# Patient Record
Sex: MALE | Race: BLACK OR AFRICAN AMERICAN | NOT HISPANIC OR LATINO | URBAN - METROPOLITAN AREA
[De-identification: names, ages, dates, MRNs, and addresses within clinical notes are randomized per-mention and may not be internally consistent; named-entity substitution may affect disease eponyms.]

---

## 2017-02-19 ENCOUNTER — INPATIENT (INPATIENT)
Facility: HOSPITAL | Age: 62
LOS: 2 days | Discharge: HOME | End: 2017-02-22
Attending: HOSPITALIST | Admitting: HOSPITALIST
Payer: MEDICAID

## 2017-02-19 VITALS
HEIGHT: 68 IN | DIASTOLIC BLOOD PRESSURE: 74 MMHG | OXYGEN SATURATION: 100 % | WEIGHT: 149.91 LBS | RESPIRATION RATE: 16 BRPM | HEART RATE: 94 BPM | SYSTOLIC BLOOD PRESSURE: 142 MMHG

## 2017-02-19 DIAGNOSIS — D17.39 BENIGN LIPOMATOUS NEOPLASM OF SKIN AND SUBCUTANEOUS TISSUE OF OTHER SITES: Chronic | ICD-10-CM

## 2017-02-19 LAB
ALBUMIN SERPL ELPH-MCNC: 3 G/DL — LOW (ref 3.3–5)
ALP SERPL-CCNC: 101 U/L — SIGNIFICANT CHANGE UP (ref 40–120)
ALT FLD-CCNC: 22 U/L — SIGNIFICANT CHANGE UP (ref 12–78)
ANION GAP SERPL CALC-SCNC: 11 MMOL/L — SIGNIFICANT CHANGE UP (ref 5–17)
APPEARANCE UR: CLEAR — SIGNIFICANT CHANGE UP
APTT BLD: 28.5 SEC — SIGNIFICANT CHANGE UP (ref 27.5–37.4)
AST SERPL-CCNC: 18 U/L — SIGNIFICANT CHANGE UP (ref 15–37)
BASOPHILS # BLD AUTO: 0 K/UL — SIGNIFICANT CHANGE UP (ref 0–0.2)
BASOPHILS NFR BLD AUTO: 0.3 % — SIGNIFICANT CHANGE UP (ref 0–2)
BILIRUB SERPL-MCNC: 0.3 MG/DL — SIGNIFICANT CHANGE UP (ref 0.2–1.2)
BILIRUB UR-MCNC: NEGATIVE — SIGNIFICANT CHANGE UP
BUN SERPL-MCNC: 21 MG/DL — SIGNIFICANT CHANGE UP (ref 7–23)
CALCIUM SERPL-MCNC: 8.3 MG/DL — LOW (ref 8.5–10.1)
CHLORIDE SERPL-SCNC: 96 MMOL/L — SIGNIFICANT CHANGE UP (ref 96–108)
CK SERPL-CCNC: 130 U/L — SIGNIFICANT CHANGE UP (ref 26–308)
CO2 SERPL-SCNC: 31 MMOL/L — SIGNIFICANT CHANGE UP (ref 22–31)
COLOR SPEC: YELLOW — SIGNIFICANT CHANGE UP
CREAT SERPL-MCNC: 1.32 MG/DL — HIGH (ref 0.5–1.3)
DIFF PNL FLD: NEGATIVE — SIGNIFICANT CHANGE UP
EOSINOPHIL # BLD AUTO: 0.1 K/UL — SIGNIFICANT CHANGE UP (ref 0–0.5)
EOSINOPHIL NFR BLD AUTO: 0.5 % — SIGNIFICANT CHANGE UP (ref 0–6)
GLUCOSE SERPL-MCNC: 276 MG/DL — HIGH (ref 70–99)
GLUCOSE UR QL: 250 MG/DL
HCT VFR BLD CALC: 38.9 % — LOW (ref 39–50)
HGB BLD-MCNC: 13.1 G/DL — SIGNIFICANT CHANGE UP (ref 13–17)
INR BLD: 0.9 RATIO — SIGNIFICANT CHANGE UP (ref 0.88–1.16)
KETONES UR-MCNC: NEGATIVE — SIGNIFICANT CHANGE UP
LEUKOCYTE ESTERASE UR-ACNC: NEGATIVE — SIGNIFICANT CHANGE UP
LYMPHOCYTES # BLD AUTO: 1.8 K/UL — SIGNIFICANT CHANGE UP (ref 1–3.3)
LYMPHOCYTES # BLD AUTO: 10.2 % — LOW (ref 13–44)
MCHC RBC-ENTMCNC: 28.8 PG — SIGNIFICANT CHANGE UP (ref 27–34)
MCHC RBC-ENTMCNC: 33.8 GM/DL — SIGNIFICANT CHANGE UP (ref 32–36)
MCV RBC AUTO: 85.4 FL — SIGNIFICANT CHANGE UP (ref 80–100)
MONOCYTES # BLD AUTO: 1 K/UL — HIGH (ref 0–0.9)
MONOCYTES NFR BLD AUTO: 5.6 % — SIGNIFICANT CHANGE UP (ref 2–14)
NEUTROPHILS # BLD AUTO: 14.8 K/UL — HIGH (ref 1.8–7.4)
NEUTROPHILS NFR BLD AUTO: 83.4 % — HIGH (ref 43–77)
NITRITE UR-MCNC: NEGATIVE — SIGNIFICANT CHANGE UP
PH UR: 6.5 — SIGNIFICANT CHANGE UP (ref 4.8–8)
PLATELET # BLD AUTO: 262 K/UL — SIGNIFICANT CHANGE UP (ref 150–400)
POTASSIUM SERPL-MCNC: 3.9 MMOL/L — SIGNIFICANT CHANGE UP (ref 3.5–5.3)
POTASSIUM SERPL-SCNC: 3.9 MMOL/L — SIGNIFICANT CHANGE UP (ref 3.5–5.3)
PROT SERPL-MCNC: 6.8 GM/DL — SIGNIFICANT CHANGE UP (ref 6–8.3)
PROT UR-MCNC: 100 MG/DL
PROTHROM AB SERPL-ACNC: 10.1 SEC — SIGNIFICANT CHANGE UP (ref 10–13.1)
RBC # BLD: 4.55 M/UL — SIGNIFICANT CHANGE UP (ref 4.2–5.8)
RBC # FLD: 12.3 % — SIGNIFICANT CHANGE UP (ref 11–15)
SODIUM SERPL-SCNC: 138 MMOL/L — SIGNIFICANT CHANGE UP (ref 135–145)
SP GR SPEC: 1.01 — SIGNIFICANT CHANGE UP (ref 1.01–1.02)
TROPONIN I SERPL-MCNC: 0.02 NG/ML — SIGNIFICANT CHANGE UP (ref 0.01–0.04)
UROBILINOGEN FLD QL: NEGATIVE MG/DL — SIGNIFICANT CHANGE UP
WBC # BLD: 17.8 K/UL — HIGH (ref 3.8–10.5)
WBC # FLD AUTO: 17.8 K/UL — HIGH (ref 3.8–10.5)
WBC UR QL: SIGNIFICANT CHANGE UP

## 2017-02-19 PROCEDURE — 99285 EMERGENCY DEPT VISIT HI MDM: CPT

## 2017-02-19 PROCEDURE — 70450 CT HEAD/BRAIN W/O DYE: CPT | Mod: 26

## 2017-02-19 PROCEDURE — 99223 1ST HOSP IP/OBS HIGH 75: CPT | Mod: AI

## 2017-02-19 RX ORDER — INFLUENZA VIRUS VACCINE 15; 15; 15; 15 UG/.5ML; UG/.5ML; UG/.5ML; UG/.5ML
0.5 SUSPENSION INTRAMUSCULAR ONCE
Qty: 0 | Refills: 0 | Status: DISCONTINUED | OUTPATIENT
Start: 2017-02-19 | End: 2017-02-22

## 2017-02-19 RX ORDER — LOSARTAN POTASSIUM 100 MG/1
25 TABLET, FILM COATED ORAL DAILY
Qty: 0 | Refills: 0 | Status: DISCONTINUED | OUTPATIENT
Start: 2017-02-19 | End: 2017-02-22

## 2017-02-19 RX ORDER — OXYCODONE HYDROCHLORIDE 5 MG/1
5 TABLET ORAL
Qty: 0 | Refills: 0 | Status: DISCONTINUED | OUTPATIENT
Start: 2017-02-19 | End: 2017-02-22

## 2017-02-19 RX ORDER — SODIUM CHLORIDE 9 MG/ML
3 INJECTION INTRAMUSCULAR; INTRAVENOUS; SUBCUTANEOUS ONCE
Qty: 0 | Refills: 0 | Status: COMPLETED | OUTPATIENT
Start: 2017-02-19 | End: 2017-02-19

## 2017-02-19 RX ORDER — ASPIRIN/CALCIUM CARB/MAGNESIUM 324 MG
325 TABLET ORAL ONCE
Qty: 0 | Refills: 0 | Status: COMPLETED | OUTPATIENT
Start: 2017-02-19 | End: 2017-02-19

## 2017-02-19 RX ORDER — AMLODIPINE BESYLATE 2.5 MG/1
10 TABLET ORAL DAILY
Qty: 0 | Refills: 0 | Status: DISCONTINUED | OUTPATIENT
Start: 2017-02-19 | End: 2017-02-22

## 2017-02-19 RX ORDER — ASPIRIN/CALCIUM CARB/MAGNESIUM 324 MG
81 TABLET ORAL DAILY
Qty: 0 | Refills: 0 | Status: DISCONTINUED | OUTPATIENT
Start: 2017-02-20 | End: 2017-02-22

## 2017-02-19 RX ADMIN — Medication 325 MILLIGRAM(S): at 22:22

## 2017-02-19 RX ADMIN — SODIUM CHLORIDE 3 MILLILITER(S): 9 INJECTION INTRAMUSCULAR; INTRAVENOUS; SUBCUTANEOUS at 15:55

## 2017-02-19 NOTE — H&P ADULT. - RS GEN PE MLT RESP DETAILS PC
respirations non-labored/good air movement/airway patent/no chest wall tenderness/breath sounds equal/clear to auscultation bilaterally

## 2017-02-19 NOTE — ED ADULT NURSE NOTE - CHIEF COMPLAINT QUOTE
Left sided weakness upon awakening today, few hours ago increase weakness and speech impairment at 10a resides at a shelter, stoke evaluation done in triage does not meet code stroke standards

## 2017-02-19 NOTE — ED ADULT TRIAGE NOTE - CHIEF COMPLAINT QUOTE
Left sided weakness upon awakening today, few hours ago increase weakness and speech impairment, resides at a shelter Left sided weakness upon awakening today, few hours ago increase weakness and speech impairment at 10a resides at a shelter, stoke evaluation done in triage does not meet code stroke standards

## 2017-02-19 NOTE — ED PROVIDER NOTE - MEDICAL DECISION MAKING DETAILS
pt presents with left facial droop and left sided weakness with slurred speech since this morning, pt is not a tpa candidate, he reports waking up with the left side weakness, labs, ct , ua

## 2017-02-19 NOTE — H&P ADULT. - NEUROLOGICAL DETAILS
alert and oriented x 3/responds to verbal commands/responds to pain/sensation intact/deep reflexes intact/cranial nerves intact

## 2017-02-19 NOTE — ED PROVIDER NOTE - OBJECTIVE STATEMENT
61 year old male with history of htn presents today c/o waking up with left sided weakness, slurred speech since 10am (-0 chest pain (-) sob (- 61 year old male with history of htn presents today c/o waking up with left sided weakness, slurred speech since 10am, pt is s/p left knee surgery  on Thursday and prescribed percocet which he thought was the cause of his symptoms (-)chest pain (-) sob (-) headache (-) dizzy (-) nausea (-) vomiting

## 2017-02-19 NOTE — H&P ADULT. - HISTORY OF PRESENT ILLNESS
Pt. is a 61 year old male with history of Thymoma s/p resection, myasthenia gravis, htn and DM-2 presents today c/o waking up with left sided weakness, slurred speech since 10am, which is now mostly improved. pt is s/p left knee surgery  on Thursday and prescribed percocet which he thought was the cause of his symptoms since he took it empty stomach.  Pt denies any sob,cp, palpitations, n/v/d/c,

## 2017-02-20 DIAGNOSIS — E11.65 TYPE 2 DIABETES MELLITUS WITH HYPERGLYCEMIA: ICD-10-CM

## 2017-02-20 DIAGNOSIS — E11.9 TYPE 2 DIABETES MELLITUS WITHOUT COMPLICATIONS: ICD-10-CM

## 2017-02-20 DIAGNOSIS — R13.10 DYSPHAGIA, UNSPECIFIED: ICD-10-CM

## 2017-02-20 DIAGNOSIS — G70.00 MYASTHENIA GRAVIS WITHOUT (ACUTE) EXACERBATION: Chronic | ICD-10-CM

## 2017-02-20 DIAGNOSIS — I10 ESSENTIAL (PRIMARY) HYPERTENSION: ICD-10-CM

## 2017-02-20 DIAGNOSIS — I63.9 CEREBRAL INFARCTION, UNSPECIFIED: ICD-10-CM

## 2017-02-20 DIAGNOSIS — Z29.9 ENCOUNTER FOR PROPHYLACTIC MEASURES, UNSPECIFIED: ICD-10-CM

## 2017-02-20 DIAGNOSIS — G70.00 MYASTHENIA GRAVIS WITHOUT (ACUTE) EXACERBATION: ICD-10-CM

## 2017-02-20 LAB
ANION GAP SERPL CALC-SCNC: 6 MMOL/L — SIGNIFICANT CHANGE UP (ref 5–17)
BUN SERPL-MCNC: 15 MG/DL — SIGNIFICANT CHANGE UP (ref 7–23)
CALCIUM SERPL-MCNC: 8.7 MG/DL — SIGNIFICANT CHANGE UP (ref 8.5–10.1)
CHLORIDE SERPL-SCNC: 101 MMOL/L — SIGNIFICANT CHANGE UP (ref 96–108)
CHOLEST SERPL-MCNC: 190 MG/DL — SIGNIFICANT CHANGE UP (ref 10–199)
CO2 SERPL-SCNC: 32 MMOL/L — HIGH (ref 22–31)
CREAT SERPL-MCNC: 1.11 MG/DL — SIGNIFICANT CHANGE UP (ref 0.5–1.3)
GLUCOSE SERPL-MCNC: 168 MG/DL — HIGH (ref 70–99)
HCT VFR BLD CALC: 38.7 % — LOW (ref 39–50)
HDLC SERPL-MCNC: 59 MG/DL — SIGNIFICANT CHANGE UP (ref 40–125)
HGB BLD-MCNC: 13.4 G/DL — SIGNIFICANT CHANGE UP (ref 13–17)
LIPID PNL WITH DIRECT LDL SERPL: 105 MG/DL — SIGNIFICANT CHANGE UP
MCHC RBC-ENTMCNC: 29.8 PG — SIGNIFICANT CHANGE UP (ref 27–34)
MCHC RBC-ENTMCNC: 34.7 GM/DL — SIGNIFICANT CHANGE UP (ref 32–36)
MCV RBC AUTO: 85.8 FL — SIGNIFICANT CHANGE UP (ref 80–100)
PLATELET # BLD AUTO: 235 K/UL — SIGNIFICANT CHANGE UP (ref 150–400)
POTASSIUM SERPL-MCNC: 3.6 MMOL/L — SIGNIFICANT CHANGE UP (ref 3.5–5.3)
POTASSIUM SERPL-SCNC: 3.6 MMOL/L — SIGNIFICANT CHANGE UP (ref 3.5–5.3)
RBC # BLD: 4.51 M/UL — SIGNIFICANT CHANGE UP (ref 4.2–5.8)
RBC # FLD: 12.6 % — SIGNIFICANT CHANGE UP (ref 11–15)
SODIUM SERPL-SCNC: 139 MMOL/L — SIGNIFICANT CHANGE UP (ref 135–145)
TOTAL CHOLESTEROL/HDL RATIO MEASUREMENT: 3.2 RATIO — LOW (ref 3.4–9.6)
TRIGL SERPL-MCNC: 131 MG/DL — SIGNIFICANT CHANGE UP (ref 10–149)
WBC # BLD: 17.1 K/UL — HIGH (ref 3.8–10.5)
WBC # FLD AUTO: 17.1 K/UL — HIGH (ref 3.8–10.5)

## 2017-02-20 PROCEDURE — 93880 EXTRACRANIAL BILAT STUDY: CPT | Mod: 26

## 2017-02-20 PROCEDURE — 99233 SBSQ HOSP IP/OBS HIGH 50: CPT

## 2017-02-20 PROCEDURE — 70551 MRI BRAIN STEM W/O DYE: CPT | Mod: 26

## 2017-02-20 RX ORDER — DEXTROSE 50 % IN WATER 50 %
12.5 SYRINGE (ML) INTRAVENOUS ONCE
Qty: 0 | Refills: 0 | Status: DISCONTINUED | OUTPATIENT
Start: 2017-02-20 | End: 2017-02-22

## 2017-02-20 RX ORDER — DEXTROSE 50 % IN WATER 50 %
1 SYRINGE (ML) INTRAVENOUS ONCE
Qty: 0 | Refills: 0 | Status: COMPLETED | OUTPATIENT
Start: 2017-02-20 | End: 2017-02-20

## 2017-02-20 RX ORDER — GLUCAGON INJECTION, SOLUTION 0.5 MG/.1ML
1 INJECTION, SOLUTION SUBCUTANEOUS ONCE
Qty: 0 | Refills: 0 | Status: DISCONTINUED | OUTPATIENT
Start: 2017-02-20 | End: 2017-02-22

## 2017-02-20 RX ORDER — DEXTROSE 50 % IN WATER 50 %
25 SYRINGE (ML) INTRAVENOUS ONCE
Qty: 0 | Refills: 0 | Status: DISCONTINUED | OUTPATIENT
Start: 2017-02-20 | End: 2017-02-22

## 2017-02-20 RX ORDER — PYRIDOSTIGMINE BROMIDE 60 MG/5ML
60 SOLUTION ORAL THREE TIMES A DAY
Qty: 0 | Refills: 0 | Status: DISCONTINUED | OUTPATIENT
Start: 2017-02-20 | End: 2017-02-22

## 2017-02-20 RX ORDER — ENOXAPARIN SODIUM 100 MG/ML
40 INJECTION SUBCUTANEOUS EVERY 24 HOURS
Qty: 0 | Refills: 0 | Status: DISCONTINUED | OUTPATIENT
Start: 2017-02-20 | End: 2017-02-22

## 2017-02-20 RX ORDER — ATORVASTATIN CALCIUM 80 MG/1
10 TABLET, FILM COATED ORAL AT BEDTIME
Qty: 0 | Refills: 0 | Status: DISCONTINUED | OUTPATIENT
Start: 2017-02-20 | End: 2017-02-22

## 2017-02-20 RX ORDER — SODIUM CHLORIDE 9 MG/ML
1000 INJECTION, SOLUTION INTRAVENOUS
Qty: 0 | Refills: 0 | Status: DISCONTINUED | OUTPATIENT
Start: 2017-02-20 | End: 2017-02-22

## 2017-02-20 RX ORDER — DEXTROSE 50 % IN WATER 50 %
1 SYRINGE (ML) INTRAVENOUS ONCE
Qty: 0 | Refills: 0 | Status: DISCONTINUED | OUTPATIENT
Start: 2017-02-20 | End: 2017-02-22

## 2017-02-20 RX ORDER — INSULIN LISPRO 100/ML
VIAL (ML) SUBCUTANEOUS
Qty: 0 | Refills: 0 | Status: DISCONTINUED | OUTPATIENT
Start: 2017-02-20 | End: 2017-02-22

## 2017-02-20 RX ADMIN — ATORVASTATIN CALCIUM 10 MILLIGRAM(S): 80 TABLET, FILM COATED ORAL at 22:08

## 2017-02-20 RX ADMIN — PYRIDOSTIGMINE BROMIDE 60 MILLIGRAM(S): 60 SOLUTION ORAL at 22:09

## 2017-02-20 RX ADMIN — ENOXAPARIN SODIUM 40 MILLIGRAM(S): 100 INJECTION SUBCUTANEOUS at 12:05

## 2017-02-20 RX ADMIN — LOSARTAN POTASSIUM 25 MILLIGRAM(S): 100 TABLET, FILM COATED ORAL at 05:25

## 2017-02-20 RX ADMIN — Medication 4: at 17:48

## 2017-02-20 RX ADMIN — Medication 81 MILLIGRAM(S): at 12:05

## 2017-02-20 RX ADMIN — Medication 1 DOSE(S): at 19:30

## 2017-02-20 RX ADMIN — AMLODIPINE BESYLATE 10 MILLIGRAM(S): 2.5 TABLET ORAL at 05:15

## 2017-02-20 NOTE — DISCHARGE NOTE ADULT - MEDICATION SUMMARY - MEDICATIONS TO STOP TAKING
I will STOP taking the medications listed below when I get home from the hospital:    hydroCHLOROthiazide 25 mg oral tablet  -- 1 tab(s) by mouth once a day

## 2017-02-20 NOTE — DISCHARGE NOTE ADULT - MEDICATION SUMMARY - MEDICATIONS TO TAKE
I will START or STAY ON the medications listed below when I get home from the hospital:    oxyCODONE 5 mg oral capsule  -- 1 cap(s) by mouth every 6 hours  -- Indication: For Pain    Aspirin Enteric Coated 81 mg oral delayed release tablet  -- 1 tab(s) by mouth once a day  -- Indication: For Acute CVA (cerebrovascular accident)    Cozaar 25 mg oral tablet  -- 1 tab(s) by mouth once a day  -- Indication: For Essential hypertension    Lantus Solostar Pen 100 units/mL subcutaneous solution  -- 5 subcutaneous once a day (at bedtime)  -- Do not drink alcoholic beverages when taking this medication.  It is very important that you take or use this exactly as directed.  Do not skip doses or discontinue unless directed by your doctor.  Keep in refrigerator.  Do not freeze.    -- Indication: For DM (diabetes mellitus)    HumaLOG KwikPen 100 units/mL subcutaneous solution  -- 3 unit(s) subcutaneous 3 times a day (before meals)  -- Indication: For DM (diabetes mellitus)    metFORMIN 1000 mg oral tablet  -- 1 tab(s) by mouth 2 times a day  -- Indication: For DM (diabetes mellitus)    Lipitor 10 mg oral tablet  -- 1 tab(s) by mouth once a day (at bedtime)  -- Indication: For Acute CVA (cerebrovascular accident)    amLODIPine 10 mg oral tablet  -- 1 tab(s) by mouth once a day  -- Indication: For Essential hypertension    Mestinon 60 mg oral tablet  -- 1 tab(s) by mouth 3 times a day  -- Indication: For Myasthenia gravis

## 2017-02-20 NOTE — SWALLOW BEDSIDE ASSESSMENT ADULT - ASR SWALLOW LINGUAL MOBILITY
impaired left lateral movement/impaired anterior elevation/impaired right lateral movement/slow movements

## 2017-02-20 NOTE — DISCHARGE NOTE ADULT - PROVIDER TOKENS
FREE:[LAST:[your],FIRST:[PMD],PHONE:[(   )    -],FAX:[(   )    -]],TOKEN:'[id number]:'[id number]',FREE:[LAST:[YOUR],FIRST:[ORTHOPEDICIAN],PHONE:[(   )    -],FAX:[(   )    -]]

## 2017-02-20 NOTE — DISCHARGE NOTE ADULT - SECONDARY DIAGNOSIS.
Type 2 diabetes mellitus with hyperglycemia, without long-term current use of insulin Essential hypertension S/P arthroscopic knee surgery Myasthenia gravis

## 2017-02-20 NOTE — SWALLOW BEDSIDE ASSESSMENT ADULT - COMMENTS
Hx of Thymoma s/p resection and c/o waking up with left sided weakness;  Pt with slurred speech which is now mostly improved.     Pt admitted from homeless shelter; Pt with dysphonia and reduced vocal volume sec mult med problems

## 2017-02-20 NOTE — DISCHARGE NOTE ADULT - PLAN OF CARE
resolve symptoms follow up  outpatient follow up Dr. Payne outpatient started insulin, medication compliance needed  continue with metformin +lantus insulin+ humolog TIDAC   monitor FS w/ glucometer, follow up with your PCP within 1 week -monitor BP  -continue with current meds follow up your own orthopedician continue with home meds follow up Dr. Payne outpatient  take mechanical soft diet with thin liquids  continue with ASA,statin, insulin and other current meds

## 2017-02-20 NOTE — PROGRESS NOTE ADULT - SUBJECTIVE AND OBJECTIVE BOX
Patient is a 61y old  Male who presents with a chief complaint of slurred speech (2017 22:48)       OVERNIGHT EVENTS: no acute events    MEDICATIONS  (STANDING):  amLODIPine   Tablet 10milliGRAM(s) Oral daily  losartan 25milliGRAM(s) Oral daily  aspirin enteric coated 81milliGRAM(s) Oral daily  influenza   Vaccine 0.5milliLiter(s) IntraMuscular once  insulin lispro (HumaLOG) corrective regimen sliding scale  SubCutaneous three times a day before meals  dextrose 5%. 1000milliLiter(s) IV Continuous <Continuous>  dextrose 50% Injectable 12.5Gram(s) IV Push once  dextrose 50% Injectable 25Gram(s) IV Push once  dextrose 50% Injectable 25Gram(s) IV Push once  enoxaparin Injectable 40milliGRAM(s) SubCutaneous every 24 hours    MEDICATIONS  (PRN):  oxyCODONE IR 5milliGRAM(s) Oral four times a day PRN Moderate Pain (4 - 6)  dextrose Gel 1Dose(s) Oral once PRN Blood Glucose LESS THAN 70 milliGRAM(s)/deciliter  glucagon  Injectable 1milliGRAM(s) IntraMuscular once PRN Glucose LESS THAN 70 milligrams/deciliter       Vital Signs Last 24 Hrs  T(C): 37.1, Max: 37.1 (02-20 @ 11:35)  T(F): 98.8, Max: 98.8 (02-20 @ 11:35)  HR: 75 (67 - 94)  BP: 117/52 (117/52 - 149/74)  BP(mean): 86 (86 - 86)  RR: 20 (13 - 20)  SpO2: 98% (95% - 100%)    PHYSICAL EXAM:  GENERAL: NAD, well-groomed, well-developed  HEAD:  Atraumatic, Normocephalic  EYES: EOMI, PERRLA, conjunctiva and sclera clear  ENMT: No tonsillar erythema, exudates, or enlargement; Moist mucous membranes   NECK: Supple, No JVD   NERVOUS SYSTEM:  Alert & Oriented X3, left facial droop, 3/5 strength LUE  CHEST/LUNG: Clear to percussion bilaterally; No rales, rhonchi, wheezing, or rubs  HEART: Regular rate and rhythm; No murmurs, rubs, or gallops  ABDOMEN: Soft, Nontender, Nondistended; Bowel sounds present  EXTREMITIES:  2+ Peripheral Pulses, No clubbing, cyanosis, or edema     LABS:                        13.4   17.1  )-----------( 235      ( 2017 11:38 )             38.7     2017 11:38    139    |  101    |  15     ----------------------------<  168    3.6     |  32     |  1.11     Ca    8.7        2017 11:38    TPro  6.8    /  Alb  3.0    /  TBili  0.3    /  DBili  x      /  AST  18     /  ALT  22     /  AlkPhos  101    2017 16:00    PT/INR - ( 2017 16:00 )   PT: 10.1 sec;   INR: 0.90 ratio         PTT - ( 2017 16:00 )  PTT:28.5 sec   cardiac markers Troponin .018      Urinalysis Basic - ( 2017 21:22 )    Color: Yellow / Appearance: Clear / S.010 / pH: x  Gluc: x / Ketone: Negative  / Bili: Negative / Urobili: Negative mg/dL   Blood: x / Protein: 100 mg/dL / Nitrite: Negative   Leuk Esterase: Negative / RBC: x / WBC 0-2   Sq Epi: x / Non Sq Epi: x / Bacteria: x      CAPILLARY BLOOD GLUCOSE  154 (2017 11:58)  169 (2017 08:07)  234 (2017 15:17)    Cultures    RADIOLOGY & ADDITIONAL TESTS:    Imaging Personally Reviewed:  [ X] YES  [ ] NO  mri Patient is a 61y old  Male who presents with a chief complaint of slurred speech (2017 22:48)       OVERNIGHT EVENTS:    MEDICATIONS  (STANDING):  amLODIPine   Tablet 10milliGRAM(s) Oral daily  losartan 25milliGRAM(s) Oral daily  aspirin enteric coated 81milliGRAM(s) Oral daily  influenza   Vaccine 0.5milliLiter(s) IntraMuscular once  insulin lispro (HumaLOG) corrective regimen sliding scale  SubCutaneous three times a day before meals  dextrose 5%. 1000milliLiter(s) IV Continuous <Continuous>  dextrose 50% Injectable 12.5Gram(s) IV Push once  dextrose 50% Injectable 25Gram(s) IV Push once  dextrose 50% Injectable 25Gram(s) IV Push once  enoxaparin Injectable 40milliGRAM(s) SubCutaneous every 24 hours    MEDICATIONS  (PRN):  oxyCODONE IR 5milliGRAM(s) Oral four times a day PRN Moderate Pain (4 - 6)  dextrose Gel 1Dose(s) Oral once PRN Blood Glucose LESS THAN 70 milliGRAM(s)/deciliter  glucagon  Injectable 1milliGRAM(s) IntraMuscular once PRN Glucose LESS THAN 70 milligrams/deciliter        REVIEW OF SYSTEMS:  CONSTITUTIONAL: No fever, weight loss, or fatigue  EYES: No eye pain, visual disturbances, or discharge  ENMT:  No difficulty hearing, tinnitus, vertigo; No sinus or throat pain  NECK: No pain or stiffness  RESPIRATORY: No cough, wheezing, chills or hemoptysis; No shortness of breath  CARDIOVASCULAR: No chest pain, palpitations, dizziness, or leg swelling  GASTROINTESTINAL: No abdominal or epigastric pain. No nausea, vomiting, or hematemesis; No diarrhea or constipation. No melena or hematochezia.  GENITOURINARY: No dysuria, frequency, hematuria, or incontinence  NEUROLOGICAL: No headaches, memory loss, loss of strength, numbness, or tremors  SKIN: No itching, burning, rashes, or lesions      Vital Signs Last 24 Hrs  T(C): 37.1, Max: 37.1 (02-20 @ 11:35)  T(F): 98.8, Max: 98.8 (02-20 @ 11:35)  HR: 75 (67 - 94)  BP: 117/52 (117/52 - 149/74)  BP(mean): 86 (86 - 86)  RR: 20 (13 - 20)  SpO2: 98% (95% - 100%)    PHYSICAL EXAM:  GENERAL: NAD, well-groomed, well-developed  HEAD:  Atraumatic, Normocephalic  EYES: EOMI, PERRLA, conjunctiva and sclera clear  ENMT: No tonsillar erythema, exudates, or enlargement; Moist mucous membranes   NECK: Supple, No JVD   NERVOUS SYSTEM:  Alert & Oriented X3, Good concentration; Motor Strength 5/5 B/L upper and lower extremities; DTRs 2+ intact and symmetric  CHEST/LUNG: Clear to percussion bilaterally; No rales, rhonchi, wheezing, or rubs  HEART: Regular rate and rhythm; No murmurs, rubs, or gallops  ABDOMEN: Soft, Nontender, Nondistended; Bowel sounds present  EXTREMITIES:  2+ Peripheral Pulses, No clubbing, cyanosis, or edema  LYMPH: No lymphadenopathy noted  SKIN: No rashes or lesions    LABS:                        13.4   17.1  )-----------( 235      ( 2017 11:38 )             38.7     2017 11:38    139    |  101    |  15     ----------------------------<  168    3.6     |  32     |  1.11     Ca    8.7        2017 11:38    TPro  6.8    /  Alb  3.0    /  TBili  0.3    /  DBili  x      /  AST  18     /  ALT  22     /  AlkPhos  101    2017 16:00    PT/INR - ( 2017 16:00 )   PT: 10.1 sec;   INR: 0.90 ratio         PTT - ( 2017 16:00 )  PTT:28.5 sec   cardiac markers Troponin .018      Urinalysis Basic - ( 2017 21:22 )    Color: Yellow / Appearance: Clear / S.010 / pH: x  Gluc: x / Ketone: Negative  / Bili: Negative / Urobili: Negative mg/dL   Blood: x / Protein: 100 mg/dL / Nitrite: Negative   Leuk Esterase: Negative / RBC: x / WBC 0-2   Sq Epi: x / Non Sq Epi: x / Bacteria: x      CAPILLARY BLOOD GLUCOSE  154 (2017 11:58)  169 (2017 08:07)  234 (2017 15:17)    Cultures    RADIOLOGY & ADDITIONAL TESTS:    Imaging Personally Reviewed:  [ ] YES  [ ] NO     Consultant(s) Notes Reviewed:  [ ] YES  [ ] NO    Care Discussed with Consultants/Other Providers [ ] YES  [ ] NO  Consultant(s) Notes Reviewed:  [X ] YES  [ ] NO    Care Discussed with Consultants/Other Providers [X ] YES  [ ] NO

## 2017-02-20 NOTE — SWALLOW BEDSIDE ASSESSMENT ADULT - SWALLOW EVAL: RECOMMENDED FEEDING/EATING TECHNIQUES
position upright (90 degrees)/allow for swallow between intakes/alternate food with liquid/maintain upright posture during/after eating for 30 mins/no straws/small sips/bites/oral hygiene

## 2017-02-20 NOTE — DISCHARGE NOTE ADULT - CARE PLAN
Principal Discharge DX:	Acute CVA (cerebrovascular accident)  Goal:	resolve symptoms  Instructions for follow-up, activity and diet:	follow up  outpatient  Secondary Diagnosis:	Type 2 diabetes mellitus with hyperglycemia, without long-term current use of insulin  Secondary Diagnosis:	Essential hypertension  Secondary Diagnosis:	S/P arthroscopic knee surgery  Secondary Diagnosis:	Myasthenia gravis Principal Discharge DX:	Acute CVA (cerebrovascular accident)  Goal:	resolve symptoms  Instructions for follow-up, activity and diet:	follow up Dr. Payne outpatient  Secondary Diagnosis:	Type 2 diabetes mellitus with hyperglycemia, without long-term current use of insulin  Secondary Diagnosis:	Essential hypertension  Secondary Diagnosis:	S/P arthroscopic knee surgery  Secondary Diagnosis:	Myasthenia gravis Principal Discharge DX:	Acute CVA (cerebrovascular accident)  Goal:	resolve symptoms  Instructions for follow-up, activity and diet:	follow up Dr. Payne outpatient  take mechanical soft diet with thin liquids  continue with ASA,statin, insulin and other current meds  Secondary Diagnosis:	Type 2 diabetes mellitus with hyperglycemia, without long-term current use of insulin  Instructions for follow-up, activity and diet:	started insulin, medication compliance needed  continue with metformin +lantus insulin+ humolog TIDAC   monitor FS w/ glucometer, follow up with your PCP within 1 week  Secondary Diagnosis:	Essential hypertension  Instructions for follow-up, activity and diet:	-monitor BP  -continue with current meds  Secondary Diagnosis:	S/P arthroscopic knee surgery  Instructions for follow-up, activity and diet:	follow up your own orthopedician  Secondary Diagnosis:	Myasthenia gravis  Instructions for follow-up, activity and diet:	continue with home meds

## 2017-02-20 NOTE — DISCHARGE NOTE ADULT - PATIENT PORTAL LINK FT
“You can access the [facility] Patient Portal, offered by [facility], by registering with the following website: [email or web address]”

## 2017-02-20 NOTE — SWALLOW BEDSIDE ASSESSMENT ADULT - PHARYNGEAL PHASE
Within functional limits Complaints of pharyngeal stasis/Delayed throat clear post oral intake/Multiple swallows Multiple swallows

## 2017-02-20 NOTE — DISCHARGE NOTE ADULT - HOSPITAL COURSE
61 year old male with history of Thymoma s/p resection, myasthenia gravis, htn and DM-2 presents today c/o waking up with left sided weakness, slurred speech  also pt is s/p left knee arthroscopy last week, as per pt- sutures to be removed tomorrow. ortho requested to see the pt and was told their usual practice is removal after 14 days and pt to f/u with his pvt ortho outpt. CT head, MRI head   Pt was admitted for  Acute CVA (cerebrovascular accident):started on ASA,statin. Neurology consult appreciated. Telemetry monitoring done  -PT/OT eval done.TTE done.   Pt's a1c>8, will need insulin along with metformin. explained to pt and he was taught insulin administration by RN. also provided outpt info for endocrinologist upon pt's request  I called  pt's PMD  )[phone]  and discussed pt's hospital stay in detail as per pt's request .   MRI head: There is evidence of abnormal T2 prolongation with restricted diffusion   seen involving the right lenticular nucleus/corona radiata region. This   is compatible with an acute infarct.  TTE :  1. Left ventricular ejection fraction, by visual estimation, is 55 to   60%.   2. Mildly increased LV wall thickness.   3. Spectral Doppler shows impaired relaxation pattern of left   ventricular myocardial filling (Grade I diastolic dysfunction).   4. There is mild concentric left ventricular hypertrophy.   5. Normal right ventricular size and function.   6. The left atrium is normal in size.   7. The right atrium is normal in size.   8. There is no evidence of pericardial effusion.   9. Structurally normal mitral valve, with normal leaflet excursion.  10. Structurally normal tricuspid valve, with normal leaflet excursion.  11. Normal trileaflet aortic valve with normal opening.  12. Structurally normal pulmonic valve, with normal leaflet excursion.  13. The main pulmonary artery is normal in size. 61 year old male with history of Thymoma s/p resection, myasthenia gravis, htn and DM-2 presents today c/o waking up with left sided weakness, slurred speech  also pt is s/p left knee arthroscopy last week, as per pt- sutures to be removed tomorrow. ortho requested to see the pt and was told their usual practice is removal after 14 days and pt to f/u with his pvt ortho outpt. CT head, MRI head   Pt was admitted for  Acute CVA (cerebrovascular accident):started on ASA,statin. Neurology consult appreciated. Telemetry monitoring done  -PT/OT eval done.TTE done.   Pt's a1c>8, will need insulin along with metformin. explained to pt and he was taught insulin administration by RN. also provided outpt info for endocrinologist upon pt's request  I called  pt's PMD  )[phone]  and left message with call back info  as per pt's request .   MRI head: There is evidence of abnormal T2 prolongation with restricted diffusion   seen involving the right lenticular nucleus/corona radiata region. This   is compatible with an acute infarct.  TTE :  1. Left ventricular ejection fraction, by visual estimation, is 55 to   60%.   2. Mildly increased LV wall thickness.   3. Spectral Doppler shows impaired relaxation pattern of left   ventricular myocardial filling (Grade I diastolic dysfunction).   4. There is mild concentric left ventricular hypertrophy.   5. Normal right ventricular size and function.   6. The left atrium is normal in size.   7. The right atrium is normal in size.   8. There is no evidence of pericardial effusion.   9. Structurally normal mitral valve, with normal leaflet excursion.  10. Structurally normal tricuspid valve, with normal leaflet excursion.  11. Normal trileaflet aortic valve with normal opening.  12. Structurally normal pulmonic valve, with normal leaflet excursion.  13. The main pulmonary artery is normal in size.

## 2017-02-20 NOTE — PROGRESS NOTE ADULT - ASSESSMENT
61 year old male with history of Thymoma s/p resection, myasthenia gravis, htn and DM-2 presents today c/o waking up with left sided weakness, slurred speech

## 2017-02-20 NOTE — DISCHARGE NOTE ADULT - CARE PROVIDERS DIRECT ADDRESSES
,DirectAddress_Unknown,DirectAddress_Unknown,DirectAddress_Unknown,DirectAddress_Unknown,DirectAddress_Unknown

## 2017-02-20 NOTE — SWALLOW BEDSIDE ASSESSMENT ADULT - SLP GENERAL OBSERVATIONS
Pt was seen OOB in chair alert and interactive; speech is impaired sec MG with reduced communication efficiency; Pt was verbal and followed directions and cooperated for exam.

## 2017-02-20 NOTE — PROGRESS NOTE ADULT - PROBLEM SELECTOR PLAN 5
- resume home meds Pyridostigmine  - confirmed with [facility]([phone]) that pt has completed prednisone tapering

## 2017-02-20 NOTE — DISCHARGE NOTE ADULT - SECONDARY STROKE PREVENTION
24      RE:  Yanely Amador  :  1989      To Whom It May Concern:    Yanely Amador is currently under our care for pregnancy. Please excuse Yanely from work for the following dates:  and Saturday, 2024.    If you have any additional concerns, please do not hesitate to contact our office.    Sincerely,  Dr. Ariane Yo MD      
AGREEMENT FOR TREATMENT WITH Rh IMMUNE GLOBULIN          To:    Yury Ramos DO (provider) and [facility], [place], [place]     Date: January 17, 2024   Time: 10:44 AM    I authorize the administration of Rh Immune Globulin for    Yanely Amador (myself or name of patient) as deemed advisable in the judgment of the attending physician of his associates or assistants.     It is understood and agreed that the attending physician or his associates and assistants shall be responsible only for the performance of their own individual professional acts and that the blood typing and the selection of compatible Rh Immune Globulin are the responsibilities of those who actually perform the tests.   It has been fully explained that immunization with Rh Immune Globulin is not always successful in producing the desired result.             _____________________________________________  (Patient or person with authority to consent for patient)        _____________________________________________  (Relationship to patient)        _____________________________________________  (Witness)  
Statement Selected

## 2017-02-20 NOTE — CONSULT NOTE ADULT - SUBJECTIVE AND OBJECTIVE BOX
Subjective Complaints:  Historian:       Consult requested by ER doctor:                  Attending: Dr Lincoln    HPI:  Pt. is a 61 year old male with history of Thymoma s/p resection, myasthenia gravis, htn and DM-2 presents today c/o waking up with left sided weakness, slurred speech since 10am, which is now mostly improved. pt is s/p left knee surgery  on Thursday and prescribed percocet which he thought was the cause of his symptoms since he took it empty stomach.  Pt denies any sob,cp, palpitations, n/v/d/c, (2017 22:48)    BRITTANIE GROSS    PAST MEDICAL & SURGICAL HISTORY:  Myasthenia gravis  DM (diabetes mellitus)  HTN (hypertension)  MG with thymoma (myasthena gravis)  Lipoma of chest wall  61yMale    MEDICATIONS  (STANDING):  amLODIPine   Tablet 10milliGRAM(s) Oral daily  losartan 25milliGRAM(s) Oral daily  aspirin enteric coated 81milliGRAM(s) Oral daily  influenza   Vaccine 0.5milliLiter(s) IntraMuscular once  insulin lispro (HumaLOG) corrective regimen sliding scale  SubCutaneous three times a day before meals  dextrose 5%. 1000milliLiter(s) IV Continuous <Continuous>  dextrose 50% Injectable 12.5Gram(s) IV Push once  dextrose 50% Injectable 25Gram(s) IV Push once  dextrose 50% Injectable 25Gram(s) IV Push once  enoxaparin Injectable 40milliGRAM(s) SubCutaneous every 24 hours  atorvastatin 10milliGRAM(s) Oral at bedtime  pyridostigmine 60milliGRAM(s) Oral three times a day    MEDICATIONS  (PRN):  oxyCODONE IR 5milliGRAM(s) Oral four times a day PRN Moderate Pain (4 - 6)  dextrose Gel 1Dose(s) Oral once PRN Blood Glucose LESS THAN 70 milliGRAM(s)/deciliter  glucagon  Injectable 1milliGRAM(s) IntraMuscular once PRN Glucose LESS THAN 70 milligrams/deciliter      Allergies    No Known Allergies    Intolerances      FAMILY HISTORY:  Family history of hypertension (Mother)      REVIEW OF SYSTEMS:  General:  No wt loss, fevers, chills, night sweats  Eyes:  Good vision, no reported pain  ENT:  No sore throat, pain, runny nose, dysphagia  CV:  No pain, palpitatioins, hypo/hypertension  Resp:  No dyspnea, cough, tachypnea, wheezing  GI:  No pain, nausea, vomiting, diarrhea, constipatiion  :  No pain, bleeding, incontinence, nocturia  Muscle:  No pain, weakness  Breast:  No pain, abscess, mass, discharge  Neuro:  No weakness, tingling, memory problems  Psych:  No fatigue, insomnia, mood problems, depression  Endocrine:  No polyuria, polydypsia, cold/heat intolerance  Heme:  No petechiae, ecchymosis, easy bruisability  Skin:  No rash, tattoos, scars, edema      Vital Signs Last 24 Hrs  T(C): 37.4, Max: 37.4 ( @ 17:29)  T(F): 99.4, Max: 99.4 ( @ 17:29)  HR: 88 (67 - 88)  BP: 131/69 (117/52 - 149/74)  BP(mean): --  RR: 20 (14 - 20)  SpO2: 95% (95% - 99%)    GENERAL PHYSICAL EXAM:  General:  Appears stated age, well-groomed, well-nourished, no distress  HEENT:  NC/AT, patent nares w/ pink mucosa, OP clear w/o lesions, PERRL, EOMI, conjunctivae clear, no thyromegaly, nodules, adenopathy, no JVD  Chest:  Full & symmetric excursion, no increased effort, breath sounds clear  Cardiovascular:  Regular rhythm, S1, S2, no murmur/rub/S3/S4, no carotid/femoral/abdominal bruit, radial/pedal pulses 2+, no edema  Abdomen:  Soft, non-tender, non-distended, normoactive bowel sounds, no HSM  Extremities:  Gait & station:   Digits:   Nails:   Joints, Bones, Muscles:   ROM:   Stability:  Skin:  No rash/erythema/ecchymoses/petechiae/wounds/abscess/warm/dry  Musculoskeletal:  Full ROM in all joints w/o swelling/tenderness/effusion    NEUROLOGICAL EXAM:  HENT:  Normocephalic head; atraumatic head.  Neck supple.  ENT: normal looking.  Mental State:    Alert.  Fully oriented to person, place and date.  Coherent.  Speech clear and intact.  Cooperative.  Responds appropriately.    Cranial Nerves:  II-XII:   Pupils round and reactive to light and accommodation.  Extraocular movements full.  Visual fields full (no homonymous hemianopsia).  Visual acuity wnl.  Facial symmetry intact.  Tongue midline.  Motor Functions:  Moves all extremities.  No pronator drift of UE.  Claps hand well.  Hand  intact bilaterally.  Ambulatory.    Sensory Functions:   Intact to touch and pinprick to face and extremities.    Reflexes:  Deep tendon reflexes normoactive to biceps, knees and ankles.  Babinski absent (present).  Cerebellar Testing:    Finger to nose intact.  Nystagmus absent.  Neurovascular: Carotid auscultation full without bruits.      LABS:                        13.4   17.1  )-----------( 235      ( 2017 11:38 )             38.7     2017 11:38    139    |  101    |  15     ----------------------------<  168    3.6     |  32     |  1.11     Ca    8.7        2017 11:38    TPro  6.8    /  Alb  3.0    /  TBili  0.3    /  DBili  x      /  AST  18     /  ALT  22     /  AlkPhos  101    2017 16:00    PT/INR - ( 2017 16:00 )   PT: 10.1 sec;   INR: 0.90 ratio         PTT - ( 2017 16:00 )  PTT:28.5 sec    Urinalysis Basic - ( 2017 21:22 )    Color: Yellow / Appearance: Clear / S.010 / pH: x  Gluc: x / Ketone: Negative  / Bili: Negative / Urobili: Negative mg/dL   Blood: x / Protein: 100 mg/dL / Nitrite: Negative   Leuk Esterase: Negative / RBC: x / WBC 0-2   Sq Epi: x / Non Sq Epi: x / Bacteria: x        RADIOLOGY & ADDITIONAL STUDIES:  Brain MRI :Right Basal ganglia infarct  Urine Microscopic-Add On (NC): 21:20 ( @ 21:30)  Admit to Inpatient Level of Care:     Service:  TELEMETRY    Physician:  Young Lincoln    Diagnosis:  CVA (cerebral vascular accident)    Additional Instructions:  Diagnosis: CVA (cerebral vascular accident)  Isolation: ,None ( @ 21:35)  aspirin:   325 milliGRAM(s), Oral, once, Stop After 1 Doses  Provider&#x27;s Contact #: [phone] ( @ 21:35) [completed]  Admit to Inpatient Level of Care:     Service:  TELEMETRY    Physician:  Young Lincoln    Diagnosis:  CVA (cerebral vascular accident)    Additional Instructions:  Diagnosis: CVA (cerebral vascular accident)  Isolation: ,None ( @ 21:36)  Remote Telemetry/EKG EXCEPT Off Unit Tests:     Time/Priority:  Routine ( @ 21:36)  Diet, DASH/TLC:   Sodium &amp; Cholesterol Restricted  Consistent Carbohydrate No Snacks (:36) [discontinued]  oxyCODONE IR:   5 milliGRAM(s), Oral, four times a day, PRN for Moderate Pain (4 - 6)  Provider&#x27;s Contact #: [phone] ( @ 21:37)  amLODIPine   Tablet: [Known as NORVASC]  10 milliGRAM(s), Oral, daily  Provider&#x27;s Contact #: [phone] ( @ 21:37)  losartan: [Known as COZAAR]  25 milliGRAM(s), Oral, daily  Provider&#x27;s Contact #: [phone] ( @ 21:37)  aspirin enteric coated: [Ordered as Ecotrin]  81 milliGRAM(s), Oral, daily  Administration Instructions: swallow whole * don&#x27;t crush/chew  Provider&#x27;s Contact #: [phone] ( @ 21:38)  MRI Head w/o Cont: Routine   Indication: r/o cva  Transport: Walking  Exam Completed  Provider&#x27;s Contact #: [phone] ( @ 21:39)  Admit from ED ( @ 21:41)  (ADM OVERRIDE):   Qty Removed: 1 each  Route - Dose Given &lt;see task&gt; ( @ 22:04) [completed]  (ADM OVERRIDE):   Qty Removed: 1 each  Route - Dose Given &lt;see task&gt; ( @ 22:26) [completed]  influenza   Vaccine:   0.5 milliLiter(s), IntraMuscular, once  Administration Instructions: Shake well and refrigerate.  If vaccine is to be given at time of discharge, please allow a miniumum of 30 minutes for patient observation.  If to be given concomitantly with Pneumococcal Vaccine, please use a different arm for each vaccination. ( @ 23:55)  Hemoglobin A1C, Whole Blood: AM Sched. Collection: 2017 05:00 ( @ 06:58)  Blood Glucose Point Of Care Testing:     Frequency:  Before meals and at bedtime    Additional Instructions:  Before meals and at bedtime ( @ 06:58)  Blood Glucose Point Of Care Testing:     Frequency:  every 15 minutes    Additional Instructions:  After carbohydrate administration for hypoglycemia, repeat every 15 minute(s) until blood glucose is GREATER THAN or EQUAL  milliGRAM(s)/deciLiter twice consecutively ( @ :58)  Notify Provider For:     Additional Instructions:  Blood glucose LESS THAN 70 milliGRAM(s)/deciLiter or GREATER THAN 400 milliGRAM(s)/deciLiter ( @ :58)  Education:     Diabetes    Other: Diet, Exercise    Additional Instructions: Diet, Exercise, Diabetes ( @ 06:58)  insulin lispro (HumaLOG) corrective regimen sliding scale:       2 Unit(s) if Glucose 151 - 200      4 Unit(s) if Glucose 201 - 250      6 Unit(s) if Glucose 251 - 300      8 Unit(s) if Glucose 301 - 350      10 Unit(s) if Glucose 351 - 400      12 Unit(s) if Glucose Greater Than 400 + Contact MD  SubCutaneous, three times a day before meals  Special Instructions: Give correctional scale insulin REGARDLESS of PO status NOTIFY Provider for blood glucose LESS THAN 70 milliGRAM(s)/deciLiter or above 400 milliGRAM(s)/deciLiter.  Administration Instructions: *Per Sliding Scale*  Provider&#x29;s Contact #: [phone] ( @ 06:58)  dextrose 5%.: Solution, 1000 milliLiter(s) infuse at 50 mL/Hr  Special Instructions: Conditional Order: HYPOGLYCEMIA PROTOCOL  Provider&#x27;s Contact #: [phone] ( @ 06:58)  Administer Carbohydrates:     Additional Instructions:  HYPOGLYCEMIA PROTOCOL (:58)  dextrose Gel: [Known as GLUTOSE]  1 Dose(s), Oral, once, PRN for Blood Glucose LESS THAN 70 milliGRAM(s)/deciliter, Stop After 1 Doses  Special Instructions: Conditional Order: HYPOGLYCEMIA PROTOCOL  Provider&#x27;s Contact #: [phone] ( @ 06:58)  dextrose 50% Injectable:   12.5 Gram(s), IV Push, once, Stop After 1 Doses  Special Instructions: Conditional Order: HYPOGLYCEMIA PROTOCOL  Provider&#x27;s Contact #: [phone] ( @ 06:58)  dextrose 50% Injectable:   25 Gram(s), IV Push, once, Stop After 1 Doses  Special Instructions: Conditional Order: HYPOGLYCEMIA PROTOCOL  Provider&#x27;s Contact #: [phone] ( 06:58)  dextrose 50% Injectable:   25 Gram(s), IV Push, once, Stop After 1 Doses  Special Instructions: Conditional Order: HYPOGLYCEMIA PROTOCOL  Provider&#x27;s Contact #: [phone] ( 06:58)  glucagon  Injectable:   1 milliGRAM(s), IntraMuscular, once, PRN for Glucose LESS THAN 70 milligrams/deciliter, Stop After 1 Doses  Special Instructions: Conditional Order: HYPOGLYCEMIA PROTOCOL  Provider&#x27;s Contact #: [phone] ( 06:58)  Provider to RN:       UNRESPONSIVE patient and Blood Glucose LESS THAN 70 milliGRAM(s)/deciLiter call Rapid Response.  HYPOGLYCEMIA PROTOCOL (:58)  enoxaparin Injectable: [Known as LOVENOX]  40 milliGRAM(s), SubCutaneous, every 24 hours  Provider&#x27;s Contact #: [phone] ( @ 06:58)  Complete Blood Count: Routine ( @ 06:59)  Basic Metabolic Panel: Routine ( @ :59)  Lipid Profile: Routine ( @ :59)  US Duplex Carotid Arteries Complete, Bilateral: Routine   Indication: tia  Transport: Walking  Exam Completed  Provider&#x27;s Contact #: [phone] ( @ 06:59)  Consult- Speech Bedside Swallow Evaluation:   *Reason for Consult - Must select at least one choice*     NPO Until Further Recommendations Made     Other: choking with food ( @ 09:52) [completed]  Diet, NPO:   Except Medications ( @ 09:53) [discontinued]  Diet, Consistent Carbohydrate/No Snacks:   Dysphagia 2, Mechanical Soft, Thin Liquids ([id number])  Low Sodium ( @ 13:58)  Complete Blood Count: AM Sched. Collection: 2017 05:00 ( @ 14:01)  Basic Metabolic Panel: AM Sched. Collection: 2017 05:00 ( @ 14:01)  TTE Echo Doppler w/o Cont:   Transport: Walking  Monitor: w/o Monitor  Provider&#x27;s Contact #: [phone] ( @ 14:01)  atorvastatin: [Ordered as LIPITOR]  10 milliGRAM(s), Oral, at bedtime  Provider&#x27;s Contact #: [phone] ( @ 14:09)  pyridostigmine: [Known as MESTINON]  60 milliGRAM(s), Oral, three times a day  Provider&#x27;s Contact #: [phone] ( @ 14:09)  (ADM OVERRIDE):   Qty Removed: 1 each  Route - Dose Given &lt;see task&gt; ( @ 19:28) [completed]  dextrose Gel: [Known as GLUTOSE]  1 Dose(s), Oral, once, PRN for Blood Glucose LESS THAN 70 milliGRAM(s)/deciliter, Stop After 1 Doses  Special Instructions: Conditional Order: HYPOGLYCEMIA PROTOCOL  Provider&#x27;s Contact #: [phone] ( @ 19:30) [completed]      Assessment & Opinion:61 y o man with h/o myasthenia gravis is fount to have a right basal ganglia infarct with left hemiparesis,NIHSS is 5 ,patient was not a candidate for tpa because he stayed home more than 1day after the beginning of his symptoms    Recommendations:  Carotid doppler.  Echocardiogram.     DVT prophylaxis as ordered.  Antiplatelet ,statin physical therapy ,rehab  Medications:

## 2017-02-20 NOTE — DISCHARGE NOTE ADULT - NS AS DC STROKE ED MATERIALS
Prescribed Medications/Need for Followup After Discharge/Risk Factors for Stroke/Stroke Warning Signs and Symptoms/Call 911 for Stroke/Stroke Education Booklet

## 2017-02-20 NOTE — DISCHARGE NOTE ADULT - CARE PROVIDER_API CALL
your, PMD  Phone: (   )    -  Fax: (   )    -    Gem Payne), Neurology  [address] [place] [address]  Phone: [phone]  Fax: [phone]    Ziyad Raphael), EndocrinologyMetabDiabetes; Internal Medicine  [address] [facility] [address]  [place] [address]  Phone: [phone]  Fax: [phone]    YOUR, ORTHOPEDICIAN  Phone: (   )    -  Fax: (   )    -

## 2017-02-20 NOTE — SWALLOW BEDSIDE ASSESSMENT ADULT - SWALLOW EVAL: DIAGNOSIS
Pt is a 61 yr old male who presents symptoms consistent with oropharyngeal dysphagia; swallow mechanism appears to tolerate modified consistencies with good airway protection.

## 2017-02-21 LAB
ANION GAP SERPL CALC-SCNC: 7 MMOL/L — SIGNIFICANT CHANGE UP (ref 5–17)
BUN SERPL-MCNC: 17 MG/DL — SIGNIFICANT CHANGE UP (ref 7–23)
CALCIUM SERPL-MCNC: 8.1 MG/DL — LOW (ref 8.5–10.1)
CHLORIDE SERPL-SCNC: 101 MMOL/L — SIGNIFICANT CHANGE UP (ref 96–108)
CO2 SERPL-SCNC: 32 MMOL/L — HIGH (ref 22–31)
CREAT SERPL-MCNC: 1.1 MG/DL — SIGNIFICANT CHANGE UP (ref 0.5–1.3)
CULTURE RESULTS: NO GROWTH — SIGNIFICANT CHANGE UP
GLUCOSE SERPL-MCNC: 163 MG/DL — HIGH (ref 70–99)
HBA1C BLD-MCNC: 8.2 % — HIGH (ref 4–5.6)
HCT VFR BLD CALC: 34.6 % — LOW (ref 39–50)
HGB BLD-MCNC: 12.6 G/DL — LOW (ref 13–17)
MCHC RBC-ENTMCNC: 30.3 PG — SIGNIFICANT CHANGE UP (ref 27–34)
MCHC RBC-ENTMCNC: 36.6 GM/DL — HIGH (ref 32–36)
MCV RBC AUTO: 82.7 FL — SIGNIFICANT CHANGE UP (ref 80–100)
PLATELET # BLD AUTO: 250 K/UL — SIGNIFICANT CHANGE UP (ref 150–400)
POTASSIUM SERPL-MCNC: 3.7 MMOL/L — SIGNIFICANT CHANGE UP (ref 3.5–5.3)
POTASSIUM SERPL-SCNC: 3.7 MMOL/L — SIGNIFICANT CHANGE UP (ref 3.5–5.3)
RBC # BLD: 4.18 M/UL — LOW (ref 4.2–5.8)
RBC # FLD: 11.5 % — SIGNIFICANT CHANGE UP (ref 11–15)
SODIUM SERPL-SCNC: 140 MMOL/L — SIGNIFICANT CHANGE UP (ref 135–145)
SPECIMEN SOURCE: SIGNIFICANT CHANGE UP
WBC # BLD: 10.8 K/UL — HIGH (ref 3.8–10.5)
WBC # FLD AUTO: 10.8 K/UL — HIGH (ref 3.8–10.5)

## 2017-02-21 PROCEDURE — 99233 SBSQ HOSP IP/OBS HIGH 50: CPT

## 2017-02-21 RX ADMIN — PYRIDOSTIGMINE BROMIDE 60 MILLIGRAM(S): 60 SOLUTION ORAL at 22:10

## 2017-02-21 RX ADMIN — Medication 2: at 17:19

## 2017-02-21 RX ADMIN — AMLODIPINE BESYLATE 10 MILLIGRAM(S): 2.5 TABLET ORAL at 06:07

## 2017-02-21 RX ADMIN — PYRIDOSTIGMINE BROMIDE 60 MILLIGRAM(S): 60 SOLUTION ORAL at 06:07

## 2017-02-21 RX ADMIN — PYRIDOSTIGMINE BROMIDE 60 MILLIGRAM(S): 60 SOLUTION ORAL at 14:53

## 2017-02-21 RX ADMIN — Medication 81 MILLIGRAM(S): at 14:32

## 2017-02-21 RX ADMIN — ATORVASTATIN CALCIUM 10 MILLIGRAM(S): 80 TABLET, FILM COATED ORAL at 22:10

## 2017-02-21 RX ADMIN — LOSARTAN POTASSIUM 25 MILLIGRAM(S): 100 TABLET, FILM COATED ORAL at 06:07

## 2017-02-21 RX ADMIN — ENOXAPARIN SODIUM 40 MILLIGRAM(S): 100 INJECTION SUBCUTANEOUS at 12:47

## 2017-02-21 RX ADMIN — Medication 6: at 12:47

## 2017-02-21 NOTE — PROGRESS NOTE ADULT - SUBJECTIVE AND OBJECTIVE BOX
Patient is a 61y old  Male who presents with a chief complaint of slurred speech (2017 19:12)       OVERNIGHT EVENTS: no acute events reported    MEDICATIONS  (STANDING):  amLODIPine   Tablet 10milliGRAM(s) Oral daily  losartan 25milliGRAM(s) Oral daily  aspirin enteric coated 81milliGRAM(s) Oral daily  influenza   Vaccine 0.5milliLiter(s) IntraMuscular once  insulin lispro (HumaLOG) corrective regimen sliding scale  SubCutaneous three times a day before meals  dextrose 5%. 1000milliLiter(s) IV Continuous <Continuous>  dextrose 50% Injectable 12.5Gram(s) IV Push once  dextrose 50% Injectable 25Gram(s) IV Push once  dextrose 50% Injectable 25Gram(s) IV Push once  enoxaparin Injectable 40milliGRAM(s) SubCutaneous every 24 hours  atorvastatin 10milliGRAM(s) Oral at bedtime  pyridostigmine 60milliGRAM(s) Oral three times a day    MEDICATIONS  (PRN):  oxyCODONE IR 5milliGRAM(s) Oral four times a day PRN Moderate Pain (4 - 6)  dextrose Gel 1Dose(s) Oral once PRN Blood Glucose LESS THAN 70 milliGRAM(s)/deciliter  glucagon  Injectable 1milliGRAM(s) IntraMuscular once PRN Glucose LESS THAN 70 milligrams/deciliter       Vital Signs Last 24 Hrs  T(C): 36.7, Max: 37.4 (-20 @ 17:29)  T(F): 98, Max: 99.4 (-20 @ 17:29)  HR: 93 (74 - 108)  BP: 140/77 (131/69 - 152/78)  BP(mean): --  RR: 18 (18 - 20)  SpO2: 98% (94% - 100%)    PHYSICAL EXAM:  GENERAL: NAD, well-groomed, well-developed  HEAD:  Atraumatic, Normocephalic  EYES: EOMI, PERRLA, conjunctiva and sclera clear  ENMT: No tonsillar erythema, exudates, or enlargement; Moist mucous membranes   NECK: Supple, No JVD   NERVOUS SYSTEM:  Alert & Oriented X3 ,moving all extremities  CHEST/LUNG: Clear to percussion bilaterally; No rales, rhonchi, wheezing, or rubs  HEART: Regular rate and rhythm; No murmurs, rubs, or gallops  ABDOMEN: Soft, Nontender, Nondistended; Bowel sounds present  EXTREMITIES:  left knee sutures     LABS:                        12.6   10.8  )-----------( 250      ( 2017 06:33 )             34.6     2017 06:33    140    |  101    |  17     ----------------------------<  163    3.7     |  32     |  1.10     Ca    8.1        2017 06:33         cardiac markers   Urinalysis Basic - ( 2017 21:22 )    Color: Yellow / Appearance: Clear / S.010 / pH: x  Gluc: x / Ketone: Negative  / Bili: Negative / Urobili: Negative mg/dL   Blood: x / Protein: 100 mg/dL / Nitrite: Negative   Leuk Esterase: Negative / RBC: x / WBC 0-2   Sq Epi: x / Non Sq Epi: x / Bacteria: x      CAPILLARY BLOOD GLUCOSE  268 (2017 12:46)  180 (2017 08:29)  165 (2017 22:38)  71 (2017 19:31)  61 (2017 19:30)  242 (2017 16:55)    Cultures    RADIOLOGY & ADDITIONAL TESTS:    Imaging Personally Reviewed:  [x ] YES  [ ] NO  MRI brain= There is evidence of abnormal T2 prolongation with restricted diffusion   seen involving the right lenticular nucleus/corona radiata region. This   is compatible with an acute infarct. No hemorrhagic transformation is   seen. No significant shift or herniation is seen.  Consultant(s) Notes Reviewed:  [ x] YES  [ ] NO    Care Discussed with Consultants/Other Providers [x ] YES  [ ] NO

## 2017-02-21 NOTE — OCCUPATIONAL THERAPY INITIAL EVALUATION ADULT - GENERAL OBSERVATIONS, REHAB EVAL
Mr. Patel was seen in bed, AA&Ox4, cooperative & followed multi step commands. Clean, dry & intact suture noted on left knee due to left knee arthroplasty last week. Pt's speech is slow but clear with ability to make needs/wants known. Left facial droop noted. Pt able to move all extremities without difficulty but has decreased strength in LUE/LLE. Mr. Patel was seen in bed, AA&Ox4, cooperative & followed multi step commands. Clean, dry & intact suture noted on left knee due to left knee arthroplasty last week. Pt's speech is slow but clear with ability to make needs/wants known. Left facial droop noted. Pt able to move all extremities without difficulty but has decreased strength in LUE/LLE. Deficits in strength, balance, coordination & endurance limit self care ability & functional mobility. Pt would benefit from using rolling walker for functional ambulation in all terrains. Mr. Patel was seen in bed, AA&Ox4, cooperative & followed multi step commands. Clean, dry & intact suture noted on left knee due to left knee arthroplasty last week. Pt's speech is slow but clear with ability to make needs/wants known. Left facial droop noted. Pt able to move all extremities without difficulty but has decreased strength in LUE/LLE. Deficits in strength, balance, coordination & endurance limit self care ability & functional mobility. Pt would benefit from using rolling walker to improve balance & stability for functional ambulation in all terrains.

## 2017-02-21 NOTE — OCCUPATIONAL THERAPY INITIAL EVALUATION ADULT - PERSONAL SAFETY AND JUDGMENT, REHAB EVAL
at risk behaviors demonstrated/Pt has decreased safety awareness, increasing risk of injury due to falls.

## 2017-02-21 NOTE — PROGRESS NOTE ADULT - ASSESSMENT
61 year old male with history of Thymoma s/p resection, myasthenia gravis, htn and DM-2 presents today c/o waking up with left sided weakness, slurred speech  also pt is s/p left knee arthroscopy last week, as per pt- sutures to be removed tomorrow. ortho requested to see the pt and was tols their usual practice is removal after 14 days and pt to f/u with his pvt ortho outpt

## 2017-02-21 NOTE — OCCUPATIONAL THERAPY INITIAL EVALUATION ADULT - SOCIAL CONCERNS
Complex psychosocial needs/coping issues/Pt does not have adequate social or family support. Complex psychosocial needs/coping issues/Pt does not have adequate social, financial, or family support. Pt has concerns about leaving hospital & going back to shelter. Pt does not have adequate social, financial, or family support. Pt has concerns about leaving hospital & going back to shelter. Pt appears depressed and could benefit from a psychosocial evaluation./Complex psychosocial needs/coping issues

## 2017-02-21 NOTE — PHYSICAL THERAPY INITIAL EVALUATION ADULT - CRITERIA FOR SKILLED THERAPEUTIC INTERVENTIONS
predicted duration of therapy intervention/therapy frequency/Home with outpatient PT and rolling walker. [place]: 4a/risk reduction/prevention/impairments found/anticipated equipment needs at discharge/functional limitations in following categories
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
## 2017-02-21 NOTE — PROGRESS NOTE ADULT - PROBLEM SELECTOR PROBLEM 2
Type 2 diabetes mellitus with hyperglycemia, without long-term current use of insulin
Type 2 diabetes mellitus with hyperglycemia, without long-term current use of insulin

## 2017-02-21 NOTE — OCCUPATIONAL THERAPY INITIAL EVALUATION ADULT - LIVES WITH, PROFILE
alone/Pt lives at a shelter in [place] with no steps to negotiate. Pt's bathroom has a walk in shower with grab bars.

## 2017-02-21 NOTE — OCCUPATIONAL THERAPY INITIAL EVALUATION ADULT - PERTINENT HX OF CURRENT PROBLEM, REHAB EVAL
Pt presented to ER on 2-19-17 due to acute onset of neurological deficit. Pt is diagnosed with CVA. MRI on 2-20 show evidence of abnormal T2 prolongation with restricted diffusion seen involving the right lenticular nucleus/corona radiata region. this is compatible with an acute infarct. Pt presented to ER on 2-19-17 due to acute onset of neurological deficit. Pt is diagnosed with CVA. MRI on 2-20 show evidence of abnormal T2 prolongation with restricted diffusion seen involving the right lenticular nucleus/corona radiata region. This is compatible with an acute infarct.

## 2017-02-21 NOTE — OCCUPATIONAL THERAPY INITIAL EVALUATION ADULT - PLANNED THERAPY INTERVENTIONS, OT EVAL
energy conservation techniques/transfer training/ADL retraining/cognitive, visual perceptual/joint mobilization/balance training/stretching/fine motor coordination training/massage/motor coordination training/IADL retraining/strengthening/neuromuscular re-education/ROM

## 2017-02-21 NOTE — PROGRESS NOTE ADULT - PROBLEM SELECTOR PLAN 1
- continue ASA,statin  -f/u A1c,lipid profile  -Neurology consult appreciated  -Telemetry monitoring  -PT/OT  - TTE ordered   - carotid US noted  - on dysphagia diet
- continue ASA,statin  -f/u A1c,lipid profile  -Neurology consult pending  -Telemetry monitoring  -PT/OT  - TTE ordered, f/u official MRI results  - carotid US noted  - on dysphagia diet

## 2017-02-21 NOTE — PHYSICAL THERAPY INITIAL EVALUATION ADULT - MODIFIED CLINICAL TEST OF SENSORY INTEGRATION IN BALANCE TEST
Barthel Index: Feeding Score __10_, Bathing Score _5__, Grooming Score __5, Dressing Score _10__, Bowels Score _10__, Bladder Score _10__, Toilet Score _10__, Transfers Score _15__, Mobility Score _10__, Stairs Score __0_,     Total Score ___85

## 2017-02-21 NOTE — PHYSICAL THERAPY INITIAL EVALUATION ADULT - ADDITIONAL COMMENTS
Pt reports he lives at a shelter home ([facility]) and was using a straight cane prior to admission. Pt reports not having to negotiate any stairs at his shelter.

## 2017-02-21 NOTE — PROGRESS NOTE ADULT - PROBLEM SELECTOR PLAN 5
- resume home meds Pyridostigmine  - confirmed with [facility]([phone]) that pt has completed prednisone tapering

## 2017-02-21 NOTE — PROGRESS NOTE ADULT - SUBJECTIVE AND OBJECTIVE BOX
Requested by medicine attending to speak with patient regarding the management of his sutures. Patient had L knee arthroscopic surgery last Thursday, and wanted to know when the sutures should come out. Informed patient that our practice here is to remove sutures on post operative day 14. Instructed him to follow up with his own orthopedist to have the sutures removed.

## 2017-02-22 VITALS
OXYGEN SATURATION: 97 % | HEART RATE: 83 BPM | SYSTOLIC BLOOD PRESSURE: 124 MMHG | TEMPERATURE: 98 F | DIASTOLIC BLOOD PRESSURE: 63 MMHG

## 2017-02-22 PROCEDURE — 99239 HOSP IP/OBS DSCHRG MGMT >30: CPT

## 2017-02-22 RX ORDER — ATORVASTATIN CALCIUM 80 MG/1
1 TABLET, FILM COATED ORAL
Qty: 30 | Refills: 0
Start: 2017-02-22 | End: 2017-03-24

## 2017-02-22 RX ORDER — LOSARTAN POTASSIUM 100 MG/1
1 TABLET, FILM COATED ORAL
Qty: 30 | Refills: 0
Start: 2017-02-22 | End: 2017-03-24

## 2017-02-22 RX ORDER — AMLODIPINE BESYLATE 2.5 MG/1
1 TABLET ORAL
Qty: 0 | Refills: 0 | COMMUNITY

## 2017-02-22 RX ORDER — INSULIN LISPRO 100/ML
3 VIAL (ML) SUBCUTANEOUS
Qty: 30 | Refills: 0
Start: 2017-02-22 | End: 2017-03-24

## 2017-02-22 RX ORDER — METFORMIN HYDROCHLORIDE 850 MG/1
1 TABLET ORAL
Qty: 60 | Refills: 0
Start: 2017-02-22 | End: 2017-03-24

## 2017-02-22 RX ORDER — INSULIN GLARGINE 100 [IU]/ML
5 INJECTION, SOLUTION SUBCUTANEOUS AT BEDTIME
Qty: 0 | Refills: 0 | Status: DISCONTINUED | OUTPATIENT
Start: 2017-02-22 | End: 2017-02-22

## 2017-02-22 RX ORDER — PYRIDOSTIGMINE BROMIDE 60 MG/5ML
1 SOLUTION ORAL
Qty: 90 | Refills: 0
Start: 2017-02-22 | End: 2017-03-24

## 2017-02-22 RX ORDER — METFORMIN HYDROCHLORIDE 850 MG/1
1 TABLET ORAL
Qty: 0 | Refills: 0 | COMMUNITY

## 2017-02-22 RX ORDER — INSULIN LISPRO 100/ML
3 VIAL (ML) SUBCUTANEOUS
Qty: 0 | Refills: 0 | Status: DISCONTINUED | OUTPATIENT
Start: 2017-02-22 | End: 2017-02-22

## 2017-02-22 RX ORDER — ENOXAPARIN SODIUM 100 MG/ML
5 INJECTION SUBCUTANEOUS
Qty: 150 | Refills: 0
Start: 2017-02-22 | End: 2017-03-24

## 2017-02-22 RX ORDER — ASPIRIN/CALCIUM CARB/MAGNESIUM 324 MG
1 TABLET ORAL
Qty: 30 | Refills: 0
Start: 2017-02-22 | End: 2017-03-24

## 2017-02-22 RX ORDER — AMLODIPINE BESYLATE 2.5 MG/1
1 TABLET ORAL
Qty: 0 | Refills: 0 | DISCHARGE
Start: 2017-02-22

## 2017-02-22 RX ADMIN — PYRIDOSTIGMINE BROMIDE 60 MILLIGRAM(S): 60 SOLUTION ORAL at 13:39

## 2017-02-22 RX ADMIN — Medication 2: at 11:28

## 2017-02-22 RX ADMIN — AMLODIPINE BESYLATE 10 MILLIGRAM(S): 2.5 TABLET ORAL at 05:55

## 2017-02-22 RX ADMIN — Medication 81 MILLIGRAM(S): at 11:31

## 2017-02-22 RX ADMIN — Medication 6: at 07:58

## 2017-02-22 RX ADMIN — LOSARTAN POTASSIUM 25 MILLIGRAM(S): 100 TABLET, FILM COATED ORAL at 07:30

## 2017-02-22 RX ADMIN — PYRIDOSTIGMINE BROMIDE 60 MILLIGRAM(S): 60 SOLUTION ORAL at 05:55

## 2017-02-22 RX ADMIN — ENOXAPARIN SODIUM 40 MILLIGRAM(S): 100 INJECTION SUBCUTANEOUS at 11:32

## 2017-02-24 DIAGNOSIS — I63.9 CEREBRAL INFARCTION, UNSPECIFIED: ICD-10-CM

## 2017-02-24 DIAGNOSIS — R29.810 FACIAL WEAKNESS: ICD-10-CM

## 2017-02-24 DIAGNOSIS — Z86.018 PERSONAL HISTORY OF OTHER BENIGN NEOPLASM: ICD-10-CM

## 2017-02-24 DIAGNOSIS — R47.81 SLURRED SPEECH: ICD-10-CM

## 2017-02-24 DIAGNOSIS — Z79.84 LONG TERM (CURRENT) USE OF ORAL HYPOGLYCEMIC DRUGS: ICD-10-CM

## 2017-02-24 DIAGNOSIS — G81.94 HEMIPLEGIA, UNSPECIFIED AFFECTING LEFT NONDOMINANT SIDE: ICD-10-CM

## 2017-02-24 DIAGNOSIS — E11.65 TYPE 2 DIABETES MELLITUS WITH HYPERGLYCEMIA: ICD-10-CM

## 2017-02-24 DIAGNOSIS — R13.10 DYSPHAGIA, UNSPECIFIED: ICD-10-CM

## 2017-02-24 DIAGNOSIS — I10 ESSENTIAL (PRIMARY) HYPERTENSION: ICD-10-CM

## 2017-02-24 DIAGNOSIS — R53.1 WEAKNESS: ICD-10-CM

## 2017-02-24 DIAGNOSIS — Z98.890 OTHER SPECIFIED POSTPROCEDURAL STATES: ICD-10-CM

## 2017-02-24 DIAGNOSIS — I63.8 OTHER CEREBRAL INFARCTION: ICD-10-CM

## 2017-02-24 DIAGNOSIS — G70.00 MYASTHENIA GRAVIS WITHOUT (ACUTE) EXACERBATION: ICD-10-CM

## 2017-03-01 NOTE — CHART NOTE - NSCHARTNOTEFT_GEN_A_CORE
Called [facility] [phone],spoke to Jerrica(pharmacist) and called in for lantus solostar again , also for needles. confirmed that pt should be taking losartan 25mg daily untill he sees PMD in 1 week  Pharmacy to call pt to  meds

## 2020-02-16 NOTE — ED ADULT NURSE NOTE - GASTROINTESTINAL ASSESSMENT
WDL interpretation of diagnostic studies/consultation with other physicians/conducted a detailed discussion of DNR status/additional history taking/direct patient care (not related to procedure)/documentation

## 2021-01-22 NOTE — OCCUPATIONAL THERAPY INITIAL EVALUATION ADULT - LEVEL OF INDEPENDENCE: DRESS UPPER BODY, OT EVAL
Virtual Regular Visit      Assessment/Plan:    Problem List Items Addressed This Visit     None      Visit Diagnoses     Migraine with aura and without status migrainosus, not intractable    -  Primary    Relevant Orders    CBC and differential    Comprehensive metabolic panel    Hemoglobin A1C    Lyme Total Antibody Profile with reflex to WB    TSH, 3rd generation    Vitamin D 25 hydroxy    Screening for cardiovascular condition        Relevant Orders    Lipid Panel with Direct LDL reflex      Patient presents for evaluation of migraines  He did experience migraine headaches in the past, rather infrequently, once or twice per year  Patient is concerned about to recent episode of migraines that took place within 1 week  He admits being under ongoing stress and dealing with persistent low back pain  Patient denies symptoms of syncope, numbness, tingling, dizziness  He was able to abort both migraine headaches with over-the-counter medications  Patient is up-to-date with eye exam   No reported blood pressure elevations  Plan:  · Start regimen of vitamin B2 400 mg daily  · Start magnesium oxide 400-500 mg daily  · We discussed importance of stress reduction  · Follow-up with [facility], pending epidural injection that will hopefully alleviate patient's low back pain  · Proceed with blood work  · If migraine headaches continue to recur-will proceed with brain MRI             Reason for visit is   Chief Complaint   Patient presents with    Virtual Regular Visit        Encounter provider Marybel Ayala MD    Provider located at [address] [place] [phone]      Recent Visits  No visits were found meeting these conditions     Showing recent visits within past 7 days and meeting all other requirements     Today's Visits  Date Type Provider Dept   01/22/21 Telemedicine Marybel Ayala MD [address] [place] [address]   Showing today's visits and meeting all other requirements     Future Appointments  No visits were found meeting these conditions  Showing future appointments within next 150 days and meeting all other requirements        The patient was identified by name and date of birth  Alexandro Owen was informed that this is a telemedicine visit and that the visit is being conducted through [facility] and patient was informed that this is a secure, HIPAA-compliant platform  He agrees to proceed     My office door was closed  No one else was in the room  He acknowledged consent and understanding of privacy and security of the video platform  The patient has agreed to participate and understands they can discontinue the visit at any time  Patient is aware this is a billable service  Subjective  Alexandro Owen is a 46 y o  male who presents for evaluation of migraine headaches   Patient presents for evaluation of  headaches  He reports remote history of migraines  He used to have 1 to 2 migraine headaches per year  Within past week patient had 2 episodes of migraine headaches with aura  He reports blurred vision with prisms in both eyes that starts on periphery preceding headache that usually develops 30 minutes later  Headaches are parietal   No nausea or vomiting  No dizziness  Visual changes usually disappear with start of migraine  Patient reports photophobia  Patient is up-to-date with eye exam   Patient denies any recent cold, fever or acute illness  No head injury or concussion  Patient was able to abort headaches with over-the-counter medications  He admits being under lot of stress lately  Patient was promoted at work with increased responsibility  Patient is concerned about ongoing financial pressure  He has been experiencing chronic daily low back pain and is under care of [facility] with pending epidural injection  Low back pain has been quite bothersome      Patient had recent eye exam, no significant changes  Patient reports recent blood pressure at home at 130/88  Past Medical History:   Diagnosis Date    Anxiety     Arthritis        Past Surgical History:   Procedure Laterality Date    KNEE ARTHROSCOPY W/ ACL RECONSTRUCTION Right     KNEE SURGERY         Current Outpatient Medications   Medication Sig Dispense Refill    cetirizine (ZyrTEC) 10 mg tablet Take 1 tablet by mouth as needed        cyclobenzaprine (FLEXERIL) 10 mg tablet Take 1 tablet (10 mg total) by mouth 3 (three) times a day as needed for muscle spasms 90 tablet 1    diclofenac (VOLTAREN) 75 mg EC tablet Take 75 mg by mouth daily Take 1 tablet once a day as needed for back pain      Lido-Menthol-Methyl Sal-Camph (CBD KINGS EX) Apply topically      TURMERIC PO Take by mouth       No current facility-administered medications for this visit  Allergies   Allergen Reactions    Latex Other (See Comments)     redness       Review of Systems   Constitutional: Negative  Negative for activity change, appetite change, chills and fever  HENT: Negative  Eyes: Positive for photophobia and visual disturbance (As outlined in HPI)  Respiratory: Negative  Cardiovascular: Negative  Gastrointestinal: Negative  Musculoskeletal: Positive for back pain  Neurological: Positive for headaches  Negative for dizziness, syncope, weakness, light-headedness and numbness  Psychiatric/Behavioral: The patient is nervous/anxious  Under stress       Video Exam    There were no vitals filed for this visit  Physical Exam  Constitutional:       General: He is not in acute distress  Appearance: Normal appearance  He is not ill-appearing  HENT:      Head: Normocephalic and atraumatic  Pulmonary:      Effort: Pulmonary effort is normal  No respiratory distress  Neurological:      General: No focal deficit present  Mental Status: He is alert     Psychiatric:         Mood and Affect: Mood normal          Behavior: Behavior normal          Thought Content: Thought content normal           I spent 15 minutes directly with the patient during this visit      VIRTUAL VISIT DISCLAIMER    Imtiaz Ruiz acknowledges that he has consented to an online visit or consultation  He understands that the online visit is based solely on information provided by him, and that, in the absence of a face-to-face physical evaluation by the physician, the diagnosis he receives is both limited and provisional in terms of accuracy and completeness  This is not intended to replace a full medical face-to-face evaluation by the physician  Imtiaz Ruiz understands and accepts these terms  independent

## 2021-12-17 ENCOUNTER — INPATIENT (INPATIENT)
Facility: HOSPITAL | Age: 66
LOS: 4 days | Discharge: NURSING, REHAB OR LONG-TERM CARE | DRG: 392 | End: 2021-12-22
Attending: INTERNAL MEDICINE | Admitting: INTERNAL MEDICINE
Payer: MEDICAID

## 2021-12-17 VITALS
SYSTOLIC BLOOD PRESSURE: 156 MMHG | HEART RATE: 91 BPM | RESPIRATION RATE: 18 BRPM | OXYGEN SATURATION: 99 % | WEIGHT: 119.93 LBS | HEIGHT: 66 IN | DIASTOLIC BLOOD PRESSURE: 80 MMHG | TEMPERATURE: 98 F

## 2021-12-17 DIAGNOSIS — R53.1 WEAKNESS: ICD-10-CM

## 2021-12-17 DIAGNOSIS — D17.39 BENIGN LIPOMATOUS NEOPLASM OF SKIN AND SUBCUTANEOUS TISSUE OF OTHER SITES: Chronic | ICD-10-CM

## 2021-12-17 DIAGNOSIS — G70.00 MYASTHENIA GRAVIS WITHOUT (ACUTE) EXACERBATION: Chronic | ICD-10-CM

## 2021-12-17 PROBLEM — E11.9 TYPE 2 DIABETES MELLITUS WITHOUT COMPLICATIONS: Chronic | Status: ACTIVE | Noted: 2017-02-19

## 2021-12-17 PROBLEM — I10 ESSENTIAL (PRIMARY) HYPERTENSION: Chronic | Status: ACTIVE | Noted: 2017-02-19

## 2021-12-17 LAB
ACETONE SERPL-MCNC: NEGATIVE — SIGNIFICANT CHANGE UP
ALBUMIN SERPL ELPH-MCNC: 2.9 G/DL — LOW (ref 3.5–5)
ALP SERPL-CCNC: 99 U/L — SIGNIFICANT CHANGE UP (ref 40–120)
ALT FLD-CCNC: 26 U/L DA — SIGNIFICANT CHANGE UP (ref 10–60)
ANION GAP SERPL CALC-SCNC: 6 MMOL/L — SIGNIFICANT CHANGE UP (ref 5–17)
APPEARANCE UR: CLEAR — SIGNIFICANT CHANGE UP
AST SERPL-CCNC: 13 U/L — SIGNIFICANT CHANGE UP (ref 10–40)
BACTERIA # UR AUTO: ABNORMAL /HPF
BASOPHILS # BLD AUTO: 0.01 K/UL — SIGNIFICANT CHANGE UP (ref 0–0.2)
BASOPHILS NFR BLD AUTO: 0.1 % — SIGNIFICANT CHANGE UP (ref 0–2)
BILIRUB SERPL-MCNC: 0.4 MG/DL — SIGNIFICANT CHANGE UP (ref 0.2–1.2)
BILIRUB UR-MCNC: NEGATIVE — SIGNIFICANT CHANGE UP
BUN SERPL-MCNC: 23 MG/DL — HIGH (ref 7–18)
CALCIUM SERPL-MCNC: 8.9 MG/DL — SIGNIFICANT CHANGE UP (ref 8.4–10.5)
CHLORIDE SERPL-SCNC: 102 MMOL/L — SIGNIFICANT CHANGE UP (ref 96–108)
CO2 SERPL-SCNC: 28 MMOL/L — SIGNIFICANT CHANGE UP (ref 22–31)
COLOR SPEC: YELLOW — SIGNIFICANT CHANGE UP
CREAT SERPL-MCNC: 2.02 MG/DL — HIGH (ref 0.5–1.3)
DIFF PNL FLD: NEGATIVE — SIGNIFICANT CHANGE UP
EOSINOPHIL # BLD AUTO: 0 K/UL — SIGNIFICANT CHANGE UP (ref 0–0.5)
EOSINOPHIL NFR BLD AUTO: 0 % — SIGNIFICANT CHANGE UP (ref 0–6)
GLUCOSE SERPL-MCNC: 250 MG/DL — HIGH (ref 70–99)
GLUCOSE UR QL: 250
HCT VFR BLD CALC: 35.3 % — LOW (ref 39–50)
HGB BLD-MCNC: 11.4 G/DL — LOW (ref 13–17)
IMM GRANULOCYTES NFR BLD AUTO: 0.4 % — SIGNIFICANT CHANGE UP (ref 0–1.5)
KETONES UR-MCNC: NEGATIVE — SIGNIFICANT CHANGE UP
LACTATE SERPL-SCNC: 1.5 MMOL/L — SIGNIFICANT CHANGE UP (ref 0.7–2)
LACTATE SERPL-SCNC: 2.4 MMOL/L — HIGH (ref 0.7–2)
LEUKOCYTE ESTERASE UR-ACNC: NEGATIVE — SIGNIFICANT CHANGE UP
LIDOCAIN IGE QN: 219 U/L — SIGNIFICANT CHANGE UP (ref 73–393)
LYMPHOCYTES # BLD AUTO: 0.58 K/UL — LOW (ref 1–3.3)
LYMPHOCYTES # BLD AUTO: 4.2 % — LOW (ref 13–44)
MAGNESIUM SERPL-MCNC: 1.9 MG/DL — SIGNIFICANT CHANGE UP (ref 1.6–2.6)
MCHC RBC-ENTMCNC: 28.1 PG — SIGNIFICANT CHANGE UP (ref 27–34)
MCHC RBC-ENTMCNC: 32.3 GM/DL — SIGNIFICANT CHANGE UP (ref 32–36)
MCV RBC AUTO: 87.2 FL — SIGNIFICANT CHANGE UP (ref 80–100)
MONOCYTES # BLD AUTO: 0.69 K/UL — SIGNIFICANT CHANGE UP (ref 0–0.9)
MONOCYTES NFR BLD AUTO: 5 % — SIGNIFICANT CHANGE UP (ref 2–14)
NEUTROPHILS # BLD AUTO: 12.53 K/UL — HIGH (ref 1.8–7.4)
NEUTROPHILS NFR BLD AUTO: 90.3 % — HIGH (ref 43–77)
NITRITE UR-MCNC: NEGATIVE — SIGNIFICANT CHANGE UP
NRBC # BLD: 0 /100 WBCS — SIGNIFICANT CHANGE UP (ref 0–0)
PH UR: 6 — SIGNIFICANT CHANGE UP (ref 5–8)
PLATELET # BLD AUTO: 294 K/UL — SIGNIFICANT CHANGE UP (ref 150–400)
POTASSIUM SERPL-MCNC: 4.8 MMOL/L — SIGNIFICANT CHANGE UP (ref 3.5–5.3)
POTASSIUM SERPL-SCNC: 4.8 MMOL/L — SIGNIFICANT CHANGE UP (ref 3.5–5.3)
PROT SERPL-MCNC: 6.7 G/DL — SIGNIFICANT CHANGE UP (ref 6–8.3)
PROT UR-MCNC: 100
RBC # BLD: 4.05 M/UL — LOW (ref 4.2–5.8)
RBC # FLD: 14.4 % — SIGNIFICANT CHANGE UP (ref 10.3–14.5)
RBC CASTS # UR COMP ASSIST: SIGNIFICANT CHANGE UP /HPF (ref 0–2)
SARS-COV-2 RNA SPEC QL NAA+PROBE: SIGNIFICANT CHANGE UP
SODIUM SERPL-SCNC: 136 MMOL/L — SIGNIFICANT CHANGE UP (ref 135–145)
SP GR SPEC: 1 — LOW (ref 1.01–1.02)
TROPONIN I, HIGH SENSITIVITY RESULT: 22 NG/L — SIGNIFICANT CHANGE UP
UROBILINOGEN FLD QL: NEGATIVE — SIGNIFICANT CHANGE UP
WBC # BLD: 13.87 K/UL — HIGH (ref 3.8–10.5)
WBC # FLD AUTO: 13.87 K/UL — HIGH (ref 3.8–10.5)
WBC UR QL: SIGNIFICANT CHANGE UP /HPF (ref 0–5)

## 2021-12-17 PROCEDURE — 99285 EMERGENCY DEPT VISIT HI MDM: CPT

## 2021-12-17 PROCEDURE — 70450 CT HEAD/BRAIN W/O DYE: CPT | Mod: 26

## 2021-12-17 PROCEDURE — 71045 X-RAY EXAM CHEST 1 VIEW: CPT | Mod: 26

## 2021-12-17 RX ORDER — FAMOTIDINE 10 MG/ML
20 INJECTION INTRAVENOUS ONCE
Refills: 0 | Status: COMPLETED | OUTPATIENT
Start: 2021-12-17 | End: 2021-12-17

## 2021-12-17 RX ORDER — SODIUM CHLORIDE 9 MG/ML
1000 INJECTION INTRAMUSCULAR; INTRAVENOUS; SUBCUTANEOUS
Refills: 0 | Status: DISCONTINUED | OUTPATIENT
Start: 2021-12-17 | End: 2021-12-18

## 2021-12-17 RX ORDER — SODIUM CHLORIDE 9 MG/ML
1000 INJECTION INTRAMUSCULAR; INTRAVENOUS; SUBCUTANEOUS ONCE
Refills: 0 | Status: COMPLETED | OUTPATIENT
Start: 2021-12-17 | End: 2021-12-17

## 2021-12-17 RX ORDER — ONDANSETRON 8 MG/1
4 TABLET, FILM COATED ORAL ONCE
Refills: 0 | Status: COMPLETED | OUTPATIENT
Start: 2021-12-17 | End: 2021-12-17

## 2021-12-17 RX ADMIN — SODIUM CHLORIDE 1000 MILLILITER(S): 9 INJECTION INTRAMUSCULAR; INTRAVENOUS; SUBCUTANEOUS at 20:24

## 2021-12-17 RX ADMIN — SODIUM CHLORIDE 1000 MILLILITER(S): 9 INJECTION INTRAMUSCULAR; INTRAVENOUS; SUBCUTANEOUS at 19:24

## 2021-12-17 RX ADMIN — ONDANSETRON 4 MILLIGRAM(S): 8 TABLET, FILM COATED ORAL at 19:25

## 2021-12-17 RX ADMIN — FAMOTIDINE 20 MILLIGRAM(S): 10 INJECTION INTRAVENOUS at 19:25

## 2021-12-17 NOTE — ED PROVIDER NOTE - PROGRESS NOTE DETAILS
pt with dehydration, vertigo, MICHEL, case d/w Dr. Goldstein, will admit pt with dehydration, vertigo, MICHEL, no source for infection, -no Abx given, case d/w Dr. Goldstein, will admit

## 2021-12-17 NOTE — ED PROVIDER NOTE - NSICDXFAMILYHX_GEN_ALL_CORE_FT
FAMILY HISTORY:  Mother  Still living? No  Family history of hypertension, Age at diagnosis: Age Unknown

## 2021-12-17 NOTE — ED PROVIDER NOTE - CARE PLAN
1 Principal Discharge DX:	Generalized weakness  Secondary Diagnosis:	Vertigo  Secondary Diagnosis:	MICHEL (acute kidney injury)  Secondary Diagnosis:	Gastroenteritis  Secondary Diagnosis:	Elevated lactic acid level

## 2021-12-17 NOTE — ED PROVIDER NOTE - EYES, MLM
Clear bilaterally, pupils equal, round and reactive to light.  EOMI, no nystagmus, able to open & close yeyes

## 2021-12-17 NOTE — ED ADULT TRIAGE NOTE - CHIEF COMPLAINT QUOTE
BIBA, sending [facility], nausea, loss of appetite, unsteady, weakness, diarrhea, SOB x 2 days, h/x MG

## 2021-12-17 NOTE — ED PROVIDER NOTE - NSICDXPASTMEDICALHX_GEN_ALL_CORE_FT
PAST MEDICAL HISTORY:  CVA (cerebrovascular accident)     DM (diabetes mellitus)     HTN (hypertension)     Hypercholesterolemia     Myasthenia gravis     Peripheral neuropathy

## 2021-12-17 NOTE — ED ADULT NURSE NOTE - CHIEF COMPLAINT QUOTE
BIBA, sending [facility], nausea, loss of appetite, unsteady, weakness, diarrhea, SOB x 2 days, h/x MG

## 2021-12-17 NOTE — ED PROVIDER NOTE - CLINICAL SUMMARY MEDICAL DECISION MAKING FREE TEXT BOX
pt with vertigo, generalized weakness, gastroenteritis, unlikely MG exac., will get labs, give IVF, poss admission

## 2021-12-17 NOTE — ED PROVIDER NOTE - HEME/LYMPH NEGATIVE STATEMENT, MLM
Completed and placed in TC/MA file.  Electronically signed by Yumiko Ralph M.D.     no anemia, no easy bruising, no jaundice, no swollen lymph nodes.

## 2021-12-17 NOTE — ED PROVIDER NOTE - NEUROLOGICAL, MLM
Alert and oriented, no focal deficits, no motor or sensory deficits. no nuchal rigidity, mild ataxia, Romberg-neg

## 2021-12-17 NOTE — ED ADULT NURSE NOTE - OBJECTIVE STATEMENT
Patient BIBA with c/o weakness. as per EMS nausea, loss of appetite, unsteady, weakness, diarrhea, SOB x 2 days. patient has history MG

## 2021-12-17 NOTE — ED PROVIDER NOTE - OBJECTIVE STATEMENT
66 y.o. [place] male BIBA c/o diff. breathing for 2 days, pt had COVID vaccine, generalized weakness, diff. ambulating, dizziness, ataxia, n/vx3, watery stool x3-4 today, no BRBPR, no dysphagia, fever, numbness, coughing, LOC, CP, pt with dec. appetite,

## 2021-12-18 DIAGNOSIS — G70.00 MYASTHENIA GRAVIS WITHOUT (ACUTE) EXACERBATION: ICD-10-CM

## 2021-12-18 DIAGNOSIS — I63.9 CEREBRAL INFARCTION, UNSPECIFIED: ICD-10-CM

## 2021-12-18 DIAGNOSIS — R53.1 WEAKNESS: ICD-10-CM

## 2021-12-18 DIAGNOSIS — N17.9 ACUTE KIDNEY FAILURE, UNSPECIFIED: ICD-10-CM

## 2021-12-18 DIAGNOSIS — Z29.9 ENCOUNTER FOR PROPHYLACTIC MEASURES, UNSPECIFIED: ICD-10-CM

## 2021-12-18 DIAGNOSIS — I10 ESSENTIAL (PRIMARY) HYPERTENSION: ICD-10-CM

## 2021-12-18 DIAGNOSIS — K52.9 NONINFECTIVE GASTROENTERITIS AND COLITIS, UNSPECIFIED: ICD-10-CM

## 2021-12-18 DIAGNOSIS — E11.9 TYPE 2 DIABETES MELLITUS WITHOUT COMPLICATIONS: ICD-10-CM

## 2021-12-18 DIAGNOSIS — E78.5 HYPERLIPIDEMIA, UNSPECIFIED: ICD-10-CM

## 2021-12-18 LAB
ANION GAP SERPL CALC-SCNC: 4 MMOL/L — LOW (ref 5–17)
BASOPHILS # BLD AUTO: 0.03 K/UL — SIGNIFICANT CHANGE UP (ref 0–0.2)
BASOPHILS NFR BLD AUTO: 0.3 % — SIGNIFICANT CHANGE UP (ref 0–2)
BUN SERPL-MCNC: 16 MG/DL — SIGNIFICANT CHANGE UP (ref 7–18)
CALCIUM SERPL-MCNC: 8.7 MG/DL — SIGNIFICANT CHANGE UP (ref 8.4–10.5)
CHLORIDE SERPL-SCNC: 106 MMOL/L — SIGNIFICANT CHANGE UP (ref 96–108)
CO2 SERPL-SCNC: 30 MMOL/L — SIGNIFICANT CHANGE UP (ref 22–31)
CREAT ?TM UR-MCNC: 106 MG/DL — SIGNIFICANT CHANGE UP
CREAT SERPL-MCNC: 1.63 MG/DL — HIGH (ref 0.5–1.3)
EOSINOPHIL # BLD AUTO: 0.02 K/UL — SIGNIFICANT CHANGE UP (ref 0–0.5)
EOSINOPHIL NFR BLD AUTO: 0.2 % — SIGNIFICANT CHANGE UP (ref 0–6)
GLUCOSE BLDC GLUCOMTR-MCNC: 123 MG/DL — HIGH (ref 70–99)
GLUCOSE BLDC GLUCOMTR-MCNC: 159 MG/DL — HIGH (ref 70–99)
GLUCOSE BLDC GLUCOMTR-MCNC: 170 MG/DL — HIGH (ref 70–99)
GLUCOSE BLDC GLUCOMTR-MCNC: 98 MG/DL — SIGNIFICANT CHANGE UP (ref 70–99)
GLUCOSE SERPL-MCNC: 132 MG/DL — HIGH (ref 70–99)
HCT VFR BLD CALC: 36.2 % — LOW (ref 39–50)
HGB BLD-MCNC: 11.5 G/DL — LOW (ref 13–17)
IMM GRANULOCYTES NFR BLD AUTO: 0.4 % — SIGNIFICANT CHANGE UP (ref 0–1.5)
LYMPHOCYTES # BLD AUTO: 1.35 K/UL — SIGNIFICANT CHANGE UP (ref 1–3.3)
LYMPHOCYTES # BLD AUTO: 12.9 % — LOW (ref 13–44)
MAGNESIUM SERPL-MCNC: 1.9 MG/DL — SIGNIFICANT CHANGE UP (ref 1.6–2.6)
MCHC RBC-ENTMCNC: 27.9 PG — SIGNIFICANT CHANGE UP (ref 27–34)
MCHC RBC-ENTMCNC: 31.8 GM/DL — LOW (ref 32–36)
MCV RBC AUTO: 87.9 FL — SIGNIFICANT CHANGE UP (ref 80–100)
MONOCYTES # BLD AUTO: 0.88 K/UL — SIGNIFICANT CHANGE UP (ref 0–0.9)
MONOCYTES NFR BLD AUTO: 8.4 % — SIGNIFICANT CHANGE UP (ref 2–14)
NEUTROPHILS # BLD AUTO: 8.18 K/UL — HIGH (ref 1.8–7.4)
NEUTROPHILS NFR BLD AUTO: 77.8 % — HIGH (ref 43–77)
NRBC # BLD: 0 /100 WBCS — SIGNIFICANT CHANGE UP (ref 0–0)
PHOSPHATE SERPL-MCNC: 3 MG/DL — SIGNIFICANT CHANGE UP (ref 2.5–4.5)
PLATELET # BLD AUTO: 287 K/UL — SIGNIFICANT CHANGE UP (ref 150–400)
POTASSIUM SERPL-MCNC: 4.2 MMOL/L — SIGNIFICANT CHANGE UP (ref 3.5–5.3)
POTASSIUM SERPL-SCNC: 4.2 MMOL/L — SIGNIFICANT CHANGE UP (ref 3.5–5.3)
RBC # BLD: 4.12 M/UL — LOW (ref 4.2–5.8)
RBC # FLD: 14.4 % — SIGNIFICANT CHANGE UP (ref 10.3–14.5)
SODIUM SERPL-SCNC: 140 MMOL/L — SIGNIFICANT CHANGE UP (ref 135–145)
SODIUM UR-SCNC: 77 MMOL/L — SIGNIFICANT CHANGE UP
WBC # BLD: 10.5 K/UL — SIGNIFICANT CHANGE UP (ref 3.8–10.5)
WBC # FLD AUTO: 10.5 K/UL — SIGNIFICANT CHANGE UP (ref 3.8–10.5)

## 2021-12-18 RX ORDER — ATORVASTATIN CALCIUM 80 MG/1
80 TABLET, FILM COATED ORAL AT BEDTIME
Refills: 0 | Status: DISCONTINUED | OUTPATIENT
Start: 2021-12-18 | End: 2021-12-22

## 2021-12-18 RX ORDER — PYRIDOSTIGMINE BROMIDE 60 MG/5ML
60 SOLUTION ORAL EVERY 4 HOURS
Refills: 0 | Status: DISCONTINUED | OUTPATIENT
Start: 2021-12-18 | End: 2021-12-22

## 2021-12-18 RX ORDER — SODIUM CHLORIDE 9 MG/ML
1000 INJECTION INTRAMUSCULAR; INTRAVENOUS; SUBCUTANEOUS
Refills: 0 | Status: DISCONTINUED | OUTPATIENT
Start: 2021-12-18 | End: 2021-12-20

## 2021-12-18 RX ORDER — NIFEDIPINE 30 MG
120 TABLET, EXTENDED RELEASE 24 HR ORAL DAILY
Refills: 0 | Status: DISCONTINUED | OUTPATIENT
Start: 2021-12-18 | End: 2021-12-22

## 2021-12-18 RX ORDER — CHOLECALCIFEROL (VITAMIN D3) 125 MCG
1000 CAPSULE ORAL DAILY
Refills: 0 | Status: DISCONTINUED | OUTPATIENT
Start: 2021-12-18 | End: 2021-12-22

## 2021-12-18 RX ORDER — INSULIN LISPRO 100/ML
VIAL (ML) SUBCUTANEOUS
Refills: 0 | Status: DISCONTINUED | OUTPATIENT
Start: 2021-12-18 | End: 2021-12-22

## 2021-12-18 RX ORDER — ASPIRIN/CALCIUM CARB/MAGNESIUM 324 MG
81 TABLET ORAL DAILY
Refills: 0 | Status: DISCONTINUED | OUTPATIENT
Start: 2021-12-18 | End: 2021-12-22

## 2021-12-18 RX ORDER — NIFEDIPINE 30 MG
60 TABLET, EXTENDED RELEASE 24 HR ORAL DAILY
Refills: 0 | Status: DISCONTINUED | OUTPATIENT
Start: 2021-12-18 | End: 2021-12-18

## 2021-12-18 RX ORDER — OXYCODONE HYDROCHLORIDE 5 MG/1
1 TABLET ORAL
Qty: 0 | Refills: 0 | DISCHARGE

## 2021-12-18 RX ORDER — HEPARIN SODIUM 5000 [USP'U]/ML
5000 INJECTION INTRAVENOUS; SUBCUTANEOUS EVERY 8 HOURS
Refills: 0 | Status: DISCONTINUED | OUTPATIENT
Start: 2021-12-18 | End: 2021-12-22

## 2021-12-18 RX ORDER — CALCIUM CARBONATE 500(1250)
1 TABLET ORAL DAILY
Refills: 0 | Status: DISCONTINUED | OUTPATIENT
Start: 2021-12-18 | End: 2021-12-22

## 2021-12-18 RX ORDER — TAMSULOSIN HYDROCHLORIDE 0.4 MG/1
0.4 CAPSULE ORAL AT BEDTIME
Refills: 0 | Status: DISCONTINUED | OUTPATIENT
Start: 2021-12-18 | End: 2021-12-22

## 2021-12-18 RX ORDER — HYDRALAZINE HCL 50 MG
100 TABLET ORAL THREE TIMES A DAY
Refills: 0 | Status: DISCONTINUED | OUTPATIENT
Start: 2021-12-18 | End: 2021-12-22

## 2021-12-18 RX ADMIN — TAMSULOSIN HYDROCHLORIDE 0.4 MILLIGRAM(S): 0.4 CAPSULE ORAL at 21:26

## 2021-12-18 RX ADMIN — HEPARIN SODIUM 5000 UNIT(S): 5000 INJECTION INTRAVENOUS; SUBCUTANEOUS at 13:32

## 2021-12-18 RX ADMIN — SODIUM CHLORIDE 150 MILLILITER(S): 9 INJECTION INTRAMUSCULAR; INTRAVENOUS; SUBCUTANEOUS at 02:51

## 2021-12-18 RX ADMIN — Medication 1: at 11:37

## 2021-12-18 RX ADMIN — HEPARIN SODIUM 5000 UNIT(S): 5000 INJECTION INTRAVENOUS; SUBCUTANEOUS at 21:27

## 2021-12-18 RX ADMIN — Medication 100 MILLIGRAM(S): at 13:31

## 2021-12-18 RX ADMIN — Medication 100 MILLIGRAM(S): at 21:26

## 2021-12-18 RX ADMIN — Medication 100 MILLIGRAM(S): at 04:27

## 2021-12-18 RX ADMIN — Medication 1: at 17:27

## 2021-12-18 RX ADMIN — HEPARIN SODIUM 5000 UNIT(S): 5000 INJECTION INTRAVENOUS; SUBCUTANEOUS at 06:00

## 2021-12-18 RX ADMIN — Medication 1000 UNIT(S): at 11:37

## 2021-12-18 RX ADMIN — Medication 120 MILLIGRAM(S): at 05:59

## 2021-12-18 RX ADMIN — Medication 81 MILLIGRAM(S): at 11:37

## 2021-12-18 RX ADMIN — PYRIDOSTIGMINE BROMIDE 60 MILLIGRAM(S): 60 SOLUTION ORAL at 17:28

## 2021-12-18 RX ADMIN — Medication 1 TABLET(S): at 11:37

## 2021-12-18 RX ADMIN — PYRIDOSTIGMINE BROMIDE 60 MILLIGRAM(S): 60 SOLUTION ORAL at 21:26

## 2021-12-18 RX ADMIN — SODIUM CHLORIDE 100 MILLILITER(S): 9 INJECTION INTRAMUSCULAR; INTRAVENOUS; SUBCUTANEOUS at 06:00

## 2021-12-18 RX ADMIN — ATORVASTATIN CALCIUM 80 MILLIGRAM(S): 80 TABLET, FILM COATED ORAL at 21:26

## 2021-12-18 RX ADMIN — PYRIDOSTIGMINE BROMIDE 60 MILLIGRAM(S): 60 SOLUTION ORAL at 06:00

## 2021-12-18 RX ADMIN — PYRIDOSTIGMINE BROMIDE 60 MILLIGRAM(S): 60 SOLUTION ORAL at 13:31

## 2021-12-18 RX ADMIN — PYRIDOSTIGMINE BROMIDE 60 MILLIGRAM(S): 60 SOLUTION ORAL at 09:43

## 2021-12-18 NOTE — PATIENT PROFILE ADULT - FALL HARM RISK - HARM RISK INTERVENTIONS

## 2021-12-18 NOTE — H&P ADULT - HISTORY OF PRESENT ILLNESS
Patient is a 67 y/o Male from [facility], walks independently, with medical hx significant for Myasthenia Gravis s/p Thymoma resection, HTN, HLD, CVA x 2, Osteoporosis, PAD, Peripheral Neuropathy, Cervical and Lumbar spondylosis, BPH, presenting to the ED due to complaints of generalized weakness. Pt c/o multiple episodes of NBNB vomiting and diarrheal episodes over the course of the last 3 days. No hemetemesis, hematochezia, SOB, LOC, abdominal pain, dysuria, numbness, tingling, fevers, cough, sick contacts. Pt says on initial ED admission, he felt lightheaded which has improved s/p IVF. Vomiting and diarrheal episodes have also stopped.     In the ED, pt's vitals were  /80, HR 91/min, Temp Afebrile, saturating well on RA  Chest xray no consolidation  U/A negative  Lactate downtrended s/p fluids

## 2021-12-18 NOTE — H&P ADULT - PROBLEM SELECTOR PLAN 6
Pt has hx of DM, takes Metformin and Humalog as per sliding scale  Started on Insulin sliding scale   Monitor blood sugars

## 2021-12-18 NOTE — H&P ADULT - NSHPPHYSICALEXAM_GEN_ALL_CORE
PHYSICAL EXAM:  GENERAL: NAD, well-developed  HEAD:  Atraumatic, Normocephalic  EYES: EOMI, PERRLA, conjunctiva and sclera clear  NECK: Supple, No JVD  CHEST/LUNG: Clear to auscultation bilaterally; No wheeze  HEART: Regular rate and rhythm; S1+ S2+  ABDOMEN: Soft, Nontender, Nondistended; Bowel sounds present  EXTREMITIES:, No clubbing, cyanosis, or edema  NEUROLOGY:AAOx3 non-focal  SKIN: No rashes or lesions

## 2021-12-18 NOTE — H&P ADULT - PROBLEM SELECTOR PLAN 3
Pt p/w mild WBC elevation 13k, Creatinine 2.02, likely due to dehydration secondary to vomiting/diarrhea  Monitor for improvement in creatinine s/p IVF  Likely pre renal  If creatinine persistently remains elevated despite IVF, can consider renal sono

## 2021-12-18 NOTE — H&P ADULT - ASSESSMENT
65 y/o Male from [facility], walks independently, with medical hx significant for Myasthenia Gravis s/p Thymoma resection, HTN, HLD, CVA x 2, Osteoporosis, PAD, Peripheral Neuropathy, Cervical and Lumbar spondylosis, BPH, presenting to the ED due to complaints of generalized weakness 65 y/o Male from [facility], walks independently, with medical hx significant for Myasthenia Gravis s/p Thymoma resection, HTN, HLD, CVA x 2, Osteoporosis, PAD, Peripheral Neuropathy, Cervical and Lumbar spondylosis, BPH, presenting to the ED due to complaints of generalized weakness likely in the setting of Gastroenteritis

## 2021-12-18 NOTE — H&P ADULT - PROBLEM SELECTOR PLAN 7
Pt has hx of Myasthenia gravis, takes Pyridostigmine 60 mg 6 times a day  As per [facility] papers, pt also on Prednisone 20 mg and Bactrim DS 80/160 M/W/F x 10 days (unsure reasoning)  Pt currently not in Myasthenic crisis, weakness was secondary to likely Gastroenteritis which has resolved  -No respiratory compromise, no eye drooping  C/w Pyridostigmine

## 2021-12-18 NOTE — H&P ADULT - PROBLEM SELECTOR PLAN 1
Pt p/w weakness, decreased appetite, NBNB vomiting and loose stools which has now resolved as per pt  -Cxray negative, U/A negative, Lactate downtrended s/p fluids  -Likely Viral gastroenteritis  -Will continue IVF for now, resume diet and encourage PO intake Pt p/w weakness, decreased appetite, NBNB vomiting and loose stools which has now resolved as per pt  -Cxray negative, U/A negative, Lactate downtrended s/p fluids  CTH negative for acute findings  -Likely Viral gastroenteritis  -Will continue IVF for now, resume diet and encourage PO intake

## 2021-12-18 NOTE — H&P ADULT - NSHPREVIEWOFSYSTEMS_GEN_ALL_CORE
REVIEW OF SYSTEMS:    CONSTITUTIONAL: + weakness, no fevers or chills  EYES/ENT: No visual changes;  No vertigo or throat pain   NECK: No pain or stiffness  RESPIRATORY: No cough, wheezing, hemoptysis; No shortness of breath  CARDIOVASCULAR: No chest pain or palpitations  GASTROINTESTINAL: No abdominal or epigastric pain. +diarrhea and vomiting resolved  GENITOURINARY: No dysuria, frequency or hematuria  NEUROLOGICAL: No numbness or weakness  SKIN: No itching, burning, rashes, or lesions   All other review of systems is negative unless indicated above.

## 2021-12-18 NOTE — PATIENT PROFILE ADULT - PUBLIC BENEFITS
Spirometry, Breathing Capacity: Normal Order, Clinic Performed   Status:  Final result   Visible to patient:  No (Not Released) Dx:  PE (pulmonary thromboembolism) (H); O... Order: [id number]       Notes Recorded by Reginaldo Muir MD on 4/14/2017 at 9:26 AM  Based on the spirometry , there is evidence for chronic obstructive pulmonary disease. The standard of care  For treatment of chronic obstructive pulmonary disease is albuterol , either the nebulizer treatments or metered dose inhaler and something like Advair [ fluticasone / serevent ]. There are other chronic obstructive pulmonary disease medications that could be tried. This is an avenue of treatment for his dyspnea on exertion and this should be pursued. Lets recommend a trial of the albuterol metered dose inhaler to assess for his response to therapy. Further follow up depending on the response to therapy . A pulmonary consultation remains an option.    Please let me know if patient has questions for me     Reginaldo Muir MD        Detail Level: Zone Detail Level: Detailed no

## 2021-12-19 LAB
ALBUMIN SERPL ELPH-MCNC: 2.2 G/DL — LOW (ref 3.5–5)
ALP SERPL-CCNC: 65 U/L — SIGNIFICANT CHANGE UP (ref 40–120)
ALT FLD-CCNC: 21 U/L DA — SIGNIFICANT CHANGE UP (ref 10–60)
ANION GAP SERPL CALC-SCNC: 2 MMOL/L — LOW (ref 5–17)
AST SERPL-CCNC: 15 U/L — SIGNIFICANT CHANGE UP (ref 10–40)
BASOPHILS # BLD AUTO: 0.03 K/UL — SIGNIFICANT CHANGE UP (ref 0–0.2)
BASOPHILS NFR BLD AUTO: 0.4 % — SIGNIFICANT CHANGE UP (ref 0–2)
BILIRUB SERPL-MCNC: 0.3 MG/DL — SIGNIFICANT CHANGE UP (ref 0.2–1.2)
BUN SERPL-MCNC: 11 MG/DL — SIGNIFICANT CHANGE UP (ref 7–18)
CALCIUM SERPL-MCNC: 8.5 MG/DL — SIGNIFICANT CHANGE UP (ref 8.4–10.5)
CHLORIDE SERPL-SCNC: 107 MMOL/L — SIGNIFICANT CHANGE UP (ref 96–108)
CO2 SERPL-SCNC: 29 MMOL/L — SIGNIFICANT CHANGE UP (ref 22–31)
CREAT SERPL-MCNC: 1.3 MG/DL — SIGNIFICANT CHANGE UP (ref 0.5–1.3)
CULTURE RESULTS: SIGNIFICANT CHANGE UP
EOSINOPHIL # BLD AUTO: 0.02 K/UL — SIGNIFICANT CHANGE UP (ref 0–0.5)
EOSINOPHIL NFR BLD AUTO: 0.2 % — SIGNIFICANT CHANGE UP (ref 0–6)
GLUCOSE BLDC GLUCOMTR-MCNC: 118 MG/DL — HIGH (ref 70–99)
GLUCOSE BLDC GLUCOMTR-MCNC: 125 MG/DL — HIGH (ref 70–99)
GLUCOSE BLDC GLUCOMTR-MCNC: 164 MG/DL — HIGH (ref 70–99)
GLUCOSE BLDC GLUCOMTR-MCNC: 244 MG/DL — HIGH (ref 70–99)
GLUCOSE SERPL-MCNC: 123 MG/DL — HIGH (ref 70–99)
HCT VFR BLD CALC: 30.3 % — LOW (ref 39–50)
HCV AB S/CO SERPL IA: 0.16 S/CO — SIGNIFICANT CHANGE UP (ref 0–0.99)
HCV AB SERPL-IMP: SIGNIFICANT CHANGE UP
HGB BLD-MCNC: 9.6 G/DL — LOW (ref 13–17)
IMM GRANULOCYTES NFR BLD AUTO: 0.5 % — SIGNIFICANT CHANGE UP (ref 0–1.5)
LYMPHOCYTES # BLD AUTO: 1.05 K/UL — SIGNIFICANT CHANGE UP (ref 1–3.3)
LYMPHOCYTES # BLD AUTO: 13 % — SIGNIFICANT CHANGE UP (ref 13–44)
MAGNESIUM SERPL-MCNC: 1.8 MG/DL — SIGNIFICANT CHANGE UP (ref 1.6–2.6)
MCHC RBC-ENTMCNC: 27.7 PG — SIGNIFICANT CHANGE UP (ref 27–34)
MCHC RBC-ENTMCNC: 31.7 GM/DL — LOW (ref 32–36)
MCV RBC AUTO: 87.3 FL — SIGNIFICANT CHANGE UP (ref 80–100)
MONOCYTES # BLD AUTO: 0.74 K/UL — SIGNIFICANT CHANGE UP (ref 0–0.9)
MONOCYTES NFR BLD AUTO: 9.1 % — SIGNIFICANT CHANGE UP (ref 2–14)
NEUTROPHILS # BLD AUTO: 6.22 K/UL — SIGNIFICANT CHANGE UP (ref 1.8–7.4)
NEUTROPHILS NFR BLD AUTO: 76.8 % — SIGNIFICANT CHANGE UP (ref 43–77)
NRBC # BLD: 0 /100 WBCS — SIGNIFICANT CHANGE UP (ref 0–0)
PHOSPHATE SERPL-MCNC: 3.1 MG/DL — SIGNIFICANT CHANGE UP (ref 2.5–4.5)
PLATELET # BLD AUTO: 267 K/UL — SIGNIFICANT CHANGE UP (ref 150–400)
POTASSIUM SERPL-MCNC: 3.7 MMOL/L — SIGNIFICANT CHANGE UP (ref 3.5–5.3)
POTASSIUM SERPL-SCNC: 3.7 MMOL/L — SIGNIFICANT CHANGE UP (ref 3.5–5.3)
PROT SERPL-MCNC: 5.2 G/DL — LOW (ref 6–8.3)
RBC # BLD: 3.47 M/UL — LOW (ref 4.2–5.8)
RBC # FLD: 14.2 % — SIGNIFICANT CHANGE UP (ref 10.3–14.5)
SODIUM SERPL-SCNC: 138 MMOL/L — SIGNIFICANT CHANGE UP (ref 135–145)
SPECIMEN SOURCE: SIGNIFICANT CHANGE UP
WBC # BLD: 8.1 K/UL — SIGNIFICANT CHANGE UP (ref 3.8–10.5)
WBC # FLD AUTO: 8.1 K/UL — SIGNIFICANT CHANGE UP (ref 3.8–10.5)

## 2021-12-19 RX ADMIN — PYRIDOSTIGMINE BROMIDE 60 MILLIGRAM(S): 60 SOLUTION ORAL at 11:39

## 2021-12-19 RX ADMIN — Medication 100 MILLIGRAM(S): at 14:57

## 2021-12-19 RX ADMIN — ATORVASTATIN CALCIUM 80 MILLIGRAM(S): 80 TABLET, FILM COATED ORAL at 21:21

## 2021-12-19 RX ADMIN — Medication 1 TABLET(S): at 13:54

## 2021-12-19 RX ADMIN — PYRIDOSTIGMINE BROMIDE 60 MILLIGRAM(S): 60 SOLUTION ORAL at 05:51

## 2021-12-19 RX ADMIN — Medication 1000 UNIT(S): at 12:49

## 2021-12-19 RX ADMIN — Medication 120 MILLIGRAM(S): at 05:51

## 2021-12-19 RX ADMIN — Medication 2: at 11:38

## 2021-12-19 RX ADMIN — HEPARIN SODIUM 5000 UNIT(S): 5000 INJECTION INTRAVENOUS; SUBCUTANEOUS at 21:21

## 2021-12-19 RX ADMIN — Medication 20 MILLIGRAM(S): at 18:59

## 2021-12-19 RX ADMIN — Medication 100 MILLIGRAM(S): at 05:51

## 2021-12-19 RX ADMIN — Medication 81 MILLIGRAM(S): at 12:53

## 2021-12-19 RX ADMIN — PYRIDOSTIGMINE BROMIDE 60 MILLIGRAM(S): 60 SOLUTION ORAL at 21:21

## 2021-12-19 RX ADMIN — PYRIDOSTIGMINE BROMIDE 60 MILLIGRAM(S): 60 SOLUTION ORAL at 18:38

## 2021-12-19 RX ADMIN — Medication 100 MILLIGRAM(S): at 21:21

## 2021-12-19 RX ADMIN — PYRIDOSTIGMINE BROMIDE 60 MILLIGRAM(S): 60 SOLUTION ORAL at 02:19

## 2021-12-19 RX ADMIN — PYRIDOSTIGMINE BROMIDE 60 MILLIGRAM(S): 60 SOLUTION ORAL at 14:57

## 2021-12-19 RX ADMIN — HEPARIN SODIUM 5000 UNIT(S): 5000 INJECTION INTRAVENOUS; SUBCUTANEOUS at 05:50

## 2021-12-19 RX ADMIN — HEPARIN SODIUM 5000 UNIT(S): 5000 INJECTION INTRAVENOUS; SUBCUTANEOUS at 14:57

## 2021-12-19 RX ADMIN — TAMSULOSIN HYDROCHLORIDE 0.4 MILLIGRAM(S): 0.4 CAPSULE ORAL at 21:21

## 2021-12-19 NOTE — PROGRESS NOTE ADULT - SUBJECTIVE AND OBJECTIVE BOX
`Patient is a 66y old  Male who presents with a chief complaint of Weakness (18 Dec 2021 03:58)    PATIENT IS SEEN AND EXAMINED IN MEDICAL FLOOR.  NGT [    ]    ARCADIO [   ]      GT [   ]    ALLERGIES:  No Known Allergies      Daily     Daily     VITALS:    Vital Signs Last 24 Hrs  T(C): 36.8 (19 Dec 2021 05:20), Max: 37.2 (18 Dec 2021 21:49)  T(F): 98.3 (19 Dec 2021 05:20), Max: 99 (18 Dec 2021 21:49)  HR: 84 (19 Dec 2021 05:20) (78 - 92)  BP: 146/77 (19 Dec 2021 05:20) (146/77 - 163/72)  BP(mean): --  RR: 18 (19 Dec 2021 05:20) (18 - 19)  SpO2: 95% (19 Dec 2021 05:20) (95% - 98%)    LABS:    CBC Full  -  ( 19 Dec 2021 05:53 )  WBC Count : 8.10 K/uL  RBC Count : 3.47 M/uL  Hemoglobin : 9.6 g/dL  Hematocrit : 30.3 %  Platelet Count - Automated : 267 K/uL  Mean Cell Volume : 87.3 fl  Mean Cell Hemoglobin : 27.7 pg  Mean Cell Hemoglobin Concentration : 31.7 gm/dL  Auto Neutrophil # : 6.22 K/uL  Auto Lymphocyte # : 1.05 K/uL  Auto Monocyte # : 0.74 K/uL  Auto Eosinophil # : 0.02 K/uL  Auto Basophil # : 0.03 K/uL  Auto Neutrophil % : 76.8 %  Auto Lymphocyte % : 13.0 %  Auto Monocyte % : 9.1 %  Auto Eosinophil % : 0.2 %  Auto Basophil % : 0.4 %      12-19    138  |  107  |  11  ----------------------------<  123<H>  3.7   |  29  |  1.30    Ca    8.5      19 Dec 2021 05:54  Phos  3.1     12-19  Mg     1.8     12-19    TPro  5.2<L>  /  Alb  2.2<L>  /  TBili  0.3  /  DBili  x   /  AST  15  /  ALT  21  /  AlkPhos  65  12-19    CAPILLARY BLOOD GLUCOSE      POCT Blood Glucose.: 244 mg/dL (19 Dec 2021 11:26)  POCT Blood Glucose.: 118 mg/dL (19 Dec 2021 07:31)  POCT Blood Glucose.: 98 mg/dL (18 Dec 2021 21:08)  POCT Blood Glucose.: 159 mg/dL (18 Dec 2021 16:47)        LIVER FUNCTIONS - ( 19 Dec 2021 05:54 )  Alb: 2.2 g/dL / Pro: 5.2 g/dL / ALK PHOS: 65 U/L / ALT: 21 U/L DA / AST: 15 U/L / GGT: x           Creatinine Trend: 1.30<--, 1.63<--, 2.02<--  I&O's Summary    18 Dec 2021 07:01  -  19 Dec 2021 07:00  --------------------------------------------------------  IN: 0 mL / OUT: 200 mL / NET: -200 mL            Clean Catch Clean Catch (Midstream)  12-18 @ 05:13   <10,000 CFU/mL Normal Urogenital Elizabet  --  --          MEDICATIONS:    MEDICATIONS  (STANDING):  aspirin enteric coated 81 milliGRAM(s) Oral daily  atorvastatin 80 milliGRAM(s) Oral at bedtime  calcium carbonate   1250 mG (OsCal) 1 Tablet(s) Oral daily  cholecalciferol 1000 Unit(s) Oral daily  heparin   Injectable 5000 Unit(s) SubCutaneous every 8 hours  hydrALAZINE 100 milliGRAM(s) Oral three times a day  insulin lispro (ADMELOG) corrective regimen sliding scale   SubCutaneous three times a day before meals  NIFEdipine  milliGRAM(s) Oral daily  pyridostigmine 60 milliGRAM(s) Oral every 4 hours  sodium chloride 0.9%. 1000 milliLiter(s) (100 mL/Hr) IV Continuous <Continuous>  tamsulosin 0.4 milliGRAM(s) Oral at bedtime      MEDICATIONS  (PRN):      REVIEW OF SYSTEMS:                           ALL ROS DONE [ X   ]    CONSTITUTIONAL:  LETHARGIC [   ], FEVER [   ], UNRESPONSIVE [   ]  CVS:  CP  [   ], SOB, [   ], PALPITATIONS [   ], DIZZYNESS [   ]  RS: COUGH [   ], SPUTUM [   ]  GI: ABDOMINAL PAIN [   ], NAUSEA [   ], VOMITINGS [   ], DIARRHEA [   ], CONSTIPATION [   ]  :  DYSURIA [   ], NOCTURIA [   ], INCREASED FREQUENCY [   ], DRIBLING [   ],  SKELETAL: PAINFUL JOINTS [   ], SWOLLEN JOINTS [   ], NECK ACHE [   ], LOW BACK ACHE [   ],  SKIN : ULCERS [   ], RASH [   ], ITCHING [   ]  CNS: HEAD ACHE [   ], DOUBLE VISION [   ], BLURRED VISION [   ], AMS / CONFUSION [   ], SEIZURES [   ], WEAKNESS [   ],TINGLING / NUMBNESS [   ]    PHYSICAL EXAMINATION:  GENERAL APPEARANCE: NO DISTRESS  HEENT:  NO PALLOR, NO  JVD,  NO   NODES, NECK SUPPLE  CVS: S1 +, S2 +,   RS: AEEB,  OCCASIONAL  RALES +,   NO RONCHI  ABD: SOFT, NT, NO, BS +  EXT: NO PE  SKIN: WARM,   SKELETAL:  ROM ACCEPTABLE  CNS:  AAO X    ,   DEFICITS    RADIOLOGY :      ASSESSMENT :     Weakness    HTN (hypertension)    DM (diabetes mellitus)    Myasthenia gravis    Hypercholesterolemia    CVA (cerebrovascular accident)    Peripheral neuropathy    Lipoma of chest wall    MG with thymoma (myasthena gravis)        PLAN:  HPI:  Patient is a 65 y/o Male from [facility], walks independently, with medical hx significant for Myasthenia Gravis s/p Thymoma resection, HTN, HLD, CVA x 2, Osteoporosis, PAD, Peripheral Neuropathy, Cervical and Lumbar spondylosis, BPH, presenting to the ED due to complaints of generalized weakness. Pt c/o multiple episodes of NBNB vomiting and diarrheal episodes over the course of the last 3 days. No hemetemesis, hematochezia, SOB, LOC, abdominal pain, dysuria, numbness, tingling, fevers, cough, sick contacts. Pt says on initial ED admission, he felt lightheaded which has improved s/p IVF. Vomiting and diarrheal episodes have also stopped.     In the ED, pt's vitals were  /80, HR 91/min, Temp Afebrile, saturating well on RA  Chest xray no consolidation  U/A negative  Lactate downtrended s/p fluids (18 Dec 2021 03:58)    -      `Patient is a 66y old  Male who presents with a chief complaint of Weakness (18 Dec 2021 03:58)    PATIENT IS SEEN AND EXAMINED IN MEDICAL FLOOR.    ALLERGIES:  No Known Allergies    VITALS:    Vital Signs Last 24 Hrs  T(C): 36.8 (19 Dec 2021 05:20), Max: 37.2 (18 Dec 2021 21:49)  T(F): 98.3 (19 Dec 2021 05:20), Max: 99 (18 Dec 2021 21:49)  HR: 84 (19 Dec 2021 05:20) (78 - 92)  BP: 146/77 (19 Dec 2021 05:20) (146/77 - 163/72)  BP(mean): --  RR: 18 (19 Dec 2021 05:20) (18 - 19)  SpO2: 95% (19 Dec 2021 05:20) (95% - 98%)    LABS:    CBC Full  -  ( 19 Dec 2021 05:53 )  WBC Count : 8.10 K/uL  RBC Count : 3.47 M/uL  Hemoglobin : 9.6 g/dL  Hematocrit : 30.3 %  Platelet Count - Automated : 267 K/uL  Mean Cell Volume : 87.3 fl  Mean Cell Hemoglobin : 27.7 pg  Mean Cell Hemoglobin Concentration : 31.7 gm/dL  Auto Neutrophil # : 6.22 K/uL  Auto Lymphocyte # : 1.05 K/uL  Auto Monocyte # : 0.74 K/uL  Auto Eosinophil # : 0.02 K/uL  Auto Basophil # : 0.03 K/uL  Auto Neutrophil % : 76.8 %  Auto Lymphocyte % : 13.0 %  Auto Monocyte % : 9.1 %  Auto Eosinophil % : 0.2 %  Auto Basophil % : 0.4 %      12-19    138  |  107  |  11  ----------------------------<  123<H>  3.7   |  29  |  1.30    Ca    8.5      19 Dec 2021 05:54  Phos  3.1     12-19  Mg     1.8     12-19    TPro  5.2<L>  /  Alb  2.2<L>  /  TBili  0.3  /  DBili  x   /  AST  15  /  ALT  21  /  AlkPhos  65  12-19    CAPILLARY BLOOD GLUCOSE      POCT Blood Glucose.: 244 mg/dL (19 Dec 2021 11:26)  POCT Blood Glucose.: 118 mg/dL (19 Dec 2021 07:31)  POCT Blood Glucose.: 98 mg/dL (18 Dec 2021 21:08)  POCT Blood Glucose.: 159 mg/dL (18 Dec 2021 16:47)        LIVER FUNCTIONS - ( 19 Dec 2021 05:54 )  Alb: 2.2 g/dL / Pro: 5.2 g/dL / ALK PHOS: 65 U/L / ALT: 21 U/L DA / AST: 15 U/L / GGT: x           Creatinine Trend: 1.30<--, 1.63<--, 2.02<--  I&O's Summary    18 Dec 2021 07:01  -  19 Dec 2021 07:00  --------------------------------------------------------  IN: 0 mL / OUT: 200 mL / NET: -200 mL            Clean Catch Clean Catch (Midstream)  12-18 @ 05:13   <10,000 CFU/mL Normal Urogenital Elizabet  --  --          MEDICATIONS:    MEDICATIONS  (STANDING):  aspirin enteric coated 81 milliGRAM(s) Oral daily  atorvastatin 80 milliGRAM(s) Oral at bedtime  calcium carbonate   1250 mG (OsCal) 1 Tablet(s) Oral daily  cholecalciferol 1000 Unit(s) Oral daily  heparin   Injectable 5000 Unit(s) SubCutaneous every 8 hours  hydrALAZINE 100 milliGRAM(s) Oral three times a day  insulin lispro (ADMELOG) corrective regimen sliding scale   SubCutaneous three times a day before meals  NIFEdipine  milliGRAM(s) Oral daily  pyridostigmine 60 milliGRAM(s) Oral every 4 hours  sodium chloride 0.9%. 1000 milliLiter(s) (100 mL/Hr) IV Continuous <Continuous>  tamsulosin 0.4 milliGRAM(s) Oral at bedtime      MEDICATIONS  (PRN):      REVIEW OF SYSTEMS:                           ALL ROS DONE [ X   ]    CONSTITUTIONAL:  LETHARGIC [   ], FEVER [   ], UNRESPONSIVE [   ]  CVS:  CP  [   ], SOB, [   ], PALPITATIONS [   ], DIZZYNESS [   ]  RS: COUGH [   ], SPUTUM [   ]  GI: ABDOMINAL PAIN [   ], NAUSEA [   ], VOMITINGS [   ], DIARRHEA [   ], CONSTIPATION [   ]  :  DYSURIA [   ], NOCTURIA [   ], INCREASED FREQUENCY [   ], DRIBLING [   ],  SKELETAL: PAINFUL JOINTS [   ], SWOLLEN JOINTS [   ], NECK ACHE [   ], LOW BACK ACHE [   ],  SKIN : ULCERS [   ], RASH [   ], ITCHING [   ]  CNS: HEAD ACHE [   ], DOUBLE VISION [   ], BLURRED VISION [   ], AMS / CONFUSION [   ], SEIZURES [   ], WEAKNESS [   ],TINGLING / NUMBNESS [   ]    PHYSICAL EXAMINATION:  GENERAL APPEARANCE: NO DISTRESS  HEENT:  NO PALLOR, NO  JVD,  NO   NODES, NECK SUPPLE   ;  ? mild exophtalmos  CVS: S1 +, S2 +,   RS: AEEB,  OCCASIONAL  RALES +,   NO RONCHI  ABD: SOFT, NT, NO, BS +  EXT: NO PE  SKIN: WARM,   SKELETAL:  ROM ACCEPTABLE  CNS:  AAO X 3    RADIOLOGY :    ACC: [id number] EXAM:  CT BRAIN                          PROCEDURE DATE:  12/17/2021          INTERPRETATION:  CLINICAL INDICATIONS:  dizziness, ataxia    COMPARISON: CT head dated 2/19/2017 and MRI brain dated 2/21/2017    TECHNIQUE: Noncontrast CT of the head. Multiplanar reformations are   submitted.    FINDINGS: Chronic right basal ganglia and left thalamic cortical   infarctions. Chronic left paramedian moderate size occipital lobe   infarction.  There is periventricular and subcortical white matter hypodensity without   mass effect, nonspecific, likely representing moderate chronic   microvascular ischemic changes. There is no compelling evidence for an   acute transcortical infarction. There is no evidence of mass, mass   effect, midline shift or extra-axial fluid collection. The lateral   ventricles and cortical sulci are age-appropriate in size and   configuration. The orbits, mastoid air cells and visualized paranasal   sinuses are unremarkable. The calvarium is intact. ConsiderMRI as   clinically warranted.    IMPRESSION: Multiple chronic supratentorial infarctions. Moderate chronic   microvascular changes without evidence of an acute transcortical   infarction or hemorrhage. Preliminary report provided by RUBEN BOLES DO; Attending Radiologist.      ASSESSMENT :     Weakness    HTN (hypertension)    DM (diabetes mellitus)    Myasthenia gravis    Hypercholesterolemia    CVA (cerebrovascular accident)    Peripheral neuropathy    Lipoma of chest wall    MG with thymoma (myasthena gravis)        PLAN:  HPI:  Patient is a 65 y/o Male from [facility], walks independently, with medical hx significant for Myasthenia Gravis s/p Thymoma resection, HTN, HLD, CVA x 2, Osteoporosis, PAD, Peripheral Neuropathy, Cervical and Lumbar spondylosis, BPH, presenting to the ED due to complaints of generalized weakness. Pt c/o multiple episodes of NBNB vomiting and diarrheal episodes over the course of the last 3 days. No hemetemesis, hematochezia, SOB, LOC, abdominal pain, dysuria, numbness, tingling, fevers, cough, sick contacts. Pt says on initial ED admission, he felt lightheaded which has improved s/p IVF. Vomiting and diarrheal episodes have also stopped.     In the ED, pt's vitals were  /80, HR 91/min, Temp Afebrile, saturating well on RA  Chest xray no consolidation  U/A negative  Lactate downtrended s/p fluids (18 Dec 2021 03:58)    # GENERALIZED WEAKNESS - R/O S/T MYASTHENIA VS. MICHEL/DEHYDRATION   - ON PYRIDOSTIGMINE 60MG X 6 TIMES DAILY, PREDNISONE 20MG QDAY, BACTRIM ? PPX  - NO OVERT EVIDENCE OF RESPIRATORY WEAKNESS - F/U NIF  - F/U PT EVAL  - NEUROLOGY CONSULT PLACED    # ACUTE GASTROENTERITIS - RESOLVED  # ELEVATED LACTIC ACID - RESOLVED S/P IVF  # MICHEL - RESOLVED S/P IVF  # BPH - ON FLOMAX  # HLD - STATIN  # HTN - ON HYDRALAZINE, NIFEDIPINE; HOLD LOSARTAN  # DM - SSI + FS; HOLD METFORMIN  # GI AND DVT PPX     `Patient is a 66y old  Male who presents with a chief complaint of Weakness (18 Dec 2021 03:58)    PATIENT IS SEEN AND EXAMINED IN MEDICAL FLOOR.    ALLERGIES:  No Known Allergies    VITALS:    Vital Signs Last 24 Hrs  T(C): 36.8 (19 Dec 2021 05:20), Max: 37.2 (18 Dec 2021 21:49)  T(F): 98.3 (19 Dec 2021 05:20), Max: 99 (18 Dec 2021 21:49)  HR: 84 (19 Dec 2021 05:20) (78 - 92)  BP: 146/77 (19 Dec 2021 05:20) (146/77 - 163/72)  BP(mean): --  RR: 18 (19 Dec 2021 05:20) (18 - 19)  SpO2: 95% (19 Dec 2021 05:20) (95% - 98%)    LABS:    CBC Full  -  ( 19 Dec 2021 05:53 )  WBC Count : 8.10 K/uL  RBC Count : 3.47 M/uL  Hemoglobin : 9.6 g/dL  Hematocrit : 30.3 %  Platelet Count - Automated : 267 K/uL  Mean Cell Volume : 87.3 fl  Mean Cell Hemoglobin : 27.7 pg  Mean Cell Hemoglobin Concentration : 31.7 gm/dL  Auto Neutrophil # : 6.22 K/uL  Auto Lymphocyte # : 1.05 K/uL  Auto Monocyte # : 0.74 K/uL  Auto Eosinophil # : 0.02 K/uL  Auto Basophil # : 0.03 K/uL  Auto Neutrophil % : 76.8 %  Auto Lymphocyte % : 13.0 %  Auto Monocyte % : 9.1 %  Auto Eosinophil % : 0.2 %  Auto Basophil % : 0.4 %      12-19    138  |  107  |  11  ----------------------------<  123<H>  3.7   |  29  |  1.30    Ca    8.5      19 Dec 2021 05:54  Phos  3.1     12-19  Mg     1.8     12-19    TPro  5.2<L>  /  Alb  2.2<L>  /  TBili  0.3  /  DBili  x   /  AST  15  /  ALT  21  /  AlkPhos  65  12-19    CAPILLARY BLOOD GLUCOSE      POCT Blood Glucose.: 244 mg/dL (19 Dec 2021 11:26)  POCT Blood Glucose.: 118 mg/dL (19 Dec 2021 07:31)  POCT Blood Glucose.: 98 mg/dL (18 Dec 2021 21:08)  POCT Blood Glucose.: 159 mg/dL (18 Dec 2021 16:47)        LIVER FUNCTIONS - ( 19 Dec 2021 05:54 )  Alb: 2.2 g/dL / Pro: 5.2 g/dL / ALK PHOS: 65 U/L / ALT: 21 U/L DA / AST: 15 U/L / GGT: x           Creatinine Trend: 1.30<--, 1.63<--, 2.02<--  I&O's Summary    18 Dec 2021 07:01  -  19 Dec 2021 07:00  --------------------------------------------------------  IN: 0 mL / OUT: 200 mL / NET: -200 mL            Clean Catch Clean Catch (Midstream)  12-18 @ 05:13   <10,000 CFU/mL Normal Urogenital Elizabet  --  --          MEDICATIONS:    MEDICATIONS  (STANDING):  aspirin enteric coated 81 milliGRAM(s) Oral daily  atorvastatin 80 milliGRAM(s) Oral at bedtime  calcium carbonate   1250 mG (OsCal) 1 Tablet(s) Oral daily  cholecalciferol 1000 Unit(s) Oral daily  heparin   Injectable 5000 Unit(s) SubCutaneous every 8 hours  hydrALAZINE 100 milliGRAM(s) Oral three times a day  insulin lispro (ADMELOG) corrective regimen sliding scale   SubCutaneous three times a day before meals  NIFEdipine  milliGRAM(s) Oral daily  pyridostigmine 60 milliGRAM(s) Oral every 4 hours  sodium chloride 0.9%. 1000 milliLiter(s) (100 mL/Hr) IV Continuous <Continuous>  tamsulosin 0.4 milliGRAM(s) Oral at bedtime      MEDICATIONS  (PRN):      REVIEW OF SYSTEMS:                           ALL ROS DONE [ X   ]    CONSTITUTIONAL:  LETHARGIC [   ], FEVER [   ], UNRESPONSIVE [   ]  CVS:  CP  [   ], SOB, [   ], PALPITATIONS [   ], DIZZYNESS [   ]  RS: COUGH [   ], SPUTUM [   ]  GI: ABDOMINAL PAIN [   ], NAUSEA [   ], VOMITINGS [   ], DIARRHEA [   ], CONSTIPATION [   ]  :  DYSURIA [   ], NOCTURIA [   ], INCREASED FREQUENCY [   ], DRIBLING [   ],  SKELETAL: PAINFUL JOINTS [   ], SWOLLEN JOINTS [   ], NECK ACHE [   ], LOW BACK ACHE [   ],  SKIN : ULCERS [   ], RASH [   ], ITCHING [   ]  CNS: HEAD ACHE [   ], DOUBLE VISION [   ], BLURRED VISION [   ], AMS / CONFUSION [   ], SEIZURES [   ], WEAKNESS [   ],TINGLING / NUMBNESS [   ]    PHYSICAL EXAMINATION:  GENERAL APPEARANCE: NO DISTRESS  HEENT:  NO PALLOR, NO  JVD,  NO   NODES, NECK SUPPLE   ;  ? mild exophtalmos  CVS: S1 +, S2 +,   RS: AEEB,  OCCASIONAL  RALES +,   NO RONCHI  ABD: SOFT, NT, NO, BS +  EXT: NO PE  SKIN: WARM,   SKELETAL:  ROM ACCEPTABLE  CNS:  AAO X 3    RADIOLOGY :    ACC: [id number] EXAM:  CT BRAIN                          PROCEDURE DATE:  12/17/2021          INTERPRETATION:  CLINICAL INDICATIONS:  dizziness, ataxia    COMPARISON: CT head dated 2/19/2017 and MRI brain dated 2/21/2017    TECHNIQUE: Noncontrast CT of the head. Multiplanar reformations are   submitted.    FINDINGS: Chronic right basal ganglia and left thalamic cortical   infarctions. Chronic left paramedian moderate size occipital lobe   infarction.  There is periventricular and subcortical white matter hypodensity without   mass effect, nonspecific, likely representing moderate chronic   microvascular ischemic changes. There is no compelling evidence for an   acute transcortical infarction. There is no evidence of mass, mass   effect, midline shift or extra-axial fluid collection. The lateral   ventricles and cortical sulci are age-appropriate in size and   configuration. The orbits, mastoid air cells and visualized paranasal   sinuses are unremarkable. The calvarium is intact. ConsiderMRI as   clinically warranted.    IMPRESSION: Multiple chronic supratentorial infarctions. Moderate chronic   microvascular changes without evidence of an acute transcortical   infarction or hemorrhage. Preliminary report provided by RUBEN BOLES DO; Attending Radiologist.      ASSESSMENT :     Weakness    HTN (hypertension)    DM (diabetes mellitus)    Myasthenia gravis    Hypercholesterolemia    CVA (cerebrovascular accident)    Peripheral neuropathy    Lipoma of chest wall    MG with thymoma (myasthena gravis)        PLAN:  HPI:  Patient is a 67 y/o Male from [facility], walks independently, with medical hx significant for Myasthenia Gravis s/p Thymoma resection, HTN, HLD, CVA x 2, Osteoporosis, PAD, Peripheral Neuropathy, Cervical and Lumbar spondylosis, BPH, presenting to the ED due to complaints of generalized weakness. Pt c/o multiple episodes of NBNB vomiting and diarrheal episodes over the course of the last 3 days. No hemetemesis, hematochezia, SOB, LOC, abdominal pain, dysuria, numbness, tingling, fevers, cough, sick contacts. Pt says on initial ED admission, he felt lightheaded which has improved s/p IVF. Vomiting and diarrheal episodes have also stopped.     In the ED, pt's vitals were  /80, HR 91/min, Temp Afebrile, saturating well on RA  Chest xray no consolidation  U/A negative  Lactate downtrended s/p fluids (18 Dec 2021 03:58)    # GENERALIZED WEAKNESS - R/O S/T MYASTHENIA VS. MICHEL/DEHYDRATION   - ON PYRIDOSTIGMINE 60MG X 6 TIMES DAILY, PREDNISONE 20MG QDAY, BACTRIM ? PPX  - NO OVERT EVIDENCE OF RESPIRATORY WEAKNESS - F/U NIF  - F/U PT EVAL  - NEUROLOGY CONSULT PLACED    # ACUTE GASTROENTERITIS - RESOLVED  # ELEVATED LACTIC ACID - RESOLVED S/P IVF  # MICHEL - RESOLVED S/P IVF  # BPH - ON FLOMAX  # HLD - STATIN  # HTN - ON HYDRALAZINE, NIFEDIPINE; HOLD LOSARTAN  # DM - SSI + FS; HOLD METFORMIN  # GI AND DVT PPX

## 2021-12-20 ENCOUNTER — TRANSCRIPTION ENCOUNTER (OUTPATIENT)
Age: 66
End: 2021-12-20

## 2021-12-20 DIAGNOSIS — G70.00 MYASTHENIA GRAVIS WITHOUT (ACUTE) EXACERBATION: ICD-10-CM

## 2021-12-20 DIAGNOSIS — Z02.9 ENCOUNTER FOR ADMINISTRATIVE EXAMINATIONS, UNSPECIFIED: ICD-10-CM

## 2021-12-20 DIAGNOSIS — E88.09 OTHER DISORDERS OF PLASMA-PROTEIN METABOLISM, NOT ELSEWHERE CLASSIFIED: ICD-10-CM

## 2021-12-20 LAB
ALBUMIN SERPL ELPH-MCNC: 2.3 G/DL — LOW (ref 3.5–5)
ALP SERPL-CCNC: 78 U/L — SIGNIFICANT CHANGE UP (ref 40–120)
ALT FLD-CCNC: 22 U/L DA — SIGNIFICANT CHANGE UP (ref 10–60)
ANION GAP SERPL CALC-SCNC: 5 MMOL/L — SIGNIFICANT CHANGE UP (ref 5–17)
ANION GAP SERPL CALC-SCNC: 6 MMOL/L — SIGNIFICANT CHANGE UP (ref 5–17)
AST SERPL-CCNC: 16 U/L — SIGNIFICANT CHANGE UP (ref 10–40)
BASOPHILS # BLD AUTO: 0.01 K/UL — SIGNIFICANT CHANGE UP (ref 0–0.2)
BASOPHILS NFR BLD AUTO: 0.1 % — SIGNIFICANT CHANGE UP (ref 0–2)
BILIRUB SERPL-MCNC: 0.2 MG/DL — SIGNIFICANT CHANGE UP (ref 0.2–1.2)
BUN SERPL-MCNC: 19 MG/DL — HIGH (ref 7–18)
BUN SERPL-MCNC: 21 MG/DL — HIGH (ref 7–18)
CALCIUM SERPL-MCNC: 8.6 MG/DL — SIGNIFICANT CHANGE UP (ref 8.4–10.5)
CALCIUM SERPL-MCNC: 8.9 MG/DL — SIGNIFICANT CHANGE UP (ref 8.4–10.5)
CHLORIDE SERPL-SCNC: 105 MMOL/L — SIGNIFICANT CHANGE UP (ref 96–108)
CHLORIDE SERPL-SCNC: 105 MMOL/L — SIGNIFICANT CHANGE UP (ref 96–108)
CO2 SERPL-SCNC: 27 MMOL/L — SIGNIFICANT CHANGE UP (ref 22–31)
CO2 SERPL-SCNC: 27 MMOL/L — SIGNIFICANT CHANGE UP (ref 22–31)
CREAT SERPL-MCNC: 1.37 MG/DL — HIGH (ref 0.5–1.3)
CREAT SERPL-MCNC: 1.44 MG/DL — HIGH (ref 0.5–1.3)
EOSINOPHIL # BLD AUTO: 0 K/UL — SIGNIFICANT CHANGE UP (ref 0–0.5)
EOSINOPHIL NFR BLD AUTO: 0 % — SIGNIFICANT CHANGE UP (ref 0–6)
GLUCOSE BLDC GLUCOMTR-MCNC: 128 MG/DL — HIGH (ref 70–99)
GLUCOSE BLDC GLUCOMTR-MCNC: 149 MG/DL — HIGH (ref 70–99)
GLUCOSE BLDC GLUCOMTR-MCNC: 152 MG/DL — HIGH (ref 70–99)
GLUCOSE BLDC GLUCOMTR-MCNC: 328 MG/DL — HIGH (ref 70–99)
GLUCOSE SERPL-MCNC: 118 MG/DL — HIGH (ref 70–99)
GLUCOSE SERPL-MCNC: 158 MG/DL — HIGH (ref 70–99)
HCT VFR BLD CALC: 30.9 % — LOW (ref 39–50)
HGB BLD-MCNC: 9.9 G/DL — LOW (ref 13–17)
IMM GRANULOCYTES NFR BLD AUTO: 0.3 % — SIGNIFICANT CHANGE UP (ref 0–1.5)
LYMPHOCYTES # BLD AUTO: 0.84 K/UL — LOW (ref 1–3.3)
LYMPHOCYTES # BLD AUTO: 8.8 % — LOW (ref 13–44)
MAGNESIUM SERPL-MCNC: 2 MG/DL — SIGNIFICANT CHANGE UP (ref 1.6–2.6)
MCHC RBC-ENTMCNC: 27.7 PG — SIGNIFICANT CHANGE UP (ref 27–34)
MCHC RBC-ENTMCNC: 32 GM/DL — SIGNIFICANT CHANGE UP (ref 32–36)
MCV RBC AUTO: 86.6 FL — SIGNIFICANT CHANGE UP (ref 80–100)
MONOCYTES # BLD AUTO: 0.54 K/UL — SIGNIFICANT CHANGE UP (ref 0–0.9)
MONOCYTES NFR BLD AUTO: 5.7 % — SIGNIFICANT CHANGE UP (ref 2–14)
NEUTROPHILS # BLD AUTO: 8.09 K/UL — HIGH (ref 1.8–7.4)
NEUTROPHILS NFR BLD AUTO: 85.1 % — HIGH (ref 43–77)
NRBC # BLD: 0 /100 WBCS — SIGNIFICANT CHANGE UP (ref 0–0)
PHOSPHATE SERPL-MCNC: 3.3 MG/DL — SIGNIFICANT CHANGE UP (ref 2.5–4.5)
PLATELET # BLD AUTO: 273 K/UL — SIGNIFICANT CHANGE UP (ref 150–400)
POTASSIUM SERPL-MCNC: 4.1 MMOL/L — SIGNIFICANT CHANGE UP (ref 3.5–5.3)
POTASSIUM SERPL-MCNC: 4.5 MMOL/L — SIGNIFICANT CHANGE UP (ref 3.5–5.3)
POTASSIUM SERPL-SCNC: 4.1 MMOL/L — SIGNIFICANT CHANGE UP (ref 3.5–5.3)
POTASSIUM SERPL-SCNC: 4.5 MMOL/L — SIGNIFICANT CHANGE UP (ref 3.5–5.3)
PROT SERPL-MCNC: 5.4 G/DL — LOW (ref 6–8.3)
RBC # BLD: 3.57 M/UL — LOW (ref 4.2–5.8)
RBC # FLD: 13.9 % — SIGNIFICANT CHANGE UP (ref 10.3–14.5)
SODIUM SERPL-SCNC: 137 MMOL/L — SIGNIFICANT CHANGE UP (ref 135–145)
SODIUM SERPL-SCNC: 138 MMOL/L — SIGNIFICANT CHANGE UP (ref 135–145)
WBC # BLD: 9.51 K/UL — SIGNIFICANT CHANGE UP (ref 3.8–10.5)
WBC # FLD AUTO: 9.51 K/UL — SIGNIFICANT CHANGE UP (ref 3.8–10.5)

## 2021-12-20 PROCEDURE — 99222 1ST HOSP IP/OBS MODERATE 55: CPT

## 2021-12-20 RX ORDER — LOSARTAN POTASSIUM 100 MG/1
1 TABLET, FILM COATED ORAL
Qty: 0 | Refills: 0 | DISCHARGE

## 2021-12-20 RX ORDER — SODIUM CHLORIDE 9 MG/ML
500 INJECTION INTRAMUSCULAR; INTRAVENOUS; SUBCUTANEOUS ONCE
Refills: 0 | Status: COMPLETED | OUTPATIENT
Start: 2021-12-20 | End: 2021-12-20

## 2021-12-20 RX ORDER — SODIUM CHLORIDE 9 MG/ML
1000 INJECTION INTRAMUSCULAR; INTRAVENOUS; SUBCUTANEOUS
Refills: 0 | Status: DISCONTINUED | OUTPATIENT
Start: 2021-12-20 | End: 2021-12-22

## 2021-12-20 RX ADMIN — Medication 81 MILLIGRAM(S): at 11:39

## 2021-12-20 RX ADMIN — PYRIDOSTIGMINE BROMIDE 60 MILLIGRAM(S): 60 SOLUTION ORAL at 03:10

## 2021-12-20 RX ADMIN — Medication 1000 UNIT(S): at 11:38

## 2021-12-20 RX ADMIN — TAMSULOSIN HYDROCHLORIDE 0.4 MILLIGRAM(S): 0.4 CAPSULE ORAL at 22:14

## 2021-12-20 RX ADMIN — Medication 1 TABLET(S): at 11:39

## 2021-12-20 RX ADMIN — HEPARIN SODIUM 5000 UNIT(S): 5000 INJECTION INTRAVENOUS; SUBCUTANEOUS at 05:09

## 2021-12-20 RX ADMIN — SODIUM CHLORIDE 500 MILLILITER(S): 9 INJECTION INTRAMUSCULAR; INTRAVENOUS; SUBCUTANEOUS at 09:36

## 2021-12-20 RX ADMIN — SODIUM CHLORIDE 100 MILLILITER(S): 9 INJECTION INTRAMUSCULAR; INTRAVENOUS; SUBCUTANEOUS at 11:39

## 2021-12-20 RX ADMIN — PYRIDOSTIGMINE BROMIDE 60 MILLIGRAM(S): 60 SOLUTION ORAL at 17:36

## 2021-12-20 RX ADMIN — Medication 100 MILLIGRAM(S): at 22:14

## 2021-12-20 RX ADMIN — HEPARIN SODIUM 5000 UNIT(S): 5000 INJECTION INTRAVENOUS; SUBCUTANEOUS at 22:14

## 2021-12-20 RX ADMIN — Medication 4: at 11:38

## 2021-12-20 RX ADMIN — PYRIDOSTIGMINE BROMIDE 60 MILLIGRAM(S): 60 SOLUTION ORAL at 22:13

## 2021-12-20 RX ADMIN — ATORVASTATIN CALCIUM 80 MILLIGRAM(S): 80 TABLET, FILM COATED ORAL at 22:13

## 2021-12-20 RX ADMIN — Medication 100 MILLIGRAM(S): at 13:31

## 2021-12-20 RX ADMIN — PYRIDOSTIGMINE BROMIDE 60 MILLIGRAM(S): 60 SOLUTION ORAL at 13:31

## 2021-12-20 RX ADMIN — Medication 1: at 08:09

## 2021-12-20 RX ADMIN — Medication 100 MILLIGRAM(S): at 05:08

## 2021-12-20 RX ADMIN — SODIUM CHLORIDE 1000 MILLILITER(S): 9 INJECTION INTRAMUSCULAR; INTRAVENOUS; SUBCUTANEOUS at 18:15

## 2021-12-20 RX ADMIN — HEPARIN SODIUM 5000 UNIT(S): 5000 INJECTION INTRAVENOUS; SUBCUTANEOUS at 13:31

## 2021-12-20 RX ADMIN — Medication 120 MILLIGRAM(S): at 05:08

## 2021-12-20 RX ADMIN — Medication 20 MILLIGRAM(S): at 05:08

## 2021-12-20 RX ADMIN — PYRIDOSTIGMINE BROMIDE 60 MILLIGRAM(S): 60 SOLUTION ORAL at 05:08

## 2021-12-20 RX ADMIN — PYRIDOSTIGMINE BROMIDE 60 MILLIGRAM(S): 60 SOLUTION ORAL at 09:28

## 2021-12-20 NOTE — DISCHARGE NOTE PROVIDER - NSDCMRMEDTOKEN_GEN_ALL_CORE_FT
Aspirin Enteric Coated 81 mg oral delayed release tablet: 1 tab(s) orally once a day  atorvastatin 80 mg oral tablet: 1 tab(s) orally once a day  Bactrim  mg-160 mg oral tablet: 1 tab(s) orally every 12 hours  calcium carbonate 1250 mg (500 mg elemental calcium) oral tablet: 1 tab(s) orally once a day  HumaLOG 100 units/mL subcutaneous solution:   hydrALAZINE 100 mg oral tablet: 1 tab(s) orally every 8 hours  metFORMIN 1000 mg oral tablet: 1 tab(s) orally 2 times a day  NIFEdipine 60 mg oral tablet, extended release: 2 tab(s) orally once a day  predniSONE 20 mg oral tablet: 1 tab(s) orally once a day  pyridostigmine 60 mg oral tablet: 1 tab(s) orally 6 times a day  tamsulosin 0.4 mg oral capsule: 1 cap(s) orally once a day  Vitamin D3 25 mcg (1000 intl units) oral tablet: 1 tab(s) orally once a day

## 2021-12-20 NOTE — DISCHARGE NOTE PROVIDER - HOSPITAL COURSE
67 y/o Male from [facility], walks independently, with medical hx significant for Myasthenia Gravis s/p Thymoma resection, HTN, HLD, CVA x 2, Osteoporosis, PAD, Peripheral Neuropathy, Cervical and Lumbar spondylosis, BPH, presenting to the ED due to complaints of generalized weakness  in the setting of Gastroenteritis and dehydration.  Pt. is now with no further s&s of gastroenteritis.  Pt. seen and evaluated by neuro, suspect generalized weakness is due to acute gastroenteritis with no clinical evidence of MG exacerbation, recommended to continue Mestinon home dose.  The patient will need to have close follow up with outpatient neurology for monitoring and decision on if steroids should be continued, may need special coordination by SW as patient is a  [place].  Pt. seen and evaluated by PT, recommended to discharge back to [facility].   65 y/o Male from [facility], walks independently, with medical hx significant for Myasthenia Gravis s/p Thymoma resection, HTN, HLD, CVA x 2, Osteoporosis, PAD, Peripheral Neuropathy, Cervical and Lumbar spondylosis, BPH, presenting to the ED due to complaints of generalized weakness  in the setting of Gastroenteritis and dehydration.  Pt. is now with no further s&s of gastroenteritis.  Pt. seen and evaluated by neuro, suspect generalized weakness is due to acute gastroenteritis with no clinical evidence of MG exacerbation, recommended to continue Mestinon home dose.  The patient will need to have close follow up with outpatient neurology for monitoring and decision on if steroids should be continued, may need special coordination by SW as patient is a  [place].  Pt. seen and evaluated by PT, recommended to discharge back to [facility].  Pt. is medically optimized for discharge back to [facility].

## 2021-12-20 NOTE — PROGRESS NOTE ADULT - PROBLEM SELECTOR PLAN 3
not in crisis  -Cont Mestinon 60 mg 6x/day and Prednisone 20mg daily  -On bacrtim 3x/week x 10 days likely PPx  -f/u neuro reccs

## 2021-12-20 NOTE — DISCHARGE NOTE PROVIDER - PROVIDER TOKENS
PROVIDER:[TOKEN:[[id number]],FOLLOWUP:[1 week]] PROVIDER:[TOKEN:[[id number]],FOLLOWUP:[1 week]],PROVIDER:[TOKEN:[[id number]]] PROVIDER:[TOKEN:[[id number]],FOLLOWUP:[1 week]],PROVIDER:[TOKEN:[[id number]]],PROVIDER:[TOKEN:[[id number]],FOLLOWUP:[1 week]]

## 2021-12-20 NOTE — CONSULT NOTE ADULT - SUBJECTIVE AND OBJECTIVE BOX
****TEMPLATE ONLY***      Patient is a 66y old  Male who presents with a chief complaint of Weakness (20 Dec 2021 08:54)      HPI:  Patient is a 65 y/o Male from [facility], walks independently, with medical hx significant for Myasthenia Gravis s/p Thymoma resection, HTN, HLD, CVA x 2, Osteoporosis, PAD, Peripheral Neuropathy, Cervical and Lumbar spondylosis, BPH, presenting to the ED due to complaints of generalized weakness. Pt c/o multiple episodes of NBNB vomiting and diarrheal episodes over the course of the last 3 days. No hemetemesis, hematochezia, SOB, LOC, abdominal pain, dysuria, numbness, tingling, fevers, cough, sick contacts. Pt says on initial ED admission, he felt lightheaded which has improved s/p IVF. Vomiting and diarrheal episodes have also stopped.     In the ED, pt's vitals were  /80, HR 91/min, Temp Afebrile, saturating well on RA  Chest xray no consolidation  U/A negative  Lactate downtrended s/p fluids (18 Dec 2021 03:58)         Neurological Review of Systems:  No difficulty with language.  No vision loss or double vision.  No dizziness, vertigo or new hearing loss.  No difficulty with speech or swallowing.  No focal weakness.  No focal sensory changes.  No numbness or tingling in the bilateral lower extremities.  No difficulty with balance.  No difficulty with ambulation.        MEDICATIONS  (STANDING):  aspirin enteric coated 81 milliGRAM(s) Oral daily  atorvastatin 80 milliGRAM(s) Oral at bedtime  calcium carbonate   1250 mG (OsCal) 1 Tablet(s) Oral daily  cholecalciferol 1000 Unit(s) Oral daily  heparin   Injectable 5000 Unit(s) SubCutaneous every 8 hours  hydrALAZINE 100 milliGRAM(s) Oral three times a day  insulin lispro (ADMELOG) corrective regimen sliding scale   SubCutaneous three times a day before meals  NIFEdipine  milliGRAM(s) Oral daily  predniSONE   Tablet 20 milliGRAM(s) Oral daily  pyridostigmine 60 milliGRAM(s) Oral every 4 hours  sodium chloride 0.9%. 1000 milliLiter(s) (100 mL/Hr) IV Continuous <Continuous>  tamsulosin 0.4 milliGRAM(s) Oral at bedtime    MEDICATIONS  (PRN):    Allergies    No Known Allergies    Intolerances      PAST MEDICAL & SURGICAL HISTORY:  HTN (hypertension)    DM (diabetes mellitus)    Myasthenia gravis    Hypercholesterolemia    CVA (cerebrovascular accident)    Peripheral neuropathy    Lipoma of chest wall    MG with thymoma (myasthena gravis)      FAMILY HISTORY:  Family history of hypertension (Mother)      SOCIAL HISTORY: non smoker/ former smoker/ active smoker    Review of Systems:  Constitutional: No generalized weakness. No fevers or chills.                    Eyes, Ears, Mouth, Throat: No vision loss   Respiratory: No shortness of breath or cough.                                Cardiovascular: No chest pain or palpitations  Gastrointestinal: No nausea or vomiting.                                         Genitourinary: No urinary incontinence or burning on urination.  Musculoskeletal: No joint pain.                                                           Dermatologic: No rash.  Neurological: as per HPI                                                                      Psychiatric: No behavioral problems.  Endocrine: No known hypoglycemia.               Hematologic/Lymphatic: No easy bleeding.    O:  Vital Signs Last 24 Hrs  T(C): 36.9 (20 Dec 2021 06:04), Max: 37.4 (19 Dec 2021 21:11)  T(F): 98.5 (20 Dec 2021 06:04), Max: 99.4 (19 Dec 2021 21:11)  HR: 83 (20 Dec 2021 06:04) (83 - 85)  BP: 167/78 (20 Dec 2021 06:04) (133/70 - 167/78)  BP(mean): --  RR: 18 (20 Dec 2021 06:04) (18 - 18)  SpO2: 97% (20 Dec 2021 06:04) (96% - 98%)    General Exam:   General appearance: No acute distress                 Cardiovascular: Pedal dorsalis pulses intact bilaterally    Mental Status: Orientated to self, date and place.  Attention intact.  No dysarthria, aphasia or neglect.  Knowledge intact.  Registration intact.  Short and long term memory grossly intact.      Cranial Nerves: CN I - not tested.  PERRL, EOMI, VFF, no nystagmus or diplopia.  No APD.  Fundi not visualized.  CN V1-3 intact to light touch and pinprick.  No facial asymmetry.  Hearing intact to finger rub bilaterally.  Tongue, uvula and palate midline.  Sternocleidomastoid and Trapezius intact bilaterally.    Motor:   Tone: normal.                  Strength intact throughout  No pronator drift bilaterally                      No dysmetria on finger-nose-finger or heel-shin-heel  No truncal ataxia.  No resting, postural or action tremor.  No myoclonus.    Sensation: intact to light touch, pinprick, vibration and proprioception    Deep Tendon Reflexes: 1+ bilateral biceps, triceps, brachioradialis, knee and ankle  Toes flexor bilaterally    Gait: normal and stable.  Rhomberg -tamy.    Other:     LABS:                        9.9    9.51  )-----------( 273      ( 20 Dec 2021 06:38 )             30.9     12-20    138  |  105  |  19<H>  ----------------------------<  158<H>  4.1   |  27  |  1.44<H>    Ca    8.6      20 Dec 2021 06:38  Phos  3.3     12-20  Mg     2.0     12-20    TPro  5.4<L>  /  Alb  2.3<L>  /  TBili  0.2  /  DBili  x   /  AST  16  /  ALT  22  /  AlkPhos  78  12-20            RADIOLOGY & ADDITIONAL STUDIES:    EKG:     < from: CT Head No Cont (12.17.21 @ 23:12) > (images reviewed)  IMPRESSION: Multiple chronic supratentorial infarctions. Moderate chronic   microvascular changes without evidence of an acute transcortical   infarction or hemorrhage.    < end of copied text >    tele:  TTE:  EEG: Patient is a 66y old  Male who presents with a chief complaint of Weakness (20 Dec 2021 08:54)      HPI:  Patient is a 67 y/o Male from [facility], walks independently, with medical hx significant for Myasthenia Gravis s/p Thymoma resection, HTN, HLD, CVA x 2, Osteoporosis, PAD, Peripheral Neuropathy, Cervical and Lumbar spondylosis, BPH, presenting to the ED due to complaints of generalized weakness. Pt c/o multiple episodes of NBNB vomiting and diarrheal episodes over the course of the last 3 days. No hemetemesis, hematochezia, SOB, LOC, abdominal pain, dysuria, numbness, tingling, fevers, cough, sick contacts. Pt says on initial ED admission, he felt lightheaded which has improved s/p IVF. Vomiting and diarrheal episodes have also stopped.     No recent infections or change of medications.   Mestinon dose has not been increased recently.  Prednisone had been held, recently restarted.  No double vision, slurred speech, difficulty swallowing, head drop, SOB, or focal weakness.    Neurological Review of Systems:  No difficulty with language.  No vision loss or double vision.  No dizziness, vertigo or new hearing loss.  No difficulty with speech or swallowing.  No focal weakness.   No difficulty with balance.  No difficulty with ambulation.        MEDICATIONS  (STANDING):  aspirin enteric coated 81 milliGRAM(s) Oral daily  atorvastatin 80 milliGRAM(s) Oral at bedtime  calcium carbonate   1250 mG (OsCal) 1 Tablet(s) Oral daily  cholecalciferol 1000 Unit(s) Oral daily  heparin   Injectable 5000 Unit(s) SubCutaneous every 8 hours  hydrALAZINE 100 milliGRAM(s) Oral three times a day  insulin lispro (ADMELOG) corrective regimen sliding scale   SubCutaneous three times a day before meals  NIFEdipine  milliGRAM(s) Oral daily  predniSONE   Tablet 20 milliGRAM(s) Oral daily  pyridostigmine 60 milliGRAM(s) Oral every 4 hours  sodium chloride 0.9%. 1000 milliLiter(s) (100 mL/Hr) IV Continuous <Continuous>  tamsulosin 0.4 milliGRAM(s) Oral at bedtime    MEDICATIONS  (PRN):    Allergies    No Known Allergies    Intolerances      PAST MEDICAL & SURGICAL HISTORY:  HTN (hypertension)    DM (diabetes mellitus)    Myasthenia gravis    Hypercholesterolemia    CVA (cerebrovascular accident)    Peripheral neuropathy    Lipoma of chest wall    MG with thymoma (myasthena gravis)      FAMILY HISTORY:  Family history of hypertension (Mother)      SOCIAL HISTORY: non smoker    Review of Systems:  Constitutional: No fevers .                    Eyes, Ears, Mouth, Throat: No vision loss   Respiratory: No  cough.                                Cardiovascular: No chest pain   Gastrointestinal: No vomiting.                                         Genitourinary: No urinary incontinence   Musculoskeletal: No joint pain.                                                           Dermatologic: No rash.  Neurological: as per HPI                                                                      Psychiatric: No behavioral problems.  Endocrine: No known hypoglycemia.               Hematologic/Lymphatic: No easy bleeding.    O:  Vital Signs Last 24 Hrs  T(C): 36.9 (20 Dec 2021 06:04), Max: 37.4 (19 Dec 2021 21:11)  T(F): 98.5 (20 Dec 2021 06:04), Max: 99.4 (19 Dec 2021 21:11)  HR: 83 (20 Dec 2021 06:04) (83 - 85)  BP: 167/78 (20 Dec 2021 06:04) (133/70 - 167/78)  BP(mean): --  RR: 18 (20 Dec 2021 06:04) (18 - 18)  SpO2: 97% (20 Dec 2021 06:04) (96% - 98%)    General Exam:   General appearance: No acute distress                 Cardiovascular: Pedal dorsalis pulses intact bilaterally    Mental Status: Orientated to self, date and place.  Attention intact.  No dysarthria, aphasia or neglect.  Knowledge intact.  Registration intact.  Short and long term memory grossly intact.      Cranial Nerves: CN I - not tested.  PERRL, EOMI, VFF, no nystagmus or diplopia.  No APD.  Fundi not visualized.  CN V1-3 intact to light touch.  No facial asymmetry.  Hearing intact to finger rub bilaterally.  Tongue, uvula and palate midline.  Sternocleidomastoid and Trapezius intact bilaterally.    Motor:   Tone: normal.                  Strength intact throughout  No pronator drift bilaterally                      No dysmetria on finger-nose-finger or heel-shin-heel  No truncal ataxia.  No resting, postural or action tremor.  No myoclonus.    Sensation: intact to light touch    Deep Tendon Reflexes: 1+ bilateral biceps, triceps, brachioradialis, knee and ankle  Toes flexor bilaterally    Gait: cautious    Other:     LABS:                        9.9    9.51  )-----------( 273      ( 20 Dec 2021 06:38 )             30.9     12-20    138  |  105  |  19<H>  ----------------------------<  158<H>  4.1   |  27  |  1.44<H>    Ca    8.6      20 Dec 2021 06:38  Phos  3.3     12-20  Mg     2.0     12-20    TPro  5.4<L>  /  Alb  2.3<L>  /  TBili  0.2  /  DBili  x   /  AST  16  /  ALT  22  /  AlkPhos  78  12-20            RADIOLOGY & ADDITIONAL STUDIES:     < from: CT Head No Cont (12.17.21 @ 23:12) > (images reviewed)  IMPRESSION: Multiple chronic supratentorial infarctions. Moderate chronic   microvascular changes without evidence of an acute transcortical   infarction or hemorrhage.    < end of copied text >

## 2021-12-20 NOTE — DISCHARGE NOTE PROVIDER - CARE PROVIDER_API CALL
Shai Goldstein)  Medicine  [address] [place] [address]  Phone: [phone]  Fax: [phone]  Follow Up Time: 1 week   Shai Goldstein)  Medicine  [address] [place] [address]  Phone: [phone]  Fax: [phone]  Follow Up Time: 1 week    Dariela Arthur  NEUROLOGY  [address] [place] [address]  Phone: [phone]  Fax: [phone]  Follow Up Time:    Shai Goldstein)  Medicine  [address] [place] [address]  Phone: [phone]  Fax: [phone]  Follow Up Time: 1 week    Dariela Arthur  NEUROLOGY  [address] [place] [address]  Phone: [phone]  Fax: [phone]  Follow Up Time:     Gianluca Cho  Psychiatry & Neurology  N  Shirley SALGADO,    Phone: ()-  Fax: ()-  Follow Up Time: 1 week

## 2021-12-20 NOTE — PROGRESS NOTE ADULT - PROBLEM SELECTOR PLAN 8
Pt has hx of CVA, no residual weakness  C/w ASA, Statin on home Metformin and Humalog as per sliding scale  -Cont FS AC&HS with Insulin sliding scale

## 2021-12-20 NOTE — PROGRESS NOTE ADULT - SUBJECTIVE AND OBJECTIVE BOX
NP Note discussed with  Primary Attending    Patient is a 66y old  Male who presents with a chief complaint of Weakness (19 Dec 2021 12:48)      INTERVAL HPI/OVERNIGHT EVENTS: no new complaints    MEDICATIONS  (STANDING):  aspirin enteric coated 81 milliGRAM(s) Oral daily  atorvastatin 80 milliGRAM(s) Oral at bedtime  calcium carbonate   1250 mG (OsCal) 1 Tablet(s) Oral daily  cholecalciferol 1000 Unit(s) Oral daily  heparin   Injectable 5000 Unit(s) SubCutaneous every 8 hours  hydrALAZINE 100 milliGRAM(s) Oral three times a day  insulin lispro (ADMELOG) corrective regimen sliding scale   SubCutaneous three times a day before meals  NIFEdipine  milliGRAM(s) Oral daily  predniSONE   Tablet 20 milliGRAM(s) Oral daily  pyridostigmine 60 milliGRAM(s) Oral every 4 hours  sodium chloride 0.9%. 1000 milliLiter(s) (100 mL/Hr) IV Continuous <Continuous>  tamsulosin 0.4 milliGRAM(s) Oral at bedtime    MEDICATIONS  (PRN):      __________________________________________________  REVIEW OF SYSTEMS:    CONSTITUTIONAL: No fever,   EYES: no acute visual disturbances  NECK: No pain or stiffness  RESPIRATORY: No cough; No shortness of breath  CARDIOVASCULAR: No chest pain, no palpitations  GASTROINTESTINAL: No pain. No nausea or vomiting; No diarrhea   NEUROLOGICAL: No headache or numbness, no tremors  MUSCULOSKELETAL: No joint pain, no muscle pain  GENITOURINARY: no dysuria, no frequency, no hesitancy  PSYCHIATRY: no depression , no anxiety  ALL OTHER  ROS negative        Vital Signs Last 24 Hrs  T(C): 36.9 (20 Dec 2021 06:04), Max: 37.4 (19 Dec 2021 21:11)  T(F): 98.5 (20 Dec 2021 06:04), Max: 99.4 (19 Dec 2021 21:11)  HR: 83 (20 Dec 2021 06:04) (83 - 85)  BP: 167/78 (20 Dec 2021 06:04) (133/70 - 167/78)  BP(mean): --  RR: 18 (20 Dec 2021 06:04) (18 - 18)  SpO2: 97% (20 Dec 2021 06:04) (96% - 98%)    ________________________________________________  PHYSICAL EXAM:  well developed, well groomed  GENERAL: NAD  HEENT: Normocephalic;  conjunctivae and sclerae clear; moist mucous membranes;   NECK : supple  CHEST/LUNG: Clear to auscultation bilaterally with good air entry   HEART: S1 S2  regular; no murmurs, gallops or rubs  ABDOMEN: Soft, Nontender, Nondistended; Bowel sounds present  EXTREMITIES: no cyanosis; no edema; no calf tenderness  SKIN: warm and dry; no rash  NERVOUS SYSTEM:  Awake and alert; Oriented  to place, person and time ;  slow speech no new deficits    _________________________________________________  LABS:                        9.9    9.51  )-----------( 273      ( 20 Dec 2021 06:38 )             30.9     12-20    138  |  105  |  19<H>  ----------------------------<  158<H>  4.1   |  27  |  1.44<H>    Ca    8.6      20 Dec 2021 06:38  Phos  3.3     12-20  Mg     2.0     12-20    TPro  5.4<L>  /  Alb  2.3<L>  /  TBili  0.2  /  DBili  x   /  AST  16  /  ALT  22  /  AlkPhos  78  12-20        CAPILLARY BLOOD GLUCOSE      POCT Blood Glucose.: 152 mg/dL (20 Dec 2021 07:33)  POCT Blood Glucose.: 164 mg/dL (19 Dec 2021 22:00)  POCT Blood Glucose.: 125 mg/dL (19 Dec 2021 16:48)  POCT Blood Glucose.: 244 mg/dL (19 Dec 2021 11:26)    RADIOLOGY & ADDITIONAL TESTS:    CT Head No Cont (12.17.21 @ 23:12) >    ACC: [id number] EXAM:  CT BRAIN                          PROCEDURE DATE:  12/17/2021          INTERPRETATION:  CLINICAL INDICATIONS:  dizziness, ataxia    COMPARISON: CT head dated 2/19/2017 and MRI brain dated 2/21/2017    TECHNIQUE: Noncontrast CT of the head. Multiplanar reformations are   submitted.    FINDINGS: Chronic right basal ganglia and left thalamic cortical   infarctions. Chronic left paramedian moderate size occipital lobe   infarction.  There is periventricular and subcortical white matter hypodensity without   mass effect, nonspecific, likely representing moderate chronic   microvascular ischemic changes. There is no compelling evidence for an   acute transcortical infarction. There is no evidence of mass, mass   effect, midline shift or extra-axial fluid collection. The lateral   ventricles and cortical sulci are age-appropriate in size and   configuration. The orbits, mastoid air cells and visualized paranasal   sinuses are unremarkable. The calvarium is intact. ConsiderMRI as   clinically warranted.    IMPRESSION: Multiple chronic supratentorial infarctions. Moderate chronic   microvascular changes without evidence of an acute transcortical   infarction or hemorrhage. Preliminary report provided by RUBEN BOLES DO; Attending Radiologist.    --- End of Report ---    < end of copied text >    Xray Chest 1 View- PORTABLE-Urgent (12.17.21 @ 18:33) >  ACC: [id number] EXAM:  XR CHEST PORTABLE URGENT 1V                          PROCEDURE DATE:  12/17/2021          INTERPRETATION:  Weakness.    AP chest.    Median sternotomy. Loop recorder projects over the left mid chest. Normal   heart mediastinum.No consolidation or effusion. Elevation right   hemidiaphragm. Hardware lower cervical spine.    IMPRESSION: No acute finding    < end of copied text >      Imaging Personally Reviewed:  YES/NO    Consultant(s) Notes Reviewed:   YES/ No    Care Discussed with Consultants :     Plan of care was discussed with patient and /or primary care giver; all questions and concerns were addressed and care was aligned with patient's wishes.

## 2021-12-20 NOTE — PROGRESS NOTE ADULT - PROBLEM SELECTOR PLAN 7
Pt has hx of DM, takes Metformin and Humalog as per sliding scale  Started on Insulin sliding scale   Monitor blood sugars controlled on current regimen  -Cont  Hydralazine 100 mg Q8hrs, Nifedipine 60 mg BID  -Low sodium diet

## 2021-12-20 NOTE — DISCHARGE NOTE PROVIDER - CARE PROVIDERS DIRECT ADDRESSES
,DirectAddress_Unknown ,DirectAddress_Unknown,[email or web address] ,DirectAddress_Unknown,[email or web address]

## 2021-12-20 NOTE — PROGRESS NOTE ADULT - SUBJECTIVE AND OBJECTIVE BOX
`Patient is a 66y old  Male who presents with a chief complaint of Weakness (20 Dec 2021 10:09)    PATIENT IS SEEN AND EXAMINED IN MEDICAL FLOOR.  EDGARDOT [    ]    ARCADIO [   ]      GT [   ]    ALLERGIES:  No Known Allergies      Daily     Daily     VITALS:    Vital Signs Last 24 Hrs  T(C): 36.9 (20 Dec 2021 06:04), Max: 37.4 (19 Dec 2021 21:11)  T(F): 98.5 (20 Dec 2021 06:04), Max: 99.4 (19 Dec 2021 21:11)  HR: 83 (20 Dec 2021 06:04) (83 - 85)  BP: 167/78 (20 Dec 2021 06:04) (133/70 - 167/78)  BP(mean): --  RR: 18 (20 Dec 2021 06:04) (18 - 18)  SpO2: 97% (20 Dec 2021 06:04) (96% - 98%)    LABS:    CBC Full  -  ( 20 Dec 2021 06:38 )  WBC Count : 9.51 K/uL  RBC Count : 3.57 M/uL  Hemoglobin : 9.9 g/dL  Hematocrit : 30.9 %  Platelet Count - Automated : 273 K/uL  Mean Cell Volume : 86.6 fl  Mean Cell Hemoglobin : 27.7 pg  Mean Cell Hemoglobin Concentration : 32.0 gm/dL  Auto Neutrophil # : 8.09 K/uL  Auto Lymphocyte # : 0.84 K/uL  Auto Monocyte # : 0.54 K/uL  Auto Eosinophil # : 0.00 K/uL  Auto Basophil # : 0.01 K/uL  Auto Neutrophil % : 85.1 %  Auto Lymphocyte % : 8.8 %  Auto Monocyte % : 5.7 %  Auto Eosinophil % : 0.0 %  Auto Basophil % : 0.1 %      12-20    138  |  105  |  19<H>  ----------------------------<  158<H>  4.1   |  27  |  1.44<H>    Ca    8.6      20 Dec 2021 06:38  Phos  3.3     12-20  Mg     2.0     12-20    TPro  5.4<L>  /  Alb  2.3<L>  /  TBili  0.2  /  DBili  x   /  AST  16  /  ALT  22  /  AlkPhos  78  12-20    CAPILLARY BLOOD GLUCOSE      POCT Blood Glucose.: 328 mg/dL (20 Dec 2021 11:22)  POCT Blood Glucose.: 152 mg/dL (20 Dec 2021 07:33)  POCT Blood Glucose.: 164 mg/dL (19 Dec 2021 22:00)  POCT Blood Glucose.: 125 mg/dL (19 Dec 2021 16:48)        LIVER FUNCTIONS - ( 20 Dec 2021 06:38 )  Alb: 2.3 g/dL / Pro: 5.4 g/dL / ALK PHOS: 78 U/L / ALT: 22 U/L DA / AST: 16 U/L / GGT: x           Creatinine Trend: 1.44<--, 1.30<--, 1.63<--, 2.02<--  I&O's Summary    19 Dec 2021 07:01  -  20 Dec 2021 07:00  --------------------------------------------------------  IN: 0 mL / OUT: 500 mL / NET: -500 mL            Clean Catch Clean Catch (Midstream)  12-18 @ 05:13   <10,000 CFU/mL Normal Urogenital Elizabet  --  --          MEDICATIONS:    MEDICATIONS  (STANDING):  aspirin enteric coated 81 milliGRAM(s) Oral daily  atorvastatin 80 milliGRAM(s) Oral at bedtime  calcium carbonate   1250 mG (OsCal) 1 Tablet(s) Oral daily  cholecalciferol 1000 Unit(s) Oral daily  heparin   Injectable 5000 Unit(s) SubCutaneous every 8 hours  hydrALAZINE 100 milliGRAM(s) Oral three times a day  insulin lispro (ADMELOG) corrective regimen sliding scale   SubCutaneous three times a day before meals  NIFEdipine  milliGRAM(s) Oral daily  predniSONE   Tablet 20 milliGRAM(s) Oral daily  pyridostigmine 60 milliGRAM(s) Oral every 4 hours  sodium chloride 0.9%. 1000 milliLiter(s) (100 mL/Hr) IV Continuous <Continuous>  tamsulosin 0.4 milliGRAM(s) Oral at bedtime      MEDICATIONS  (PRN):      REVIEW OF SYSTEMS:                           ALL ROS DONE [ X   ]    CONSTITUTIONAL:  LETHARGIC [   ], FEVER [   ], UNRESPONSIVE [   ]  CVS:  CP  [   ], SOB, [   ], PALPITATIONS [   ], DIZZYNESS [   ]  RS: COUGH [   ], SPUTUM [   ]  GI: ABDOMINAL PAIN [   ], NAUSEA [   ], VOMITINGS [   ], DIARRHEA [   ], CONSTIPATION [   ]  :  DYSURIA [   ], NOCTURIA [   ], INCREASED FREQUENCY [   ], DRIBLING [   ],  SKELETAL: PAINFUL JOINTS [   ], SWOLLEN JOINTS [   ], NECK ACHE [   ], LOW BACK ACHE [   ],  SKIN : ULCERS [   ], RASH [   ], ITCHING [   ]  CNS: HEAD ACHE [   ], DOUBLE VISION [   ], BLURRED VISION [   ], AMS / CONFUSION [   ], SEIZURES [   ], WEAKNESS [   ],TINGLING / NUMBNESS [   ]    PHYSICAL EXAMINATION:  GENERAL APPEARANCE: NO DISTRESS  HEENT:  NO PALLOR, NO  JVD,  NO   NODES, NECK SUPPLE   ;  ? mild exophtalmos  CVS: S1 +, S2 +,   RS: AEEB,  OCCASIONAL  RALES +,   NO RONCHI  ABD: SOFT, NT, NO, BS +  EXT: NO PE  SKIN: WARM,   SKELETAL:  ROM ACCEPTABLE  CNS:  AAO X 3    RADIOLOGY :    ACC: [id number] EXAM:  CT BRAIN                          PROCEDURE DATE:  12/17/2021          INTERPRETATION:  CLINICAL INDICATIONS:  dizziness, ataxia    COMPARISON: CT head dated 2/19/2017 and MRI brain dated 2/21/2017    TECHNIQUE: Noncontrast CT of the head. Multiplanar reformations are   submitted.    FINDINGS: Chronic right basal ganglia and left thalamic cortical   infarctions. Chronic left paramedian moderate size occipital lobe   infarction.  There is periventricular and subcortical white matter hypodensity without   mass effect, nonspecific, likely representing moderate chronic   microvascular ischemic changes. There is no compelling evidence for an   acute transcortical infarction. There is no evidence of mass, mass   effect, midline shift or extra-axial fluid collection. The lateral   ventricles and cortical sulci are age-appropriate in size and   configuration. The orbits, mastoid air cells and visualized paranasal   sinuses are unremarkable. The calvarium is intact. ConsiderMRI as   clinically warranted.    IMPRESSION: Multiple chronic supratentorial infarctions. Moderate chronic   microvascular changes without evidence of an acute transcortical   infarction or hemorrhage. Preliminary report provided by RUBEN BOLES DO; Attending Radiologist.      ASSESSMENT :     Weakness    HTN (hypertension)    DM (diabetes mellitus)    Myasthenia gravis    Hypercholesterolemia    CVA (cerebrovascular accident)    Peripheral neuropathy    Lipoma of chest wall    MG with thymoma (myasthena gravis)        PLAN:  HPI:  Patient is a 67 y/o Male from [facility], walks independently, with medical hx significant for Myasthenia Gravis s/p Thymoma resection, HTN, HLD, CVA x 2, Osteoporosis, PAD, Peripheral Neuropathy, Cervical and Lumbar spondylosis, BPH, presenting to the ED due to complaints of generalized weakness. Pt c/o multiple episodes of NBNB vomiting and diarrheal episodes over the course of the last 3 days. No hemetemesis, hematochezia, SOB, LOC, abdominal pain, dysuria, numbness, tingling, fevers, cough, sick contacts. Pt says on initial ED admission, he felt lightheaded which has improved s/p IVF. Vomiting and diarrheal episodes have also stopped.     In the ED, pt's vitals were  /80, HR 91/min, Temp Afebrile, saturating well on RA  Chest xray no consolidation  U/A negative  Lactate downtrended s/p fluids (18 Dec 2021 03:58)    # GENERALIZED WEAKNESS - R/O S/T MYASTHENIA VS. MICHEL/DEHYDRATION   - ON PYRIDOSTIGMINE 60MG X 6 TIMES DAILY, PREDNISONE 20MG QDAY, BACTRIM ? PPX  - NO OVERT EVIDENCE OF RESPIRATORY WEAKNESS - F/U NIF  - F/U PT EVAL  - NEUROLOGY CONSULT PLACED    # ACUTE GASTROENTERITIS - RESOLVED  # ELEVATED LACTIC ACID - RESOLVED S/P IVF  # MICHEL - RESOLVED S/P IVF  # BPH - ON FLOMAX  # HLD - STATIN  # HTN - ON HYDRALAZINE, NIFEDIPINE; HOLD LOSARTAN  # DM - SSI + FS; HOLD METFORMIN  # GI AND DVT PPX         Patient is a 66y old  Male who presents with a chief complaint of Weakness (20 Dec 2021 10:09)    PATIENT IS SEEN AND EXAMINED IN MEDICAL FLOOR.    ALLERGIES:  No Known Allergies    VITALS:    Vital Signs Last 24 Hrs  T(C): 36.9 (20 Dec 2021 06:04), Max: 37.4 (19 Dec 2021 21:11)  T(F): 98.5 (20 Dec 2021 06:04), Max: 99.4 (19 Dec 2021 21:11)  HR: 83 (20 Dec 2021 06:04) (83 - 85)  BP: 167/78 (20 Dec 2021 06:04) (133/70 - 167/78)  BP(mean): --  RR: 18 (20 Dec 2021 06:04) (18 - 18)  SpO2: 97% (20 Dec 2021 06:04) (96% - 98%)    LABS:    CBC Full  -  ( 20 Dec 2021 06:38 )  WBC Count : 9.51 K/uL  RBC Count : 3.57 M/uL  Hemoglobin : 9.9 g/dL  Hematocrit : 30.9 %  Platelet Count - Automated : 273 K/uL  Mean Cell Volume : 86.6 fl  Mean Cell Hemoglobin : 27.7 pg  Mean Cell Hemoglobin Concentration : 32.0 gm/dL  Auto Neutrophil # : 8.09 K/uL  Auto Lymphocyte # : 0.84 K/uL  Auto Monocyte # : 0.54 K/uL  Auto Eosinophil # : 0.00 K/uL  Auto Basophil # : 0.01 K/uL  Auto Neutrophil % : 85.1 %  Auto Lymphocyte % : 8.8 %  Auto Monocyte % : 5.7 %  Auto Eosinophil % : 0.0 %  Auto Basophil % : 0.1 %      12-20    138  |  105  |  19<H>  ----------------------------<  158<H>  4.1   |  27  |  1.44<H>    Ca    8.6      20 Dec 2021 06:38  Phos  3.3     12-20  Mg     2.0     12-20    TPro  5.4<L>  /  Alb  2.3<L>  /  TBili  0.2  /  DBili  x   /  AST  16  /  ALT  22  /  AlkPhos  78  12-20    CAPILLARY BLOOD GLUCOSE      POCT Blood Glucose.: 328 mg/dL (20 Dec 2021 11:22)  POCT Blood Glucose.: 152 mg/dL (20 Dec 2021 07:33)  POCT Blood Glucose.: 164 mg/dL (19 Dec 2021 22:00)  POCT Blood Glucose.: 125 mg/dL (19 Dec 2021 16:48)        LIVER FUNCTIONS - ( 20 Dec 2021 06:38 )  Alb: 2.3 g/dL / Pro: 5.4 g/dL / ALK PHOS: 78 U/L / ALT: 22 U/L DA / AST: 16 U/L / GGT: x           Creatinine Trend: 1.44<--, 1.30<--, 1.63<--, 2.02<--  I&O's Summary    19 Dec 2021 07:01  -  20 Dec 2021 07:00  --------------------------------------------------------  IN: 0 mL / OUT: 500 mL / NET: -500 mL            Clean Catch Clean Catch (Midstream)  12-18 @ 05:13   <10,000 CFU/mL Normal Urogenital Elizabet  --  --          MEDICATIONS:    MEDICATIONS  (STANDING):  aspirin enteric coated 81 milliGRAM(s) Oral daily  atorvastatin 80 milliGRAM(s) Oral at bedtime  calcium carbonate   1250 mG (OsCal) 1 Tablet(s) Oral daily  cholecalciferol 1000 Unit(s) Oral daily  heparin   Injectable 5000 Unit(s) SubCutaneous every 8 hours  hydrALAZINE 100 milliGRAM(s) Oral three times a day  insulin lispro (ADMELOG) corrective regimen sliding scale   SubCutaneous three times a day before meals  NIFEdipine  milliGRAM(s) Oral daily  predniSONE   Tablet 20 milliGRAM(s) Oral daily  pyridostigmine 60 milliGRAM(s) Oral every 4 hours  sodium chloride 0.9%. 1000 milliLiter(s) (100 mL/Hr) IV Continuous <Continuous>  tamsulosin 0.4 milliGRAM(s) Oral at bedtime      MEDICATIONS  (PRN):      REVIEW OF SYSTEMS:                           ALL ROS DONE [ X   ]    CONSTITUTIONAL:  LETHARGIC [   ], FEVER [   ], UNRESPONSIVE [   ]  CVS:  CP  [   ], SOB, [   ], PALPITATIONS [   ], DIZZYNESS [   ]  RS: COUGH [   ], SPUTUM [   ]  GI: ABDOMINAL PAIN [   ], NAUSEA [   ], VOMITINGS [   ], DIARRHEA [   ], CONSTIPATION [   ]  :  DYSURIA [   ], NOCTURIA [   ], INCREASED FREQUENCY [   ], DRIBLING [   ],  SKELETAL: PAINFUL JOINTS [   ], SWOLLEN JOINTS [   ], NECK ACHE [   ], LOW BACK ACHE [   ],  SKIN : ULCERS [   ], RASH [   ], ITCHING [   ]  CNS: HEAD ACHE [   ], DOUBLE VISION [   ], BLURRED VISION [   ], AMS / CONFUSION [   ], SEIZURES [   ], WEAKNESS [   ],TINGLING / NUMBNESS [   ]    PHYSICAL EXAMINATION:  GENERAL APPEARANCE: NO DISTRESS  HEENT:  NO PALLOR, NO  JVD,  NO   NODES, NECK SUPPLE   ;  ? mild exophtalmos  CVS: S1 +, S2 +,   RS: AEEB,  OCCASIONAL  RALES +,   NO RONCHI  ABD: SOFT, NT, NO, BS +  EXT: NO PE  SKIN: WARM,   SKELETAL:  ROM ACCEPTABLE  CNS:  AAO X 3    RADIOLOGY :    ACC: [id number] EXAM:  CT BRAIN                          PROCEDURE DATE:  12/17/2021          INTERPRETATION:  CLINICAL INDICATIONS:  dizziness, ataxia    COMPARISON: CT head dated 2/19/2017 and MRI brain dated 2/21/2017    TECHNIQUE: Noncontrast CT of the head. Multiplanar reformations are   submitted.    FINDINGS: Chronic right basal ganglia and left thalamic cortical   infarctions. Chronic left paramedian moderate size occipital lobe   infarction.  There is periventricular and subcortical white matter hypodensity without   mass effect, nonspecific, likely representing moderate chronic   microvascular ischemic changes. There is no compelling evidence for an   acute transcortical infarction. There is no evidence of mass, mass   effect, midline shift or extra-axial fluid collection. The lateral   ventricles and cortical sulci are age-appropriate in size and   configuration. The orbits, mastoid air cells and visualized paranasal   sinuses are unremarkable. The calvarium is intact. ConsiderMRI as   clinically warranted.    IMPRESSION: Multiple chronic supratentorial infarctions. Moderate chronic   microvascular changes without evidence of an acute transcortical   infarction or hemorrhage. Preliminary report provided by RUBEN BOLES DO; Attending Radiologist.      ASSESSMENT :     Weakness    HTN (hypertension)    DM (diabetes mellitus)    Myasthenia gravis    Hypercholesterolemia    CVA (cerebrovascular accident)    Peripheral neuropathy    Lipoma of chest wall    MG with thymoma (myasthena gravis)        PLAN:  HPI:  Patient is a 65 y/o Male from [facility], walks independently, with medical hx significant for Myasthenia Gravis s/p Thymoma resection, HTN, HLD, CVA x 2, Osteoporosis, PAD, Peripheral Neuropathy, Cervical and Lumbar spondylosis, BPH, presenting to the ED due to complaints of generalized weakness. Pt c/o multiple episodes of NBNB vomiting and diarrheal episodes over the course of the last 3 days. No hemetemesis, hematochezia, SOB, LOC, abdominal pain, dysuria, numbness, tingling, fevers, cough, sick contacts. Pt says on initial ED admission, he felt lightheaded which has improved s/p IVF. Vomiting and diarrheal episodes have also stopped.     In the ED, pt's vitals were  /80, HR 91/min, Temp Afebrile, saturating well on RA  Chest xray no consolidation  U/A negative  Lactate downtrended s/p fluids (18 Dec 2021 03:58)    # GENERALIZED WEAKNESS - MICHEL/DEHYDRATION - LESS LIKELY S/T FLARE OF MYASTHENIA   - ON PYRIDOSTIGMINE 60MG X 6 TIMES DAILY, PREDNISONE 20MG QDAY, BACTRIM ? PPX  - NO OVERT EVIDENCE OF RESPIRATORY WEAKNESS - NIF [-45] , VC [2010 ML]  - F/U PT EVAL  - NEUROLOGY CONSULT IN PROGRESS  - CASE D/W PATIENT'S GIRLFRIEND VIA PHONE AT BEDSIDE - PATIENT FOLLOW W/ DR. ESPINOZA AT [facility]    # ACUTE GASTROENTERITIS - RESOLVED  # ELEVATED LACTIC ACID - RESOLVED S/P IVF  # MICHEL - RESOLVED S/P IVF  # BPH - ON FLOMAX  # HLD - STATIN  # HTN - ON HYDRALAZINE, NIFEDIPINE; HOLD LOSARTAN  # DM - SSI + FS; HOLD METFORMIN  # GI AND DVT PPX

## 2021-12-20 NOTE — PROGRESS NOTE ADULT - PROBLEM SELECTOR PLAN 11
Awaiting neuro eval  Awaiting PT eval  Will need repeat COVID prior to discharge back to [facility]

## 2021-12-20 NOTE — PROGRESS NOTE ADULT - PROBLEM SELECTOR PLAN 5
Pt has hx of HLD, takes Atorvastatin 80 mg  C/w home med acute due gastroenteritis vs. baseline  -BMI 19.4, Serum Albumin 2.3  -Dietician consult

## 2021-12-20 NOTE — PROGRESS NOTE ADULT - PROBLEM SELECTOR PLAN 4
due to deh  Pt p/w weakness, decreased appetite, NBNB vomiting and loose stools which has now resolved as per pt  -Cxray negative, U/A negative, Lactate downtrended s/p fluids  CTH negative for acute findings  -Likely Viral gastroenteritis  -Will continue IVF for now, resume diet and encourage PO intake due to MG superimposed by dehydration due to gastroenteritis-Improved  -CTH negative for acute findings  -CXR negative, U/A negative, Lactate downtrended s/p fluids  -OOB, PT  -Pulmonary toileting, incentive spirometry

## 2021-12-20 NOTE — PHYSICAL THERAPY INITIAL EVALUATION ADULT - PERTINENT HX OF CURRENT PROBLEM, REHAB EVAL
medical hx significant for Myasthenia Gravis s/p Thymoma resection, HTN, HLD, CVA x 2, Osteoporosis, PAD, Peripheral Neuropathy, Cervical and Lumbar spondylosis, BPH, presenting to the ED due to complaints of generalized weakness.

## 2021-12-20 NOTE — DISCHARGE NOTE PROVIDER - NSDCCPCAREPLAN_GEN_ALL_CORE_FT
PRINCIPAL DISCHARGE DIAGNOSIS  Diagnosis: Gastroenteritis  Assessment and Plan of Treatment: You were sent to ED from NH due to signs and symptoms of viral gastroenteritis (nausea and vomiting and loose stools).  You were treated with intravenous fluids with resolution of symptoms.      SECONDARY DISCHARGE DIAGNOSES  Diagnosis: MICHEL (acute kidney injury)  Assessment and Plan of Treatment: Your kidney function was impaired on admission due to dehydration.  You were treated with intravenous fluids with good response.  -Follow up with your doctor for continued kidney function monitoring  -Avoid medications that can impair your kidney function such as Advil, Ibuprophen, IV dye etc.    Diagnosis: Hypoalbuminemia due to protein-calorie malnutrition  Assessment and Plan of Treatment: You are noted with low serum protein which may be due to gastroenteritis or may be chronic due to malnutrition.  -Follow up heart healthy diet  -Follow up with your doctor for continued monitoring.    Diagnosis: Diabetes mellitus  Assessment and Plan of Treatment: Continue finger sticks monitoring  Continue anti diabetic regimen as prior to hospitalization  Follow up with your doctor    Diagnosis: Myasthenia gravis status post thymectomy  Assessment and Plan of Treatment: You were followed by neurologist to make sure your symptoms were not related to your chronic Myasthenia Gravis.  It was confirmed by neurologist that you are not in exacerbation and your symptoms are due to acute gastroenteritis.  -Continue your medications as prior to hospitalization.  -You need close follow up with neurologist to determine steroid course      Diagnosis: Vertigo  Assessment and Plan of Treatment:     Diagnosis: Elevated lactic acid level  Assessment and Plan of Treatment:     Diagnosis: Gastroenteritis  Assessment and Plan of Treatment:      PRINCIPAL DISCHARGE DIAGNOSIS  Diagnosis: Gastroenteritis  Assessment and Plan of Treatment: You were sent to ED from NH due to signs and symptoms of viral gastroenteritis (nausea and vomiting and loose stools).  You were treated with intravenous fluids with resolution of symptoms.      SECONDARY DISCHARGE DIAGNOSES  Diagnosis: MICHEL (acute kidney injury)  Assessment and Plan of Treatment: Your kidney function was impaired on admission due to dehydration.  You were treated with intravenous fluids with good response.  -Follow up with your doctor for continued kidney function monitoring  -Avoid medications that can impair your kidney function such as Advil, Ibuprophen, IV dye etc.    Diagnosis: Hypoalbuminemia due to protein-calorie malnutrition  Assessment and Plan of Treatment: You are noted with low serum protein which may be due to gastroenteritis or may be chronic due to malnutrition.  -Follow up heart healthy diet  -Follow up with your doctor for continued monitoring.    Diagnosis: Diabetes mellitus  Assessment and Plan of Treatment: Continue finger sticks monitoring  Continue anti diabetic regimen as prior to hospitalization  Follow up with your doctor    Diagnosis: Myasthenia gravis status post thymectomy  Assessment and Plan of Treatment: You were followed by neurologist to make sure your symptoms were not related to your chronic Myasthenia Gravis.  It was confirmed by neurologist that you are not in exacerbation and your symptoms are due to acute gastroenteritis.  -Continue your medications as prior to hospitalization.  -You need close follow up with neurologist to determine steroid course  Neurologist Dr. Arthur has seen you while inpatient.  You are provided with Dr. Arthur contact information in case you want to follow up with her.  You are also provided with a list of antibiotics you should not take while on Mestinon.  This list was provided by our neurologist and should be communicated with your health care providers.      Diagnosis: Vertigo  Assessment and Plan of Treatment: Maintain adequate hydration at all times to avoid vertigo/fainting.  Change positions slowly.    Diagnosis: Gastroenteritis  Assessment and Plan of Treatment:

## 2021-12-20 NOTE — PROGRESS NOTE ADULT - PROBLEM SELECTOR PLAN 6
Pt has hx of HTN, takes Hydralazine 100 mg Q8hrs, Nifedipine 60 mg BID  C/w home meds Pt has hx of HLD, takes Atorvastatin 80 mg  C/w home med

## 2021-12-20 NOTE — PROGRESS NOTE ADULT - ASSESSMENT
65 y/o Male from [facility], walks independently, with medical hx significant for Myasthenia Gravis s/p Thymoma resection, HTN, HLD, CVA x 2, Osteoporosis, PAD, Peripheral Neuropathy, Cervical and Lumbar spondylosis, BPH, presenting to the ED due to complaints of generalized weakness likely in the setting of Gastroenteritis 65 y/o Male from [facility], walks independently, with medical hx significant for Myasthenia Gravis s/p Thymoma resection, HTN, HLD, CVA x 2, Osteoporosis, PAD, Peripheral Neuropathy, Cervical and Lumbar spondylosis, BPH, presenting to the ED due to complaints of generalized weakness likely in the setting of Gastroenteritis and dehydration.  Pt. seen and examined, noted comfortable stating he is feeling better with no further episodes diarrhea, N/V.  Awaiting neuro eval and reccs, currently continues with his usual MG management regimen with no s&s of crisis.  PT ordered.  Will need repeat COVID prior to discharge back to [facility].

## 2021-12-20 NOTE — PROGRESS NOTE ADULT - PROBLEM SELECTOR PLAN 1
due to dehydration due to GI loss-Improved from admission  -a/w Cr 2.02, now 1.44 (trended up since yesterday  -NS bolus 500 ml x 1  -f/u repeat Cr in pm

## 2021-12-20 NOTE — CONSULT NOTE ADULT - ASSESSMENT
Vomiting and diarrhea and generalized weakness in patient with myasthenia gravis.  The generalized weakness is likely secondary to the acute gastroenteritis.  There is no clinical evidence of myasthenia exacerbation.  Mestinon dose has not been changed recently and not likely to be contributing to these symptoms, will recommend to continue with home dose.  The patient will need to have close follow up with outpatient neurology for monitoring and decision on if steroids should be continued, may need special coordination by SW as patient is a  and [place].    List of medications to avoid with myasthenia are left at front of chart.    Please call back with questions.    melly NP

## 2021-12-21 ENCOUNTER — TRANSCRIPTION ENCOUNTER (OUTPATIENT)
Age: 66
End: 2021-12-21

## 2021-12-21 LAB
ANION GAP SERPL CALC-SCNC: 5 MMOL/L — SIGNIFICANT CHANGE UP (ref 5–17)
BUN SERPL-MCNC: 17 MG/DL — SIGNIFICANT CHANGE UP (ref 7–18)
CALCIUM SERPL-MCNC: 9 MG/DL — SIGNIFICANT CHANGE UP (ref 8.4–10.5)
CHLORIDE SERPL-SCNC: 106 MMOL/L — SIGNIFICANT CHANGE UP (ref 96–108)
CO2 SERPL-SCNC: 28 MMOL/L — SIGNIFICANT CHANGE UP (ref 22–31)
CREAT SERPL-MCNC: 1.33 MG/DL — HIGH (ref 0.5–1.3)
GLUCOSE BLDC GLUCOMTR-MCNC: 168 MG/DL — HIGH (ref 70–99)
GLUCOSE BLDC GLUCOMTR-MCNC: 228 MG/DL — HIGH (ref 70–99)
GLUCOSE BLDC GLUCOMTR-MCNC: 328 MG/DL — HIGH (ref 70–99)
GLUCOSE SERPL-MCNC: 173 MG/DL — HIGH (ref 70–99)
HCT VFR BLD CALC: 33.2 % — LOW (ref 39–50)
HGB BLD-MCNC: 10.5 G/DL — LOW (ref 13–17)
MCHC RBC-ENTMCNC: 27.3 PG — SIGNIFICANT CHANGE UP (ref 27–34)
MCHC RBC-ENTMCNC: 31.6 GM/DL — LOW (ref 32–36)
MCV RBC AUTO: 86.5 FL — SIGNIFICANT CHANGE UP (ref 80–100)
NRBC # BLD: 0 /100 WBCS — SIGNIFICANT CHANGE UP (ref 0–0)
PLATELET # BLD AUTO: 282 K/UL — SIGNIFICANT CHANGE UP (ref 150–400)
POTASSIUM SERPL-MCNC: 4 MMOL/L — SIGNIFICANT CHANGE UP (ref 3.5–5.3)
POTASSIUM SERPL-SCNC: 4 MMOL/L — SIGNIFICANT CHANGE UP (ref 3.5–5.3)
RBC # BLD: 3.84 M/UL — LOW (ref 4.2–5.8)
RBC # FLD: 14.2 % — SIGNIFICANT CHANGE UP (ref 10.3–14.5)
SARS-COV-2 RNA SPEC QL NAA+PROBE: SIGNIFICANT CHANGE UP
SODIUM SERPL-SCNC: 139 MMOL/L — SIGNIFICANT CHANGE UP (ref 135–145)
WBC # BLD: 8 K/UL — SIGNIFICANT CHANGE UP (ref 3.8–10.5)
WBC # FLD AUTO: 8 K/UL — SIGNIFICANT CHANGE UP (ref 3.8–10.5)

## 2021-12-21 RX ORDER — SODIUM CHLORIDE 9 MG/ML
1000 INJECTION INTRAMUSCULAR; INTRAVENOUS; SUBCUTANEOUS ONCE
Refills: 0 | Status: COMPLETED | OUTPATIENT
Start: 2021-12-21 | End: 2021-12-21

## 2021-12-21 RX ADMIN — HEPARIN SODIUM 5000 UNIT(S): 5000 INJECTION INTRAVENOUS; SUBCUTANEOUS at 21:17

## 2021-12-21 RX ADMIN — Medication 81 MILLIGRAM(S): at 11:22

## 2021-12-21 RX ADMIN — TAMSULOSIN HYDROCHLORIDE 0.4 MILLIGRAM(S): 0.4 CAPSULE ORAL at 21:20

## 2021-12-21 RX ADMIN — ATORVASTATIN CALCIUM 80 MILLIGRAM(S): 80 TABLET, FILM COATED ORAL at 21:17

## 2021-12-21 RX ADMIN — Medication 100 MILLIGRAM(S): at 05:06

## 2021-12-21 RX ADMIN — Medication 1 TABLET(S): at 11:03

## 2021-12-21 RX ADMIN — Medication 100 MILLIGRAM(S): at 21:20

## 2021-12-21 RX ADMIN — HEPARIN SODIUM 5000 UNIT(S): 5000 INJECTION INTRAVENOUS; SUBCUTANEOUS at 13:53

## 2021-12-21 RX ADMIN — Medication 20 MILLIGRAM(S): at 05:06

## 2021-12-21 RX ADMIN — Medication 100 MILLIGRAM(S): at 13:53

## 2021-12-21 RX ADMIN — HEPARIN SODIUM 5000 UNIT(S): 5000 INJECTION INTRAVENOUS; SUBCUTANEOUS at 05:06

## 2021-12-21 RX ADMIN — Medication 4: at 11:21

## 2021-12-21 RX ADMIN — Medication 2: at 17:34

## 2021-12-21 RX ADMIN — PYRIDOSTIGMINE BROMIDE 60 MILLIGRAM(S): 60 SOLUTION ORAL at 17:35

## 2021-12-21 RX ADMIN — PYRIDOSTIGMINE BROMIDE 60 MILLIGRAM(S): 60 SOLUTION ORAL at 02:00

## 2021-12-21 RX ADMIN — Medication 1: at 08:11

## 2021-12-21 RX ADMIN — PYRIDOSTIGMINE BROMIDE 60 MILLIGRAM(S): 60 SOLUTION ORAL at 13:53

## 2021-12-21 RX ADMIN — Medication 1000 UNIT(S): at 11:22

## 2021-12-21 RX ADMIN — PYRIDOSTIGMINE BROMIDE 60 MILLIGRAM(S): 60 SOLUTION ORAL at 21:17

## 2021-12-21 RX ADMIN — SODIUM CHLORIDE 1000 MILLILITER(S): 9 INJECTION INTRAMUSCULAR; INTRAVENOUS; SUBCUTANEOUS at 11:02

## 2021-12-21 RX ADMIN — Medication 120 MILLIGRAM(S): at 05:05

## 2021-12-21 RX ADMIN — PYRIDOSTIGMINE BROMIDE 60 MILLIGRAM(S): 60 SOLUTION ORAL at 11:03

## 2021-12-21 RX ADMIN — PYRIDOSTIGMINE BROMIDE 60 MILLIGRAM(S): 60 SOLUTION ORAL at 05:00

## 2021-12-21 NOTE — PROGRESS NOTE ADULT - SUBJECTIVE AND OBJECTIVE BOX
Patient is a 66y old  Male who presents with a chief complaint of Weakness (20 Dec 2021 17:45)    PATIENT IS SEEN AND EXAMINED IN MEDICAL FLOOR.  NGT [    ]    ARCADIO [   ]      GT [   ]    ALLERGIES:  No Known Allergies      Daily     Daily     VITALS:    Vital Signs Last 24 Hrs  T(C): 36.7 (21 Dec 2021 13:29), Max: 36.8 (21 Dec 2021 03:07)  T(F): 98.1 (21 Dec 2021 13:29), Max: 98.2 (21 Dec 2021 03:07)  HR: 89 (21 Dec 2021 13:29) (82 - 89)  BP: 152/72 (21 Dec 2021 13:29) (145/70 - 158/73)  BP(mean): --  RR: 18 (21 Dec 2021 13:29) (18 - 18)  SpO2: 98% (21 Dec 2021 13:29) (96% - 98%)    LABS:    CBC Full  -  ( 21 Dec 2021 05:36 )  WBC Count : 8.00 K/uL  RBC Count : 3.84 M/uL  Hemoglobin : 10.5 g/dL  Hematocrit : 33.2 %  Platelet Count - Automated : 282 K/uL  Mean Cell Volume : 86.5 fl  Mean Cell Hemoglobin : 27.3 pg  Mean Cell Hemoglobin Concentration : 31.6 gm/dL  Auto Neutrophil # : x  Auto Lymphocyte # : x  Auto Monocyte # : x  Auto Eosinophil # : x  Auto Basophil # : x  Auto Neutrophil % : x  Auto Lymphocyte % : x  Auto Monocyte % : x  Auto Eosinophil % : x  Auto Basophil % : x      12-21    139  |  106  |  17  ----------------------------<  173<H>  4.0   |  28  |  1.33<H>    Ca    9.0      21 Dec 2021 05:36  Phos  3.3     12-20  Mg     2.0     12-20    TPro  5.4<L>  /  Alb  2.3<L>  /  TBili  0.2  /  DBili  x   /  AST  16  /  ALT  22  /  AlkPhos  78  12-20    CAPILLARY BLOOD GLUCOSE      POCT Blood Glucose.: 228 mg/dL (21 Dec 2021 17:26)  POCT Blood Glucose.: 328 mg/dL (21 Dec 2021 11:10)  POCT Blood Glucose.: 168 mg/dL (21 Dec 2021 07:46)  POCT Blood Glucose.: 149 mg/dL (20 Dec 2021 22:24)        LIVER FUNCTIONS - ( 20 Dec 2021 06:38 )  Alb: 2.3 g/dL / Pro: 5.4 g/dL / ALK PHOS: 78 U/L / ALT: 22 U/L DA / AST: 16 U/L / GGT: x           Creatinine Trend: 1.33<--, 1.37<--, 1.44<--, 1.30<--, 1.63<--, 2.02<--  I&O's Summary    20 Dec 2021 07:01  -  21 Dec 2021 07:00  --------------------------------------------------------  IN: 700 mL / OUT: 0 mL / NET: 700 mL            Clean Catch Clean Catch (Midstream)  12-18 @ 05:13   <10,000 CFU/mL Normal Urogenital Elizabet  --  --          MEDICATIONS:    MEDICATIONS  (STANDING):  aspirin enteric coated 81 milliGRAM(s) Oral daily  atorvastatin 80 milliGRAM(s) Oral at bedtime  calcium carbonate   1250 mG (OsCal) 1 Tablet(s) Oral daily  cholecalciferol 1000 Unit(s) Oral daily  heparin   Injectable 5000 Unit(s) SubCutaneous every 8 hours  hydrALAZINE 100 milliGRAM(s) Oral three times a day  insulin lispro (ADMELOG) corrective regimen sliding scale   SubCutaneous three times a day before meals  NIFEdipine  milliGRAM(s) Oral daily  predniSONE   Tablet 20 milliGRAM(s) Oral daily  pyridostigmine 60 milliGRAM(s) Oral every 4 hours  sodium chloride 0.9%. 1000 milliLiter(s) (100 mL/Hr) IV Continuous <Continuous>  tamsulosin 0.4 milliGRAM(s) Oral at bedtime      MEDICATIONS  (PRN):      REVIEW OF SYSTEMS:                           ALL ROS DONE [ X   ]    CONSTITUTIONAL:  LETHARGIC [   ], FEVER [   ], UNRESPONSIVE [   ]  CVS:  CP  [   ], SOB, [   ], PALPITATIONS [   ], DIZZYNESS [   ]  RS: COUGH [   ], SPUTUM [   ]  GI: ABDOMINAL PAIN [   ], NAUSEA [   ], VOMITINGS [   ], DIARRHEA [   ], CONSTIPATION [   ]  :  DYSURIA [   ], NOCTURIA [   ], INCREASED FREQUENCY [   ], DRIBLING [   ],  SKELETAL: PAINFUL JOINTS [   ], SWOLLEN JOINTS [   ], NECK ACHE [   ], LOW BACK ACHE [   ],  SKIN : ULCERS [   ], RASH [   ], ITCHING [   ]  CNS: HEAD ACHE [   ], DOUBLE VISION [   ], BLURRED VISION [   ], AMS / CONFUSION [   ], SEIZURES [   ], WEAKNESS [   ],TINGLING / NUMBNESS [   ]    PHYSICAL EXAMINATION:  GENERAL APPEARANCE: NO DISTRESS  HEENT:  NO PALLOR, NO  JVD,  NO   NODES, NECK SUPPLE   ;  ? mild exophtalmos  CVS: S1 +, S2 +,   RS: AEEB,  OCCASIONAL  RALES +,   NO RONCHI  ABD: SOFT, NT, NO, BS +  EXT: NO PE  SKIN: WARM,   SKELETAL:  ROM ACCEPTABLE  CNS:  AAO X 3    RADIOLOGY :    ACC: [id number] EXAM:  CT BRAIN                          PROCEDURE DATE:  12/17/2021          INTERPRETATION:  CLINICAL INDICATIONS:  dizziness, ataxia    COMPARISON: CT head dated 2/19/2017 and MRI brain dated 2/21/2017    TECHNIQUE: Noncontrast CT of the head. Multiplanar reformations are   submitted.    FINDINGS: Chronic right basal ganglia and left thalamic cortical   infarctions. Chronic left paramedian moderate size occipital lobe   infarction.  There is periventricular and subcortical white matter hypodensity without   mass effect, nonspecific, likely representing moderate chronic   microvascular ischemic changes. There is no compelling evidence for an   acute transcortical infarction. There is no evidence of mass, mass   effect, midline shift or extra-axial fluid collection. The lateral   ventricles and cortical sulci are age-appropriate in size and   configuration. The orbits, mastoid air cells and visualized paranasal   sinuses are unremarkable. The calvarium is intact. ConsiderMRI as   clinically warranted.    IMPRESSION: Multiple chronic supratentorial infarctions. Moderate chronic   microvascular changes without evidence of an acute transcortical   infarction or hemorrhage. Preliminary report provided by RUBEN BOLES DO; Attending Radiologist.      ASSESSMENT :     Weakness    HTN (hypertension)    DM (diabetes mellitus)    Myasthenia gravis    Hypercholesterolemia    CVA (cerebrovascular accident)    Peripheral neuropathy    Lipoma of chest wall    MG with thymoma (myasthena gravis)        PLAN:  HPI:  Patient is a 65 y/o Male from [facility], walks independently, with medical hx significant for Myasthenia Gravis s/p Thymoma resection, HTN, HLD, CVA x 2, Osteoporosis, PAD, Peripheral Neuropathy, Cervical and Lumbar spondylosis, BPH, presenting to the ED due to complaints of generalized weakness. Pt c/o multiple episodes of NBNB vomiting and diarrheal episodes over the course of the last 3 days. No hemetemesis, hematochezia, SOB, LOC, abdominal pain, dysuria, numbness, tingling, fevers, cough, sick contacts. Pt says on initial ED admission, he felt lightheaded which has improved s/p IVF. Vomiting and diarrheal episodes have also stopped.     In the ED, pt's vitals were  /80, HR 91/min, Temp Afebrile, saturating well on RA  Chest xray no consolidation  U/A negative  Lactate downtrended s/p fluids (18 Dec 2021 03:58)    # GENERALIZED WEAKNESS - MICHEL/DEHYDRATION - LESS LIKELY S/T FLARE OF MYASTHENIA   - ON PYRIDOSTIGMINE 60MG X 6 TIMES DAILY, PREDNISONE 20MG QDAY, BACTRIM ? PPX  - NO OVERT EVIDENCE OF RESPIRATORY WEAKNESS - NIF [-45] , VC [2010 ML]  - F/U PT EVAL  - NEUROLOGY CONSULT IN PROGRESS  - CASE D/W PATIENT'S GIRLFRIEND VIA PHONE AT BEDSIDE - PATIENT FOLLOW W/ DR. ESPINOZA AT [facility] [address]    # ACUTE GASTROENTERITIS - RESOLVED  # ELEVATED LACTIC ACID - RESOLVED S/P IVF  # MICHEL - RESOLVED S/P IVF  # BPH - ON FLOMAX  # HLD - STATIN  # HTN - ON HYDRALAZINE, NIFEDIPINE; HOLD LOSARTAN  # DM - SSI + FS; HOLD METFORMIN  # GI AND DVT PPX       Patient is a 66y old  Male who presents with a chief complaint of Weakness (20 Dec 2021 17:45)    PATIENT IS SEEN AND EXAMINED IN MEDICAL FLOOR.  NGT [    ]    ARCADIO [   ]      GT [   ]    ALLERGIES:  No Known Allergies      Daily     Daily     VITALS:    Vital Signs Last 24 Hrs  T(C): 36.7 (21 Dec 2021 13:29), Max: 36.8 (21 Dec 2021 03:07)  T(F): 98.1 (21 Dec 2021 13:29), Max: 98.2 (21 Dec 2021 03:07)  HR: 89 (21 Dec 2021 13:29) (82 - 89)  BP: 152/72 (21 Dec 2021 13:29) (145/70 - 158/73)  BP(mean): --  RR: 18 (21 Dec 2021 13:29) (18 - 18)  SpO2: 98% (21 Dec 2021 13:29) (96% - 98%)    LABS:    CBC Full  -  ( 21 Dec 2021 05:36 )  WBC Count : 8.00 K/uL  RBC Count : 3.84 M/uL  Hemoglobin : 10.5 g/dL  Hematocrit : 33.2 %  Platelet Count - Automated : 282 K/uL  Mean Cell Volume : 86.5 fl  Mean Cell Hemoglobin : 27.3 pg  Mean Cell Hemoglobin Concentration : 31.6 gm/dL  Auto Neutrophil # : x  Auto Lymphocyte # : x  Auto Monocyte # : x  Auto Eosinophil # : x  Auto Basophil # : x  Auto Neutrophil % : x  Auto Lymphocyte % : x  Auto Monocyte % : x  Auto Eosinophil % : x  Auto Basophil % : x      12-21    139  |  106  |  17  ----------------------------<  173<H>  4.0   |  28  |  1.33<H>    Ca    9.0      21 Dec 2021 05:36  Phos  3.3     12-20  Mg     2.0     12-20    TPro  5.4<L>  /  Alb  2.3<L>  /  TBili  0.2  /  DBili  x   /  AST  16  /  ALT  22  /  AlkPhos  78  12-20    CAPILLARY BLOOD GLUCOSE      POCT Blood Glucose.: 228 mg/dL (21 Dec 2021 17:26)  POCT Blood Glucose.: 328 mg/dL (21 Dec 2021 11:10)  POCT Blood Glucose.: 168 mg/dL (21 Dec 2021 07:46)  POCT Blood Glucose.: 149 mg/dL (20 Dec 2021 22:24)        LIVER FUNCTIONS - ( 20 Dec 2021 06:38 )  Alb: 2.3 g/dL / Pro: 5.4 g/dL / ALK PHOS: 78 U/L / ALT: 22 U/L DA / AST: 16 U/L / GGT: x           Creatinine Trend: 1.33<--, 1.37<--, 1.44<--, 1.30<--, 1.63<--, 2.02<--  I&O's Summary    20 Dec 2021 07:01  -  21 Dec 2021 07:00  --------------------------------------------------------  IN: 700 mL / OUT: 0 mL / NET: 700 mL            Clean Catch Clean Catch (Midstream)  12-18 @ 05:13   <10,000 CFU/mL Normal Urogenital Elizabet  --  --          MEDICATIONS:    MEDICATIONS  (STANDING):  aspirin enteric coated 81 milliGRAM(s) Oral daily  atorvastatin 80 milliGRAM(s) Oral at bedtime  calcium carbonate   1250 mG (OsCal) 1 Tablet(s) Oral daily  cholecalciferol 1000 Unit(s) Oral daily  heparin   Injectable 5000 Unit(s) SubCutaneous every 8 hours  hydrALAZINE 100 milliGRAM(s) Oral three times a day  insulin lispro (ADMELOG) corrective regimen sliding scale   SubCutaneous three times a day before meals  NIFEdipine  milliGRAM(s) Oral daily  predniSONE   Tablet 20 milliGRAM(s) Oral daily  pyridostigmine 60 milliGRAM(s) Oral every 4 hours  sodium chloride 0.9%. 1000 milliLiter(s) (100 mL/Hr) IV Continuous <Continuous>  tamsulosin 0.4 milliGRAM(s) Oral at bedtime      MEDICATIONS  (PRN):      REVIEW OF SYSTEMS:                           ALL ROS DONE [ X   ]    CONSTITUTIONAL:  LETHARGIC [   ], FEVER [   ], UNRESPONSIVE [   ]  CVS:  CP  [   ], SOB, [   ], PALPITATIONS [   ], DIZZYNESS [   ]  RS: COUGH [   ], SPUTUM [   ]  GI: ABDOMINAL PAIN [   ], NAUSEA [   ], VOMITINGS [   ], DIARRHEA [   ], CONSTIPATION [   ]  :  DYSURIA [   ], NOCTURIA [   ], INCREASED FREQUENCY [   ], DRIBLING [   ],  SKELETAL: PAINFUL JOINTS [   ], SWOLLEN JOINTS [   ], NECK ACHE [   ], LOW BACK ACHE [   ],  SKIN : ULCERS [   ], RASH [   ], ITCHING [   ]  CNS: HEAD ACHE [   ], DOUBLE VISION [   ], BLURRED VISION [   ], AMS / CONFUSION [   ], SEIZURES [   ], WEAKNESS [   ],TINGLING / NUMBNESS [   ]    PHYSICAL EXAMINATION:  GENERAL APPEARANCE: NO DISTRESS  HEENT:  NO PALLOR, NO  JVD,  NO   NODES, NECK SUPPLE   ;  ? mild exophtalmos  CVS: S1 +, S2 +,   RS: AEEB,  OCCASIONAL  RALES +,   NO RONCHI  ABD: SOFT, NT, NO, BS +  EXT: NO PE  SKIN: WARM,   SKELETAL:  ROM ACCEPTABLE  CNS:  AAO X 3    RADIOLOGY :    ACC: [id number] EXAM:  CT BRAIN                          PROCEDURE DATE:  12/17/2021          INTERPRETATION:  CLINICAL INDICATIONS:  dizziness, ataxia    COMPARISON: CT head dated 2/19/2017 and MRI brain dated 2/21/2017    TECHNIQUE: Noncontrast CT of the head. Multiplanar reformations are   submitted.    FINDINGS: Chronic right basal ganglia and left thalamic cortical   infarctions. Chronic left paramedian moderate size occipital lobe   infarction.  There is periventricular and subcortical white matter hypodensity without   mass effect, nonspecific, likely representing moderate chronic   microvascular ischemic changes. There is no compelling evidence for an   acute transcortical infarction. There is no evidence of mass, mass   effect, midline shift or extra-axial fluid collection. The lateral   ventricles and cortical sulci are age-appropriate in size and   configuration. The orbits, mastoid air cells and visualized paranasal   sinuses are unremarkable. The calvarium is intact. ConsiderMRI as   clinically warranted.    IMPRESSION: Multiple chronic supratentorial infarctions. Moderate chronic   microvascular changes without evidence of an acute transcortical   infarction or hemorrhage. Preliminary report provided by RUBEN BOLES DO; Attending Radiologist.      ASSESSMENT :     Weakness    HTN (hypertension)    DM (diabetes mellitus)    Myasthenia gravis    Hypercholesterolemia    CVA (cerebrovascular accident)    Peripheral neuropathy    Lipoma of chest wall    MG with thymoma (myasthena gravis)        PLAN:  HPI:  Patient is a 65 y/o Male from [facility], walks independently, with medical hx significant for Myasthenia Gravis s/p Thymoma resection, HTN, HLD, CVA x 2, Osteoporosis, PAD, Peripheral Neuropathy, Cervical and Lumbar spondylosis, BPH, presenting to the ED due to complaints of generalized weakness. Pt c/o multiple episodes of NBNB vomiting and diarrheal episodes over the course of the last 3 days. No hemetemesis, hematochezia, SOB, LOC, abdominal pain, dysuria, numbness, tingling, fevers, cough, sick contacts. Pt says on initial ED admission, he felt lightheaded which has improved s/p IVF. Vomiting and diarrheal episodes have also stopped.     In the ED, pt's vitals were  /80, HR 91/min, Temp Afebrile, saturating well on RA  Chest xray no consolidation  U/A negative  Lactate downtrended s/p fluids (18 Dec 2021 03:58)    # PATIENT WAS MEDICALLY CLEARED FOR DISCHARGE AND DISCUSSED PRIOR - D/W TEAM INCLUDING ACP, CM AND NURSE MANAGER. TRANSPORTATION SET-UP WAS DELAYED, THUS DISCHARGE WAS PLANNED FOR A.M.    # GENERALIZED WEAKNESS - MICHEL/DEHYDRATION - LESS LIKELY S/T FLARE OF MYASTHENIA   - ON PYRIDOSTIGMINE 60MG X 6 TIMES DAILY, PREDNISONE 20MG QDAY, BACTRIM ? PPX  - NO OVERT EVIDENCE OF RESPIRATORY WEAKNESS - NIF [-45] , VC [2010 ML]  - F/U PT EVAL  - NEUROLOGY CONSULT IN PROGRESS  - CASE D/W PATIENT'S GIRLFRIEND VIA PHONE AT BEDSIDE - PATIENT FOLLOW W/ DR. ESPINOZA AT [facility]    # ACUTE GASTROENTERITIS - RESOLVED  # ELEVATED LACTIC ACID - RESOLVED S/P IVF  # MICHEL - RESOLVED S/P IVF  # BPH - ON FLOMAX  # HLD - STATIN  # HTN - ON HYDRALAZINE, NIFEDIPINE; HOLD LOSARTAN  # DM - SSI + FS; HOLD METFORMIN  # GI AND DVT PPX

## 2021-12-21 NOTE — DISCHARGE NOTE NURSING/CASE MANAGEMENT/SOCIAL WORK - PATIENT PORTAL LINK FT
You can access the FollowMyHealth Patient Portal offered by [facility] by registering at the following website: [email or web address]. By joining [facility]’s FollowMyHealth portal, you will also be able to view your health information using other applications (apps) compatible with our system.

## 2021-12-21 NOTE — DISCHARGE NOTE NURSING/CASE MANAGEMENT/SOCIAL WORK - NSDCPEFALRISK_GEN_ALL_CORE
For information on Fall & Injury Prevention, visit: [email or web address] OR  [email or web address] OR  https://www.cdc.gov/steadi/patient.html

## 2021-12-22 VITALS
RESPIRATION RATE: 18 BRPM | SYSTOLIC BLOOD PRESSURE: 157 MMHG | DIASTOLIC BLOOD PRESSURE: 75 MMHG | HEART RATE: 90 BPM | TEMPERATURE: 99 F | OXYGEN SATURATION: 97 %

## 2021-12-22 LAB — GLUCOSE BLDC GLUCOMTR-MCNC: 216 MG/DL — HIGH (ref 70–99)

## 2021-12-22 PROCEDURE — 82570 ASSAY OF URINE CREATININE: CPT

## 2021-12-22 PROCEDURE — 36415 COLL VENOUS BLD VENIPUNCTURE: CPT

## 2021-12-22 PROCEDURE — 86803 HEPATITIS C AB TEST: CPT

## 2021-12-22 PROCEDURE — 83605 ASSAY OF LACTIC ACID: CPT

## 2021-12-22 PROCEDURE — 99285 EMERGENCY DEPT VISIT HI MDM: CPT | Mod: 25

## 2021-12-22 PROCEDURE — 85025 COMPLETE CBC W/AUTO DIFF WBC: CPT

## 2021-12-22 PROCEDURE — 96375 TX/PRO/DX INJ NEW DRUG ADDON: CPT

## 2021-12-22 PROCEDURE — 94150 VITAL CAPACITY TEST: CPT

## 2021-12-22 PROCEDURE — 80048 BASIC METABOLIC PNL TOTAL CA: CPT

## 2021-12-22 PROCEDURE — 71045 X-RAY EXAM CHEST 1 VIEW: CPT

## 2021-12-22 PROCEDURE — 84100 ASSAY OF PHOSPHORUS: CPT

## 2021-12-22 PROCEDURE — 83735 ASSAY OF MAGNESIUM: CPT

## 2021-12-22 PROCEDURE — 81001 URINALYSIS AUTO W/SCOPE: CPT

## 2021-12-22 PROCEDURE — 87086 URINE CULTURE/COLONY COUNT: CPT

## 2021-12-22 PROCEDURE — 70450 CT HEAD/BRAIN W/O DYE: CPT | Mod: MA

## 2021-12-22 PROCEDURE — 85027 COMPLETE CBC AUTOMATED: CPT

## 2021-12-22 PROCEDURE — 84300 ASSAY OF URINE SODIUM: CPT

## 2021-12-22 PROCEDURE — 93005 ELECTROCARDIOGRAM TRACING: CPT

## 2021-12-22 PROCEDURE — 80053 COMPREHEN METABOLIC PANEL: CPT

## 2021-12-22 PROCEDURE — 86041 ACETYLCHOLN RCPTR BNDNG ANTB: CPT

## 2021-12-22 PROCEDURE — 82962 GLUCOSE BLOOD TEST: CPT

## 2021-12-22 PROCEDURE — 82009 KETONE BODYS QUAL: CPT

## 2021-12-22 PROCEDURE — 83690 ASSAY OF LIPASE: CPT

## 2021-12-22 PROCEDURE — 84484 ASSAY OF TROPONIN QUANT: CPT

## 2021-12-22 PROCEDURE — 96374 THER/PROPH/DIAG INJ IV PUSH: CPT

## 2021-12-22 PROCEDURE — 87635 SARS-COV-2 COVID-19 AMP PRB: CPT

## 2021-12-22 RX ADMIN — Medication 2: at 08:34

## 2021-12-22 RX ADMIN — Medication 20 MILLIGRAM(S): at 05:12

## 2021-12-22 RX ADMIN — Medication 81 MILLIGRAM(S): at 11:36

## 2021-12-22 RX ADMIN — PYRIDOSTIGMINE BROMIDE 60 MILLIGRAM(S): 60 SOLUTION ORAL at 05:12

## 2021-12-22 RX ADMIN — HEPARIN SODIUM 5000 UNIT(S): 5000 INJECTION INTRAVENOUS; SUBCUTANEOUS at 05:12

## 2021-12-22 RX ADMIN — PYRIDOSTIGMINE BROMIDE 60 MILLIGRAM(S): 60 SOLUTION ORAL at 02:41

## 2021-12-22 RX ADMIN — Medication 100 MILLIGRAM(S): at 05:11

## 2021-12-22 RX ADMIN — Medication 120 MILLIGRAM(S): at 05:12

## 2021-12-22 RX ADMIN — Medication 1 TABLET(S): at 12:45

## 2021-12-22 RX ADMIN — PYRIDOSTIGMINE BROMIDE 60 MILLIGRAM(S): 60 SOLUTION ORAL at 09:56

## 2021-12-22 RX ADMIN — Medication 1000 UNIT(S): at 11:36

## 2021-12-22 NOTE — DIETITIAN INITIAL EVALUATION ADULT. - OTHER INFO
Pt visited. Pt seen for Nutrition consult  and LOS. Pt  Visited. Pt reports No Vomiting and Diarrhea at Present. Pt admitted with Dehydration, MICHEL, Gastroenteritis.  Pt is from [place]. Per Pt Ht 5 feet 6 inches. Current wt  119 lb. BMI 19. Pt being D/C today . Pt tolerating PO . Pt visited. Pt seen for Nutrition consult  and LOS. Pt  Visited. Pt reports No Vomiting and Diarrhea at Present. Pt admitted with Dehydration, MICHEL, Gastroenteritis.  Pt is from [place]. Per Pt Ht 5 feet 6 inches. Current wt  119 lb. BMI 19. Pt being D/C today . Pt tolerating PO . H/O CVA. No chewing or swallowing difficulties Reported. H/O DM.

## 2021-12-22 NOTE — DIETITIAN INITIAL EVALUATION ADULT. - PERTINENT LABORATORY DATA
12-21 Na139 mmol/L Glu 173 mg/dL<H> K+ 4.0 mmol/L Cr  1.33 mg/dL<H> BUN 17 mg/dL   12-20 Phos 3.3 mg/dL   12-20 Alb 2.3 g/dL<L>

## 2021-12-22 NOTE — DIETITIAN INITIAL EVALUATION ADULT. - PROBLEM SELECTOR PLAN 1
Pt p/w weakness, decreased appetite, NBNB vomiting and loose stools which has now resolved as per pt  -Cxray negative, U/A negative, Lactate downtrended s/p fluids  CTH negative for acute findings  -Likely Viral gastroenteritis  -Will continue IVF for now, resume diet and encourage PO intake

## 2021-12-22 NOTE — DIETITIAN INITIAL EVALUATION ADULT. - IDEAL BODY WEIGHT (LBS)
SW met with treatment team to discuss pt's progress and setbacks. SW 2 was present. Pt was admitted, voluntary, for SA by OD, alcohol intoxication, hx of depression, identifies multiple psychosocial stressors including the loss of her job, divorce, selling her house, coming to [address] [place] [address] to help her sister move, sister is currently in the hospital following a MVA, mother has Alzheimer's dementia and is doing poorly, denies HI/AVH. Since admission, pt reportedly was cooperative with admission process, alert/oriented x 4, slept well last night, with medications, appetite is good, attends scheduled group activities, social with peers/staff, performs ADL's, compliant with medications, behavior has been calm/cooperative, affect bright, denies depression/anxiety, denies SI/HI/AVH, will be discharged today, follow-up appointments will be scheduled. 142

## 2021-12-22 NOTE — DIETITIAN INITIAL EVALUATION ADULT. - PERTINENT MEDS FT
MEDICATIONS:  aspirin enteric coated 81 daily  atorvastatin 80 at bedtime  calcium carbonate   1250 mG (OsCal) 1 daily  cholecalciferol 1000 daily  heparin   Injectable 5000 every 8 hours  hydrALAZINE 100 three times a day  insulin lispro (ADMELOG) corrective regimen sliding scale  three times a day before meals  NIFEdipine  daily  predniSONE   Tablet 20 daily  pyridostigmine 60 every 4 hours  sodium chloride 0.9%. 1000 <Continuous>  tamsulosin 0.4 at bedtime

## 2021-12-23 LAB — ACRM BINDING ANTIBODY: 1.63 NMOL/L — HIGH (ref 0–0.24)

## 2022-01-11 NOTE — ED ADULT NURSE NOTE - CAS EDP DISCH DISPOSITION ADMI
Call placed and spoke with Faviola regarding rescheduling patient appointment for CTA in the next 3 weeks due to staff covid absences. Ok'd to reschedule. She will inform ordering physician and call back if there is a concern.       Telemetry

## 2022-01-18 ENCOUNTER — INPATIENT (INPATIENT)
Facility: HOSPITAL | Age: 67
LOS: 2 days | Discharge: HOME | DRG: 57 | End: 2022-01-21
Attending: INTERNAL MEDICINE | Admitting: INTERNAL MEDICINE
Payer: MEDICAID

## 2022-01-18 VITALS
TEMPERATURE: 98 F | WEIGHT: 156.97 LBS | OXYGEN SATURATION: 98 % | DIASTOLIC BLOOD PRESSURE: 71 MMHG | RESPIRATION RATE: 18 BRPM | HEART RATE: 88 BPM | SYSTOLIC BLOOD PRESSURE: 150 MMHG | HEIGHT: 66 IN

## 2022-01-18 DIAGNOSIS — G70.00 MYASTHENIA GRAVIS WITHOUT (ACUTE) EXACERBATION: Chronic | ICD-10-CM

## 2022-01-18 DIAGNOSIS — R26.2 DIFFICULTY IN WALKING, NOT ELSEWHERE CLASSIFIED: ICD-10-CM

## 2022-01-18 DIAGNOSIS — D17.39 BENIGN LIPOMATOUS NEOPLASM OF SKIN AND SUBCUTANEOUS TISSUE OF OTHER SITES: Chronic | ICD-10-CM

## 2022-01-18 LAB
ACETONE SERPL-MCNC: NEGATIVE — SIGNIFICANT CHANGE UP
ALBUMIN SERPL ELPH-MCNC: 2.8 G/DL — LOW (ref 3.5–5)
ALP SERPL-CCNC: 110 U/L — SIGNIFICANT CHANGE UP (ref 40–120)
ALT FLD-CCNC: 51 U/L DA — SIGNIFICANT CHANGE UP (ref 10–60)
ANION GAP SERPL CALC-SCNC: 4 MMOL/L — LOW (ref 5–17)
APPEARANCE UR: CLEAR — SIGNIFICANT CHANGE UP
AST SERPL-CCNC: 27 U/L — SIGNIFICANT CHANGE UP (ref 10–40)
BACTERIA # UR AUTO: ABNORMAL /HPF
BASOPHILS # BLD AUTO: 0.02 K/UL — SIGNIFICANT CHANGE UP (ref 0–0.2)
BASOPHILS NFR BLD AUTO: 0.2 % — SIGNIFICANT CHANGE UP (ref 0–2)
BILIRUB SERPL-MCNC: 0.3 MG/DL — SIGNIFICANT CHANGE UP (ref 0.2–1.2)
BILIRUB UR-MCNC: NEGATIVE — SIGNIFICANT CHANGE UP
BUN SERPL-MCNC: 22 MG/DL — HIGH (ref 7–18)
CALCIUM SERPL-MCNC: 8.9 MG/DL — SIGNIFICANT CHANGE UP (ref 8.4–10.5)
CHLORIDE SERPL-SCNC: 107 MMOL/L — SIGNIFICANT CHANGE UP (ref 96–108)
CK SERPL-CCNC: 229 U/L — SIGNIFICANT CHANGE UP (ref 35–232)
CO2 SERPL-SCNC: 31 MMOL/L — SIGNIFICANT CHANGE UP (ref 22–31)
COLOR SPEC: YELLOW — SIGNIFICANT CHANGE UP
CREAT SERPL-MCNC: 1.45 MG/DL — HIGH (ref 0.5–1.3)
DIFF PNL FLD: NEGATIVE — SIGNIFICANT CHANGE UP
EOSINOPHIL # BLD AUTO: 0 K/UL — SIGNIFICANT CHANGE UP (ref 0–0.5)
EOSINOPHIL NFR BLD AUTO: 0 % — SIGNIFICANT CHANGE UP (ref 0–6)
EPI CELLS # UR: ABNORMAL /HPF
GLUCOSE SERPL-MCNC: 70 MG/DL — SIGNIFICANT CHANGE UP (ref 70–99)
GLUCOSE UR QL: NEGATIVE — SIGNIFICANT CHANGE UP
HCT VFR BLD CALC: 33.8 % — LOW (ref 39–50)
HGB BLD-MCNC: 10.5 G/DL — LOW (ref 13–17)
IMM GRANULOCYTES NFR BLD AUTO: 0.5 % — SIGNIFICANT CHANGE UP (ref 0–1.5)
KETONES UR-MCNC: NEGATIVE — SIGNIFICANT CHANGE UP
LACTATE SERPL-SCNC: 1.4 MMOL/L — SIGNIFICANT CHANGE UP (ref 0.7–2)
LEUKOCYTE ESTERASE UR-ACNC: NEGATIVE — SIGNIFICANT CHANGE UP
LIDOCAIN IGE QN: 238 U/L — SIGNIFICANT CHANGE UP (ref 73–393)
LYMPHOCYTES # BLD AUTO: 0.94 K/UL — LOW (ref 1–3.3)
LYMPHOCYTES # BLD AUTO: 7.4 % — LOW (ref 13–44)
MAGNESIUM SERPL-MCNC: 2.4 MG/DL — SIGNIFICANT CHANGE UP (ref 1.6–2.6)
MCHC RBC-ENTMCNC: 27.7 PG — SIGNIFICANT CHANGE UP (ref 27–34)
MCHC RBC-ENTMCNC: 31.1 GM/DL — LOW (ref 32–36)
MCV RBC AUTO: 89.2 FL — SIGNIFICANT CHANGE UP (ref 80–100)
MONOCYTES # BLD AUTO: 1.09 K/UL — HIGH (ref 0–0.9)
MONOCYTES NFR BLD AUTO: 8.5 % — SIGNIFICANT CHANGE UP (ref 2–14)
NEUTROPHILS # BLD AUTO: 10.66 K/UL — HIGH (ref 1.8–7.4)
NEUTROPHILS NFR BLD AUTO: 83.4 % — HIGH (ref 43–77)
NITRITE UR-MCNC: NEGATIVE — SIGNIFICANT CHANGE UP
NRBC # BLD: 0 /100 WBCS — SIGNIFICANT CHANGE UP (ref 0–0)
NT-PROBNP SERPL-SCNC: 350 PG/ML — HIGH (ref 0–125)
PH UR: 6 — SIGNIFICANT CHANGE UP (ref 5–8)
PHOSPHATE SERPL-MCNC: 2.8 MG/DL — SIGNIFICANT CHANGE UP (ref 2.5–4.5)
PLATELET # BLD AUTO: 234 K/UL — SIGNIFICANT CHANGE UP (ref 150–400)
POTASSIUM SERPL-MCNC: 3.9 MMOL/L — SIGNIFICANT CHANGE UP (ref 3.5–5.3)
POTASSIUM SERPL-SCNC: 3.9 MMOL/L — SIGNIFICANT CHANGE UP (ref 3.5–5.3)
PROT SERPL-MCNC: 6.4 G/DL — SIGNIFICANT CHANGE UP (ref 6–8.3)
PROT UR-MCNC: 30 MG/DL
RBC # BLD: 3.79 M/UL — LOW (ref 4.2–5.8)
RBC # FLD: 14.9 % — HIGH (ref 10.3–14.5)
RBC CASTS # UR COMP ASSIST: SIGNIFICANT CHANGE UP /HPF (ref 0–2)
SARS-COV-2 RNA SPEC QL NAA+PROBE: SIGNIFICANT CHANGE UP
SODIUM SERPL-SCNC: 142 MMOL/L — SIGNIFICANT CHANGE UP (ref 135–145)
SP GR SPEC: 1 — LOW (ref 1.01–1.02)
TROPONIN I, HIGH SENSITIVITY RESULT: 35.4 NG/L — SIGNIFICANT CHANGE UP
UROBILINOGEN FLD QL: NEGATIVE — SIGNIFICANT CHANGE UP
WBC # BLD: 12.77 K/UL — HIGH (ref 3.8–10.5)
WBC # FLD AUTO: 12.77 K/UL — HIGH (ref 3.8–10.5)
WBC UR QL: SIGNIFICANT CHANGE UP /HPF (ref 0–5)

## 2022-01-18 PROCEDURE — 93010 ELECTROCARDIOGRAM REPORT: CPT

## 2022-01-18 PROCEDURE — 99285 EMERGENCY DEPT VISIT HI MDM: CPT

## 2022-01-18 PROCEDURE — 71045 X-RAY EXAM CHEST 1 VIEW: CPT | Mod: 26

## 2022-01-18 RX ORDER — FUROSEMIDE 40 MG
60 TABLET ORAL ONCE
Refills: 0 | Status: COMPLETED | OUTPATIENT
Start: 2022-01-18 | End: 2022-01-18

## 2022-01-18 RX ORDER — SODIUM CHLORIDE 9 MG/ML
1000 INJECTION INTRAMUSCULAR; INTRAVENOUS; SUBCUTANEOUS
Refills: 0 | Status: DISCONTINUED | OUTPATIENT
Start: 2022-01-18 | End: 2022-01-21

## 2022-01-18 RX ADMIN — Medication 60 MILLIGRAM(S): at 21:59

## 2022-01-18 NOTE — H&P ADULT - PROBLEM SELECTOR PLAN 3
Pt admitted with Sr Cr 1.45 which is elevated from his baseline   C/w IVF, likely pre-renal   Recheck in AM

## 2022-01-18 NOTE — H&P ADULT - PROBLEM SELECTOR PLAN 2
C/w home medications pyridostigmine and prednisone C/w home medications pyridostigmine and prednisone  Dr. Paige to be consulted in AM

## 2022-01-18 NOTE — H&P ADULT - PROBLEM SELECTOR PLAN 7
Lovenox 40mg Qd   PPI   IMPROVE VTE Individual Risk Assessment  RISK                                                                Points  [  ] Previous VTE                                                  3  [  ] Thrombophilia                                               2  [x  ] Lower limb paralysis                                      2        (unable to hold up >15 seconds)    [  ] Current Cancer                                              2         (within 6 months)  [x  ] Immobilization > 24 hrs                                1  [  ] ICU/CCU stay > 24 hours                              1  [ x ] Age > 60                                                      1  IMPROVE VTE Score ____4_____

## 2022-01-18 NOTE — ED ADULT NURSE NOTE - OBJECTIVE STATEMENT
Pt c/o shortness of breath x 2 days as well as bilateral leg swelling and generalized weakness. Pt denies cardiac history and only endorses history of Mysthenia Gravis.

## 2022-01-18 NOTE — H&P ADULT - NSHPPHYSICALEXAM_GEN_ALL_CORE
GENERAL APPEARANCE: Well developed, AA0x3, in NAD   HEENT: Normocephalic, PERRL, EOMI External auditory canals clear, hearing grossly intact, no nasal discharge   THROAT: Oral cavity and pharynx normal. No inflammation, swelling, exudate, or lesions.  CARDIAC: Normal S1 and S2. No S3, S4 or murmurs. Rhythm is regular. There is no peripheral edema, cyanosis or pallor.  LUNGS: Clear to auscultation and percussion without rales, rhonchi, wheezing or diminished breath sounds.  ABDOMEN: Positive bowel sounds. Soft, nondistended, nontender. No guarding or rebound. No masses.  EXTREMITIES: No significant deformity or joint abnormality. No edema. Peripheral pulses intact. No varicosities.  NEUROLOGICAL: CN II-XII intact. Strength and sensation symmetric and intact throughout. Reflexes 2+ throughout.   SKIN: No skin breakdown, lesions or rashes noted GENERAL APPEARANCE: Well developed AAM, AA0x3, in NAD while sitting in bed   HEENT: Normocephalic, PERRL, EOMI External auditory canals clear, hearing grossly intact, no nasal discharge   THROAT: Oral cavity and pharynx normal. No inflammation, swelling, exudate, or lesions.  CARDIAC: Normal S1 and S2. No S3, S4 or murmurs. Rhythm is regular. There is no peripheral edema, cyanosis or pallor.  LUNGS: Clear to auscultation and percussion without rales, rhonchi, wheezing or diminished breath sounds.  ABDOMEN: Positive bowel sounds. Soft, nondistended, nontender. No guarding or rebound. No masses.  EXTREMITIES: No significant deformity or joint abnormality. No edema. Peripheral pulses intact. No varicosities.  NEUROLOGICAL: CN II-XII intact. Strength and sensation symmetric and intact throughout.   SKIN: No skin breakdown, lesions or rashes noted

## 2022-01-18 NOTE — ED PROVIDER NOTE - CLINICAL SUMMARY MEDICAL DECISION MAKING FREE TEXT BOX
pt with significant weakness, concern for infectious process vs metabolic imbalance, will get labs, CXR, reassess

## 2022-01-18 NOTE — H&P ADULT - HISTORY OF PRESENT ILLNESS
Patient is a 67 y/o Male from [facility], ambulates independently, with PMHx of Myasthenia Gravis s/p Thymectomy , HTN, HLD, CVA x 2, Osteoporosis, PAD, Peripheral Neuropathy, Cervical and Lumbar spondylosis, and BPH who presented to the ED for generalized weakness. He was previously admitted for similar complaints in Dec 2021 and was found to have acute gastroenteritis. At this admission, he endorses that for the past few days, he is unsure that he has been taking all of his medications on time. He is unable to point out any specific part of the body that hurts. He denies any headache, chest pain, SOB, LOC, abdominal pain, dysuria, numbness, tingling, fevers, cough.

## 2022-01-18 NOTE — H&P ADULT - PROBLEM SELECTOR PLAN 1
Multiple prior admissions with generalized weakness   Likely trigger for this episode may be medication noncompliance (timing of pyridostigmine)  Pt by neurological assessment does not appear to be in Myasthenic crisis   Less likely to be Adrenal insufficiency 2/2 to long term prednisone use (Pt is Hypertensive)  Poor PO intake/ dehydration may also be contributing

## 2022-01-18 NOTE — ED PROVIDER NOTE - CARE PLAN
1 Principal Discharge DX:	Inability to walk  Secondary Diagnosis:	General weakness  Secondary Diagnosis:	Edema leg

## 2022-01-18 NOTE — ED ADULT NURSE NOTE - [facility] TRG GENERAL [facility], MLM
[id number]  Patent

## 2022-01-18 NOTE — H&P ADULT - ASSESSMENT
Patient is a 67 y/o Male from [facility], ambulates independently, with PMHx of Myasthenia Gravis s/p Thymectomy , HTN, HLD, CVA x 2, Osteoporosis, PAD, Peripheral Neuropathy, Cervical and Lumbar spondylosis, and BPH who presented to the ED for generalized weakness.    In the ED,   Patient is a 67 y/o Male from [facility], ambulates independently, with PMHx of Myasthenia Gravis s/p Thymectomy , HTN, HLD, CVA x 2, Osteoporosis, PAD, Peripheral Neuropathy, Cervical and Lumbar spondylosis, and BPH who presented to the ED for generalized weakness.    In the ED,  VS: Temp 98.1F, HR 49, /94mmHg, RR 19, Smf977% on RA   Labs significant for Hb 10.5, BUN/Cr 22/1.45

## 2022-01-18 NOTE — ED PROVIDER NOTE - OBJECTIVE STATEMENT
66 y.o. male with h/o DM, last dose ? today, BIBA generalized weakness for since yest., dec. appetite, sob, dizziness upon walking, diff. ambulating, no n/v/d, no abd pain, CP, coughing,

## 2022-01-19 DIAGNOSIS — Z29.9 ENCOUNTER FOR PROPHYLACTIC MEASURES, UNSPECIFIED: ICD-10-CM

## 2022-01-19 DIAGNOSIS — E11.9 TYPE 2 DIABETES MELLITUS WITHOUT COMPLICATIONS: ICD-10-CM

## 2022-01-19 DIAGNOSIS — I10 ESSENTIAL (PRIMARY) HYPERTENSION: ICD-10-CM

## 2022-01-19 DIAGNOSIS — D64.9 ANEMIA, UNSPECIFIED: ICD-10-CM

## 2022-01-19 DIAGNOSIS — R53.1 WEAKNESS: ICD-10-CM

## 2022-01-19 DIAGNOSIS — G70.00 MYASTHENIA GRAVIS WITHOUT (ACUTE) EXACERBATION: ICD-10-CM

## 2022-01-19 DIAGNOSIS — N17.9 ACUTE KIDNEY FAILURE, UNSPECIFIED: ICD-10-CM

## 2022-01-19 PROBLEM — G62.9 POLYNEUROPATHY, UNSPECIFIED: Chronic | Status: ACTIVE | Noted: 2021-12-17

## 2022-01-19 PROBLEM — E78.00 PURE HYPERCHOLESTEROLEMIA, UNSPECIFIED: Chronic | Status: ACTIVE | Noted: 2021-12-17

## 2022-01-19 PROBLEM — I63.9 CEREBRAL INFARCTION, UNSPECIFIED: Chronic | Status: ACTIVE | Noted: 2021-12-17

## 2022-01-19 PROCEDURE — 99222 1ST HOSP IP/OBS MODERATE 55: CPT

## 2022-01-19 RX ORDER — HYDRALAZINE HCL 50 MG
10 TABLET ORAL ONCE
Refills: 0 | Status: COMPLETED | OUTPATIENT
Start: 2022-01-19 | End: 2022-01-19

## 2022-01-19 RX ORDER — ACETAMINOPHEN 500 MG
650 TABLET ORAL ONCE
Refills: 0 | Status: COMPLETED | OUTPATIENT
Start: 2022-01-19 | End: 2022-01-19

## 2022-01-19 RX ORDER — HYDRALAZINE HCL 50 MG
100 TABLET ORAL EVERY 8 HOURS
Refills: 0 | Status: DISCONTINUED | OUTPATIENT
Start: 2022-01-19 | End: 2022-01-21

## 2022-01-19 RX ORDER — ATORVASTATIN CALCIUM 80 MG/1
80 TABLET, FILM COATED ORAL AT BEDTIME
Refills: 0 | Status: DISCONTINUED | OUTPATIENT
Start: 2022-01-19 | End: 2022-01-21

## 2022-01-19 RX ORDER — HYDRALAZINE HCL 50 MG
5 TABLET ORAL ONCE
Refills: 0 | Status: COMPLETED | OUTPATIENT
Start: 2022-01-19 | End: 2022-01-19

## 2022-01-19 RX ORDER — PYRIDOSTIGMINE BROMIDE 60 MG/5ML
60 SOLUTION ORAL ONCE
Refills: 0 | Status: COMPLETED | OUTPATIENT
Start: 2022-01-19 | End: 2022-01-19

## 2022-01-19 RX ORDER — METFORMIN HYDROCHLORIDE 850 MG/1
1000 TABLET ORAL
Refills: 0 | Status: DISCONTINUED | OUTPATIENT
Start: 2022-01-19 | End: 2022-01-21

## 2022-01-19 RX ORDER — NIFEDIPINE 30 MG
120 TABLET, EXTENDED RELEASE 24 HR ORAL DAILY
Refills: 0 | Status: DISCONTINUED | OUTPATIENT
Start: 2022-01-19 | End: 2022-01-21

## 2022-01-19 RX ORDER — CALCIUM CARBONATE 500(1250)
1 TABLET ORAL DAILY
Refills: 0 | Status: DISCONTINUED | OUTPATIENT
Start: 2022-01-19 | End: 2022-01-21

## 2022-01-19 RX ORDER — ASPIRIN/CALCIUM CARB/MAGNESIUM 324 MG
81 TABLET ORAL DAILY
Refills: 0 | Status: DISCONTINUED | OUTPATIENT
Start: 2022-01-19 | End: 2022-01-21

## 2022-01-19 RX ORDER — TAMSULOSIN HYDROCHLORIDE 0.4 MG/1
0.4 CAPSULE ORAL AT BEDTIME
Refills: 0 | Status: DISCONTINUED | OUTPATIENT
Start: 2022-01-19 | End: 2022-01-21

## 2022-01-19 RX ORDER — CHOLECALCIFEROL (VITAMIN D3) 125 MCG
1000 CAPSULE ORAL DAILY
Refills: 0 | Status: DISCONTINUED | OUTPATIENT
Start: 2022-01-19 | End: 2022-01-21

## 2022-01-19 RX ORDER — PYRIDOSTIGMINE BROMIDE 60 MG/5ML
60 SOLUTION ORAL EVERY 4 HOURS
Refills: 0 | Status: DISCONTINUED | OUTPATIENT
Start: 2022-01-19 | End: 2022-01-21

## 2022-01-19 RX ADMIN — Medication 5 MILLIGRAM(S): at 01:48

## 2022-01-19 RX ADMIN — Medication 120 MILLIGRAM(S): at 05:35

## 2022-01-19 RX ADMIN — PYRIDOSTIGMINE BROMIDE 60 MILLIGRAM(S): 60 SOLUTION ORAL at 18:56

## 2022-01-19 RX ADMIN — Medication 20 MILLIGRAM(S): at 05:36

## 2022-01-19 RX ADMIN — Medication 1 TABLET(S): at 13:43

## 2022-01-19 RX ADMIN — PYRIDOSTIGMINE BROMIDE 60 MILLIGRAM(S): 60 SOLUTION ORAL at 23:19

## 2022-01-19 RX ADMIN — METFORMIN HYDROCHLORIDE 1000 MILLIGRAM(S): 850 TABLET ORAL at 05:33

## 2022-01-19 RX ADMIN — PYRIDOSTIGMINE BROMIDE 60 MILLIGRAM(S): 60 SOLUTION ORAL at 01:06

## 2022-01-19 RX ADMIN — Medication 1000 UNIT(S): at 12:27

## 2022-01-19 RX ADMIN — Medication 40 MILLIGRAM(S): at 13:44

## 2022-01-19 RX ADMIN — Medication 100 MILLIGRAM(S): at 23:19

## 2022-01-19 RX ADMIN — PYRIDOSTIGMINE BROMIDE 60 MILLIGRAM(S): 60 SOLUTION ORAL at 05:43

## 2022-01-19 RX ADMIN — ATORVASTATIN CALCIUM 80 MILLIGRAM(S): 80 TABLET, FILM COATED ORAL at 23:19

## 2022-01-19 RX ADMIN — Medication 100 MILLIGRAM(S): at 13:44

## 2022-01-19 RX ADMIN — Medication 20 MILLIGRAM(S): at 01:06

## 2022-01-19 RX ADMIN — Medication 1 TABLET(S): at 18:56

## 2022-01-19 RX ADMIN — Medication 1 TABLET(S): at 05:33

## 2022-01-19 RX ADMIN — Medication 81 MILLIGRAM(S): at 12:26

## 2022-01-19 RX ADMIN — TAMSULOSIN HYDROCHLORIDE 0.4 MILLIGRAM(S): 0.4 CAPSULE ORAL at 23:18

## 2022-01-19 RX ADMIN — PYRIDOSTIGMINE BROMIDE 60 MILLIGRAM(S): 60 SOLUTION ORAL at 12:27

## 2022-01-19 RX ADMIN — Medication 10 MILLIGRAM(S): at 03:31

## 2022-01-19 RX ADMIN — Medication 100 MILLIGRAM(S): at 05:35

## 2022-01-19 NOTE — PATIENT PROFILE ADULT - HAVE YOU HAD A FIRST COVID-19 BOOSTER?
Date/Time of Note


Date/Time of Note


DATE: 6/1/17 


TIME: 18:55





Discharge Summary


Admission/Discharge Info


Admit Date/Time


May 25, 2017 at 00:53


Discharge Date/Time


Jun 1, 2017 at 13:09


Patient Condition:  Fair


Hospital Course


Patient Left AMA




















IMPRESSION:


1.  Congestive heart failure exacerbation, diastolic by echocardiogram 12/2016, 

acute on chronic.


2.  Hypertension, under reasonable control currently.


3.  Shortness of breath secondary to #1.


4.  Chronic obstructive pulmonary disease exacerbation.


5.  Coronary artery disease by medications.-negative troponin x 3


6.  Dyslipidemia-LDL 96 HDL 77.


7.  Obesity.





Recc:


-Tele


-Continue norvasc/zestril


-Continue BB as patient will comply with


-Continue asa


-Continue lasix diuresis and follow volume status closely as patient will comply


-Continue steroids/bronchodilators/abx's as patient will comply


-F/U cx data


-Continue statin


Home Meds


Reported Medications


Ondansetron Hcl* (Ondansetron Hcl*) 4 Mg Tablet, 4 MG PO Q4H Y for NAUSEA AND 

OR VOMITING, TAB


   5/24/17


Gabapentin* (Gabapentin*) 300 Mg Capsule, 900 MG PO TID, #270 CAP


   5/24/17


Hydrocodone Bit-Acetaminophen (Hydrocodone Bit-Acetaminophen)  Mg Tablet, 

1 TAB PO TID Y for PAIN, TAB


   4/16/17


Morphine Sulfate* (Ms Contin*) 15 Mg Tablet.sa, 15 MG PO Q12, TAB


   4/16/17


Simvastatin* (Zocor*) 10 Mg Tablet, 10 MG PO QHS, #30 TAB


   12/17/16


Pantoprazole* (Pantoprazole*) 40 Mg Tablet.dr, 40 MG PO AC BREAKFAST, TAB


   12/17/16


Cholecalciferol* (Vitamin D3*) 1,000 Unit Tablet, 1000 UNIT PO DAILY, TAB


   6/28/15


Lisinopril* (Lisinopril*) 20 Mg Tablet, 20 MG PO DAILY, TAB


   5/20/15


Amlodipine Besylate* (Amlodipine Besylate*) 10 Mg Tablet, 10 MG PO DAILY, TAB


   2/19/15


Furosemide* (Lasix*) 40 Mg Tablet, 40 MG PO BID, TAB


   2/19/15


Multivitamins* (Multivitamin*) 1 Udcap Capsule, 1 TAB PO DAILY, TAB


   1/31/15


Docusate Sodium* (Colace*) 250 Mg Capsule, 250 MG PO BID, CAP


   1/31/15


Ascorbic Acid (Vitamin C) 500 Mg Tab, 1000 MG PO DAILY, TAB


   12/23/14


Potassium Chloride* (K-Dur*) 10 Meq Tab.prt.sr, 10 MEQ PO DAILY, TAB


   10/16/14


Aspirin* (Aspirin* Chew) 81 Mg Tab.chew, 81 MG PO DAILY, TAB.CHEW


   10/16/14


Primary Care Provider


NIKKI Barnes Jun 1, 2017 18:56 No

## 2022-01-19 NOTE — CONSULT NOTE ADULT - ASSESSMENT
Acute exacerbation of myasthenia gravis. Currently there doesn't appear to be  a precipitating infection. I discussed with Dr. Lopez the options. Because of his previous history of  cerebrovascular accident as well as other cardiovascular risk factors, intravenous immunoglobulin over a day or 2  would be risky.  Hospital stay for a prolonged length of time with current pandemic is also not recommended. Recommend increase prednisone to 60 mg  for 2 days and follow up as an outpatient after he returned to the skilled nursing residence.

## 2022-01-19 NOTE — CONSULT NOTE ADULT - SUBJECTIVE AND OBJECTIVE BOX
Patient is a 66y old  Male who presents with a chief complaint of Weakness (19 Jan 2022 17:36)      HPI:Resident of [facility]; Reports generalized weakness, similar to the last time he was admitted in last December when he had gastroenteritis. Denies shortness of breath but admits drooping of eyelid is increased. Denies double vision or swallowing difficulty. Admits difficulty holding his hand up to  shave/brush teeth for long. Does not require assistance to cut food, and ambulates independently but effort tolerance has reduced      PAST MEDICAL & SURGICAL HISTORY:  HTN (hypertension)    DM (diabetes mellitus)    Myasthenia gravis    Hypercholesterolemia    CVA (cerebrovascular accident)    Peripheral neuropathy    Lipoma of chest wall    MG with thymoma (myasthena gravis)        FAMILY HISTORY:  Family history of hypertension (Mother)          Social Hx:  Nonsmoker, no drug or alcohol use    MEDICATIONS  (STANDING):  acetaminophen     Tablet .. 650 milliGRAM(s) Oral once  aspirin enteric coated 81 milliGRAM(s) Oral daily  atorvastatin 80 milliGRAM(s) Oral at bedtime  calcium carbonate   1250 mG (OsCal) 1 Tablet(s) Oral daily  cholecalciferol 1000 Unit(s) Oral daily  hydrALAZINE 100 milliGRAM(s) Oral every 8 hours  metFORMIN 1000 milliGRAM(s) Oral two times a day  NIFEdipine  milliGRAM(s) Oral daily  pyridostigmine 60 milliGRAM(s) Oral every 4 hours  sodium chloride 0.9%. 1000 milliLiter(s) (125 mL/Hr) IV Continuous <Continuous>  tamsulosin 0.4 milliGRAM(s) Oral at bedtime  trimethoprim  160 mG/sulfamethoxazole 800 mG 1 Tablet(s) Oral every 12 hoursaspirin enteric coated 81 milliGRAM(s) Oral daily      Allergies    No Known Allergies    Intolerances        ROS: Pertinent positives in HPI, all other ROS were reviewed and are negative.      Vital Signs Last 24 Hrs    T(C): 36.4 (19 Jan 2022 16:50), Max: 37.1 (19 Jan 2022 01:20)  T(F): 97.5 (19 Jan 2022 16:50), Max: 98.7 (19 Jan 2022 01:20)  HR: 87 (19 Jan 2022 16:50) (49 - 97)  BP: 126/60 (19 Jan 2022 16:50) (126/60 - 212/92)  BP(mean): --  RR: 18 (19 Jan 2022 16:50) (17 - 19)  SpO2: 97% (19 Jan 2022 16:50) (96% - 99%)      Constitutional: awake and alert.  HEENT: PERRLA, EOMI,   Neck: Supple.  Respiratory: Breath sounds are clear bilaterally  Cardiovascular: S1 and S2, regular / rhythm  Gastrointestinal: soft, nontender  Extremities:  no edema  Vascular: Carotid Bruit - no  Musculoskeletal: no joint swelling/tenderness, no abnormal movements  Skin: No rashes    Neurological exam:  HF: A x O x 3. Appropriately interactive, normal affect. Speech fluent, No Aphasia or paraphasic errors. Naming /repetition intact   CN: DIAN, EOMI, VFF, facial sensation normal, no NLFD, tongue midline, Palate moves equally, SCM equal bilaterally  Motor: No pronator drift, Strength 5/5 in all 4 ext, normal bulk and tone, no tremor, rigidity or bradykinesia.    Sens: Intact to light touch / PP/ VS/ JS    Reflexes: Symmetric and normal . BJ 2+, BR 2+, KJ 2+, AJ 2+, downgoing toes b/l  Coord:  No FNFA, dysmetria, JHONY intact   Gait/Balance: deferred    NIHSS:          Labs:                        10.5   12.77 )-----------( 234      ( 18 Jan 2022 07:26 )             33.8     01-18    142  |  107  |  22<H>  ----------------------------<  70  3.9   |  31  |  1.45<H>    Ca    8.9      18 Jan 2022 20:32  Phos  2.8     01-18  Mg     2.4     01-18  TPro  6.4  /  Alb  2.8<L>  /  TBili  0.3  /  DBili  x   /  AST  27  /  ALT  51  /  AlkPhos  110  01-18      LIVER FUNCTIONS - ( 18 Jan 2022 20:32 )  Alb: 2.8 g/dL / Pro: 6.4 g/dL / ALK PHOS: 110 U/L / ALT: 51 U/L DA / AST: 27 U/L / GGT: x             Radiology report:  - CT head:  - MRI brain  - MRA head/carotids  - EEG    A/P:    - No IV tpa given because…  - ASA/ PLavix  - Statin  - Dysphagia screen  - DVT prophylaxia  - MRI/A brain-carotids  - PT eval  - Speech/swallow eval    Total Critical Care Time spent:

## 2022-01-19 NOTE — PROGRESS NOTE ADULT - SUBJECTIVE AND OBJECTIVE BOX
`Patient is a 66y old  Male who presents with a chief complaint of Weakness (18 Jan 2022 23:11)    PATIENT IS SEEN AND EXAMINED IN MEDICAL FLOOR.  AILYN [    ]    ARCADIO [   ]      GT [   ]    ALLERGIES:  No Known Allergies      Daily Height in cm: 167.64 (18 Jan 2022 18:52)    Daily     VITALS:    Vital Signs Last 24 Hrs  T(C): 36.4 (19 Jan 2022 16:50), Max: 37.1 (19 Jan 2022 01:20)  T(F): 97.5 (19 Jan 2022 16:50), Max: 98.7 (19 Jan 2022 01:20)  HR: 87 (19 Jan 2022 16:50) (49 - 97)  BP: 126/60 (19 Jan 2022 16:50) (126/60 - 212/92)  BP(mean): --  RR: 18 (19 Jan 2022 16:50) (17 - 19)  SpO2: 97% (19 Jan 2022 16:50) (96% - 99%)    LABS:    CBC Full  -  ( 18 Jan 2022 20:32 )  WBC Count : 12.77 K/uL  RBC Count : 3.79 M/uL  Hemoglobin : 10.5 g/dL  Hematocrit : 33.8 %  Platelet Count - Automated : 234 K/uL  Mean Cell Volume : 89.2 fl  Mean Cell Hemoglobin : 27.7 pg  Mean Cell Hemoglobin Concentration : 31.1 gm/dL  Auto Neutrophil # : 10.66 K/uL  Auto Lymphocyte # : 0.94 K/uL  Auto Monocyte # : 1.09 K/uL  Auto Eosinophil # : 0.00 K/uL  Auto Basophil # : 0.02 K/uL  Auto Neutrophil % : 83.4 %  Auto Lymphocyte % : 7.4 %  Auto Monocyte % : 8.5 %  Auto Eosinophil % : 0.0 %  Auto Basophil % : 0.2 %      01-18    142  |  107  |  22<H>  ----------------------------<  70  3.9   |  31  |  1.45<H>    Ca    8.9      18 Jan 2022 20:32  Phos  2.8     01-18  Mg     2.4     01-18    TPro  6.4  /  Alb  2.8<L>  /  TBili  0.3  /  DBili  x   /  AST  27  /  ALT  51  /  AlkPhos  110  01-18    CAPILLARY BLOOD GLUCOSE        CARDIAC MARKERS ( 18 Jan 2022 20:32 )  x     / x     / 229 U/L / x     / x          LIVER FUNCTIONS - ( 18 Jan 2022 20:32 )  Alb: 2.8 g/dL / Pro: 6.4 g/dL / ALK PHOS: 110 U/L / ALT: 51 U/L DA / AST: 27 U/L / GGT: x           Creatinine Trend: 1.45<--, 1.33<--  I&O's Summary          Clean Catch Clean Catch (Midstream)  12-18 @ 05:13   <10,000 CFU/mL Normal Urogenital Elizabet  --  --          MEDICATIONS:    MEDICATIONS  (STANDING):  acetaminophen     Tablet .. 650 milliGRAM(s) Oral once  aspirin enteric coated 81 milliGRAM(s) Oral daily  atorvastatin 80 milliGRAM(s) Oral at bedtime  calcium carbonate   1250 mG (OsCal) 1 Tablet(s) Oral daily  cholecalciferol 1000 Unit(s) Oral daily  hydrALAZINE 100 milliGRAM(s) Oral every 8 hours  metFORMIN 1000 milliGRAM(s) Oral two times a day  NIFEdipine  milliGRAM(s) Oral daily  pyridostigmine 60 milliGRAM(s) Oral every 4 hours  sodium chloride 0.9%. 1000 milliLiter(s) (125 mL/Hr) IV Continuous <Continuous>  tamsulosin 0.4 milliGRAM(s) Oral at bedtime  trimethoprim  160 mG/sulfamethoxazole 800 mG 1 Tablet(s) Oral every 12 hours      MEDICATIONS  (PRN):      REVIEW OF SYSTEMS:                           ALL ROS DONE [ X   ]    CONSTITUTIONAL:  LETHARGIC [   ], FEVER [   ], UNRESPONSIVE [   ]  CVS:  CP  [   ], SOB, [   ], PALPITATIONS [   ], DIZZYNESS [   ]  RS: COUGH [   ], SPUTUM [   ]  GI: ABDOMINAL PAIN [   ], NAUSEA [   ], VOMITINGS [   ], DIARRHEA [   ], CONSTIPATION [   ]  :  DYSURIA [   ], NOCTURIA [   ], INCREASED FREQUENCY [   ], DRIBLING [   ],  SKELETAL: PAINFUL JOINTS [   ], SWOLLEN JOINTS [   ], NECK ACHE [   ], LOW BACK ACHE [   ],  SKIN : ULCERS [   ], RASH [   ], ITCHING [   ]  CNS: HEAD ACHE [   ], DOUBLE VISION [   ], BLURRED VISION [   ], AMS / CONFUSION [   ], SEIZURES [   ], WEAKNESS [   ],TINGLING / NUMBNESS [   ]    PHYSICAL EXAMINATION:  GENERAL APPEARANCE: NO DISTRESS  HEENT:  NO PALLOR, NO  JVD,  NO   NODES, NECK SUPPLE  CVS: S1 +, S2 +,   RS: AEEB,  OCCASIONAL  RALES +,   NO RONCHI  ABD: SOFT, NT, NO, BS +  EXT: NO PE  SKIN: WARM,   SKELETAL:  ROM ACCEPTABLE  CNS:  AAO X 3 ,  NO VISUAL FIELD DEFICITS / NYSTAGMUS    RADIOLOGY :    ACC: [id number] EXAM:  CT BRAIN                          PROCEDURE DATE:  12/17/2021          INTERPRETATION:  CLINICAL INDICATIONS:  dizziness, ataxia    COMPARISON: CT head dated 2/19/2017 and MRI brain dated 2/21/2017    TECHNIQUE: Noncontrast CT of the head. Multiplanar reformations are   submitted.    FINDINGS: Chronic right basal ganglia and left thalamic cortical   infarctions. Chronic left paramedian moderate size occipital lobe   infarction.  There is periventricular and subcortical white matter hypodensity without   mass effect, nonspecific, likely representing moderate chronic   microvascular ischemic changes. There is no compelling evidence for an   acute transcortical infarction. There is no evidence of mass, mass   effect, midline shift or extra-axial fluid collection. The lateral   ventricles and cortical sulci are age-appropriate in size and   configuration. The orbits, mastoid air cells and visualized paranasal   sinuses are unremarkable. The calvarium is intact. ConsiderMRI as   clinically warranted.    IMPRESSION: Multiple chronic supratentorial infarctions. Moderate chronic   microvascular changes without evidence of an acute transcortical   infarction or hemorrhage. Preliminary report provided by RUBEN BOLES DO; Attending Radiologist.    ASSESSMENT :     Difficulty in walking, not elsewhere classified    HTN (hypertension)    DM (diabetes mellitus)    Myasthenia gravis    Hypercholesterolemia    CVA (cerebrovascular accident)    Peripheral neuropathy    Lipoma of chest wall    MG with thymoma (myasthena gravis)        PLAN:  HPI:  Patient is a 67 y/o Male from [facility], ambulates independently, with PMHx of Myasthenia Gravis s/p Thymectomy , HTN, HLD, CVA x 2, Osteoporosis, PAD, Peripheral Neuropathy, Cervical and Lumbar spondylosis, and BPH who presented to the ED for generalized weakness. He was previously admitted for similar complaints in Dec 2021 and was found to have acute gastroenteritis. At this admission, he endorses that for the past few days, he is unsure that he has been taking all of his medications on time. He is unable to point out any specific part of the body that hurts. He denies any headache, chest pain, SOB, LOC, abdominal pain, dysuria, numbness, tingling, fevers, cough.  (18 Jan 2022 23:11)    # GENERALIZED WEAKNESS, UNSTEADY GAIT - SUSPECT MYASTHENIA FLARE   - ON PYRIDOSTIGMINE 60MG X 6 TIMES DAILY  - NEUROLOGY [DR. DAWSON] RECOMMENDED INCREASING TO PREDNISONE 60MG QDAY [FROM 20MG], PATIENT WAS PREVIOUSLY ON BACTRIM ? PPX - WILL DOUBLE CHECK  - F/U NIF  - F/U PT EVAL  - NEUROLOGY CONSULT IN PROGRESS [CASE DISCUSSED WITH DR. DAWSON]  - PATIENT FOLLOW W/ DR. ESPINOZA AT [facility]    # MICHEL ON ? CKD- GIVING IVF, MONITOR CR, AVOID NEPHROTOXIC AGENTS  - F/U UA, AND RENAL SONOGRAM    # BPH - ON FLOMAX  # HLD - STATIN  # HTN - ON HYDRALAZINE, NIFEDIPINE; HOLD LOSARTAN  # DM - SSI + FS; HOLD METFORMIN  # GI AND DVT PPX

## 2022-01-20 DIAGNOSIS — Z02.9 ENCOUNTER FOR ADMINISTRATIVE EXAMINATIONS, UNSPECIFIED: ICD-10-CM

## 2022-01-20 LAB
ANION GAP SERPL CALC-SCNC: 5 MMOL/L — SIGNIFICANT CHANGE UP (ref 5–17)
BUN SERPL-MCNC: 22 MG/DL — HIGH (ref 7–18)
CALCIUM SERPL-MCNC: 8.4 MG/DL — SIGNIFICANT CHANGE UP (ref 8.4–10.5)
CHLORIDE SERPL-SCNC: 107 MMOL/L — SIGNIFICANT CHANGE UP (ref 96–108)
CO2 SERPL-SCNC: 29 MMOL/L — SIGNIFICANT CHANGE UP (ref 22–31)
CREAT SERPL-MCNC: 1.51 MG/DL — HIGH (ref 0.5–1.3)
CULTURE RESULTS: SIGNIFICANT CHANGE UP
GLUCOSE BLDC GLUCOMTR-MCNC: 106 MG/DL — HIGH (ref 70–99)
GLUCOSE BLDC GLUCOMTR-MCNC: 140 MG/DL — HIGH (ref 70–99)
GLUCOSE BLDC GLUCOMTR-MCNC: 151 MG/DL — HIGH (ref 70–99)
GLUCOSE BLDC GLUCOMTR-MCNC: 248 MG/DL — HIGH (ref 70–99)
GLUCOSE BLDC GLUCOMTR-MCNC: 273 MG/DL — HIGH (ref 70–99)
GLUCOSE SERPL-MCNC: 142 MG/DL — HIGH (ref 70–99)
HCT VFR BLD CALC: 32.6 % — LOW (ref 39–50)
HGB BLD-MCNC: 10.5 G/DL — LOW (ref 13–17)
MCHC RBC-ENTMCNC: 27.7 PG — SIGNIFICANT CHANGE UP (ref 27–34)
MCHC RBC-ENTMCNC: 32.2 GM/DL — SIGNIFICANT CHANGE UP (ref 32–36)
MCV RBC AUTO: 86 FL — SIGNIFICANT CHANGE UP (ref 80–100)
NRBC # BLD: 0 /100 WBCS — SIGNIFICANT CHANGE UP (ref 0–0)
PLATELET # BLD AUTO: 250 K/UL — SIGNIFICANT CHANGE UP (ref 150–400)
POTASSIUM SERPL-MCNC: 3.8 MMOL/L — SIGNIFICANT CHANGE UP (ref 3.5–5.3)
POTASSIUM SERPL-SCNC: 3.8 MMOL/L — SIGNIFICANT CHANGE UP (ref 3.5–5.3)
RBC # BLD: 3.79 M/UL — LOW (ref 4.2–5.8)
RBC # FLD: 14.7 % — HIGH (ref 10.3–14.5)
SODIUM SERPL-SCNC: 141 MMOL/L — SIGNIFICANT CHANGE UP (ref 135–145)
SPECIMEN SOURCE: SIGNIFICANT CHANGE UP
WBC # BLD: 11.71 K/UL — HIGH (ref 3.8–10.5)
WBC # FLD AUTO: 11.71 K/UL — HIGH (ref 3.8–10.5)

## 2022-01-20 RX ORDER — SODIUM CHLORIDE 9 MG/ML
1000 INJECTION, SOLUTION INTRAVENOUS
Refills: 0 | Status: DISCONTINUED | OUTPATIENT
Start: 2022-01-20 | End: 2022-01-21

## 2022-01-20 RX ORDER — DEXTROSE 50 % IN WATER 50 %
12.5 SYRINGE (ML) INTRAVENOUS ONCE
Refills: 0 | Status: DISCONTINUED | OUTPATIENT
Start: 2022-01-20 | End: 2022-01-21

## 2022-01-20 RX ORDER — DEXTROSE 50 % IN WATER 50 %
15 SYRINGE (ML) INTRAVENOUS ONCE
Refills: 0 | Status: DISCONTINUED | OUTPATIENT
Start: 2022-01-20 | End: 2022-01-21

## 2022-01-20 RX ORDER — HYDRALAZINE HCL 50 MG
5 TABLET ORAL ONCE
Refills: 0 | Status: COMPLETED | OUTPATIENT
Start: 2022-01-20 | End: 2022-01-20

## 2022-01-20 RX ORDER — DEXTROSE 50 % IN WATER 50 %
25 SYRINGE (ML) INTRAVENOUS ONCE
Refills: 0 | Status: DISCONTINUED | OUTPATIENT
Start: 2022-01-20 | End: 2022-01-21

## 2022-01-20 RX ORDER — GLUCAGON INJECTION, SOLUTION 0.5 MG/.1ML
1 INJECTION, SOLUTION SUBCUTANEOUS ONCE
Refills: 0 | Status: DISCONTINUED | OUTPATIENT
Start: 2022-01-20 | End: 2022-01-21

## 2022-01-20 RX ORDER — INSULIN LISPRO 100/ML
VIAL (ML) SUBCUTANEOUS AT BEDTIME
Refills: 0 | Status: DISCONTINUED | OUTPATIENT
Start: 2022-01-20 | End: 2022-01-21

## 2022-01-20 RX ORDER — INSULIN LISPRO 100/ML
VIAL (ML) SUBCUTANEOUS
Refills: 0 | Status: DISCONTINUED | OUTPATIENT
Start: 2022-01-20 | End: 2022-01-21

## 2022-01-20 RX ADMIN — PYRIDOSTIGMINE BROMIDE 60 MILLIGRAM(S): 60 SOLUTION ORAL at 17:53

## 2022-01-20 RX ADMIN — PYRIDOSTIGMINE BROMIDE 60 MILLIGRAM(S): 60 SOLUTION ORAL at 21:58

## 2022-01-20 RX ADMIN — Medication 60 MILLIGRAM(S): at 06:08

## 2022-01-20 RX ADMIN — PYRIDOSTIGMINE BROMIDE 60 MILLIGRAM(S): 60 SOLUTION ORAL at 14:00

## 2022-01-20 RX ADMIN — Medication 1 TABLET(S): at 11:11

## 2022-01-20 RX ADMIN — PYRIDOSTIGMINE BROMIDE 60 MILLIGRAM(S): 60 SOLUTION ORAL at 06:08

## 2022-01-20 RX ADMIN — Medication 5 MILLIGRAM(S): at 18:19

## 2022-01-20 RX ADMIN — ATORVASTATIN CALCIUM 80 MILLIGRAM(S): 80 TABLET, FILM COATED ORAL at 21:58

## 2022-01-20 RX ADMIN — Medication 120 MILLIGRAM(S): at 06:08

## 2022-01-20 RX ADMIN — Medication 100 MILLIGRAM(S): at 06:08

## 2022-01-20 RX ADMIN — Medication 1000 UNIT(S): at 11:11

## 2022-01-20 RX ADMIN — PYRIDOSTIGMINE BROMIDE 60 MILLIGRAM(S): 60 SOLUTION ORAL at 03:17

## 2022-01-20 RX ADMIN — METFORMIN HYDROCHLORIDE 1000 MILLIGRAM(S): 850 TABLET ORAL at 06:08

## 2022-01-20 RX ADMIN — Medication 4: at 16:47

## 2022-01-20 RX ADMIN — Medication 100 MILLIGRAM(S): at 21:58

## 2022-01-20 RX ADMIN — SODIUM CHLORIDE 125 MILLILITER(S): 9 INJECTION INTRAMUSCULAR; INTRAVENOUS; SUBCUTANEOUS at 03:19

## 2022-01-20 RX ADMIN — PYRIDOSTIGMINE BROMIDE 60 MILLIGRAM(S): 60 SOLUTION ORAL at 11:12

## 2022-01-20 RX ADMIN — TAMSULOSIN HYDROCHLORIDE 0.4 MILLIGRAM(S): 0.4 CAPSULE ORAL at 21:58

## 2022-01-20 RX ADMIN — Medication 1 TABLET(S): at 06:08

## 2022-01-20 RX ADMIN — Medication 100 MILLIGRAM(S): at 13:58

## 2022-01-20 RX ADMIN — Medication 81 MILLIGRAM(S): at 11:12

## 2022-01-20 RX ADMIN — Medication 1 TABLET(S): at 17:53

## 2022-01-20 NOTE — PROGRESS NOTE ADULT - SUBJECTIVE AND OBJECTIVE BOX
`Patient is a 66y old  Male who presents with a chief complaint of Weakness (20 Jan 2022 09:12)    PATIENT IS SEEN AND EXAMINED IN MEDICAL FLOOR.  NGT [    ]    ARCADIO [   ]      GT [   ]    ALLERGIES:  No Known Allergies      Daily     Daily     VITALS:    Vital Signs Last 24 Hrs  T(C): 36.8 (20 Jan 2022 05:05), Max: 37.1 (19 Jan 2022 20:28)  T(F): 98.2 (20 Jan 2022 05:05), Max: 98.7 (19 Jan 2022 20:28)  HR: 86 (20 Jan 2022 09:00) (60 - 87)  BP: 161/75 (20 Jan 2022 09:00) (122/62 - 161/75)  BP(mean): --  RR: 17 (20 Jan 2022 05:05) (17 - 18)  SpO2: 92% (20 Jan 2022 09:00) (92% - 99%)    LABS:    CBC Full  -  ( 20 Jan 2022 07:26 )  WBC Count : 11.71 K/uL  RBC Count : 3.79 M/uL  Hemoglobin : 10.5 g/dL  Hematocrit : 32.6 %  Platelet Count - Automated : 250 K/uL  Mean Cell Volume : 86.0 fl  Mean Cell Hemoglobin : 27.7 pg  Mean Cell Hemoglobin Concentration : 32.2 gm/dL  Auto Neutrophil # : x  Auto Lymphocyte # : x  Auto Monocyte # : x  Auto Eosinophil # : x  Auto Basophil # : x  Auto Neutrophil % : x  Auto Lymphocyte % : x  Auto Monocyte % : x  Auto Eosinophil % : x  Auto Basophil % : x      01-20    141  |  107  |  22<H>  ----------------------------<  142<H>  3.8   |  29  |  1.51<H>    Ca    8.4      20 Jan 2022 07:26  Phos  2.8     01-18  Mg     2.4     01-18    TPro  6.4  /  Alb  2.8<L>  /  TBili  0.3  /  DBili  x   /  AST  27  /  ALT  51  /  AlkPhos  110  01-18    CAPILLARY BLOOD GLUCOSE      POCT Blood Glucose.: 140 mg/dL (20 Jan 2022 08:00)    CARDIAC MARKERS ( 18 Jan 2022 20:32 )  x     / x     / 229 U/L / x     / x          LIVER FUNCTIONS - ( 18 Jan 2022 20:32 )  Alb: 2.8 g/dL / Pro: 6.4 g/dL / ALK PHOS: 110 U/L / ALT: 51 U/L DA / AST: 27 U/L / GGT: x           Creatinine Trend: 1.51<--, 1.45<--  I&O's Summary          Clean Catch Clean Catch (Midstream)  01-19 @ 04:29   <10,000 CFU/mL Normal Urogenital Elizabet  --  --      .Blood Blood  01-19 @ 04:08   No growth to date.  --  --      Clean Catch Clean Catch (Midstream)  12-18 @ 05:13   <10,000 CFU/mL Normal Urogenital Elizabet  --  --          MEDICATIONS:    MEDICATIONS  (STANDING):  aspirin enteric coated 81 milliGRAM(s) Oral daily  atorvastatin 80 milliGRAM(s) Oral at bedtime  calcium carbonate   1250 mG (OsCal) 1 Tablet(s) Oral daily  cholecalciferol 1000 Unit(s) Oral daily  hydrALAZINE 100 milliGRAM(s) Oral every 8 hours  metFORMIN 1000 milliGRAM(s) Oral two times a day  NIFEdipine  milliGRAM(s) Oral daily  predniSONE   Tablet 60 milliGRAM(s) Oral daily  pyridostigmine 60 milliGRAM(s) Oral every 4 hours  sodium chloride 0.9%. 1000 milliLiter(s) (125 mL/Hr) IV Continuous <Continuous>  tamsulosin 0.4 milliGRAM(s) Oral at bedtime  trimethoprim  160 mG/sulfamethoxazole 800 mG 1 Tablet(s) Oral every 12 hours      MEDICATIONS  (PRN):      REVIEW OF SYSTEMS:                           ALL ROS DONE [ X   ]    CONSTITUTIONAL:  LETHARGIC [   ], FEVER [   ], UNRESPONSIVE [   ]  CVS:  CP  [   ], SOB, [   ], PALPITATIONS [   ], DIZZYNESS [   ]  RS: COUGH [   ], SPUTUM [   ]  GI: ABDOMINAL PAIN [   ], NAUSEA [   ], VOMITINGS [   ], DIARRHEA [   ], CONSTIPATION [   ]  :  DYSURIA [   ], NOCTURIA [   ], INCREASED FREQUENCY [   ], DRIBLING [   ],  SKELETAL: PAINFUL JOINTS [   ], SWOLLEN JOINTS [   ], NECK ACHE [   ], LOW BACK ACHE [   ],  SKIN : ULCERS [   ], RASH [   ], ITCHING [   ]  CNS: HEAD ACHE [   ], DOUBLE VISION [   ], BLURRED VISION [   ], AMS / CONFUSION [   ], SEIZURES [   ], WEAKNESS [   ],TINGLING / NUMBNESS [   ]    PHYSICAL EXAMINATION:  GENERAL APPEARANCE: NO DISTRESS  HEENT:  NO PALLOR, NO  JVD,  NO   NODES, NECK SUPPLE  CVS: S1 +, S2 +,   RS: AEEB,  OCCASIONAL  RALES +,   NO RONCHI  ABD: SOFT, NT, NO, BS +  EXT: NO PE  SKIN: WARM,   SKELETAL:  ROM ACCEPTABLE  CNS:  AAO X 3 ,  NO VISUAL FIELD DEFICITS / NYSTAGMUS    RADIOLOGY :    ACC: [id number] EXAM:  CT BRAIN                          PROCEDURE DATE:  12/17/2021          INTERPRETATION:  CLINICAL INDICATIONS:  dizziness, ataxia    COMPARISON: CT head dated 2/19/2017 and MRI brain dated 2/21/2017    TECHNIQUE: Noncontrast CT of the head. Multiplanar reformations are   submitted.    FINDINGS: Chronic right basal ganglia and left thalamic cortical   infarctions. Chronic left paramedian moderate size occipital lobe   infarction.  There is periventricular and subcortical white matter hypodensity without   mass effect, nonspecific, likely representing moderate chronic   microvascular ischemic changes. There is no compelling evidence for an   acute transcortical infarction. There is no evidence of mass, mass   effect, midline shift or extra-axial fluid collection. The lateral   ventricles and cortical sulci are age-appropriate in size and   configuration. The orbits, mastoid air cells and visualized paranasal   sinuses are unremarkable. The calvarium is intact. ConsiderMRI as   clinically warranted.    IMPRESSION: Multiple chronic supratentorial infarctions. Moderate chronic   microvascular changes without evidence of an acute transcortical   infarction or hemorrhage. Preliminary report provided by RUBEN BOLES DO; Attending Radiologist.    ASSESSMENT :     Difficulty in walking, not elsewhere classified    HTN (hypertension)    DM (diabetes mellitus)    Myasthenia gravis    Hypercholesterolemia    CVA (cerebrovascular accident)    Peripheral neuropathy    Lipoma of chest wall    MG with thymoma (myasthena gravis)        PLAN:  HPI:  Patient is a 67 y/o Male from [facility], ambulates independently, with PMHx of Myasthenia Gravis s/p Thymectomy , HTN, HLD, CVA x 2, Osteoporosis, PAD, Peripheral Neuropathy, Cervical and Lumbar spondylosis, and BPH who presented to the ED for generalized weakness. He was previously admitted for similar complaints in Dec 2021 and was found to have acute gastroenteritis. At this admission, he endorses that for the past few days, he is unsure that he has been taking all of his medications on time. He is unable to point out any specific part of the body that hurts. He denies any headache, chest pain, SOB, LOC, abdominal pain, dysuria, numbness, tingling, fevers, cough.  (18 Jan 2022 23:11)    # GENERALIZED WEAKNESS, UNSTEADY GAIT - SUSPECT MYASTHENIA FLARE   - ON PYRIDOSTIGMINE 60MG X 6 TIMES DAILY  - NEUROLOGY [DR. DAWSON] RECOMMENDED INCREASING TO PREDNISONE 60MG QDAY [FROM 20MG], PATIENT WAS PREVIOUSLY ON BACTRIM ? PPX - WILL DOUBLE CHECK  - F/U NIF  - F/U PT EVAL  - NEUROLOGY CONSULT IN PROGRESS [CASE DISCUSSED WITH DR. DAWSON]  - PATIENT FOLLOW W/ DR. ESPINOZA AT [facility]    # MICHEL ON ? CKD- GIVING IVF, MONITOR CR, AVOID NEPHROTOXIC AGENTS  - F/U UA, AND RENAL SONOGRAM    # BPH - ON FLOMAX  # HLD - STATIN  # HTN - ON HYDRALAZINE, NIFEDIPINE; HOLD LOSARTAN  # DM - SSI + FS; HOLD METFORMIN  # GI AND DVT PPX     Patient is a 66y old  Male who presents with a chief complaint of Weakness (20 Jan 2022 09:12)    PATIENT IS SEEN AND EXAMINED IN MEDICAL FLOOR.    ALLERGIES:  No Known Allergies      VITALS:    Vital Signs Last 24 Hrs  T(C): 36.8 (20 Jan 2022 05:05), Max: 37.1 (19 Jan 2022 20:28)  T(F): 98.2 (20 Jan 2022 05:05), Max: 98.7 (19 Jan 2022 20:28)  HR: 86 (20 Jan 2022 09:00) (60 - 87)  BP: 161/75 (20 Jan 2022 09:00) (122/62 - 161/75)  BP(mean): --  RR: 17 (20 Jan 2022 05:05) (17 - 18)  SpO2: 92% (20 Jan 2022 09:00) (92% - 99%)    LABS:    CBC Full  -  ( 20 Jan 2022 07:26 )  WBC Count : 11.71 K/uL  RBC Count : 3.79 M/uL  Hemoglobin : 10.5 g/dL  Hematocrit : 32.6 %  Platelet Count - Automated : 250 K/uL  Mean Cell Volume : 86.0 fl  Mean Cell Hemoglobin : 27.7 pg  Mean Cell Hemoglobin Concentration : 32.2 gm/dL  Auto Neutrophil # : x  Auto Lymphocyte # : x  Auto Monocyte # : x  Auto Eosinophil # : x  Auto Basophil # : x  Auto Neutrophil % : x  Auto Lymphocyte % : x  Auto Monocyte % : x  Auto Eosinophil % : x  Auto Basophil % : x      01-20    141  |  107  |  22<H>  ----------------------------<  142<H>  3.8   |  29  |  1.51<H>    Ca    8.4      20 Jan 2022 07:26  Phos  2.8     01-18  Mg     2.4     01-18    TPro  6.4  /  Alb  2.8<L>  /  TBili  0.3  /  DBili  x   /  AST  27  /  ALT  51  /  AlkPhos  110  01-18    CAPILLARY BLOOD GLUCOSE      POCT Blood Glucose.: 140 mg/dL (20 Jan 2022 08:00)    CARDIAC MARKERS ( 18 Jan 2022 20:32 )  x     / x     / 229 U/L / x     / x          LIVER FUNCTIONS - ( 18 Jan 2022 20:32 )  Alb: 2.8 g/dL / Pro: 6.4 g/dL / ALK PHOS: 110 U/L / ALT: 51 U/L DA / AST: 27 U/L / GGT: x           Creatinine Trend: 1.51<--, 1.45<--  I&O's Summary          Clean Catch Clean Catch (Midstream)  01-19 @ 04:29   <10,000 CFU/mL Normal Urogenital Elizabet  --  --      .Blood Blood  01-19 @ 04:08   No growth to date.  --  --      Clean Catch Clean Catch (Midstream)  12-18 @ 05:13   <10,000 CFU/mL Normal Urogenital Elizabet  --  --          MEDICATIONS:    MEDICATIONS  (STANDING):  aspirin enteric coated 81 milliGRAM(s) Oral daily  atorvastatin 80 milliGRAM(s) Oral at bedtime  calcium carbonate   1250 mG (OsCal) 1 Tablet(s) Oral daily  cholecalciferol 1000 Unit(s) Oral daily  hydrALAZINE 100 milliGRAM(s) Oral every 8 hours  metFORMIN 1000 milliGRAM(s) Oral two times a day  NIFEdipine  milliGRAM(s) Oral daily  predniSONE   Tablet 60 milliGRAM(s) Oral daily  pyridostigmine 60 milliGRAM(s) Oral every 4 hours  sodium chloride 0.9%. 1000 milliLiter(s) (125 mL/Hr) IV Continuous <Continuous>  tamsulosin 0.4 milliGRAM(s) Oral at bedtime  trimethoprim  160 mG/sulfamethoxazole 800 mG 1 Tablet(s) Oral every 12 hours      MEDICATIONS  (PRN):      REVIEW OF SYSTEMS:                           ALL ROS DONE [ X   ]    CONSTITUTIONAL:  LETHARGIC [   ], FEVER [   ], UNRESPONSIVE [   ]  CVS:  CP  [   ], SOB, [   ], PALPITATIONS [   ], DIZZYNESS [   ]  RS: COUGH [   ], SPUTUM [   ]  GI: ABDOMINAL PAIN [   ], NAUSEA [   ], VOMITINGS [   ], DIARRHEA [   ], CONSTIPATION [   ]  :  DYSURIA [   ], NOCTURIA [   ], INCREASED FREQUENCY [   ], DRIBLING [   ],  SKELETAL: PAINFUL JOINTS [   ], SWOLLEN JOINTS [   ], NECK ACHE [   ], LOW BACK ACHE [   ],  SKIN : ULCERS [   ], RASH [   ], ITCHING [   ]  CNS: HEAD ACHE [   ], DOUBLE VISION [   ], BLURRED VISION [   ], AMS / CONFUSION [   ], SEIZURES [   ], WEAKNESS [   ],TINGLING / NUMBNESS [   ]    PHYSICAL EXAMINATION:  GENERAL APPEARANCE: NO DISTRESS  HEENT:  NO PALLOR, NO  JVD,  NO   NODES, NECK SUPPLE  CVS: S1 +, S2 +,   RS: AEEB,  OCCASIONAL  RALES +,   NO RONCHI  ABD: SOFT, NT, NO, BS +  EXT: NO PE  SKIN: WARM,   SKELETAL:  ROM ACCEPTABLE  CNS:  AAO X 3 ,  NO VISUAL FIELD DEFICITS / NYSTAGMUS    RADIOLOGY :    ACC: [id number] EXAM:  CT BRAIN                          PROCEDURE DATE:  12/17/2021          INTERPRETATION:  CLINICAL INDICATIONS:  dizziness, ataxia    COMPARISON: CT head dated 2/19/2017 and MRI brain dated 2/21/2017    TECHNIQUE: Noncontrast CT of the head. Multiplanar reformations are   submitted.    FINDINGS: Chronic right basal ganglia and left thalamic cortical   infarctions. Chronic left paramedian moderate size occipital lobe   infarction.  There is periventricular and subcortical white matter hypodensity without   mass effect, nonspecific, likely representing moderate chronic   microvascular ischemic changes. There is no compelling evidence for an   acute transcortical infarction. There is no evidence of mass, mass   effect, midline shift or extra-axial fluid collection. The lateral   ventricles and cortical sulci are age-appropriate in size and   configuration. The orbits, mastoid air cells and visualized paranasal   sinuses are unremarkable. The calvarium is intact. ConsiderMRI as   clinically warranted.    IMPRESSION: Multiple chronic supratentorial infarctions. Moderate chronic   microvascular changes without evidence of an acute transcortical   infarction or hemorrhage. Preliminary report provided by RUBEN BOLES DO; Attending Radiologist.    ASSESSMENT :     Difficulty in walking, not elsewhere classified    HTN (hypertension)    DM (diabetes mellitus)    Myasthenia gravis    Hypercholesterolemia    CVA (cerebrovascular accident)    Peripheral neuropathy    Lipoma of chest wall    MG with thymoma (myasthena gravis)        PLAN:  HPI:  Patient is a 67 y/o Male from [facility], ambulates independently, with PMHx of Myasthenia Gravis s/p Thymectomy , HTN, HLD, CVA x 2, Osteoporosis, PAD, Peripheral Neuropathy, Cervical and Lumbar spondylosis, and BPH who presented to the ED for generalized weakness. He was previously admitted for similar complaints in Dec 2021 and was found to have acute gastroenteritis. At this admission, he endorses that for the past few days, he is unsure that he has been taking all of his medications on time. He is unable to point out any specific part of the body that hurts. He denies any headache, chest pain, SOB, LOC, abdominal pain, dysuria, numbness, tingling, fevers, cough.  (18 Jan 2022 23:11)    # GENERALIZED WEAKNESS, UNSTEADY GAIT - SUSPECT MYASTHENIA FLARE   - ON PYRIDOSTIGMINE 60MG X 6 TIMES DAILY  - NEUROLOGY [DR. DAWSON] RECOMMENDED INCREASING TO PREDNISONE 60MG QDAY [FROM 20MG], PATIENT WAS PREVIOUSLY ON BACTRIM ? PPX - WILL DOUBLE CHECK  - F/U NIF  - F/U PT EVAL  - NEUROLOGY CONSULT IN PROGRESS [CASE DISCUSSED WITH DR. DAWSON]  - PATIENT FOLLOW W/ DR. ESPINOZA AT [facility]    # MICHEL ON ? CKD- GIVING IVF, MONITOR CR, AVOID NEPHROTOXIC AGENTS  - F/U RENAL SONOGRAM    # BPH - ON FLOMAX  # HLD - STATIN  # HTN - ON HYDRALAZINE, NIFEDIPINE; HOLD LOSARTAN  # DM - SSI + FS; HOLD METFORMIN    # PER PATIENT'S REQUEST - CASE D/W PARTNER, MILLI DONOVAN @ [phone] [1/20] - CASE DISCUSSED AT LENGTH AND ALL QUESTIONS ANSWERED  # GI AND DVT PPX

## 2022-01-20 NOTE — PROGRESS NOTE ADULT - SUBJECTIVE AND OBJECTIVE BOX
NP Note discussed with  Primary Attending    INTERVAL HPI/OVERNIGHT EVENTS: no new complaints    MEDICATIONS  (STANDING):  aspirin enteric coated 81 milliGRAM(s) Oral daily  atorvastatin 80 milliGRAM(s) Oral at bedtime  calcium carbonate   1250 mG (OsCal) 1 Tablet(s) Oral daily  cholecalciferol 1000 Unit(s) Oral daily  hydrALAZINE 100 milliGRAM(s) Oral every 8 hours  metFORMIN 1000 milliGRAM(s) Oral two times a day  NIFEdipine  milliGRAM(s) Oral daily  predniSONE   Tablet 60 milliGRAM(s) Oral daily  pyridostigmine 60 milliGRAM(s) Oral every 4 hours  sodium chloride 0.9%. 1000 milliLiter(s) (125 mL/Hr) IV Continuous <Continuous>  tamsulosin 0.4 milliGRAM(s) Oral at bedtime  trimethoprim  160 mG/sulfamethoxazole 800 mG 1 Tablet(s) Oral every 12 hours    MEDICATIONS  (PRN):      __________________________________________________  REVIEW OF SYSTEMS:    CONSTITUTIONAL: No fever,   EYES: no acute visual disturbances  NECK: No pain or stiffness  RESPIRATORY: No cough; No shortness of breath  CARDIOVASCULAR: No chest pain, no palpitations  GASTROINTESTINAL: No pain. No nausea or vomiting; No diarrhea   NEUROLOGICAL: No headache or numbness, no tremors  MUSCULOSKELETAL: No joint pain, no muscle pain  GENITOURINARY: no dysuria, no frequency, no hesitancy  PSYCHIATRY: no depression , no anxiety  ALL OTHER  ROS negative        Vital Signs Last 24 Hrs  T(C): 36.8 (2022 05:05), Max: 37.1 (2022 20:28)  T(F): 98.2 (2022 05:05), Max: 98.7 (2022 20:28)  HR: 76 (2022 05:05) (60 - 87)  BP: 160/80 (2022 05:05) (122/62 - 160/80)  BP(mean): --  RR: 17 (2022 05:05) (17 - 18)  SpO2: 96% (2022 05:05) (96% - 99%)    ________________________________________________  PHYSICAL EXAM:  GENERAL: NAD  HEENT: Normocephalic;  conjunctivae and sclerae clear; moist mucous membranes;   NECK : supple  CHEST/LUNG: Clear to auscultation bilaterally with good air entry   HEART: S1 S2  regular; no murmurs, gallops or rubs  ABDOMEN: Soft, Nontender, Nondistended; Bowel sounds present  EXTREMITIES: no cyanosis; no edema; no calf tenderness  SKIN: warm and dry; no rash  NERVOUS SYSTEM:  Awake and alert; Oriented  to place, person and time ; no new deficits    _________________________________________________  LABS:                        10.5   11.71 )-----------( 250      ( 2022 07:26 )             32.6     01-    141  |  107  |  22<H>  ----------------------------<  142<H>  3.8   |  29  |  1.51<H>    Ca    8.4      2022 07:26  Phos  2.8       Mg     2.4         TPro  6.4  /  Alb  2.8<L>  /  TBili  0.3  /  DBili  x   /  AST  27  /  ALT  51  /  AlkPhos  110        Urinalysis Basic - ( 2022 20:32 )    Color: Yellow / Appearance: Clear / S.005 / pH: x  Gluc: x / Ketone: Negative  / Bili: Negative / Urobili: Negative   Blood: x / Protein: 30 mg/dL / Nitrite: Negative   Leuk Esterase: Negative / RBC: 0-2 /HPF / WBC 0-2 /HPF   Sq Epi: x / Non Sq Epi: Occasional /HPF / Bacteria: Trace /HPF      CAPILLARY BLOOD GLUCOSE      POCT Blood Glucose.: 140 mg/dL (2022 08:00)        RADIOLOGY & ADDITIONAL TESTS:    Imaging  Reviewed:  YES    Consultant(s) Notes Reviewed:   YES      Plan of care was discussed with patient and /or primary care giver; all questions and concerns were addressed  NP Note discussed with  Primary Attending    INTERVAL HPI/OVERNIGHT EVENTS: no new complaints, pt states feeling much better today.     MEDICATIONS  (STANDING):  aspirin enteric coated 81 milliGRAM(s) Oral daily  atorvastatin 80 milliGRAM(s) Oral at bedtime  calcium carbonate   1250 mG (OsCal) 1 Tablet(s) Oral daily  cholecalciferol 1000 Unit(s) Oral daily  hydrALAZINE 100 milliGRAM(s) Oral every 8 hours  metFORMIN 1000 milliGRAM(s) Oral two times a day  NIFEdipine  milliGRAM(s) Oral daily  predniSONE   Tablet 60 milliGRAM(s) Oral daily  pyridostigmine 60 milliGRAM(s) Oral every 4 hours  sodium chloride 0.9%. 1000 milliLiter(s) (125 mL/Hr) IV Continuous <Continuous>  tamsulosin 0.4 milliGRAM(s) Oral at bedtime  trimethoprim  160 mG/sulfamethoxazole 800 mG 1 Tablet(s) Oral every 12 hours    MEDICATIONS  (PRN):      __________________________________________________  REVIEW OF SYSTEMS:    CONSTITUTIONAL: No fever,   EYES: no acute visual disturbances  NECK: No pain or stiffness  RESPIRATORY: No cough; No shortness of breath  CARDIOVASCULAR: No chest pain, no palpitations  GASTROINTESTINAL: No pain. No nausea or vomiting; No diarrhea   NEUROLOGICAL: No headache or numbness, no tremors  MUSCULOSKELETAL: No joint pain, no muscle pain  GENITOURINARY: no dysuria, no frequency, no hesitancy  PSYCHIATRY: no depression , no anxiety  ALL OTHER  ROS negative        Vital Signs Last 24 Hrs  T(C): 36.8 (2022 05:05), Max: 37.1 (2022 20:28)  T(F): 98.2 (2022 05:05), Max: 98.7 (2022 20:28)  HR: 76 (2022 05:05) (60 - 87)  BP: 160/80 (2022 05:05) (122/62 - 160/80)  BP(mean): --  RR: 17 (2022 05:05) (17 - 18)  SpO2: 96% (2022 05:05) (96% - 99%)    ________________________________________________  PHYSICAL EXAM:  GENERAL: NAD, pleasant  HEENT: Normocephalic;  conjunctivae and sclerae clear; moist mucous membranes;   NECK : supple  CHEST/LUNG: Clear to auscultation bilaterally with fair air entry   HEART: S1 S2  regular; no murmurs, gallops or rubs  ABDOMEN: Soft, Nontender, Nondistended; Bowel sounds present  EXTREMITIES: no cyanosis; no edema; no calf tenderness  Musk: hand   5/5, b/l LEs 5/5  SKIN: warm and dry; no rash  NERVOUS SYSTEM:  Awake and alert; Oriented  to self and place; no new deficits    _________________________________________________  LABS:                        10.5   11.71 )-----------( 250      ( 2022 07:26 )             32.6     01-20    141  |  107  |  22<H>  ----------------------------<  142<H>  3.8   |  29  |  1.51<H>    Ca    8.4      2022 07:26  Phos  2.8       Mg     2.4         TPro  6.4  /  Alb  2.8<L>  /  TBili  0.3  /  DBili  x   /  AST  27  /  ALT  51  /  AlkPhos  110        Urinalysis Basic - ( 2022 20:32 )    Color: Yellow / Appearance: Clear / S.005 / pH: x  Gluc: x / Ketone: Negative  / Bili: Negative / Urobili: Negative   Blood: x / Protein: 30 mg/dL / Nitrite: Negative   Leuk Esterase: Negative / RBC: 0-2 /HPF / WBC 0-2 /HPF   Sq Epi: x / Non Sq Epi: Occasional /HPF / Bacteria: Trace /HPF      CAPILLARY BLOOD GLUCOSE      POCT Blood Glucose.: 140 mg/dL (2022 08:00)        RADIOLOGY & ADDITIONAL TESTS:    Imaging  Reviewed:  YES  < from: Xray Chest 1 View-PORTABLE IMMEDIATE (Xray Chest 1 View-PORTABLE IMMEDIATE .) (22 @ 20:19) >    ACC: [id number] EXAM:  XR CHEST PORTABLE IMMED 1V                          PROCEDURE DATE:  2022          INTERPRETATION:  AP chest on 2022 at 8:02 PM. Patient has   generalized weakness.    Heart magnified by technique. Sternotomy,left loop recorder, and lower   cervical spine hardware again noted.    No lung finding.    Chest is similar to 2021.    IMPRESSION: No acute finding or change.    --- End of Report ---    RYLEY SIMS MD; Attending Radiologist  This document has been electronically signed. 2022  9:07AM    < end of copied text >    Consultant(s) Notes Reviewed:   YES      Plan of care was discussed with patient and /or primary care giver; all questions and concerns were addressed

## 2022-01-20 NOTE — PROGRESS NOTE ADULT - PROBLEM SELECTOR PLAN 4
Hb 10.5, no signs of active bleeding   F/u Retic count and Fe panel Hb 10.5, no signs of active bleeding   monitor CBC, iron panel, retic count

## 2022-01-20 NOTE — PROGRESS NOTE ADULT - ASSESSMENT
Patient is a 65 y/o Male from [facility], ambulates independently, with PMHx of Myasthenia Gravis s/p Thymectomy , HTN, HLD, CVA x 2, Osteoporosis, PAD, Peripheral Neuropathy, Cervical and Lumbar spondylosis, and BPH who presented to the ED for generalized weakness.    In the ED,  VS: Temp 98.1F, HR 49, /94mmHg, RR 19, [id number]% on RA   Labs significant for Hb 10.5, BUN/Cr 22/1.45   Patient is a 65 y/o Male from [facility], ambulates independently, with PMHx of Myasthenia Gravis s/p Thymectomy , HTN, HLD, CVA x 2, Osteoporosis, PAD, Peripheral Neuropathy, Cervical and Lumbar spondylosis, and BPH who presented to the ED for generalized weakness.    In the ED,  VS: Temp 98.1F, HR 49, /94mmHg, RR 19, Fgp752% on RA   Labs significant for Hb 10.5, BUN/Cr 22/1.45    Neurology consulted, recommending MRI/MRA brain to carotid. Increased prednisone to 60mg.   Speech/swallow, PT consulted.   cultures are negative.   BP better controlled.

## 2022-01-20 NOTE — PROGRESS NOTE ADULT - PROBLEM SELECTOR PLAN 1
Multiple prior admissions with generalized weakness   Likely trigger for this episode may be medication noncompliance (timing of pyridostigmine)  Pt by neurological assessment does not appear to be in Myasthenic crisis   Less likely to be Adrenal insufficiency 2/2 to long term prednisone use (Pt is Hypertensive)  Poor PO intake/ dehydration may also be contributing Multiple prior admissions with generalized weakness   Likely trigger for this episode may be medication noncompliance (timing of pyridostigmine)  Pt by neurological assessment does not appear to be in Myasthenic crisis   Less likely to be Adrenal insufficiency 2/2 to long term prednisone use (Pt is Hypertensive)  Poor PO intake/ dehydration may also be contributing  Neuro dr. Paige consulted  rec. MRI/A brain to carotids  Speech/swallow eval  PT consult

## 2022-01-20 NOTE — PROGRESS NOTE ADULT - PROBLEM SELECTOR PLAN 3
Pt admitted with Sr Cr 1.45 which is elevated from his baseline   C/w IVF, likely pre-renal   Recheck in AM Pt admitted with Sr Cr 1.45 which is elevated from his baseline   C/w IVF, likely pre-renal   Cr 1.55 today  c/w IVF

## 2022-01-20 NOTE — PROGRESS NOTE ADULT - PROBLEM SELECTOR PLAN 2
C/w home medications pyridostigmine and prednisone  Dr. Paige to be consulted in AM C/w home medications pyridostigmine and prednisone  Dr. Paige to be consulted  increased prednisone 60mg per rec  follow up MRI/A brain to carotids  Speech/swallow eval  PT consult

## 2022-01-21 ENCOUNTER — TRANSCRIPTION ENCOUNTER (OUTPATIENT)
Age: 67
End: 2022-01-21

## 2022-01-21 VITALS
TEMPERATURE: 98 F | SYSTOLIC BLOOD PRESSURE: 144 MMHG | HEART RATE: 81 BPM | OXYGEN SATURATION: 95 % | RESPIRATION RATE: 18 BRPM | DIASTOLIC BLOOD PRESSURE: 71 MMHG

## 2022-01-21 LAB
A1C WITH ESTIMATED AVERAGE GLUCOSE RESULT: 7.3 % — HIGH (ref 4–5.6)
ALBUMIN SERPL ELPH-MCNC: 2.7 G/DL — LOW (ref 3.5–5)
ALP SERPL-CCNC: 67 U/L — SIGNIFICANT CHANGE UP (ref 40–120)
ALT FLD-CCNC: 31 U/L DA — SIGNIFICANT CHANGE UP (ref 10–60)
ANION GAP SERPL CALC-SCNC: 8 MMOL/L — SIGNIFICANT CHANGE UP (ref 5–17)
AST SERPL-CCNC: 16 U/L — SIGNIFICANT CHANGE UP (ref 10–40)
BILIRUB SERPL-MCNC: 0.2 MG/DL — SIGNIFICANT CHANGE UP (ref 0.2–1.2)
BUN SERPL-MCNC: 21 MG/DL — HIGH (ref 7–18)
CALCIUM SERPL-MCNC: 8.6 MG/DL — SIGNIFICANT CHANGE UP (ref 8.4–10.5)
CHLORIDE SERPL-SCNC: 106 MMOL/L — SIGNIFICANT CHANGE UP (ref 96–108)
CO2 SERPL-SCNC: 28 MMOL/L — SIGNIFICANT CHANGE UP (ref 22–31)
CREAT SERPL-MCNC: 1.8 MG/DL — HIGH (ref 0.5–1.3)
ESTIMATED AVERAGE GLUCOSE: 163 MG/DL — HIGH (ref 68–114)
GLUCOSE BLDC GLUCOMTR-MCNC: 130 MG/DL — HIGH (ref 70–99)
GLUCOSE SERPL-MCNC: 126 MG/DL — HIGH (ref 70–99)
HCT VFR BLD CALC: 33.4 % — LOW (ref 39–50)
HGB BLD-MCNC: 10.8 G/DL — LOW (ref 13–17)
MCHC RBC-ENTMCNC: 28.1 PG — SIGNIFICANT CHANGE UP (ref 27–34)
MCHC RBC-ENTMCNC: 32.3 GM/DL — SIGNIFICANT CHANGE UP (ref 32–36)
MCV RBC AUTO: 86.8 FL — SIGNIFICANT CHANGE UP (ref 80–100)
NRBC # BLD: 0 /100 WBCS — SIGNIFICANT CHANGE UP (ref 0–0)
PLATELET # BLD AUTO: 237 K/UL — SIGNIFICANT CHANGE UP (ref 150–400)
POTASSIUM SERPL-MCNC: 3.5 MMOL/L — SIGNIFICANT CHANGE UP (ref 3.5–5.3)
POTASSIUM SERPL-SCNC: 3.5 MMOL/L — SIGNIFICANT CHANGE UP (ref 3.5–5.3)
PROT SERPL-MCNC: 5.6 G/DL — LOW (ref 6–8.3)
RBC # BLD: 3.85 M/UL — LOW (ref 4.2–5.8)
RBC # FLD: 14.8 % — HIGH (ref 10.3–14.5)
SODIUM SERPL-SCNC: 142 MMOL/L — SIGNIFICANT CHANGE UP (ref 135–145)
WBC # BLD: 13.67 K/UL — HIGH (ref 3.8–10.5)
WBC # FLD AUTO: 13.67 K/UL — HIGH (ref 3.8–10.5)

## 2022-01-21 PROCEDURE — 83605 ASSAY OF LACTIC ACID: CPT

## 2022-01-21 PROCEDURE — 96374 THER/PROPH/DIAG INJ IV PUSH: CPT

## 2022-01-21 PROCEDURE — 76775 US EXAM ABDO BACK WALL LIM: CPT

## 2022-01-21 PROCEDURE — 99285 EMERGENCY DEPT VISIT HI MDM: CPT

## 2022-01-21 PROCEDURE — 83690 ASSAY OF LIPASE: CPT

## 2022-01-21 PROCEDURE — 82550 ASSAY OF CK (CPK): CPT

## 2022-01-21 PROCEDURE — 76775 US EXAM ABDO BACK WALL LIM: CPT | Mod: 26

## 2022-01-21 PROCEDURE — 82009 KETONE BODYS QUAL: CPT

## 2022-01-21 PROCEDURE — 80048 BASIC METABOLIC PNL TOTAL CA: CPT

## 2022-01-21 PROCEDURE — 80053 COMPREHEN METABOLIC PANEL: CPT

## 2022-01-21 PROCEDURE — 87635 SARS-COV-2 COVID-19 AMP PRB: CPT

## 2022-01-21 PROCEDURE — 87040 BLOOD CULTURE FOR BACTERIA: CPT

## 2022-01-21 PROCEDURE — 84484 ASSAY OF TROPONIN QUANT: CPT

## 2022-01-21 PROCEDURE — 83735 ASSAY OF MAGNESIUM: CPT

## 2022-01-21 PROCEDURE — 93005 ELECTROCARDIOGRAM TRACING: CPT

## 2022-01-21 PROCEDURE — 97161 PT EVAL LOW COMPLEX 20 MIN: CPT

## 2022-01-21 PROCEDURE — 36415 COLL VENOUS BLD VENIPUNCTURE: CPT

## 2022-01-21 PROCEDURE — 71045 X-RAY EXAM CHEST 1 VIEW: CPT

## 2022-01-21 PROCEDURE — 87086 URINE CULTURE/COLONY COUNT: CPT

## 2022-01-21 PROCEDURE — 83036 HEMOGLOBIN GLYCOSYLATED A1C: CPT

## 2022-01-21 PROCEDURE — 81001 URINALYSIS AUTO W/SCOPE: CPT

## 2022-01-21 PROCEDURE — 85027 COMPLETE CBC AUTOMATED: CPT

## 2022-01-21 PROCEDURE — 84100 ASSAY OF PHOSPHORUS: CPT

## 2022-01-21 PROCEDURE — 85025 COMPLETE CBC W/AUTO DIFF WBC: CPT

## 2022-01-21 PROCEDURE — 82962 GLUCOSE BLOOD TEST: CPT

## 2022-01-21 PROCEDURE — 83880 ASSAY OF NATRIURETIC PEPTIDE: CPT

## 2022-01-21 RX ORDER — INSULIN LISPRO 100/ML
0 VIAL (ML) SUBCUTANEOUS
Qty: 0 | Refills: 0 | DISCHARGE

## 2022-01-21 RX ORDER — SODIUM CHLORIDE 9 MG/ML
1000 INJECTION INTRAMUSCULAR; INTRAVENOUS; SUBCUTANEOUS ONCE
Refills: 0 | Status: DISCONTINUED | OUTPATIENT
Start: 2022-01-21 | End: 2022-01-21

## 2022-01-21 RX ADMIN — METFORMIN HYDROCHLORIDE 1000 MILLIGRAM(S): 850 TABLET ORAL at 06:16

## 2022-01-21 RX ADMIN — Medication 120 MILLIGRAM(S): at 06:16

## 2022-01-21 RX ADMIN — Medication 1 TABLET(S): at 06:16

## 2022-01-21 RX ADMIN — PYRIDOSTIGMINE BROMIDE 60 MILLIGRAM(S): 60 SOLUTION ORAL at 10:44

## 2022-01-21 RX ADMIN — PYRIDOSTIGMINE BROMIDE 60 MILLIGRAM(S): 60 SOLUTION ORAL at 06:19

## 2022-01-21 RX ADMIN — Medication 60 MILLIGRAM(S): at 06:16

## 2022-01-21 RX ADMIN — PYRIDOSTIGMINE BROMIDE 60 MILLIGRAM(S): 60 SOLUTION ORAL at 02:27

## 2022-01-21 RX ADMIN — Medication 100 MILLIGRAM(S): at 06:16

## 2022-01-21 NOTE — DISCHARGE NOTE NURSING/CASE MANAGEMENT/SOCIAL WORK - PATIENT PORTAL LINK FT
You can access the FollowMyHealth Patient Portal offered by [facility] by registering at the following website: [email or web address]. By joining [facility]’s FollowMyHealth portal, you will also be able to view your health information using other applications (apps) compatible with our system.

## 2022-01-21 NOTE — DISCHARGE NOTE PROVIDER - NSDCMRMEDTOKEN_GEN_ALL_CORE_FT
Aspirin Enteric Coated 81 mg oral delayed release tablet: 1 tab(s) orally once a day  atorvastatin 80 mg oral tablet: 1 tab(s) orally once a day  Bactrim  mg-160 mg oral tablet: 1 tab(s) orally every 12 hours  calcium carbonate 1250 mg (500 mg elemental calcium) oral tablet: 1 tab(s) orally once a day  HumaLOG 100 units/mL subcutaneous solution:   hydrALAZINE 100 mg oral tablet: 1 tab(s) orally every 8 hours  metFORMIN 1000 mg oral tablet: 1 tab(s) orally 2 times a day  NIFEdipine 60 mg oral tablet, extended release: 2 tab(s) orally once a day  predniSONE 20 mg oral tablet: 1 tab(s) orally once a day  pyridostigmine 60 mg oral tablet: 1 tab(s) orally 6 times a day  tamsulosin 0.4 mg oral capsule: 1 cap(s) orally once a day  Vitamin D3 25 mcg (1000 intl units) oral tablet: 1 tab(s) orally once a day   Aspirin Enteric Coated 81 mg oral delayed release tablet: 1 tab(s) orally once a day  atorvastatin 80 mg oral tablet: 1 tab(s) orally once a day  Bactrim  mg-160 mg oral tablet: 1 tab(s) orally every 12 hours  calcium carbonate 1250 mg (500 mg elemental calcium) oral tablet: 1 tab(s) orally once a day  hydrALAZINE 100 mg oral tablet: 1 tab(s) orally every 8 hours  metFORMIN 1000 mg oral tablet: 1 tab(s) orally 2 times a day  NIFEdipine 60 mg oral tablet, extended release: 2 tab(s) orally once a day  predniSONE 20 mg oral tablet: 3 tab(s) orally once a day  pyridostigmine 60 mg oral tablet: 1 tab(s) orally 6 times a day  tamsulosin 0.4 mg oral capsule: 1 cap(s) orally once a day  Vitamin D3 25 mcg (1000 intl units) oral tablet: 1 tab(s) orally once a day

## 2022-01-21 NOTE — DISCHARGE NOTE NURSING/CASE MANAGEMENT/SOCIAL WORK - NSDCPEFALRISK_GEN_ALL_CORE
For information on Fall & Injury Prevention, visit: [email or web address] OR  [email or web address] OR  https://www.cdc.gov/steadi/patient.html

## 2022-01-21 NOTE — DISCHARGE NOTE PROVIDER - NSDCFUADDINST_GEN_ALL_CORE_FT
Neurology recommends a prolonged prednisone taper starting at 60mg daily and decreasing by 5mg weekly until he reaches his outpatient goal of 20mg daily.   His outpatient primary team will manage dosage.

## 2022-01-21 NOTE — DISCHARGE NOTE PROVIDER - HOSPITAL COURSE
Patient is a 65 y/o Male from [facility], ambulates independently, with PMHx of Myasthenia Gravis s/p Thymectomy , HTN, HLD, CVA x 2, Osteoporosis, PAD, Peripheral Neuropathy, Cervical and Lumbar spondylosis, and BPH who presented to the ED for generalized weakness. He was previously admitted for similar complaints in Dec 2021 and was found to have acute gastroenteritis. At this admission, he endorses that for the past few days, he is unsure that he has been taking all of his medications on time. He is unable to point out any specific part of the body that hurts. He denies any headache, chest pain, SOB, LOC, abdominal pain, dysuria, numbness, tingling, fevers, cough.   patient's weakness has improved, he is medically stable to return to his facility. Patient is a 67 y/o Male from [facility], ambulates independently, with PMHx of Myasthenia Gravis s/p Thymectomy , HTN, HLD, CVA x 2, Osteoporosis, PAD, Peripheral Neuropathy, Cervical and Lumbar spondylosis, and BPH who presented to the ED for generalized weakness. He was previously admitted for similar complaints in Dec 2021 and was found to have acute gastroenteritis. At this admission, he endorses that for the past few days, he is unsure that he has been taking all of his medications on time. He is unable to point out any specific part of the body that hurts. He denies any headache, chest pain, SOB, LOC, abdominal pain, dysuria, numbness, tingling, fevers, cough.   patient's weakness has improved, he is medically stable to return to his facility with a prolonged steroid taper.    Neurology recommends a prolonged prednisone taper starting at 60mg daily and decreasing by 5mg weekly until he reaches his outpatient goal of 20mg daily.   His outpatient primary team will manage dosage.

## 2022-01-21 NOTE — DISCHARGE NOTE PROVIDER - CARE PROVIDER_API CALL
Shai Goldstein)  Medicine  [id number] [place] [address]  Phone: [phone]  Fax: [phone]  Follow Up Time: 1 week

## 2022-01-21 NOTE — DISCHARGE NOTE PROVIDER - NSDCCPCAREPLAN_GEN_ALL_CORE_FT
PRINCIPAL DISCHARGE DIAGNOSIS  Diagnosis: Myasthenia gravis  Assessment and Plan of Treatment: Myasthenia gravis (Myasthenia gravis (Myasthenia gravis (Myasthenia gravis (MG) is a long-term disease that causes severe muscle weakness.    You should continue to take your medications as prescribed.   These will help improve your energy and strength.  You should Remove rugs and loose carpeting from the floor to help prevent falls. Chairs with side arms and hard cushions may make it easier to get up or out of a chair. Put grab bars on the walls beside toilets and inside showers and bathtubs. These will help you get up after using the toilet or after bathing. You may want to put a shower chair inside the shower) is a long-term disease that causes severe muscle weakness.   You were admitted with generalized weakness which was most likely due to this condition.  You were treated with a higher dose of steroids and your weakness has improved.  You should continue to take your medications as prescribed.   These will help improve your energy and strength.  You should Remove rugs and loose carpeting from the floor to help prevent falls. Chairs with side arms and hard cushions may make it easier to get up or out of a chair. Put grab bars on the walls beside toilets and inside showers and bathtubs. These will help you get up after using the toilet or after bathing. You may want to put a shower chair inside the shower.      SECONDARY DISCHARGE DIAGNOSES  Diagnosis: HTN (hypertension)  Assessment and Plan of Treatment: You have a history of Hypertension.   Hypertension is high blood pressure. Your blood pressure is the force of your blood moving against the walls of your arteries. Hypertension causes your blood pressure to get so high that your heart has to work much harder than normal. This can damage your heart. The cause of hypertension may not be known. This is called essential or primary hypertension. Hypertension caused by another medical condition, such as kidney disease, is called secondary hypertension.  You should continue to take you anti hypertensives as prescribed.   You should return to the Emergency deparrtment if you experience any severe headaches, vision loss or weakness in the arms or legs.  You should follow up with your primary doctor to have you blood pressur checked routinely.  You should follow a low salt diet.      Diagnosis: DM (diabetes mellitus)  Assessment and Plan of Treatment: Type 2 diabetes is a disease that affects how your body uses glucose (sugar). Either your body cannot make enough insulin, or it cannot use the insulin correctly. It is important to keep diabetes controlled to prevent damage to your heart, blood vessels, and other organs.  You should continue to take  your medications as prescribed and monitor your blood glucose levels.   You should work with your doctor or dietician to develop a meal plan that works for you and your schedule. A dietitian can help you learn how to eat the right amount of carbohydrates during your meals and snacks. Carbohydrates can raise your blood sugar if you eat too many at one time. Some foods that contain carbohydrates include breads, cereals, rice, pasta, and sweets.      Diagnosis: General weakness  Assessment and Plan of Treatment:     Diagnosis: Edema leg  Assessment and Plan of Treatment:

## 2022-01-24 LAB
CULTURE RESULTS: SIGNIFICANT CHANGE UP
CULTURE RESULTS: SIGNIFICANT CHANGE UP
SPECIMEN SOURCE: SIGNIFICANT CHANGE UP
SPECIMEN SOURCE: SIGNIFICANT CHANGE UP

## 2022-03-01 ENCOUNTER — EMERGENCY (EMERGENCY)
Facility: HOSPITAL | Age: 67
LOS: 1 days | Discharge: HOME | End: 2022-03-01
Attending: EMERGENCY MEDICINE
Payer: MEDICAID

## 2022-03-01 VITALS
WEIGHT: 160.06 LBS | TEMPERATURE: 98 F | HEART RATE: 78 BPM | SYSTOLIC BLOOD PRESSURE: 170 MMHG | RESPIRATION RATE: 18 BRPM | DIASTOLIC BLOOD PRESSURE: 77 MMHG | OXYGEN SATURATION: 95 % | HEIGHT: 66 IN

## 2022-03-01 DIAGNOSIS — D17.39 BENIGN LIPOMATOUS NEOPLASM OF SKIN AND SUBCUTANEOUS TISSUE OF OTHER SITES: Chronic | ICD-10-CM

## 2022-03-01 DIAGNOSIS — G70.00 MYASTHENIA GRAVIS WITHOUT (ACUTE) EXACERBATION: Chronic | ICD-10-CM

## 2022-03-01 LAB
ALBUMIN SERPL ELPH-MCNC: 2.7 G/DL — LOW (ref 3.5–5)
ALP SERPL-CCNC: 104 U/L — SIGNIFICANT CHANGE UP (ref 40–120)
ALT FLD-CCNC: 46 U/L DA — SIGNIFICANT CHANGE UP (ref 10–60)
ANION GAP SERPL CALC-SCNC: 7 MMOL/L — SIGNIFICANT CHANGE UP (ref 5–17)
AST SERPL-CCNC: 41 U/L — HIGH (ref 10–40)
BASOPHILS # BLD AUTO: 0.02 K/UL — SIGNIFICANT CHANGE UP (ref 0–0.2)
BASOPHILS NFR BLD AUTO: 0.2 % — SIGNIFICANT CHANGE UP (ref 0–2)
BILIRUB SERPL-MCNC: 0.5 MG/DL — SIGNIFICANT CHANGE UP (ref 0.2–1.2)
BUN SERPL-MCNC: 26 MG/DL — HIGH (ref 7–18)
CALCIUM SERPL-MCNC: 8.7 MG/DL — SIGNIFICANT CHANGE UP (ref 8.4–10.5)
CHLORIDE SERPL-SCNC: 104 MMOL/L — SIGNIFICANT CHANGE UP (ref 96–108)
CO2 SERPL-SCNC: 29 MMOL/L — SIGNIFICANT CHANGE UP (ref 22–31)
CREAT SERPL-MCNC: 1.72 MG/DL — HIGH (ref 0.5–1.3)
D DIMER BLD IA.RAPID-MCNC: 546 NG/ML DDU — HIGH
EGFR: 44 ML/MIN/1.73M2 — LOW
EOSINOPHIL # BLD AUTO: 0 K/UL — SIGNIFICANT CHANGE UP (ref 0–0.5)
EOSINOPHIL NFR BLD AUTO: 0 % — SIGNIFICANT CHANGE UP (ref 0–6)
GLUCOSE SERPL-MCNC: 72 MG/DL — SIGNIFICANT CHANGE UP (ref 70–99)
HCT VFR BLD CALC: 32.1 % — LOW (ref 39–50)
HGB BLD-MCNC: 10.3 G/DL — LOW (ref 13–17)
IMM GRANULOCYTES NFR BLD AUTO: 0.5 % — SIGNIFICANT CHANGE UP (ref 0–1.5)
LYMPHOCYTES # BLD AUTO: 1.17 K/UL — SIGNIFICANT CHANGE UP (ref 1–3.3)
LYMPHOCYTES # BLD AUTO: 8.9 % — LOW (ref 13–44)
MAGNESIUM SERPL-MCNC: 2.2 MG/DL — SIGNIFICANT CHANGE UP (ref 1.6–2.6)
MCHC RBC-ENTMCNC: 27.9 PG — SIGNIFICANT CHANGE UP (ref 27–34)
MCHC RBC-ENTMCNC: 32.1 GM/DL — SIGNIFICANT CHANGE UP (ref 32–36)
MCV RBC AUTO: 87 FL — SIGNIFICANT CHANGE UP (ref 80–100)
MONOCYTES # BLD AUTO: 1.17 K/UL — HIGH (ref 0–0.9)
MONOCYTES NFR BLD AUTO: 8.9 % — SIGNIFICANT CHANGE UP (ref 2–14)
NEUTROPHILS # BLD AUTO: 10.79 K/UL — HIGH (ref 1.8–7.4)
NEUTROPHILS NFR BLD AUTO: 81.5 % — HIGH (ref 43–77)
NRBC # BLD: 0 /100 WBCS — SIGNIFICANT CHANGE UP (ref 0–0)
NT-PROBNP SERPL-SCNC: 460 PG/ML — HIGH (ref 0–125)
PLATELET # BLD AUTO: 249 K/UL — SIGNIFICANT CHANGE UP (ref 150–400)
POTASSIUM SERPL-MCNC: 3.7 MMOL/L — SIGNIFICANT CHANGE UP (ref 3.5–5.3)
POTASSIUM SERPL-SCNC: 3.7 MMOL/L — SIGNIFICANT CHANGE UP (ref 3.5–5.3)
PROT SERPL-MCNC: 6.3 G/DL — SIGNIFICANT CHANGE UP (ref 6–8.3)
RBC # BLD: 3.69 M/UL — LOW (ref 4.2–5.8)
RBC # FLD: 13.2 % — SIGNIFICANT CHANGE UP (ref 10.3–14.5)
SARS-COV-2 RNA SPEC QL NAA+PROBE: SIGNIFICANT CHANGE UP
SODIUM SERPL-SCNC: 140 MMOL/L — SIGNIFICANT CHANGE UP (ref 135–145)
TROPONIN I, HIGH SENSITIVITY RESULT: 48.1 NG/L — SIGNIFICANT CHANGE UP
WBC # BLD: 13.21 K/UL — HIGH (ref 3.8–10.5)
WBC # FLD AUTO: 13.21 K/UL — HIGH (ref 3.8–10.5)

## 2022-03-01 PROCEDURE — 71045 X-RAY EXAM CHEST 1 VIEW: CPT | Mod: 26

## 2022-03-01 PROCEDURE — 99285 EMERGENCY DEPT VISIT HI MDM: CPT

## 2022-03-01 NOTE — ED ADULT NURSE NOTE - CHIEF COMPLAINT QUOTE
Pt BIBA for compliant of short of breathe , generalized weakness and cramps to both legs, from [facility].

## 2022-03-01 NOTE — ED ADULT TRIAGE NOTE - CHIEF COMPLAINT QUOTE
Pt BIBA for compliant of short of breathe , generalized weakness and cramps to both legs, from [facility].

## 2022-03-01 NOTE — ED ADULT NURSE NOTE - SUICIDE SCREENING QUESTION 3
1. Will do lab today--will need to follow up in 1 week.  Please come 15 minutes early to your appointment for check in and rooming.    2. Will refer to pain management and physical therapy for back/leg pain    3. Bring ALL medications in all bottles that you are taking to the appointment    4. Restart daily inhailer symbicort, and nasal inhailer morning and night with albuterol as needed for shortness of breath    5. Restart lisinopril, carvedilol, atorvastatin.    In the meantime take:    2 aleve twice per day scheduled every day for pain  AND   Tylenol 2 extra strength three times per day until we review labs    Next appointment will be to discuss diabetes as well as re-assess for depression     
No

## 2022-03-02 VITALS
DIASTOLIC BLOOD PRESSURE: 95 MMHG | RESPIRATION RATE: 18 BRPM | SYSTOLIC BLOOD PRESSURE: 212 MMHG | TEMPERATURE: 99 F | OXYGEN SATURATION: 96 % | HEART RATE: 76 BPM

## 2022-03-02 LAB
APPEARANCE UR: CLEAR — SIGNIFICANT CHANGE UP
BILIRUB UR-MCNC: NEGATIVE — SIGNIFICANT CHANGE UP
COLOR SPEC: YELLOW — SIGNIFICANT CHANGE UP
DIFF PNL FLD: NEGATIVE — SIGNIFICANT CHANGE UP
GLUCOSE UR QL: NEGATIVE — SIGNIFICANT CHANGE UP
KETONES UR-MCNC: NEGATIVE — SIGNIFICANT CHANGE UP
LEUKOCYTE ESTERASE UR-ACNC: NEGATIVE — SIGNIFICANT CHANGE UP
NITRITE UR-MCNC: NEGATIVE — SIGNIFICANT CHANGE UP
PH UR: 8 — SIGNIFICANT CHANGE UP (ref 5–8)
PROT UR-MCNC: 100
SP GR SPEC: 1.01 — SIGNIFICANT CHANGE UP (ref 1.01–1.02)
TROPONIN I, HIGH SENSITIVITY RESULT: 48.4 NG/L — SIGNIFICANT CHANGE UP
UROBILINOGEN FLD QL: NEGATIVE — SIGNIFICANT CHANGE UP

## 2022-03-02 PROCEDURE — 71275 CT ANGIOGRAPHY CHEST: CPT | Mod: MA

## 2022-03-02 PROCEDURE — 87086 URINE CULTURE/COLONY COUNT: CPT

## 2022-03-02 PROCEDURE — 71045 X-RAY EXAM CHEST 1 VIEW: CPT

## 2022-03-02 PROCEDURE — 85379 FIBRIN DEGRADATION QUANT: CPT

## 2022-03-02 PROCEDURE — 70450 CT HEAD/BRAIN W/O DYE: CPT | Mod: MA

## 2022-03-02 PROCEDURE — 99284 EMERGENCY DEPT VISIT MOD MDM: CPT | Mod: 25

## 2022-03-02 PROCEDURE — 80053 COMPREHEN METABOLIC PANEL: CPT

## 2022-03-02 PROCEDURE — 83880 ASSAY OF NATRIURETIC PEPTIDE: CPT

## 2022-03-02 PROCEDURE — 87635 SARS-COV-2 COVID-19 AMP PRB: CPT

## 2022-03-02 PROCEDURE — 81001 URINALYSIS AUTO W/SCOPE: CPT

## 2022-03-02 PROCEDURE — 83735 ASSAY OF MAGNESIUM: CPT

## 2022-03-02 PROCEDURE — 70450 CT HEAD/BRAIN W/O DYE: CPT | Mod: 26,MA

## 2022-03-02 PROCEDURE — 85025 COMPLETE CBC W/AUTO DIFF WBC: CPT

## 2022-03-02 PROCEDURE — 93005 ELECTROCARDIOGRAM TRACING: CPT

## 2022-03-02 PROCEDURE — 71275 CT ANGIOGRAPHY CHEST: CPT | Mod: 26,MA

## 2022-03-02 PROCEDURE — 36415 COLL VENOUS BLD VENIPUNCTURE: CPT

## 2022-03-02 PROCEDURE — 84484 ASSAY OF TROPONIN QUANT: CPT

## 2022-03-02 RX ORDER — HYDRALAZINE HCL 50 MG
100 TABLET ORAL ONCE
Refills: 0 | Status: COMPLETED | OUTPATIENT
Start: 2022-03-02 | End: 2022-03-02

## 2022-03-02 RX ADMIN — Medication 100 MILLIGRAM(S): at 05:59

## 2022-03-02 NOTE — ED PROVIDER NOTE - PATIENT PORTAL LINK FT
You can access the FollowMyHealth Patient Portal offered by [facility] by registering at the following website: [email or web address]. By joining [employer]’s FollowMyHealth portal, you will also be able to view your health information using other applications (apps) compatible with our system.

## 2022-03-02 NOTE — ED PROVIDER NOTE - CLINICAL SUMMARY MEDICAL DECISION MAKING FREE TEXT BOX
67yo M with hx DM, HTN, HLD, CVA with right hemiparesis, osteoporosis/polyarthritis, diabetic neuropathy, myasthenia gravis presents with shortness of breath and weakness.  ekf shows TWI in lateral leads, trop x2 wnl, CXR unremarkable, probnp 460, ddimer 546-will obtain CTA  CT head shows no acute ischemic changes  CTA chest shows No pulmonary embolus. Cardiomegaly and trace bilateral pleural effusions.  Discussed above with patient 65yo M with hx DM, HTN, HLD, CVA with right hemiparesis, osteoporosis/polyarthritis, diabetic neuropathy, myasthenia gravis presents with shortness of breath and weakness.  ekf shows TWI in lateral leads, trop x2 wnl, CXR unremarkable, probnp 460, ddimer 546-will obtain CTA  CT head shows no acute ischemic changes  CTA chest shows No pulmonary embolus. Cardiomegaly and trace bilateral pleural effusions.  Discussed above with patient. On reeval patient reports feeling better. Patient able to ambulate with his cane while in ED. Patient stable for discharge, ambulette transport arranged back to [facility]. 65yo M with hx DM, HTN, HLD, CVA with right hemiparesis, osteoporosis/polyarthritis, diabetic neuropathy, myasthenia gravis presents with shortness of breath and weakness.  ekf shows TWI in lateral leads, trop x2 wnl, CXR unremarkable, probnp 460, ddimer 546-will obtain CTA  CT head shows no acute ischemic changes  CTA chest shows No pulmonary embolus. Cardiomegaly and trace bilateral pleural effusions.  Discussed above with patient. On reeval patient reports feeling better. Patient able to ambulate with his cane while in ED. Patient stable for discharge, ambulette transport arranged back to [facility]. Rpt BP elevated, patient currently asymptomatic, eating food, given home dose of hydralazine 100mg po.

## 2022-03-02 NOTE — ED PROVIDER NOTE - OBJECTIVE STATEMENT
67yo M with hx DM, HTN, HLD, CVA with right hemiparesis, osteoporosis/polyarthritis, diabetic neuropathy, myasthenia gravis presents with shortness of breath and weakness. Reports yesterday developed shortness of breath which he feels in center of chest, denies fever, cough, chest pain. Also reports that for at least 3 days he feels weakness in bilateral legs and right arm, denies headache, trouble speaking or walking, reports he walks with walker.

## 2022-03-02 NOTE — ED PROVIDER NOTE - NSFOLLOWUPINSTRUCTIONS_ED_ALL_ED_FT
Followup with PMD for reevaluation.  Return to ED if your symptoms worsen.      Weakness      Weakness is a lack of strength. You may feel weak all over your body (generalized), or you may feel weak in one part of your body (focal). There are many potential causes of weakness. Sometimes, the cause of your weakness may not be known. Some causes of weakness can be serious, so it is important to see your doctor.      Follow these instructions at home:    Activity     •Rest as needed.      •Try to get enough sleep. Most adults need 7–8 hours of sleep each night. Talk to your doctor about how much sleep you need each night.      •Do exercises, such as arm curls and leg raises, for 30 minutes at least 2 days a week or as told by your doctor.      •Think about working with a physical therapist or  to help you get stronger.        General instructions      •Take over-the-counter and prescription medicines only as told by your doctor.    •Eat a healthy, well-balanced diet. This includes:  •Proteins to build muscles, such as lean meats and fish.      •Fresh fruits and vegetables.      •Carbohydrates to boost energy, such as whole grains.        •Drink enough fluid to keep your pee (urine) pale yellow.      •Keep all follow-up visits as told by your doctor. This is important.        Contact a doctor if:    •Your weakness does not get better or it gets worse.    •Your weakness affects your ability to:  •Think clearly.      •Do your normal daily activities.          Get help right away if you:    •Have sudden weakness on one side of your face or body.      •Have chest pain.      •Have trouble breathing or shortness of breath.      •Have problems with your vision.      •Have trouble talking or swallowing.      •Have trouble standing or walking.      •Are light-headed.      •Pass out (lose consciousness).        Summary    •Weakness is a lack of strength. You may feel weak all over your body or just in one part of your body.      •There are many potential causes of weakness. Sometimes, the cause of your weakness may not be known.      •Rest as needed, and try to get enough sleep. Most adults need 7–8 hours of sleep each night.      •Eat a healthy, well-balanced diet.

## 2022-03-03 LAB
CULTURE RESULTS: SIGNIFICANT CHANGE UP
SPECIMEN SOURCE: SIGNIFICANT CHANGE UP

## 2022-04-02 ENCOUNTER — INPATIENT (INPATIENT)
Facility: HOSPITAL | Age: 67
LOS: 4 days | Discharge: HOME | DRG: 57 | End: 2022-04-07
Attending: INTERNAL MEDICINE | Admitting: INTERNAL MEDICINE
Payer: MEDICAID

## 2022-04-02 VITALS
HEIGHT: 66 IN | SYSTOLIC BLOOD PRESSURE: 162 MMHG | HEART RATE: 87 BPM | RESPIRATION RATE: 18 BRPM | DIASTOLIC BLOOD PRESSURE: 65 MMHG | TEMPERATURE: 98 F | OXYGEN SATURATION: 96 % | WEIGHT: 160.06 LBS

## 2022-04-02 DIAGNOSIS — R00.1 BRADYCARDIA, UNSPECIFIED: ICD-10-CM

## 2022-04-02 DIAGNOSIS — D17.39 BENIGN LIPOMATOUS NEOPLASM OF SKIN AND SUBCUTANEOUS TISSUE OF OTHER SITES: Chronic | ICD-10-CM

## 2022-04-02 DIAGNOSIS — I10 ESSENTIAL (PRIMARY) HYPERTENSION: ICD-10-CM

## 2022-04-02 DIAGNOSIS — R53.1 WEAKNESS: ICD-10-CM

## 2022-04-02 DIAGNOSIS — G70.00 MYASTHENIA GRAVIS WITHOUT (ACUTE) EXACERBATION: ICD-10-CM

## 2022-04-02 DIAGNOSIS — G70.00 MYASTHENIA GRAVIS WITHOUT (ACUTE) EXACERBATION: Chronic | ICD-10-CM

## 2022-04-02 DIAGNOSIS — D72.829 ELEVATED WHITE BLOOD CELL COUNT, UNSPECIFIED: ICD-10-CM

## 2022-04-02 DIAGNOSIS — Z29.9 ENCOUNTER FOR PROPHYLACTIC MEASURES, UNSPECIFIED: ICD-10-CM

## 2022-04-02 DIAGNOSIS — N17.9 ACUTE KIDNEY FAILURE, UNSPECIFIED: ICD-10-CM

## 2022-04-02 DIAGNOSIS — E11.9 TYPE 2 DIABETES MELLITUS WITHOUT COMPLICATIONS: ICD-10-CM

## 2022-04-02 DIAGNOSIS — D64.9 ANEMIA, UNSPECIFIED: ICD-10-CM

## 2022-04-02 DIAGNOSIS — G70.01 MYASTHENIA GRAVIS WITH (ACUTE) EXACERBATION: ICD-10-CM

## 2022-04-02 DIAGNOSIS — E78.00 PURE HYPERCHOLESTEROLEMIA, UNSPECIFIED: ICD-10-CM

## 2022-04-02 LAB
ALBUMIN SERPL ELPH-MCNC: 3.2 G/DL — LOW (ref 3.5–5)
ALP SERPL-CCNC: 87 U/L — SIGNIFICANT CHANGE UP (ref 40–120)
ALT FLD-CCNC: 46 U/L DA — SIGNIFICANT CHANGE UP (ref 10–60)
ANION GAP SERPL CALC-SCNC: 7 MMOL/L — SIGNIFICANT CHANGE UP (ref 5–17)
APPEARANCE UR: CLEAR — SIGNIFICANT CHANGE UP
AST SERPL-CCNC: 40 U/L — SIGNIFICANT CHANGE UP (ref 10–40)
BACTERIA # UR AUTO: ABNORMAL /HPF
BASE EXCESS BLDA CALC-SCNC: 7.5 MMOL/L — HIGH (ref -2–3)
BASOPHILS # BLD AUTO: 0.03 K/UL — SIGNIFICANT CHANGE UP (ref 0–0.2)
BASOPHILS NFR BLD AUTO: 0.2 % — SIGNIFICANT CHANGE UP (ref 0–2)
BILIRUB SERPL-MCNC: 0.4 MG/DL — SIGNIFICANT CHANGE UP (ref 0.2–1.2)
BILIRUB UR-MCNC: NEGATIVE — SIGNIFICANT CHANGE UP
BLD GP AB SCN SERPL QL: SIGNIFICANT CHANGE UP
BLOOD GAS COMMENTS ARTERIAL: SIGNIFICANT CHANGE UP
BUN SERPL-MCNC: 20 MG/DL — HIGH (ref 7–18)
CALCIUM SERPL-MCNC: 9 MG/DL — SIGNIFICANT CHANGE UP (ref 8.4–10.5)
CHLORIDE SERPL-SCNC: 100 MMOL/L — SIGNIFICANT CHANGE UP (ref 96–108)
CO2 SERPL-SCNC: 30 MMOL/L — SIGNIFICANT CHANGE UP (ref 22–31)
COLOR SPEC: YELLOW — SIGNIFICANT CHANGE UP
CREAT SERPL-MCNC: 1.63 MG/DL — HIGH (ref 0.5–1.3)
DIFF PNL FLD: NEGATIVE — SIGNIFICANT CHANGE UP
EGFR: 47 ML/MIN/1.73M2 — LOW
EOSINOPHIL # BLD AUTO: 0 K/UL — SIGNIFICANT CHANGE UP (ref 0–0.5)
EOSINOPHIL NFR BLD AUTO: 0 % — SIGNIFICANT CHANGE UP (ref 0–6)
EPI CELLS # UR: ABNORMAL /HPF
GLUCOSE BLDC GLUCOMTR-MCNC: 266 MG/DL — HIGH (ref 70–99)
GLUCOSE SERPL-MCNC: 154 MG/DL — HIGH (ref 70–99)
GLUCOSE UR QL: NEGATIVE — SIGNIFICANT CHANGE UP
HCO3 BLDA-SCNC: 32 MMOL/L — HIGH (ref 21–28)
HCT VFR BLD CALC: 36 % — LOW (ref 39–50)
HGB BLD-MCNC: 11.6 G/DL — LOW (ref 13–17)
HOROWITZ INDEX BLDA+IHG-RTO: 21 — SIGNIFICANT CHANGE UP
IMM GRANULOCYTES NFR BLD AUTO: 0.3 % — SIGNIFICANT CHANGE UP (ref 0–1.5)
KETONES UR-MCNC: ABNORMAL
LEUKOCYTE ESTERASE UR-ACNC: NEGATIVE — SIGNIFICANT CHANGE UP
LYMPHOCYTES # BLD AUTO: 0.91 K/UL — LOW (ref 1–3.3)
LYMPHOCYTES # BLD AUTO: 5.7 % — LOW (ref 13–44)
MAGNESIUM SERPL-MCNC: 2.2 MG/DL — SIGNIFICANT CHANGE UP (ref 1.6–2.6)
MCHC RBC-ENTMCNC: 27.9 PG — SIGNIFICANT CHANGE UP (ref 27–34)
MCHC RBC-ENTMCNC: 32.2 GM/DL — SIGNIFICANT CHANGE UP (ref 32–36)
MCV RBC AUTO: 86.5 FL — SIGNIFICANT CHANGE UP (ref 80–100)
MONOCYTES # BLD AUTO: 0.4 K/UL — SIGNIFICANT CHANGE UP (ref 0–0.9)
MONOCYTES NFR BLD AUTO: 2.5 % — SIGNIFICANT CHANGE UP (ref 2–14)
NEUTROPHILS # BLD AUTO: 14.58 K/UL — HIGH (ref 1.8–7.4)
NEUTROPHILS NFR BLD AUTO: 91.3 % — HIGH (ref 43–77)
NITRITE UR-MCNC: NEGATIVE — SIGNIFICANT CHANGE UP
NRBC # BLD: 0 /100 WBCS — SIGNIFICANT CHANGE UP (ref 0–0)
NT-PROBNP SERPL-SCNC: 486 PG/ML — HIGH (ref 0–125)
PCO2 BLDA: 44 MMHG — SIGNIFICANT CHANGE UP (ref 35–48)
PH BLDA: 7.47 — HIGH (ref 7.35–7.45)
PH UR: 6 — SIGNIFICANT CHANGE UP (ref 5–8)
PLATELET # BLD AUTO: 258 K/UL — SIGNIFICANT CHANGE UP (ref 150–400)
PO2 BLDA: 76 MMHG — LOW (ref 83–108)
POTASSIUM SERPL-MCNC: 4.1 MMOL/L — SIGNIFICANT CHANGE UP (ref 3.5–5.3)
POTASSIUM SERPL-SCNC: 4.1 MMOL/L — SIGNIFICANT CHANGE UP (ref 3.5–5.3)
PROT SERPL-MCNC: 6.5 G/DL — SIGNIFICANT CHANGE UP (ref 6–8.3)
PROT UR-MCNC: 100
RBC # BLD: 4.16 M/UL — LOW (ref 4.2–5.8)
RBC # FLD: 13.9 % — SIGNIFICANT CHANGE UP (ref 10.3–14.5)
RBC CASTS # UR COMP ASSIST: SIGNIFICANT CHANGE UP /HPF (ref 0–2)
SAO2 % BLDA: 97 % — SIGNIFICANT CHANGE UP
SARS-COV-2 RNA SPEC QL NAA+PROBE: SIGNIFICANT CHANGE UP
SODIUM SERPL-SCNC: 137 MMOL/L — SIGNIFICANT CHANGE UP (ref 135–145)
SP GR SPEC: 1.01 — SIGNIFICANT CHANGE UP (ref 1.01–1.02)
UROBILINOGEN FLD QL: NEGATIVE — SIGNIFICANT CHANGE UP
WBC # BLD: 15.96 K/UL — HIGH (ref 3.8–10.5)
WBC # FLD AUTO: 15.96 K/UL — HIGH (ref 3.8–10.5)
WBC UR QL: SIGNIFICANT CHANGE UP /HPF (ref 0–5)

## 2022-04-02 PROCEDURE — 99285 EMERGENCY DEPT VISIT HI MDM: CPT

## 2022-04-02 PROCEDURE — 71045 X-RAY EXAM CHEST 1 VIEW: CPT | Mod: 26

## 2022-04-02 RX ORDER — AZTREONAM 2 G
1 VIAL (EA) INJECTION
Qty: 0 | Refills: 0 | DISCHARGE

## 2022-04-02 RX ORDER — NIFEDIPINE 30 MG
2 TABLET, EXTENDED RELEASE 24 HR ORAL
Qty: 0 | Refills: 0 | DISCHARGE

## 2022-04-02 RX ORDER — INSULIN LISPRO 100/ML
VIAL (ML) SUBCUTANEOUS
Refills: 0 | Status: DISCONTINUED | OUTPATIENT
Start: 2022-04-02 | End: 2022-04-07

## 2022-04-02 RX ORDER — SERTRALINE 25 MG/1
50 TABLET, FILM COATED ORAL DAILY
Refills: 0 | Status: DISCONTINUED | OUTPATIENT
Start: 2022-04-02 | End: 2022-04-07

## 2022-04-02 RX ORDER — PYRIDOSTIGMINE BROMIDE 60 MG/5ML
60 SOLUTION ORAL ONCE
Refills: 0 | Status: COMPLETED | OUTPATIENT
Start: 2022-04-02 | End: 2022-04-02

## 2022-04-02 RX ORDER — ASPIRIN/CALCIUM CARB/MAGNESIUM 324 MG
81 TABLET ORAL DAILY
Refills: 0 | Status: DISCONTINUED | OUTPATIENT
Start: 2022-04-02 | End: 2022-04-07

## 2022-04-02 RX ORDER — IMMUNE GLOBULIN (HUMAN) 10 G/100ML
130 INJECTION INTRAVENOUS; SUBCUTANEOUS DAILY
Refills: 0 | Status: DISCONTINUED | OUTPATIENT
Start: 2022-04-02 | End: 2022-04-02

## 2022-04-02 RX ORDER — ATORVASTATIN CALCIUM 80 MG/1
80 TABLET, FILM COATED ORAL AT BEDTIME
Refills: 0 | Status: DISCONTINUED | OUTPATIENT
Start: 2022-04-02 | End: 2022-04-07

## 2022-04-02 RX ORDER — HEPARIN SODIUM 5000 [USP'U]/ML
5000 INJECTION INTRAVENOUS; SUBCUTANEOUS EVERY 8 HOURS
Refills: 0 | Status: DISCONTINUED | OUTPATIENT
Start: 2022-04-02 | End: 2022-04-07

## 2022-04-02 RX ORDER — METFORMIN HYDROCHLORIDE 850 MG/1
1 TABLET ORAL
Qty: 0 | Refills: 0 | DISCHARGE

## 2022-04-02 RX ORDER — SODIUM CHLORIDE 9 MG/ML
1000 INJECTION INTRAMUSCULAR; INTRAVENOUS; SUBCUTANEOUS ONCE
Refills: 0 | Status: COMPLETED | OUTPATIENT
Start: 2022-04-02 | End: 2022-04-02

## 2022-04-02 RX ORDER — ACETAMINOPHEN 500 MG
1000 TABLET ORAL ONCE
Refills: 0 | Status: DISCONTINUED | OUTPATIENT
Start: 2022-04-02 | End: 2022-04-02

## 2022-04-02 RX ORDER — NIFEDIPINE 30 MG
60 TABLET, EXTENDED RELEASE 24 HR ORAL
Refills: 0 | Status: DISCONTINUED | OUTPATIENT
Start: 2022-04-02 | End: 2022-04-05

## 2022-04-02 RX ORDER — ONDANSETRON 8 MG/1
4 TABLET, FILM COATED ORAL ONCE
Refills: 0 | Status: COMPLETED | OUTPATIENT
Start: 2022-04-02 | End: 2022-04-02

## 2022-04-02 RX ORDER — SODIUM CHLORIDE 9 MG/ML
1000 INJECTION INTRAMUSCULAR; INTRAVENOUS; SUBCUTANEOUS
Refills: 0 | Status: DISCONTINUED | OUTPATIENT
Start: 2022-04-02 | End: 2022-04-03

## 2022-04-02 RX ORDER — IMMUNE GLOBULIN (HUMAN) 10 G/100ML
20 INJECTION INTRAVENOUS; SUBCUTANEOUS DAILY
Refills: 0 | Status: DISCONTINUED | OUTPATIENT
Start: 2022-04-02 | End: 2022-04-06

## 2022-04-02 RX ORDER — HYDRALAZINE HCL 50 MG
100 TABLET ORAL THREE TIMES A DAY
Refills: 0 | Status: DISCONTINUED | OUTPATIENT
Start: 2022-04-02 | End: 2022-04-07

## 2022-04-02 RX ORDER — HYDRALAZINE HCL 50 MG
100 TABLET ORAL THREE TIMES A DAY
Refills: 0 | Status: DISCONTINUED | OUTPATIENT
Start: 2022-04-02 | End: 2022-04-02

## 2022-04-02 RX ORDER — TAMSULOSIN HYDROCHLORIDE 0.4 MG/1
0.4 CAPSULE ORAL AT BEDTIME
Refills: 0 | Status: DISCONTINUED | OUTPATIENT
Start: 2022-04-02 | End: 2022-04-07

## 2022-04-02 RX ORDER — PYRIDOSTIGMINE BROMIDE 60 MG/5ML
2 SOLUTION ORAL ONCE
Refills: 0 | Status: DISCONTINUED | OUTPATIENT
Start: 2022-04-02 | End: 2022-04-02

## 2022-04-02 RX ORDER — PYRIDOSTIGMINE BROMIDE 60 MG/5ML
60 SOLUTION ORAL EVERY 4 HOURS
Refills: 0 | Status: DISCONTINUED | OUTPATIENT
Start: 2022-04-02 | End: 2022-04-07

## 2022-04-02 RX ADMIN — PYRIDOSTIGMINE BROMIDE 60 MILLIGRAM(S): 60 SOLUTION ORAL at 15:37

## 2022-04-02 RX ADMIN — PYRIDOSTIGMINE BROMIDE 60 MILLIGRAM(S): 60 SOLUTION ORAL at 19:15

## 2022-04-02 RX ADMIN — ONDANSETRON 4 MILLIGRAM(S): 8 TABLET, FILM COATED ORAL at 15:00

## 2022-04-02 RX ADMIN — Medication 100 MILLIGRAM(S): at 20:25

## 2022-04-02 RX ADMIN — PYRIDOSTIGMINE BROMIDE 60 MILLIGRAM(S): 60 SOLUTION ORAL at 22:35

## 2022-04-02 RX ADMIN — Medication 125 MILLIGRAM(S): at 15:00

## 2022-04-02 RX ADMIN — HEPARIN SODIUM 5000 UNIT(S): 5000 INJECTION INTRAVENOUS; SUBCUTANEOUS at 22:34

## 2022-04-02 RX ADMIN — TAMSULOSIN HYDROCHLORIDE 0.4 MILLIGRAM(S): 0.4 CAPSULE ORAL at 22:34

## 2022-04-02 RX ADMIN — IMMUNE GLOBULIN (HUMAN) 150 GRAM(S): 10 INJECTION INTRAVENOUS; SUBCUTANEOUS at 22:34

## 2022-04-02 RX ADMIN — SODIUM CHLORIDE 1000 MILLILITER(S): 9 INJECTION INTRAMUSCULAR; INTRAVENOUS; SUBCUTANEOUS at 15:44

## 2022-04-02 RX ADMIN — ATORVASTATIN CALCIUM 80 MILLIGRAM(S): 80 TABLET, FILM COATED ORAL at 23:21

## 2022-04-02 NOTE — ED PROVIDER NOTE - CHPI ED SYMPTOMS POS
7/9 - Pt said she wants to receive her CTscan results no later than Monday, 7/12, because she is leaving for vacation on Tuesday morning, 7/13  Please call pt and advise, or advise pt if Dr Ronal Almaraz must call her back with the results 
Called and spoke to patient to make sure she got my message, she will be leaving for vacation for 2 weeks but doesn't have a set day to come back  She will call when she is back to schedule follow up appointment with Dr Zeina Barragan for some soreness she is having in her abdomin 
Dr Sherly Ann also mentioned  Small stone in right ureter which is same as before but that might be causing pain   She should see a urologist
Message Dr Benjamín Jeronimo on [employer], he stated no hernia  Called patient and LM relaying the information 
WEAKNESS

## 2022-04-02 NOTE — H&P ADULT - NSHPREVIEWOFSYSTEMS_GEN_ALL_CORE
CONSTITUTIONAL: No fever, weight loss, or fatigue  EYES: No eye pain, visual disturbances, or discharge  ENT:  No difficulty hearing, tinnitus, vertigo; No sinus or throat pain  NECK: No pain or stiffness  RESPIRATORY: No cough, wheezing, chills or hemoptysis; No Shortness of Breath  CARDIOVASCULAR: No chest pain, palpitations, passing out, dizziness, or leg swelling  GASTROINTESTINAL: No abdominal or epigastric pain. No nausea, vomiting, or hematemesis; No diarrhea or constipation. No melena or hematochezia.  GENITOURINARY: No dysuria, frequency, hematuria, or incontinence  NEUROLOGICAL: No headaches, memory loss, loss of strength, numbness, or tremors  SKIN: No itching, burning, rashes, or lesions   LYMPH Nodes: No enlarged glands  ENDOCRINE: No heat or cold intolerance; No hair loss  HEME/LYMPH: No easy bruising, or bleeding gums  ALLERGY AND IMMUNOLOGIC: No hives or eczema

## 2022-04-02 NOTE — PHARMACOTHERAPY INTERVENTION NOTE - COMMENTS
Md ordered Gammagard for the patient who has Myasthenia gravis weighing 72.6 kg  130 gm/day x5 days  the total dose should be for 5 days 145.2 gm and per day should be 30 gm.

## 2022-04-02 NOTE — ED PROVIDER NOTE - OBJECTIVE STATEMENT
66 year old male with past medical history of hypertension, peripheral neuropathy, diabetes, myasthenia gravis presents to the ED with complaints of feelings of weakness this morning. The patient states that this morning he told his girlfriend that he was not feeling well and was having weakness. The patient denies any chest pain, vomiting, diarrhea, and is not complaining fo shortness of breath. NKDA.

## 2022-04-02 NOTE — H&P ADULT - PROBLEM SELECTOR PLAN 8
IMPROVE VTE Individual Risk Assessment          RISK                                                          Points  [  ] Previous VTE                                                3  [  ] Thrombophilia                                             2  [  ] Lower limb paralysis                                   2        (unable to hold up >15 seconds)    [  ] Current Cancer                                             2         (within 6 months)  [x  ] Immobilization > 24 hrs                              1  [  ] ICU/CCU stay > 24 hours                             1  [ x] Age > 60                                                         1    IMPROVE VTE Score:         [   2      ]    Will start heparin subQ - pt takes atorvastain 80mg  -c/w home medications pt takes Novolin 70/30 10 units once a day and Humulin R  will start on SS  f/u A1c  chhaya NICHOLSON  Diabetic diet

## 2022-04-02 NOTE — H&P ADULT - PROBLEM SELECTOR PLAN 10
IMPROVE VTE Individual Risk Assessment          RISK                                                          Points  [  ] Previous VTE                                                3  [  ] Thrombophilia                                             2  [  ] Lower limb paralysis                                   2        (unable to hold up >15 seconds)    [  ] Current Cancer                                             2         (within 6 months)  [x  ] Immobilization > 24 hrs                              1  [  ] ICU/CCU stay > 24 hours                             1  [ x] Age > 60                                                         1    IMPROVE VTE Score:         [   2      ]    Will start heparin subQ

## 2022-04-02 NOTE — ED ADULT NURSE NOTE - OBJECTIVE STATEMENT
BIBA c/o of generalized weakness and poor appetite x 2 days BIBA c/o of generalized weakness and poor appetite x 2 days. Denies CP and any other pain. Stated he lives in Assisted Living and will like to go to a NH bc he needs more assistance. Pt has periorbital edema and generalized swelling

## 2022-04-02 NOTE — H&P ADULT - PROBLEM SELECTOR PLAN 7
- pt takes atorvastain 80mg  -c/w home medications pt takes Novolin 70/30 10 units once a day and Humulin R  will start on SS  f/u A1c  chhaya NICHOLSON  Diabetic diet pt is on hydralazine, Losartan, Lasix, nifedipine  /65  Will start nifedipine and hydralazine  Hold Losartan for MICHEL  f/u ECHO (pt is on Lasix 20mg for  unknown reason)

## 2022-04-02 NOTE — H&P ADULT - ATTENDING COMMENTS
66 year old male  from [facility], ambulates independently, with PMHx of Myasthenia Gravis s/p Thymectomy , HTN, HLD, CVA x 2, Osteoporosis, PAD, Peripheral Neuropathy, Cervical and Lumbar spondylosis, and BPH who presented to the ED for generalized weakness. Patient stated that for the past 3 days he feels overall more weak than his baseline and also had decrease in appetite. Patient denies any headaches, visual changes, SOB, cough, chest pain, abdominal pain, or change in bowel habits. Pt was previously admitted for similar sxs and was treated for Acute exacerbation of myasthenia gravis.    In the ED:  Afebile, HR 97, /67, 99% on RA  WBC 15.9, BUN/Creatinine 20/1.63  UA negative, CXR no congestions or infiltrates (pending official read)  Was given Solumedrol 125mg + pyridostigmine 60mg       # GENERALIZED WEAKNESS - SUSPECTED MYASTHENIA GRAVIS [S/P THYMECTOMY] FLARE  - ON PYRIDOSTIGMINE 60MG X 6 TIMES DAILY  - ON RECENT ADMISSION, DISCHARGED ON PROLONGED PREDNISONE TAPER  - F/U NIF  - F/U PT EVAL  - RECOMMENDED FOR IVIG INFUSION X 5 DAYS PER NEUROLOGY RECOMMENDATION  - NEUROLOGY CONSULT IN PROGRESS [CASE DISCUSSED WITH DR. RICARDO]  - PATIENT FOLLOW W/ DR. ESPINOZA AT [facility]    # ASYMPTOMATIC BRADYCARDIA - MONITOR ON TELEMETRY, F/U EKG, F/U ECHOCARDIOGRAM  - CARDIOLOGY CONSULT PLACED    # ? SUSPECTED CKD - REVIEWED UA AND PREVIOUS CR, OBTAIN OUTPATIENT RECORDS  - F/U BLADDER SCAN    # BPH - ON FLOMAX  # HLD - STATIN  # HTN - ON HYDRALAZINE, NIFEDIPINE; HOLD LOSARTAN  # DM - SSI + FS; HOLD METFORMIN    # PER PATIENT'S REQUEST - CASE D/W PARTNER, MILLI DONOVAN @ [phone] [4/2] - CASE DISCUSSED AT LENGTH AND ALL QUESTIONS ANSWERED  # GI AND DVT PPX

## 2022-04-02 NOTE — H&P ADULT - PROBLEM SELECTOR PLAN 3
BUN/Creatinine 20/1.63 (creatinine 1.3 on previous discharge)  ragini pre renal due to decrease PO intake  c/w IVF  f/u BMP in AM

## 2022-04-02 NOTE — H&P ADULT - PROBLEM SELECTOR PLAN 5
pt is on hydralazine, Losartan, Lasix, nifedipine  /65  Will start XXXXX - hb 11.6 MCV 86  - no signs of bleeding  - f/u iron panel - WBC 15, afebrile  -UA negative, CXR no consolidation or infiltrates (pending official read)  - unlikely infection, likley reactive to dehydration and steroid use  - c/w maintance fluids   - trend WBC

## 2022-04-02 NOTE — H&P ADULT - PROBLEM SELECTOR PLAN 6
pt takes Novolin 70/30 10 units once a day and Humulin R  will start on SS  f/u A1c  chhaya NICHOLSON  Diabetic diet pt is on hydralazine, Losartan, Lasix, nifedipine  /65  Will start XXXXX pt is on hydralazine, Losartan, Lasix, nifedipine  /65  Will start nifedipine and hydralazine  Hold Losartan for MICHEL  f/u ECHO (pt is on Lasix 20mg for  unknown reason) - hb 11.6 MCV 86  - no signs of bleeding  - f/u iron panel

## 2022-04-02 NOTE — H&P ADULT - PROBLEM SELECTOR PLAN 1
pt with hx of MG presented with worsening weakness than baseline  WBC 15, afebrile  UA negative, CXR no consolidation or infiltrates (pending official read)  weakness likely 2/2 myasthenia flare   s/p solumedrol and pyridostigmine in ED  c/w steriods  f/u TSH pt with hx of MG presented with worsening weakness than baseline  WBC 15, afebrile  UA negative, CXR no consolidation or infiltrates (pending official read)  weakness likely 2/2 myasthenia flare   s/p solumedrol and pyridostigmine in ED  c/w steriods  f/u TSH, CRP, ESR, iron panel pt with hx of MG presented with worsening weakness than baseline  WBC 15, afebrile  UA negative, CXR no consolidation or infiltrates (pending official read)  weakness likely 2/2 myasthenia flare   s/p solumedrol and pyridostigmine in ED  f/u TSH, CRP, ESR, iron panel  Consulted Neurologist: Dr. Shaw pt with hx of MG presented with worsening weakness than baseline  WBC 15, afebrile  UA negative, CXR no consolidation or infiltrates (pending official read)  s/p solumedrol and pyridostigmine in ED  weakness likely 2/2 myasthenia flare   f/u TSH, CRP, ESR, iron panel  Consulted Neurologist: Dr. Shaw pt with hx of MG presented with worsening weakness than baseline  admitted two months ago for MG flare and was discharged on steroid taper  pt failed multiple attempts with steroids, will consider IVIG  currently takes prednisone 20mg daily and pyridostigmine 60mg  no current sxs of difficulty breathing or swallowing   continue home meds  NIF And Vital Capacity Q 6 hrs to Determine Progression Of Neuromuscular Diseases  Consulted Neuro Dr. Shaw pt with hx of MG presented with worsening weakness than baseline  admitted two months ago for MG flare and was discharged on steroid taper  pt failed multiple attempts with steroids, will consider IVIG  currently takes prednisone 20mg daily and pyridostigmine 60mg  no current sxs of difficulty breathing or swallowing   continue home meds  NIF And Vital Capacity Q 6 hrs to Determine Progression Of Neuromuscular Diseases  Consulted Neuro Dr. Shaw: start IVIG 2g/kg for 5 days pt with hx of MG presented with worsening weakness than baseline  admitted two months ago for MG flare and was discharged on steroid taper  pt failed multiple attempts with steroids, will consider IVIG  currently takes prednisone 20mg daily and pyridostigmine 60mg  no current sxs of difficulty breathing or swallowing   continue home meds  NIF And Vital Capacity Q 6 hrs to Determine Progression Of Neuromuscular Diseases  Consulted Neuro Dr. Shaw: start IVIG   will start IVIG 30g/ day for 5 days (2g/kg over treatment course)

## 2022-04-02 NOTE — ED ADULT TRIAGE NOTE - CHIEF COMPLAINT QUOTE
BIBA for facial swelling, generalized weakness x 2 days, poor appetite BIBA for facial swelling, generalized weakness x 2 days, poor appetite, no SOB/ respiratory distress

## 2022-04-02 NOTE — H&P ADULT - PROBLEM SELECTOR PLAN 4
- WBC 15, afebrile  -UA negative, CXR no consolidation or infiltrates (pending official read)  - unlikely infection, likley reactive to dehydration and steroid use  - c/w maintance fluids   - trend WBC Pt's HR noted 44/min in ED hold  Pt is asymptomatic   will get ECG and monitor in tele  Cardio Dr. Mercado was consulted

## 2022-04-02 NOTE — H&P ADULT - NSHPLABSRESULTS_GEN_ALL_CORE
LABS:                        11.6   15.96 )-----------( 258      ( 2022 12:57 )             36.0     04-02    137  |  100  |  20<H>  ----------------------------<  154<H>  4.1   |  30  |  1.63<H>    Ca    9.0      2022 12:57  Mg     2.2     04-02    TPro  6.5  /  Alb  3.2<L>  /  TBili  0.4  /  DBili  x   /  AST  40  /  ALT  46  /  AlkPhos  87  04-02      Urinalysis Basic - ( 2022 15:16 )    Color: Yellow / Appearance: Clear / S.015 / pH: x  Gluc: x / Ketone: Trace  / Bili: Negative / Urobili: Negative   Blood: x / Protein: 100 / Nitrite: Negative   Leuk Esterase: Negative / RBC: 0-2 /HPF / WBC 0-2 /HPF   Sq Epi: x / Non Sq Epi: Occasional /HPF / Bacteria: Trace /HPF      LIVER FUNCTIONS - ( 2022 12:57 )  Alb: 3.2 g/dL / Pro: 6.5 g/dL / ALK PHOS: 87 U/L / ALT: 46 U/L DA / AST: 40 U/L / GGT: x

## 2022-04-02 NOTE — H&P ADULT - PROBLEM SELECTOR PLAN 2
pt with hx of MG presented with worsening weakness than baseline  admitted two months ago for MG flare and was discharged on steroid taper  currently takes prednisone 20mg daily and pyridostigmine 60mg  continue home meds pt with hx of MG presented with worsening weakness than baseline  admitted two months ago for MG flare and was discharged on steroid taper  pt failed multiple attempts with steroids, will consider IVIG  currently takes prednisone 20mg daily and pyridostigmine 60mg  continue home meds  Consulted Neuro Dr. Shaw pt with hx of MG presented with worsening weakness than baseline  admitted two months ago for MG flare and was discharged on steroid taper  pt failed multiple attempts with steroids, will consider IVIG  currently takes prednisone 20mg daily and pyridostigmine 60mg  continue home meds  NIF And Vital Capacity Q 12 hrs to Determine Progression Of Neuromuscular Diseases  Consulted Neuro Dr. Shaw pt with hx of MG presented with worsening weakness than baseline  admitted two months ago for MG flare and was discharged on steroid taper  pt failed multiple attempts with steroids, will consider IVIG  currently takes prednisone 20mg daily and pyridostigmine 60mg  continue home meds  NIF And Vital Capacity Q 6 hrs to Determine Progression Of Neuromuscular Diseases  Consulted Neuro Dr. Shaw pt with hx of MG presented with worsening weakness than baseline  admitted two months ago for MG flare and was discharged on steroid taper  pt failed multiple attempts with steroids, will consider IVIG  currently takes prednisone 20mg daily and pyridostigmine 60mg  no current sxs of difficulty breathing or swallowing   continue home meds  NIF And Vital Capacity Q 6 hrs to Determine Progression Of Neuromuscular Diseases  Consulted Neuro Dr. Shaw pt with hx of MG presented with worsening weakness than baseline  WBC 15, afebrile  UA negative, CXR no consolidation or infiltrates (pending official read)  s/p solumedrol and pyridostigmine in ED  weakness likely 2/2 myasthenia flare   f/u TSH, CRP, ESR, iron panel  Consulted Neurologist: Dr. Shaw

## 2022-04-02 NOTE — H&P ADULT - HISTORY OF PRESENT ILLNESS
66 year old male  from [facility], ambulates independently, with PMHx of Myasthenia Gravis s/p Thymectomy , HTN, HLD, CVA x 2, Osteoporosis, PAD, Peripheral Neuropathy, Cervical and Lumbar spondylosis, and BPH who presented to the ED for generalized weakness. Patient stated that for the past 3 days he feels overall more weak than his baseline and also had decrease in appetite. Patient denies any headaches, visual changes, SOB, cough, chest pain, abdominal pain, or change in bowel habits. Pt was previously admitted for similar sxs and was treated for Acute exacerbation of myasthenia gravis.    In the ED:  Afebile, HR 97, /67, 99% on RA  WBC 15.9, BUN/Creatinine 20/1.63  UA negative, CXR no congestions or infiltrates (pending official read)  Was given Solumedrol 125mg + pyridostigmine 60mg

## 2022-04-02 NOTE — H&P ADULT - NSHPPHYSICALEXAM_GEN_ALL_CORE
GENERAL: NAD  HEAD:  Atraumatic, Normocephalic  EYES: EOMI, PERRLA, conjunctiva and sclera clear  ENMT: No tonsillar erythema, exudates, or enlargement; Moist mucous membranes,   NECK: Supple, normal appearance, No JVD; Normal thyroid; Trachea midline  NERVOUS SYSTEM:  Alert & Oriented X3,  Motor Strength 5/5 B/L upper and lower extremities, sensation intact  CHEST/LUNG: Lungs clear to auscultation bilaterally, No rales, rhonchi, wheezing   HEART: Regular rate and rhythm; No murmurs, rubs, or gallops  ABDOMEN: Soft, Nontender, Nondistended; Bowel sounds present  EXTREMITIES:  2+ Peripheral Pulses, +1 bilateral pitting edema   LYMPH: No lymphadenopathy noted  SKIN: No rashes or lesions;  Good capillary refill GENERAL: NAD  HEAD:  Atraumatic, Normocephalic  EYES: EOMI, PERRLA, conjunctiva and sclera clear, exophthalmos   ENMT: No tonsillar erythema, exudates, or enlargement; Moist mucous membranes,   NECK: Supple, normal appearance, No JVD; Normal thyroid; Trachea midline  NERVOUS SYSTEM:  Alert & Oriented X3,  Motor Strength 5/5 B/L upper and lower extremities, sensation intact  CHEST/LUNG: Lungs clear to auscultation bilaterally, No rales, rhonchi, wheezing   HEART: Regular rate and rhythm; No murmurs, rubs, or gallops  ABDOMEN: Soft, Nontender, Nondistended; Bowel sounds present  EXTREMITIES:  2+ Peripheral Pulses, +1 bilateral pitting edema   LYMPH: No lymphadenopathy noted  SKIN: No rashes or lesions;  Good capillary refill GENERAL: NAD  HEAD:  Atraumatic, Normocephalic  EYES: EOMI, PERRLA, conjunctiva and sclera clear, exophthalmos   ENMT: No tonsillar erythema, exudates, or enlargement; Moist mucous membranes,   NECK: Supple, normal appearance, No JVD; Normal thyroid; Trachea midline  NERVOUS SYSTEM:  Alert & Oriented X3,  Motor Strength 5/5 B/L upper and lower extremities, sensation intact  CHEST/LUNG: Lungs clear to auscultation bilaterally, No rales, rhonchi, wheezing   HEART: Regular rate and rhythm; (+) diastolic murmur, no rubs, or gallops  ABDOMEN: Soft, Nontender, Nondistended; Bowel sounds present  EXTREMITIES:  2+ Peripheral Pulses, +1 bilateral pitting edema   LYMPH: No lymphadenopathy noted  SKIN: No rashes or lesions;  Good capillary refill

## 2022-04-02 NOTE — H&P ADULT - PROBLEM SELECTOR PLAN 9
IMPROVE VTE Individual Risk Assessment          RISK                                                          Points  [  ] Previous VTE                                                3  [  ] Thrombophilia                                             2  [  ] Lower limb paralysis                                   2        (unable to hold up >15 seconds)    [  ] Current Cancer                                             2         (within 6 months)  [x  ] Immobilization > 24 hrs                              1  [  ] ICU/CCU stay > 24 hours                             1  [ x] Age > 60                                                         1    IMPROVE VTE Score:         [   2      ]    Will start heparin subQ - pt takes atorvastain 80mg  -c/w home medications

## 2022-04-02 NOTE — ED ADULT NURSE NOTE - CHIEF COMPLAINT QUOTE
BIBA for facial swelling, generalized weakness x 2 days, poor appetite, no SOB/ respiratory distress

## 2022-04-02 NOTE — ED PROVIDER NOTE - PATIENT PORTAL LINK FT
You can access the FollowMyHealth Patient Portal offered by [facility] by registering at the following website: [email or web address]. By joining [facility]’s FollowMyHealth portal, you will also be able to view your health information using other applications (apps) compatible with our system.

## 2022-04-02 NOTE — CHART NOTE - NSCHARTNOTEFT_GEN_A_CORE
Pt wants us to ask his girlfriend about his history. Discussed with girlfriend Jada [phone] and updated his condition. She denied hx of thyroid disease or heart disease, abnormal heart rate. RN notified that pt has HR 44 and /84. Pt was resting and had no symptoms. Hydralazine 100mg PO was ordered stat.  Pt wants us to ask his girlfriend about his history. Discussed with girlfriend Jada [phone] and updated his condition. She denied hx of thyroid disease or heart disease, abnormal heart rate. RN notified that pt has HR 44 and /84. Pt was resting and had no symptoms. Hydralazine 100mg PO was ordered stat. ECG ordered and transfer to tele.  Pt wants us to ask his girlfriend about his history. Discussed with girlfriend Jada [phone] and updated his condition. She denied hx of thyroid disease or heart disease, abnormal heart rate.

## 2022-04-02 NOTE — H&P ADULT - ASSESSMENT
66 year old male  from [facility], ambulates independently, with PMHx of Myasthenia Gravis s/p Thymectomy , HTN, HLD, CVA x 2, Osteoporosis, PAD, Peripheral Neuropathy, Cervical and Lumbar spondylosis, and BPH who presented to the ED for generalized weakness.

## 2022-04-03 LAB
A1C WITH ESTIMATED AVERAGE GLUCOSE RESULT: 6.3 % — HIGH (ref 4–5.6)
ALBUMIN SERPL ELPH-MCNC: 2.7 G/DL — LOW (ref 3.5–5)
ALP SERPL-CCNC: 70 U/L — SIGNIFICANT CHANGE UP (ref 40–120)
ALT FLD-CCNC: 36 U/L DA — SIGNIFICANT CHANGE UP (ref 10–60)
ANION GAP SERPL CALC-SCNC: 6 MMOL/L — SIGNIFICANT CHANGE UP (ref 5–17)
AST SERPL-CCNC: 20 U/L — SIGNIFICANT CHANGE UP (ref 10–40)
BASOPHILS # BLD AUTO: 0.01 K/UL — SIGNIFICANT CHANGE UP (ref 0–0.2)
BASOPHILS NFR BLD AUTO: 0.1 % — SIGNIFICANT CHANGE UP (ref 0–2)
BILIRUB SERPL-MCNC: 0.3 MG/DL — SIGNIFICANT CHANGE UP (ref 0.2–1.2)
BUN SERPL-MCNC: 31 MG/DL — HIGH (ref 7–18)
CALCIUM SERPL-MCNC: 8.3 MG/DL — LOW (ref 8.4–10.5)
CHLORIDE SERPL-SCNC: 103 MMOL/L — SIGNIFICANT CHANGE UP (ref 96–108)
CO2 SERPL-SCNC: 29 MMOL/L — SIGNIFICANT CHANGE UP (ref 22–31)
CREAT SERPL-MCNC: 1.73 MG/DL — HIGH (ref 0.5–1.3)
EGFR: 44 ML/MIN/1.73M2 — LOW
EOSINOPHIL # BLD AUTO: 0 K/UL — SIGNIFICANT CHANGE UP (ref 0–0.5)
EOSINOPHIL NFR BLD AUTO: 0 % — SIGNIFICANT CHANGE UP (ref 0–6)
ERYTHROCYTE [SEDIMENTATION RATE] IN BLOOD: 5 MM/HR — SIGNIFICANT CHANGE UP (ref 0–20)
ESTIMATED AVERAGE GLUCOSE: 134 MG/DL — HIGH (ref 68–114)
FERRITIN SERPL-MCNC: 51 NG/ML — SIGNIFICANT CHANGE UP (ref 30–400)
GLUCOSE BLDC GLUCOMTR-MCNC: 121 MG/DL — HIGH (ref 70–99)
GLUCOSE BLDC GLUCOMTR-MCNC: 141 MG/DL — HIGH (ref 70–99)
GLUCOSE BLDC GLUCOMTR-MCNC: 161 MG/DL — HIGH (ref 70–99)
GLUCOSE BLDC GLUCOMTR-MCNC: 277 MG/DL — HIGH (ref 70–99)
GLUCOSE SERPL-MCNC: 117 MG/DL — HIGH (ref 70–99)
HCT VFR BLD CALC: 32.5 % — LOW (ref 39–50)
HGB BLD-MCNC: 10.4 G/DL — LOW (ref 13–17)
IMM GRANULOCYTES NFR BLD AUTO: 0.4 % — SIGNIFICANT CHANGE UP (ref 0–1.5)
IRON SATN MFR SERPL: 14 % — LOW (ref 20–55)
IRON SATN MFR SERPL: 30 UG/DL — LOW (ref 65–170)
LYMPHOCYTES # BLD AUTO: 1.05 K/UL — SIGNIFICANT CHANGE UP (ref 1–3.3)
LYMPHOCYTES # BLD AUTO: 8.8 % — LOW (ref 13–44)
MAGNESIUM SERPL-MCNC: 2.1 MG/DL — SIGNIFICANT CHANGE UP (ref 1.6–2.6)
MCHC RBC-ENTMCNC: 27.9 PG — SIGNIFICANT CHANGE UP (ref 27–34)
MCHC RBC-ENTMCNC: 32 GM/DL — SIGNIFICANT CHANGE UP (ref 32–36)
MCV RBC AUTO: 87.1 FL — SIGNIFICANT CHANGE UP (ref 80–100)
MONOCYTES # BLD AUTO: 1.1 K/UL — HIGH (ref 0–0.9)
MONOCYTES NFR BLD AUTO: 9.2 % — SIGNIFICANT CHANGE UP (ref 2–14)
NEUTROPHILS # BLD AUTO: 9.72 K/UL — HIGH (ref 1.8–7.4)
NEUTROPHILS NFR BLD AUTO: 81.5 % — HIGH (ref 43–77)
NRBC # BLD: 0 /100 WBCS — SIGNIFICANT CHANGE UP (ref 0–0)
PHOSPHATE SERPL-MCNC: 3.6 MG/DL — SIGNIFICANT CHANGE UP (ref 2.5–4.5)
PLATELET # BLD AUTO: 241 K/UL — SIGNIFICANT CHANGE UP (ref 150–400)
POTASSIUM SERPL-MCNC: 4.4 MMOL/L — SIGNIFICANT CHANGE UP (ref 3.5–5.3)
POTASSIUM SERPL-SCNC: 4.4 MMOL/L — SIGNIFICANT CHANGE UP (ref 3.5–5.3)
PROT SERPL-MCNC: 6.1 G/DL — SIGNIFICANT CHANGE UP (ref 6–8.3)
RBC # BLD: 3.73 M/UL — LOW (ref 4.2–5.8)
RBC # FLD: 13.8 % — SIGNIFICANT CHANGE UP (ref 10.3–14.5)
SODIUM SERPL-SCNC: 138 MMOL/L — SIGNIFICANT CHANGE UP (ref 135–145)
T4 FREE SERPL-MCNC: 1.2 NG/DL — SIGNIFICANT CHANGE UP (ref 0.9–1.8)
TIBC SERPL-MCNC: 209 UG/DL — LOW (ref 250–450)
TSH SERPL-MCNC: 0.59 UU/ML — SIGNIFICANT CHANGE UP (ref 0.34–4.82)
UIBC SERPL-MCNC: 179 UG/DL — SIGNIFICANT CHANGE UP (ref 110–370)
WBC # BLD: 11.93 K/UL — HIGH (ref 3.8–10.5)
WBC # FLD AUTO: 11.93 K/UL — HIGH (ref 3.8–10.5)

## 2022-04-03 PROCEDURE — 99222 1ST HOSP IP/OBS MODERATE 55: CPT | Mod: GC

## 2022-04-03 RX ORDER — HYDRALAZINE HCL 50 MG
10 TABLET ORAL ONCE
Refills: 0 | Status: COMPLETED | OUTPATIENT
Start: 2022-04-03 | End: 2022-04-03

## 2022-04-03 RX ORDER — HYDRALAZINE HCL 50 MG
5 TABLET ORAL ONCE
Refills: 0 | Status: COMPLETED | OUTPATIENT
Start: 2022-04-03 | End: 2022-04-03

## 2022-04-03 RX ADMIN — SERTRALINE 50 MILLIGRAM(S): 25 TABLET, FILM COATED ORAL at 12:08

## 2022-04-03 RX ADMIN — Medication 81 MILLIGRAM(S): at 12:08

## 2022-04-03 RX ADMIN — PYRIDOSTIGMINE BROMIDE 60 MILLIGRAM(S): 60 SOLUTION ORAL at 06:16

## 2022-04-03 RX ADMIN — Medication 1: at 17:30

## 2022-04-03 RX ADMIN — PYRIDOSTIGMINE BROMIDE 60 MILLIGRAM(S): 60 SOLUTION ORAL at 16:19

## 2022-04-03 RX ADMIN — Medication 60 MILLIGRAM(S): at 17:38

## 2022-04-03 RX ADMIN — PYRIDOSTIGMINE BROMIDE 60 MILLIGRAM(S): 60 SOLUTION ORAL at 11:56

## 2022-04-03 RX ADMIN — TAMSULOSIN HYDROCHLORIDE 0.4 MILLIGRAM(S): 0.4 CAPSULE ORAL at 21:15

## 2022-04-03 RX ADMIN — Medication 20 MILLIGRAM(S): at 06:16

## 2022-04-03 RX ADMIN — Medication 0: at 08:58

## 2022-04-03 RX ADMIN — Medication 10 MILLIGRAM(S): at 02:21

## 2022-04-03 RX ADMIN — Medication 100 MILLIGRAM(S): at 05:03

## 2022-04-03 RX ADMIN — PYRIDOSTIGMINE BROMIDE 60 MILLIGRAM(S): 60 SOLUTION ORAL at 21:15

## 2022-04-03 RX ADMIN — Medication 0.2 MILLIGRAM(S): at 06:16

## 2022-04-03 RX ADMIN — Medication 0: at 12:10

## 2022-04-03 RX ADMIN — Medication 100 MILLIGRAM(S): at 16:19

## 2022-04-03 RX ADMIN — HEPARIN SODIUM 5000 UNIT(S): 5000 INJECTION INTRAVENOUS; SUBCUTANEOUS at 16:19

## 2022-04-03 RX ADMIN — IMMUNE GLOBULIN (HUMAN) 150 GRAM(S): 10 INJECTION INTRAVENOUS; SUBCUTANEOUS at 12:07

## 2022-04-03 RX ADMIN — Medication 5 MILLIGRAM(S): at 01:07

## 2022-04-03 RX ADMIN — PYRIDOSTIGMINE BROMIDE 60 MILLIGRAM(S): 60 SOLUTION ORAL at 05:11

## 2022-04-03 RX ADMIN — HEPARIN SODIUM 5000 UNIT(S): 5000 INJECTION INTRAVENOUS; SUBCUTANEOUS at 06:16

## 2022-04-03 RX ADMIN — HEPARIN SODIUM 5000 UNIT(S): 5000 INJECTION INTRAVENOUS; SUBCUTANEOUS at 21:15

## 2022-04-03 RX ADMIN — ATORVASTATIN CALCIUM 80 MILLIGRAM(S): 80 TABLET, FILM COATED ORAL at 21:15

## 2022-04-03 RX ADMIN — Medication 60 MILLIGRAM(S): at 05:04

## 2022-04-03 RX ADMIN — Medication 100 MILLIGRAM(S): at 21:14

## 2022-04-03 NOTE — CONSULT NOTE ADULT - ASSESSMENT
66 year old male  from [facility], ambulates independently, with PMHx of Myasthenia Gravis s/p Thymectomy , HTN, HLD, Chronic Left PCA infarct, CKD, Osteoporosis, PAD, Peripheral Neuropathy, Cervical and Lumbar spondylosis, and BPH who presented to the ED for generalized weakness. On labwork there is elevated WBC.   Pt does not appear to be in exacerbation.     - Decrease IVIG to 3 days total.   - Continue prednisone 20mg daily, Mestinon q4hrs. Monitor for excessive secretions  - Pt needs physical therapy for deconditioning.   -  NIF and VC Q12hrs, pt not showing any current signs of respiratory compromise  - Infectious and metabolic workup as per medicine team

## 2022-04-03 NOTE — CHART NOTE - NSCHARTNOTEFT_GEN_A_CORE
EVENT:  66 year old male from [facility], ambulates independently, with PMHx of Myasthenia Gravis s/p Thymectomy , HTN, HLD, CVA x 2, Osteoporosis, PAD, Peripheral Neuropathy, Cervical and Lumbar spondylosis, and BPH who presented to the ED for generalized weakness. Patient stated that for the past 3 days he feels overall more weak than his baseline and also had decrease in appetite. Patient denies any headaches, visual changes, SOB, cough, chest pain, abdominal pain, or change in bowel habits. Pt was previously admitted for similar sxs and was treated for Acute exacerbation of myasthenia gravis.    In the ED:  Afebrile, HR 97, /67, 99% on RA  WBC 15.9, BUN/Creatinine 20/1.63  UA negative, CXR no congestions or infiltrates (pending official read)  Was given Solumedrol 125mg + pyridostigmine 60mg  (02 Apr 2022 16:23)    SUBJECTIVE:  Patient with elevated blood pressure, uncontrolled  /97, 209/101, 224/89, 201/94, 198/89, 208/85  After Clonidine 0.1mg - /93, 163/77  Patient denies headaches, dizziness    OBJECTIVE:  Vital Signs Last 24 Hrs  T(C): 36.3 (03 Apr 2022 07:20), Max: 37 (02 Apr 2022 16:40)  T(F): 97.4 (03 Apr 2022 07:20), Max: 98.6 (02 Apr 2022 16:40)  HR: 79 (03 Apr 2022 07:25) (44 - 95)  BP: 163/77 (03 Apr 2022 07:25) (162/65 - 224/89)  BP(mean): 130 (03 Apr 2022 03:53) (130 - 130)  RR: 18 (03 Apr 2022 07:20) (16 - 18)  SpO2: 97% (03 Apr 2022 07:20) (94% - 98%)    LABS:                        10.4   11.93 )-----------( 241      ( 03 Apr 2022 04:34 )             32.5     04-03    138  |  103  |  31<H>  ----------------------------<  117<H>  4.4   |  29  |  1.73<H>    Ca    8.3<L>      03 Apr 2022 04:34  Phos  3.6     04-03  Mg     2.1     04-03    T Pro  6.1  /  Alb  2.7<L>  /  T Bili  0.3  /  D Bili  x   /  AST  20  /  ALT  36  /  Alk Phos  70  04-03    Problem:  Uncontrolled HTN  -Clonidine 0.1mg given  -Hydralazine 10mg, 5mg IVP was given  -Continue Hydralazine 100mg po three times daily  -Continue Nifedipine xl 60mg twice daily  -Monitor blood pressure

## 2022-04-03 NOTE — PATIENT PROFILE ADULT - FALL HARM RISK - HARM RISK INTERVENTIONS

## 2022-04-03 NOTE — PROGRESS NOTE ADULT - SUBJECTIVE AND OBJECTIVE BOX
`Patient is a 66y old  Male who presents with a chief complaint of weakness (03 Apr 2022 12:02)    PATIENT IS SEEN AND EXAMINED IN MEDICAL FLOOR.  NGT [    ]    ARCADIO [   ]      GT [   ]    ALLERGIES:  No Known Allergies      Daily     Daily     VITALS:    Vital Signs Last 24 Hrs  T(C): 36.7 (03 Apr 2022 11:05), Max: 37 (02 Apr 2022 16:40)  T(F): 98 (03 Apr 2022 11:05), Max: 98.6 (02 Apr 2022 16:40)  HR: 84 (03 Apr 2022 11:05) (44 - 95)  BP: 169/79 (03 Apr 2022 11:05) (163/77 - 224/89)  BP(mean): 130 (03 Apr 2022 03:53) (130 - 130)  RR: 17 (03 Apr 2022 11:05) (16 - 18)  SpO2: 98% (03 Apr 2022 11:05) (94% - 98%)    LABS:    CBC Full  -  ( 03 Apr 2022 04:34 )  WBC Count : 11.93 K/uL  RBC Count : 3.73 M/uL  Hemoglobin : 10.4 g/dL  Hematocrit : 32.5 %  Platelet Count - Automated : 241 K/uL  Mean Cell Volume : 87.1 fl  Mean Cell Hemoglobin : 27.9 pg  Mean Cell Hemoglobin Concentration : 32.0 gm/dL  Auto Neutrophil # : 9.72 K/uL  Auto Lymphocyte # : 1.05 K/uL  Auto Monocyte # : 1.10 K/uL  Auto Eosinophil # : 0.00 K/uL  Auto Basophil # : 0.01 K/uL  Auto Neutrophil % : 81.5 %  Auto Lymphocyte % : 8.8 %  Auto Monocyte % : 9.2 %  Auto Eosinophil % : 0.0 %  Auto Basophil % : 0.1 %      04-03    138  |  103  |  31<H>  ----------------------------<  117<H>  4.4   |  29  |  1.73<H>    Ca    8.3<L>      03 Apr 2022 04:34  Phos  3.6     04-03  Mg     2.1     04-03    TPro  6.1  /  Alb  2.7<L>  /  TBili  0.3  /  DBili  x   /  AST  20  /  ALT  36  /  AlkPhos  70  04-03    CAPILLARY BLOOD GLUCOSE      POCT Blood Glucose.: 277 mg/dL (03 Apr 2022 11:45)  POCT Blood Glucose.: 121 mg/dL (03 Apr 2022 08:01)  POCT Blood Glucose.: 266 mg/dL (02 Apr 2022 21:29)        LIVER FUNCTIONS - ( 03 Apr 2022 04:34 )  Alb: 2.7 g/dL / Pro: 6.1 g/dL / ALK PHOS: 70 U/L / ALT: 36 U/L DA / AST: 20 U/L / GGT: x           Creatinine Trend: 1.73<--, 1.63<--  I&O's Summary      ABG - ( 02 Apr 2022 12:42 )  pH, Arterial: 7.47  pH, Blood: x     /  pCO2: 44    /  pO2: 76    / HCO3: 32    / Base Excess: 7.5   /  SaO2: 97                  Clean Catch Clean Catch (Midstream)  03-02 @ 06:43   <10,000 CFU/mL Normal Urogenital Elizabet  --  --      Clean Catch Clean Catch (Midstream)  01-19 @ 04:29   <10,000 CFU/mL Normal Urogenital Elizabet  --  --      .Blood Blood  01-19 @ 04:08   No Growth Final  --  --          MEDICATIONS:    MEDICATIONS  (STANDING):  aspirin enteric coated 81 milliGRAM(s) Oral daily  atorvastatin 80 milliGRAM(s) Oral at bedtime  heparin   Injectable 5000 Unit(s) SubCutaneous every 8 hours  hydrALAZINE 100 milliGRAM(s) Oral three times a day  immune   globulin 10% (GAMMAGARD) IVPB 20 Gram(s) IV Intermittent daily  insulin lispro (ADMELOG) corrective regimen sliding scale   SubCutaneous three times a day before meals  NIFEdipine XL 60 milliGRAM(s) Oral two times a day  predniSONE   Tablet 20 milliGRAM(s) Oral daily  pyridostigmine 60 milliGRAM(s) Oral every 4 hours  sertraline 50 milliGRAM(s) Oral daily  tamsulosin 0.4 milliGRAM(s) Oral at bedtime      MEDICATIONS  (PRN):      REVIEW OF SYSTEMS:                           ALL ROS DONE [ X   ]    CONSTITUTIONAL:  LETHARGIC [   ], FEVER [   ], UNRESPONSIVE [   ]  CVS:  CP  [   ], SOB, [   ], PALPITATIONS [   ], DIZZYNESS [   ]  RS: COUGH [   ], SPUTUM [   ]  GI: ABDOMINAL PAIN [   ], NAUSEA [   ], VOMITINGS [   ], DIARRHEA [   ], CONSTIPATION [   ]  :  DYSURIA [   ], NOCTURIA [   ], INCREASED FREQUENCY [   ], DRIBLING [   ],  SKELETAL: PAINFUL JOINTS [   ], SWOLLEN JOINTS [   ], NECK ACHE [   ], LOW BACK ACHE [   ],  SKIN : ULCERS [   ], RASH [   ], ITCHING [   ]  CNS: HEAD ACHE [   ], DOUBLE VISION [   ], BLURRED VISION [   ], AMS / CONFUSION [   ], SEIZURES [   ], WEAKNESS [   ],TINGLING / NUMBNESS [   ]    PHYSICAL EXAMINATION:  GENERAL APPEARANCE: NO DISTRESS  HEENT:  NO PALLOR, NO  JVD,  NO   NODES, NECK SUPPLE  CVS: S1 +, S2 +,   RS: AEEB,  OCCASIONAL  RALES +,   NO RONCHI  ABD: SOFT, NT, NO, BS +  EXT: NO PE  SKIN: WARM,   SKELETAL:  ROM ACCEPTABLE  CNS:  AAO X    ,   DEFICITS    RADIOLOGY :      ASSESSMENT :     Myasthenia gravis    HTN (hypertension)    DM (diabetes mellitus)    Myasthenia gravis    Hypercholesterolemia    CVA (cerebrovascular accident)    Peripheral neuropathy    Lipoma of chest wall    MG with thymoma (myasthena gravis)        PLAN:  HPI:  66 year old male  from [facility], ambulates independently, with PMHx of Myasthenia Gravis s/p Thymectomy , HTN, HLD, CVA x 2, Osteoporosis, PAD, Peripheral Neuropathy, Cervical and Lumbar spondylosis, and BPH who presented to the ED for generalized weakness. Patient stated that for the past 3 days he feels overall more weak than his baseline and also had decrease in appetite. Patient denies any headaches, visual changes, SOB, cough, chest pain, abdominal pain, or change in bowel habits. Pt was previously admitted for similar sxs and was treated for Acute exacerbation of myasthenia gravis.    In the ED:  Afebile, HR 97, /67, 99% on RA  WBC 15.9, BUN/Creatinine 20/1.63  UA negative, CXR no congestions or infiltrates (pending official read)  Was given Solumedrol 125mg + pyridostigmine 60mg  (02 Apr 2022 16:23)    # GENERALIZED WEAKNESS - SUSPECTED MYASTHENIA GRAVIS [S/P THYMECTOMY] FLARE  - ON PYRIDOSTIGMINE 60MG X 6 TIMES DAILY  - ON RECENT ADMISSION, DISCHARGED ON PROLONGED PREDNISONE TAPER  - F/U NIF  - F/U PT EVAL  - RECOMMENDED FOR IVIG INFUSION X 5 DAYS PER NEUROLOGY RECOMMENDATION  - NEUROLOGY CONSULT IN PROGRESS [CASE DISCUSSED WITH DR. RICARDO]  - PATIENT FOLLOW W/ DR. ESPINOZA AT [facility]    # ASYMPTOMATIC BRADYCARDIA - MONITOR ON TELEMETRY, F/U EKG, F/U ECHOCARDIOGRAM  - CARDIOLOGY CONSULT PLACED    # ? SUSPECTED CKD - REVIEWED UA AND PREVIOUS CR, OBTAIN OUTPATIENT RECORDS  - F/U BLADDER SCAN    # BPH - ON FLOMAX  # HLD - STATIN  # HTN - ON HYDRALAZINE, NIFEDIPINE; HOLD LOSARTAN  # DM - SSI + FS; HOLD METFORMIN    # PER PATIENT'S REQUEST - CASE D/W PARTNER, MILLI DONOVAN @ [phone] [4/2] - CASE DISCUSSED AT LENGTH AND ALL QUESTIONS ANSWERED  # GI AND DVT PPX .     Patient is a 66y old  Male who presents with a chief complaint of weakness (03 Apr 2022 12:02)    PATIENT IS SEEN AND EXAMINED IN MEDICAL FLOOR.      ALLERGIES:  No Known Allergies      VITALS:    Vital Signs Last 24 Hrs  T(C): 36.7 (03 Apr 2022 11:05), Max: 37 (02 Apr 2022 16:40)  T(F): 98 (03 Apr 2022 11:05), Max: 98.6 (02 Apr 2022 16:40)  HR: 84 (03 Apr 2022 11:05) (44 - 95)  BP: 169/79 (03 Apr 2022 11:05) (163/77 - 224/89)  BP(mean): 130 (03 Apr 2022 03:53) (130 - 130)  RR: 17 (03 Apr 2022 11:05) (16 - 18)  SpO2: 98% (03 Apr 2022 11:05) (94% - 98%)    LABS:    CBC Full  -  ( 03 Apr 2022 04:34 )  WBC Count : 11.93 K/uL  RBC Count : 3.73 M/uL  Hemoglobin : 10.4 g/dL  Hematocrit : 32.5 %  Platelet Count - Automated : 241 K/uL  Mean Cell Volume : 87.1 fl  Mean Cell Hemoglobin : 27.9 pg  Mean Cell Hemoglobin Concentration : 32.0 gm/dL  Auto Neutrophil # : 9.72 K/uL  Auto Lymphocyte # : 1.05 K/uL  Auto Monocyte # : 1.10 K/uL  Auto Eosinophil # : 0.00 K/uL  Auto Basophil # : 0.01 K/uL  Auto Neutrophil % : 81.5 %  Auto Lymphocyte % : 8.8 %  Auto Monocyte % : 9.2 %  Auto Eosinophil % : 0.0 %  Auto Basophil % : 0.1 %      04-03    138  |  103  |  31<H>  ----------------------------<  117<H>  4.4   |  29  |  1.73<H>    Ca    8.3<L>      03 Apr 2022 04:34  Phos  3.6     04-03  Mg     2.1     04-03    TPro  6.1  /  Alb  2.7<L>  /  TBili  0.3  /  DBili  x   /  AST  20  /  ALT  36  /  AlkPhos  70  04-03    CAPILLARY BLOOD GLUCOSE      POCT Blood Glucose.: 277 mg/dL (03 Apr 2022 11:45)  POCT Blood Glucose.: 121 mg/dL (03 Apr 2022 08:01)  POCT Blood Glucose.: 266 mg/dL (02 Apr 2022 21:29)        LIVER FUNCTIONS - ( 03 Apr 2022 04:34 )  Alb: 2.7 g/dL / Pro: 6.1 g/dL / ALK PHOS: 70 U/L / ALT: 36 U/L DA / AST: 20 U/L / GGT: x           Creatinine Trend: 1.73<--, 1.63<--  I&O's Summary      ABG - ( 02 Apr 2022 12:42 )  pH, Arterial: 7.47  pH, Blood: x     /  pCO2: 44    /  pO2: 76    / HCO3: 32    / Base Excess: 7.5   /  SaO2: 97          Clean Catch Clean Catch (Midstream)  03-02 @ 06:43   <10,000 CFU/mL Normal Urogenital Elizabet  --  --      Clean Catch Clean Catch (Midstream)  01-19 @ 04:29   <10,000 CFU/mL Normal Urogenital Elizabet  --  --      .Blood Blood  01-19 @ 04:08   No Growth Final  --  --      MEDICATIONS:    MEDICATIONS  (STANDING):  aspirin enteric coated 81 milliGRAM(s) Oral daily  atorvastatin 80 milliGRAM(s) Oral at bedtime  heparin   Injectable 5000 Unit(s) SubCutaneous every 8 hours  hydrALAZINE 100 milliGRAM(s) Oral three times a day  immune   globulin 10% (GAMMAGARD) IVPB 20 Gram(s) IV Intermittent daily  insulin lispro (ADMELOG) corrective regimen sliding scale   SubCutaneous three times a day before meals  NIFEdipine XL 60 milliGRAM(s) Oral two times a day  predniSONE   Tablet 20 milliGRAM(s) Oral daily  pyridostigmine 60 milliGRAM(s) Oral every 4 hours  sertraline 50 milliGRAM(s) Oral daily  tamsulosin 0.4 milliGRAM(s) Oral at bedtime      MEDICATIONS  (PRN):      REVIEW OF SYSTEMS:                           ALL ROS DONE [ X   ]    CONSTITUTIONAL:  LETHARGIC [   ], FEVER [   ], UNRESPONSIVE [   ]  CVS:  CP  [   ], SOB, [   ], PALPITATIONS [   ], DIZZYNESS [   ]  RS: COUGH [   ], SPUTUM [   ]  GI: ABDOMINAL PAIN [   ], NAUSEA [   ], VOMITINGS [   ], DIARRHEA [   ], CONSTIPATION [   ]  :  DYSURIA [   ], NOCTURIA [   ], INCREASED FREQUENCY [   ], DRIBLING [   ],  SKELETAL: PAINFUL JOINTS [   ], SWOLLEN JOINTS [   ], NECK ACHE [   ], LOW BACK ACHE [   ],  SKIN : ULCERS [   ], RASH [   ], ITCHING [   ]  CNS: HEAD ACHE [   ], DOUBLE VISION [   ], BLURRED VISION [   ], AMS / CONFUSION [   ], SEIZURES [   ], WEAKNESS [   ],TINGLING / NUMBNESS [   ]    PHYSICAL EXAMINATION:  GENERAL APPEARANCE: NO DISTRESS  HEENT:  NO PALLOR, NO  JVD,  NO   NODES, NECK SUPPLE  CVS: S1 +, S2 +,   RS: AEEB,  OCCASIONAL  RALES +,   NO RONCHI  ABD: SOFT, NT, NO, BS +  EXT: PE _+  SKIN: WARM,   SKELETAL:  REDUCE ROM AND CERVICAL AND LS SPINE  CNS:  AAO X  3    RADIOLOGY :    ACC: [id number] EXAM:  XR CHEST PORTABLE IMMED 1V                          PROCEDURE DATE:  04/02/2022          INTERPRETATION:  CLINICAL HISTORY: Facial swelling.    TECHNIQUE: Single upright AP chest radiograph.    COMPARISON: No priors for comparison.    COMMENT:  Loop recorder and sternotomy wires are noted.    The lungs are clear without discrete infiltrate.  There is no pulmonary   vascular congestion.  There are no pleural effusions.    The heart and mediastinal contours are within normal limits.  The trachea   is midline.    The osseous structures are intact.    IMPRESSION:  No acute pulmonary pathology.      ASSESSMENT :     Myasthenia gravis    HTN (hypertension)    DM (diabetes mellitus)    Myasthenia gravis    Hypercholesterolemia    CVA (cerebrovascular accident)    Peripheral neuropathy    Lipoma of chest wall    MG with thymoma (myasthena gravis)        PLAN:  HPI:  66 year old male  from [facility], ambulates independently, with PMHx of Myasthenia Gravis s/p Thymectomy , HTN, HLD, CVA x 2, Osteoporosis, PAD, Peripheral Neuropathy, Cervical and Lumbar spondylosis, and BPH who presented to the ED for generalized weakness. Patient stated that for the past 3 days he feels overall more weak than his baseline and also had decrease in appetite. Patient denies any headaches, visual changes, SOB, cough, chest pain, abdominal pain, or change in bowel habits. Pt was previously admitted for similar sxs and was treated for Acute exacerbation of myasthenia gravis.    In the ED:  Afebile, HR 97, /67, 99% on RA  WBC 15.9, BUN/Creatinine 20/1.63  UA negative, CXR no congestions or infiltrates (pending official read)  Was given Solumedrol 125mg + pyridostigmine 60mg  (02 Apr 2022 16:23)    # GENERALIZED WEAKNESS - SUSPECTED MYASTHENIA GRAVIS [S/P THYMECTOMY] FLARE  - ON PYRIDOSTIGMINE 60MG X 6 TIMES DAILY  - ON RECENT ADMISSION, DISCHARGED ON PROLONGED PREDNISONE TAPER  - F/U NIF AND VC  - F/U PT EVAL  - RECOMMENDED FOR IVIG INFUSION X 5 DAYS PER NEUROLOGY RECOMMENDATION  - NEUROLOGY CONSULT IN PROGRESS [CASE DISCUSSED WITH DR. RICARDO]  - PATIENT FOLLOW W/ DR. ESPINOZA AT [facility]    # ASYMPTOMATIC BRADYCARDIA - MONITOR ON TELEMETRY, F/U EKG, F/U ECHOCARDIOGRAM  - CARDIOLOGY CONSULT PLACED    # HTN - ON HYDRALAZINE, NIFEDIPINE; HOLD LOSARTAN    # ? SUSPECTED CKD - REVIEWED UA AND PREVIOUS CR, OBTAIN OUTPATIENT RECORDS  - F/U BLADDER SCAN    # BPH - ON FLOMAX  # HLD - STATIN  # DM - SSI + FS; HOLD METFORMIN    # PER PATIENT'S REQUEST - CASE D/W PARTNER, MILLI DONOVAN @ [phone] [4/2] - CASE DISCUSSED AT LENGTH AND ALL QUESTIONS ANSWERED  # GI AND DVT PPX .

## 2022-04-03 NOTE — CONSULT NOTE ADULT - SUBJECTIVE AND OBJECTIVE BOX
BRITTANIE Beltre is a 66y old  Male who presents with a chief complaint of weakness (02 Apr 2022 16:23)    Neurology consulted for management of myasthenia Gravis. Pt is a poor historian and states that he feels generally weak but thinks that it is because someone is tampering with his food. He cannot say when the weakness started but feels that he did not improve from the last time that he was admitted. He is positive for ACR binding ab as of 12/21/21. Pt denies any double vision, difficulty swallowing or breathing.     HPI:  66 year old male  from [facility], ambulates independently, with PMHx of Myasthenia Gravis s/p Thymectomy , HTN, HLD, CVA x 2, Osteoporosis, PAD, Peripheral Neuropathy, Cervical and Lumbar spondylosis, and BPH who presented to the ED for generalized weakness. Patient stated that for the past 3 days he feels overall more weak than his baseline and also had decrease in appetite. Patient denies any headaches, visual changes, SOB, cough, chest pain, abdominal pain, or change in bowel habits. Pt was previously admitted for similar sxs and was treated for Acute exacerbation of myasthenia gravis.    In the ED:  Afebile, HR 97, /67, 99% on RA  WBC 15.9, BUN/Creatinine 20/1.63  UA negative, CXR no congestions or infiltrates (pending official read)  Was given Solumedrol 125mg + pyridostigmine 60mg  (02 Apr 2022 16:23)          MEDICATIONS  (STANDING):  aspirin enteric coated 81 milliGRAM(s) Oral daily  atorvastatin 80 milliGRAM(s) Oral at bedtime  heparin   Injectable 5000 Unit(s) SubCutaneous every 8 hours  hydrALAZINE 100 milliGRAM(s) Oral three times a day  immune   globulin 10% (GAMMAGARD) IVPB 20 Gram(s) IV Intermittent daily  insulin lispro (ADMELOG) corrective regimen sliding scale   SubCutaneous three times a day before meals  NIFEdipine XL 60 milliGRAM(s) Oral two times a day  predniSONE   Tablet 20 milliGRAM(s) Oral daily  pyridostigmine 60 milliGRAM(s) Oral every 4 hours  sertraline 50 milliGRAM(s) Oral daily  tamsulosin 0.4 milliGRAM(s) Oral at bedtime    MEDICATIONS  (PRN):    PAST MEDICAL & SURGICAL HISTORY:  HTN (hypertension)    DM (diabetes mellitus)    Myasthenia gravis    Hypercholesterolemia    CVA (cerebrovascular accident)    Peripheral neuropathy    Lipoma of chest wall    MG with thymoma (myasthena gravis)      FAMILY HISTORY:  Family history of hypertension (Mother)      Social History:  Pt denied smoking, alcohol use or illicit drug use. (02 Apr 2022 16:23)    SHx - No smoking, No ETOH, No drug abuse  Allergies    No Known Allergies    Intolerances        REVIEW OF SYSTEMS:    Constitutional: No fever, weight loss, or fatigue  Eyes: No eye pain, visual disturbances, or discharge  ENMT:  No difficulty hearing, tinnitus, vertigo; No sinus or throat pain  Neck: No pain or stiffness  Respiratory: No cough, wheezing, or shortness of breath  Cardiovascular: No chest pain, palpitations, or leg swelling  Gastrointestinal: No abdominal pain, nausea, vomiting, diarrhea or constipation.   Genitourinary: No dysuria, frequency, hematuria, or incontinence  Neurological: As per HPI  Skin: No itching, burning, rashes, or lesions   Endocrine: No heat or cold intolerance; No hair loss  Musculoskeletal: No joint pain or swelling; No muscle, back, or extremity pain  Psychiatric: No depression, anxiety, mood swings, or difficulty sleeping  Heme/Lymph: No easy bruising or bleeding; No enlarged glands      Vital Signs Last 24 Hrs  T(C): 36.7 (03 Apr 2022 11:05), Max: 37 (02 Apr 2022 16:40)  T(F): 98 (03 Apr 2022 11:05), Max: 98.6 (02 Apr 2022 16:40)  HR: 84 (03 Apr 2022 11:05) (44 - 95)  BP: 169/79 (03 Apr 2022 11:05) (162/65 - 224/89)  BP(mean): 130 (03 Apr 2022 03:53) (130 - 130)  RR: 17 (03 Apr 2022 11:05) (16 - 18)  SpO2: 98% (03 Apr 2022 11:05) (94% - 98%)    General Exam:   GENERAL EXAM:  Constitutional: awake but drowsy. NAD  Head: normocephalic atraumatic  Eyes: nonicteric, clear conjunctiva, periorbital edema, exopthalmos  Neck: Supple, no masses 4+/5 neck flexion, 5/5 neck extension  Resp: breathing comfortably, normal rate and rhythm, about to count to 30 on exhalation  Musculoskeletal: no joint swelling/tenderness, no clubbing or cyanosis, no joint deformity  Skin: no rashes or areas of discoloration  Vasc: no pitting edema, DP pulses present             Neurological Exam:  Mental Status: Orientated to self, date and place.  Attention intact.  No dysarthria. Speech fluent.  Cranial Nerves:   PERRL, EOMI, VFF, no nystagmus.    CN V1-3 intact to light touch .  No facial asymmetry.  Hearing intact bilaterally.  Tongue  midline.  Sternocleidomastoid and Trapezius intact bilaterally.    Motor:   Tone: normal.                  Strength:     [] Upper extremity                      Delt       Bicep    Tricep                                                  R         5/5 5/5 5/5 5/5                                               L          5/5 5/5 5/5 5/5  [] Lower extremity                       HF          KE          KF        DF         PF                                               R        5/5 5/5 5/5 5/5 5/5                                               L         5/5 5/5 5/5 5/5        5/5             Dysmetria: None to finger-nose-finger or heel-shin-heel  No truncal ataxia.    Tremor: No resting, postural or action tremor.  No myoclonus.    Sensation: intact to light touch, pinprick, vibration and proprioception    Deep Tendon Reflexes:     1+ throughout, plantars mute bilat  Gait: deferred.      Other:    04-03    138  |  103  |  31<H>  ----------------------------<  117<H>  4.4   |  29  |  1.73<H>    Ca    8.3<L>      03 Apr 2022 04:34  Phos  3.6     04-03  Mg     2.1     04-03    TPro  6.1  /  Alb  2.7<L>  /  TBili  0.3  /  DBili  x   /  AST  20  /  ALT  36  /  AlkPhos  70  04-03                            10.4   11.93 )-----------( 241      ( 03 Apr 2022 04:34 )             32.5       Radiology    CT: < from: CT Head No Cont (03.02.22 @ 02:45) >  FINDINGS:    There is no evidence of mass or acute intracranial hemorrhage. Ventricles and sulci are normal in size and configuration for the patient's stated   age. No midline shift or other significant mass effect is noted. Chronic right basal ganglia and left thalamic infarcts. Chronic left perimedian   occipital lobe infarct. There is no CT evidence of acute territorial infarct. There are periventricular white matter hypodensities that are   nonspecificin nature but may reflect chronic ischemic microvascular disease.    The visualized paranasal sinuses and tympanomastoid spaces are clear.   Orbits and orbital contents are unremarkable.    There is no depressed calvarial fracture.    < from: CT Angio Chest PE Protocol w/ IV Cont (03.02.22 @ 02:45) >  FINDINGS:    LUNGS AND AIRWAYS: Patent central airways.  Bibasilar dependent   atelectasis.  PLEURA: Trace bilateral pleural effusions.  MEDIASTINUM AND TASIA: No lymphadenopathy.  VESSELS: No pulmonary embolus.  HEART: Cardiomegaly. No pericardial effusion.  CHEST WALL AND LOWER NECK: Within normal limits.  VISUALIZED UPPER ABDOMEN: Within normal limits.  BONES: Degenerative changes of the spine. Lower cervical spine ACDF.   Median sternotomy.    IMPRESSION:  No pulmonary embolus.    Cardiomegaly and trace bilateral pleural effusions.

## 2022-04-04 LAB
ALBUMIN SERPL ELPH-MCNC: 2.4 G/DL — LOW (ref 3.5–5)
ALP SERPL-CCNC: 62 U/L — SIGNIFICANT CHANGE UP (ref 40–120)
ALT FLD-CCNC: 32 U/L DA — SIGNIFICANT CHANGE UP (ref 10–60)
ANION GAP SERPL CALC-SCNC: 3 MMOL/L — LOW (ref 5–17)
AST SERPL-CCNC: 20 U/L — SIGNIFICANT CHANGE UP (ref 10–40)
BASOPHILS # BLD AUTO: 0.04 K/UL — SIGNIFICANT CHANGE UP (ref 0–0.2)
BASOPHILS NFR BLD AUTO: 0.4 % — SIGNIFICANT CHANGE UP (ref 0–2)
BILIRUB SERPL-MCNC: 0.3 MG/DL — SIGNIFICANT CHANGE UP (ref 0.2–1.2)
BUN SERPL-MCNC: 24 MG/DL — HIGH (ref 7–18)
CALCIUM SERPL-MCNC: 8.5 MG/DL — SIGNIFICANT CHANGE UP (ref 8.4–10.5)
CHLORIDE SERPL-SCNC: 106 MMOL/L — SIGNIFICANT CHANGE UP (ref 96–108)
CO2 SERPL-SCNC: 32 MMOL/L — HIGH (ref 22–31)
CREAT SERPL-MCNC: 1.26 MG/DL — SIGNIFICANT CHANGE UP (ref 0.5–1.3)
EGFR: 64 ML/MIN/1.73M2 — SIGNIFICANT CHANGE UP
EOSINOPHIL # BLD AUTO: 0.02 K/UL — SIGNIFICANT CHANGE UP (ref 0–0.5)
EOSINOPHIL NFR BLD AUTO: 0.2 % — SIGNIFICANT CHANGE UP (ref 0–6)
GLUCOSE BLDC GLUCOMTR-MCNC: 110 MG/DL — HIGH (ref 70–99)
GLUCOSE BLDC GLUCOMTR-MCNC: 125 MG/DL — HIGH (ref 70–99)
GLUCOSE BLDC GLUCOMTR-MCNC: 169 MG/DL — HIGH (ref 70–99)
GLUCOSE BLDC GLUCOMTR-MCNC: 251 MG/DL — HIGH (ref 70–99)
GLUCOSE SERPL-MCNC: 84 MG/DL — SIGNIFICANT CHANGE UP (ref 70–99)
HCT VFR BLD CALC: 33 % — LOW (ref 39–50)
HGB BLD-MCNC: 10.7 G/DL — LOW (ref 13–17)
IMM GRANULOCYTES NFR BLD AUTO: 0.4 % — SIGNIFICANT CHANGE UP (ref 0–1.5)
LYMPHOCYTES # BLD AUTO: 1.47 K/UL — SIGNIFICANT CHANGE UP (ref 1–3.3)
LYMPHOCYTES # BLD AUTO: 14.2 % — SIGNIFICANT CHANGE UP (ref 13–44)
MAGNESIUM SERPL-MCNC: 2.1 MG/DL — SIGNIFICANT CHANGE UP (ref 1.6–2.6)
MCHC RBC-ENTMCNC: 28.1 PG — SIGNIFICANT CHANGE UP (ref 27–34)
MCHC RBC-ENTMCNC: 32.4 GM/DL — SIGNIFICANT CHANGE UP (ref 32–36)
MCV RBC AUTO: 86.6 FL — SIGNIFICANT CHANGE UP (ref 80–100)
MONOCYTES # BLD AUTO: 0.94 K/UL — HIGH (ref 0–0.9)
MONOCYTES NFR BLD AUTO: 9.1 % — SIGNIFICANT CHANGE UP (ref 2–14)
NEUTROPHILS # BLD AUTO: 7.84 K/UL — HIGH (ref 1.8–7.4)
NEUTROPHILS NFR BLD AUTO: 75.7 % — SIGNIFICANT CHANGE UP (ref 43–77)
NRBC # BLD: 0 /100 WBCS — SIGNIFICANT CHANGE UP (ref 0–0)
PHOSPHATE SERPL-MCNC: 2.8 MG/DL — SIGNIFICANT CHANGE UP (ref 2.5–4.5)
PLATELET # BLD AUTO: 247 K/UL — SIGNIFICANT CHANGE UP (ref 150–400)
POTASSIUM SERPL-MCNC: 3.7 MMOL/L — SIGNIFICANT CHANGE UP (ref 3.5–5.3)
POTASSIUM SERPL-SCNC: 3.7 MMOL/L — SIGNIFICANT CHANGE UP (ref 3.5–5.3)
PROT SERPL-MCNC: 6.3 G/DL — SIGNIFICANT CHANGE UP (ref 6–8.3)
RBC # BLD: 3.81 M/UL — LOW (ref 4.2–5.8)
RBC # FLD: 13.9 % — SIGNIFICANT CHANGE UP (ref 10.3–14.5)
SODIUM SERPL-SCNC: 141 MMOL/L — SIGNIFICANT CHANGE UP (ref 135–145)
WBC # BLD: 10.35 K/UL — SIGNIFICANT CHANGE UP (ref 3.8–10.5)
WBC # FLD AUTO: 10.35 K/UL — SIGNIFICANT CHANGE UP (ref 3.8–10.5)

## 2022-04-04 PROCEDURE — 74018 RADEX ABDOMEN 1 VIEW: CPT | Mod: 26

## 2022-04-04 PROCEDURE — 76536 US EXAM OF HEAD AND NECK: CPT | Mod: 26

## 2022-04-04 RX ORDER — LOSARTAN POTASSIUM 100 MG/1
50 TABLET, FILM COATED ORAL DAILY
Refills: 0 | Status: DISCONTINUED | OUTPATIENT
Start: 2022-04-04 | End: 2022-04-05

## 2022-04-04 RX ADMIN — Medication 100 MILLIGRAM(S): at 15:00

## 2022-04-04 RX ADMIN — TAMSULOSIN HYDROCHLORIDE 0.4 MILLIGRAM(S): 0.4 CAPSULE ORAL at 21:40

## 2022-04-04 RX ADMIN — PYRIDOSTIGMINE BROMIDE 60 MILLIGRAM(S): 60 SOLUTION ORAL at 23:15

## 2022-04-04 RX ADMIN — Medication 1: at 17:15

## 2022-04-04 RX ADMIN — Medication 20 MILLIGRAM(S): at 05:51

## 2022-04-04 RX ADMIN — Medication 100 MILLIGRAM(S): at 05:50

## 2022-04-04 RX ADMIN — PYRIDOSTIGMINE BROMIDE 60 MILLIGRAM(S): 60 SOLUTION ORAL at 08:02

## 2022-04-04 RX ADMIN — PYRIDOSTIGMINE BROMIDE 60 MILLIGRAM(S): 60 SOLUTION ORAL at 15:48

## 2022-04-04 RX ADMIN — IMMUNE GLOBULIN (HUMAN) 150 GRAM(S): 10 INJECTION INTRAVENOUS; SUBCUTANEOUS at 15:01

## 2022-04-04 RX ADMIN — Medication 81 MILLIGRAM(S): at 15:44

## 2022-04-04 RX ADMIN — PYRIDOSTIGMINE BROMIDE 60 MILLIGRAM(S): 60 SOLUTION ORAL at 05:50

## 2022-04-04 RX ADMIN — HEPARIN SODIUM 5000 UNIT(S): 5000 INJECTION INTRAVENOUS; SUBCUTANEOUS at 05:51

## 2022-04-04 RX ADMIN — PYRIDOSTIGMINE BROMIDE 60 MILLIGRAM(S): 60 SOLUTION ORAL at 00:00

## 2022-04-04 RX ADMIN — LOSARTAN POTASSIUM 50 MILLIGRAM(S): 100 TABLET, FILM COATED ORAL at 10:17

## 2022-04-04 RX ADMIN — Medication 60 MILLIGRAM(S): at 05:51

## 2022-04-04 RX ADMIN — Medication 3: at 12:01

## 2022-04-04 RX ADMIN — HEPARIN SODIUM 5000 UNIT(S): 5000 INJECTION INTRAVENOUS; SUBCUTANEOUS at 15:00

## 2022-04-04 RX ADMIN — Medication 60 MILLIGRAM(S): at 17:15

## 2022-04-04 RX ADMIN — ATORVASTATIN CALCIUM 80 MILLIGRAM(S): 80 TABLET, FILM COATED ORAL at 21:41

## 2022-04-04 RX ADMIN — PYRIDOSTIGMINE BROMIDE 60 MILLIGRAM(S): 60 SOLUTION ORAL at 21:40

## 2022-04-04 RX ADMIN — Medication 100 MILLIGRAM(S): at 21:40

## 2022-04-04 RX ADMIN — HEPARIN SODIUM 5000 UNIT(S): 5000 INJECTION INTRAVENOUS; SUBCUTANEOUS at 21:39

## 2022-04-04 RX ADMIN — SERTRALINE 50 MILLIGRAM(S): 25 TABLET, FILM COATED ORAL at 15:43

## 2022-04-04 NOTE — CONSULT NOTE ADULT - SUBJECTIVE AND OBJECTIVE BOX
C A R D I O L O G Y  *********************    DATE OF SERVICE: 04-04-22    HISTORY OF PRESENT ILLNESS: HPI:  66 year old male  from [facility], ambulates independently, with PMHx of Myasthenia Gravis s/p Thymectomy , HTN, HLD, CVA x 2, Osteoporosis, PAD, Peripheral Neuropathy, Cervical and Lumbar spondylosis, and BPH who presented to the ED for generalized weakness. Patient stated that for the past 3 days he feels overall more weak than his baseline and also had decrease in appetite. Patient denies any headaches, visual changes, SOB, cough, chest pain, abdominal pain, LOC , dizziness or change in bowel habits. Pt was previously admitted for similar sxs and was treated for Acute exacerbation of myasthenia gravis.    In the ED:  Afebile, HR 97, /67, 99% on RA  WBC 15.9, BUN/Creatinine 20/1.63  UA negative, CXR no congestions or infiltrates (pending official read)  Was given Solumedrol 125mg + pyridostigmine 60mg  (02 Apr 2022 16:23)      PAST MEDICAL & SURGICAL HISTORY:  HTN (hypertension)    DM (diabetes mellitus)    Myasthenia gravis    Hypercholesterolemia    CVA (cerebrovascular accident)    Peripheral neuropathy    Lipoma of chest wall    MG with thymoma (myasthena gravis)            MEDICATIONS:  MEDICATIONS  (STANDING):  aspirin enteric coated 81 milliGRAM(s) Oral daily  atorvastatin 80 milliGRAM(s) Oral at bedtime  heparin   Injectable 5000 Unit(s) SubCutaneous every 8 hours  hydrALAZINE 100 milliGRAM(s) Oral three times a day  immune   globulin 10% (GAMMAGARD) IVPB 20 Gram(s) IV Intermittent daily  insulin lispro (ADMELOG) corrective regimen sliding scale   SubCutaneous three times a day before meals  losartan 50 milliGRAM(s) Oral daily  NIFEdipine XL 60 milliGRAM(s) Oral two times a day  predniSONE   Tablet 20 milliGRAM(s) Oral daily  pyridostigmine 60 milliGRAM(s) Oral every 4 hours  sertraline 50 milliGRAM(s) Oral daily  tamsulosin 0.4 milliGRAM(s) Oral at bedtime      Allergies    No Known Allergies    Intolerances        FAMILY HISTORY:  Family history of hypertension (Mother)      Non-contributary for premature coronary disease or sudden cardiac death    SOCIAL HISTORY:    [ x] Non-smoker  [ ] Smoker  [ ] Alcohol      REVIEW OF SYSTEMS:  [ ]chest pain  [  ]shortness of breath  [  ]palpitations  [  ]syncope  [ ]near syncope [ ]upper extremity weakness   [ ] lower extremity weakness  [  ]diplopia  [  ]altered mental status   [  ]fevers  [ ]chills [ ]nausea  [ ]vomitting  [  ]dysphagia    [ ]abdominal pain  [ ]melena  [ ]BRBPR    [  ]epistaxis  [  ]rash    [ ]lower extremity edema        [X] All others negative	  [ ] Unable to obtain      LABS:	 	    CARDIAC MARKERS:                              10.7   10.35 )-----------( 247      ( 04 Apr 2022 06:06 )             33.0     Hb Trend: 10.7<--    04-04    141  |  106  |  24<H>  ----------------------------<  84  3.7   |  32<H>  |  1.26    Ca    8.5      04 Apr 2022 06:06  Phos  2.8     04-04  Mg     2.1     04-04    TPro  6.3  /  Alb  2.4<L>  /  TBili  0.3  /  DBili  x   /  AST  20  /  ALT  32  /  AlkPhos  62  04-04    Creatinine Trend: 1.26<--, 1.73<--, 1.63<--        PHYSICAL EXAM:  T(C): 36.8 (04-04-22 @ 07:25), Max: 36.8 (04-03-22 @ 19:37)  HR: 84 (04-04-22 @ 07:25) (74 - 86)  BP: 162/72 (04-04-22 @ 07:25) (144/63 - 178/78)  RR: 19 (04-04-22 @ 07:25) (17 - 19)  SpO2: 94% (04-04-22 @ 07:25) (94% - 99%)  Wt(kg): --   BMI (kg/m2): 25.8 (04-02-22 @ 12:12)  I&O's Summary    03 Apr 2022 07:01  -  04 Apr 2022 07:00  --------------------------------------------------------  IN: 0 mL / OUT: 1550 mL / NET: -1550 mL      HEENT:  (-)icterus (-)pallor  CV: N S1 S2 1/6 BOUBACAR (+)2 Pulses B/l  Resp:  Clear to ausculatation B/L, normal effort  GI: (+) BS Soft, NT, ND  Lymph:  (-)Edema, (-)obvious lymphadenopathy  Skin: Warm to touch, Normal turgor  Psych: Appropriate mood and affect        TELEMETRY: 	  Sinus PVC    ECG:  	Sinus  BPM, delayed R wave progression    RADIOLOGY:         CXR:  < from: Xray Chest 1 View-PORTABLE IMMEDIATE (Xray Chest 1 View-PORTABLE IMMEDIATE .) (04.02.22 @ 13:24) >  Loop recorder and sternotomy wires are noted.    The lungs are clear without discrete infiltrate.  There is no pulmonary   vascular congestion.  There are no pleural effusions.    The heart and mediastinal contours are within normal limits.  The trachea   is midline.    The osseous structures are intact.      < end of copied text >      ASSESSMENT/PLAN: 	66y Male from [facility], ambulates independently, with PMHx of Myasthenia Gravis s/p Thymectomy , HTN, HLD, CVA x 2, Osteoporosis, PAD, Peripheral Neuropathy, Cervical and Lumbar spondylosis, and BPH who presented to the ED for generalized weakness noted with bradycardia.    - TSH within normal limits.  - doesn't appear to be persistently martinez  - bradycardia maybe due to Mestinon, but since he is asymptomatic will just monitor    I once again thank you for allowing me to participate in the care of your patient.  If you have any questions or concerns please do not hesitate to contact me.    Ta Mercado MD, [facility]  BEEPER [phone]

## 2022-04-04 NOTE — PROGRESS NOTE ADULT - ATTENDING COMMENTS
HPI:  66 year old male  from [facility], ambulates independently, with PMHx of Myasthenia Gravis s/p Thymectomy , HTN, HLD, CVA x 2, Osteoporosis, PAD, Peripheral Neuropathy, Cervical and Lumbar spondylosis, and BPH who presented to the ED for generalized weakness. Patient stated that for the past 3 days he feels overall more weak than his baseline and also had decrease in appetite. Patient denies any headaches, visual changes, SOB, cough, chest pain, abdominal pain, or change in bowel habits. Pt was previously admitted for similar sxs and was treated for Acute exacerbation of myasthenia gravis.    In the ED:  Afebile, HR 97, /67, 99% on RA  WBC 15.9, BUN/Creatinine 20/1.63  UA negative, CXR no congestions or infiltrates (pending official read)  Was given Solumedrol 125mg + pyridostigmine 60mg  (02 Apr 2022 16:23)    # GENERALIZED WEAKNESS - SUSPECTED MYASTHENIA GRAVIS [S/P THYMECTOMY] FLARE  - ON PYRIDOSTIGMINE 60MG X 6 TIMES DAILY  - ON RECENT ADMISSION, DISCHARGED ON PROLONGED PREDNISONE TAPER  - F/U NIF AND VC  - F/U PT EVAL  - RECOMMENDED FOR IVIG INFUSION X 5 DAYS PER NEUROLOGY RECOMMENDATION  - NEUROLOGY CONSULT IN PROGRESS [CASE DISCUSSED WITH DR. RICARDO]  - PATIENT FOLLOW W/ DR. ESPINOZA AT [facility]    # ASYMPTOMATIC BRADYCARDIA - MONITOR ON TELEMETRY, F/U EKG, F/U ECHOCARDIOGRAM  - CARDIOLOGY CONSULT IN PROGRESS  - ECHO - TRACE MR, CONCENTRIC LVH, NORMAL LV SYSTOLIC EF, TRACE TR, TRACE CT    # HTN - ON HYDRALAZINE, NIFEDIPINE; HOLD LOSARTAN    # ? SUSPECTED CKD - REVIEWED UA AND PREVIOUS CR, OBTAIN OUTPATIENT RECORDS  - F/U BLADDER SCAN    # BPH - ON FLOMAX  # HLD - STATIN  # DM - SSI + FS; HOLD METFORMIN    # PER PATIENT'S REQUEST - CASE D/W PARTNER, MILLI DONOVAN @ [phone] [4/2] - CASE DISCUSSED AT LENGTH AND ALL QUESTIONS ANSWERED  # GI AND DVT PPX

## 2022-04-04 NOTE — PROGRESS NOTE ADULT - SUBJECTIVE AND OBJECTIVE BOX
PGY-1 Progress Note discussed with attending    PAGER #: [[phone]] TILL 5:00 PM  PLEASE CONTACT ON CALL TEAM:  - On Call Team (Please refer to Bennett) FROM 5:00 PM - 8:30PM  - Nightfloat Team FROM 8:30 -7:30 AM    CHIEF COMPLAINT & BRIEF HOSPITAL COURSE:  66 year old male  from [facility], ambulates independently, with PMHx of Myasthenia Gravis s/p Thymectomy , HTN, HLD, CVA x 2, Osteoporosis, PAD, Peripheral Neuropathy, Cervical and Lumbar spondylosis, and BPH who presented to the ED for generalized weakness. Patient stated that for the past 3 days he feels overall more weak than his baseline and also had decrease in appetite. Patient denies any headaches, visual changes, SOB, cough, chest pain, abdominal pain, or change in bowel habits. Pt was previously admitted for similar sxs and was treated for Acute exacerbation of myasthenia gravis.    INTERVAL HPI/OVERNIGHT EVENTS:   Patient seen and examined at bedside. No overnight events. Tele showing frequent PVCs and episodes of bigeminy and trigeminy.     REVIEW OF SYSTEMS:  CONSTITUTIONAL: No fever, night sweats, weight loss, or fatigue  RESPIRATORY: No cough, wheezing, or hemoptysis; No shortness of breath  CARDIOVASCULAR: No chest pain, palpitations, dizziness, or leg swelling  GASTROINTESTINAL: No abdominal pain. No nausea, vomiting, or hematemesis; No diarrhea or constipation. No melena or hematochezia.  GENITOURINARY: No dysuria or hematuria, no urinary frequency  NEUROLOGICAL: No headaches, memory loss, loss of strength, numbness, or tremors  SKIN: No itching, burning, rashes, or lesions     Vital Signs Last 24 Hrs  T(C): 36.9 (04 Apr 2022 11:22), Max: 36.9 (04 Apr 2022 11:22)  T(F): 98.5 (04 Apr 2022 11:22), Max: 98.5 (04 Apr 2022 11:22)  HR: 85 (04 Apr 2022 11:22) (74 - 86)  BP: 175/67 (04 Apr 2022 11:22) (144/63 - 178/78)  BP(mean): --  RR: 17 (04 Apr 2022 11:22) (17 - 19)  SpO2: 94% (04 Apr 2022 11:22) (94% - 99%)    PHYSICAL EXAMINATION:  GENERAL: NAD, well built  HEAD:  Atraumatic, Normocephalic  EYES:  conjunctiva and sclera clear  NECK: Supple, No JVD  CHEST/LUNG: Clear to auscultation. No wheezing, rales, rubs or rhonchi  HEART: Regular rate and rhythm; Clear S1 and S2, No murmurs, rubs, or gallops  ABDOMEN: Soft, Nontender, Nondistended; Bowel sounds present  NERVOUS SYSTEM:  Alert & Oriented X3, CNII-XII intact, no focal neurological deficits   EXTREMITIES:  2+ Peripheral Pulses, No clubbing, cyanosis, or +1 b/l edema  SKIN: warm dry, no lesions noted                           10.7   10.35 )-----------( 247      ( 04 Apr 2022 06:06 )             33.0     04-04    141  |  106  |  24<H>  ----------------------------<  84  3.7   |  32<H>  |  1.26    Ca    8.5      04 Apr 2022 06:06  Phos  2.8     04-04  Mg     2.1     04-04    TPro  6.3  /  Alb  2.4<L>  /  TBili  0.3  /  DBili  x   /  AST  20  /  ALT  32  /  AlkPhos  62  04-04    LIVER FUNCTIONS - ( 04 Apr 2022 06:06 )  Alb: 2.4 g/dL / Pro: 6.3 g/dL / ALK PHOS: 62 U/L / ALT: 32 U/L DA / AST: 20 U/L / GGT: x               CAPILLARY BLOOD GLUCOSE    MEDICATIONS  (STANDING):  aspirin enteric coated 81 milliGRAM(s) Oral daily  atorvastatin 80 milliGRAM(s) Oral at bedtime  heparin   Injectable 5000 Unit(s) SubCutaneous every 8 hours  hydrALAZINE 100 milliGRAM(s) Oral three times a day  immune   globulin 10% (GAMMAGARD) IVPB 20 Gram(s) IV Intermittent daily  insulin lispro (ADMELOG) corrective regimen sliding scale   SubCutaneous three times a day before meals  losartan 50 milliGRAM(s) Oral daily  NIFEdipine XL 60 milliGRAM(s) Oral two times a day  predniSONE   Tablet 20 milliGRAM(s) Oral daily  pyridostigmine 60 milliGRAM(s) Oral every 4 hours  sertraline 50 milliGRAM(s) Oral daily  tamsulosin 0.4 milliGRAM(s) Oral at bedtime    MEDICATIONS  (PRN):      RADIOLOGY & ADDITIONAL TESTS:        ACC: [id number] EXAM:  US THYROID AND PARATHYROID                          PROCEDURE DATE:  04/04/2022          INTERPRETATION:  CLINICAL INFORMATION: Evaluate thyroid nodule    COMPARISON: None available.    TECHNIQUE: Sonography of the thyroid.    FINDINGS:  Technically limited exam due to patient's inability to extend his neck.    Right Lobe: 4.3 x  1.7 x 2.4 cm. There is a 0.4 cm hypoechoic nodule in   the lower pole.    Left Lobe: 4.3 x 1.7 x 2.0 cm. There is a 0.6 cm cystic nodule in the   lower pole.    Isthmus: 0.6 cm.    Cervical Lymph Nodes: No enlarged or abnormal morphology cervical nodes.    IMPRESSION:    Technically limited exam. There is a 0.4 cm hypoechoic in the lower pole   of the right thyroid lobe.    TI-RAD 4: Moderatelysuspicious (FNA if > 1.5 cm, Follow if > 1 cm)  __________________________________  ACR Thyroid Imaging, Reporting and Data System (TI-RADS): White Paper of   the ACR TI-RADS Committee. J Am Nakul Radiol 2017;14:587-595.    TI-RAD 1: Benign (No FNA)  TI-RAD 2: Not suspicious (No FNA)  TI-RAD 3: Mildly suspicious (FNA if > 2.5 cm, Follow if >1.5 cm)  TI-RAD 4: Moderately suspicious (FNA if > 1.5 cm, Follow if > 1 cm)  TI-RAD 5: Highly suspicious (FNA if > 1 cm, Follow if > 0.5 cm)        --- End ofReport ---    ARUN MACARIO MD; Attending Radiologist  This document has been electronically signed. Apr 4 2022 12:34PM

## 2022-04-04 NOTE — PROGRESS NOTE ADULT - PROBLEM SELECTOR PLAN 1
pt with hx of MG presented with worsening weakness than baseline  admitted two months ago for MG flare and was discharged on steroid taper  pt failed multiple attempts with steroids  currently takes prednisone 20mg daily and pyridostigmine 60mg  no current sxs of difficulty breathing or swallowing   c/w home meds  c/w NIV and VC q12   c/w IVIG 30g/day for 3 days (2g/kg over treatment course)  Neuro Dr. Shaw: following pt with hx of MG presented with worsening weakness than baseline  admitted two months ago for MG flare and was discharged on steroid taper  pt failed multiple attempts with steroids  currently takes prednisone 20mg daily and pyridostigmine 60mg  no current sxs of difficulty breathing or swallowing   c/w home meds  c/w NIV and VC q12   c/w IVIG 30g/day for 3 days (2g/kg over treatment course)  Neuro Dr. Shaw following

## 2022-04-04 NOTE — PROGRESS NOTE ADULT - PROBLEM SELECTOR PLAN 2
pt with hx of MG presented with worsening weakness than baseline  WBC 15, afebrile  UA negative, CXR no consolidation or infiltrates (pending official read)  s/p solumedrol and pyridostigmine in ED  weakness likely 2/2 myasthenia flare   TSH, free T4 wnl  ESR wnl  Iron panel as above  Consulted Neurologist: Dr. Shaw pt with hx of MG presented with worsening weakness than baseline  WBC 15, afebrile  UA negative, CXR no consolidation or infiltrates (pending official read)  s/p solumedrol and pyridostigmine in ED  weakness likely 2/2 myasthenia flare   TSH, free T4 wnl  ESR wnl  Iron panel as above  Neuro Dr. Shaw following

## 2022-04-05 LAB
ALBUMIN SERPL ELPH-MCNC: 2.5 G/DL — LOW (ref 3.5–5)
ALP SERPL-CCNC: 67 U/L — SIGNIFICANT CHANGE UP (ref 40–120)
ALT FLD-CCNC: 29 U/L DA — SIGNIFICANT CHANGE UP (ref 10–60)
ANION GAP SERPL CALC-SCNC: 4 MMOL/L — LOW (ref 5–17)
AST SERPL-CCNC: 21 U/L — SIGNIFICANT CHANGE UP (ref 10–40)
BILIRUB SERPL-MCNC: 0.3 MG/DL — SIGNIFICANT CHANGE UP (ref 0.2–1.2)
BUN SERPL-MCNC: 19 MG/DL — HIGH (ref 7–18)
CALCIUM SERPL-MCNC: 9 MG/DL — SIGNIFICANT CHANGE UP (ref 8.4–10.5)
CHLORIDE SERPL-SCNC: 103 MMOL/L — SIGNIFICANT CHANGE UP (ref 96–108)
CO2 SERPL-SCNC: 30 MMOL/L — SIGNIFICANT CHANGE UP (ref 22–31)
CREAT SERPL-MCNC: 1.07 MG/DL — SIGNIFICANT CHANGE UP (ref 0.5–1.3)
EGFR: 77 ML/MIN/1.73M2 — SIGNIFICANT CHANGE UP
GLUCOSE BLDC GLUCOMTR-MCNC: 112 MG/DL — HIGH (ref 70–99)
GLUCOSE BLDC GLUCOMTR-MCNC: 140 MG/DL — HIGH (ref 70–99)
GLUCOSE BLDC GLUCOMTR-MCNC: 153 MG/DL — HIGH (ref 70–99)
GLUCOSE BLDC GLUCOMTR-MCNC: 170 MG/DL — HIGH (ref 70–99)
GLUCOSE SERPL-MCNC: 148 MG/DL — HIGH (ref 70–99)
HCT VFR BLD CALC: 34.8 % — LOW (ref 39–50)
HGB BLD-MCNC: 11.3 G/DL — LOW (ref 13–17)
MAGNESIUM SERPL-MCNC: 1.8 MG/DL — SIGNIFICANT CHANGE UP (ref 1.6–2.6)
MCHC RBC-ENTMCNC: 27.7 PG — SIGNIFICANT CHANGE UP (ref 27–34)
MCHC RBC-ENTMCNC: 32.5 GM/DL — SIGNIFICANT CHANGE UP (ref 32–36)
MCV RBC AUTO: 85.3 FL — SIGNIFICANT CHANGE UP (ref 80–100)
NRBC # BLD: 0 /100 WBCS — SIGNIFICANT CHANGE UP (ref 0–0)
PHOSPHATE SERPL-MCNC: 3.1 MG/DL — SIGNIFICANT CHANGE UP (ref 2.5–4.5)
PLATELET # BLD AUTO: 255 K/UL — SIGNIFICANT CHANGE UP (ref 150–400)
POTASSIUM SERPL-MCNC: 3.4 MMOL/L — LOW (ref 3.5–5.3)
POTASSIUM SERPL-SCNC: 3.4 MMOL/L — LOW (ref 3.5–5.3)
PROT SERPL-MCNC: 6.7 G/DL — SIGNIFICANT CHANGE UP (ref 6–8.3)
RBC # BLD: 4.08 M/UL — LOW (ref 4.2–5.8)
RBC # FLD: 13.6 % — SIGNIFICANT CHANGE UP (ref 10.3–14.5)
SODIUM SERPL-SCNC: 137 MMOL/L — SIGNIFICANT CHANGE UP (ref 135–145)
WBC # BLD: 12.42 K/UL — HIGH (ref 3.8–10.5)
WBC # FLD AUTO: 12.42 K/UL — HIGH (ref 3.8–10.5)

## 2022-04-05 PROCEDURE — 99233 SBSQ HOSP IP/OBS HIGH 50: CPT

## 2022-04-05 RX ORDER — MAGNESIUM SULFATE 500 MG/ML
1 VIAL (ML) INJECTION ONCE
Refills: 0 | Status: COMPLETED | OUTPATIENT
Start: 2022-04-05 | End: 2022-04-05

## 2022-04-05 RX ORDER — AZATHIOPRINE 100 MG/1
50 TABLET ORAL DAILY
Refills: 0 | Status: DISCONTINUED | OUTPATIENT
Start: 2022-04-05 | End: 2022-04-07

## 2022-04-05 RX ORDER — NIFEDIPINE 30 MG
90 TABLET, EXTENDED RELEASE 24 HR ORAL
Refills: 0 | Status: DISCONTINUED | OUTPATIENT
Start: 2022-04-05 | End: 2022-04-07

## 2022-04-05 RX ORDER — POTASSIUM CHLORIDE 20 MEQ
40 PACKET (EA) ORAL EVERY 4 HOURS
Refills: 0 | Status: COMPLETED | OUTPATIENT
Start: 2022-04-05 | End: 2022-04-05

## 2022-04-05 RX ORDER — LOSARTAN POTASSIUM 100 MG/1
50 TABLET, FILM COATED ORAL ONCE
Refills: 0 | Status: COMPLETED | OUTPATIENT
Start: 2022-04-05 | End: 2022-04-05

## 2022-04-05 RX ORDER — LOSARTAN POTASSIUM 100 MG/1
100 TABLET, FILM COATED ORAL DAILY
Refills: 0 | Status: DISCONTINUED | OUTPATIENT
Start: 2022-04-06 | End: 2022-04-07

## 2022-04-05 RX ADMIN — Medication 100 GRAM(S): at 16:09

## 2022-04-05 RX ADMIN — Medication 100 MILLIGRAM(S): at 21:49

## 2022-04-05 RX ADMIN — Medication 100 MILLIGRAM(S): at 17:47

## 2022-04-05 RX ADMIN — Medication 60 MILLIGRAM(S): at 05:58

## 2022-04-05 RX ADMIN — Medication 20 MILLIGRAM(S): at 05:58

## 2022-04-05 RX ADMIN — HEPARIN SODIUM 5000 UNIT(S): 5000 INJECTION INTRAVENOUS; SUBCUTANEOUS at 21:50

## 2022-04-05 RX ADMIN — PYRIDOSTIGMINE BROMIDE 60 MILLIGRAM(S): 60 SOLUTION ORAL at 06:01

## 2022-04-05 RX ADMIN — TAMSULOSIN HYDROCHLORIDE 0.4 MILLIGRAM(S): 0.4 CAPSULE ORAL at 21:49

## 2022-04-05 RX ADMIN — AZATHIOPRINE 50 MILLIGRAM(S): 100 TABLET ORAL at 21:49

## 2022-04-05 RX ADMIN — Medication 1: at 08:21

## 2022-04-05 RX ADMIN — Medication 100 MILLIGRAM(S): at 05:58

## 2022-04-05 RX ADMIN — Medication 90 MILLIGRAM(S): at 17:47

## 2022-04-05 RX ADMIN — ATORVASTATIN CALCIUM 80 MILLIGRAM(S): 80 TABLET, FILM COATED ORAL at 21:49

## 2022-04-05 RX ADMIN — PYRIDOSTIGMINE BROMIDE 60 MILLIGRAM(S): 60 SOLUTION ORAL at 21:49

## 2022-04-05 RX ADMIN — HEPARIN SODIUM 5000 UNIT(S): 5000 INJECTION INTRAVENOUS; SUBCUTANEOUS at 17:47

## 2022-04-05 RX ADMIN — PYRIDOSTIGMINE BROMIDE 60 MILLIGRAM(S): 60 SOLUTION ORAL at 17:21

## 2022-04-05 RX ADMIN — Medication 1: at 12:22

## 2022-04-05 RX ADMIN — IMMUNE GLOBULIN (HUMAN) 150 GRAM(S): 10 INJECTION INTRAVENOUS; SUBCUTANEOUS at 16:07

## 2022-04-05 RX ADMIN — PYRIDOSTIGMINE BROMIDE 60 MILLIGRAM(S): 60 SOLUTION ORAL at 10:43

## 2022-04-05 RX ADMIN — Medication 40 MILLIEQUIVALENT(S): at 10:42

## 2022-04-05 RX ADMIN — LOSARTAN POTASSIUM 50 MILLIGRAM(S): 100 TABLET, FILM COATED ORAL at 05:58

## 2022-04-05 RX ADMIN — HEPARIN SODIUM 5000 UNIT(S): 5000 INJECTION INTRAVENOUS; SUBCUTANEOUS at 05:58

## 2022-04-05 RX ADMIN — SERTRALINE 50 MILLIGRAM(S): 25 TABLET, FILM COATED ORAL at 16:08

## 2022-04-05 RX ADMIN — PYRIDOSTIGMINE BROMIDE 60 MILLIGRAM(S): 60 SOLUTION ORAL at 02:45

## 2022-04-05 RX ADMIN — Medication 40 MILLIEQUIVALENT(S): at 13:32

## 2022-04-05 RX ADMIN — Medication 81 MILLIGRAM(S): at 13:36

## 2022-04-05 RX ADMIN — LOSARTAN POTASSIUM 50 MILLIGRAM(S): 100 TABLET, FILM COATED ORAL at 10:55

## 2022-04-05 NOTE — PROGRESS NOTE ADULT - PROBLEM SELECTOR PLAN 2
pt with hx of MG presented with worsening weakness than baseline  WBC 15, afebrile  UA negative, CXR no consolidation or infiltrates (pending official read)  s/p solumedrol and pyridostigmine in ED  weakness likely 2/2 myasthenia flare   TSH, free T4 wnl  ESR wnl  Iron panel as above  Neuro Dr. Shaw following

## 2022-04-05 NOTE — DIETITIAN INITIAL EVALUATION ADULT. - PROBLEM SELECTOR PLAN 2
pt with hx of MG presented with worsening weakness than baseline  WBC 15, afebrile  UA negative, CXR no consolidation or infiltrates (pending official read)  s/p solumedrol and pyridostigmine in ED  weakness likely 2/2 myasthenia flare   f/u TSH, CRP, ESR, iron panel  Consulted Neurologist: Dr. Shaw

## 2022-04-05 NOTE — PROGRESS NOTE ADULT - SUBJECTIVE AND OBJECTIVE BOX
NEUROLOGY FOLLOW-UP NOTE    NAME:  BRITTANIE GROSS      ASSESSMENT:  66 RHM with myasthenia gravis s/p thymectomy, with improved strength following IVIg treatment over 3 days, also with concern for mild cognitive impairment with memory loss      RECOMMENDATIONS:    - Three-day IVIg course complete - No need to resume at this time    - Continue Pyridostigmine 60mg PO Q6H for treatment of myasthenia gravis    - Continue Prednisone 20mg PO Daily for immunosuppression - No taper at this time (would decrease after patient has been on non-steroidal immunosuppression for at least 6 months)    - Start Azathioprine 50mg PO Daily for long-term non-steroidal immunosuppression - Increase to 100mg PO Daily after 3 days if tolerated    - Routine follow-up in Neurology clinic for Neuromuscular and Cognitive evaluations - Patient is requesting establishment at a center in [place]; consider [facility] Neurology clinic at [phone] for these evaluations        NOTE TO BE COMPLETED - PLEASE REFER TO ABOVE ONLY AND IGNORE INFORMATION BELOW    ******************************    HPI:  66 year old male  from [facility], ambulates independently, with PMHx of Myasthenia Gravis s/p Thymectomy , HTN, HLD, CVA x 2, Osteoporosis, PAD, Peripheral Neuropathy, Cervical and Lumbar spondylosis, and BPH who presented to the ED for generalized weakness. Patient stated that for the past 3 days he feels overall more weak than his baseline and also had decrease in appetite. Patient denies any headaches, visual changes, SOB, cough, chest pain, abdominal pain, or change in bowel habits. Pt was previously admitted for similar sxs and was treated for Acute exacerbation of myasthenia gravis.    In the ED:  Afebile, HR 97, /67, 99% on RA  WBC 15.9, BUN/Creatinine 20/1.63  UA negative, CXR no congestions or infiltrates (pending official read)  Was given Solumedrol 125mg + pyridostigmine 60mg  (02 Apr 2022 16:23)      NEURO HPI:      INTERVAL HISTORY:      MEDICATIONS:  aspirin enteric coated 81 milliGRAM(s) Oral daily  atorvastatin 80 milliGRAM(s) Oral at bedtime  heparin   Injectable 5000 Unit(s) SubCutaneous every 8 hours  hydrALAZINE 100 milliGRAM(s) Oral three times a day  immune   globulin 10% (GAMMAGARD) IVPB 20 Gram(s) IV Intermittent daily  insulin lispro (ADMELOG) corrective regimen sliding scale   SubCutaneous three times a day before meals  NIFEdipine XL 90 milliGRAM(s) Oral two times a day  predniSONE   Tablet 20 milliGRAM(s) Oral daily  pyridostigmine 60 milliGRAM(s) Oral every 4 hours  sertraline 50 milliGRAM(s) Oral daily  tamsulosin 0.4 milliGRAM(s) Oral at bedtime      ALLERGIES:  No Known Allergies      REVIEW OF SYSTEMS:  Fourteen systems reviewed and negative except as in HPI / Interval History.        OBJECTIVE:  Vital Signs Last 24 Hrs  T(C): 37.2 (05 Apr 2022 16:02), Max: 37.2 (05 Apr 2022 16:02)  T(F): 98.9 (05 Apr 2022 16:02), Max: 98.9 (05 Apr 2022 16:02)  HR: 70 (05 Apr 2022 16:02) (70 - 89)  BP: 156/69 (05 Apr 2022 16:02) (156/69 - 179/89)  BP(mean): --  RR: 18 (05 Apr 2022 16:02) (18 - 18)  SpO2: 97% (05 Apr 2022 16:02) (95% - 99%)    General Examination:  General: No acute distress  HEENT: Atraumatic, Normocephalic  Respiratory: CTA B/l.  No crackles, rhonchi, or wheezes.  Cardiovascular: RRR.  Normal S1 & S2.  Normal b/l radial and pedal pulses.    Neurological Examination:  General / Mental Status: AAO x 3.  No aphasia or dysarthria.  Naming and repetition intact.  Cranial Nerves: VFF x 4.  PERRL.  EOMI x 2, No nystagmus or diplopia.  B/l V1-V3 equal and intact to light touch and pinprick.  Symmetric facial movement and palate elevation.  B/l hearing equal to finger rub.  5/5 strength with b/l sternocleidomastoid & trapezius.  Midline tongue protrusion, with no atrophy or fasciculations.  Motor: Normal bulk & tone in all four extremities.  5/5 strength throughout all four extremities.  No downward drift, rigidity, spasticity, or tremors in any of the four extremities.  Sensory: Intact to light touch and pinprick in all four extremities.  Negative Romberg.  Reflex: 2+ and symmetric at b/l biceps, triceps, brachioradialis, patellae, and ankles.  Downgoing toes b/l.  Coordination: No dysmetria with b/l finger-to-nose and heel raise tests.  Symmetric rapid alternating movements b/l.  Gait: Normal, narrow-based gait.  No difficulty with tiptoe, heel, and tandem gaits.        LABORATORY VALUES:                          11.3   12.42 )-----------( 255      ( 05 Apr 2022 08:12 )             34.8       04-05    137  |  103  |  19<H>  ----------------------------<  148<H>  3.4<L>   |  30  |  1.07    Ca    9.0      05 Apr 2022 08:12  Phos  3.1     04-05  Mg     1.8     04-05    TPro  6.7  /  Alb  2.5<L>  /  TBili  0.3  /  DBili  x   /  AST  21  /  ALT  29  /  AlkPhos  67  04-05      LIVER FUNCTIONS - ( 05 Apr 2022 08:12 )  Alb: 2.5 g/dL / Pro: 6.7 g/dL / ALK PHOS: 67 U/L / ALT: 29 U/L DA / AST: 21 U/L / GGT: x                 Glucose Trend  04-05-22 @ 16:54   -  -- -- 140<H>  04-05-22 @ 12:00   -  -- -- 170<H>  04-05-22 @ 08:12   -  -- 148<H> --  04-05-22 @ 07:57   -  -- -- 153<H>  04-04-22 @ 20:55   -  -- -- 125<H>  04-04-22 @ 16:47   -  -- -- 169<H>  04-04-22 @ 11:40   -  -- -- 251<H>  04-04-22 @ 07:45   -  -- -- 110<H>  04-04-22 @ 06:06   -  -- 84 --  04-03-22 @ 20:52   -  -- -- 141<H>                    NEUROIMAGING:          Please contact the Neurology consult service with any neurological questions.      Antonio Hastings MD   of Neurology  [facility] [employer] [facility] at [facility]         NEUROLOGY FOLLOW-UP NOTE    NAME:  BRITTANIE GROSS      ASSESSMENT:  66 RHM with myasthenia gravis s/p thymectomy, with improved strength following IVIg treatment over 3 days, also with concern for mild cognitive impairment with memory loss vs. vascular dementia, as well as multiple chronic lacunar infarcts secondary to cerebral microvascular disease      RECOMMENDATIONS:    - Three-day IVIg course complete - No need to resume at this time    - Continue Pyridostigmine 60mg PO Q6H for treatment of myasthenia gravis    - Continue Prednisone 20mg PO Daily for immunosuppression - No taper at this time (would decrease after patient has been on non-steroidal immunosuppression for at least 6 months)    - Start Azathioprine 50mg PO Daily for long-term non-steroidal immunosuppression - Increase to 100mg PO Daily after 3 days if tolerated    - Continue Aspirin 81mg PO Daily and Atorvastatin 80mg PO QHS for secondary stroke prevention in the setting of multiple chronic lacunar infarcts    - PT/OT for recovery of b/l leg strength and gait    - Routine follow-up in Neurology clinic for Neuromuscular and Cognitive evaluations - Patient is requesting establishment at a center in [place]; consider [facility] Neurology clinic at [phone] for these evaluations        ******************************    HPI:  66 year old male  from [facility], ambulates independently, with PMHx of Myasthenia Gravis s/p Thymectomy , HTN, HLD, CVA x 2, Osteoporosis, PAD, Peripheral Neuropathy, Cervical and Lumbar spondylosis, and BPH who presented to the ED for generalized weakness. Patient stated that for the past 3 days he feels overall more weak than his baseline and also had decrease in appetite. Patient denies any headaches, visual changes, SOB, cough, chest pain, abdominal pain, or change in bowel habits. Pt was previously admitted for similar sxs and was treated for Acute exacerbation of myasthenia gravis.  In the ED:  Afebile, HR 97, /67, 99% on RA  WBC 15.9, BUN/Creatinine 20/1.63  UA negative, CXR no congestions or infiltrates (pending official read)  Was given Solumedrol 125mg + pyridostigmine 60mg  (02 Apr 2022 16:23)      NEURO HPI:  Neurology consulted for management of myasthenia Gravis. Pt is a poor historian and states that he feels generally weak but thinks that it is because someone is tampering with his food. He cannot say when the weakness started but feels that he did not improve from the last time that he was admitted. He is positive for ACR binding ab as of 12/21/21. Pt denies any double vision, difficulty swallowing or breathing.       INTERVAL HISTORY:  The patient has completed a three-day course of IVIg (470 mg/kg per day), and reports improvement in the strength of all four extremities. He still has some difficulty standing up and walking.      MEDICATIONS:  aspirin enteric coated 81 milliGRAM(s) Oral daily  atorvastatin 80 milliGRAM(s) Oral at bedtime  heparin   Injectable 5000 Unit(s) SubCutaneous every 8 hours  hydrALAZINE 100 milliGRAM(s) Oral three times a day  immune   globulin 10% (GAMMAGARD) IVPB 20 Gram(s) IV Intermittent daily  insulin lispro (ADMELOG) corrective regimen sliding scale   SubCutaneous three times a day before meals  NIFEdipine XL 90 milliGRAM(s) Oral two times a day  predniSONE   Tablet 20 milliGRAM(s) Oral daily  pyridostigmine 60 milliGRAM(s) Oral every 4 hours  sertraline 50 milliGRAM(s) Oral daily  tamsulosin 0.4 milliGRAM(s) Oral at bedtime      ALLERGIES:  No Known Allergies      REVIEW OF SYSTEMS:  Fourteen systems reviewed and negative except as in HPI / Interval History.        OBJECTIVE:  Vital Signs Last 24 Hrs  T(C): 37.2 (05 Apr 2022 16:02), Max: 37.2 (05 Apr 2022 16:02)  T(F): 98.9 (05 Apr 2022 16:02), Max: 98.9 (05 Apr 2022 16:02)  HR: 70 (05 Apr 2022 16:02) (70 - 89)  BP: 156/69 (05 Apr 2022 16:02) (156/69 - 179/89)  RR: 18 (05 Apr 2022 16:02) (18 - 18)  SpO2: 97% (05 Apr 2022 16:02) (95% - 99%)    General Examination:  General: No acute distress  HEENT: Atraumatic, Normocephalic  Respiratory: CTA B/l.  No crackles, rhonchi, or wheezes. Able to count beyond 20 in one breath.  Cardiovascular: RRR.  Normal S1 & S2.  Normal b/l radial and pedal pulses.    Neurological Examination:  General / Mental Status: AAO x 2 (not to time).  No aphasia or dysarthria.  Naming and repetition intact.  Immediate recall: 3/3.  5-minute delayed recall: 1/3 (recalls only 1 other word with MCQ cue, and no words with category cue).  Cranial Nerves: B/l blink to threat present.  PERRL.  EOMI x 2, with diplopia reported only when looking slightly right of center.  B/l V1-V3 equal and intact to light touch and pinprick.  Symmetric facial movement and palate elevation.  B/l hearing equal to finger rub.  At least 4/5 strength with b/l sternocleidomastoid & trapezius.  Midline tongue protrusion, with no atrophy or fasciculations.  Motor: Normal bulk & diminished tone in all four extremities.  At least 3/5 strength throughout all four extremities.  B/l hand  strength equal.  No downward drift, rigidity, spasticity, or tremors in any of the four extremities.  Sensory: Intact to light touch and pinprick in all four extremities.  Reflex: 1+ and symmetric at b/l biceps, triceps, brachioradialis, patellae, and ankles.  Downgoing toes b/l.  Coordination: No dysmetria with b/l finger-to-nose and heel raise tests.  Gait and Romberg sign testing deferred per patient preference.        LABORATORY VALUES:                          11.3   12.42 )-----------( 255      ( 05 Apr 2022 08:12 )             34.8       04-05    137  |  103  |  19<H>  ----------------------------<  148<H>  3.4<L>   |  30  |  1.07    Ca    9.0      05 Apr 2022 08:12  Phos  3.1     04-05  Mg     1.8     04-05    TPro  6.7  /  Alb  2.5<L>  /  TBili  0.3  /  DBili  x   /  AST  21  /  ALT  29  /  AlkPhos  67  04-05    Glucose Trend  04-05-22 @ 16:54   -  -- -- 140<H>  04-05-22 @ 12:00   -  -- -- 170<H>  04-05-22 @ 08:12   -  -- 148<H> --  04-05-22 @ 07:57   -  -- -- 153<H>  04-04-22 @ 20:55   -  -- -- 125<H>  04-04-22 @ 16:47   -  -- -- 169<H>  04-04-22 @ 11:40   -  -- -- 251<H>  04-04-22 @ 07:45   -  -- -- 110<H>  04-04-22 @ 06:06   -  -- 84 --  04-03-22 @ 20:52   -  -- -- 141<H>    A1C with Estimated Average Glucose (04.03.22 @ 05:49)   A1C with Estimated Average Glucose Result: 6.3%   Estimated Average Glucose: 134 mg/dL         NEUROIMAGING:      CT Head (3/2/22):  - No acute intracranial abnormality  - Chronic right basal ganglia, left thalamic, and left occipital infarcts  - Chronic microvascular changes        Please contact the Neurology consult service with any neurological questions.      Antonio Hastings MD   of Neurology  [facility] [employer] [facility] at [facility]

## 2022-04-05 NOTE — DIETITIAN INITIAL EVALUATION ADULT. - PROBLEM SELECTOR PLAN 8
pt takes Novolin 70/30 10 units once a day and Humulin R  will start on SS  f/u A1c  chhaya NICHOLSON  Diabetic diet

## 2022-04-05 NOTE — PROGRESS NOTE ADULT - ATTENDING COMMENTS
HPI:  66 year old male  from [facility], ambulates independently, with PMHx of Myasthenia Gravis s/p Thymectomy , HTN, HLD, CVA x 2, Osteoporosis, PAD, Peripheral Neuropathy, Cervical and Lumbar spondylosis, and BPH who presented to the ED for generalized weakness. Patient stated that for the past 3 days he feels overall more weak than his baseline and also had decrease in appetite. Patient denies any headaches, visual changes, SOB, cough, chest pain, abdominal pain, or change in bowel habits. Pt was previously admitted for similar sxs and was treated for Acute exacerbation of myasthenia gravis.    In the ED:  Afebile, HR 97, /67, 99% on RA  WBC 15.9, BUN/Creatinine 20/1.63  UA negative, CXR no congestions or infiltrates (pending official read)  Was given Solumedrol 125mg + pyridostigmine 60mg  (02 Apr 2022 16:23)    # GENERALIZED WEAKNESS - SUSPECTED MYASTHENIA GRAVIS [S/P THYMECTOMY] FLARE  - ON PYRIDOSTIGMINE 60MG X 6 TIMES DAILY  - ON RECENT ADMISSION, DISCHARGED ON PROLONGED PREDNISONE TAPER  - F/U NIF AND VC  - F/U PT EVAL  - RECOMMENDED FOR IVIG INFUSION X 5 DAYS PER NEUROLOGY RECOMMENDATION  - NEUROLOGY CONSULT IN PROGRESS [CASE DISCUSSED WITH DR. RICARDO]  - PATIENT FOLLOW W/ DR. ESPINOZA AT [facility]    # ASYMPTOMATIC BRADYCARDIA - MONITOR ON TELEMETRY, F/U EKG, F/U ECHOCARDIOGRAM  - CARDIOLOGY CONSULT IN PROGRESS  - ECHO - TRACE MR, CONCENTRIC LVH, NORMAL LV SYSTOLIC EF, TRACE TR, TRACE DE    # HTN - ON HYDRALAZINE, NIFEDIPINE; HOLD LOSARTAN    # ? SUSPECTED CKD - REVIEWED UA AND PREVIOUS CR, OBTAIN OUTPATIENT RECORDS  - F/U BLADDER SCAN    # BPH - ON FLOMAX  # HLD - STATIN  # DM - SSI + FS; HOLD METFORMIN    # PER PATIENT'S REQUEST - CASE D/W PARTNER, MILLI DONOVAN @ [phone] [4/2] - CASE DISCUSSED AT LENGTH AND ALL QUESTIONS ANSWERED  # GI AND DVT PPX . HPI:  66 year old male  from [facility], ambulates independently, with PMHx of Myasthenia Gravis s/p Thymectomy , HTN, HLD, CVA x 2, Osteoporosis, PAD, Peripheral Neuropathy, Cervical and Lumbar spondylosis, and BPH who presented to the ED for generalized weakness. Patient stated that for the past 3 days he feels overall more weak than his baseline and also had decrease in appetite. Patient denies any headaches, visual changes, SOB, cough, chest pain, abdominal pain, or change in bowel habits. Pt was previously admitted for similar sxs and was treated for Acute exacerbation of myasthenia gravis.    In the ED:  Afebile, HR 97, /67, 99% on RA  WBC 15.9, BUN/Creatinine 20/1.63  UA negative, CXR no congestions or infiltrates (pending official read)  Was given Solumedrol 125mg + pyridostigmine 60mg  (02 Apr 2022 16:23)    # D/C IN A.M. TO [facility] [place] IF MEDICALLY STABLE    # GENERALIZED WEAKNESS - SUSPECTED MYASTHENIA GRAVIS [S/P THYMECTOMY] FLARE  - ON PYRIDOSTIGMINE 60MG X 6 TIMES DAILY  - ON RECENT ADMISSION, DISCHARGED ON PROLONGED PREDNISONE TAPER  - MONITORING NIF AND VC  - PT EVAL  - RECOMMENDED FOR IVIG INFUSION X 3 DAYS PER NEUROLOGY RECOMMENDATION  - NEUROLOGY CONSULT IN PROGRESS [CASE DISCUSSED WITH DR. RICARDO]  - PATIENT FOLLOW W/ DR. ESPINOZA AT [facility]    # ASYMPTOMATIC BRADYCARDIA - MONITOR ON TELEMETRY  - CARDIOLOGY CONSULT IN PROGRESS  - ECHO - TRACE MR, CONCENTRIC LVH, NORMAL LV SYSTOLIC EF, TRACE TR, TRACE MA    # HTN - ON HYDRALAZINE, INCREASED NIFEDIPINE, INCREASED LOSARTAN    # MICHEL ON SUSPECTED CKD - RESOLVED - REVIEWED UA AND PREVIOUS CR, OBTAIN OUTPATIENT RECORDS    # BPH - ON FLOMAX  # HLD - STATIN  # DM - SSI + FS; HOLD METFORMIN    # PER PATIENT'S REQUEST - CASE D/W PARTNER, MILLI DONOVAN @ [phone] [4/2] - CASE DISCUSSED AT LENGTH AND ALL QUESTIONS ANSWERED  # GI AND DVT PPX HPI:  66 year old male  from [facility], ambulates independently, with PMHx of Myasthenia Gravis s/p Thymectomy , HTN, HLD, CVA x 2, Osteoporosis, PAD, Peripheral Neuropathy, Cervical and Lumbar spondylosis, and BPH who presented to the ED for generalized weakness. Patient stated that for the past 3 days he feels overall more weak than his baseline and also had decrease in appetite. Patient denies any headaches, visual changes, SOB, cough, chest pain, abdominal pain, or change in bowel habits. Pt was previously admitted for similar sxs and was treated for Acute exacerbation of myasthenia gravis.    In the ED:  Afebile, HR 97, /67, 99% on RA  WBC 15.9, BUN/Creatinine 20/1.63  UA negative, CXR no congestions or infiltrates (pending official read)  Was given Solumedrol 125mg + pyridostigmine 60mg  (02 Apr 2022 16:23)    # D/C IN A.M. TO [facility] [place] IF MEDICALLY STABLE    # GENERALIZED WEAKNESS - SUSPECTED MYASTHENIA GRAVIS [S/P THYMECTOMY] FLARE  - ON PYRIDOSTIGMINE 60MG X 6 TIMES DAILY  - ON RECENT ADMISSION, DISCHARGED ON PROLONGED PREDNISONE TAPER  - MONITORING NIF AND VC  - PT EVAL  - RECOMMENDED FOR IVIG INFUSION X 3 DAYS PER NEUROLOGY RECOMMENDATION  - NEUROLOGY CONSULT IN PROGRESS [CASE DISCUSSED WITH DR. RICARDO]  - PATIENT FOLLOW W/ DR. ESPINOZA AT [facility]    # ASYMPTOMATIC BRADYCARDIA - MONITOR ON TELEMETRY  - CARDIOLOGY CONSULT IN PROGRESS  - ECHO - TRACE MR, CONCENTRIC LVH, NORMAL LV SYSTOLIC EF, TRACE TR, TRACE SD    # HTN - ON HYDRALAZINE, INCREASED NIFEDIPINE, INCREASED LOSARTAN    # MICHEL ON SUSPECTED CKD - RESOLVED - REVIEWED UA AND PREVIOUS CR, OBTAIN OUTPATIENT RECORDS    # BPH - ON FLOMAX  # HLD - STATIN  # DM - SSI + FS; HOLD METFORMIN    # PER PATIENT'S REQUEST - CASE D/W PARTNER, MILLI DONOVAN @ [phone] [4/5] - CASE DISCUSSED AT LENGTH AND ALL QUESTIONS ANSWERED  # GI AND DVT PPX

## 2022-04-05 NOTE — DIETITIAN INITIAL EVALUATION ADULT. - OTHER INFO
reports losing 1-2# PTA, time frame not available. UBW= 68kg. No complaints of Nausea, vomiting or diarrhea. No problems with chewing or swallowing food . No food allergies. Provide DASH/TLC, carbohydrate consistent diet, Add glucerna 1 can/d To approximate needs. reports losing unintentionally 1-2# PTA, time frame not available. UBW= 68kg. wt loss is negligible. No complaints of Nausea, vomiting or diarrhea. No problems with chewing or swallowing food . No food allergies. Provide DASH/TLC, carbohydrate consistent diet, Add glucerna 1 can/d To approximate needs.

## 2022-04-05 NOTE — DIETITIAN INITIAL EVALUATION ADULT. - PROBLEM SELECTOR PLAN 7
pt is on hydralazine, Losartan, Lasix, nifedipine  /65  Will start nifedipine and hydralazine  Hold Losartan for MICHEL  f/u ECHO (pt is on Lasix 20mg for  unknown reason)

## 2022-04-05 NOTE — PROGRESS NOTE ADULT - PROBLEM SELECTOR PLAN 1
pt with hx of MG presented with worsening weakness than baseline  admitted two months ago for MG flare and was discharged on steroid taper  pt failed multiple attempts with steroids  currently takes prednisone 20mg daily and pyridostigmine 60mg  no current sxs of difficulty breathing or swallowing  s/p day course of IVIG 30g  c/w home meds  c/w NIV and VC q12   Neuro Dr. Shaw following

## 2022-04-05 NOTE — PHYSICAL THERAPY INITIAL EVALUATION ADULT - GENERAL OBSERVATIONS, REHAB EVAL
patient refer in tele for generalized weakness, half-way resident, currently requires assistance with bedside mobility activities

## 2022-04-05 NOTE — PHYSICAL THERAPY INITIAL EVALUATION ADULT - PREDICTED DURATION OF THERAPY (DAYS/WKS), PT EVAL
"Anesthesia Transfer of Care Note    Patient: Ree Pena    Procedure(s) Performed: Procedure(s) (LRB):  HYSTEROSCOPY, WITH DILATION AND CURETTAGE OF UTERUS AND HYDROTHERMAL ENDOMETRIAL ABLATION (N/A)    Patient location: PACU    Anesthesia Type: general    Transport from OR: Transported from OR on room air with adequate spontaneous ventilation    Post pain: adequate analgesia    Post assessment: no apparent anesthetic complications and tolerated procedure well    Post vital signs: stable    Level of consciousness: awake    Nausea/Vomiting: no nausea/vomiting    Complications: none    Transfer of care protocol was followed      Last vitals:   Visit Vitals  /74   Pulse (!) 53   Temp 37.1 °C (98.7 °F) (Oral)   Resp 18   Ht 5' 10" (1.778 m)   Wt 105.4 kg (232 lb 7.6 oz)   SpO2 99%   Breastfeeding? No   BMI 33.36 kg/m²     "
2-3 weeks

## 2022-04-05 NOTE — DIETITIAN INITIAL EVALUATION ADULT. - PROBLEM SELECTOR PLAN 5
- WBC 15, afebrile  -UA negative, CXR no consolidation or infiltrates (pending official read)  - unlikely infection, likley reactive to dehydration and steroid use  - c/w maintance fluids   - trend WBC

## 2022-04-05 NOTE — DIETITIAN INITIAL EVALUATION ADULT. - PROBLEM SELECTOR PLAN 4
Pt's HR noted 44/min in ED hold  Pt is asymptomatic   will get ECG and monitor in tele  Cardio Dr. Mercado was consulted

## 2022-04-05 NOTE — DIETITIAN INITIAL EVALUATION ADULT. - PERTINENT LABORATORY DATA
04-05 Na137 mmol/L Glu 148 mg/dL<H> K+ 3.4 mmol/L<L> Cr  1.07 mg/dL BUN 19 mg/dL<H> 04-05 Phos 3.1 mg/dL 04-05 Alb 2.5 g/dL<L>

## 2022-04-05 NOTE — PROGRESS NOTE ADULT - SUBJECTIVE AND OBJECTIVE BOX
PGY-1 Progress Note discussed with attending    PAGER #: [[phone]] TILL 5:00 PM  PLEASE CONTACT ON CALL TEAM:  - On Call Team (Please refer to Bennett) FROM 5:00 PM - 8:30PM  - Nightfloat Team FROM 8:30 -7:30 AM    CHIEF COMPLAINT & BRIEF HOSPITAL COURSE:  66 year old male  from [facility], ambulates independently, with PMHx of Myasthenia Gravis s/p Thymectomy , HTN, HLD, CVA x 2, Osteoporosis, PAD, Peripheral Neuropathy, Cervical and Lumbar spondylosis, and BPH who presented to the ED for generalized weakness. Patient stated that for the past 3 days he feels overall more weak than his baseline and also had decrease in appetite. Patient denies any headaches, visual changes, SOB, cough, chest pain, abdominal pain, or change in bowel habits. Pt was previously admitted for similar sxs and was treated for Acute exacerbation of myasthenia gravis.    INTERVAL HPI/OVERNIGHT EVENTS:   Patient seen and examined at bedside. No overnight events. Patient with no acute complaints this AM, endorses mild improvement in general weakness. Tele showing frequent PVCs and episodes of bigeminy.    REVIEW OF SYSTEMS:  CONSTITUTIONAL: No fever, night sweats, weight loss, or fatigue  RESPIRATORY: No cough, wheezing, or hemoptysis; No shortness of breath  CARDIOVASCULAR: No chest pain, palpitations, dizziness, or leg swelling  GASTROINTESTINAL: No abdominal pain. No nausea, vomiting, or hematemesis; No diarrhea or constipation. No melena or hematochezia.  GENITOURINARY: No dysuria or hematuria, no urinary frequency  NEUROLOGICAL: No headaches, memory loss, loss of strength, numbness, or tremors  SKIN: No itching, burning, rashes, or lesions     Vital Signs Last 24 Hrs  T(C): 36.7 (05 Apr 2022 12:17), Max: 37 (04 Apr 2022 15:58)  T(F): 98 (05 Apr 2022 12:17), Max: 98.6 (04 Apr 2022 15:58)  HR: 89 (05 Apr 2022 12:17) (77 - 89)  BP: 158/77 (05 Apr 2022 12:17) (157/63 - 186/84)  BP(mean): --  RR: 18 (05 Apr 2022 12:17) (18 - 18)  SpO2: 97% (05 Apr 2022 12:17) (95% - 99%)    PHYSICAL EXAMINATION:  GENERAL: NAD, well built  HEAD:  Atraumatic, Normocephalic  EYES:  conjunctiva and sclera clear  NECK: Supple, No JVD, thyroid not examined   CHEST/LUNG: Clear to auscultation. No wheezing, rales, rubs or rhonchi  HEART: Regular rate and rhythm; Clear S1 and S2, No murmurs, rubs, or gallops  ABDOMEN: Soft, Nontender, Nondistended; Bowel sounds present  NERVOUS SYSTEM:  Alert & Oriented X3, CNII-XII intact, no focal neurological deficits   EXTREMITIES:  2+ Peripheral Pulses, No clubbing, cyanosis, +1 b/l LE edema  SKIN: warm dry, no lesions noted                           11.3   12.42 )-----------( 255      ( 05 Apr 2022 08:12 )             34.8     04-05    137  |  103  |  19<H>  ----------------------------<  148<H>  3.4<L>   |  30  |  1.07    Ca    9.0      05 Apr 2022 08:12  Phos  3.1     04-05  Mg     1.8     04-05    TPro  6.7  /  Alb  2.5<L>  /  TBili  0.3  /  DBili  x   /  AST  21  /  ALT  29  /  AlkPhos  67  04-05    LIVER FUNCTIONS - ( 05 Apr 2022 08:12 )  Alb: 2.5 g/dL / Pro: 6.7 g/dL / ALK PHOS: 67 U/L / ALT: 29 U/L DA / AST: 21 U/L / GGT: x           CAPILLARY BLOOD GLUCOSE    MEDICATIONS  (STANDING):  aspirin enteric coated 81 milliGRAM(s) Oral daily  atorvastatin 80 milliGRAM(s) Oral at bedtime  heparin   Injectable 5000 Unit(s) SubCutaneous every 8 hours  hydrALAZINE 100 milliGRAM(s) Oral three times a day  immune   globulin 10% (GAMMAGARD) IVPB 20 Gram(s) IV Intermittent daily  insulin lispro (ADMELOG) corrective regimen sliding scale   SubCutaneous three times a day before meals  NIFEdipine XL 60 milliGRAM(s) Oral two times a day  potassium chloride    Tablet ER 40 milliEquivalent(s) Oral every 4 hours  predniSONE   Tablet 20 milliGRAM(s) Oral daily  pyridostigmine 60 milliGRAM(s) Oral every 4 hours  sertraline 50 milliGRAM(s) Oral daily  tamsulosin 0.4 milliGRAM(s) Oral at bedtime    MEDICATIONS  (PRN):      RADIOLOGY & ADDITIONAL TESTS:      ACC: [id number] EXAM:  XR ABDOMEN PORTABLE ROUTINE 1V                          PROCEDURE DATE:  04/04/2022          INTERPRETATION:  KUB: AP    COMPARISON: None.    CLINICAL INFORMATION: Constipation.    FINDINGS:    There is a nonspecific, nonobstructive bowel gas pattern.  No free intra-abdominal air.  No obvious intra-abdominal masses.  No abnormal calcifications.  Degenerative spondylosis of the lumbar spine.    IMPRESSION:    No acute radiographic abdominal findings.    --- End of Report ---    RYLEY FARAH MD; Attending Radiologist  This document has been electronically signed. Apr 5 2022  8:50AM          ACC: [id number] EXAM:  US THYROID AND PARATHYROID                          PROCEDURE DATE:  04/04/2022          INTERPRETATION:  CLINICAL INFORMATION: Evaluate thyroid nodule    COMPARISON: None available.    TECHNIQUE: Sonography of the thyroid.    FINDINGS:  Technically limited exam due to patient's inability to extend his neck.    Right Lobe: 4.3 x  1.7 x 2.4 cm. There is a 0.4 cm hypoechoic nodule in   the lower pole.    Left Lobe: 4.3 x 1.7 x 2.0 cm. There is a 0.6 cm cystic nodule in the   lower pole.    Isthmus: 0.6 cm.    Cervical Lymph Nodes: No enlarged or abnormal morphology cervical nodes.    IMPRESSION:    Technically limited exam. There is a 0.4 cm hypoechoic in the lower pole   of the right thyroid lobe.    TI-RAD 4: Moderatelysuspicious (FNA if > 1.5 cm, Follow if > 1 cm)  __________________________________  ACR Thyroid Imaging, Reporting and Data System (TI-RADS): White Paper of   the ACR TI-RADS Committee. J Am Nakul Radiol 2017;14:587-595.    TI-RAD 1: Benign (No FNA)  TI-RAD 2: Not suspicious (No FNA)  TI-RAD 3: Mildly suspicious (FNA if > 2.5 cm, Follow if >1.5 cm)  TI-RAD 4: Moderately suspicious (FNA if > 1.5 cm, Follow if > 1 cm)  TI-RAD 5: Highly suspicious (FNA if > 1 cm, Follow if > 0.5 cm)        --- End ofReport ---    ARUN MACARIO MD; Attending Radiologist  This document has been electronically signed. Apr 4 2022 12:34PM

## 2022-04-05 NOTE — DIETITIAN INITIAL EVALUATION ADULT. - PROBLEM SELECTOR PLAN 1
pt with hx of MG presented with worsening weakness than baseline  admitted two months ago for MG flare and was discharged on steroid taper  pt failed multiple attempts with steroids, will consider IVIG  currently takes prednisone 20mg daily and pyridostigmine 60mg  no current sxs of difficulty breathing or swallowing   continue home meds  NIF And Vital Capacity Q 6 hrs to Determine Progression Of Neuromuscular Diseases  Consulted Neuro Dr. Shaw: start IVIG   will start IVIG 30g/ day for 5 days (2g/kg over treatment course)

## 2022-04-06 LAB
ANION GAP SERPL CALC-SCNC: 5 MMOL/L — SIGNIFICANT CHANGE UP (ref 5–17)
BASOPHILS # BLD AUTO: 0.02 K/UL — SIGNIFICANT CHANGE UP (ref 0–0.2)
BASOPHILS NFR BLD AUTO: 0.2 % — SIGNIFICANT CHANGE UP (ref 0–2)
BUN SERPL-MCNC: 18 MG/DL — SIGNIFICANT CHANGE UP (ref 7–18)
CALCIUM SERPL-MCNC: 9 MG/DL — SIGNIFICANT CHANGE UP (ref 8.4–10.5)
CHLORIDE SERPL-SCNC: 104 MMOL/L — SIGNIFICANT CHANGE UP (ref 96–108)
CO2 SERPL-SCNC: 28 MMOL/L — SIGNIFICANT CHANGE UP (ref 22–31)
CREAT SERPL-MCNC: 1.25 MG/DL — SIGNIFICANT CHANGE UP (ref 0.5–1.3)
EGFR: 64 ML/MIN/1.73M2 — SIGNIFICANT CHANGE UP
EOSINOPHIL # BLD AUTO: 0.02 K/UL — SIGNIFICANT CHANGE UP (ref 0–0.5)
EOSINOPHIL NFR BLD AUTO: 0.2 % — SIGNIFICANT CHANGE UP (ref 0–6)
GLUCOSE BLDC GLUCOMTR-MCNC: 125 MG/DL — HIGH (ref 70–99)
GLUCOSE BLDC GLUCOMTR-MCNC: 197 MG/DL — HIGH (ref 70–99)
GLUCOSE BLDC GLUCOMTR-MCNC: 98 MG/DL — SIGNIFICANT CHANGE UP (ref 70–99)
GLUCOSE SERPL-MCNC: 95 MG/DL — SIGNIFICANT CHANGE UP (ref 70–99)
HCT VFR BLD CALC: 33 % — LOW (ref 39–50)
HGB BLD-MCNC: 10.8 G/DL — LOW (ref 13–17)
IMM GRANULOCYTES NFR BLD AUTO: 0.5 % — SIGNIFICANT CHANGE UP (ref 0–1.5)
LYMPHOCYTES # BLD AUTO: 1.27 K/UL — SIGNIFICANT CHANGE UP (ref 1–3.3)
LYMPHOCYTES # BLD AUTO: 12 % — LOW (ref 13–44)
MAGNESIUM SERPL-MCNC: 2.1 MG/DL — SIGNIFICANT CHANGE UP (ref 1.6–2.6)
MCHC RBC-ENTMCNC: 28 PG — SIGNIFICANT CHANGE UP (ref 27–34)
MCHC RBC-ENTMCNC: 32.7 GM/DL — SIGNIFICANT CHANGE UP (ref 32–36)
MCV RBC AUTO: 85.5 FL — SIGNIFICANT CHANGE UP (ref 80–100)
MONOCYTES # BLD AUTO: 0.95 K/UL — HIGH (ref 0–0.9)
MONOCYTES NFR BLD AUTO: 9 % — SIGNIFICANT CHANGE UP (ref 2–14)
NEUTROPHILS # BLD AUTO: 8.25 K/UL — HIGH (ref 1.8–7.4)
NEUTROPHILS NFR BLD AUTO: 78.1 % — HIGH (ref 43–77)
NRBC # BLD: 0 /100 WBCS — SIGNIFICANT CHANGE UP (ref 0–0)
PHOSPHATE SERPL-MCNC: 3.2 MG/DL — SIGNIFICANT CHANGE UP (ref 2.5–4.5)
PLATELET # BLD AUTO: 236 K/UL — SIGNIFICANT CHANGE UP (ref 150–400)
POTASSIUM SERPL-MCNC: 3.9 MMOL/L — SIGNIFICANT CHANGE UP (ref 3.5–5.3)
POTASSIUM SERPL-SCNC: 3.9 MMOL/L — SIGNIFICANT CHANGE UP (ref 3.5–5.3)
RBC # BLD: 3.86 M/UL — LOW (ref 4.2–5.8)
RBC # FLD: 13.7 % — SIGNIFICANT CHANGE UP (ref 10.3–14.5)
SODIUM SERPL-SCNC: 137 MMOL/L — SIGNIFICANT CHANGE UP (ref 135–145)
WBC # BLD: 10.56 K/UL — HIGH (ref 3.8–10.5)
WBC # FLD AUTO: 10.56 K/UL — HIGH (ref 3.8–10.5)

## 2022-04-06 PROCEDURE — 99233 SBSQ HOSP IP/OBS HIGH 50: CPT

## 2022-04-06 RX ORDER — ONDANSETRON 8 MG/1
4 TABLET, FILM COATED ORAL ONCE
Refills: 0 | Status: COMPLETED | OUTPATIENT
Start: 2022-04-06 | End: 2022-04-06

## 2022-04-06 RX ADMIN — TAMSULOSIN HYDROCHLORIDE 0.4 MILLIGRAM(S): 0.4 CAPSULE ORAL at 22:30

## 2022-04-06 RX ADMIN — Medication 90 MILLIGRAM(S): at 17:59

## 2022-04-06 RX ADMIN — Medication 100 MILLIGRAM(S): at 15:30

## 2022-04-06 RX ADMIN — Medication 100 MILLIGRAM(S): at 22:30

## 2022-04-06 RX ADMIN — Medication 90 MILLIGRAM(S): at 06:18

## 2022-04-06 RX ADMIN — SERTRALINE 50 MILLIGRAM(S): 25 TABLET, FILM COATED ORAL at 15:31

## 2022-04-06 RX ADMIN — PYRIDOSTIGMINE BROMIDE 60 MILLIGRAM(S): 60 SOLUTION ORAL at 02:44

## 2022-04-06 RX ADMIN — Medication 81 MILLIGRAM(S): at 15:30

## 2022-04-06 RX ADMIN — PYRIDOSTIGMINE BROMIDE 60 MILLIGRAM(S): 60 SOLUTION ORAL at 15:32

## 2022-04-06 RX ADMIN — HEPARIN SODIUM 5000 UNIT(S): 5000 INJECTION INTRAVENOUS; SUBCUTANEOUS at 22:31

## 2022-04-06 RX ADMIN — HEPARIN SODIUM 5000 UNIT(S): 5000 INJECTION INTRAVENOUS; SUBCUTANEOUS at 17:39

## 2022-04-06 RX ADMIN — ONDANSETRON 4 MILLIGRAM(S): 8 TABLET, FILM COATED ORAL at 00:53

## 2022-04-06 RX ADMIN — HEPARIN SODIUM 5000 UNIT(S): 5000 INJECTION INTRAVENOUS; SUBCUTANEOUS at 06:18

## 2022-04-06 RX ADMIN — PYRIDOSTIGMINE BROMIDE 60 MILLIGRAM(S): 60 SOLUTION ORAL at 19:55

## 2022-04-06 RX ADMIN — LOSARTAN POTASSIUM 100 MILLIGRAM(S): 100 TABLET, FILM COATED ORAL at 06:18

## 2022-04-06 RX ADMIN — PYRIDOSTIGMINE BROMIDE 60 MILLIGRAM(S): 60 SOLUTION ORAL at 00:21

## 2022-04-06 RX ADMIN — ATORVASTATIN CALCIUM 80 MILLIGRAM(S): 80 TABLET, FILM COATED ORAL at 22:31

## 2022-04-06 RX ADMIN — Medication 100 MILLIGRAM(S): at 06:17

## 2022-04-06 RX ADMIN — PYRIDOSTIGMINE BROMIDE 60 MILLIGRAM(S): 60 SOLUTION ORAL at 22:30

## 2022-04-06 RX ADMIN — Medication 20 MILLIGRAM(S): at 06:18

## 2022-04-06 RX ADMIN — AZATHIOPRINE 50 MILLIGRAM(S): 100 TABLET ORAL at 15:31

## 2022-04-06 RX ADMIN — PYRIDOSTIGMINE BROMIDE 60 MILLIGRAM(S): 60 SOLUTION ORAL at 06:17

## 2022-04-06 NOTE — PROGRESS NOTE ADULT - ASSESSMENT
66 year old male  from [facility], ambulates independently, with PMHx of Myasthenia Gravis s/p Thymectomy , HTN, HLD, CVA x 2, Osteoporosis, PAD, Peripheral Neuropathy, Cervical and Lumbar spondylosis, and BPH who presented to the ED for generalized weakness.
66 year old male  from [facility], ambulates independently, with PMHx of Myasthenia Gravis s/p Thymectomy , HTN, HLD, CVA x 2, Osteoporosis, PAD, Peripheral Neuropathy, Cervical and Lumbar spondylosis, and BPH who presented to the ED for generalized weakness.
66 year old male  from [facility], ambulates independently, with PMHx of Myasthenia Gravis s/p Thymectomy , HTN, HLD, CVA x 2, Osteoporosis, PAD, Peripheral Neuropathy, Cervical and Lumbar spondylosis, and BPH who presented to the ED for generalized weakness.

## 2022-04-06 NOTE — PROGRESS NOTE ADULT - PROBLEM SELECTOR PLAN 9
- pt takes atorvastatin 80mg  - c/w home medications

## 2022-04-06 NOTE — PROGRESS NOTE ADULT - TIME BILLING
I counseled the patient and primary team about the medications to maintain to manage myasthenia gravis exacerbation.
I counseled the patient and primary team about the medications to maintain for management of myasthenia gravis exacerbation and chronic lacunar infarcts.
No

## 2022-04-06 NOTE — PROGRESS NOTE ADULT - PROBLEM SELECTOR PLAN 8
pt takes Novolin 70/30 10 units once a day and Humulin R  A1c 6.3   monitor BS  c/w SSI, Diabetic diet

## 2022-04-06 NOTE — PROGRESS NOTE ADULT - PROBLEM SELECTOR PLAN 7
HGB 11.6 MCV 86  no signs of bleeding  Iron panel as above, mixed AYAD and chronic disease  -monitor CBC

## 2022-04-06 NOTE — PROGRESS NOTE ADULT - ATTENDING COMMENTS
HPI:  66 year old male  from [facility], ambulates independently, with PMHx of Myasthenia Gravis s/p Thymectomy , HTN, HLD, CVA x 2, Osteoporosis, PAD, Peripheral Neuropathy, Cervical and Lumbar spondylosis, and BPH who presented to the ED for generalized weakness. Patient stated that for the past 3 days he feels overall more weak than his baseline and also had decrease in appetite. Patient denies any headaches, visual changes, SOB, cough, chest pain, abdominal pain, or change in bowel habits. Pt was previously admitted for similar sxs and was treated for Acute exacerbation of myasthenia gravis.    In the ED:  Afebile, HR 97, /67, 99% on RA  WBC 15.9, BUN/Creatinine 20/1.63  UA negative, CXR no congestions or infiltrates (pending official read)  Was given Solumedrol 125mg + pyridostigmine 60mg  (02 Apr 2022 16:23)    # D/C IN A.M. TO [facility] IF MEDICALLY STABLE    # GENERALIZED WEAKNESS - SUSPECTED MYASTHENIA GRAVIS [S/P THYMECTOMY] FLARE  - ON PYRIDOSTIGMINE 60MG X 6 TIMES DAILY  - ON RECENT ADMISSION, DISCHARGED ON PROLONGED PREDNISONE TAPER  - MONITORING NIF AND VC  - PT EVAL  - RECOMMENDED FOR IVIG INFUSION X 3 DAYS PER NEUROLOGY RECOMMENDATION  - NEUROLOGY CONSULT IN PROGRESS [CASE DISCUSSED WITH DR. RICARDO]  - PATIENT FOLLOW W/ DR. ESPINOZA AT [facility]    - RECOMMENDED AZATHIOPRINE BY NEUROLOGY    # ASYMPTOMATIC BRADYCARDIA - MONITOR ON TELEMETRY  - CARDIOLOGY CONSULT IN PROGRESS  - ECHO - TRACE MR, CONCENTRIC LVH, NORMAL LV SYSTOLIC EF, TRACE TR, TRACE MT    # HTN - ON HYDRALAZINE, NIFEDIPINE, LOSARTAN    # PVC, NSVT - F/U NST IN A.M.    # MICHEL ON SUSPECTED CKD - RESOLVED - REVIEWED UA AND PREVIOUS CR, OBTAIN OUTPATIENT RECORDS    # BPH - ON FLOMAX  # HLD - STATIN  # DM - SSI + FS; HOLD METFORMIN    # PER PATIENT'S REQUEST - CASE D/W PARTNER, MILLI DONOVAN @ [phone] [4/5] - CASE DISCUSSED AT LENGTH AND ALL QUESTIONS ANSWERED  # GI AND DVT PPX.

## 2022-04-06 NOTE — PROGRESS NOTE ADULT - PROBLEM SELECTOR PROBLEM 1
Myasthenia gravis with exacerbation

## 2022-04-06 NOTE — PROGRESS NOTE ADULT - SUBJECTIVE AND OBJECTIVE BOX
PGY-1 Progress Note discussed with attending    PAGER #: [[phone]] TILL 5:00 PM  PLEASE CONTACT ON CALL TEAM:  - On Call Team (Please refer to Bennett) FROM 5:00 PM - 8:30PM  - Nightfloat Team FROM 8:30 -7:30 AM    CHIEF COMPLAINT & BRIEF HOSPITAL COURSE:  66 year old male  from [facility], ambulates independently, with PMHx of Myasthenia Gravis s/p Thymectomy , HTN, HLD, CVA x 2, Osteoporosis, PAD, Peripheral Neuropathy, Cervical and Lumbar spondylosis, and BPH who presented to the ED for generalized weakness. Patient stated that for the past 3 days he feels overall more weak than his baseline and also had decrease in appetite. Patient denies any headaches, visual changes, SOB, cough, chest pain, abdominal pain, or change in bowel habits. Pt was previously admitted for similar sxs and was treated for Acute exacerbation of myasthenia gravis.    INTERVAL HPI/OVERNIGHT EVENTS:   Patient seen and examined at bedside. No overnight events. Patient with no acute complaints this AM. Tele showing episodes of non-sustained vtach, frequent PVCs and episodes of trigeminy    REVIEW OF SYSTEMS:  CONSTITUTIONAL: No fever, night sweats, weight loss, or fatigue  RESPIRATORY: No cough, wheezing, or hemoptysis; No shortness of breath  CARDIOVASCULAR: No chest pain, palpitations, dizziness, or leg swelling  GASTROINTESTINAL: No abdominal pain. No nausea, vomiting, or hematemesis; No diarrhea or constipation. No melena or hematochezia.  GENITOURINARY: No dysuria or hematuria, no urinary frequency  NEUROLOGICAL: No headaches, memory loss, loss of strength, numbness, or tremors  SKIN: No itching, burning, rashes, or lesions       REVIEW OF SYSTEMS:  CONSTITUTIONAL: No fever, night sweats, weight loss, or fatigue  RESPIRATORY: No cough, wheezing, or hemoptysis; No shortness of breath  CARDIOVASCULAR: No chest pain, palpitations, dizziness, or leg swelling  GASTROINTESTINAL: No abdominal pain. No nausea, vomiting, or hematemesis; No diarrhea or constipation. No melena or hematochezia.  GENITOURINARY: No dysuria or hematuria, no urinary frequency  NEUROLOGICAL: No headaches, memory loss, loss of strength, numbness, or tremors  SKIN: No itching, burning, rashes, or lesions     Vital Signs Last 24 Hrs  T(C): 36.7 (06 Apr 2022 11:36), Max: 37.2 (05 Apr 2022 16:02)  T(F): 98 (06 Apr 2022 11:36), Max: 98.9 (05 Apr 2022 16:02)  HR: 85 (06 Apr 2022 11:36) (70 - 85)  BP: 176/74 (06 Apr 2022 11:36) (148/73 - 176/74)  BP(mean): --  RR: 18 (06 Apr 2022 11:36) (17 - 18)  SpO2: 97% (06 Apr 2022 11:36) (94% - 100%)    PHYSICAL EXAMINATION:  GENERAL: NAD, well built  HEAD:  Atraumatic, Normocephalic  EYES:  conjunctiva and sclera clear  NECK: Supple, No JVD, thyroid not examined   CHEST/LUNG: Clear to auscultation. No wheezing, rales, rubs or rhonchi  HEART: Regular rate and rhythm; Clear S1 and S2, No murmurs, rubs, or gallops  ABDOMEN: Soft, Nontender, Nondistended; Bowel sounds present  NERVOUS SYSTEM:  Alert & Oriented X3, CNII-XII intact, no focal neurological deficits   EXTREMITIES:  2+ Peripheral Pulses, No clubbing, cyanosis, +1 b/l LE edema  SKIN: warm dry, no lesions noted                           10.8   10.56 )-----------( 236      ( 06 Apr 2022 06:53 )             33.0     04-06    137  |  104  |  18  ----------------------------<  95  3.9   |  28  |  1.25    Ca    9.0      06 Apr 2022 06:53  Phos  3.2     04-06  Mg     2.1     04-06    TPro  6.7  /  Alb  2.5<L>  /  TBili  0.3  /  DBili  x   /  AST  21  /  ALT  29  /  AlkPhos  67  04-05    LIVER FUNCTIONS - ( 05 Apr 2022 08:12 )  Alb: 2.5 g/dL / Pro: 6.7 g/dL / ALK PHOS: 67 U/L / ALT: 29 U/L DA / AST: 21 U/L / GGT: x                   CAPILLARY BLOOD GLUCOSE    MEDICATIONS  (STANDING):  aspirin enteric coated 81 milliGRAM(s) Oral daily  atorvastatin 80 milliGRAM(s) Oral at bedtime  azaTHIOprine 50 milliGRAM(s) Oral daily  heparin   Injectable 5000 Unit(s) SubCutaneous every 8 hours  hydrALAZINE 100 milliGRAM(s) Oral three times a day  insulin lispro (ADMELOG) corrective regimen sliding scale   SubCutaneous three times a day before meals  losartan 100 milliGRAM(s) Oral daily  NIFEdipine XL 90 milliGRAM(s) Oral two times a day  predniSONE   Tablet 20 milliGRAM(s) Oral daily  pyridostigmine 60 milliGRAM(s) Oral every 4 hours  sertraline 50 milliGRAM(s) Oral daily  tamsulosin 0.4 milliGRAM(s) Oral at bedtime    MEDICATIONS  (PRN):      RADIOLOGY & ADDITIONAL TESTS:      Patient name: BRITTANIE GROSS  YOB: 1955   Age: 66 (M)   MR#: [id number]  Study Date: 4/3/2022  Location: [address] [place], [place] [address]Sonographer: Heike Grewal LINCOLN  Study quality: Technically good  Referring Physician:  BIA TOMAS MD  Blood Pressure:162/65 mmHg  Height: 168 cm  Weight: 73 kg  BSA: 1.8 m2  ------------------------------------------------------------------------    PROCEDURE: Transthoracic echocardiogram with 2-D, M-Mode  and complete spectral and color flow Doppler.  INDICATION: Dyspnea, unspecified (R06.00)  HISTORY:  ------------------------------------------------------------------------  DIMENSIONS:  Dimensions:     Normal Values:  LA:     4.2 cm    2.0 - 4.0 cm  Ao:     3.4 cm    2.0 - 3.8 cm  SEPTUM: 1.1 cm    0.6 - 1.2cm  PWT:    1.2 cm    0.6 - 1.1 cm  LVIDd:  5.5 cm    3.0 - 5.6 cm  LVIDs:  3.0 cm    1.8 - 4.0 cm      Derived Variables:  LVMI: 141 g/m2  RWT: 0.43  Ejection Fraction Visual Estimate: 55-60 %  Ejection Fraction Baxter: 61 %    ------------------------------------------------------------------------  OBSERVATIONS:  Mitral Valve: Normal mitral valve. Trace mitral  regurgitation.  Aortic Root: Aortic Root: 3.4 cm.    Aortic Valve: Normal trileaflet aortic valve.  Left Atrium: Normal left atrium.LA volume index = 20  cc/m2.  Left Ventricle: Normal Left Ventricular Systolic Function,  (EF = 55 to 60%) Moderate concentric left ventricular  hypertrophy. Normal diastolic function.  Right Heart: Normal right atrium. Normal right ventricular  sizeand systolic function (TAPSE  2.9cm). There is trace  tricuspid regurgitation. There is trace pulmonic  regurgitation.  Pericardium/PleuraTrivial pericardial effusion is seen.  Left pleural effusion.  Hemodynamic: RV systolic pressure is normal at  28 mm Hg.  ------------------------------------------------------------------------  CONCLUSIONS:  1. Normal mitral valve. Trace mitral regurgitation.  2. Normal trileaflet aortic valve.  3. Aortic Root: 3.4 cm.  4. Normal left atrium.  LA volume index= 20 cc/m2.  5. Moderate concentric left ventricular hypertrophy.  6. Normal Left Ventricular Systolic Function,  (EF = 55 to  60%)  7. Normal diastolic function.  8. Normal right atrium.  9. Normal right ventricular size and systolic function  (TAPSE  2.9cm).  10. RV systolic pressure is normal at  28 mm Hg.  11. There is trace tricuspid regurgitation.  12. There is trace pulmonic regurgitation.  13. Trivial pericardial effusion is seen.  14. Left pleural effusion.    ------------------------------------------------------------------------  Confirmed on  4/4/2022 - 07:56:14 by Irma Viera MD  ------------------------------------------------------------------------        ACC: [id number] EXAM:  XR ABDOMEN PORTABLE ROUTINE 1V                          PROCEDURE DATE:  04/04/2022          INTERPRETATION:  KUB: AP    COMPARISON: None.    CLINICAL INFORMATION: Constipation.    FINDINGS:    There is a nonspecific, nonobstructive bowel gas pattern.  No free intra-abdominal air.  No obvious intra-abdominal masses.  No abnormal calcifications.  Degenerative spondylosis of the lumbar spine.    IMPRESSION:    No acute radiographic abdominal findings.    --- End of Report ---    RYLYE FARAH MD; Attending Radiologist  This document has been electronically signed. Apr 5 2022  8:50AM          ACC: [id number] EXAM:  US THYROID AND PARATHYROID                          PROCEDURE DATE:  04/04/2022          INTERPRETATION:  CLINICAL INFORMATION: Evaluate thyroid nodule    COMPARISON: None available.    TECHNIQUE: Sonography of the thyroid.    FINDINGS:  Technically limited exam due to patient's inability to extend his neck.    Right Lobe: 4.3 x  1.7 x 2.4 cm. There is a 0.4 cm hypoechoic nodule in   the lower pole.    Left Lobe: 4.3 x 1.7 x 2.0 cm. There is a 0.6 cm cystic nodule in the   lower pole.    Isthmus: 0.6 cm.    Cervical Lymph Nodes: No enlarged or abnormal morphology cervical nodes.    IMPRESSION:    Technically limited exam. There is a 0.4 cm hypoechoic in the lower pole   of the right thyroid lobe.    TI-RAD 4: Moderatelysuspicious (FNA if > 1.5 cm, Follow if > 1 cm)  __________________________________  ACR Thyroid Imaging, Reporting and Data System (TI-RADS): White Paper of   the ACR TI-RADS Committee. J Am Nakul Radiol 2017;14:587-595.    TI-RAD 1: Benign (No FNA)  TI-RAD 2: Not suspicious (No FNA)  TI-RAD 3: Mildly suspicious (FNA if > 2.5 cm, Follow if >1.5 cm)  TI-RAD 4: Moderately suspicious (FNA if > 1.5 cm, Follow if > 1 cm)  TI-RAD 5: Highly suspicious (FNA if > 1 cm, Follow if > 0.5 cm)        --- End ofReport ---    ARUN MACARIO MD; Attending Radiologist  This document has been electronically signed. Apr 4 2022 12:34PM

## 2022-04-06 NOTE — PROGRESS NOTE ADULT - PROBLEM SELECTOR PLAN 1
pt with hx of MG presented with worsening weakness than baseline  admitted two months ago for MG flare and was discharged on steroid taper  pt failed multiple attempts with steroids  currently takes prednisone 20mg daily and pyridostigmine 60mg  no current sxs of difficulty breathing or swallowing  s/p day course of IVIG 30g  c/w home meds  c/w NIV and VC q12   Neuro Dr. Shaw/Venkata following  - Start Azathioprine 50mg PO Daily for long-term non-steroidal immunosuppression - Increase to 100mg PO Daily after 3 days if tolerated  - Continue Aspirin 81mg PO Daily and Atorvastatin 80mg PO QHS for secondary stroke prevention in the setting of multiple chronic lacunar infarcts

## 2022-04-06 NOTE — PROGRESS NOTE ADULT - PROBLEM SELECTOR PLAN 4
Pt's HR noted 44/min in ED hold  Pt is asymptomatic   dc tele   Cardio Jess following
Pt's HR noted 44/min in ED hold  Pt is asymptomatic   Tele with episode of non sustained Vtach, trigeminy and frequent PVCs   f/u Stress test in AM  Cardio Jess following
pt is on hydralazine, Losartan, Lasix, nifedipine  /65  c/w  nifedipine and hydralazine  restarted losartan at half dose (MICHEL resolved)  Echo as above: wnl, moderate concentric LV hypertrophy  -monitor BP

## 2022-04-06 NOTE — PROGRESS NOTE ADULT - PROBLEM SELECTOR PLAN 3
Pt's HR noted 44/min in ED hold  Pt is asymptomatic   c/w tele   Cardio Jess following
WBC 15, afebrile  UA negative  CXR neg  unlikely infection, likely reactive to steroid use  Monitor CBC
WBC 15, afebrile  UA negative  CXR neg  unlikely infection, likely reactive to steroid use  Monitor CBC

## 2022-04-06 NOTE — PROGRESS NOTE ADULT - PROBLEM SELECTOR PLAN 6
WBC 15, afebrile  UA negative, CXR no consolidation or infiltrates (pending official read)  unlikely infection, quirinoley reactive to dehydration and steroid use  s/p IVF   -RESOLVED
BUN/Creatinine 20/1.63 (creatinine 1.3 on previous discharge)  ragini pre renal due to decrease PO intake  s/p IVF  -RESOLVED
BUN/Creatinine 20/1.63 (creatinine 1.3 on previous discharge)  ragini pre renal due to decrease PO intake  s/p IVF  -RESOLVED

## 2022-04-06 NOTE — PROGRESS NOTE ADULT - PROBLEM SELECTOR PLAN 5
pt is on hydralazine, Losartan, Lasix, nifedipine  /65  c/w home meds, hold lasix   Echo as above: wnl, moderate concentric LV hypertrophy  -monitor BP
BUN/Creatinine 20/1.63 (creatinine 1.3 on previous discharge)  ragini pre renal due to decrease PO intake  s/p IVF  -RESOLVED
pt is on hydralazine, Losartan, Lasix, nifedipine  /65  c/w home meds, hold lasix   Echo as above: wnl, moderate concentric LV hypertrophy  -monitor BP

## 2022-04-06 NOTE — PROGRESS NOTE ADULT - SUBJECTIVE AND OBJECTIVE BOX
C A R D I O L O G Y  **********************************     DATE OF SERVICE: 04-06-22    Patient denies chest pain or shortness of breath.   Review of symptoms otherwise negative.    aspirin enteric coated 81 milliGRAM(s) Oral daily  atorvastatin 80 milliGRAM(s) Oral at bedtime  azaTHIOprine 50 milliGRAM(s) Oral daily  heparin   Injectable 5000 Unit(s) SubCutaneous every 8 hours  hydrALAZINE 100 milliGRAM(s) Oral three times a day  insulin lispro (ADMELOG) corrective regimen sliding scale   SubCutaneous three times a day before meals  losartan 100 milliGRAM(s) Oral daily  NIFEdipine XL 90 milliGRAM(s) Oral two times a day  predniSONE   Tablet 20 milliGRAM(s) Oral daily  pyridostigmine 60 milliGRAM(s) Oral every 4 hours  sertraline 50 milliGRAM(s) Oral daily  tamsulosin 0.4 milliGRAM(s) Oral at bedtime                            10.8   10.56 )-----------( 236      ( 06 Apr 2022 06:53 )             33.0       Hemoglobin: 10.8 g/dL (04-06 @ 06:53)  Hemoglobin: 11.3 g/dL (04-05 @ 08:12)  Hemoglobin: 10.7 g/dL (04-04 @ 06:06)  Hemoglobin: 10.4 g/dL (04-03 @ 04:34)  Hemoglobin: 11.6 g/dL (04-02 @ 12:57)      04-06    137  |  104  |  18  ----------------------------<  95  3.9   |  28  |  1.25    Ca    9.0      06 Apr 2022 06:53  Phos  3.2     04-06  Mg     2.1     04-06    TPro  6.7  /  Alb  2.5<L>  /  TBili  0.3  /  DBili  x   /  AST  21  /  ALT  29  /  AlkPhos  67  04-05    Creatinine Trend: 1.25<--, 1.07<--, 1.26<--, 1.73<--, 1.63<--    COAGS:           T(C): 36.7 (04-06-22 @ 07:38), Max: 37.2 (04-05-22 @ 16:02)  HR: 83 (04-06-22 @ 07:38) (70 - 89)  BP: 148/73 (04-06-22 @ 07:38) (148/73 - 174/70)  RR: 17 (04-06-22 @ 07:38) (17 - 18)  SpO2: 100% (04-06-22 @ 07:38) (94% - 100%)  Wt(kg): --    I&O's Summary    05 Apr 2022 07:01  -  06 Apr 2022 07:00  --------------------------------------------------------  IN: 0 mL / OUT: 500 mL / NET: -500 mL    06 Apr 2022 07:01  -  06 Apr 2022 10:32  --------------------------------------------------------  IN: 0 mL / OUT: 200 mL / NET: -200 mL            Gen: Appears well in NAD  HEENT:  (-)icterus (-)pallor  CV: N S1 S2 1/6 BOUBACAR (+)2 Pulses B/l  Resp:  Clear to ausculatation B/L, normal effort  GI: (+) BS Soft, NT, ND  Lymph:  (-)Edema, (-)obvious lymphadenopathy  Skin: Warm to touch, Normal turgor  Psych: Appropriate mood and affect      TELEMETRY: 	  sinus, PVC, 5 beats NSVT         ASSESSMENT/PLAN: 	66y  Male from [facility], ambulates independently, with PMHx of Myasthenia Gravis s/p Thymectomy , HTN, HLD, CVA x 2, Osteoporosis, PAD, Peripheral Neuropathy, Cervical and Lumbar spondylosis, and BPH who presented to the ED for generalized weakness noted with bradycardia.    - TSH within normal limits.  - doesn't appear to be persistently martinez  - bradycardia maybe due to Mestinon, but since he is asymptomatic will just monitor  - No pertinent findings on cardiac echo  - Frequent PVC and NSVT overnight plan for pharm stress in AM    Ta Mercado MD, [facility]  BEEPER [phone]

## 2022-04-06 NOTE — PROGRESS NOTE ADULT - SUBJECTIVE AND OBJECTIVE BOX
NEUROLOGY FOLLOW-UP NOTE    NAME:  BRITTANIE GROSS      ASSESSMENT:  66 RHM with myasthenia gravis s/p thymectomy, with improved strength following IVIg treatment over 3 days, also with concern for mild cognitive impairment with memory loss vs. vascular dementia, as well as multiple chronic lacunar infarcts secondary to cerebral microvascular disease      RECOMMENDATIONS:    - Three-day IVIg course complete - No need to resume at this time    - Continue Pyridostigmine 60mg PO Q6H for treatment of myasthenia gravis    - Continue Prednisone 20mg PO Daily for immunosuppression - No taper at this time (would decrease after patient has been on non-steroidal immunosuppression for at least 6 months)    - Continue Azathioprine 50mg PO Daily for long-term non-steroidal immunosuppression - Increase to 100mg PO Daily on 4/9/22 if tolerated    - Continue Aspirin 81mg PO Daily and Atorvastatin 80mg PO QHS for secondary stroke prevention in the setting of multiple chronic lacunar infarcts    - PT/OT for recovery of b/l leg strength and gait    - Routine follow-up in Neurology clinic for Neuromuscular and Cognitive evaluations - Patient is requesting establishment at a center in [place]; consider [facility] Neurology clinic at [phone] for these evaluations       - Can provide contact info for [facility] Clinic in [place] as a backup option.      ******************************    HPI:  66 year old male  from [facility], ambulates independently, with PMHx of Myasthenia Gravis s/p Thymectomy , HTN, HLD, CVA x 2, Osteoporosis, PAD, Peripheral Neuropathy, Cervical and Lumbar spondylosis, and BPH who presented to the ED for generalized weakness. Patient stated that for the past 3 days he feels overall more weak than his baseline and also had decrease in appetite. Patient denies any headaches, visual changes, SOB, cough, chest pain, abdominal pain, or change in bowel habits. Pt was previously admitted for similar sxs and was treated for Acute exacerbation of myasthenia gravis.  In the ED:  Afebile, HR 97, /67, 99% on RA  WBC 15.9, BUN/Creatinine 20/1.63  UA negative, CXR no congestions or infiltrates (pending official read)  Was given Solumedrol 125mg + pyridostigmine 60mg  (02 Apr 2022 16:23)      NEURO HPI:  Neurology consulted for management of myasthenia Gravis. Pt is a poor historian and states that he feels generally weak but thinks that it is because someone is tampering with his food. He cannot say when the weakness started but feels that he did not improve from the last time that he was admitted. He is positive for ACR binding ab as of 12/21/21. Pt denies any double vision, difficulty swallowing or breathing.       INTERVAL HISTORY:  The patient continues to report improved the strength of all four extremities as compared with when he presented to the ED.      MEDICATIONS:  aspirin enteric coated 81 milliGRAM(s) Oral daily  atorvastatin 80 milliGRAM(s) Oral at bedtime  azaTHIOprine 50 milliGRAM(s) Oral daily  heparin   Injectable 5000 Unit(s) SubCutaneous every 8 hours  hydrALAZINE 100 milliGRAM(s) Oral three times a day  insulin lispro (ADMELOG) corrective regimen sliding scale   SubCutaneous three times a day before meals  losartan 100 milliGRAM(s) Oral daily  NIFEdipine XL 90 milliGRAM(s) Oral two times a day  predniSONE   Tablet 20 milliGRAM(s) Oral daily  pyridostigmine 60 milliGRAM(s) Oral every 4 hours  sertraline 50 milliGRAM(s) Oral daily  tamsulosin 0.4 milliGRAM(s) Oral at bedtime      ALLERGIES:  No Known Allergies      REVIEW OF SYSTEMS:  Fourteen systems reviewed and negative except as in HPI / Interval History.        OBJECTIVE:  Vital Signs Last 24 Hrs  T(C): 37.2 (06 Apr 2022 23:23), Max: 37.2 (06 Apr 2022 19:14)  T(F): 98.9 (06 Apr 2022 23:23), Max: 98.9 (06 Apr 2022 19:14)  HR: 91 (06 Apr 2022 23:23) (76 - 91)  BP: 166/62 (06 Apr 2022 23:23) (148/73 - 176/74)  RR: 17 (06 Apr 2022 23:23) (17 - 18)  SpO2: 95% (06 Apr 2022 23:23) (95% - 100%)    General Examination:  General: No acute distress  HEENT: Atraumatic, Normocephalic  Respiratory: CTA B/l.  No crackles, rhonchi, or wheezes. Able to count beyond 20 in one breath.  Cardiovascular: RRR.  Normal S1 & S2.  Normal b/l radial and pedal pulses.    Neurological Examination:  General / Mental Status: AAO x 2 (not to time).  No aphasia or dysarthria.  Naming and repetition intact.  Immediate recall: 3/3.  5-minute delayed recall: 1/3 (recalls only 1 other word with MCQ cue, and no words with category cue).  Cranial Nerves: B/l blink to threat present.  PERRL.  EOMI x 2, with diplopia reported only when looking slightly right of center.  B/l V1-V3 equal and intact to light touch and pinprick.  Symmetric facial movement and palate elevation.  B/l hearing equal to finger rub.  At least 4/5 strength with b/l sternocleidomastoid & trapezius.  Midline tongue protrusion, with no atrophy or fasciculations.  Motor: Normal bulk & diminished tone in all four extremities.  At least 3/5 strength throughout all four extremities.  B/l hand  strength equal.  No downward drift, rigidity, spasticity, or tremors in any of the four extremities.  Sensory: Intact to light touch and pinprick in all four extremities.  Reflex: 1+ and symmetric at b/l biceps, triceps, brachioradialis, patellae, and ankles.  Downgoing toes b/l.  Coordination: No dysmetria with b/l finger-to-nose and heel raise tests.  Gait and Romberg sign testing deferred per patient preference.        LABORATORY VALUES:                          10.8   10.56 )-----------( 236      ( 06 Apr 2022 06:53 )             33.0       04-06    137  |  104  |  18  ----------------------------<  95  3.9   |  28  |  1.25    Ca    9.0      06 Apr 2022 06:53  Phos  3.2     04-06  Mg     2.1     04-06    TPro  6.7  /  Alb  2.5<L>  /  TBili  0.3  /  DBili  x   /  AST  21  /  ALT  29  /  AlkPhos  67  04-05    Glucose Trend  04-06-22 @ 21:11   -  -- -- 98  04-06-22 @ 16:40   -  -- -- 197<H>  04-06-22 @ 07:50   -  -- -- 125<H>  04-06-22 @ 06:53   -  -- 95 --  04-05-22 @ 20:56   -  -- -- 112<H>  04-05-22 @ 16:54   -  -- -- 140<H>  04-05-22 @ 12:00   -  -- -- 170<H>  04-05-22 @ 08:12   -  -- 148<H> --  04-05-22 @ 07:57   -  -- -- 153<H>  04-04-22 @ 20:55   -  -- -- 125<H>    A1C with Estimated Average Glucose (04.03.22 @ 05:49)   A1C with Estimated Average Glucose Result: 6.3%   Estimated Average Glucose: 134 mg/dL         NEUROIMAGING:      CT Head (3/2/22):  - No acute intracranial abnormality  - Chronic right basal ganglia, left thalamic, and left occipital infarcts  - Chronic microvascular changes        Please contact the Neurology consult service with any neurological questions.      Antonio Hastings MD   of Neurology  [facility] [employer] [facility] at [facility] [place]         NEUROLOGY FOLLOW-UP NOTE    NAME:  BRITTANIE GROSS      ASSESSMENT:  66 RHM with myasthenia gravis s/p thymectomy, with improved strength following IVIg treatment over 3 days, also with concern for mild cognitive impairment with memory loss vs. vascular dementia, as well as multiple chronic lacunar infarcts secondary to cerebral microvascular disease      RECOMMENDATIONS:    - Three-day IVIg course complete - No need to resume at this time    - Continue Pyridostigmine 60mg PO Q6H for treatment of myasthenia gravis    - Continue Prednisone 20mg PO Daily for immunosuppression - No taper at this time (would decrease after patient has been on non-steroidal immunosuppression for at least 6 months)    - Continue Azathioprine 50mg PO Daily for long-term non-steroidal immunosuppression - Increase to 100mg PO Daily on 4/9/22 if tolerated    - Continue Aspirin 81mg PO Daily and Atorvastatin 80mg PO QHS for secondary stroke prevention in the setting of multiple chronic lacunar infarcts    - PT/OT for recovery of b/l leg strength and gait    - Routine follow-up in Neurology clinic for Neuromuscular and Cognitive evaluations - Patient is requesting establishment at a center in [place]; consider [facility] Neurology clinic at [phone] for these evaluations       - Can provide contact info for [facility] Neurology Clinic in [place] at [phone] as a backup option.      ******************************    HPI:  66 year old male  from [facility], ambulates independently, with PMHx of Myasthenia Gravis s/p Thymectomy , HTN, HLD, CVA x 2, Osteoporosis, PAD, Peripheral Neuropathy, Cervical and Lumbar spondylosis, and BPH who presented to the ED for generalized weakness. Patient stated that for the past 3 days he feels overall more weak than his baseline and also had decrease in appetite. Patient denies any headaches, visual changes, SOB, cough, chest pain, abdominal pain, or change in bowel habits. Pt was previously admitted for similar sxs and was treated for Acute exacerbation of myasthenia gravis.  In the ED:  Afebile, HR 97, /67, 99% on RA  WBC 15.9, BUN/Creatinine 20/1.63  UA negative, CXR no congestions or infiltrates (pending official read)  Was given Solumedrol 125mg + pyridostigmine 60mg  (02 Apr 2022 16:23)      NEURO HPI:  Neurology consulted for management of myasthenia Gravis. Pt is a poor historian and states that he feels generally weak but thinks that it is because someone is tampering with his food. He cannot say when the weakness started but feels that he did not improve from the last time that he was admitted. He is positive for ACR binding ab as of 12/21/21. Pt denies any double vision, difficulty swallowing or breathing.       INTERVAL HISTORY:  The patient continues to report improved the strength of all four extremities as compared with when he presented to the ED.      MEDICATIONS:  aspirin enteric coated 81 milliGRAM(s) Oral daily  atorvastatin 80 milliGRAM(s) Oral at bedtime  azaTHIOprine 50 milliGRAM(s) Oral daily  heparin   Injectable 5000 Unit(s) SubCutaneous every 8 hours  hydrALAZINE 100 milliGRAM(s) Oral three times a day  insulin lispro (ADMELOG) corrective regimen sliding scale   SubCutaneous three times a day before meals  losartan 100 milliGRAM(s) Oral daily  NIFEdipine XL 90 milliGRAM(s) Oral two times a day  predniSONE   Tablet 20 milliGRAM(s) Oral daily  pyridostigmine 60 milliGRAM(s) Oral every 4 hours  sertraline 50 milliGRAM(s) Oral daily  tamsulosin 0.4 milliGRAM(s) Oral at bedtime      ALLERGIES:  No Known Allergies      REVIEW OF SYSTEMS:  Fourteen systems reviewed and negative except as in HPI / Interval History.        OBJECTIVE:  Vital Signs Last 24 Hrs  T(C): 37.2 (06 Apr 2022 23:23), Max: 37.2 (06 Apr 2022 19:14)  T(F): 98.9 (06 Apr 2022 23:23), Max: 98.9 (06 Apr 2022 19:14)  HR: 91 (06 Apr 2022 23:23) (76 - 91)  BP: 166/62 (06 Apr 2022 23:23) (148/73 - 176/74)  RR: 17 (06 Apr 2022 23:23) (17 - 18)  SpO2: 95% (06 Apr 2022 23:23) (95% - 100%)    General Examination:  General: No acute distress  HEENT: Atraumatic, Normocephalic  Respiratory: CTA B/l.  No crackles, rhonchi, or wheezes. Able to count beyond 20 in one breath.  Cardiovascular: RRR.  Normal S1 & S2.  Normal b/l radial and pedal pulses.    Neurological Examination:  General / Mental Status: AAO x 2 (not to time).  No aphasia or dysarthria.  Naming and repetition intact.  Immediate recall: 3/3.  5-minute delayed recall: 1/3 (recalls only 1 other word with MCQ cue, and no words with category cue).  Cranial Nerves: B/l blink to threat present.  PERRL.  EOMI x 2, with diplopia reported only when looking slightly right of center.  B/l V1-V3 equal and intact to light touch and pinprick.  Symmetric facial movement and palate elevation.  B/l hearing equal to finger rub.  At least 4/5 strength with b/l sternocleidomastoid & trapezius.  Midline tongue protrusion, with no atrophy or fasciculations.  Motor: Normal bulk & diminished tone in all four extremities.  At least 3/5 strength throughout all four extremities.  B/l hand  strength equal.  No downward drift, rigidity, spasticity, or tremors in any of the four extremities.  Sensory: Intact to light touch and pinprick in all four extremities.  Reflex: 1+ and symmetric at b/l biceps, triceps, brachioradialis, patellae, and ankles.  Downgoing toes b/l.  Coordination: No dysmetria with b/l finger-to-nose and heel raise tests.  Gait and Romberg sign testing deferred per patient preference.        LABORATORY VALUES:                          10.8   10.56 )-----------( 236      ( 06 Apr 2022 06:53 )             33.0       04-06    137  |  104  |  18  ----------------------------<  95  3.9   |  28  |  1.25    Ca    9.0      06 Apr 2022 06:53  Phos  3.2     04-06  Mg     2.1     04-06    TPro  6.7  /  Alb  2.5<L>  /  TBili  0.3  /  DBili  x   /  AST  21  /  ALT  29  /  AlkPhos  67  04-05    Glucose Trend  04-06-22 @ 21:11   -  -- -- 98  04-06-22 @ 16:40   -  -- -- 197<H>  04-06-22 @ 07:50   -  -- -- 125<H>  04-06-22 @ 06:53   -  -- 95 --  04-05-22 @ 20:56   -  -- -- 112<H>  04-05-22 @ 16:54   -  -- -- 140<H>  04-05-22 @ 12:00   -  -- -- 170<H>  04-05-22 @ 08:12   -  -- 148<H> --  04-05-22 @ 07:57   -  -- -- 153<H>  04-04-22 @ 20:55   -  -- -- 125<H>    A1C with Estimated Average Glucose (04.03.22 @ 05:49)   A1C with Estimated Average Glucose Result: 6.3%   Estimated Average Glucose: 134 mg/dL         NEUROIMAGING:      CT Head (3/2/22):  - No acute intracranial abnormality  - Chronic right basal ganglia, left thalamic, and left occipital infarcts  - Chronic microvascular changes        Please contact the Neurology consult service with any neurological questions.      Antonio Hastings MD   of Neurology  [facility] [employer] at [facility]

## 2022-04-06 NOTE — PROGRESS NOTE ADULT - PROBLEM SELECTOR PLAN 2
pt with hx of MG presented with worsening weakness than baseline  WBC 15, afebrile  UA negative, CXR no consolidation or infiltrates (pending official read)  s/p solumedrol and pyridostigmine in ED  weakness likely 2/2 myasthenia flare   TSH, free T4 wnl  ESR wnl  Iron panel as above  Neuro Dr. Shaw/Venkata following

## 2022-04-07 ENCOUNTER — TRANSCRIPTION ENCOUNTER (OUTPATIENT)
Age: 67
End: 2022-04-07

## 2022-04-07 VITALS
HEART RATE: 86 BPM | SYSTOLIC BLOOD PRESSURE: 169 MMHG | DIASTOLIC BLOOD PRESSURE: 62 MMHG | OXYGEN SATURATION: 98 % | TEMPERATURE: 98 F | RESPIRATION RATE: 18 BRPM

## 2022-04-07 LAB
ANION GAP SERPL CALC-SCNC: 7 MMOL/L — SIGNIFICANT CHANGE UP (ref 5–17)
BUN SERPL-MCNC: 22 MG/DL — HIGH (ref 7–18)
CALCIUM SERPL-MCNC: 9.3 MG/DL — SIGNIFICANT CHANGE UP (ref 8.4–10.5)
CHLORIDE SERPL-SCNC: 103 MMOL/L — SIGNIFICANT CHANGE UP (ref 96–108)
CO2 SERPL-SCNC: 27 MMOL/L — SIGNIFICANT CHANGE UP (ref 22–31)
CREAT SERPL-MCNC: 1.37 MG/DL — HIGH (ref 0.5–1.3)
EGFR: 58 ML/MIN/1.73M2 — LOW
GLUCOSE BLDC GLUCOMTR-MCNC: 241 MG/DL — HIGH (ref 70–99)
GLUCOSE SERPL-MCNC: 110 MG/DL — HIGH (ref 70–99)
HCT VFR BLD CALC: 35.3 % — LOW (ref 39–50)
HGB BLD-MCNC: 11.5 G/DL — LOW (ref 13–17)
MAGNESIUM SERPL-MCNC: 2 MG/DL — SIGNIFICANT CHANGE UP (ref 1.6–2.6)
MCHC RBC-ENTMCNC: 27.8 PG — SIGNIFICANT CHANGE UP (ref 27–34)
MCHC RBC-ENTMCNC: 32.6 GM/DL — SIGNIFICANT CHANGE UP (ref 32–36)
MCV RBC AUTO: 85.5 FL — SIGNIFICANT CHANGE UP (ref 80–100)
NRBC # BLD: 0 /100 WBCS — SIGNIFICANT CHANGE UP (ref 0–0)
PHOSPHATE SERPL-MCNC: 3.7 MG/DL — SIGNIFICANT CHANGE UP (ref 2.5–4.5)
PLATELET # BLD AUTO: 239 K/UL — SIGNIFICANT CHANGE UP (ref 150–400)
POTASSIUM SERPL-MCNC: 3.6 MMOL/L — SIGNIFICANT CHANGE UP (ref 3.5–5.3)
POTASSIUM SERPL-SCNC: 3.6 MMOL/L — SIGNIFICANT CHANGE UP (ref 3.5–5.3)
RBC # BLD: 4.13 M/UL — LOW (ref 4.2–5.8)
RBC # FLD: 13.7 % — SIGNIFICANT CHANGE UP (ref 10.3–14.5)
SODIUM SERPL-SCNC: 137 MMOL/L — SIGNIFICANT CHANGE UP (ref 135–145)
WBC # BLD: 8.65 K/UL — SIGNIFICANT CHANGE UP (ref 3.8–10.5)
WBC # FLD AUTO: 8.65 K/UL — SIGNIFICANT CHANGE UP (ref 3.8–10.5)

## 2022-04-07 PROCEDURE — 85652 RBC SED RATE AUTOMATED: CPT

## 2022-04-07 PROCEDURE — 97161 PT EVAL LOW COMPLEX 20 MIN: CPT

## 2022-04-07 PROCEDURE — 93306 TTE W/DOPPLER COMPLETE: CPT

## 2022-04-07 PROCEDURE — 83550 IRON BINDING TEST: CPT

## 2022-04-07 PROCEDURE — 78452 HT MUSCLE IMAGE SPECT MULT: CPT

## 2022-04-07 PROCEDURE — 86850 RBC ANTIBODY SCREEN: CPT

## 2022-04-07 PROCEDURE — A9502: CPT

## 2022-04-07 PROCEDURE — 83735 ASSAY OF MAGNESIUM: CPT

## 2022-04-07 PROCEDURE — 84100 ASSAY OF PHOSPHORUS: CPT

## 2022-04-07 PROCEDURE — 82728 ASSAY OF FERRITIN: CPT

## 2022-04-07 PROCEDURE — 71045 X-RAY EXAM CHEST 1 VIEW: CPT

## 2022-04-07 PROCEDURE — 97116 GAIT TRAINING THERAPY: CPT

## 2022-04-07 PROCEDURE — 76536 US EXAM OF HEAD AND NECK: CPT

## 2022-04-07 PROCEDURE — 85025 COMPLETE CBC W/AUTO DIFF WBC: CPT

## 2022-04-07 PROCEDURE — 83540 ASSAY OF IRON: CPT

## 2022-04-07 PROCEDURE — 83880 ASSAY OF NATRIURETIC PEPTIDE: CPT

## 2022-04-07 PROCEDURE — 99285 EMERGENCY DEPT VISIT HI MDM: CPT

## 2022-04-07 PROCEDURE — 87635 SARS-COV-2 COVID-19 AMP PRB: CPT

## 2022-04-07 PROCEDURE — 82962 GLUCOSE BLOOD TEST: CPT

## 2022-04-07 PROCEDURE — 86901 BLOOD TYPING SEROLOGIC RH(D): CPT

## 2022-04-07 PROCEDURE — 96374 THER/PROPH/DIAG INJ IV PUSH: CPT

## 2022-04-07 PROCEDURE — 94150 VITAL CAPACITY TEST: CPT

## 2022-04-07 PROCEDURE — 83036 HEMOGLOBIN GLYCOSYLATED A1C: CPT

## 2022-04-07 PROCEDURE — 36415 COLL VENOUS BLD VENIPUNCTURE: CPT

## 2022-04-07 PROCEDURE — 80048 BASIC METABOLIC PNL TOTAL CA: CPT

## 2022-04-07 PROCEDURE — 84443 ASSAY THYROID STIM HORMONE: CPT

## 2022-04-07 PROCEDURE — 74018 RADEX ABDOMEN 1 VIEW: CPT

## 2022-04-07 PROCEDURE — 80053 COMPREHEN METABOLIC PANEL: CPT

## 2022-04-07 PROCEDURE — 81001 URINALYSIS AUTO W/SCOPE: CPT

## 2022-04-07 PROCEDURE — 82803 BLOOD GASES ANY COMBINATION: CPT

## 2022-04-07 PROCEDURE — 84439 ASSAY OF FREE THYROXINE: CPT

## 2022-04-07 PROCEDURE — 93005 ELECTROCARDIOGRAM TRACING: CPT

## 2022-04-07 PROCEDURE — 86900 BLOOD TYPING SEROLOGIC ABO: CPT

## 2022-04-07 PROCEDURE — 85027 COMPLETE CBC AUTOMATED: CPT

## 2022-04-07 PROCEDURE — 93017 CV STRESS TEST TRACING ONLY: CPT

## 2022-04-07 PROCEDURE — 96375 TX/PRO/DX INJ NEW DRUG ADDON: CPT

## 2022-04-07 RX ORDER — LOSARTAN POTASSIUM 100 MG/1
1 TABLET, FILM COATED ORAL
Qty: 0 | Refills: 0 | DISCHARGE

## 2022-04-07 RX ORDER — NIFEDIPINE 30 MG
2 TABLET, EXTENDED RELEASE 24 HR ORAL
Qty: 0 | Refills: 0 | DISCHARGE

## 2022-04-07 RX ORDER — NIFEDIPINE 30 MG
1 TABLET, EXTENDED RELEASE 24 HR ORAL
Qty: 60 | Refills: 0
Start: 2022-04-07 | End: 2022-05-06

## 2022-04-07 RX ORDER — AZATHIOPRINE 100 MG/1
1 TABLET ORAL
Qty: 30 | Refills: 0
Start: 2022-04-07 | End: 2022-05-06

## 2022-04-07 RX ORDER — LOSARTAN POTASSIUM 100 MG/1
1 TABLET, FILM COATED ORAL
Qty: 0 | Refills: 0 | DISCHARGE
Start: 2022-04-07

## 2022-04-07 RX ORDER — AZATHIOPRINE 100 MG/1
1 TABLET ORAL
Qty: 0 | Refills: 0 | DISCHARGE
Start: 2022-04-07

## 2022-04-07 RX ORDER — FUROSEMIDE 40 MG
1 TABLET ORAL
Qty: 0 | Refills: 0 | DISCHARGE

## 2022-04-07 RX ORDER — SODIUM CHLORIDE 9 MG/ML
1000 INJECTION INTRAMUSCULAR; INTRAVENOUS; SUBCUTANEOUS ONCE
Refills: 0 | Status: COMPLETED | OUTPATIENT
Start: 2022-04-07 | End: 2022-04-07

## 2022-04-07 RX ORDER — AZATHIOPRINE 100 MG/1
1 TABLET ORAL
Qty: 1 | Refills: 0
Start: 2022-04-07 | End: 2022-04-07

## 2022-04-07 RX ADMIN — LOSARTAN POTASSIUM 100 MILLIGRAM(S): 100 TABLET, FILM COATED ORAL at 06:32

## 2022-04-07 RX ADMIN — SODIUM CHLORIDE 1000 MILLILITER(S): 9 INJECTION INTRAMUSCULAR; INTRAVENOUS; SUBCUTANEOUS at 11:51

## 2022-04-07 RX ADMIN — PYRIDOSTIGMINE BROMIDE 60 MILLIGRAM(S): 60 SOLUTION ORAL at 15:52

## 2022-04-07 RX ADMIN — PYRIDOSTIGMINE BROMIDE 60 MILLIGRAM(S): 60 SOLUTION ORAL at 11:32

## 2022-04-07 RX ADMIN — HEPARIN SODIUM 5000 UNIT(S): 5000 INJECTION INTRAVENOUS; SUBCUTANEOUS at 13:54

## 2022-04-07 RX ADMIN — Medication 81 MILLIGRAM(S): at 11:32

## 2022-04-07 RX ADMIN — PYRIDOSTIGMINE BROMIDE 60 MILLIGRAM(S): 60 SOLUTION ORAL at 03:49

## 2022-04-07 RX ADMIN — Medication 2: at 11:42

## 2022-04-07 RX ADMIN — SERTRALINE 50 MILLIGRAM(S): 25 TABLET, FILM COATED ORAL at 11:32

## 2022-04-07 RX ADMIN — Medication 20 MILLIGRAM(S): at 06:32

## 2022-04-07 RX ADMIN — Medication 100 MILLIGRAM(S): at 13:54

## 2022-04-07 RX ADMIN — PYRIDOSTIGMINE BROMIDE 60 MILLIGRAM(S): 60 SOLUTION ORAL at 06:33

## 2022-04-07 RX ADMIN — Medication 90 MILLIGRAM(S): at 06:32

## 2022-04-07 RX ADMIN — AZATHIOPRINE 50 MILLIGRAM(S): 100 TABLET ORAL at 11:33

## 2022-04-07 RX ADMIN — Medication 100 MILLIGRAM(S): at 06:32

## 2022-04-07 RX ADMIN — HEPARIN SODIUM 5000 UNIT(S): 5000 INJECTION INTRAVENOUS; SUBCUTANEOUS at 06:31

## 2022-04-07 NOTE — DISCHARGE NOTE PROVIDER - NSDCCAREPROVSEEN_GEN_ALL_CORE_FT
Ta Mercado, Myron Hastings, Antonio Goldstein, Shai Goldstein, Chinyere Jess, Ta Catherine, Myron Hastings, Antonio Goldstein, Shai Goldstein, Jonathan Oates

## 2022-04-07 NOTE — DISCHARGE NOTE PROVIDER - NSDCCPCAREPLAN_GEN_ALL_CORE_FT
PRINCIPAL DISCHARGE DIAGNOSIS  Diagnosis: Myasthenia gravis with exacerbation  Assessment and Plan of Treatment:       SECONDARY DISCHARGE DIAGNOSES  Diagnosis: HTN (hypertension)  Assessment and Plan of Treatment:     Diagnosis: MICHEL (acute kidney injury)  Assessment and Plan of Treatment:     Diagnosis: Anemia  Assessment and Plan of Treatment:     Diagnosis: DM (diabetes mellitus)  Assessment and Plan of Treatment:     Diagnosis: Hypercholesterolemia  Assessment and Plan of Treatment:      PRINCIPAL DISCHARGE DIAGNOSIS  Diagnosis: Myasthenia gravis with exacerbation  Assessment and Plan of Treatment:       SECONDARY DISCHARGE DIAGNOSES  Diagnosis: HTN (hypertension)  Assessment and Plan of Treatment: You have previously been diagnosed with hypertension (high blood pressure). We managed your blood pressure during your hospitalization and made some changes to your home medications. Please stop taking lasix We are discharging you on your home dose of hydralazine, losartan, and an increased dose of nifedipine (90mg). Please take your medications on time and as prescribed. Monitor your blood pressure at home, keep a log of your home readings and follow up with your Primary Care Physician. As per AHA/ACC guidelines, it is important to adhere to a DASH Diet of fresh fruits, vegetables, lean meats such as poultry and fish, and whole wheat carbs. 30 minutes of aerobic exercise per day 3-4 times a week, reducing salt intake <1.5g/day, and cutting down on highly processed foods are also shown to reduce high blood pressure. Uncontrolled BP may result in organ damage to your eyes, brain, heart, and kidneys by causing strokes, heart attacks, kidney failure, and bleeds in your eye. Please follow up with your primary care physician within 1-2 weeks after discharge and routinely after.    Diagnosis: MICHEL (acute kidney injury)  Assessment and Plan of Treatment: You were found to have acute kidney injury indicated by an elevated creatinine. We treated you with IV fluids. Please follow up with your primary doctor for a routine BMP blood test in 1 week to monitor creatinine and routinely after  for further management    Diagnosis: DM (diabetes mellitus)  Assessment and Plan of Treatment: You have previously been diagnosed with diabetes mellitus for which you take insulin at home. Your HbA1c at the hospital was 6.3 indicating very good control of your diabetes. We managed you on sliding scale insulin while you were hospitalized with good glycemic control. We recommend continuing with your current with your blood sugar medication regimen. Please check your blood sugar levels twice daily - Morning/Afternoon, and Lunch/Bedtime the following day. It is important to keep a record of your blood sugar readings. We recommend you maintain a healthy diet that is low in sugar, carbohydrates and fat. Please limit your intake of high sugar and high carbohydrate foods such as pasta, rice, and bread. Exercise frequently as tolerated. Please follow up with you primary care physician within one week of your discharge to further manage your diabetes and monitor your Hemoglobin A1c levels after 3 months to evaluate your diabetes control.    Diagnosis: Hypercholesterolemia  Assessment and Plan of Treatment: You have previously been diagnosed with high cholesterol. We continued your home medications during your hospitalization. Please continue to take your medications as prescribed and follow up with your primary doctor within 1 week of discharge and routinely after for further management.     PRINCIPAL DISCHARGE DIAGNOSIS  Diagnosis: Myasthenia gravis with exacerbation  Assessment and Plan of Treatment: You came to the hospital complaining of generalized weakness. You have a history of myasthenia gravis exacerbation. An infectious workup including chest xray and urinalysis was normal. Neurologists Dr. Shaw and Dr. Hastings were consulted and followed you over the course of hospitalization. We continued your home dose of prednisone and pyridostigmine and started you on a 3 day course of IVIG. You showed symptomatic improvement. Per the neurologist's recommendations, we started you on Azathioprine in the hospital for your myasthenia gravis.   Please take 50mg for one more day (on 4/8/22) and start 100mg thereafter. Please follow up with the neurologist as an outpatient for monitoring with the eventual plan to taper off your prednisone in 6 months.      SECONDARY DISCHARGE DIAGNOSES  Diagnosis: Bradycardia  Assessment and Plan of Treatment: You were found to be bradycardic on EKG. You were admitted to the telemetry unit and cardiologist Dr. Mercado was consulted. An Echocardiogram of your heart showed normal function, with moderate concentric LV hypertrophy (liekly indicative of chronic hypertension), and stress test was normal. Please follow up with your [place] doctor within 1-2 weeks of discharge for further management and routine monitoring.    Diagnosis: HTN (hypertension)  Assessment and Plan of Treatment: You have previously been diagnosed with hypertension (high blood pressure). We managed your blood pressure during your hospitalization and made some changes to your home medications. Please stop taking lasix We are discharging you on your home dose of hydralazine, losartan, and an increased dose of nifedipine (90mg). Please take your medications on time and as prescribed. Monitor your blood pressure at home, keep a log of your home readings and follow up with your Primary Care Physician. As per AHA/ACC guidelines, it is important to adhere to a DASH Diet of fresh fruits, vegetables, lean meats such as poultry and fish, and whole wheat carbs. 30 minutes of aerobic exercise per day 3-4 times a week, reducing salt intake <1.5g/day, and cutting down on highly processed foods are also shown to reduce high blood pressure. Uncontrolled BP may result in organ damage to your eyes, brain, heart, and kidneys by causing strokes, heart attacks, kidney failure, and bleeds in your eye. Please follow up with your primary care physician within 1-2 weeks after discharge and routinely after.    Diagnosis: MICHEL (acute kidney injury)  Assessment and Plan of Treatment: You were found to have acute kidney injury indicated by an elevated creatinine. We stopped your home lasix and treated you with IV fluids. Please follow up with your primary doctor for a routine BMP blood test in 1 week to monitor your creatinine and to evaluate possibly restarting your lasix    Diagnosis: DM (diabetes mellitus)  Assessment and Plan of Treatment: You have previously been diagnosed with diabetes mellitus for which you take insulin at home. Your HbA1c at the hospital was 6.3 indicating very good control of your diabetes. We managed you on sliding scale insulin while you were hospitalized with good glycemic control. We recommend continuing with your current with your blood sugar medication regimen. Please check your blood sugar levels twice daily - Morning/Afternoon, and Lunch/Bedtime the following day. It is important to keep a record of your blood sugar readings. We recommend you maintain a healthy diet that is low in sugar, carbohydrates and fat. Please limit your intake of high sugar and high carbohydrate foods such as pasta, rice, and bread. Exercise frequently as tolerated. Please follow up with you primary care physician within one week of your discharge to further manage your diabetes and monitor your Hemoglobin A1c levels after 3 months to evaluate your diabetes control.    Diagnosis: Hypercholesterolemia  Assessment and Plan of Treatment: You have previously been diagnosed with high cholesterol. We continued your home medications during your hospitalization. Please continue to take your medications as prescribed and follow up with your primary doctor within 1 week of discharge and routinely after for further management.

## 2022-04-07 NOTE — PROGRESS NOTE ADULT - PROVIDER SPECIALTY LIST ADULT
Cardiology
Internal Medicine
Neurology
Neurology
Internal Medicine

## 2022-04-07 NOTE — DISCHARGE NOTE PROVIDER - CARE PROVIDER_API CALL
Shai Goldstein)  Medicine  [id number] [place] [address]  Phone: [phone]  Fax: [phone]  Follow Up Time:

## 2022-04-07 NOTE — DISCHARGE NOTE PROVIDER - NSDCMRMEDTOKEN_GEN_ALL_CORE_FT
Aspirin Enteric Coated 81 mg oral delayed release tablet: 1 tab(s) orally once a day  atorvastatin 80 mg oral tablet: 1 tab(s) orally once a day  calcium carbonate 1250 mg (500 mg elemental calcium) oral tablet: 1 tab(s) orally once a day  HumuLIN R 100 units/mL injectable solution: unit(s) injectable 2 times a day   hydrALAZINE 100 mg oral tablet: 1 tab(s) orally every 8 hours  Lasix 40 mg oral tablet: 1 tab(s) orally once a day  losartan 100 mg oral tablet: 1 tab(s) orally once a day  NIFEdipine 60 mg oral tablet, extended release: 2 tab(s) orally once a day  NovoLIN 70/30 subcutaneous suspension: 10 unit(s) subcutaneous once a day  predniSONE 20 mg oral tablet: 1 tab(s) orally once a day  pyridostigmine 60 mg oral tablet: 1 tab(s) orally 6 times a day  tamsulosin 0.4 mg oral capsule: 1 cap(s) orally once a day  Vitamin D3 25 mcg (1000 intl units) oral tablet: 1 tab(s) orally once a day  Zoloft 50 mg oral tablet: 1 tab(s) orally once a day   Aspirin Enteric Coated 81 mg oral delayed release tablet: 1 tab(s) orally once a day  atorvastatin 80 mg oral tablet: 1 tab(s) orally once a day  azaTHIOprine 50 mg oral tablet: 1 tab(s) orally once a day for one more day (4/8/22)  calcium carbonate 1250 mg (500 mg elemental calcium) oral tablet: 1 tab(s) orally once a day  HumuLIN R 100 units/mL injectable solution: unit(s) injectable 2 times a day   hydrALAZINE 100 mg oral tablet: 1 tab(s) orally every 8 hours  losartan 100 mg oral tablet: 1 tab(s) orally once a day  NIFEdipine 60 mg oral tablet, extended release: 2 tab(s) orally once a day  NovoLIN 70/30 subcutaneous suspension: 10 unit(s) subcutaneous once a day  predniSONE 20 mg oral tablet: 1 tab(s) orally once a day  pyridostigmine 60 mg oral tablet: 1 tab(s) orally 6 times a day  tamsulosin 0.4 mg oral capsule: 1 cap(s) orally once a day  Vitamin D3 25 mcg (1000 intl units) oral tablet: 1 tab(s) orally once a day  Zoloft 50 mg oral tablet: 1 tab(s) orally once a day   Aspirin Enteric Coated 81 mg oral delayed release tablet: 1 tab(s) orally once a day  atorvastatin 80 mg oral tablet: 1 tab(s) orally once a day  calcium carbonate 1250 mg (500 mg elemental calcium) oral tablet: 1 tab(s) orally once a day  HumuLIN R 100 units/mL injectable solution: unit(s) injectable 2 times a day   hydrALAZINE 100 mg oral tablet: 1 tab(s) orally every 8 hours  losartan 100 mg oral tablet: 1 tab(s) orally once a day  NovoLIN 70/30 subcutaneous suspension: 10 unit(s) subcutaneous once a day  predniSONE 20 mg oral tablet: 1 tab(s) orally once a day  pyridostigmine 60 mg oral tablet: 1 tab(s) orally 6 times a day  tamsulosin 0.4 mg oral capsule: 1 cap(s) orally once a day  Vitamin D3 25 mcg (1000 intl units) oral tablet: 1 tab(s) orally once a day  Zoloft 50 mg oral tablet: 1 tab(s) orally once a day

## 2022-04-07 NOTE — DISCHARGE NOTE NURSING/CASE MANAGEMENT/SOCIAL WORK - PATIENT PORTAL LINK FT
You can access the FollowMyHealth Patient Portal offered by [facility] by registering at the following website: [email or web address]. By joining [facility]’s FollowMyHealth portal, you will also be able to view your health information using other applications (apps) compatible with our system.

## 2022-04-07 NOTE — DISCHARGE NOTE NURSING/CASE MANAGEMENT/SOCIAL WORK - NSDCPEFALRISK_GEN_ALL_CORE
For information on Fall & Injury Prevention, visit: [email or web address] OR  [email or web address] OR  https://www.cdc.gov/steadi/patient.html

## 2022-04-07 NOTE — DISCHARGE NOTE PROVIDER - HOSPITAL COURSE
66 year old male  from [facility], ambulates independently, with PMHx of Myasthenia Gravis s/p Thymectomy , HTN, HLD, CVA x 2, Osteoporosis, PAD, Peripheral Neuropathy, Cervical and Lumbar spondylosis, and BPH who presented to the ED for generalized weakness. Patient stated that for the past 3 days he feels overall more weak than his baseline and also had decrease in appetite. Patient denies any headaches, visual changes, SOB, cough, chest pain, abdominal pain, or change in bowel habits. Pt was previously admitted for similar sxs and was treated for Acute exacerbation of myasthenia gravis and was discharged on steroid taper. Pt admitted to tele for weakness likely 2/2 to myasthenia gravis exacerbation and bradycardia. WBC 15 (likely 2/2 steroid use), UA negative, CXR showed no consolidation or infiltrates, TSH and free T4 wnl. Neuro Valeria/Venkata consulted. Pt received 3 day course of IVIG and continued home meds of daily prednisone 20mg and pyridostigmine 60mg. Pt did not display current sxs of difficulty breathing or swallowing and was monitored with NIV and VC q6 and then q12 via respiratory therapy. Pt was started on Azathioprine 50mg PO Daily for long-term non-steroidal immunosuppression - Increase to 100mg PO Daily after 3 days if tolerated and continued on Aspirin 81mg PO Daily and Atorvastatin 80mg PO QHS for secondary stroke prevention in the setting of multiple chronic lacunar infarcts. tele showed single episode of non sustained Vtach, trigeminy and frequent PVCs. Echo showed normal EF and diastolic function, moderate concentric LV hypertrophy, and stress test was normal. Lasix held but home meds of losartan, hydralazine and inc dose of nifedipine given for HTN. Pt was also treated with IV fluids for MICHEL. Pt treated with SSI for DM, home statin for HLD, and HSQ for DVT ppx. Patient medically stable for discharge on azathioprine 50mg for 1 more day (4/9) and then 100mg thereafter, with neuro follow up for steroid weaning and further management. 66 year old male  from [facility], ambulates independently, with PMHx of Myasthenia Gravis s/p Thymectomy , HTN, HLD, CVA x 2, Osteoporosis, PAD, Peripheral Neuropathy, Cervical and Lumbar spondylosis, and BPH who presented to the ED for generalized weakness. Patient stated that for the past 3 days he feels overall more weak than his baseline and also had decrease in appetite. Patient denies any headaches, visual changes, SOB, cough, chest pain, abdominal pain, or change in bowel habits. Pt was previously admitted for similar sxs and was treated for Acute exacerbation of myasthenia gravis and was discharged on steroid taper. Pt admitted to tele for weakness likely 2/2 to myasthenia gravis exacerbation and bradycardia. WBC 15 (likely 2/2 steroid use), UA negative, CXR showed no consolidation or infiltrates, TSH and free T4 wnl. Neuro Dionisio Shaw/Venkata consulted. Pt received 3 day course of IVIG and continued home meds of daily prednisone 20mg and pyridostigmine 60mg. Pt did not display current sxs of difficulty breathing or swallowing and was monitored with NIV and VC q6 and then q12 via respiratory therapy. Pt was started on Azathioprine 50mg PO Daily for long-term non-steroidal immunosuppression and will Increase to 100mg PO Daily after 3 days if tolerated and continued on Aspirin 81mg PO Daily and Atorvastatin 80mg PO QHS for secondary stroke prevention in the setting of multiple chronic lacunar infarcts. Telemetry showed single episode of non sustained Vtach, trigeminy and frequent PVCs. Echo showed normal EF and diastolic function, moderate concentric LV hypertrophy, and stress test was normal. Lasix held but home meds of losartan, hydralazine and inc dose of nifedipine given for HTN. Pt was also treated with IV fluids for MICHEL. Pt treated with SSI for DM, home statin for HLD, and HSQ for DVT ppx. Patient medically stable for discharge on azathioprine 50mg for 1 more day (4/9) and then 100mg thereafter, with neuro follow up for steroid weaning and further. Pt is stable for discharge. Pt has been advised to follow up as outpatient. Case has quiana discussed with the attending. This is just a summary of the case. For further information please refer to pt. chart document. management.

## 2022-04-07 NOTE — PROGRESS NOTE ADULT - SUBJECTIVE AND OBJECTIVE BOX
C A R D I O L O G Y  **********************************     DATE OF SERVICE: 04-07-22    Patient denies chest pain or shortness of breath.   Review of symptoms otherwise negative.    aspirin enteric coated 81 milliGRAM(s) Oral daily  atorvastatin 80 milliGRAM(s) Oral at bedtime  azaTHIOprine 50 milliGRAM(s) Oral daily  heparin   Injectable 5000 Unit(s) SubCutaneous every 8 hours  hydrALAZINE 100 milliGRAM(s) Oral three times a day  insulin lispro (ADMELOG) corrective regimen sliding scale   SubCutaneous three times a day before meals  losartan 100 milliGRAM(s) Oral daily  NIFEdipine XL 90 milliGRAM(s) Oral two times a day  predniSONE   Tablet 20 milliGRAM(s) Oral daily  pyridostigmine 60 milliGRAM(s) Oral every 4 hours  sertraline 50 milliGRAM(s) Oral daily  sodium chloride 0.9% Bolus 1000 milliLiter(s) IV Bolus once  tamsulosin 0.4 milliGRAM(s) Oral at bedtime                            11.5   8.65  )-----------( 239      ( 07 Apr 2022 06:54 )             35.3       Hemoglobin: 11.5 g/dL (04-07 @ 06:54)  Hemoglobin: 10.8 g/dL (04-06 @ 06:53)  Hemoglobin: 11.3 g/dL (04-05 @ 08:12)  Hemoglobin: 10.7 g/dL (04-04 @ 06:06)  Hemoglobin: 10.4 g/dL (04-03 @ 04:34)      04-07    137  |  103  |  22<H>  ----------------------------<  110<H>  3.6   |  27  |  1.37<H>    Ca    9.3      07 Apr 2022 06:54  Phos  3.7     04-07  Mg     2.0     04-07      Creatinine Trend: 1.37<--, 1.25<--, 1.07<--, 1.26<--, 1.73<--, 1.63<--    COAGS:           T(C): 36.5 (04-07-22 @ 11:01), Max: 37.2 (04-06-22 @ 19:14)  HR: 82 (04-07-22 @ 11:01) (81 - 91)  BP: 152/75 (04-07-22 @ 11:01) (144/64 - 166/62)  RR: 18 (04-07-22 @ 11:01) (17 - 18)  SpO2: 96% (04-07-22 @ 11:01) (95% - 97%)  Wt(kg): --    I&O's Summary    06 Apr 2022 07:01  -  07 Apr 2022 07:00  --------------------------------------------------------  IN: 250 mL / OUT: 1050 mL / NET: -800 mL      HEENT:  (-)icterus (-)pallor  CV: N S1 S2 1/6 BOUBACAR (+)2 Pulses B/l  Resp:  Clear to ausculatation B/L, normal effort  GI: (+) BS Soft, NT, ND  Lymph:  (-)Edema, (-)obvious lymphadenopathy  Skin: Warm to touch, Normal turgor  Psych: Appropriate mood and affect      TELEMETRY: 	  sinus, PVC        ASSESSMENT/PLAN: 	66y  Male from [facility], ambulates independently, with PMHx of Myasthenia Gravis s/p Thymectomy , HTN, HLD, CVA x 2, Osteoporosis, PAD, Peripheral Neuropathy, Cervical and Lumbar spondylosis, and BPH who presented to the ED for generalized weakness noted with bradycardia.    - TSH within normal limits.  - doesn't appear to be persistently martinez  - bradycardia maybe due to Mestinon, but since he is asymptomatic will just monitor  - No pertinent findings on cardiac echo  - Stress with no ischemia or infarct  - Will hold of on BB given episodes of Bradycrardia  - No need for further inpatient cardiac work up.      Ta Mercado MD, [facility]  BEEPER [phone]

## 2022-04-10 LAB — GLUCOSE BLDC GLUCOMTR-MCNC: 121 MG/DL — HIGH (ref 70–99)

## 2022-08-20 ENCOUNTER — EMERGENCY (EMERGENCY)
Facility: HOSPITAL | Age: 67
LOS: 1 days | Discharge: HOME | End: 2022-08-20
Attending: EMERGENCY MEDICINE
Payer: MEDICAID

## 2022-08-20 VITALS
OXYGEN SATURATION: 100 % | RESPIRATION RATE: 18 BRPM | DIASTOLIC BLOOD PRESSURE: 67 MMHG | WEIGHT: 149.91 LBS | HEART RATE: 56 BPM | HEIGHT: 66 IN | SYSTOLIC BLOOD PRESSURE: 127 MMHG | TEMPERATURE: 98 F

## 2022-08-20 VITALS
HEART RATE: 97 BPM | TEMPERATURE: 99 F | SYSTOLIC BLOOD PRESSURE: 160 MMHG | OXYGEN SATURATION: 96 % | DIASTOLIC BLOOD PRESSURE: 80 MMHG | RESPIRATION RATE: 17 BRPM

## 2022-08-20 DIAGNOSIS — D17.39 BENIGN LIPOMATOUS NEOPLASM OF SKIN AND SUBCUTANEOUS TISSUE OF OTHER SITES: Chronic | ICD-10-CM

## 2022-08-20 DIAGNOSIS — G70.00 MYASTHENIA GRAVIS WITHOUT (ACUTE) EXACERBATION: Chronic | ICD-10-CM

## 2022-08-20 LAB
ALBUMIN SERPL ELPH-MCNC: 3 G/DL — LOW (ref 3.5–5)
ALP SERPL-CCNC: 84 U/L — SIGNIFICANT CHANGE UP (ref 40–120)
ALT FLD-CCNC: 29 U/L DA — SIGNIFICANT CHANGE UP (ref 10–60)
ANION GAP SERPL CALC-SCNC: 9 MMOL/L — SIGNIFICANT CHANGE UP (ref 5–17)
APPEARANCE UR: CLEAR — SIGNIFICANT CHANGE UP
AST SERPL-CCNC: 28 U/L — SIGNIFICANT CHANGE UP (ref 10–40)
BASOPHILS # BLD AUTO: 0.02 K/UL — SIGNIFICANT CHANGE UP (ref 0–0.2)
BASOPHILS NFR BLD AUTO: 0.2 % — SIGNIFICANT CHANGE UP (ref 0–2)
BILIRUB SERPL-MCNC: 0.4 MG/DL — SIGNIFICANT CHANGE UP (ref 0.2–1.2)
BILIRUB UR-MCNC: NEGATIVE — SIGNIFICANT CHANGE UP
BUN SERPL-MCNC: 15 MG/DL — SIGNIFICANT CHANGE UP (ref 7–18)
CALCIUM SERPL-MCNC: 9 MG/DL — SIGNIFICANT CHANGE UP (ref 8.4–10.5)
CHLORIDE SERPL-SCNC: 104 MMOL/L — SIGNIFICANT CHANGE UP (ref 96–108)
CK SERPL-CCNC: 119 U/L — SIGNIFICANT CHANGE UP (ref 35–232)
CO2 SERPL-SCNC: 27 MMOL/L — SIGNIFICANT CHANGE UP (ref 22–31)
COLOR SPEC: YELLOW — SIGNIFICANT CHANGE UP
CREAT SERPL-MCNC: 1.17 MG/DL — SIGNIFICANT CHANGE UP (ref 0.5–1.3)
DIFF PNL FLD: NEGATIVE — SIGNIFICANT CHANGE UP
EGFR: 69 ML/MIN/1.73M2 — SIGNIFICANT CHANGE UP
EOSINOPHIL # BLD AUTO: 0.04 K/UL — SIGNIFICANT CHANGE UP (ref 0–0.5)
EOSINOPHIL NFR BLD AUTO: 0.4 % — SIGNIFICANT CHANGE UP (ref 0–6)
GLUCOSE SERPL-MCNC: 69 MG/DL — LOW (ref 70–99)
GLUCOSE UR QL: NEGATIVE — SIGNIFICANT CHANGE UP
HCT VFR BLD CALC: 34.7 % — LOW (ref 39–50)
HGB BLD-MCNC: 11.6 G/DL — LOW (ref 13–17)
IMM GRANULOCYTES NFR BLD AUTO: 0.5 % — SIGNIFICANT CHANGE UP (ref 0–1.5)
KETONES UR-MCNC: NEGATIVE — SIGNIFICANT CHANGE UP
LEUKOCYTE ESTERASE UR-ACNC: NEGATIVE — SIGNIFICANT CHANGE UP
LIDOCAIN IGE QN: 320 U/L — SIGNIFICANT CHANGE UP (ref 73–393)
LYMPHOCYTES # BLD AUTO: 0.94 K/UL — LOW (ref 1–3.3)
LYMPHOCYTES # BLD AUTO: 9.2 % — LOW (ref 13–44)
MCHC RBC-ENTMCNC: 32.6 PG — SIGNIFICANT CHANGE UP (ref 27–34)
MCHC RBC-ENTMCNC: 33.4 GM/DL — SIGNIFICANT CHANGE UP (ref 32–36)
MCV RBC AUTO: 97.5 FL — SIGNIFICANT CHANGE UP (ref 80–100)
MONOCYTES # BLD AUTO: 0.57 K/UL — SIGNIFICANT CHANGE UP (ref 0–0.9)
MONOCYTES NFR BLD AUTO: 5.6 % — SIGNIFICANT CHANGE UP (ref 2–14)
NEUTROPHILS # BLD AUTO: 8.63 K/UL — HIGH (ref 1.8–7.4)
NEUTROPHILS NFR BLD AUTO: 84.1 % — HIGH (ref 43–77)
NITRITE UR-MCNC: NEGATIVE — SIGNIFICANT CHANGE UP
NRBC # BLD: 0 /100 WBCS — SIGNIFICANT CHANGE UP (ref 0–0)
PH UR: 7 — SIGNIFICANT CHANGE UP (ref 5–8)
PLATELET # BLD AUTO: 276 K/UL — SIGNIFICANT CHANGE UP (ref 150–400)
POTASSIUM SERPL-MCNC: 3.7 MMOL/L — SIGNIFICANT CHANGE UP (ref 3.5–5.3)
POTASSIUM SERPL-SCNC: 3.7 MMOL/L — SIGNIFICANT CHANGE UP (ref 3.5–5.3)
PROT SERPL-MCNC: 6.7 G/DL — SIGNIFICANT CHANGE UP (ref 6–8.3)
PROT UR-MCNC: 100
RBC # BLD: 3.56 M/UL — LOW (ref 4.2–5.8)
RBC # FLD: 16.7 % — HIGH (ref 10.3–14.5)
SODIUM SERPL-SCNC: 140 MMOL/L — SIGNIFICANT CHANGE UP (ref 135–145)
SP GR SPEC: 1.01 — SIGNIFICANT CHANGE UP (ref 1.01–1.02)
TROPONIN I, HIGH SENSITIVITY RESULT: 35.1 NG/L — SIGNIFICANT CHANGE UP
UROBILINOGEN FLD QL: NEGATIVE — SIGNIFICANT CHANGE UP
WBC # BLD: 10.25 K/UL — SIGNIFICANT CHANGE UP (ref 3.8–10.5)
WBC # FLD AUTO: 10.25 K/UL — SIGNIFICANT CHANGE UP (ref 3.8–10.5)

## 2022-08-20 PROCEDURE — 84484 ASSAY OF TROPONIN QUANT: CPT

## 2022-08-20 PROCEDURE — 87086 URINE CULTURE/COLONY COUNT: CPT

## 2022-08-20 PROCEDURE — 80053 COMPREHEN METABOLIC PANEL: CPT

## 2022-08-20 PROCEDURE — 99285 EMERGENCY DEPT VISIT HI MDM: CPT

## 2022-08-20 PROCEDURE — 82550 ASSAY OF CK (CPK): CPT

## 2022-08-20 PROCEDURE — 93005 ELECTROCARDIOGRAM TRACING: CPT

## 2022-08-20 PROCEDURE — 99285 EMERGENCY DEPT VISIT HI MDM: CPT | Mod: 25

## 2022-08-20 PROCEDURE — 82962 GLUCOSE BLOOD TEST: CPT

## 2022-08-20 PROCEDURE — 70450 CT HEAD/BRAIN W/O DYE: CPT | Mod: 26,MA

## 2022-08-20 PROCEDURE — 85025 COMPLETE CBC W/AUTO DIFF WBC: CPT

## 2022-08-20 PROCEDURE — 83690 ASSAY OF LIPASE: CPT

## 2022-08-20 PROCEDURE — 36415 COLL VENOUS BLD VENIPUNCTURE: CPT

## 2022-08-20 PROCEDURE — 70450 CT HEAD/BRAIN W/O DYE: CPT | Mod: MA

## 2022-08-20 PROCEDURE — 81001 URINALYSIS AUTO W/SCOPE: CPT

## 2022-08-20 NOTE — ED PROVIDER NOTE - NSFOLLOWUPINSTRUCTIONS_ED_ALL_ED_FT
Head Injury    WHAT YOU NEED TO KNOW:    A head injury can include your scalp, face, skull, or brain and range from mild to severe. Effects can appear immediately after the injury or develop later. The effects may last a short time or be permanent. Healthcare providers may want to check your recovery over time. Treatment may change as you recover or develop new health problems from the head injury.    DISCHARGE INSTRUCTIONS:    Call your local emergency number (911 in the ), or have someone else call if:   •You cannot be woken.      •You have a seizure.      •You stop responding to others or you faint.      •You have blurry or double vision.      •Your speech becomes slurred or confused.      •You have arm or leg weakness, loss of feeling, or new problems with coordination.      •Your pupils are larger than usual, or one pupil is a different size than the other.      •You have blood or clear fluid coming out of your ears or nose.      Seek care immediately if:   •You have repeated or forceful vomiting.      •You feel confused.      •Your headache gets worse or becomes severe.      •You or someone caring for you notices that you are harder to wake than usual.      Call your doctor if:   •Your symptoms last longer than 6 weeks after the injury.      •You have questions or concerns about your condition or care.      Medicines:   •Acetaminophen decreases pain and fever. It is available without a doctor's order. Ask how much to take and how often to take it. Follow directions. Read the labels of all other medicines you are using to see if they also contain acetaminophen, or ask your doctor or pharmacist. Acetaminophen can cause liver damage if not taken correctly.      •Take your medicine as directed. Contact your healthcare provider if you think your medicine is not helping or if you have side effects. Tell him or her if you are allergic to any medicine. Keep a list of the medicines, vitamins, and herbs you take. Include the amounts, and when and why you take them. Bring the list or the pill bottles to follow-up visits. Carry your medicine list with you in case of an emergency.      Self-care:   •Rest or do quiet activities. Limit your time watching TV, using the computer, or doing tasks that require a lot of thinking. Slowly return to your normal activities as directed. Do not play sports or do activities that may cause you to get hit in the head. Ask your healthcare provider when you can return to sports.      •Apply ice on your head for 15 to 20 minutes every hour or as directed. Use an ice pack, or put crushed ice in a plastic bag. Cover it with a towel before you apply it to your skin. Ice helps prevent tissue damage and decreases swelling and pain.      •Have someone stay with you for 24 hours , or as directed. This person can monitor you for problems and call for help if needed. When you are awake, the person should ask you a few questions every few hours to see if you are thinking clearly. An example is to ask your name or address.      Prevent another head injury:   •Wear a helmet that fits properly. Do this when you play sports, or ride a bike, scooter, or skateboard. Helmets help decrease your risk for a serious head injury. Talk to your healthcare provider about other ways you can protect yourself if you play sports.      •Wear your seatbelt every time you are in a car. This helps lower your risk for a head injury if you are in a car accident.      Follow up with your doctor as directed: Write down your questions so you remember to ask them during your visits.

## 2022-08-20 NOTE — ED ADULT NURSE NOTE - OBJECTIVE STATEMENT
As per pt, c/o slip and fall in the kitchen at the hitting the back of his head today. Pt denies all other symptoms.

## 2022-08-20 NOTE — ED PROVIDER NOTE - OBJECTIVE STATEMENT
Patient with myastenia gravis, history stroke, Diabetes Mellitus here with fall today. Patient states he was feeling sick to his stomach and was going to throw but and then fell. He hit his head during fall. no fainting. currenlty denies complaints. no abdominal pain no chest pain dizziness or other complaints

## 2022-08-20 NOTE — ED PROVIDER NOTE - PATIENT PORTAL LINK FT
You can access the FollowMyHealth Patient Portal offered by [facility] by registering at the following website: [email or web address]. By joining [facility]’s FollowMyHealth portal, you will also be able to view your health information using other applications (apps) compatible with our system.

## 2022-08-21 LAB
CULTURE RESULTS: NO GROWTH — SIGNIFICANT CHANGE UP
SPECIMEN SOURCE: SIGNIFICANT CHANGE UP

## 2022-09-06 PROBLEM — Z00.00 ENCOUNTER FOR PREVENTIVE HEALTH EXAMINATION: Status: ACTIVE | Noted: 2022-09-06

## 2022-09-12 ENCOUNTER — APPOINTMENT (OUTPATIENT)
Dept: SURGERY | Facility: CLINIC | Age: 67
End: 2022-09-12

## 2022-09-12 VITALS
BODY MASS INDEX: 20.61 KG/M2 | HEIGHT: 68 IN | HEART RATE: 62 BPM | WEIGHT: 136 LBS | DIASTOLIC BLOOD PRESSURE: 64 MMHG | SYSTOLIC BLOOD PRESSURE: 121 MMHG

## 2022-09-12 DIAGNOSIS — I10 ESSENTIAL (PRIMARY) HYPERTENSION: ICD-10-CM

## 2022-09-12 DIAGNOSIS — N40.1 BENIGN PROSTATIC HYPERPLASIA WITH LOWER URINARY TRACT SYMPMS: ICD-10-CM

## 2022-09-12 DIAGNOSIS — Z56.0 UNEMPLOYMENT, UNSPECIFIED: ICD-10-CM

## 2022-09-12 DIAGNOSIS — Z86.59 PERSONAL HISTORY OF OTHER MENTAL AND BEHAVIORAL DISORDERS: ICD-10-CM

## 2022-09-12 DIAGNOSIS — Z78.9 OTHER SPECIFIED HEALTH STATUS: ICD-10-CM

## 2022-09-12 DIAGNOSIS — K40.20 BILATERAL INGUINAL HERNIA, W/OUT OBSTRUCTION OR GANGRENE, NOT SPECIFIED AS RECURRENT: ICD-10-CM

## 2022-09-12 DIAGNOSIS — G70.00 MYASTHENIA GRAVIS W/OUT (ACUTE) EXACERBATION: ICD-10-CM

## 2022-09-12 DIAGNOSIS — E78.5 HYPERLIPIDEMIA, UNSPECIFIED: ICD-10-CM

## 2022-09-12 PROCEDURE — 99203 OFFICE O/P NEW LOW 30 MIN: CPT

## 2022-09-12 SDOH — ECONOMIC STABILITY - INCOME SECURITY: UNEMPLOYMENT, UNSPECIFIED: Z56.0

## 2022-09-13 RX ORDER — ATORVASTATIN CALCIUM 80 MG/1
80 TABLET, FILM COATED ORAL
Refills: 0 | Status: ACTIVE | COMMUNITY

## 2022-09-13 RX ORDER — ASPIRIN 81 MG
81 TABLET, DELAYED RELEASE (ENTERIC COATED) ORAL
Refills: 0 | Status: ACTIVE | COMMUNITY

## 2022-09-13 RX ORDER — HUMAN INSULIN 100 [IU]/ML
(70-30) 100 INJECTION, SUSPENSION SUBCUTANEOUS
Refills: 0 | Status: ACTIVE | COMMUNITY

## 2022-09-13 RX ORDER — SERTRALINE 25 MG/1
TABLET, FILM COATED ORAL
Refills: 0 | Status: ACTIVE | COMMUNITY

## 2022-09-13 RX ORDER — LOSARTAN POTASSIUM 100 MG/1
100 TABLET, FILM COATED ORAL
Refills: 0 | Status: ACTIVE | COMMUNITY

## 2022-09-13 RX ORDER — METOPROLOL TARTRATE 100 MG/1
100 TABLET, FILM COATED ORAL
Refills: 0 | Status: ACTIVE | COMMUNITY

## 2022-09-13 RX ORDER — PREDNISONE 20 MG/1
20 TABLET ORAL
Refills: 0 | Status: ACTIVE | COMMUNITY

## 2022-09-13 RX ORDER — HYDRALAZINE HYDROCHLORIDE 100 MG/1
100 TABLET ORAL
Refills: 0 | Status: ACTIVE | COMMUNITY

## 2022-09-13 RX ORDER — PYRIDOSTIGMINE BROMIDE 60 MG/1
60 TABLET ORAL
Refills: 0 | Status: ACTIVE | COMMUNITY

## 2022-09-13 RX ORDER — FINASTERIDE 5 MG/1
5 TABLET, FILM COATED ORAL
Refills: 0 | Status: ACTIVE | COMMUNITY

## 2022-09-13 RX ORDER — NIFEDIPINE 60 MG
60 TABLET, EXTENDED RELEASE ORAL
Refills: 0 | Status: ACTIVE | COMMUNITY

## 2022-09-13 RX ORDER — INSULIN HUMAN 100 [IU]/ML
100 INJECTION, SOLUTION PARENTERAL
Refills: 0 | Status: ACTIVE | COMMUNITY

## 2022-09-13 RX ORDER — TAMSULOSIN HYDROCHLORIDE 0.4 MG/1
0.4 CAPSULE ORAL
Refills: 0 | Status: ACTIVE | COMMUNITY

## 2022-09-13 RX ORDER — AZATHIOPRINE 50 1/1
50 TABLET ORAL
Refills: 0 | Status: ACTIVE | COMMUNITY

## 2022-09-13 NOTE — ASSESSMENT
[FreeTextEntry1] : Impression: BL inguinal hernia - symptomatic, patient wants to have them both repaired at the same time \par \par umbilical hernia- asymptomatic

## 2022-09-13 NOTE — CONSULT LETTER
[Dear  ___] : Dear  [unfilled], [Consult Letter:] : I had the pleasure of evaluating your patient, [unfilled]. [Please see my note below.] : Please see my note below. [Consult Closing:] : Thank you very much for allowing me to participate in the care of this patient.  If you have any questions, please do not hesitate to contact me. [Sincerely,] : Sincerely, [FreeTextEntry3] : Jhony Carvajal MD, FACS

## 2022-09-13 NOTE — PLAN
[FreeTextEntry1] : Mr. GROSS  was told significance of findings, options, risks and benefits were explained.  Informed consent for BL inguinal hernia repair with mesh , open and potential risks, benefits and alternatives (surgical options were discussed including non-surgical options or the option of no surgery) to the planned surgery were discussed in depth.  All surgical options were discussed including non-surgical treatments.  He wishes to proceed with surgery.  We will plan for surgery on at the next available date, pending any required insurance pre-certification or pre-approval. He agrees to obtain any necessary pre-operative evaluations and testing prior to surgery.\par Patient advised to seek immediate medical attention with any acute change in symptoms or with the development of any new or worsening symptoms.  Patient's questions and concerns addressed to patient's satisfaction, and patient verbalized an understanding of the information discussed.\par \par

## 2022-09-13 NOTE — HISTORY OF PRESENT ILLNESS
[de-identified] : Mr. BRITTANIE GROSS is  a 67 year old male who was referred by Dr. Angelita Gibbons with the chief complaint of having pain and swelling in his BL groin.  He has a right groin pain and swelling for few years now, the left side swelling is more recent. The condition is  worse when he is walking or standing.   He has Myasthenia  Gravis.  he had surgey in [place] many years go.  He is stating that the hernias are getting bigger and symptomatic. He denies any fever or  night sweats. Appetite is good and weight is stable. \par  \par  \par

## 2022-09-13 NOTE — PHYSICAL EXAM
[Alert] : alert [Oriented to Person] : oriented to person [Oriented to Place] : oriented to place [Oriented to Time] : oriented to time [Calm] : calm [de-identified] : He  is alert, well-groomed, and in NAD\par   [de-identified] : anicteric.  Nasal mucosa pink, septum midline. Oral mucosa pink.  Tongue midline, Pharynx without exudates.\par   [de-identified] : Neck supple. Trachea midline. Thyroid isthmus barely palpable, lobes not felt.\par   [de-identified] : large reducible Right  inguinal hernia.  smaller  hernia on the left side.  Reducible umbilical hernia. No penile discharge or lesions.  No scrotal swelling or discoloration. Testes descended bilaterally, smooth, without masses. Epididymis non-tender.

## 2022-09-27 ENCOUNTER — OUTPATIENT (OUTPATIENT)
Dept: OUTPATIENT SERVICES | Facility: HOSPITAL | Age: 67
LOS: 1 days | End: 2022-09-27
Payer: MEDICAID

## 2022-09-27 VITALS
DIASTOLIC BLOOD PRESSURE: 70 MMHG | OXYGEN SATURATION: 97 % | SYSTOLIC BLOOD PRESSURE: 158 MMHG | HEIGHT: 64 IN | RESPIRATION RATE: 16 BRPM | TEMPERATURE: 98 F | HEART RATE: 55 BPM | WEIGHT: 136.91 LBS

## 2022-09-27 DIAGNOSIS — G70.00 MYASTHENIA GRAVIS WITHOUT (ACUTE) EXACERBATION: Chronic | ICD-10-CM

## 2022-09-27 DIAGNOSIS — Z01.818 ENCOUNTER FOR OTHER PREPROCEDURAL EXAMINATION: ICD-10-CM

## 2022-09-27 DIAGNOSIS — K40.20 BILATERAL INGUINAL HERNIA, WITHOUT OBSTRUCTION OR GANGRENE, NOT SPECIFIED AS RECURRENT: ICD-10-CM

## 2022-09-27 DIAGNOSIS — E11.9 TYPE 2 DIABETES MELLITUS WITHOUT COMPLICATIONS: ICD-10-CM

## 2022-09-27 DIAGNOSIS — I25.10 ATHEROSCLEROTIC HEART DISEASE OF NATIVE CORONARY ARTERY WITHOUT ANGINA PECTORIS: ICD-10-CM

## 2022-09-27 DIAGNOSIS — D17.39 BENIGN LIPOMATOUS NEOPLASM OF SKIN AND SUBCUTANEOUS TISSUE OF OTHER SITES: Chronic | ICD-10-CM

## 2022-09-27 DIAGNOSIS — Z29.9 ENCOUNTER FOR PROPHYLACTIC MEASURES, UNSPECIFIED: ICD-10-CM

## 2022-09-27 DIAGNOSIS — G70.00 MYASTHENIA GRAVIS WITHOUT (ACUTE) EXACERBATION: ICD-10-CM

## 2022-09-27 DIAGNOSIS — Z98.49 CATARACT EXTRACTION STATUS, UNSPECIFIED EYE: Chronic | ICD-10-CM

## 2022-09-27 DIAGNOSIS — I10 ESSENTIAL (PRIMARY) HYPERTENSION: ICD-10-CM

## 2022-09-27 DIAGNOSIS — E78.5 HYPERLIPIDEMIA, UNSPECIFIED: ICD-10-CM

## 2022-09-27 PROCEDURE — G0463: CPT

## 2022-09-27 RX ORDER — INSULIN NPH HUM/REG INSULIN HM 70-30/ML
10 VIAL (ML) SUBCUTANEOUS
Qty: 0 | Refills: 0 | DISCHARGE

## 2022-09-27 RX ORDER — FERROUS SULFATE 325(65) MG
1 TABLET ORAL
Qty: 0 | Refills: 0 | DISCHARGE

## 2022-09-27 RX ORDER — NIFEDIPINE 30 MG
1 TABLET, EXTENDED RELEASE 24 HR ORAL
Qty: 0 | Refills: 0 | DISCHARGE

## 2022-09-27 RX ORDER — CALCIUM CARBONATE 500(1250)
1 TABLET ORAL
Qty: 0 | Refills: 0 | DISCHARGE

## 2022-09-27 RX ORDER — METOPROLOL TARTRATE 50 MG
1 TABLET ORAL
Qty: 0 | Refills: 0 | DISCHARGE

## 2022-09-27 RX ORDER — FOLIC ACID 0.8 MG
1 TABLET ORAL
Qty: 0 | Refills: 0 | DISCHARGE

## 2022-09-27 RX ORDER — PYRIDOSTIGMINE BROMIDE 60 MG/5ML
1 SOLUTION ORAL
Qty: 0 | Refills: 0 | DISCHARGE

## 2022-09-27 RX ORDER — INSULIN HUMAN 100 [IU]/ML
0 INJECTION, SOLUTION SUBCUTANEOUS
Qty: 0 | Refills: 0 | DISCHARGE

## 2022-09-27 RX ORDER — HYDRALAZINE HCL 50 MG
1 TABLET ORAL
Qty: 0 | Refills: 0 | DISCHARGE

## 2022-09-27 RX ORDER — PREGABALIN 225 MG/1
1 CAPSULE ORAL
Qty: 0 | Refills: 0 | DISCHARGE

## 2022-09-27 RX ORDER — CHOLECALCIFEROL (VITAMIN D3) 125 MCG
1 CAPSULE ORAL
Qty: 0 | Refills: 0 | DISCHARGE

## 2022-09-27 RX ORDER — SERTRALINE 25 MG/1
1 TABLET, FILM COATED ORAL
Qty: 0 | Refills: 0 | DISCHARGE

## 2022-09-27 RX ORDER — TAMSULOSIN HYDROCHLORIDE 0.4 MG/1
1 CAPSULE ORAL
Qty: 0 | Refills: 0 | DISCHARGE

## 2022-09-27 RX ORDER — FINASTERIDE 5 MG/1
1 TABLET, FILM COATED ORAL
Qty: 0 | Refills: 0 | DISCHARGE

## 2022-09-27 RX ORDER — ATORVASTATIN CALCIUM 80 MG/1
1 TABLET, FILM COATED ORAL
Qty: 0 | Refills: 0 | DISCHARGE

## 2022-09-27 RX ORDER — AZATHIOPRINE 100 MG/1
1 TABLET ORAL
Qty: 0 | Refills: 0 | DISCHARGE

## 2022-09-27 NOTE — H&P PST ADULT - NSICDXPASTSURGICALHX_GEN_ALL_CORE_FT
PAST SURGICAL HISTORY:  H/O cataract extraction , right eye    Lipoma of chest wall     MG with thymoma (myasthena gravis)

## 2022-09-27 NOTE — H&P PST ADULT - PROBLEM SELECTOR PLAN 6
As per records patient also suffers from cardiac arrythmias  Continue all medications  Maintain f/u appointment with provider

## 2022-09-27 NOTE — H&P PST ADULT - PAIN SCALE PREFERRED, PROFILE
"    10/1/2021         RE: Mariana Olguin  [address] [place] [address]        Dear Colleague,    Thank you for referring your patient, Mariana Olguin, to the [facility] [place]. Please see a copy of my visit note below.    GI CLINIC VISIT     CC/REFERRING PROVIDER: Loco Puckett  REASON FOR CONSULTATION: abdominal pain and diarrhea    HPI: 33 year old female with PMH of anxiety/depression, bipolar, presenting to GI clinic for follow-up of abdominal pain and diarrhea.    Mariana presented with a one year history of postprandial abdomianl cramping and diarrhea, with progressive symptoms in Spring 2021.Initially noted watery diarrhea lasting several hours, triggered postprandially, with 5-6 BMs/day. This was in the context of daily NSAID use. Saw PCP 3/19/2021 with abdominal pain, diarrhea, weight loss with normal CBC, negative HIV, normal CMP. Went to [facility] - had EGD and colonoscopy. EGD with erythematous mucosa in stomach, otherwise normal. Colon with ulcers in sigmoid colon, possible resolving ischemic colitis. Biopsies returned and showed microscopic colitis. Duodenal biopsies were negative for celiac disease. Given resolution of diarrhea and cessation of NSAID, budesonide was not started. There was also some concern regarding potential effect on mood from her psychiatry team. She started pantoprazole 40 mg in the morning with significant improvement, as well as Metamucil with every meal, with improvement.    At most recent visit, she reports doing well for a period of time with the above interventions, however then started to experience \"stomach upset\" described as abdominal cramping, worsened with dietary triggers including spicy foods, citrus, raw vegetables. Daily bowel movement, hard, small, dime-sized pieces, associated with abdominal bloating.    Today, Mariana notes she overall feels worse. Continues to have abdominal cramping, migratory, particularly with " dietary triggers, stress, occurring around 30 minutes after eating and associated with increased gas. She has no GERD symptoms, dysphagia, odynophagia, early satiety, or emesis. Occasional nausea. She is having 1-2 formed BM per day, can be small and incomplete at times. Skips 2-3 days every few weeks. She is distressed by her symptoms.      ROS: 10pt ROS performed and otherwise negative.    PAST MEDICAL HISTORY:  Anxiety  Depression   Bipolar    PREVIOUS ABDOMINAL/GYNECOLOGIC SURGERIES:  Past Surgical History:   Procedure Laterality Date     DENTAL SURGERY      Ash Teeth     PERTINENT MEDICATIONS:  Current Outpatient Medications   Medication Sig Dispense Refill     budesonide (ENTOCORT EC) 3 MG EC capsule TAKE THREE CAPSULES BY MOUTH DAILY       buPROPion (WELLBUTRIN XL) 300 MG 24 hr tablet Take 300 mg by mouth every morning       lamoTRIgine (LAMICTAL) 150 MG tablet Take 150 mg by mouth daily       levonorgestrel (MIRENA) 20 MCG/24HR IUD 1 each by Intrauterine route once       metroNIDAZOLE (METROGEL) 0.75 % vaginal gel Insert one applicatorful nightly for 7 nights 30 g 0     pantoprazole (PROTONIX) 40 MG EC tablet Take 1 tablet (40 mg) by mouth 2 times daily (before meals) 90 tablet 3     buPROPion (WELLBUTRIN XL) 300 MG 24 hr tablet TK 1 T PO QD       lamoTRIgine (LAMICTAL) 150 MG tablet TK 1 T PO D       SOCIAL HISTORY:  Smoking: None  EtOH: Minimal EtOH use  Social History     Socioeconomic History     Marital status: Single     Spouse name: Not on file     Number of children: Not on file     Years of education: Not on file     Highest education level: Not on file   Occupational History     Not on file   Tobacco Use     Smoking status: Never Smoker     Smokeless tobacco: Never Used   Substance and Sexual Activity     Alcohol use: Yes     Drug use: Never     Sexual activity: Yes     Partners: Male     Birth control/protection: I.U.D.   Other Topics Concern     Not on file   Social History Narrative    **  Merged History Encounter **          Social Determinants of Health     Financial Resource Strain:      Difficulty of Paying Living Expenses:    Food Insecurity:      Worried About Running Out of Food in the Last Year:      Ran Out of Food in the Last Year:    Transportation Needs:      Lack of Transportation (Medical):      Lack of Transportation (Non-Medical):    Physical Activity:      Days of Exercise per Week:      Minutes of Exercise per Session:    Stress:      Feeling of Stress :    Social Connections:      Frequency of Communication with Friends and Family:      Frequency of Social Gatherings with Friends and Family:      Attends [employer] Services:      Active Member of Clubs or Organizations:      Attends Club or Organization Meetings:      Marital Status:    Intimate Partner Violence:      Fear of Current or Ex-Partner:      Emotionally Abused:      Physically Abused:      Sexually Abused:      FAMILY HISTORY:  No colon cancer  Family History   Problem Relation Age of Onset     Hypertension Father      Breast Cancer Paternal Grandmother      PHYSICAL EXAMINATION:  Video physical exam  General: Patient appears well in no acute distress.   Skin: No visualized rash or lesions on visualized skin  Eyes: EOMI, no erythema, sclera icterus or discharge noted  Resp: Appears to be breathing comfortably without accessory muscle usage, speaking in full sentences, no cough  MSK: Appears to have normal range of motion based on visualized movements  Neurologic: No apparent tremors, facial movements symmetric  Psych: affect normal, alert and oriented    The rest of a comprehensive physical examination is deferred due to PHE (public health emergency) video restrictions    PERTINENT STUDIES Reviewed in EMR    ASSESSMENT/PLAN:  33 year old female with PMH of anxiety/depression, bipolar, presenting to GI clinic for follow-up of abdominal pain and diarrhea.      Mariana developed abdominal pain and diarrhea in setting of NSAID  use, with EGD/colonosopy showing mucosa in stomach, sigmoid ulcers and biopsies with MC, all of which may have been triggered by NSAID use. Previously improving with NSAID cessation, PPI, and fiber supplementation, however now with recurrence of abdominal cramping and bloating, in the setting of daily BSC 2-3 stools.    With resolution of diarrhea, microscopic colitis is unlikely the underlying etiology for her symptoms, though could be possible. We can check a fecal calprotectin, knowing that the pantoprazole may falsely elevate this value. If negative, would provide reassurance that symptoms are likely unrelated to previous finding of microscopic colitis while on NSAIDs. 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     Plan:  -- Continue pantoprazole 40 mg twice daily 30-60 minutes before meals. Hold off adjustments for now as she has had increased symptoms attempting to reduce this.  -- Check fecal calporectin  -- Referral to GI dietician  -- Trial enteric coated peppermint  -- Continue fiber supplementation as doing. Can add in over-the-counter magnesium supplementation 200-400 mg nightly as needed.  -- Continue NSAID avoidance  -- Future considerations include repeat colonoscopy pending fecal romina    RTC 4 weeks per patient preference    Thank you for this consultation. It was a pleasure to participate in the care of this patient; please contact us with any further questions.    Christy Martinez PA-C    43 minutes spent on the date of the encounter doing chart review, review of outside records, review of test results, patient visit and documentation          Again, thank you for allowing me to participate in the care of your patient.      Sincerely,    Christy Martinez PA-C     numerical 0-10

## 2022-09-27 NOTE — H&P PST ADULT - PROBLEM SELECTOR PLAN 3
Taking Nifedipine, metoprolol, hydralazine, losartan  Continue same medications  Maintain f/u appointment with provider

## 2022-09-27 NOTE — H&P PST ADULT - PROBLEM SELECTOR PLAN 1
Bilateral Inguinal Hernia Repair with Mesh      STOP BANG : 3   Intermediate risk for BRET complications

## 2022-09-27 NOTE — H&P PST ADULT - HISTORY OF PRESENT ILLNESS
68 yo male with history of HTN, HLD, CVA with Right Sided Weakness, Myasthenia Gravis (on medications), BPH, CAD, Arrythmia, reports the above.  He is scheduled for : Bilateral Inguinal Hernia Repair with Mesh, on 10/7/22

## 2022-11-11 ENCOUNTER — APPOINTMENT (OUTPATIENT)
Dept: SURGERY | Facility: HOSPITAL | Age: 67
End: 2022-11-11

## 2023-01-13 NOTE — ED PROVIDER NOTE - SKIN COLOR
[address] [facility]                Phone: [phone]   Fax: [phone]    Physical Therapy Daily Treatment Note  Date:  2023    Patient Name:  Dion Guo    :  1974  MRN: [id number]    Evaluating therapist:  NEDA Marte                (23)  Restrictions/Precautions:    Diagnosis:  chronic LBP  Treatment Diagnosis:    Insurance/Certification information:  [id number] [facility],[address]   Referring Physician:  [address] [place] [address] Loop of care signed (Y/N):    Visit# / total visits:    Pain level: 3/10   Time In:  1536  Time Out:  1626    Subjective:      Exercises:  Exercise/Equipment Resistance/Repetitions Other comments   StepOne 10 min            tball flex/rot 10 x10s           rot st 3x20s    SKTC 3x20s    piriformis st 3x20s    hamst st 3x20s           pelvic tilts 15x3s               bridges  15x3s                                                               Other Therapeutic Activities:      Home Exercise Program:  provided 23    Manual Treatments:      Modalities:  IFC/MH to LB x 15 min     Timed Code Treatment Minutes: Total Treatment Minutes:      Treatment/Activity Tolerance:  [] Patient tolerated treatment well [] Patient limited by fatique  [] Patient limited by pain  [] Patient limited by other medical complications  [] Other:     Prognosis: [] Good [] Fair  [] Poor    Patient Requires Follow-up: [] Yes  [] No    Plan:   [] Continue per plan of care [] Alter current plan (see comments)  [] Plan of care initiated [] Hold pending MD visit [] Discharge  Plan for Next Session:      See Weekly Progress Note: []  Yes  []  No  Next due:        Electronically signed by:   Meryl Jewell PT normal for race

## 2023-03-28 NOTE — ED PROVIDER NOTE - CPE EDP MUSC NORM
NP at chair side with discharge instructions and pt voiced understanding.      Darci Murphy LPN  61/76/40 8688 normal...

## 2023-06-11 NOTE — H&P PST ADULT - PROBLEM SELECTOR PROBLEM 3
bilateral upper extremity ROM was WFL (within functional limits)/bilateral lower extremity ROM was WFL (within functional limits)
Hypertension

## 2023-07-17 NOTE — DISCHARGE NOTE ADULT - CAREGIVER RELATION TO PATIENT
GIRLFRIEND Topical Retinoid counseling:  Patient advised to apply a pea-sized amount only at bedtime and wait 30 minutes after washing their face before applying.  If too drying, patient may add a non-comedogenic moisturizer. The patient verbalized understanding of the proper use and possible adverse effects of retinoids.  All of the patient's questions and concerns were addressed.

## 2023-10-20 NOTE — ED PROVIDER NOTE - CROS ED GI ALL NEG
chest wall non-tender, breathing is unlabored without accessory muscle use, normal breath sounds , chest wall non-tender, breathing is unlabored without accessory muscle use, normal breath sounds
- - -

## 2024-07-14 NOTE — ED ADULT NURSE NOTE - ED STAT RN HANDOFF TIME
Patient c/o burning at IV site - potassium chloride running on pump reduced rate to 50ml/hr. Patient states IV feels better now, will call if irritation comes back. No other voiced concerns when asked.   03:04 05:23

## 2024-09-06 NOTE — H&P PST ADULT - PROBLEM/PLAN-6
Pre-op Diagnosis: * No pre-op diagnosis entered *    The above referenced H&P was reviewed by Yunior Soriano MD on 9/6/2024, the patient was examined and no significant changes have occurred in the patient's condition since the H&P was performed.  I discussed with the patient and/or legal representative the potential benefits, risks and side effects of this procedure; the likelihood of the patient achieving goals; and potential problems that might occur during recuperation.  I discussed reasonable alternatives to the procedure, including risks, benefits and side effects related to the alternatives and risks related to not receiving this procedure.  We will proceed with procedure as planned.    MP2  ASA3     DISPLAY PLAN FREE TEXT

## 2024-12-02 NOTE — H&P ADULT - NSICDXFAMILYHX_GEN_ALL_CORE_FT
No
FAMILY HISTORY:  Mother  Still living? No  Family history of hypertension, Age at diagnosis: Age Unknown

## 2025-03-23 NOTE — PATIENT PROFILE ADULT - FALL HARM RISK - UNIVERSAL INTERVENTIONS
[facility]   NEUROCRITICAL CARE   PROGRESS NOTE    Admission date: 3/19/2025  Reason for Consult: Leg weakness   Chief Complaint: S/p AAA repair  ________________________________________________________________    SUBJECTIVE     24 Hour Significant Events: Kidney function improving, on intermittent Levophed to maintain blood pressure.  Lumbar drain with 310 cc out.  Hemoglobin without adequate response to units overnight.  No unit this morning.  Platelets down as well.  Strength improving.     OBJECTIVE   VITAL SIGNS:   Temp:  [97.4 °F (36.3 °C)-99.4 °F (37.4 °C)] 98.6 °F (37 °C)  Pulse:  [] 74  Resp:  [13-23] 18  BP: (129-183)/() 161/90  SpO2:  [90 %-100 %] 90 %  AO: (151-195)/(43-96) 189/96    INTAKE/OUTPUT:  Intake/Output Summary (Last 24 hours) at 3/23/2025 0852  Last data filed at 3/23/2025 0800  Gross per 24 hour   Intake 5974.8 ml   Output 2790 ml   Net 3184.8 ml     PHYSICAL EXAM:  CONSTITUTIONAL: Patient resting comfortably in bed, NAD    NEUROLOGIC:    Mental Status:  A&O x 4, Follows simple commands, no obvious aphasia or dysarthria  Cranial nerves: PERRL.  Visual fields full.  EOMI.  Face symmetric with normal movement bilaterally.  Hearing grossly intact. Tongue midline with normal movements.   Motor: Drift:  RUE mild drift, improved on retesting; Motor exam is 5 out of 5 in BL UE        HF  KE  KF  DF  PF     Left 4- 5 4 5 4     Right 3 5 3 3 3      Sensation: Decreased to light touch in left lower extremity.  Decreased cold sensation in bilateral lower extremities, decreased motor sensation in bilateral extremities, pain sharp down to umbilicus, T10, then becomes dull - checked 3/22  Cerebellar: Normal Finger-To-Nose test BL     LABORATORY DATA:  Last 24 hour labs were reviewed in detail.  Recent Labs   Lab 03/22/25  0423 03/22/25  1739 03/23/25  0505    139 136   K 4.5 4.2 4.3    106 104   CO2 17.0* 22.0 23.0   * 118* 174*   BUN 35* 33* 31*    CREATSERUM 2.24* 1.84* 1.72*     Recent Labs   Lab 25  0415 25  0425 25  1902 25  0505   WBC 7.2 18.0*  --  7.4   HGB 12.2* 11.2* 8.1* 9.2*   .0 155.0  --  74.0*     Recent Labs   Lab 25  1427 25  0415 25  0423   ALT <7* <7* 8*   AST 10 10 30     Recent Labs   Lab 25  0528 25  0415 25  1739 25  0505   MG 1.6 1.6 1.5* 1.6   PHOS 3.8  --   --   --        Scheduled Meds:   sodium chloride   Intravenous Once    sodium chloride  500 mL Intravenous Once    docusate sodium  100 mg Oral BID    polyethylene glycol (PEG 3350)  17 g Oral Daily    insulin aspart  1-5 Units Subcutaneous TID AC and HS    methylPREDNISolone  1,000 mg Intravenous Q6H    pantoprazole  40 mg Intravenous Q24H    heparin  5,000 Units Subcutaneous 2 times per day    senna  10 mL Per NG Tube BID    chlorhexidine gluconate  15 mL Mouth/Throat BID@0800,2000    mineral oil-white petrolatum  1 Application Both Eyes Nightly     Continuous Infusions:   lactated ringers 150 mL/hr at 25 0600    niCARdipine Stopped (25 0245)    norepinephrine Stopped (25 2255)    fentanyl      dexmedetomidine       PRN Meds:  glucose **OR** glucose **OR** glucose-vitamin C **OR** dextrose **OR** glucose **OR** glucose **OR** glucose-vitamin C    acetaminophen **OR** HYDROcodone-acetaminophen **OR** HYDROcodone-acetaminophen    ondansetron    metoclopramide    HYDROmorphone **OR** HYDROmorphone **OR** HYDROmorphone    niCARdipine    norepinephrine    bupivacaine PF    acetaminophen **OR** acetaminophen **OR** acetaminophen    [] fentaNYL (Sublimaze) **AND** fentanyl    fentaNYL (Sublimaze)    polyethylene glycol (PEG 3350)    bisacodyl    Radiology:    MRI SPINE THORACIC (CPT=72146)    Result Date: 3/22/2025  CONCLUSION:  Exam is somewhat suboptimal due to metal artifacts relating to patient's abdominal aortic stenting.  There is no evidence of acute cord injury/cord infarct.     Dictated by (CST): Sparkle High DO on 3/22/2025 at 2:54 PM     Finalized by (CST): Sparkle High DO on 3/22/2025 at 2:58 PM      ASSESSMENT/PLAN   73 year old male with PMH of anxiety, DVT, GERD, prior type A dissection status post repair, PVD, DVT, HTN, prior smoker here for status post endovascular arch repair and thoracoabdominal aneurysm repair, neuroconsulted for waxing and waning right leg weakness     Active Problems:    Pre-op testing    Thoracoabdominal aortic aneurysm (TAAA)     Reviewed notes by pulm and vascular  Reviewed CBC/BMP and CXR  Imaging personally reviewed and interpreted by me, agree with radiologist impression.  Discussed case with care team     Bilateral lower extremity weakness  Initially reported right lower extremity weakness 3/22 morning, later right leg worsened and patient also developed left leg weakness.    Exam limited by baseline peripheral neuropathy in lower extremities.  Concern for spinal cord ischemia/hypoperfusion in the postoperative state  MRI T spine limited by artifact but without obvious compression or signs of cord ischemia noted however, given clinical exam, concern remains   Improving today but still weak   Continue BP augmentation for atleast another day   LD management per surgical team   Hb goal > 10 for another day, another transfusion this morning  - Initial drop thought to be possibly dilutional, if without adequate response this time, would consider CT abdomen pelvis to rule out bleed  Platelets down to 74 this morning, atleast partially dilutional, platelet goal per vascular surgery  Started steroids 3/22 as rescue measure in setting of worsening exam despite blood pressure augmentation & increased lumbar drainage                - Continue for 1 more day, monitoring glucose q6 with SSI  Na downtrending, on LR, goal normonatremia in setting of above so will switch IVF to NS.   Continue chemoprophylaxis for DVT prevention  PT/OT evals   Will follow     36  minutes of CC time (exclusive of billable procedures) was administered to manage and/or prevent neurologic instability. More than 50% spent counseling/coordinating care. This involved direct patient intervention, complex decision making, and/or extensive discussions with the patient, family, and clinical staff.     Carolyn Burch MD  Neurocritical Care  [facility]    Bed in lowest position, wheels locked, appropriate side rails in place/Call bell, personal items and telephone in reach/Instruct patient to call for assistance before getting out of bed or chair/Non-slip footwear when patient is out of bed/[place] to call system/Physically safe environment - no spills, clutter or unnecessary equipment/Purposeful Proactive Rounding/Room/bathroom lighting operational, light cord in reach

## 2025-06-25 ENCOUNTER — INPATIENT (INPATIENT)
Facility: HOSPITAL | Age: 70
LOS: 18 days | Discharge: NURSING, REHAB OR LONG-TERM CARE | DRG: 684 | End: 2025-07-14
Attending: INTERNAL MEDICINE | Admitting: INTERNAL MEDICINE
Payer: MEDICAID

## 2025-06-25 VITALS
DIASTOLIC BLOOD PRESSURE: 78 MMHG | TEMPERATURE: 98 F | WEIGHT: 136.91 LBS | OXYGEN SATURATION: 95 % | SYSTOLIC BLOOD PRESSURE: 139 MMHG | HEART RATE: 60 BPM | RESPIRATION RATE: 18 BRPM

## 2025-06-25 DIAGNOSIS — G70.00 MYASTHENIA GRAVIS WITHOUT (ACUTE) EXACERBATION: Chronic | ICD-10-CM

## 2025-06-25 DIAGNOSIS — N17.9 ACUTE KIDNEY FAILURE, UNSPECIFIED: ICD-10-CM

## 2025-06-25 DIAGNOSIS — D17.39 BENIGN LIPOMATOUS NEOPLASM OF SKIN AND SUBCUTANEOUS TISSUE OF OTHER SITES: Chronic | ICD-10-CM

## 2025-06-25 DIAGNOSIS — Z98.49 CATARACT EXTRACTION STATUS, UNSPECIFIED EYE: Chronic | ICD-10-CM

## 2025-06-25 LAB
ALBUMIN SERPL ELPH-MCNC: 3.5 G/DL — SIGNIFICANT CHANGE UP (ref 3.5–5)
ALP SERPL-CCNC: 137 U/L — HIGH (ref 40–120)
ALT FLD-CCNC: 31 U/L DA — SIGNIFICANT CHANGE UP (ref 10–60)
ANION GAP SERPL CALC-SCNC: 2 MMOL/L — LOW (ref 5–17)
APPEARANCE UR: CLEAR — SIGNIFICANT CHANGE UP
APTT BLD: 25.6 SEC — LOW (ref 26.1–36.8)
AST SERPL-CCNC: 35 U/L — SIGNIFICANT CHANGE UP (ref 10–40)
BACTERIA # UR AUTO: ABNORMAL /HPF
BASOPHILS # BLD AUTO: 0.01 K/UL — SIGNIFICANT CHANGE UP (ref 0–0.2)
BASOPHILS NFR BLD AUTO: 0.2 % — SIGNIFICANT CHANGE UP (ref 0–2)
BILIRUB SERPL-MCNC: 0.3 MG/DL — SIGNIFICANT CHANGE UP (ref 0.2–1.2)
BILIRUB UR-MCNC: NEGATIVE — SIGNIFICANT CHANGE UP
BLD GP AB SCN SERPL QL: SIGNIFICANT CHANGE UP
BUN SERPL-MCNC: 30 MG/DL — HIGH (ref 7–18)
CALCIUM SERPL-MCNC: 9.4 MG/DL — SIGNIFICANT CHANGE UP (ref 8.4–10.5)
CHLORIDE SERPL-SCNC: 102 MMOL/L — SIGNIFICANT CHANGE UP (ref 96–108)
CO2 SERPL-SCNC: 32 MMOL/L — HIGH (ref 22–31)
COLOR SPEC: SIGNIFICANT CHANGE UP
CREAT ?TM UR-MCNC: <13 MG/DL — SIGNIFICANT CHANGE UP
CREAT SERPL-MCNC: 1.81 MG/DL — HIGH (ref 0.5–1.3)
DIFF PNL FLD: NEGATIVE — SIGNIFICANT CHANGE UP
EGFR: 40 ML/MIN/1.73M2 — LOW
EGFR: 40 ML/MIN/1.73M2 — LOW
EOSINOPHIL # BLD AUTO: 0 K/UL — SIGNIFICANT CHANGE UP (ref 0–0.5)
EOSINOPHIL NFR BLD AUTO: 0 % — SIGNIFICANT CHANGE UP (ref 0–6)
FERRITIN SERPL-MCNC: 236 NG/ML — SIGNIFICANT CHANGE UP (ref 30–400)
GLUCOSE SERPL-MCNC: 86 MG/DL — SIGNIFICANT CHANGE UP (ref 70–99)
GLUCOSE UR QL: NEGATIVE MG/DL — SIGNIFICANT CHANGE UP
HCT VFR BLD CALC: 29.8 % — LOW (ref 39–50)
HGB BLD-MCNC: 9.9 G/DL — LOW (ref 13–17)
IMM GRANULOCYTES NFR BLD AUTO: 0.6 % — SIGNIFICANT CHANGE UP (ref 0–0.9)
INR BLD: 0.86 RATIO — SIGNIFICANT CHANGE UP (ref 0.85–1.16)
IRON SATN MFR SERPL: 16 % — LOW (ref 20–55)
IRON SATN MFR SERPL: 37 UG/DL — LOW (ref 65–170)
KETONES UR QL: ABNORMAL MG/DL
LACTATE SERPL-SCNC: 1.2 MMOL/L — SIGNIFICANT CHANGE UP (ref 0.7–2)
LACTATE SERPL-SCNC: 2.5 MMOL/L — HIGH (ref 0.7–2)
LEUKOCYTE ESTERASE UR-ACNC: NEGATIVE — SIGNIFICANT CHANGE UP
LYMPHOCYTES # BLD AUTO: 0.56 K/UL — LOW (ref 1–3.3)
LYMPHOCYTES # BLD AUTO: 9 % — LOW (ref 13–44)
MCHC RBC-ENTMCNC: 33.2 G/DL — SIGNIFICANT CHANGE UP (ref 32–36)
MCHC RBC-ENTMCNC: 35.1 PG — HIGH (ref 27–34)
MCV RBC AUTO: 105.7 FL — HIGH (ref 80–100)
MONOCYTES # BLD AUTO: 0.27 K/UL — SIGNIFICANT CHANGE UP (ref 0–0.9)
MONOCYTES NFR BLD AUTO: 4.3 % — SIGNIFICANT CHANGE UP (ref 2–14)
NEUTROPHILS # BLD AUTO: 5.35 K/UL — SIGNIFICANT CHANGE UP (ref 1.8–7.4)
NEUTROPHILS NFR BLD AUTO: 85.9 % — HIGH (ref 43–77)
NITRITE UR-MCNC: NEGATIVE — SIGNIFICANT CHANGE UP
NRBC BLD AUTO-RTO: 0 /100 WBCS — SIGNIFICANT CHANGE UP (ref 0–0)
PH UR: 5.5 — SIGNIFICANT CHANGE UP (ref 5–8)
PLATELET # BLD AUTO: 226 K/UL — SIGNIFICANT CHANGE UP (ref 150–400)
POTASSIUM SERPL-MCNC: 4 MMOL/L — SIGNIFICANT CHANGE UP (ref 3.5–5.3)
POTASSIUM SERPL-SCNC: 4 MMOL/L — SIGNIFICANT CHANGE UP (ref 3.5–5.3)
PROT ?TM UR-MCNC: 75 MG/DL — HIGH (ref 0–12)
PROT SERPL-MCNC: 7.6 G/DL — SIGNIFICANT CHANGE UP (ref 6–8.3)
PROT UR-MCNC: 300 MG/DL
PROT/CREAT UR-RTO: SIGNIFICANT CHANGE UP RATIO (ref 0–0.2)
PROTHROM AB SERPL-ACNC: 10 SEC — SIGNIFICANT CHANGE UP (ref 9.9–13.4)
RBC # BLD: 2.82 M/UL — LOW (ref 4.2–5.8)
RBC # FLD: 17.2 % — HIGH (ref 10.3–14.5)
RBC CASTS # UR COMP ASSIST: 2 /HPF — SIGNIFICANT CHANGE UP (ref 0–4)
SODIUM SERPL-SCNC: 136 MMOL/L — SIGNIFICANT CHANGE UP (ref 135–145)
SODIUM UR-SCNC: 119 MMOL/L — SIGNIFICANT CHANGE UP
SP GR SPEC: 1.01 — SIGNIFICANT CHANGE UP (ref 1–1.03)
TIBC SERPL-MCNC: 233 UG/DL — LOW (ref 250–450)
TROPONIN I, HIGH SENSITIVITY RESULT: 43.9 NG/L — SIGNIFICANT CHANGE UP
UIBC SERPL-MCNC: 196 UG/DL — SIGNIFICANT CHANGE UP (ref 110–370)
UROBILINOGEN FLD QL: 1 MG/DL — SIGNIFICANT CHANGE UP (ref 0.2–1)
WBC # BLD: 6.23 K/UL — SIGNIFICANT CHANGE UP (ref 3.8–10.5)
WBC # FLD AUTO: 6.23 K/UL — SIGNIFICANT CHANGE UP (ref 3.8–10.5)
WBC UR QL: 2 /HPF — SIGNIFICANT CHANGE UP (ref 0–5)

## 2025-06-25 PROCEDURE — 86850 RBC ANTIBODY SCREEN: CPT

## 2025-06-25 PROCEDURE — 85610 PROTHROMBIN TIME: CPT

## 2025-06-25 PROCEDURE — 85025 COMPLETE CBC W/AUTO DIFF WBC: CPT

## 2025-06-25 PROCEDURE — 85730 THROMBOPLASTIN TIME PARTIAL: CPT

## 2025-06-25 PROCEDURE — 83605 ASSAY OF LACTIC ACID: CPT

## 2025-06-25 PROCEDURE — 84156 ASSAY OF PROTEIN URINE: CPT

## 2025-06-25 PROCEDURE — 80053 COMPREHEN METABOLIC PANEL: CPT

## 2025-06-25 PROCEDURE — 86901 BLOOD TYPING SEROLOGIC RH(D): CPT

## 2025-06-25 PROCEDURE — 82570 ASSAY OF URINE CREATININE: CPT

## 2025-06-25 PROCEDURE — 81001 URINALYSIS AUTO W/SCOPE: CPT

## 2025-06-25 PROCEDURE — 82728 ASSAY OF FERRITIN: CPT

## 2025-06-25 PROCEDURE — 86900 BLOOD TYPING SEROLOGIC ABO: CPT

## 2025-06-25 PROCEDURE — 99285 EMERGENCY DEPT VISIT HI MDM: CPT

## 2025-06-25 PROCEDURE — 84484 ASSAY OF TROPONIN QUANT: CPT

## 2025-06-25 PROCEDURE — 83550 IRON BINDING TEST: CPT

## 2025-06-25 PROCEDURE — 83540 ASSAY OF IRON: CPT

## 2025-06-25 PROCEDURE — 36415 COLL VENOUS BLD VENIPUNCTURE: CPT

## 2025-06-25 PROCEDURE — 84300 ASSAY OF URINE SODIUM: CPT

## 2025-06-25 RX ORDER — ONDANSETRON HCL/PF 4 MG/2 ML
4 VIAL (ML) INJECTION EVERY 8 HOURS
Refills: 0 | Status: DISCONTINUED | OUTPATIENT
Start: 2025-06-25 | End: 2025-07-14

## 2025-06-25 RX ORDER — ACETAMINOPHEN 500 MG/5ML
650 LIQUID (ML) ORAL EVERY 6 HOURS
Refills: 0 | Status: DISCONTINUED | OUTPATIENT
Start: 2025-06-25 | End: 2025-07-14

## 2025-06-25 RX ORDER — PYRIDOSTIGMINE BROMIDE 5 MG/ML
60 INJECTION, SOLUTION INTRAVENOUS; PARENTERAL
Refills: 0 | Status: DISCONTINUED | OUTPATIENT
Start: 2025-06-25 | End: 2025-07-14

## 2025-06-25 RX ADMIN — Medication 1000 MILLILITER(S): at 19:16

## 2025-06-25 RX ADMIN — PYRIDOSTIGMINE BROMIDE 60 MILLIGRAM(S): 5 INJECTION, SOLUTION INTRAVENOUS; PARENTERAL at 22:04

## 2025-06-25 NOTE — H&P ADULT - NSHPPHYSICALEXAM_GEN_ALL_CORE
Vital Signs Last 24 Hrs  T(C): 36.8 (25 Jun 2025 23:53), Max: 36.8 (25 Jun 2025 23:53)  T(F): 98.3 (25 Jun 2025 23:53), Max: 98.3 (25 Jun 2025 23:53)  HR: 58 (25 Jun 2025 23:53) (58 - 60)  BP: 220/74 (25 Jun 2025 23:53) (139/78 - 220/74)  BP(mean): --  RR: 18 (25 Jun 2025 23:53) (18 - 18)  SpO2: 95% (25 Jun 2025 23:53) (95% - 95%)    Parameters below as of 25 Jun 2025 23:53  Patient On (Oxygen Delivery Method): room air    GENERAL: NAD, laying comfortably in bed   HEAD:  Atraumatic, Normocephalic  EYES: EOMI, PERRLA, conjunctiva and sclera clear  ENMT: Moist mucous membrane  NECK: Enlarged right neck lump (chronic)   NERVOUS SYSTEM:  Alert & Oriented X3,  Motor Strength 5/5 B/L upper and lower extremities, sensation intact, CN II-XII intact.   CHEST/LUNG: Lungs clear to auscultation bilaterally, No rales, rhonchi, wheezing   HEART: Regular rate and rhythm; LUSB, RUSB systolic murmur; No rubs, or gallops  ABDOMEN: Soft, Nontender, Nondistended; Bowel sounds present  : No suprapubic tenderness, CVAT  EXTREMITIES: 3+ b/l LE edema; No clubbing, cyanosis  LYMPH: No lymphadenopathy noted  SKIN: No rashes or lesions;  Good capillary refill

## 2025-06-25 NOTE — ED PROVIDER NOTE - PROGRESS NOTE DETAILS
Hemoglobin within normal limits.  Vital signs stable.  Lab significant for MICHEL.  Will admit to medicine for further management. Huma Vigil MD.

## 2025-06-25 NOTE — H&P ADULT - HISTORY OF PRESENT ILLNESS
Patient is a 70M, from [facility] ambulates with a walker, with PMHx of Myasthenia gravis s/p thymectomy, HTN, CVA w/ no residual symptoms, IDT2DM, osteoporosis, PAD, cervical and lumbar spondylosis and BPH who was sent in from NH for Hgb of 7.9.  Patient is a 70M, from [facility] ambulates with a walker, with PMHx of Myasthenia gravis s/p thymectomy, HTN, CVA w/ no residual symptoms, IDT2DM, osteoporosis, PAD, cervical and lumbar spondylosis and BPH who was sent in from NH for Hgb of 7.9. Patient reports he has been feeling nauseous for a few days.  Patient is a 70M, from [facility] ambulates with a walker, with PMHx of Myasthenia gravis s/p thymectomy, HTN, 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 Patient reports he has been feeling nauseous for a few days but no vomiting. He reports chronic increased urinary frequency and sensation of incomplete voiding. He denies all other symptoms - fever, chills, cough, chest pain, SoB, palpitations abdominal pain, diarrhea, dysuria, arm or leg numbness or tingling. Reports regular brown stool - denies black or bloody stool.

## 2025-06-25 NOTE — ED ADULT NURSE NOTE - ED STAT RN HANDOFF DETAILS
report given to HAMZAH Pisano. Pt in no acute distress at this time. pt awaiting admission bed. awaiting medication to come from pharmacy, endorsed to HAMZAH Pisano

## 2025-06-25 NOTE — ED ADULT NURSE REASSESSMENT NOTE - NS ED NURSE REASSESS COMMENT FT1
received pt from HAMZAH Gates. Pt A7Ox3, in no acute distress. iv wdl, no redness or swelling noted. pt admitted. awaiting admission bed at this time. received pt from HAMZAH Gates. Pt AOx3, in no acute distress. iv wdl, no redness or swelling noted. pt admitted. awaiting admission bed at this time.

## 2025-06-25 NOTE — H&P ADULT - PROBLEM SELECTOR PLAN 4
Hx of myasthenia gravis s/p thymectomy on prednisone 20mg qd, azathioprine 50mg qd, pyridostigmine 60mg @1AM, 6AM, 10AM, 1PM, 5PM, 9PM   Not in crisis     - c/w home meds

## 2025-06-25 NOTE — H&P ADULT - NSHPREVIEWOFSYSTEMS_GEN_ALL_CORE
CONSTITUTIONAL: No fever, weight loss, or fatigue  ENT:  No difficulty hearing, tinnitus, vertigo; No sinus or throat pain  RESPIRATORY: No cough, wheezing, chills or hemoptysis; No Shortness of Breath  CARDIOVASCULAR: No chest pain, palpitations, passing out, dizziness, or leg swelling  GASTROINTESTINAL: + nausea; No abdominal or epigastric pain. No vomiting, or hematemesis; No diarrhea or constipation. No melena or hematochezia.  GENITOURINARY: +frequency, incomplete voiding; No dysuria, hematuria, or incontinence  NEUROLOGICAL: No headaches, memory loss, loss of strength, numbness, or tremors  SKIN: No itching, burning, rashes, or lesions   MUSCULOSKELETAL: No joint pain or swelling; No muscle, back, or extremity pain  HEME/LYMPH: No easy bruising, or bleeding gums

## 2025-06-25 NOTE — H&P ADULT - PROBLEM SELECTOR PLAN 9
DVT prophylaxis - heparin SQ Hx of BPH on finasteride 5mg qd and tamsulosin 0.4mg qd     - c/w home meds

## 2025-06-25 NOTE — H&P ADULT - ASSESSMENT
Patient is a 70M, from [facility] ambulates with a walker, with PMHx of Myasthenia gravis s/p thymectomy, HTN, 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 Hgb 9.9. BUN/Cr 30/1.81. Admitted for MICHEL.

## 2025-06-25 NOTE — H&P ADULT - NSICDXPASTMEDICALHX_GEN_ALL_CORE_FT
PAST MEDICAL HISTORY:  BPH (benign prostatic hyperplasia)     CVA (cerebrovascular accident)     DM (diabetes mellitus)     HTN (hypertension)     Hypercholesterolemia     Major depression     Myasthenia gravis     Peripheral neuropathy

## 2025-06-25 NOTE — H&P ADULT - PROBLEM SELECTOR PLAN 7
Hx of depression on sertraline 50mg qd     - c/w home meds Hx of CVA w/ residual right sided weakness on aspirin 81mg qd, atorvastatin 80mg qd     - c/w home meds

## 2025-06-25 NOTE — H&P ADULT - PROBLEM SELECTOR PLAN 5
01/14/2023  Sofia Chan is a 23 y.o., female.      Pre-op Assessment    I have reviewed the Patient Summary Reports.     I have reviewed the Nursing Notes.    I have reviewed the Medications.     Review of Systems  Anesthesia Hx:  No problems with previous Anesthesia    Social:  Non-Smoker, No Alcohol Use    Hematology/Oncology:  Hematology Normal   Oncology Normal     EENT/Dental:EENT/Dental Normal   Cardiovascular:  Cardiovascular Normal Exercise tolerance: good     Pulmonary:  Pulmonary Normal    Renal/:  Renal/ Normal     Hepatic/GI:  Hepatic/GI Normal    Musculoskeletal:  Musculoskeletal Normal    Neurological:  Neurology Normal    Endocrine:  Endocrine Normal    Dermatological:  Skin Normal    Psych:  Psychiatric Normal              Anesthesia Plan  Type of Anesthesia, risks & benefits discussed:    Anesthesia Type: Epidural, Spinal  Intra-op Monitoring Plan: Standard ASA Monitors  Post Op Pain Control Plan: multimodal analgesia  Induction:  IV  Airway Plan: Direct, Post-Induction  Informed Consent: Informed consent signed with the Patient and all parties understand the risks and agree with anesthesia plan.  All questions answered. Patient consented to blood products? Yes  ASA Score: 2  Day of Surgery Review of History & Physical: H&P Update referred to the surgeon/provider.I have interviewed and examined the patient. I have reviewed the patient's H&P dated: 1/14/23. There are no significant changes.     Ready For Surgery From Anesthesia Perspective.     .       Hx of HTN on hydralazine 100mg tid, losartan 100mg qd, nifedipine 60mg qd, metoprolol 100mg bid     - holding nifedipine iso of MICHEL on CKD   - c/w home meds Hx of HTN on hydralazine 100mg tid, losartan 100mg qd, nifedipine 60mg qd, metoprolol 100mg bid     - holding losartan iso of MICHEL on CKD   - c/w home meds

## 2025-06-25 NOTE — ED ADULT NURSE NOTE - IN THE PAST 12 MONTHS HAVE YOU USED DRUGS OTHER THAN THOSE REQUIRED FOR MEDICAL REASON?
pt c/o cough and congestion for 3 days. fever starting tonight with fast breathing as per mom. tylenol @8pm. pt awake and alert. +retractions. BS clear b/l. RSS 10. No

## 2025-06-25 NOTE — H&P ADULT - ATTENDING COMMENTS
Patient is a 70M, from [facility] ambulates with a walker, with PMHx of Myasthenia gravis s/p thymectomy, HTN, 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 Patient reports he has been feeling nauseous for a few days but no vomiting. He reports chronic increased urinary frequency and sensation of incomplete voiding. He denies all other symptoms - fever, chills, cough, chest pain, SoB, palpitations abdominal pain, diarrhea, dysuria, arm or leg numbness or tingling. Reports regular brown stool - denies black or bloody stool.       # HYPERTENSIVE URGENCY  # UNDERLYING HTN  - TELEMETRY  - F/U ECHO  - F/U TROPONINS  - S/P IV HYDRALAZINE  - RESUMED HOME ANTIHYPERTENSIVE AGENTS  - CARDIOLOGY CONSULT    # INTRAVASCULAR VOLUME DEPLETION  # MICHEL  # BPH  - IV FLUIDS  - PVR - 0 ML  - MONITOR CR  - AVOID NEPHROTOXIC AGENTS    # ANEMIA  - TREND HGB    # MYASTHENIA GRAVIS S/P THYMECTOMY    # DM  - SSI + FS    # CVA W/ MILD RIGHT HP    # PAD  # GI AND DVT PPX

## 2025-06-25 NOTE — H&P ADULT - PROBLEM SELECTOR PLAN 8
Hx of BPH on finasteride 5mg qd and tamsulosin 0.4mg qd     - c/w home meds Hx of depression on sertraline 50mg qd     - c/w home meds

## 2025-06-25 NOTE — H&P ADULT - PROBLEM SELECTOR PLAN 2
BUN/Cr 30/1.81 (Baseline Scr 1.3)    - f/u urine lytes  - f/u bladder scan BUN/Cr 30/1.81 (Baseline Scr 1.3)  s/p 1L NS in ED   Post-void bladder scan - 0mL    - f/u urine lytes  - f/u bladder scan

## 2025-06-25 NOTE — H&P ADULT - PROBLEM SELECTOR PLAN 1
On admission /78  During initial assessment /87  Prior to moving to the floor, patient's /104, denies headache, nausea, abdominal pain  - patient received his pyridostigmine before the BP went up   Troponin 43.9   s/p hydralazine 5mg IVP     - f/u Trop On admission /78  During initial assessment /87  Prior to moving to the floor, patient's /74, denies headache, nausea, abdominal pain  - patient received his pyridostigmine before the BP went up   Troponin 43.9   s/p hydralazine 5mg IVP     - f/u Trop

## 2025-06-25 NOTE — H&P ADULT - PROBLEM SELECTOR PLAN 3
Was sent in from NH for Hgb 7.9 in a routine lab   Hgb 9.9   .7  Total iron 37, TIBC 233, % iron 16; anemia of chronic dz and AYAD   No active signs of bleeding     - c/w home iron and folic acid supplement

## 2025-06-25 NOTE — ED PROVIDER NOTE - OBJECTIVE STATEMENT
Patient is a 70-year-old male with past medical history DM2, HTN, HLD, BPH, PAD, CVA, myasthenia gravis brought in by ambulance from emergency care center for hemoglobin of 7.9.   Patient asymptomatic.  Denies any nausea vomiting, hematochezia, melena, abdominal pain, back pain, chest pain, shortness of breath, hematuria.   Denies any other past medical history.

## 2025-06-25 NOTE — ED PROVIDER NOTE - CLINICAL SUMMARY MEDICAL DECISION MAKING FREE TEXT BOX
Patient is a 70-year-old male with past medical history DM2, HTN, HLD, BPH, PAD, CVA, myasthenia gravis brought in by ambulance from emergency care center for hemoglobin of 7.9.   Patient asymptomatic.  Denies any nausea vomiting, hematochezia, melena, abdominal pain, back pain, chest pain, shortness of breath, hematuria.     Vital signs stable lungs clear oropharynx clear, belly soft nontender.  – Will check labs, rule out electrolyte abnormalities and blood loss anemia, lactate to eval for end organ dysfunction, reassess.

## 2025-06-25 NOTE — H&P ADULT - PROBLEM SELECTOR PLAN 6
Hx of CVA w/ residual right sided weakness on aspirin 81mg qd, atorvastatin 80mg qd     - c/w home meds Hx of IDT2DM on Knnyzjp25/30 10U qd and Novolin 100 ISS     - c/w lantus 6U qhs + BILLIE AC QHS

## 2025-06-26 ENCOUNTER — RESULT REVIEW (OUTPATIENT)
Age: 70
End: 2025-06-26

## 2025-06-26 DIAGNOSIS — I10 ESSENTIAL (PRIMARY) HYPERTENSION: ICD-10-CM

## 2025-06-26 DIAGNOSIS — N17.9 ACUTE KIDNEY FAILURE, UNSPECIFIED: ICD-10-CM

## 2025-06-26 DIAGNOSIS — F32.9 MAJOR DEPRESSIVE DISORDER, SINGLE EPISODE, UNSPECIFIED: ICD-10-CM

## 2025-06-26 DIAGNOSIS — Z86.73 PERSONAL HISTORY OF TRANSIENT ISCHEMIC ATTACK (TIA), AND CEREBRAL INFARCTION WITHOUT RESIDUAL DEFICITS: ICD-10-CM

## 2025-06-26 DIAGNOSIS — I16.0 HYPERTENSIVE URGENCY: ICD-10-CM

## 2025-06-26 DIAGNOSIS — E11.9 TYPE 2 DIABETES MELLITUS WITHOUT COMPLICATIONS: ICD-10-CM

## 2025-06-26 DIAGNOSIS — D64.9 ANEMIA, UNSPECIFIED: ICD-10-CM

## 2025-06-26 DIAGNOSIS — Z29.9 ENCOUNTER FOR PROPHYLACTIC MEASURES, UNSPECIFIED: ICD-10-CM

## 2025-06-26 DIAGNOSIS — G70.00 MYASTHENIA GRAVIS WITHOUT (ACUTE) EXACERBATION: ICD-10-CM

## 2025-06-26 DIAGNOSIS — N40.0 BENIGN PROSTATIC HYPERPLASIA WITHOUT LOWER URINARY TRACT SYMPTOMS: ICD-10-CM

## 2025-06-26 DIAGNOSIS — R94.31 ABNORMAL ELECTROCARDIOGRAM [ECG] [EKG]: ICD-10-CM

## 2025-06-26 LAB
ANION GAP SERPL CALC-SCNC: 4 MMOL/L — LOW (ref 5–17)
BASOPHILS # BLD AUTO: 0.01 K/UL — SIGNIFICANT CHANGE UP (ref 0–0.2)
BASOPHILS NFR BLD AUTO: 0.2 % — SIGNIFICANT CHANGE UP (ref 0–2)
BUN SERPL-MCNC: 25 MG/DL — HIGH (ref 7–18)
CALCIUM SERPL-MCNC: 9 MG/DL — SIGNIFICANT CHANGE UP (ref 8.4–10.5)
CHLORIDE SERPL-SCNC: 104 MMOL/L — SIGNIFICANT CHANGE UP (ref 96–108)
CO2 SERPL-SCNC: 30 MMOL/L — SIGNIFICANT CHANGE UP (ref 22–31)
CREAT SERPL-MCNC: 1.55 MG/DL — HIGH (ref 0.5–1.3)
EGFR: 48 ML/MIN/1.73M2 — LOW
EGFR: 48 ML/MIN/1.73M2 — LOW
EOSINOPHIL # BLD AUTO: 0.01 K/UL — SIGNIFICANT CHANGE UP (ref 0–0.5)
EOSINOPHIL NFR BLD AUTO: 0.2 % — SIGNIFICANT CHANGE UP (ref 0–6)
GLUCOSE BLDC GLUCOMTR-MCNC: 107 MG/DL — HIGH (ref 70–99)
GLUCOSE BLDC GLUCOMTR-MCNC: 139 MG/DL — HIGH (ref 70–99)
GLUCOSE BLDC GLUCOMTR-MCNC: 244 MG/DL — HIGH (ref 70–99)
GLUCOSE BLDC GLUCOMTR-MCNC: 266 MG/DL — HIGH (ref 70–99)
GLUCOSE SERPL-MCNC: 108 MG/DL — HIGH (ref 70–99)
HCT VFR BLD CALC: 29.5 % — LOW (ref 39–50)
HGB BLD-MCNC: 9.8 G/DL — LOW (ref 13–17)
IMM GRANULOCYTES NFR BLD AUTO: 0.5 % — SIGNIFICANT CHANGE UP (ref 0–0.9)
LYMPHOCYTES # BLD AUTO: 1.62 K/UL — SIGNIFICANT CHANGE UP (ref 1–3.3)
LYMPHOCYTES # BLD AUTO: 24.5 % — SIGNIFICANT CHANGE UP (ref 13–44)
MAGNESIUM SERPL-MCNC: 2.1 MG/DL — SIGNIFICANT CHANGE UP (ref 1.6–2.6)
MCHC RBC-ENTMCNC: 33.2 G/DL — SIGNIFICANT CHANGE UP (ref 32–36)
MCHC RBC-ENTMCNC: 35.1 PG — HIGH (ref 27–34)
MCV RBC AUTO: 105.7 FL — HIGH (ref 80–100)
MONOCYTES # BLD AUTO: 0.57 K/UL — SIGNIFICANT CHANGE UP (ref 0–0.9)
MONOCYTES NFR BLD AUTO: 8.6 % — SIGNIFICANT CHANGE UP (ref 2–14)
NEUTROPHILS # BLD AUTO: 4.36 K/UL — SIGNIFICANT CHANGE UP (ref 1.8–7.4)
NEUTROPHILS NFR BLD AUTO: 66 % — SIGNIFICANT CHANGE UP (ref 43–77)
NRBC BLD AUTO-RTO: 0 /100 WBCS — SIGNIFICANT CHANGE UP (ref 0–0)
PHOSPHATE SERPL-MCNC: 2.7 MG/DL — SIGNIFICANT CHANGE UP (ref 2.5–4.5)
PLATELET # BLD AUTO: 241 K/UL — SIGNIFICANT CHANGE UP (ref 150–400)
POTASSIUM SERPL-MCNC: 3.5 MMOL/L — SIGNIFICANT CHANGE UP (ref 3.5–5.3)
POTASSIUM SERPL-SCNC: 3.5 MMOL/L — SIGNIFICANT CHANGE UP (ref 3.5–5.3)
RBC # BLD: 2.79 M/UL — LOW (ref 4.2–5.8)
RBC # FLD: 16.5 % — HIGH (ref 10.3–14.5)
SODIUM SERPL-SCNC: 138 MMOL/L — SIGNIFICANT CHANGE UP (ref 135–145)
TROPONIN I, HIGH SENSITIVITY RESULT: 39.5 NG/L — SIGNIFICANT CHANGE UP
UUN UR-MCNC: 134 MG/DL — SIGNIFICANT CHANGE UP
WBC # BLD: 6.6 K/UL — SIGNIFICANT CHANGE UP (ref 3.8–10.5)
WBC # FLD AUTO: 6.6 K/UL — SIGNIFICANT CHANGE UP (ref 3.8–10.5)

## 2025-06-26 PROCEDURE — 83605 ASSAY OF LACTIC ACID: CPT

## 2025-06-26 PROCEDURE — 86850 RBC ANTIBODY SCREEN: CPT

## 2025-06-26 PROCEDURE — 82728 ASSAY OF FERRITIN: CPT

## 2025-06-26 PROCEDURE — 83735 ASSAY OF MAGNESIUM: CPT

## 2025-06-26 PROCEDURE — 84156 ASSAY OF PROTEIN URINE: CPT

## 2025-06-26 PROCEDURE — 82962 GLUCOSE BLOOD TEST: CPT

## 2025-06-26 PROCEDURE — 36415 COLL VENOUS BLD VENIPUNCTURE: CPT

## 2025-06-26 PROCEDURE — 80053 COMPREHEN METABOLIC PANEL: CPT

## 2025-06-26 PROCEDURE — 81001 URINALYSIS AUTO W/SCOPE: CPT

## 2025-06-26 PROCEDURE — 84300 ASSAY OF URINE SODIUM: CPT

## 2025-06-26 PROCEDURE — 85610 PROTHROMBIN TIME: CPT

## 2025-06-26 PROCEDURE — 83550 IRON BINDING TEST: CPT

## 2025-06-26 PROCEDURE — 84100 ASSAY OF PHOSPHORUS: CPT

## 2025-06-26 PROCEDURE — 86901 BLOOD TYPING SEROLOGIC RH(D): CPT

## 2025-06-26 PROCEDURE — 85730 THROMBOPLASTIN TIME PARTIAL: CPT

## 2025-06-26 PROCEDURE — 84540 ASSAY OF URINE/UREA-N: CPT

## 2025-06-26 PROCEDURE — 82570 ASSAY OF URINE CREATININE: CPT

## 2025-06-26 PROCEDURE — 93010 ELECTROCARDIOGRAM REPORT: CPT

## 2025-06-26 PROCEDURE — 85025 COMPLETE CBC W/AUTO DIFF WBC: CPT

## 2025-06-26 PROCEDURE — 86900 BLOOD TYPING SEROLOGIC ABO: CPT

## 2025-06-26 PROCEDURE — 80048 BASIC METABOLIC PNL TOTAL CA: CPT

## 2025-06-26 PROCEDURE — 93306 TTE W/DOPPLER COMPLETE: CPT | Mod: 26

## 2025-06-26 PROCEDURE — 83540 ASSAY OF IRON: CPT

## 2025-06-26 PROCEDURE — 84484 ASSAY OF TROPONIN QUANT: CPT

## 2025-06-26 RX ORDER — FOLIC ACID 1 MG/1
1 TABLET ORAL DAILY
Refills: 0 | Status: DISCONTINUED | OUTPATIENT
Start: 2025-06-26 | End: 2025-07-14

## 2025-06-26 RX ORDER — FERROUS SULFATE 137(45) MG
325 TABLET, EXTENDED RELEASE ORAL
Refills: 0 | Status: DISCONTINUED | OUTPATIENT
Start: 2025-06-26 | End: 2025-07-14

## 2025-06-26 RX ORDER — SODIUM CHLORIDE 9 G/1000ML
1000 INJECTION, SOLUTION INTRAVENOUS
Refills: 0 | Status: DISCONTINUED | OUTPATIENT
Start: 2025-06-26 | End: 2025-07-14

## 2025-06-26 RX ORDER — PREDNISONE 20 MG/1
20 TABLET ORAL DAILY
Refills: 0 | Status: DISCONTINUED | OUTPATIENT
Start: 2025-06-26 | End: 2025-07-14

## 2025-06-26 RX ORDER — NIFEDIPINE 30 MG
60 TABLET, EXTENDED RELEASE 24 HR ORAL DAILY
Refills: 0 | Status: DISCONTINUED | OUTPATIENT
Start: 2025-06-26 | End: 2025-06-26

## 2025-06-26 RX ORDER — HEPARIN SODIUM 1000 [USP'U]/ML
5000 INJECTION INTRAVENOUS; SUBCUTANEOUS EVERY 8 HOURS
Refills: 0 | Status: DISCONTINUED | OUTPATIENT
Start: 2025-06-26 | End: 2025-07-14

## 2025-06-26 RX ORDER — TAMSULOSIN HYDROCHLORIDE 0.4 MG/1
0.4 CAPSULE ORAL AT BEDTIME
Refills: 0 | Status: DISCONTINUED | OUTPATIENT
Start: 2025-06-26 | End: 2025-07-14

## 2025-06-26 RX ORDER — LOSARTAN POTASSIUM 100 MG/1
100 TABLET, FILM COATED ORAL ONCE
Refills: 0 | Status: COMPLETED | OUTPATIENT
Start: 2025-06-26 | End: 2025-06-26

## 2025-06-26 RX ORDER — NIFEDIPINE 30 MG
30 TABLET, EXTENDED RELEASE 24 HR ORAL ONCE
Refills: 0 | Status: COMPLETED | OUTPATIENT
Start: 2025-06-26 | End: 2025-06-26

## 2025-06-26 RX ORDER — INSULIN LISPRO 100 U/ML
INJECTION, SOLUTION INTRAVENOUS; SUBCUTANEOUS AT BEDTIME
Refills: 0 | Status: DISCONTINUED | OUTPATIENT
Start: 2025-06-26 | End: 2025-07-14

## 2025-06-26 RX ORDER — ASPIRIN 325 MG
81 TABLET ORAL DAILY
Refills: 0 | Status: DISCONTINUED | OUTPATIENT
Start: 2025-06-26 | End: 2025-07-04

## 2025-06-26 RX ORDER — CYANOCOBALAMIN 1000 UG/ML
1000 INJECTION INTRAMUSCULAR; SUBCUTANEOUS DAILY
Refills: 0 | Status: DISCONTINUED | OUTPATIENT
Start: 2025-06-26 | End: 2025-07-14

## 2025-06-26 RX ORDER — FINASTERIDE 1 MG/1
5 TABLET, FILM COATED ORAL DAILY
Refills: 0 | Status: DISCONTINUED | OUTPATIENT
Start: 2025-06-26 | End: 2025-07-14

## 2025-06-26 RX ORDER — SERTRALINE 100 MG/1
50 TABLET, FILM COATED ORAL DAILY
Refills: 0 | Status: DISCONTINUED | OUTPATIENT
Start: 2025-06-26 | End: 2025-07-14

## 2025-06-26 RX ORDER — ATORVASTATIN CALCIUM 80 MG/1
80 TABLET, FILM COATED ORAL AT BEDTIME
Refills: 0 | Status: DISCONTINUED | OUTPATIENT
Start: 2025-06-26 | End: 2025-07-14

## 2025-06-26 RX ORDER — LABETALOL HYDROCHLORIDE 200 MG/1
400 TABLET, FILM COATED ORAL EVERY 12 HOURS
Refills: 0 | Status: DISCONTINUED | OUTPATIENT
Start: 2025-06-26 | End: 2025-06-27

## 2025-06-26 RX ORDER — AZATHIOPRINE 50 MG/1
100 TABLET ORAL DAILY
Refills: 0 | Status: DISCONTINUED | OUTPATIENT
Start: 2025-06-26 | End: 2025-07-14

## 2025-06-26 RX ORDER — LOSARTAN POTASSIUM 100 MG/1
100 TABLET, FILM COATED ORAL DAILY
Refills: 0 | Status: DISCONTINUED | OUTPATIENT
Start: 2025-06-26 | End: 2025-06-26

## 2025-06-26 RX ORDER — INSULIN LISPRO 100 U/ML
INJECTION, SOLUTION INTRAVENOUS; SUBCUTANEOUS
Refills: 0 | Status: DISCONTINUED | OUTPATIENT
Start: 2025-06-26 | End: 2025-07-14

## 2025-06-26 RX ORDER — NIFEDIPINE 30 MG
90 TABLET, EXTENDED RELEASE 24 HR ORAL DAILY
Refills: 0 | Status: DISCONTINUED | OUTPATIENT
Start: 2025-06-27 | End: 2025-07-06

## 2025-06-26 RX ORDER — DEXTROSE 50 % IN WATER 50 %
25 SYRINGE (ML) INTRAVENOUS ONCE
Refills: 0 | Status: DISCONTINUED | OUTPATIENT
Start: 2025-06-26 | End: 2025-07-14

## 2025-06-26 RX ORDER — METOPROLOL SUCCINATE 50 MG/1
100 TABLET, EXTENDED RELEASE ORAL
Refills: 0 | Status: DISCONTINUED | OUTPATIENT
Start: 2025-06-26 | End: 2025-06-26

## 2025-06-26 RX ORDER — INSULIN GLARGINE-YFGN 100 [IU]/ML
6 INJECTION, SOLUTION SUBCUTANEOUS AT BEDTIME
Refills: 0 | Status: DISCONTINUED | OUTPATIENT
Start: 2025-06-26 | End: 2025-07-01

## 2025-06-26 RX ORDER — DEXTROSE 50 % IN WATER 50 %
15 SYRINGE (ML) INTRAVENOUS ONCE
Refills: 0 | Status: DISCONTINUED | OUTPATIENT
Start: 2025-06-26 | End: 2025-07-14

## 2025-06-26 RX ADMIN — LABETALOL HYDROCHLORIDE 400 MILLIGRAM(S): 200 TABLET, FILM COATED ORAL at 12:39

## 2025-06-26 RX ADMIN — INSULIN LISPRO 2: 100 INJECTION, SOLUTION INTRAVENOUS; SUBCUTANEOUS at 17:13

## 2025-06-26 RX ADMIN — Medication 60 MILLIGRAM(S): at 04:22

## 2025-06-26 RX ADMIN — Medication 100 MILLIGRAM(S): at 22:42

## 2025-06-26 RX ADMIN — Medication 10 MILLIGRAM(S): at 02:06

## 2025-06-26 RX ADMIN — PREDNISONE 20 MILLIGRAM(S): 20 TABLET ORAL at 06:05

## 2025-06-26 RX ADMIN — Medication 10 MILLIGRAM(S): at 04:22

## 2025-06-26 RX ADMIN — PYRIDOSTIGMINE BROMIDE 60 MILLIGRAM(S): 5 INJECTION, SOLUTION INTRAVENOUS; PARENTERAL at 02:07

## 2025-06-26 RX ADMIN — PYRIDOSTIGMINE BROMIDE 60 MILLIGRAM(S): 5 INJECTION, SOLUTION INTRAVENOUS; PARENTERAL at 18:14

## 2025-06-26 RX ADMIN — HEPARIN SODIUM 5000 UNIT(S): 1000 INJECTION INTRAVENOUS; SUBCUTANEOUS at 14:06

## 2025-06-26 RX ADMIN — FINASTERIDE 5 MILLIGRAM(S): 1 TABLET, FILM COATED ORAL at 11:36

## 2025-06-26 RX ADMIN — LOSARTAN POTASSIUM 100 MILLIGRAM(S): 100 TABLET, FILM COATED ORAL at 01:29

## 2025-06-26 RX ADMIN — Medication 30 MILLIGRAM(S): at 12:39

## 2025-06-26 RX ADMIN — CYANOCOBALAMIN 1000 MICROGRAM(S): 1000 INJECTION INTRAMUSCULAR; SUBCUTANEOUS at 11:38

## 2025-06-26 RX ADMIN — Medication 325 MILLIGRAM(S): at 06:04

## 2025-06-26 RX ADMIN — Medication 1000 UNIT(S): at 11:36

## 2025-06-26 RX ADMIN — Medication 100 MILLILITER(S): at 13:33

## 2025-06-26 RX ADMIN — PYRIDOSTIGMINE BROMIDE 60 MILLIGRAM(S): 5 INJECTION, SOLUTION INTRAVENOUS; PARENTERAL at 12:47

## 2025-06-26 RX ADMIN — Medication 650 MILLIGRAM(S): at 22:50

## 2025-06-26 RX ADMIN — PYRIDOSTIGMINE BROMIDE 60 MILLIGRAM(S): 5 INJECTION, SOLUTION INTRAVENOUS; PARENTERAL at 08:06

## 2025-06-26 RX ADMIN — FOLIC ACID 1 MILLIGRAM(S): 1 TABLET ORAL at 11:48

## 2025-06-26 RX ADMIN — ATORVASTATIN CALCIUM 80 MILLIGRAM(S): 80 TABLET, FILM COATED ORAL at 22:42

## 2025-06-26 RX ADMIN — AZATHIOPRINE 100 MILLIGRAM(S): 50 TABLET ORAL at 11:39

## 2025-06-26 RX ADMIN — PYRIDOSTIGMINE BROMIDE 60 MILLIGRAM(S): 5 INJECTION, SOLUTION INTRAVENOUS; PARENTERAL at 22:42

## 2025-06-26 RX ADMIN — Medication 5 MILLIGRAM(S): at 00:27

## 2025-06-26 RX ADMIN — Medication 100 MILLIGRAM(S): at 01:29

## 2025-06-26 RX ADMIN — Medication 325 MILLIGRAM(S): at 17:13

## 2025-06-26 RX ADMIN — INSULIN LISPRO 3: 100 INJECTION, SOLUTION INTRAVENOUS; SUBCUTANEOUS at 11:35

## 2025-06-26 RX ADMIN — HEPARIN SODIUM 5000 UNIT(S): 1000 INJECTION INTRAVENOUS; SUBCUTANEOUS at 06:04

## 2025-06-26 RX ADMIN — INSULIN GLARGINE-YFGN 6 UNIT(S): 100 INJECTION, SOLUTION SUBCUTANEOUS at 22:49

## 2025-06-26 RX ADMIN — PYRIDOSTIGMINE BROMIDE 60 MILLIGRAM(S): 5 INJECTION, SOLUTION INTRAVENOUS; PARENTERAL at 09:57

## 2025-06-26 RX ADMIN — HEPARIN SODIUM 5000 UNIT(S): 1000 INJECTION INTRAVENOUS; SUBCUTANEOUS at 22:42

## 2025-06-26 RX ADMIN — Medication 650 MILLIGRAM(S): at 23:50

## 2025-06-26 RX ADMIN — TAMSULOSIN HYDROCHLORIDE 0.4 MILLIGRAM(S): 0.4 CAPSULE ORAL at 22:42

## 2025-06-26 RX ADMIN — Medication 81 MILLIGRAM(S): at 11:37

## 2025-06-26 RX ADMIN — LABETALOL HYDROCHLORIDE 400 MILLIGRAM(S): 200 TABLET, FILM COATED ORAL at 17:17

## 2025-06-26 RX ADMIN — SERTRALINE 50 MILLIGRAM(S): 100 TABLET, FILM COATED ORAL at 11:36

## 2025-06-26 NOTE — PROGRESS NOTE ADULT - SUBJECTIVE AND OBJECTIVE BOX
Patient is a 70y old  Male who presents with a chief complaint of MICHEL (26 Jun 2025 10:49)    OVERNIGHT EVENTS: no acute changes.     Pt is aox2 (not time), comfortable appearing, tolerating PO, remains in bed due to weakness.   Blood pressure remains elevated 200s systolic.   On telemetry - sinus rhythm.     REVIEW OF SYSTEMS:  CONSTITUTIONAL: No fever, chills  ENMT:  No difficulty hearing, no change in vision  NECK: No pain or stiffness  RESPIRATORY: No cough, SOB  CARDIOVASCULAR: No chest pain, palpitations  GASTROINTESTINAL: No abdominal pain. No nausea, vomiting, or diarrhea  GENITOURINARY: No dysuria  NEUROLOGICAL: No HA  SKIN: No itching, burning, rashes, or lesions   LYMPH NODES: No enlarged glands  ENDOCRINE: No heat or cold intolerance; No hair loss  MUSCULOSKELETAL: No joint pain or swelling; No muscle, back, or extremity pain  PSYCHIATRIC: No depression, anxiety  HEME/LYMPH: No easy bruising, or bleeding gums    T(C): 36.7 (06-26-25 @ 09:18), Max: 36.9 (06-26-25 @ 07:20)  HR: 68 (06-26-25 @ 09:18) (58 - 69)  BP: 201/70 (06-26-25 @ 11:28) (138/54 - 237/104)  RR: 16 (06-26-25 @ 09:18) (16 - 18)  SpO2: 98% (06-26-25 @ 09:18) (95% - 98%)  Wt(kg): --Vital Signs Last 24 Hrs  T(C): 36.7 (26 Jun 2025 09:18), Max: 36.9 (26 Jun 2025 07:20)  T(F): 98 (26 Jun 2025 09:18), Max: 98.5 (26 Jun 2025 07:20)  HR: 68 (26 Jun 2025 09:18) (58 - 69)  BP: 201/70 (26 Jun 2025 11:28) (138/54 - 237/104)  BP(mean): 85 (26 Jun 2025 06:05) (78 - 85)  RR: 16 (26 Jun 2025 09:18) (16 - 18)  SpO2: 98% (26 Jun 2025 09:18) (95% - 98%)    Parameters below as of 26 Jun 2025 09:18  Patient On (Oxygen Delivery Method): room air        MEDICATIONS  (STANDING):  aspirin enteric coated 81 milliGRAM(s) Oral daily  atorvastatin 80 milliGRAM(s) Oral at bedtime  azaTHIOprine 100 milliGRAM(s) Oral daily  cholecalciferol 1000 Unit(s) Oral daily  cyanocobalamin 1000 MICROGram(s) Oral daily  dextrose 5%. 1000 milliLiter(s) (50 mL/Hr) IV Continuous <Continuous>  dextrose 50% Injectable 25 Gram(s) IV Push once  ferrous    sulfate 325 milliGRAM(s) Oral two times a day  finasteride 5 milliGRAM(s) Oral daily  folic acid 1 milliGRAM(s) Oral daily  heparin   Injectable 5000 Unit(s) SubCutaneous every 8 hours  hydrALAZINE 100 milliGRAM(s) Oral every 8 hours  insulin glargine Injectable (LANTUS) 6 Unit(s) SubCutaneous at bedtime  insulin lispro (ADMELOG) corrective regimen sliding scale   SubCutaneous three times a day before meals  insulin lispro (ADMELOG) corrective regimen sliding scale   SubCutaneous at bedtime  labetalol 400 milliGRAM(s) Oral every 12 hours  NIFEdipine XL 30 milliGRAM(s) Oral once  predniSONE   Tablet 20 milliGRAM(s) Oral daily  pyridostigmine 60 milliGRAM(s) Oral <User Schedule>  sertraline 50 milliGRAM(s) Oral daily  tamsulosin 0.4 milliGRAM(s) Oral at bedtime    MEDICATIONS  (PRN):  acetaminophen     Tablet .. 650 milliGRAM(s) Oral every 6 hours PRN Temp greater or equal to 38C (100.4F), Mild Pain (1 - 3)  dextrose Oral Gel 15 Gram(s) Oral once PRN Blood Glucose LESS THAN 70 milliGRAM(s)/deciliter  ondansetron Injectable 4 milliGRAM(s) IV Push every 8 hours PRN Nausea and/or Vomiting      PHYSICAL EXAM:  GENERAL: NAD  EYES: clear conjunctiva  ENMT: Moist mucous membranes  NECK: Supple, No JVD, Normal thyroid  CHEST/LUNG: Clear to auscultation bilaterally; No rales, rhonchi, wheezing, or rubs  HEART: S1, S2, Regular rate and rhythm  ABDOMEN: Soft, Nontender, Nondistended; Bowel sounds present  NEURO: Alert & Oriented X2 (not time)  EXTREMITIES: No LE edema, no calf tenderness  LYMPH: No lymphadenopathy noted  SKIN: No rashes or lesions    Consultant(s) Notes Reviewed:  [x ] YES  [ ] NO  Care Discussed with Consultants/Other Providers [ x] YES  [ ] NO    LABS:                        9.8    6.60  )-----------( 241      ( 26 Jun 2025 05:10 )             29.5     06-26    138  |  104  |  25[H]  ----------------------------<  108[H]  3.5   |  30  |  1.55[H]    Ca    9.0      26 Jun 2025 05:10  Phos  2.7     06-26  Mg     2.1     06-26    TPro  7.6  /  Alb  3.5  /  TBili  0.3  /  DBili  x   /  AST  35  /  ALT  31  /  AlkPhos  137[H]  06-25    PT/INR - ( 25 Jun 2025 18:00 )   PT: 10.0 sec;   INR: 0.86 ratio         PTT - ( 25 Jun 2025 18:00 )  PTT:25.6 sec  CAPILLARY BLOOD GLUCOSE      POCT Blood Glucose.: 266 mg/dL (26 Jun 2025 11:19)        Urinalysis Basic - ( 26 Jun 2025 05:10 )    Color: x / Appearance: x / SG: x / pH: x  Gluc: 108 mg/dL / Ketone: x  / Bili: x / Urobili: x   Blood: x / Protein: x / Nitrite: x   Leuk Esterase: x / RBC: x / WBC x   Sq Epi: x / Non Sq Epi: x / Bacteria: x        RADIOLOGY & ADDITIONAL TESTS:      Imaging Personally Reviewed:  [ ] YES  [ ] NO

## 2025-06-26 NOTE — PROGRESS NOTE ADULT - PROBLEM SELECTOR PLAN 1
Hx of HTN on hydralazine 100mg tid, losartan 100mg qd, nifedipine 60mg qd, metoprolol 100mg bid   Troponin 43.9 > 39  s/p hydralazine 35mg IVP   BP remains elevated 200s systolic this morning  - increased nifedipine to 90 mg qd  - changed metoprolol to labetalol 400 mg bid  - holding losartan iso of MICHEL on CKD   - f/u TSH  - f/u echo  - telemetry/vitals q4h  - Cardiology Dr. Mercado consulted

## 2025-06-26 NOTE — PROGRESS NOTE ADULT - ASSESSMENT
71 y/o M, from [facility] ambulates with a walker, with PMHx of Myasthenia gravis s/p thymectomy, HTN, 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 Hgb 9.9. BUN/Cr 30/1.81. Admitted for MICHEL.     Also found to have uncontrolled HTN, Cardiology Dr. Mercado was consulted. Pending Echo.

## 2025-06-26 NOTE — PROGRESS NOTE ADULT - PROBLEM SELECTOR PLAN 2
BUN/Cr 30/1.81 (Baseline Scr 1.3)  s/p 1L NS in ED   Post-void bladder scan - 0mL  - creatinine improving now 1.55  - trend BMP BUN/Cr 30/1.81 (Baseline Scr 1.3)  s/p 1L NS in ED   Post-void bladder scan - 0mL  - add iv fluids - NS   - creatinine improving now 1.55  - trend BMP

## 2025-06-26 NOTE — PATIENT PROFILE ADULT - FALL HARM RISK - HARM RISK INTERVENTIONS

## 2025-06-26 NOTE — CHART NOTE - NSCHARTNOTEFT_GEN_A_CORE
11:49 PM Notified by RN that patient's /74. Patient denies headache, chest pain or SOB.   - Patient's BP in ED was 139/78 and during initial assessment was 144/87  - hydralazine 5mg IVP ordered   - troponin ordered: 39.5   - Patient's admission changed to tele.     1AM. Patient's /78 despite IV hydralazine. STAT doses of home hydralazine 100mg, Nifedipine 60mg given.     1:55AM. Patient assessed at bedside. BP showing 212/69. HR 59. Patient sleeping comfortably.   Patient reports he missed all his BP meds today as he's been at the hospital all day   Patient denies vision changes, SoB, chest pain, headache, nausea or palpitations.   PEx: Normal sinus rhythm. lungs clear. PERRLA.   EKG: NSR with no ST changes from prior   - Another hydralazine 10mg IVP given     3:29AM Patient's repeat /78. HR 59. Hydralazine 10mg IVP x1 given again. No labetalol given HR <60 and hx of MG. 11:49 PM Notified by RN that patient's /74. Patient denies headache, chest pain or SOB.   - Patient's BP in ED was 139/78 and during initial assessment was 144/87  - hydralazine 5mg IVP ordered   - troponin ordered: 39.5   - Patient's admission changed to tele.     1AM. Patient's /78 despite IV hydralazine. STAT doses of home hydralazine 100mg, Nifedipine 60mg given.     1:55AM. Patient assessed at bedside. BP showing 212/69. HR 59. Patient sleeping comfortably.   Patient reports he missed all his BP meds today as he's been at the hospital all day. He reports his SBP runs usually between 160-180.   Patient denies vision changes, SoB, chest pain, headache, nausea or palpitations.   PEx: Normal sinus rhythm. lungs clear. PERRLA.   EKG: NSR with no ST changes from prior   - Another hydralazine 10mg IVP given     3:29AM Patient's repeat /78. HR 59. Hydralazine 10mg IVP x1 given again. No labetalol given HR <60 and hx of MG. 11:49 PM Notified by RN that patient's /74. Patient denies headache, chest pain or SOB.   - Patient's BP in ED was 139/78 and during initial assessment was 144/87  - hydralazine 5mg IVP ordered   - troponin ordered: 39.5   - Patient's admission changed to tele.     1AM. Patient's /78 despite IV hydralazine. STAT doses of home hydralazine 100mg, Nifedipine 60mg given.     1:55AM. Patient assessed at bedside. BP showing 212/69. HR 59. Patient sleeping comfortably.   Patient reports he missed all his BP meds today as he's been at the hospital all day. He reports his SBP runs usually between 160-180.   Patient denies vision changes, SoB, chest pain, headache, nausea or palpitations.   PEx: Normal sinus rhythm. lungs clear. PERRLA.   EKG: NSR with no ST changes from prior   - Another hydralazine 10mg IVP given     3:29AM Patient's repeat /78. HR 59. Hydralazine 10mg IVP x1 given again. No labetalol given HR <60 and hx of MG.    4:40AM. Patient's /54, HR 69. Patient assessed at bedside.   Denies headache, nausea, chest pain, SoB, dizziness  PEx: AAOx3. Bilateral equal UE and LE strength.  - Monitored patient's BP at bedside q15min for 1 hour. BP ranging from 140/56 - 144/54.

## 2025-06-26 NOTE — PROGRESS NOTE ADULT - PROBLEM SELECTOR PLAN 6
Hx of CVA w/ residual right sided weakness on aspirin 81mg qd, atorvastatin 80mg qd     - c/w home meds

## 2025-06-26 NOTE — CONSULT NOTE ADULT - SUBJECTIVE AND OBJECTIVE BOX
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 Patient reports he has been feeling nauseous for a few days but no vomiting. He reports chronic increased urinary frequency and sensation of incomplete voiding. He denies all other symptoms - fever, chills, cough, chest pain, SoB, palpitations abdominal pain, diarrhea, dysuria,LOC arm or leg numbness or tingling. Reports regular brown stool - denies black or bloody stool.  During his hospitalization he is noted with uncontrolled BP.      PAST MEDICAL & SURGICAL HISTORY:  HTN (hypertension)  DM (diabetes mellitus)  Myasthenia gravis  Hypercholesterolemi  CVA (cerebrovascular accident)  Peripheral neuropathy  BPH (benign prostatic hyperplasia)  Major depression  Lipoma of chest wall  MG with thymoma (myasthena gravis)  H/O cataract extraction  , right eye        MEDICATIONS:  MEDICATIONS  (STANDING):  aspirin enteric coated 81 milliGRAM(s) Oral daily  atorvastatin 80 milliGRAM(s) Oral at bedtime  azaTHIOprine 100 milliGRAM(s) Oral daily  cholecalciferol 1000 Unit(s) Oral daily  cyanocobalamin 1000 MICROGram(s) Oral daily  dextrose 5%. 1000 milliLiter(s) (50 mL/Hr) IV Continuous <Continuous>  dextrose 50% Injectable 25 Gram(s) IV Push once  ferrous    sulfate 325 milliGRAM(s) Oral two times a day  finasteride 5 milliGRAM(s) Oral daily  folic acid 1 milliGRAM(s) Oral daily  heparin   Injectable 5000 Unit(s) SubCutaneous every 8 hours  hydrALAZINE 100 milliGRAM(s) Oral every 8 hours  insulin glargine Injectable (LANTUS) 6 Unit(s) SubCutaneous at bedtime  insulin lispro (ADMELOG) corrective regimen sliding scale   SubCutaneous three times a day before meals  insulin lispro (ADMELOG) corrective regimen sliding scale   SubCutaneous at bedtime  metoprolol tartrate 100 milliGRAM(s) Oral two times a day  NIFEdipine XL 60 milliGRAM(s) Oral daily  predniSONE   Tablet 20 milliGRAM(s) Oral daily  pyridostigmine 60 milliGRAM(s) Oral <User Schedule>  sertraline 50 milliGRAM(s) Oral daily  tamsulosin 0.4 milliGRAM(s) Oral at bedtime      Allergies    No Known Allergies    Intolerances        FAMILY HISTORY:  Family history of hypertension (Mother)      Non-contributary for premature coronary disease or sudden cardiac death    SOCIAL HISTORY:    [ X] Non-smoker  [ ] Smoker  [ ] Alcohol          REVIEW OF SYSTEMS:  [ ]chest pain  [  ]shortness of breath  [  ]palpitations  [  ]syncope  [ ]near syncope [ ]upper extremity weakness   [ ] lower extremity weakness  [  ]diplopia  [  ]altered mental status   [  ]fevers  [ ]chills [ ]nausea  [ ]vomitting  [  ]dysphagia    [ ]abdominal pain  [ ]melena  [ ]BRBPR    [  ]epistaxis  [  ]rash    [ ]lower extremity edema        [X] All others negative	  [ ] Unable to obtain      LABS:	 	    CARDIAC MARKERS:        Troponin I, High Sensitivity Result: 39.5 ng/L (06-26-25 @ 00:47)  Troponin I, High Sensitivity Result: 43.9 ng/L (06-25-25 @ 17:30)                            9.8    6.60  )-----------( 241      ( 26 Jun 2025 05:10 )             29.5     Hb Trend: 9.8<--, 9.9<--    06-26    138  |  104  |  25[H]  ----------------------------<  108[H]  3.5   |  30  |  1.55[H]    Ca    9.0      26 Jun 2025 05:10  Phos  2.7     06-26  Mg     2.1     06-26    TPro  7.6  /  Alb  3.5  /  TBili  0.3  /  DBili  x   /  AST  35  /  ALT  31  /  AlkPhos  137[H]  06-25    Creatinine Trend: 1.55<--, 1.81<--    Coags:  PT/INR - ( 25 Jun 2025 18:00 )   PT: 10.0 sec;   INR: 0.86 ratio         PTT - ( 25 Jun 2025 18:00 )  PTT:25.6 sec      PHYSICAL EXAM:  T(C): 36.7 (06-26-25 @ 09:18), Max: 36.9 (06-26-25 @ 07:20)  HR: 68 (06-26-25 @ 09:18) (58 - 69)  BP: 208/78 (06-26-25 @ 09:18) (138/54 - 237/104)  RR: 16 (06-26-25 @ 09:18) (16 - 18)  SpO2: 98% (06-26-25 @ 09:18) (95% - 98%)  Wt(kg): --     I&O's Summary      HEENT:  (-)icterus (-)pallor  CV: N S1 S2 1/6 BOUBACAR (+)2 Pulses B/l  Resp:  Clear to ausculatation B/L, normal effort  GI: (+) BS Soft, NT, ND  Lymph:  (-)Edema, (-)obvious lymphadenopathy  Skin: Warm to touch, Normal turgor  Psych: Appropriate mood and affect      ECG:  	Sinus 60 BPM, cannot exclude anterior infarct of indeterminant age    RADIOLOGY:         CXR:   none    ASSESSMENT/PLAN: 	70y Male PMHx of Myasthenia gravis s/p thymectomy, HTN, 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 hyperaldosterone state would check serum renin and aldosterone levels   - increase Procardia to 90  - plan to resume RB when BP allows  - check TSH    # Abnormal EKG  - f/u echo      I once again thank you for allowing me to participate in the care of your patient.  If you have any questions or concerns please do not hesitate to contact me.    Ta Mercado MD, [facility]  BEEPER [phone]

## 2025-06-26 NOTE — PROGRESS NOTE ADULT - SUBJECTIVE AND OBJECTIVE BOX
Patient is a 70y old  Male who presents with a chief complaint of MICHEL (26 Jun 2025 12:26)    PATIENT IS SEEN AND EXAMINED IN MEDICAL FLOOR.      ALLERGIES:  No Known Allergies      VITALS:    Vital Signs Last 24 Hrs  T(C): 36.7 (26 Jun 2025 19:23), Max: 36.9 (26 Jun 2025 07:20)  T(F): 98.1 (26 Jun 2025 19:23), Max: 98.5 (26 Jun 2025 07:20)  HR: 56 (26 Jun 2025 22:36) (56 - 69)  BP: 198/77 (26 Jun 2025 19:23) (97/62 - 237/104)  BP(mean): 85 (26 Jun 2025 06:05) (78 - 85)  RR: 18 (26 Jun 2025 19:23) (16 - 18)  SpO2: 96% (26 Jun 2025 19:23) (95% - 98%)    Parameters below as of 26 Jun 2025 19:23  Patient On (Oxygen Delivery Method): room air        LABS:    CBC Full  -  ( 26 Jun 2025 05:10 )  WBC Count : 6.60 K/uL  RBC Count : 2.79 M/uL  Hemoglobin : 9.8 g/dL  Hematocrit : 29.5 %  Platelet Count - Automated : 241 K/uL  Mean Cell Volume : 105.7 fl  Mean Cell Hemoglobin : 35.1 pg  Mean Cell Hemoglobin Concentration : 33.2 g/dL  Auto Neutrophil # : x  Auto Lymphocyte # : x  Auto Monocyte # : x  Auto Eosinophil # : x  Auto Basophil # : x  Auto Neutrophil % : x  Auto Lymphocyte % : x  Auto Monocyte % : x  Auto Eosinophil % : x  Auto Basophil % : x    PT/INR - ( 25 Jun 2025 18:00 )   PT: 10.0 sec;   INR: 0.86 ratio         PTT - ( 25 Jun 2025 18:00 )  PTT:25.6 sec  06-26    138  |  104  |  25[H]  ----------------------------<  108[H]  3.5   |  30  |  1.55[H]    Ca    9.0      26 Jun 2025 05:10  Phos  2.7     06-26  Mg     2.1     06-26    TPro  7.6  /  Alb  3.5  /  TBili  0.3  /  DBili  x   /  AST  35  /  ALT  31  /  AlkPhos  137[H]  06-25    CAPILLARY BLOOD GLUCOSE      POCT Blood Glucose.: 107 mg/dL (26 Jun 2025 22:46)  POCT Blood Glucose.: 139 mg/dL (26 Jun 2025 20:58)  POCT Blood Glucose.: 244 mg/dL (26 Jun 2025 16:52)  POCT Blood Glucose.: 266 mg/dL (26 Jun 2025 11:19)        LIVER FUNCTIONS - ( 25 Jun 2025 17:30 )  Alb: 3.5 g/dL / Pro: 7.6 g/dL / ALK PHOS: 137 U/L / ALT: 31 U/L DA / AST: 35 U/L / GGT: x           Creatinine Trend: 1.55<--, 1.81<--  I&O's Summary    26 Jun 2025 07:01  -  26 Jun 2025 23:44  --------------------------------------------------------  IN: 660 mL / OUT: 150 mL / NET: 510 mL                MEDICATIONS:    MEDICATIONS  (STANDING):  aspirin enteric coated 81 milliGRAM(s) Oral daily  atorvastatin 80 milliGRAM(s) Oral at bedtime  azaTHIOprine 100 milliGRAM(s) Oral daily  cholecalciferol 1000 Unit(s) Oral daily  cyanocobalamin 1000 MICROGram(s) Oral daily  dextrose 5%. 1000 milliLiter(s) (50 mL/Hr) IV Continuous <Continuous>  dextrose 50% Injectable 25 Gram(s) IV Push once  ferrous    sulfate 325 milliGRAM(s) Oral two times a day  finasteride 5 milliGRAM(s) Oral daily  folic acid 1 milliGRAM(s) Oral daily  heparin   Injectable 5000 Unit(s) SubCutaneous every 8 hours  hydrALAZINE 100 milliGRAM(s) Oral every 8 hours  insulin glargine Injectable (LANTUS) 6 Unit(s) SubCutaneous at bedtime  insulin lispro (ADMELOG) corrective regimen sliding scale   SubCutaneous three times a day before meals  insulin lispro (ADMELOG) corrective regimen sliding scale   SubCutaneous at bedtime  labetalol 400 milliGRAM(s) Oral every 12 hours  predniSONE   Tablet 20 milliGRAM(s) Oral daily  pyridostigmine 60 milliGRAM(s) Oral <User Schedule>  sertraline 50 milliGRAM(s) Oral daily  tamsulosin 0.4 milliGRAM(s) Oral at bedtime      MEDICATIONS  (PRN):  acetaminophen     Tablet .. 650 milliGRAM(s) Oral every 6 hours PRN Temp greater or equal to 38C (100.4F), Mild Pain (1 - 3)  dextrose Oral Gel 15 Gram(s) Oral once PRN Blood Glucose LESS THAN 70 milliGRAM(s)/deciliter  ondansetron Injectable 4 milliGRAM(s) IV Push every 8 hours PRN Nausea and/or Vomiting      REVIEW OF SYSTEMS:                           ALL ROS DONE [ X   ]    CONSTITUTIONAL:  LETHARGIC [   ], FEVER [   ], UNRESPONSIVE [   ]  CVS:  CP  [   ], SOB, [   ], PALPITATIONS [   ], DIZZYNESS [   ]  RS: COUGH [   ], SPUTUM [   ]  GI: ABDOMINAL PAIN [   ], NAUSEA [   ], VOMITINGS [   ], DIARRHEA [   ], CONSTIPATION [   ]  :  DYSURIA [   ], NOCTURIA [   ], INCREASED FREQUENCY [   ], DRIBLING [   ],  SKELETAL: PAINFUL JOINTS [   ], SWOLLEN JOINTS [   ], NECK ACHE [   ], LOW BACK ACHE [   ],  SKIN : ULCERS [   ], RASH [   ], ITCHING [   ]  CNS: HEAD ACHE [   ], DOUBLE VISION [   ], BLURRED VISION [   ], AMS / CONFUSION [   ], SEIZURES [   ], WEAKNESS [   ],TINGLING / NUMBNESS [   ]    PHYSICAL EXAMINATION:  GENERAL APPEARANCE: NO DISTRESS  HEENT:  NO PALLOR, NO  JVD,  NO   NODES, NECK SUPPLE  CVS: S1 +, S2 +,   RS: AEEB,  OCCASIONAL  RALES +,   NO RONCHI  ABD: SOFT, NT, NO, BS +  EXT: NO PE  SKIN: WARM,   SKELETAL:  ROM ACCEPTABLE  CNS:  AAO X 3    RADIOLOGY :  RADIOLOGY AND READINGS REVIEWED    ASSESSMENT :     Acute renal failure    HTN (hypertension)    DM (diabetes mellitus)    Myasthenia gravis    Hypercholesterolemia    CVA (cerebrovascular accident)    Peripheral neuropathy    BPH (benign prostatic hyperplasia)    Major depression    Lipoma of chest wall    MG with thymoma (myasthena gravis)    H/O cataract extraction        PLAN:  HPI:  Patient is a 70M, from [facility] ambulates with a walker, with PMHx of Myasthenia gravis s/p thymectomy, HTN, 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 Patient reports he has been feeling nauseous for a few days but no vomiting. He reports chronic increased urinary frequency and sensation of incomplete voiding. He denies all other symptoms - fever, chills, cough, chest pain, SoB, palpitations abdominal pain, diarrhea, dysuria, arm or leg numbness or tingling. Reports regular brown stool - denies black or bloody stool.  (25 Jun 2025 21:32)      # HYPERTENSIVE URGENCY  # UNDERLYING HTN  - TELEMETRY  - F/U ECHO  - NOTED TROPONINS  - S/P IV HYDRALAZINE  - HYDRALAZINE  - INCREASE PROCARDIA  - SWITCH METOPROLOL TO LABETALOL  - F/U SERUM RENIN, ALDOSTERONE LEVELS  - CARDIOLOGY CONSULT    # INTRAVASCULAR VOLUME DEPLETION  # MICHEL  # BPH  - IV FLUIDS  - PVR - 0 ML  - MONITOR CR  - AVOID NEPHROTOXIC AGENTS    # ANEMIA  - TREND HGB    # MYASTHENIA GRAVIS S/P THYMECTOMY    # DM  - SSI + FS    # CVA W/ MILD RIGHT HP    # PAD  # GI AND DVT PPX

## 2025-06-26 NOTE — PROGRESS NOTE ADULT - PROBLEM SELECTOR PLAN 3
Was sent in from NH for Hgb 7.9 in a routine lab   Total iron 37, TIBC 233, % iron 16; anemia of chronic disease and AYAD   No active signs of bleeding   - c/w home iron and folic acid supplement  - stable hgb now 9.8

## 2025-06-27 LAB
A1C WITH ESTIMATED AVERAGE GLUCOSE RESULT: 6.3 % — HIGH (ref 4–5.6)
ANION GAP SERPL CALC-SCNC: 2 MMOL/L — LOW (ref 5–17)
ANION GAP SERPL CALC-SCNC: 5 MMOL/L — SIGNIFICANT CHANGE UP (ref 5–17)
BUN SERPL-MCNC: 18 MG/DL — SIGNIFICANT CHANGE UP (ref 7–18)
BUN SERPL-MCNC: 22 MG/DL — HIGH (ref 7–18)
CALCIUM SERPL-MCNC: 8.4 MG/DL — SIGNIFICANT CHANGE UP (ref 8.4–10.5)
CALCIUM SERPL-MCNC: 9 MG/DL — SIGNIFICANT CHANGE UP (ref 8.4–10.5)
CHLORIDE SERPL-SCNC: 103 MMOL/L — SIGNIFICANT CHANGE UP (ref 96–108)
CHLORIDE SERPL-SCNC: 104 MMOL/L — SIGNIFICANT CHANGE UP (ref 96–108)
CO2 SERPL-SCNC: 30 MMOL/L — SIGNIFICANT CHANGE UP (ref 22–31)
CO2 SERPL-SCNC: 30 MMOL/L — SIGNIFICANT CHANGE UP (ref 22–31)
CREAT ?TM UR-MCNC: 53 MG/DL — SIGNIFICANT CHANGE UP
CREAT SERPL-MCNC: 1.45 MG/DL — HIGH (ref 0.5–1.3)
CREAT SERPL-MCNC: 1.54 MG/DL — HIGH (ref 0.5–1.3)
EGFR: 49 ML/MIN/1.73M2 — LOW
EGFR: 49 ML/MIN/1.73M2 — LOW
EGFR: 52 ML/MIN/1.73M2 — LOW
EGFR: 52 ML/MIN/1.73M2 — LOW
ESTIMATED AVERAGE GLUCOSE: 134 MG/DL — HIGH (ref 68–114)
GLUCOSE BLDC GLUCOMTR-MCNC: 114 MG/DL — HIGH (ref 70–99)
GLUCOSE BLDC GLUCOMTR-MCNC: 128 MG/DL — HIGH (ref 70–99)
GLUCOSE BLDC GLUCOMTR-MCNC: 147 MG/DL — HIGH (ref 70–99)
GLUCOSE BLDC GLUCOMTR-MCNC: 165 MG/DL — HIGH (ref 70–99)
GLUCOSE BLDC GLUCOMTR-MCNC: 54 MG/DL — CRITICAL LOW (ref 70–99)
GLUCOSE SERPL-MCNC: 113 MG/DL — HIGH (ref 70–99)
GLUCOSE SERPL-MCNC: 40 MG/DL — CRITICAL LOW (ref 70–99)
POTASSIUM SERPL-MCNC: 2.9 MMOL/L — CRITICAL LOW (ref 3.5–5.3)
POTASSIUM SERPL-MCNC: 4.4 MMOL/L — SIGNIFICANT CHANGE UP (ref 3.5–5.3)
POTASSIUM SERPL-SCNC: 2.9 MMOL/L — CRITICAL LOW (ref 3.5–5.3)
POTASSIUM SERPL-SCNC: 4.4 MMOL/L — SIGNIFICANT CHANGE UP (ref 3.5–5.3)
POTASSIUM UR-SCNC: 50 MMOL/L — SIGNIFICANT CHANGE UP
PROT ?TM UR-MCNC: 124 MG/DL — HIGH (ref 0–12)
SODIUM SERPL-SCNC: 136 MMOL/L — SIGNIFICANT CHANGE UP (ref 135–145)
SODIUM SERPL-SCNC: 138 MMOL/L — SIGNIFICANT CHANGE UP (ref 135–145)
TSH SERPL-MCNC: 4.28 UU/ML — SIGNIFICANT CHANGE UP (ref 0.34–4.82)

## 2025-06-27 PROCEDURE — 99232 SBSQ HOSP IP/OBS MODERATE 35: CPT

## 2025-06-27 RX ORDER — DEXTROSE 50 % IN WATER 50 %
25 SYRINGE (ML) INTRAVENOUS ONCE
Refills: 0 | Status: COMPLETED | OUTPATIENT
Start: 2025-06-27 | End: 2025-06-27

## 2025-06-27 RX ORDER — LABETALOL HYDROCHLORIDE 200 MG/1
400 TABLET, FILM COATED ORAL EVERY 8 HOURS
Refills: 0 | Status: DISCONTINUED | OUTPATIENT
Start: 2025-06-27 | End: 2025-07-14

## 2025-06-27 RX ORDER — LOSARTAN POTASSIUM 100 MG/1
100 TABLET, FILM COATED ORAL DAILY
Refills: 0 | Status: DISCONTINUED | OUTPATIENT
Start: 2025-06-27 | End: 2025-06-30

## 2025-06-27 RX ORDER — IRON SUCROSE 20 MG/ML
200 INJECTION, SOLUTION INTRAVENOUS EVERY 24 HOURS
Refills: 0 | Status: COMPLETED | OUTPATIENT
Start: 2025-06-27 | End: 2025-06-29

## 2025-06-27 RX ADMIN — INSULIN LISPRO 1: 100 INJECTION, SOLUTION INTRAVENOUS; SUBCUTANEOUS at 12:08

## 2025-06-27 RX ADMIN — FOLIC ACID 1 MILLIGRAM(S): 1 TABLET ORAL at 12:05

## 2025-06-27 RX ADMIN — Medication 100 MILLIEQUIVALENT(S): at 09:40

## 2025-06-27 RX ADMIN — TAMSULOSIN HYDROCHLORIDE 0.4 MILLIGRAM(S): 0.4 CAPSULE ORAL at 22:09

## 2025-06-27 RX ADMIN — ATORVASTATIN CALCIUM 80 MILLIGRAM(S): 80 TABLET, FILM COATED ORAL at 22:09

## 2025-06-27 RX ADMIN — Medication 100 MILLIGRAM(S): at 22:09

## 2025-06-27 RX ADMIN — Medication 325 MILLIGRAM(S): at 18:20

## 2025-06-27 RX ADMIN — PYRIDOSTIGMINE BROMIDE 60 MILLIGRAM(S): 5 INJECTION, SOLUTION INTRAVENOUS; PARENTERAL at 10:18

## 2025-06-27 RX ADMIN — Medication 20 MILLIEQUIVALENT(S): at 12:05

## 2025-06-27 RX ADMIN — LABETALOL HYDROCHLORIDE 400 MILLIGRAM(S): 200 TABLET, FILM COATED ORAL at 05:50

## 2025-06-27 RX ADMIN — LOSARTAN POTASSIUM 100 MILLIGRAM(S): 100 TABLET, FILM COATED ORAL at 12:10

## 2025-06-27 RX ADMIN — Medication 20 MILLIEQUIVALENT(S): at 10:18

## 2025-06-27 RX ADMIN — LABETALOL HYDROCHLORIDE 400 MILLIGRAM(S): 200 TABLET, FILM COATED ORAL at 13:23

## 2025-06-27 RX ADMIN — Medication 10 MILLIGRAM(S): at 20:44

## 2025-06-27 RX ADMIN — Medication 100 MILLIEQUIVALENT(S): at 07:12

## 2025-06-27 RX ADMIN — CYANOCOBALAMIN 1000 MICROGRAM(S): 1000 INJECTION INTRAMUSCULAR; SUBCUTANEOUS at 12:06

## 2025-06-27 RX ADMIN — HEPARIN SODIUM 5000 UNIT(S): 1000 INJECTION INTRAVENOUS; SUBCUTANEOUS at 13:23

## 2025-06-27 RX ADMIN — SERTRALINE 50 MILLIGRAM(S): 100 TABLET, FILM COATED ORAL at 12:04

## 2025-06-27 RX ADMIN — PYRIDOSTIGMINE BROMIDE 60 MILLIGRAM(S): 5 INJECTION, SOLUTION INTRAVENOUS; PARENTERAL at 12:08

## 2025-06-27 RX ADMIN — Medication 20 MILLIEQUIVALENT(S): at 08:11

## 2025-06-27 RX ADMIN — PREDNISONE 20 MILLIGRAM(S): 20 TABLET ORAL at 05:46

## 2025-06-27 RX ADMIN — Medication 100 MILLIEQUIVALENT(S): at 08:11

## 2025-06-27 RX ADMIN — PYRIDOSTIGMINE BROMIDE 60 MILLIGRAM(S): 5 INJECTION, SOLUTION INTRAVENOUS; PARENTERAL at 05:50

## 2025-06-27 RX ADMIN — Medication 100 MILLIGRAM(S): at 13:23

## 2025-06-27 RX ADMIN — PYRIDOSTIGMINE BROMIDE 60 MILLIGRAM(S): 5 INJECTION, SOLUTION INTRAVENOUS; PARENTERAL at 22:09

## 2025-06-27 RX ADMIN — IRON SUCROSE 110 MILLIGRAM(S): 20 INJECTION, SOLUTION INTRAVENOUS at 18:20

## 2025-06-27 RX ADMIN — Medication 325 MILLIGRAM(S): at 05:47

## 2025-06-27 RX ADMIN — HEPARIN SODIUM 5000 UNIT(S): 1000 INJECTION INTRAVENOUS; SUBCUTANEOUS at 22:10

## 2025-06-27 RX ADMIN — FINASTERIDE 5 MILLIGRAM(S): 1 TABLET, FILM COATED ORAL at 12:05

## 2025-06-27 RX ADMIN — AZATHIOPRINE 100 MILLIGRAM(S): 50 TABLET ORAL at 12:05

## 2025-06-27 RX ADMIN — INSULIN GLARGINE-YFGN 6 UNIT(S): 100 INJECTION, SOLUTION SUBCUTANEOUS at 22:09

## 2025-06-27 RX ADMIN — LABETALOL HYDROCHLORIDE 400 MILLIGRAM(S): 200 TABLET, FILM COATED ORAL at 12:05

## 2025-06-27 RX ADMIN — Medication 81 MILLIGRAM(S): at 12:05

## 2025-06-27 RX ADMIN — Medication 100 MILLIGRAM(S): at 05:51

## 2025-06-27 RX ADMIN — Medication 25 GRAM(S): at 07:14

## 2025-06-27 RX ADMIN — PYRIDOSTIGMINE BROMIDE 60 MILLIGRAM(S): 5 INJECTION, SOLUTION INTRAVENOUS; PARENTERAL at 01:16

## 2025-06-27 RX ADMIN — PYRIDOSTIGMINE BROMIDE 60 MILLIGRAM(S): 5 INJECTION, SOLUTION INTRAVENOUS; PARENTERAL at 18:20

## 2025-06-27 RX ADMIN — LABETALOL HYDROCHLORIDE 400 MILLIGRAM(S): 200 TABLET, FILM COATED ORAL at 22:09

## 2025-06-27 RX ADMIN — Medication 1000 UNIT(S): at 12:06

## 2025-06-27 RX ADMIN — HEPARIN SODIUM 5000 UNIT(S): 1000 INJECTION INTRAVENOUS; SUBCUTANEOUS at 05:54

## 2025-06-27 NOTE — PROGRESS NOTE ADULT - ASSESSMENT
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 Hgb 9.9. BUN/Cr 30/1.81. Admitted for MICHEL.   Also found to have uncontrolled HTN, Cardiology Dr. Mercado was consulted. 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 Hgb 9.9. BUN/Cr 30/1.81. Admitted for MICHEL.   Also found to have uncontrolled HTN, Cardiology Dr. Mercado was consulted. Medications adjusted   TTE: Left ventricular wall thickness is severely increased. Left ventricular systolic function is hyperdynamic with an ejection fraction visually estimated at 70 to 75 %.  Differential includes hypertensive, hypertrophic, and infiltrative cardiomyopathy, among others. cardiac MRI for further evaluation was recommended, can be done outp   Nephro consulted for MICHEL on CKD,

## 2025-06-27 NOTE — PROGRESS NOTE ADULT - SUBJECTIVE AND OBJECTIVE BOX
C A R D I O L O G Y  **********************************     DATE OF SERVICE: 06-27-25    Patient denies chest pain or shortness of breath.   Review of systems otherwise (-)  	  MEDICATIONS:  MEDICATIONS  (STANDING):  aspirin enteric coated 81 milliGRAM(s) Oral daily  atorvastatin 80 milliGRAM(s) Oral at bedtime  azaTHIOprine 100 milliGRAM(s) Oral daily  cholecalciferol 1000 Unit(s) Oral daily  cyanocobalamin 1000 MICROGram(s) Oral daily  dextrose 5%. 1000 milliLiter(s) (50 mL/Hr) IV Continuous <Continuous>  dextrose 50% Injectable 25 Gram(s) IV Push once  ferrous    sulfate 325 milliGRAM(s) Oral two times a day  finasteride 5 milliGRAM(s) Oral daily  folic acid 1 milliGRAM(s) Oral daily  heparin   Injectable 5000 Unit(s) SubCutaneous every 8 hours  hydrALAZINE 100 milliGRAM(s) Oral every 8 hours  insulin glargine Injectable (LANTUS) 6 Unit(s) SubCutaneous at bedtime  insulin lispro (ADMELOG) corrective regimen sliding scale   SubCutaneous three times a day before meals  insulin lispro (ADMELOG) corrective regimen sliding scale   SubCutaneous at bedtime  labetalol 400 milliGRAM(s) Oral every 12 hours  NIFEdipine XL 90 milliGRAM(s) Oral daily  potassium chloride    Tablet ER 20 milliEquivalent(s) Oral every 2 hours  predniSONE   Tablet 20 milliGRAM(s) Oral daily  pyridostigmine 60 milliGRAM(s) Oral <User Schedule>  sertraline 50 milliGRAM(s) Oral daily  tamsulosin 0.4 milliGRAM(s) Oral at bedtime      LABS:	 	    CARDIAC MARKERS:        Troponin I, High Sensitivity Result: 39.5 ng/L (06-26-25 @ 00:47)  Troponin I, High Sensitivity Result: 43.9 ng/L (06-25-25 @ 17:30)                              9.8    6.60  )-----------( 241      ( 26 Jun 2025 05:10 )             29.5     Hemoglobin: 9.8 g/dL (06-26 @ 05:10)  Hemoglobin: 9.9 g/dL (06-25 @ 18:00)      06-27    138  |  103  |  22[H]  ----------------------------<  40[LL]  2.9[LL]   |  30  |  1.54[H]    Ca    9.0      27 Jun 2025 05:47  Phos  2.7     06-26  Mg     2.1     06-26    TPro  7.6  /  Alb  3.5  /  TBili  0.3  /  DBili  x   /  AST  35  /  ALT  31  /  AlkPhos  137[H]  06-25    Creatinine Trend: 1.54<--, 1.55<--, 1.81<--      TSH: Thyroid Stimulating Hormone, Serum: 4.28 uU/mL (06-27 @ 05:47)        PHYSICAL EXAM:  T(C): 36.4 (06-27-25 @ 08:06), Max: 36.9 (06-27-25 @ 00:10)  HR: 61 (06-27-25 @ 08:06) (58 - 68)  BP: 191/60 (06-27-25 @ 08:06) (97/62 - 213/80)  RR: 18 (06-27-25 @ 08:06) (18 - 19)  SpO2: 100% (06-27-25 @ 08:06) (96% - 100%)  Wt(kg): --  I&O's Summary    26 Jun 2025 07:01  -  27 Jun 2025 07:00  --------------------------------------------------------  IN: 660 mL / OUT: 650 mL / NET: 10 mL      HEENT:  (-)icterus (-)pallor  CV: N S1 S2 1/6 BOUBACAR (+)2 Pulses B/l  Resp:  Clear to ausculatation B/L, normal effort  GI: (+) BS Soft, NT, ND  Lymph:  (-)Edema, (-)obvious lymphadenopathy  Skin: Warm to touch, Normal turgor  Psych: Appropriate mood and affect      TELEMETRY: 	  sinus        ASSESSMENT/PLAN: 	70y  Male PMHx of Myasthenia gravis s/p thymectomy, HTN, 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 hyperaldosterone state would check serum renin and aldosterone levels   - cont Procardia at 90 mg PO daily   - plan to resume ARB when BP allows  - check TSH    # Abnormal EKG  - echo noted, suspect it is due to hypertensive herat disease givent his profoundly elevated blood pressures however can arrange for outpt cardiac MRI     Ta Mercado MD, [facility]  BEEPER [phone]

## 2025-06-27 NOTE — PROGRESS NOTE ADULT - PROBLEM SELECTOR PLAN 2
BUN/Cr 30/1.81 (Baseline Scr 1.3)  s/p 1L NS in ED   Post-void bladder scan - 0mL  - add iv fluids - NS   - creatinine improving now 1.55  - trend BMP Body Location Override (Optional - Billing Will Still Be Based On Selected Body Map Location If Applicable): left temple Detail Level: Detailed Depth Of Biopsy: dermis Was A Bandage Applied: Yes Size Of Lesion In Cm: 0.4 Biopsy Type: H and E Biopsy Method: Dermablade Anesthesia Type: 1% lidocaine with epinephrine Anesthesia Volume In Cc (Will Not Render If 0): 0.5 Additional Anesthesia Volume In Cc (Will Not Render If 0): 0 Hemostasis: Aluminum Chloride Wound Care: Petrolatum Dressing: bandage Destruction After The Procedure: No Type Of Destruction Used: Curettage Curettage Text: The wound bed was treated with curettage after the biopsy was performed. Cryotherapy Text: The wound bed was treated with cryotherapy after the biopsy was performed. Electrodesiccation Text: The wound bed was treated with electrodesiccation after the biopsy was performed. Electrodesiccation And Curettage Text: The wound bed was treated with electrodesiccation and curettage after the biopsy was performed. Silver Nitrate Text: The wound bed was treated with silver nitrate after the biopsy was performed. Lab: 6 Lab Facility: 3 Consent: Written consent was obtained and risks were reviewed including but not limited to scarring, infection, bleeding, scabbing, incomplete removal, nerve damage and allergy to anesthesia. Post-Care Instructions: I reviewed with the patient in detail post-care instructions. Patient is to keep the biopsy site dry overnight, and then clean site twice daily and apply vaseline twice daily until healed. Notification Instructions: Patient will be notified of biopsy results. However, patient instructed to call the office if not contacted within 2 weeks. Billing Type: Third-Party Bill Information: Selecting Yes will display possible errors in your note based on the variables you have selected. This validation is only offered as a suggestion for you. PLEASE NOTE THAT THE VALIDATION TEXT WILL BE REMOVED WHEN YOU FINALIZE YOUR NOTE. IF YOU WANT TO FAX A PRELIMINARY NOTE YOU WILL NEED TO TOGGLE THIS TO 'NO' IF YOU DO NOT WANT IT IN YOUR FAXED NOTE.

## 2025-06-27 NOTE — PROGRESS NOTE ADULT - PROBLEM SELECTOR PLAN 1
Hx of HTN on hydralazine 100mg tid, losartan 100mg qd, nifedipine 60mg qd, metoprolol 100mg bid   Troponin 43.9 > 39  s/p hydralazine 35mg IVP   BP remains elevated 200s systolic this morning  - increased nifedipine to 90 mg qd  - changed metoprolol to labetalol 400 mg bid  - holding losartan iso of MICHEL on CKD   - f/u TSH  - f/u echo  - telemetry/vitals q4h  - Cardiology Dr. Mercado consulted Hx of HTN on hydralazine 100mg tid, losartan 100mg qd, nifedipine 60mg qd, metoprolol 100mg bid   Troponin 43.9 > 39  BP remains elevated   - increased nifedipine to 90 mg qd  - changed metoprolol to labetalol 400 mg q 8hr   - restart  losartan   - echo: Left ventricular wall thickness is severely increased. Left ventricular systolic function is hyperdynamic with an ejection fraction visually estimated at 70 to 75 %.  Differential includes hypertensive, hypertrophic, and infiltrative cardiomyopathy, among others. cardiac MRI for further evaluation was recommended, can be done outp   - telemetry/vitals q4h  - Cardiology Dr. Mercado follows

## 2025-06-27 NOTE — PROGRESS NOTE ADULT - SUBJECTIVE AND OBJECTIVE BOX
NP Note discussed with  primary attending    Patient is a 70y old  Male who presents with a chief complaint of MICHEL (27 Jun 2025 09:54)      INTERVAL HPI/OVERNIGHT EVENTS: no new complaints    MEDICATIONS  (STANDING):  aspirin enteric coated 81 milliGRAM(s) Oral daily  atorvastatin 80 milliGRAM(s) Oral at bedtime  azaTHIOprine 100 milliGRAM(s) Oral daily  cholecalciferol 1000 Unit(s) Oral daily  cyanocobalamin 1000 MICROGram(s) Oral daily  dextrose 5%. 1000 milliLiter(s) (50 mL/Hr) IV Continuous <Continuous>  dextrose 50% Injectable 25 Gram(s) IV Push once  ferrous    sulfate 325 milliGRAM(s) Oral two times a day  finasteride 5 milliGRAM(s) Oral daily  folic acid 1 milliGRAM(s) Oral daily  heparin   Injectable 5000 Unit(s) SubCutaneous every 8 hours  hydrALAZINE 100 milliGRAM(s) Oral every 8 hours  insulin glargine Injectable (LANTUS) 6 Unit(s) SubCutaneous at bedtime  insulin lispro (ADMELOG) corrective regimen sliding scale   SubCutaneous three times a day before meals  insulin lispro (ADMELOG) corrective regimen sliding scale   SubCutaneous at bedtime  labetalol 400 milliGRAM(s) Oral every 8 hours  losartan 100 milliGRAM(s) Oral daily  NIFEdipine XL 90 milliGRAM(s) Oral daily  predniSONE   Tablet 20 milliGRAM(s) Oral daily  pyridostigmine 60 milliGRAM(s) Oral <User Schedule>  sertraline 50 milliGRAM(s) Oral daily  tamsulosin 0.4 milliGRAM(s) Oral at bedtime    MEDICATIONS  (PRN):  acetaminophen     Tablet .. 650 milliGRAM(s) Oral every 6 hours PRN Temp greater or equal to 38C (100.4F), Mild Pain (1 - 3)  dextrose Oral Gel 15 Gram(s) Oral once PRN Blood Glucose LESS THAN 70 milliGRAM(s)/deciliter  ondansetron Injectable 4 milliGRAM(s) IV Push every 8 hours PRN Nausea and/or Vomiting      __________________________________________________  REVIEW OF SYSTEMS:    CONSTITUTIONAL: No fever,   EYES: no acute visual disturbances  NECK: No pain or stiffness  RESPIRATORY: No cough; No shortness of breath  CARDIOVASCULAR: No chest pain, no palpitations  GASTROINTESTINAL: No pain. No nausea or vomiting; No diarrhea   NEUROLOGICAL: No headache or numbness, no tremors  MUSCULOSKELETAL: No joint pain, no muscle pain  GENITOURINARY: no dysuria, no frequency, no hesitancy  PSYCHIATRY: no depression , no anxiety  ALL OTHER  ROS negative        Vital Signs Last 24 Hrs  T(C): 36.4 (27 Jun 2025 11:36), Max: 36.9 (27 Jun 2025 00:10)  T(F): 97.5 (27 Jun 2025 11:36), Max: 98.4 (27 Jun 2025 00:10)  HR: 64 (27 Jun 2025 13:22) (58 - 68)  BP: 185/71 (27 Jun 2025 13:22) (119/46 - 227/75)  BP(mean): 100 (27 Jun 2025 05:08) (100 - 100)  RR: 18 (27 Jun 2025 11:36) (18 - 19)  SpO2: 100% (27 Jun 2025 11:36) (96% - 100%)    Parameters below as of 27 Jun 2025 11:36  Patient On (Oxygen Delivery Method): room air        ________________________________________________  PHYSICAL EXAM:  GENERAL: NAD  HEENT: Normocephalic;  conjunctivae and sclerae clear; moist mucous membranes;   NECK : supple  CHEST/LUNG: Clear to ausculitation bilaterally with good air entry   HEART: S1 S2  regular; no murmurs, gallops or rubs  ABDOMEN: Soft, Nontender, Nondistended; Bowel sounds present  EXTREMITIES: no cyanosis; no edema; no calf tenderness  SKIN: warm and dry; no rash  NERVOUS SYSTEM:  Awake and alert; Oriented  to place, person and time ; no new deficits    _________________________________________________  LABS:                        9.8    6.60  )-----------( 241      ( 26 Jun 2025 05:10 )             29.5     06-27    138  |  103  |  22[H]  ----------------------------<  40[LL]  2.9[LL]   |  30  |  1.54[H]    Ca    9.0      27 Jun 2025 05:47  Phos  2.7     06-26  Mg     2.1     06-26    TPro  7.6  /  Alb  3.5  /  TBili  0.3  /  DBili  x   /  AST  35  /  ALT  31  /  AlkPhos  137[H]  06-25    PT/INR - ( 25 Jun 2025 18:00 )   PT: 10.0 sec;   INR: 0.86 ratio         PTT - ( 25 Jun 2025 18:00 )  PTT:25.6 sec  Urinalysis Basic - ( 27 Jun 2025 05:47 )    Color: x / Appearance: x / SG: x / pH: x  Gluc: 40 mg/dL / Ketone: x  / Bili: x / Urobili: x   Blood: x / Protein: x / Nitrite: x   Leuk Esterase: x / RBC: x / WBC x   Sq Epi: x / Non Sq Epi: x / Bacteria: x      CAPILLARY BLOOD GLUCOSE      POCT Blood Glucose.: 165 mg/dL (27 Jun 2025 11:12)  POCT Blood Glucose.: 147 mg/dL (27 Jun 2025 07:45)  POCT Blood Glucose.: 54 mg/dL (27 Jun 2025 06:47)  POCT Blood Glucose.: 107 mg/dL (26 Jun 2025 22:46)  POCT Blood Glucose.: 139 mg/dL (26 Jun 2025 20:58)  POCT Blood Glucose.: 244 mg/dL (26 Jun 2025 16:52)        RADIOLOGY & ADDITIONAL TESTS:    Imaging Personally Reviewed:  YES/NO    Consultant(s) Notes Reviewed:   YES/ No    Care Discussed with Consultants :     Plan of care was discussed with patient and /or primary care giver; all questions and concerns were addressed and care was aligned with patient's wishes.     NP Note discussed with  primary attending    Patient is a 70y old  Male who presents with a chief complaint of MICHEL (27 Jun 2025 09:54)      INTERVAL HPI/OVERNIGHT EVENTS: no new complaints    MEDICATIONS  (STANDING):  aspirin enteric coated 81 milliGRAM(s) Oral daily  atorvastatin 80 milliGRAM(s) Oral at bedtime  azaTHIOprine 100 milliGRAM(s) Oral daily  cholecalciferol 1000 Unit(s) Oral daily  cyanocobalamin 1000 MICROGram(s) Oral daily  dextrose 5%. 1000 milliLiter(s) (50 mL/Hr) IV Continuous <Continuous>  dextrose 50% Injectable 25 Gram(s) IV Push once  ferrous    sulfate 325 milliGRAM(s) Oral two times a day  finasteride 5 milliGRAM(s) Oral daily  folic acid 1 milliGRAM(s) Oral daily  heparin   Injectable 5000 Unit(s) SubCutaneous every 8 hours  hydrALAZINE 100 milliGRAM(s) Oral every 8 hours  insulin glargine Injectable (LANTUS) 6 Unit(s) SubCutaneous at bedtime  insulin lispro (ADMELOG) corrective regimen sliding scale   SubCutaneous three times a day before meals  insulin lispro (ADMELOG) corrective regimen sliding scale   SubCutaneous at bedtime  labetalol 400 milliGRAM(s) Oral every 8 hours  losartan 100 milliGRAM(s) Oral daily  NIFEdipine XL 90 milliGRAM(s) Oral daily  predniSONE   Tablet 20 milliGRAM(s) Oral daily  pyridostigmine 60 milliGRAM(s) Oral <User Schedule>  sertraline 50 milliGRAM(s) Oral daily  tamsulosin 0.4 milliGRAM(s) Oral at bedtime    MEDICATIONS  (PRN):  acetaminophen     Tablet .. 650 milliGRAM(s) Oral every 6 hours PRN Temp greater or equal to 38C (100.4F), Mild Pain (1 - 3)  dextrose Oral Gel 15 Gram(s) Oral once PRN Blood Glucose LESS THAN 70 milliGRAM(s)/deciliter  ondansetron Injectable 4 milliGRAM(s) IV Push every 8 hours PRN Nausea and/or Vomiting      __________________________________________________  REVIEW OF SYSTEMS:      RESPIRATORY: No cough; No shortness of breath  CARDIOVASCULAR: No chest pain, no palpitations  GASTROINTESTINAL: No pain. No nausea or vomiting; No diarrhea   NEUROLOGICAL: No headache or numbness, no tremors  MUSCULOSKELETAL: No joint pain, no muscle pain  GENITOURINARY: no dysuria, no frequency, no hesitancy        Vital Signs Last 24 Hrs  T(C): 36.4 (27 Jun 2025 11:36), Max: 36.9 (27 Jun 2025 00:10)  T(F): 97.5 (27 Jun 2025 11:36), Max: 98.4 (27 Jun 2025 00:10)  HR: 64 (27 Jun 2025 13:22) (58 - 68)  BP: 185/71 (27 Jun 2025 13:22) (119/46 - 227/75)  BP(mean): 100 (27 Jun 2025 05:08) (100 - 100)  RR: 18 (27 Jun 2025 11:36) (18 - 19)  SpO2: 100% (27 Jun 2025 11:36) (96% - 100%)    Parameters below as of 27 Jun 2025 11:36  Patient On (Oxygen Delivery Method): room air        ________________________________________________  PHYSICAL EXAM:  GENERAL: NAD  CHEST/LUNG: Clear to ausculitation bilaterally  HEART: S1 S2  regular; BP elevated   ABDOMEN: Soft, Nontender, Nondistended; Bowel sounds present  EXTREMITIES: no cyanosis; no edema; no calf tenderness  SKIN: warm and dry; no rash  NERVOUS SYSTEM:  Awake and alert; Oriented  to place, person and time ;     _________________________________________________  LABS:                        9.8    6.60  )-----------( 241      ( 26 Jun 2025 05:10 )             29.5     06-27    138  |  103  |  22[H]  ----------------------------<  40[LL]  2.9[LL]   |  30  |  1.54[H]    Ca    9.0      27 Jun 2025 05:47  Phos  2.7     06-26  Mg     2.1     06-26    TPro  7.6  /  Alb  3.5  /  TBili  0.3  /  DBili  x   /  AST  35  /  ALT  31  /  AlkPhos  137[H]  06-25    PT/INR - ( 25 Jun 2025 18:00 )   PT: 10.0 sec;   INR: 0.86 ratio         PTT - ( 25 Jun 2025 18:00 )  PTT:25.6 sec  Urinalysis Basic - ( 27 Jun 2025 05:47 )    Color: x / Appearance: x / SG: x / pH: x  Gluc: 40 mg/dL / Ketone: x  / Bili: x / Urobili: x   Blood: x / Protein: x / Nitrite: x   Leuk Esterase: x / RBC: x / WBC x   Sq Epi: x / Non Sq Epi: x / Bacteria: x      CAPILLARY BLOOD GLUCOSE      POCT Blood Glucose.: 165 mg/dL (27 Jun 2025 11:12)  POCT Blood Glucose.: 147 mg/dL (27 Jun 2025 07:45)  POCT Blood Glucose.: 54 mg/dL (27 Jun 2025 06:47)  POCT Blood Glucose.: 107 mg/dL (26 Jun 2025 22:46)  POCT Blood Glucose.: 139 mg/dL (26 Jun 2025 20:58)  POCT Blood Glucose.: 244 mg/dL (26 Jun 2025 16:52)        RADIOLOGY & ADDITIONAL TESTS:    Imaging Personally Reviewed:  YES/NO    Consultant(s) Notes Reviewed:   YES/ No    Care Discussed with Consultants :     Plan of care was discussed with patient and /or primary care giver; all questions and concerns were addressed and care was aligned with patient's wishes.

## 2025-06-27 NOTE — CONSULT NOTE ADULT - ASSESSMENT
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 Pt a/w hypertensive urgency and MICHEL.   Nephrology consulted for Elevated serum creatinine.    1. MICHEL- as per H& P; last SCr 1.3.MICHEL likely hemodynamically mediated due to uncontrolled HTN. Renal function slowly improving. Losartan briefly held but now resumed due to controlled HTN. UA with 300 protein, no blood. FeNa 10%; Check Renal US to r/o retention. TTE with grade 1 diastolic dysfunction, EF 70-75%; ?hypertensive vs infiltrative cardiomyopathy. Check SIFE and K/l. Pt b/l pleural effisons on TTE. Check CXR; consider lasix.   Strict I/Os. Avoid nephrotoxins/ NSAIDs/ RCA. Monitor BMP.  2. Proteinuria- UA with 300 protein, no blood. Unable to calculate Upr/Cr since Urine Cr <13. Repeat Urine Pr/Cr.   3. Hypertensive urgency- BP elevated for which Labetalol was increased to 400mg PO q 8hrs by primary team. Ok to resume Losartan 100mg Po daily since MICHEL is resolving and BP is uncontrolled. Consider diuretics; recc Lasix 80mg PO daily. c/w Nifedipine ER 90mg p daily. Consider tapering prednisone off if able. c/w low salt diet. Monitor BP  4. Hypokalemia- f/u zahra/ renin; pending. Pt given KCl PO and IV. Check FeK; can be falsely elevated due to repletion. Hypokalemia due to shifting from excessive insulin. If persistent hypokalemia;  Recc aldactone 25mg PO daily. Monitor serum potassium  5. Iron deficiency anemia- low hgb. Check FOBT. tsat 16% with ferriitn 236- will give Venofer 200mg IV qd x 3 doses. Monitor hgb    [facility] [place] NEPHROLOGY  Bethel Smith M.D.  Guillermo Jimenez D.O.  Kathleen Lan M.D.  MD Kimi Rodríguez, MSN, ANP-C    Telephone: [phone]  Facsimile: [phone] [address], [address]  [place] [address]

## 2025-06-27 NOTE — CONSULT NOTE ADULT - SUBJECTIVE AND OBJECTIVE BOX
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 Pt a/w hypertensive urgency and MICHEL.   Nephrology consulted for Elevated serum creatinine.    Pt denies any h/o kidney disease. Patient denies any NSAID use, recent CT with contrast, or h/o hepatitis +blood transfusion  Pt denies any SOB, chest pain, n/v/d, abd pain, urinary complaints or LE edema.         PAST MEDICAL & SURGICAL HISTORY:  HTN (hypertension)      DM (diabetes mellitus)      Myasthenia gravis      Hypercholesterolemia      CVA (cerebrovascular accident)      Peripheral neuropathy      BPH (benign prostatic hyperplasia)      Major depression      Lipoma of chest wall      MG with thymoma (myasthena gravis)      H/O cataract extraction  , right eye        No Known Allergies    Home Medications Reviewed  Hospital Medications:   MEDICATIONS  (STANDING):  aspirin enteric coated 81 milliGRAM(s) Oral daily  atorvastatin 80 milliGRAM(s) Oral at bedtime  azaTHIOprine 100 milliGRAM(s) Oral daily  cholecalciferol 1000 Unit(s) Oral daily  cyanocobalamin 1000 MICROGram(s) Oral daily  dextrose 5%. 1000 milliLiter(s) (50 mL/Hr) IV Continuous <Continuous>  dextrose 50% Injectable 25 Gram(s) IV Push once  ferrous    sulfate 325 milliGRAM(s) Oral two times a day  finasteride 5 milliGRAM(s) Oral daily  folic acid 1 milliGRAM(s) Oral daily  heparin   Injectable 5000 Unit(s) SubCutaneous every 8 hours  hydrALAZINE 100 milliGRAM(s) Oral every 8 hours  insulin glargine Injectable (LANTUS) 6 Unit(s) SubCutaneous at bedtime  insulin lispro (ADMELOG) corrective regimen sliding scale   SubCutaneous three times a day before meals  insulin lispro (ADMELOG) corrective regimen sliding scale   SubCutaneous at bedtime  labetalol 400 milliGRAM(s) Oral every 8 hours  losartan 100 milliGRAM(s) Oral daily  NIFEdipine XL 90 milliGRAM(s) Oral daily  predniSONE   Tablet 20 milliGRAM(s) Oral daily  pyridostigmine 60 milliGRAM(s) Oral <User Schedule>  sertraline 50 milliGRAM(s) Oral daily  tamsulosin 0.4 milliGRAM(s) Oral at bedtime    SOCIAL HISTORY:  Denies ETOh, Smoking, or drug use  FAMILY HISTORY:  Family history of hypertension (Mother)        REVIEW OF SYSTEMS:  Gen: no changes in weight  HEENT: no rhinorrhea  Neck: no sore throat  Cards: no chest pain  Resp: no dyspnea  GI: no nausea or vomiting or diarrhea  : no dysuria or hematuria  Vascular: no LE edema  Derm: no rashes  Neuro: no numbness/tingling  All other review of systems is negative unless indicated above.    VITALS:  T(F): 97.8 (06-27-25 @ 16:14), Max: 98.4 (06-27-25 @ 00:10)  HR: 67 (06-27-25 @ 16:14)  BP: 184/69 (06-27-25 @ 16:14)  RR: 18 (06-27-25 @ 16:14)  SpO2: 100% (06-27-25 @ 16:14)  Wt(kg): --    06-26 @ 07:01  -  06-27 @ 07:00  --------------------------------------------------------  IN: 660 mL / OUT: 650 mL / NET: 10 mL        PHYSICAL EXAM:  Gen: NAD, calm  HEENT: +facial/ periorbital edema  Neck: no JVD  Cards: RRR, +S1/S2,+BOUBACAR  Resp: CTA B/L  GI: soft, NT/ND, NABS  : no CVA tenderness  Extremities: no LE edema B/L  Derm: no rashes  Neuro: non-focal    LABS:  06-27    138  |  103  |  22[H]  ----------------------------<  40[LL]  2.9[LL]   |  30  |  1.54[H]    Ca    9.0      27 Jun 2025 05:47  Phos  2.7     06-26  Mg     2.1     06-26    TPro  7.6  /  Alb  3.5  /  TBili  0.3  /  DBili      /  AST  35  /  ALT  31  /  AlkPhos  137[H]  06-25    Creatinine Trend: 1.54 <--, 1.55 <--, 1.81 <--                        9.8    6.60  )-----------( 241      ( 26 Jun 2025 05:10 )             29.5     Urine Studies:  Urinalysis Basic - ( 27 Jun 2025 05:47 )    Color:  / Appearance:  / SG:  / pH:   Gluc: 40 mg/dL / Ketone:   / Bili:  / Urobili:    Blood:  / Protein:  / Nitrite:    Leuk Esterase:  / RBC:  / WBC    Sq Epi:  / Non Sq Epi:  / Bacteria:       Sodium, Random Urine: 119 mmol/L (06-25 @ 21:58)  Creatinine, Random Urine: <13 mg/dL (06-25 @ 21:58)  Protein/Creatinine Ratio Calculation: unable to calc Ratio (06-25 @ 21:58)    RADIOLOGY & ADDITIONAL STUDIES:      < from: TTE W or WO Ultrasound Enhancing Agent (06.26.25 @ 12:04) >    TRANSTHORACIC ECHOCARDIOGRAM REPORT  ________________________________________________________________________________                                      _______       Pt. Name:       BRITTANIE GROSS  Study Date:    6/26/2025  MRN:            [id number]  YOB: 1955  Accession #:    [id number]   Age:           70 years  Account#:       [id number]  Gender:        M  Heart Rate:                 Height:        68.00 in (172.72 cm)  Rhythm:                     Weight:        136.99 lb (62.14 kg)  Blood Pressure: 160/57 mmHg BSA/BMI:       1.74 m² / 20.83 kg/m²  ________________________________________________________________________________________  Referring Physician:    [id number] Shai Goldstein  Interpreting Physician: Geo James  Primary Sonographer:    Harsha Henao LINCOLN    CPT:               ECHO TTE WO CON COMP W DOPP - 17538.m  Indication(s):     Hypertensive heart disease without heart failure - I11.9  Procedure:         Transthoracic echocardiogram with 2-D, M-mode andcomplete                     spectral and color flow Doppler.  Ordering Location: [facility]  Admission Status:  Inpatient    _______________________________________________________________________________________     CONCLUSIONS:      1. Left ventricular wall thickness is severely increased. Left ventricular systolic function is hyperdynamic with an ejection fraction visually estimated at 70 to 75 %.   2. Differential includes hypertensive, hypertrophic, and infiltrative cardiomyopathy, among others. Consider cardiac MRI for further evaluation if clinically indicated.   3. There is mild (grade 1) left ventricular diastolic dysfunction.   4. Left atrium is mildly dilated.   5. Trace mitral regurgitation.   6. Trace tricuspid regurgitation.   7. Trace pulmonic regurgitation.   8. Trace pericardial effusion.   9. Bilateral pleural effusion noted.  10. No prior echocardiogram is available for comparison.      < end of copied text >

## 2025-06-27 NOTE — PROGRESS NOTE ADULT - SUBJECTIVE AND OBJECTIVE BOX
Patient is a 70y old  Male who presents with a chief complaint of MICHEL (2025 12:43)    PATIENT IS SEEN AND EXAMINED IN MEDICAL FLOOR.  AILYN [    ]    ARCADIO [   ]      GT [   ]    ALLERGIES:  No Known Allergies      Daily     Daily Weight in k.8 (2025 05:08)    VITALS:    Vital Signs Last 24 Hrs  T(C): 36.4 (2025 08:06), Max: 36.9 (2025 00:10)  T(F): 97.5 (2025 08:06), Max: 98.4 (2025 00:10)  HR: 61 (2025 08:06) (58 - 68)  BP: 191/60 (2025 08:06) (97/62 - 213/80)  BP(mean): 100 (2025 05:08) (100 - 100)  RR: 18 (2025 08:06) (18 - 19)  SpO2: 100% (2025 08:06) (96% - 100%)    Parameters below as of 2025 08:06  Patient On (Oxygen Delivery Method): room air        LABS:    CBC Full  -  ( 2025 05:10 )  WBC Count : 6.60 K/uL  RBC Count : 2.79 M/uL  Hemoglobin : 9.8 g/dL  Hematocrit : 29.5 %  Platelet Count - Automated : 241 K/uL  Mean Cell Volume : 105.7 fl  Mean Cell Hemoglobin : 35.1 pg  Mean Cell Hemoglobin Concentration : 33.2 g/dL  Auto Neutrophil # : x  Auto Lymphocyte # : x  Auto Monocyte # : x  Auto Eosinophil # : x  Auto Basophil # : x  Auto Neutrophil % : x  Auto Lymphocyte % : x  Auto Monocyte % : x  Auto Eosinophil % : x  Auto Basophil % : x    PT/INR - ( 2025 18:00 )   PT: 10.0 sec;   INR: 0.86 ratio         PTT - ( 2025 18:00 )  PTT:25.6 sec  -    138  |  103  |  22[H]  ----------------------------<  40[LL]  2.9[LL]   |  30  |  1.54[H]    Ca    9.0      2025 05:47  Phos  2.7     06-  Mg     2.1         TPro  7.6  /  Alb  3.5  /  TBili  0.3  /  DBili  x   /  AST  35  /  ALT  31  /  AlkPhos  137[H]      CAPILLARY BLOOD GLUCOSE      POCT Blood Glucose.: 147 mg/dL (2025 07:45)  POCT Blood Glucose.: 54 mg/dL (2025 06:47)  POCT Blood Glucose.: 107 mg/dL (2025 22:46)  POCT Blood Glucose.: 139 mg/dL (2025 20:58)  POCT Blood Glucose.: 244 mg/dL (2025 16:52)  POCT Blood Glucose.: 266 mg/dL (2025 11:19)        LIVER FUNCTIONS - ( 2025 17:30 )  Alb: 3.5 g/dL / Pro: 7.6 g/dL / ALK PHOS: 137 U/L / ALT: 31 U/L DA / AST: 35 U/L / GGT: x           Creatinine Trend: 1.54<--, 1.55<--, 1.81<--  I&O's Summary    2025 07:01  -  2025 07:00  --------------------------------------------------------  IN: 660 mL / OUT: 650 mL / NET: 10 mL                MEDICATIONS:    MEDICATIONS  (STANDING):  aspirin enteric coated 81 milliGRAM(s) Oral daily  atorvastatin 80 milliGRAM(s) Oral at bedtime  azaTHIOprine 100 milliGRAM(s) Oral daily  cholecalciferol 1000 Unit(s) Oral daily  cyanocobalamin 1000 MICROGram(s) Oral daily  dextrose 5%. 1000 milliLiter(s) (50 mL/Hr) IV Continuous <Continuous>  dextrose 50% Injectable 25 Gram(s) IV Push once  ferrous    sulfate 325 milliGRAM(s) Oral two times a day  finasteride 5 milliGRAM(s) Oral daily  folic acid 1 milliGRAM(s) Oral daily  heparin   Injectable 5000 Unit(s) SubCutaneous every 8 hours  hydrALAZINE 100 milliGRAM(s) Oral every 8 hours  insulin glargine Injectable (LANTUS) 6 Unit(s) SubCutaneous at bedtime  insulin lispro (ADMELOG) corrective regimen sliding scale   SubCutaneous three times a day before meals  insulin lispro (ADMELOG) corrective regimen sliding scale   SubCutaneous at bedtime  labetalol 400 milliGRAM(s) Oral every 12 hours  NIFEdipine XL 90 milliGRAM(s) Oral daily  potassium chloride    Tablet ER 20 milliEquivalent(s) Oral every 2 hours  predniSONE   Tablet 20 milliGRAM(s) Oral daily  pyridostigmine 60 milliGRAM(s) Oral <User Schedule>  sertraline 50 milliGRAM(s) Oral daily  tamsulosin 0.4 milliGRAM(s) Oral at bedtime      MEDICATIONS  (PRN):  acetaminophen     Tablet .. 650 milliGRAM(s) Oral every 6 hours PRN Temp greater or equal to 38C (100.4F), Mild Pain (1 - 3)  dextrose Oral Gel 15 Gram(s) Oral once PRN Blood Glucose LESS THAN 70 milliGRAM(s)/deciliter  ondansetron Injectable 4 milliGRAM(s) IV Push every 8 hours PRN Nausea and/or Vomiting      REVIEW OF SYSTEMS:                           ALL ROS DONE [ X   ]    CONSTITUTIONAL:  LETHARGIC [   ], FEVER [   ], UNRESPONSIVE [   ]  CVS:  CP  [   ], SOB, [   ], PALPITATIONS [   ], DIZZYNESS [   ]  RS: COUGH [   ], SPUTUM [   ]  GI: ABDOMINAL PAIN [   ], NAUSEA [   ], VOMITINGS [   ], DIARRHEA [   ], CONSTIPATION [   ]  :  DYSURIA [   ], NOCTURIA [   ], INCREASED FREQUENCY [   ], DRIBLING [   ],  SKELETAL: PAINFUL JOINTS [   ], SWOLLEN JOINTS [   ], NECK ACHE [   ], LOW BACK ACHE [   ],  SKIN : ULCERS [   ], RASH [   ], ITCHING [   ]  CNS: HEAD ACHE [   ], DOUBLE VISION [   ], BLURRED VISION [   ], AMS / CONFUSION [   ], SEIZURES [   ], WEAKNESS [   ],TINGLING / NUMBNESS [   ]      PHYSICAL EXAMINATION:  GENERAL APPEARANCE: NO DISTRESS  HEENT:  NO PALLOR, NO  JVD,  NO   NODES, NECK SUPPLE  CVS: S1 +, S2 +,   RS: AEEB,  OCCASIONAL  RALES +,   NO RONCHI  ABD: SOFT, NT, NO, BS +  EXT: NO PE  SKIN: WARM,   SKELETAL:  ROM ACCEPTABLE  CNS:  AAO X 3    RADIOLOGY :  RADIOLOGY AND READINGS REVIEWED    ASSESSMENT :     Acute renal failure    HTN (hypertension)    DM (diabetes mellitus)    Myasthenia gravis    Hypercholesterolemia    CVA (cerebrovascular accident)    Peripheral neuropathy    BPH (benign prostatic hyperplasia)    Major depression    Lipoma of chest wall    MG with thymoma (myasthena gravis)    H/O cataract extraction        PLAN:  HPI:  Patient is a 70M, from [facility] ambulates with a walker, with PMHx of Myasthenia gravis s/p thymectomy, HTN, 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 Patient reports he has been feeling nauseous for a few days but no vomiting. He reports chronic increased urinary frequency and sensation of incomplete voiding. He denies all other symptoms - fever, chills, cough, chest pain, SoB, palpitations abdominal pain, diarrhea, dysuria, arm or leg numbness or tingling. Reports regular brown stool - denies black or bloody stool.  (2025 21:32)      # HYPERTENSIVE URGENCY  # UNDERLYING HTN  - TELEMETRY  - F/U ECHO  - NOTED TROPONINS  - S/P IV HYDRALAZINE  - HYDRALAZINE  - INCREASE PROCARDIA  - SWITCH METOPROLOL TO LABETALOL  - F/U SERUM RENIN, ALDOSTERONE LEVELS  - CARDIOLOGY CONSULT    # INTRAVASCULAR VOLUME DEPLETION  # MICHEL  # BPH  - IV FLUIDS  - PVR - 0 ML  - MONITOR CR  - AVOID NEPHROTOXIC AGENTS    # ANEMIA  - TREND HGB    # MYASTHENIA GRAVIS S/P THYMECTOMY    # DM  - SSI + FS    # CVA W/ MILD RIGHT HP    # PAD  # GI AND DVT PPX   Patient is a 70y old  Male who presents with a chief complaint of MICHEL (2025 12:43)    PATIENT IS SEEN AND EXAMINED IN MEDICAL FLOOR.      ALLERGIES:  No Known Allergies      Daily     Daily Weight in k.8 (2025 05:08)    VITALS:    Vital Signs Last 24 Hrs  T(C): 36.4 (2025 08:06), Max: 36.9 (2025 00:10)  T(F): 97.5 (2025 08:06), Max: 98.4 (2025 00:10)  HR: 61 (2025 08:06) (58 - 68)  BP: 191/60 (2025 08:06) (97/62 - 213/80)  BP(mean): 100 (2025 05:08) (100 - 100)  RR: 18 (2025 08:06) (18 - 19)  SpO2: 100% (2025 08:06) (96% - 100%)    Parameters below as of 2025 08:06  Patient On (Oxygen Delivery Method): room air        LABS:    CBC Full  -  ( 2025 05:10 )  WBC Count : 6.60 K/uL  RBC Count : 2.79 M/uL  Hemoglobin : 9.8 g/dL  Hematocrit : 29.5 %  Platelet Count - Automated : 241 K/uL  Mean Cell Volume : 105.7 fl  Mean Cell Hemoglobin : 35.1 pg  Mean Cell Hemoglobin Concentration : 33.2 g/dL  Auto Neutrophil # : x  Auto Lymphocyte # : x  Auto Monocyte # : x  Auto Eosinophil # : x  Auto Basophil # : x  Auto Neutrophil % : x  Auto Lymphocyte % : x  Auto Monocyte % : x  Auto Eosinophil % : x  Auto Basophil % : x    PT/INR - ( 2025 18:00 )   PT: 10.0 sec;   INR: 0.86 ratio         PTT - ( 2025 18:00 )  PTT:25.6 sec  -    138  |  103  |  22[H]  ----------------------------<  40[LL]  2.9[LL]   |  30  |  1.54[H]    Ca    9.0      2025 05:47  Phos  2.7     -  Mg     2.1     06-    TPro  7.6  /  Alb  3.5  /  TBili  0.3  /  DBili  x   /  AST  35  /  ALT  31  /  AlkPhos  137[H]      CAPILLARY BLOOD GLUCOSE      POCT Blood Glucose.: 147 mg/dL (2025 07:45)  POCT Blood Glucose.: 54 mg/dL (2025 06:47)  POCT Blood Glucose.: 107 mg/dL (2025 22:46)  POCT Blood Glucose.: 139 mg/dL (2025 20:58)  POCT Blood Glucose.: 244 mg/dL (2025 16:52)  POCT Blood Glucose.: 266 mg/dL (2025 11:19)        LIVER FUNCTIONS - ( 2025 17:30 )  Alb: 3.5 g/dL / Pro: 7.6 g/dL / ALK PHOS: 137 U/L / ALT: 31 U/L DA / AST: 35 U/L / GGT: x           Creatinine Trend: 1.54<--, 1.55<--, 1.81<--  I&O's Summary    2025 07:01  -  2025 07:00  --------------------------------------------------------  IN: 660 mL / OUT: 650 mL / NET: 10 mL                MEDICATIONS:    MEDICATIONS  (STANDING):  aspirin enteric coated 81 milliGRAM(s) Oral daily  atorvastatin 80 milliGRAM(s) Oral at bedtime  azaTHIOprine 100 milliGRAM(s) Oral daily  cholecalciferol 1000 Unit(s) Oral daily  cyanocobalamin 1000 MICROGram(s) Oral daily  dextrose 5%. 1000 milliLiter(s) (50 mL/Hr) IV Continuous <Continuous>  dextrose 50% Injectable 25 Gram(s) IV Push once  ferrous    sulfate 325 milliGRAM(s) Oral two times a day  finasteride 5 milliGRAM(s) Oral daily  folic acid 1 milliGRAM(s) Oral daily  heparin   Injectable 5000 Unit(s) SubCutaneous every 8 hours  hydrALAZINE 100 milliGRAM(s) Oral every 8 hours  insulin glargine Injectable (LANTUS) 6 Unit(s) SubCutaneous at bedtime  insulin lispro (ADMELOG) corrective regimen sliding scale   SubCutaneous three times a day before meals  insulin lispro (ADMELOG) corrective regimen sliding scale   SubCutaneous at bedtime  labetalol 400 milliGRAM(s) Oral every 12 hours  NIFEdipine XL 90 milliGRAM(s) Oral daily  potassium chloride    Tablet ER 20 milliEquivalent(s) Oral every 2 hours  predniSONE   Tablet 20 milliGRAM(s) Oral daily  pyridostigmine 60 milliGRAM(s) Oral <User Schedule>  sertraline 50 milliGRAM(s) Oral daily  tamsulosin 0.4 milliGRAM(s) Oral at bedtime      MEDICATIONS  (PRN):  acetaminophen     Tablet .. 650 milliGRAM(s) Oral every 6 hours PRN Temp greater or equal to 38C (100.4F), Mild Pain (1 - 3)  dextrose Oral Gel 15 Gram(s) Oral once PRN Blood Glucose LESS THAN 70 milliGRAM(s)/deciliter  ondansetron Injectable 4 milliGRAM(s) IV Push every 8 hours PRN Nausea and/or Vomiting      REVIEW OF SYSTEMS:                           ALL ROS DONE [ X   ]    CONSTITUTIONAL:  LETHARGIC [   ], FEVER [   ], UNRESPONSIVE [   ]  CVS:  CP  [   ], SOB, [   ], PALPITATIONS [   ], DIZZYNESS [   ]  RS: COUGH [   ], SPUTUM [   ]  GI: ABDOMINAL PAIN [   ], NAUSEA [   ], VOMITINGS [   ], DIARRHEA [   ], CONSTIPATION [   ]  :  DYSURIA [   ], NOCTURIA [   ], INCREASED FREQUENCY [   ], DRIBLING [   ],  SKELETAL: PAINFUL JOINTS [   ], SWOLLEN JOINTS [   ], NECK ACHE [   ], LOW BACK ACHE [   ],  SKIN : ULCERS [   ], RASH [   ], ITCHING [   ]  CNS: HEAD ACHE [   ], DOUBLE VISION [   ], BLURRED VISION [   ], AMS / CONFUSION [   ], SEIZURES [   ], WEAKNESS [   ],TINGLING / NUMBNESS [   ]        PHYSICAL EXAMINATION:  GENERAL APPEARANCE: NO DISTRESS  HEENT:  NO PALLOR, NO  JVD,  NO   NODES, NECK SUPPLE  CVS: S1 +, S2 +,   RS: AEEB,  OCCASIONAL  RALES +,   NO RONCHI  ABD: SOFT, NT, NO, BS +  EXT: NO PE  SKIN: WARM,   SKELETAL:  ROM ACCEPTABLE  CNS:  AAO X 3      RADIOLOGY :  RADIOLOGY AND READINGS REVIEWED      ASSESSMENT :     Acute renal failure    HTN (hypertension)    DM (diabetes mellitus)    Myasthenia gravis    Hypercholesterolemia    CVA (cerebrovascular accident)    Peripheral neuropathy    BPH (benign prostatic hyperplasia)    Major depression    Lipoma of chest wall    MG with thymoma (myasthena gravis)    H/O cataract extraction        PLAN:  HPI:  Patient is a 70M, from [facility] ambulates with a walker, with PMHx of Myasthenia gravis s/p thymectomy, HTN, 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 Patient reports he has been feeling nauseous for a few days but no vomiting. He reports chronic increased urinary frequency and sensation of incomplete voiding. He denies all other symptoms - fever, chills, cough, chest pain, SoB, palpitations abdominal pain, diarrhea, dysuria, arm or leg numbness or tingling. Reports regular brown stool - denies black or bloody stool.  (2025 21:32)      # HYPERTENSIVE URGENCY  # UNDERLYING HTN  - TELEMETRY  - ECHO - LV EF - 70 - 75%, G1DD  - NOTED TROPONINS  - S/P IV HYDRALAZINE  - HYDRALAZINE  - INCREASE PROCARDIA  - SWITCH METOPROLOL TO LABETALOL  - F/U SERUM RENIN, ALDOSTERONE LEVELS  - CARDIOLOGY CONSULT  - NEPHROLOGY CONSULT    # INTRAVASCULAR VOLUME DEPLETION  # MICHEL  # BPH  - IV FLUIDS  - PVR - 0 ML  - MONITOR CR  - AVOID NEPHROTOXIC AGENTS  - NEPHROLOGY CONSULT    # ANEMIA  - TREND HGB    # MYASTHENIA GRAVIS S/P THYMECTOMY    # DM  - SSI + FS    # CVA W/ MILD RIGHT HP    # PAD  # GI AND DVT PPX

## 2025-06-27 NOTE — CHART NOTE - NSCHARTNOTEFT_GEN_A_CORE
EVENT: Received telephone call from RN re: critical value: serum Potassium 2.9 & Glucose of 40    HPI: Patient is a 70M, from [facility] ambulates with a walker, with PMHx of Myasthenia gravis s/p thymectomy, HTN, 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 Hgb 9.9. BUN/Cr 30/1.81. Admitted for MICHEL.     SUBJECTIVE: n/a    OBJECTIVE:  Vital Signs Last 24 Hrs  T(C): 36.3 (27 Jun 2025 05:08), Max: 36.9 (26 Jun 2025 07:20)  T(F): 97.4 (27 Jun 2025 05:08), Max: 98.5 (26 Jun 2025 07:20)  HR: 60 (27 Jun 2025 05:08) (58 - 68)  BP: 192/65 (27 Jun 2025 05:08) (97/62 - 213/80)  BP(mean): 100 (27 Jun 2025 05:08) (100 - 100)  RR: 19 (27 Jun 2025 05:08) (16 - 19)  SpO2: 96% (27 Jun 2025 05:08) (96% - 99%)    Parameters below as of 27 Jun 2025 05:08  Patient On (Oxygen Delivery Method): room air        PHYSICAL EXAM:  Neuro: Awake and alert, oriented to person, place, and time  Cardiovascular: + S1, S2, no murmurs, rubs, or bruits  Respiratory: clear to auscultation bilaterally with good air entry   GI: Abdomen soft, non-tender, bowel sounds present   : Non distended;   Skin: warm and dry; no rash      LABS:                        9.8    6.60  )-----------( 241      ( 26 Jun 2025 05:10 )             29.5     06-27    138  |  103  |  22[H]  ----------------------------<  40[LL]  2.9[LL]   |  30  |  1.54[H]    Ca    9.0      27 Jun 2025 05:47  Phos  2.7     06-26  Mg     2.1     06-26    TPro  7.6  /  Alb  3.5  /  TBili  0.3  /  DBili  x   /  AST  35  /  ALT  31  /  AlkPhos  137[H]  06-25        EKG:   IMAGING:    ASSESSMENT/PROBLEM: Hypokalemia & hypoglycemia        PLAN:   1. Potassium 20 mg, PO Q2 hrs x 3 doses ordered  2. Potassium 10 meq, IV Q 1 hr x 3 doses ordered  3. EKG ordered stat  4. D50 25 Gm IVP x 1 dose ordered/given  5. Monitor response to treatment  6. Cont present care/treatment  7. Supportive care    FOLLOW UP: with BMP at 2 PM    . Total time spent was > than  35 mins assessing presenting problems of acute illness that poses high probability of life threatening deterioration or end organ damage/dysfunction.  Medical decision making including Initiating plan of care, reviewing data, reviewing radiology, direct patient bedside evaluation and interpretation of vital signs, ordering medication/s and writing this note.

## 2025-06-28 LAB
ANION GAP SERPL CALC-SCNC: 3 MMOL/L — LOW (ref 5–17)
APPEARANCE UR: CLEAR — SIGNIFICANT CHANGE UP
BACTERIA # UR AUTO: ABNORMAL /HPF
BILIRUB UR-MCNC: NEGATIVE — SIGNIFICANT CHANGE UP
BLD GP AB SCN SERPL QL: SIGNIFICANT CHANGE UP
BUN SERPL-MCNC: 14 MG/DL — SIGNIFICANT CHANGE UP (ref 7–18)
CALCIUM SERPL-MCNC: 8.5 MG/DL — SIGNIFICANT CHANGE UP (ref 8.4–10.5)
CHLORIDE SERPL-SCNC: 105 MMOL/L — SIGNIFICANT CHANGE UP (ref 96–108)
CO2 SERPL-SCNC: 30 MMOL/L — SIGNIFICANT CHANGE UP (ref 22–31)
COLOR SPEC: YELLOW — SIGNIFICANT CHANGE UP
CREAT ?TM UR-MCNC: 127 MG/DL — SIGNIFICANT CHANGE UP
CREAT SERPL-MCNC: 1.4 MG/DL — HIGH (ref 0.5–1.3)
DIFF PNL FLD: NEGATIVE — SIGNIFICANT CHANGE UP
EGFR: 55 ML/MIN/1.73M2 — LOW
EGFR: 55 ML/MIN/1.73M2 — LOW
EPI CELLS # UR: SIGNIFICANT CHANGE UP
GLUCOSE BLDC GLUCOMTR-MCNC: 100 MG/DL — HIGH (ref 70–99)
GLUCOSE BLDC GLUCOMTR-MCNC: 119 MG/DL — HIGH (ref 70–99)
GLUCOSE BLDC GLUCOMTR-MCNC: 221 MG/DL — HIGH (ref 70–99)
GLUCOSE SERPL-MCNC: 49 MG/DL — CRITICAL LOW (ref 70–99)
GLUCOSE UR QL: NEGATIVE MG/DL — SIGNIFICANT CHANGE UP
HCT VFR BLD CALC: 24.2 % — LOW (ref 39–50)
HCT VFR BLD CALC: 28.5 % — LOW (ref 39–50)
HGB BLD-MCNC: 7.8 G/DL — LOW (ref 13–17)
HGB BLD-MCNC: 9.1 G/DL — LOW (ref 13–17)
KETONES UR QL: NEGATIVE MG/DL — SIGNIFICANT CHANGE UP
LEUKOCYTE ESTERASE UR-ACNC: NEGATIVE — SIGNIFICANT CHANGE UP
MCHC RBC-ENTMCNC: 31.9 G/DL — LOW (ref 32–36)
MCHC RBC-ENTMCNC: 32.2 G/DL — SIGNIFICANT CHANGE UP (ref 32–36)
MCHC RBC-ENTMCNC: 34.2 PG — HIGH (ref 27–34)
MCHC RBC-ENTMCNC: 34.9 PG — HIGH (ref 27–34)
MCV RBC AUTO: 106.1 FL — HIGH (ref 80–100)
MCV RBC AUTO: 109.2 FL — HIGH (ref 80–100)
NITRITE UR-MCNC: NEGATIVE — SIGNIFICANT CHANGE UP
NRBC BLD AUTO-RTO: 0 /100 WBCS — SIGNIFICANT CHANGE UP (ref 0–0)
NRBC BLD AUTO-RTO: 0 /100 WBCS — SIGNIFICANT CHANGE UP (ref 0–0)
PH UR: 7 — SIGNIFICANT CHANGE UP (ref 5–8)
PLATELET # BLD AUTO: 203 K/UL — SIGNIFICANT CHANGE UP (ref 150–400)
PLATELET # BLD AUTO: 234 K/UL — SIGNIFICANT CHANGE UP (ref 150–400)
POTASSIUM SERPL-MCNC: 3.6 MMOL/L — SIGNIFICANT CHANGE UP (ref 3.5–5.3)
POTASSIUM SERPL-SCNC: 3.6 MMOL/L — SIGNIFICANT CHANGE UP (ref 3.5–5.3)
POTASSIUM UR-SCNC: 43 MMOL/L — SIGNIFICANT CHANGE UP
PROT ?TM UR-MCNC: 115 MG/DL — HIGH (ref 0–12)
PROT UR-MCNC: 100 MG/DL
PROT/CREAT UR-RTO: 0.9 RATIO — HIGH (ref 0–0.2)
RBC # BLD: 2.28 M/UL — LOW (ref 4.2–5.8)
RBC # BLD: 2.61 M/UL — LOW (ref 4.2–5.8)
RBC # FLD: 17.2 % — HIGH (ref 10.3–14.5)
RBC # FLD: 17.2 % — HIGH (ref 10.3–14.5)
RBC CASTS # UR COMP ASSIST: 2 /HPF — SIGNIFICANT CHANGE UP (ref 0–4)
SODIUM SERPL-SCNC: 138 MMOL/L — SIGNIFICANT CHANGE UP (ref 135–145)
SP GR SPEC: 1.01 — SIGNIFICANT CHANGE UP (ref 1–1.03)
UROBILINOGEN FLD QL: 1 MG/DL — SIGNIFICANT CHANGE UP (ref 0.2–1)
WBC # BLD: 5.14 K/UL — SIGNIFICANT CHANGE UP (ref 3.8–10.5)
WBC # BLD: 6.75 K/UL — SIGNIFICANT CHANGE UP (ref 3.8–10.5)
WBC # FLD AUTO: 5.14 K/UL — SIGNIFICANT CHANGE UP (ref 3.8–10.5)
WBC # FLD AUTO: 6.75 K/UL — SIGNIFICANT CHANGE UP (ref 3.8–10.5)
WBC UR QL: 2 /HPF — SIGNIFICANT CHANGE UP (ref 0–5)

## 2025-06-28 RX ADMIN — Medication 1000 UNIT(S): at 11:51

## 2025-06-28 RX ADMIN — INSULIN LISPRO 2: 100 INJECTION, SOLUTION INTRAVENOUS; SUBCUTANEOUS at 11:52

## 2025-06-28 RX ADMIN — INSULIN GLARGINE-YFGN 6 UNIT(S): 100 INJECTION, SOLUTION SUBCUTANEOUS at 21:14

## 2025-06-28 RX ADMIN — SERTRALINE 50 MILLIGRAM(S): 100 TABLET, FILM COATED ORAL at 11:51

## 2025-06-28 RX ADMIN — PYRIDOSTIGMINE BROMIDE 60 MILLIGRAM(S): 5 INJECTION, SOLUTION INTRAVENOUS; PARENTERAL at 11:51

## 2025-06-28 RX ADMIN — PYRIDOSTIGMINE BROMIDE 60 MILLIGRAM(S): 5 INJECTION, SOLUTION INTRAVENOUS; PARENTERAL at 21:13

## 2025-06-28 RX ADMIN — AZATHIOPRINE 100 MILLIGRAM(S): 50 TABLET ORAL at 11:51

## 2025-06-28 RX ADMIN — Medication 90 MILLIGRAM(S): at 05:12

## 2025-06-28 RX ADMIN — CYANOCOBALAMIN 1000 MICROGRAM(S): 1000 INJECTION INTRAMUSCULAR; SUBCUTANEOUS at 11:52

## 2025-06-28 RX ADMIN — Medication 325 MILLIGRAM(S): at 17:23

## 2025-06-28 RX ADMIN — LOSARTAN POTASSIUM 100 MILLIGRAM(S): 100 TABLET, FILM COATED ORAL at 05:12

## 2025-06-28 RX ADMIN — HEPARIN SODIUM 5000 UNIT(S): 1000 INJECTION INTRAVENOUS; SUBCUTANEOUS at 21:14

## 2025-06-28 RX ADMIN — LABETALOL HYDROCHLORIDE 400 MILLIGRAM(S): 200 TABLET, FILM COATED ORAL at 05:12

## 2025-06-28 RX ADMIN — FOLIC ACID 1 MILLIGRAM(S): 1 TABLET ORAL at 11:51

## 2025-06-28 RX ADMIN — HEPARIN SODIUM 5000 UNIT(S): 1000 INJECTION INTRAVENOUS; SUBCUTANEOUS at 05:12

## 2025-06-28 RX ADMIN — Medication 100 MILLIGRAM(S): at 05:12

## 2025-06-28 RX ADMIN — Medication 100 MILLIGRAM(S): at 21:14

## 2025-06-28 RX ADMIN — Medication 100 MILLIGRAM(S): at 13:57

## 2025-06-28 RX ADMIN — FINASTERIDE 5 MILLIGRAM(S): 1 TABLET, FILM COATED ORAL at 11:51

## 2025-06-28 RX ADMIN — PYRIDOSTIGMINE BROMIDE 60 MILLIGRAM(S): 5 INJECTION, SOLUTION INTRAVENOUS; PARENTERAL at 05:12

## 2025-06-28 RX ADMIN — PYRIDOSTIGMINE BROMIDE 60 MILLIGRAM(S): 5 INJECTION, SOLUTION INTRAVENOUS; PARENTERAL at 01:54

## 2025-06-28 RX ADMIN — PYRIDOSTIGMINE BROMIDE 60 MILLIGRAM(S): 5 INJECTION, SOLUTION INTRAVENOUS; PARENTERAL at 17:23

## 2025-06-28 RX ADMIN — ATORVASTATIN CALCIUM 80 MILLIGRAM(S): 80 TABLET, FILM COATED ORAL at 21:13

## 2025-06-28 RX ADMIN — LABETALOL HYDROCHLORIDE 400 MILLIGRAM(S): 200 TABLET, FILM COATED ORAL at 21:13

## 2025-06-28 RX ADMIN — PREDNISONE 20 MILLIGRAM(S): 20 TABLET ORAL at 05:12

## 2025-06-28 RX ADMIN — IRON SUCROSE 110 MILLIGRAM(S): 20 INJECTION, SOLUTION INTRAVENOUS at 17:23

## 2025-06-28 RX ADMIN — PYRIDOSTIGMINE BROMIDE 60 MILLIGRAM(S): 5 INJECTION, SOLUTION INTRAVENOUS; PARENTERAL at 13:57

## 2025-06-28 RX ADMIN — HEPARIN SODIUM 5000 UNIT(S): 1000 INJECTION INTRAVENOUS; SUBCUTANEOUS at 13:57

## 2025-06-28 RX ADMIN — Medication 325 MILLIGRAM(S): at 05:12

## 2025-06-28 RX ADMIN — LABETALOL HYDROCHLORIDE 400 MILLIGRAM(S): 200 TABLET, FILM COATED ORAL at 13:57

## 2025-06-28 RX ADMIN — Medication 81 MILLIGRAM(S): at 11:51

## 2025-06-28 RX ADMIN — TAMSULOSIN HYDROCHLORIDE 0.4 MILLIGRAM(S): 0.4 CAPSULE ORAL at 21:14

## 2025-06-28 NOTE — DISCHARGE NOTE PROVIDER - NSDCCPCAREPLAN_GEN_ALL_CORE_FT
PRINCIPAL DISCHARGE DIAGNOSIS  Diagnosis: MICHEL (acute kidney injury)  Assessment and Plan of Treatment: In order to prevent further disease progression, continue to follow recommendations made by your primary provider/nephrologist. Continue a diet that is low in sodium and avoid foods that are concentrated in potassium and phosphorus. Continue your medications/supplementations as directed and avoid over-the-counter drugs that are harmful to kidneys, such as, Non-Steroidal Anti-Inflammatory Drugs (NSAIDs). Follow-up as outpatient to monitor your kidney function, as well as, vitamin D, Calcium, potassium, and phosphorus levels.        SECONDARY DISCHARGE DIAGNOSES  Diagnosis: MG (myasthenia gravis)  Assessment and Plan of Treatment: Continue to take your medication as prescribe and follow up with your doctor as outpatient.  Report any changes in  your condition.    Diagnosis: History of CVA (cerebrovascular accident)  Assessment and Plan of Treatment: History of TIA, continue medications as ordered. TIA is a small transient stroke . A stroke is a brain attack that occurs when an artery or a blood vessel becomes occluded breaks, interrupting blood flow to an area of the brain cells begin to die. Please call 911 for facial droop slurring speech, unable to move a limb or sudden weakness in one limb or one side of the body or confusion.      Diagnosis: Hypertensive urgency  Assessment and Plan of Treatment: You are being treated for high blood pressure.  Continue taking your medication as prescribed to help prevent or minimize your risk of end organ damage.  Follow up with your doctor for routine blood pressure evaluation and medication regimen.  Report any symptoms of headaces with nausea or vomiting, visual changes, heart palpitation, chest pain or shortness.      Diagnosis: BPH (benign prostatic hyperplasia)  Assessment and Plan of Treatment: You have an enlarged prostate gland which gets bigger as men get older - it is a very common problem and has nothing to do with prostate cancer  Call your doctor if you are urinating more frequently, have trouble starting to urinate, have weak stream, urine leaking or dribbling, and feeling as though bladder is not empty after urination  Your doctor will monitor your prostate with a rectal exam as well as urine or blood testing  You can help yourself by reducing the amount of fluid you drink before going to bed, limiting the amount of alcohol & caffeine you drink   Avoid cold & allergy medication that contain decongestants or antihistamines which make BPH symptoms worse  You can also "double void" by waiting a moment after urinating & trying again  Take your medication as prescribed - one medication helps to relax the muscle around the urethra and the other medication you may take prevents the prostate from growing more or even shrinking the prostate      Diagnosis: Major depression  Assessment and Plan of Treatment: Depression is a condition that causes feelings of sadness or hopelessness that do not go away. The person may lose interest in things he or she used to enjoy. Depression is common in older adults, but it is not a normal part of aging. Treatment is very important and can help improve the person's daily life. You can help support the person by encouraging him or her to work with healthcare providers to manage depression.  Help the person connect with others. Encourage him or her to become involved in the community. Some examples include tutoring a young student or volunteering at a local organization. The person may need help setting up a computer or creating an e-mail account to help him or her remain connected to others. You may also be able to help set up a visit for the person with his or her Shinto or spiritual leader.  Encourage the person to try new things. This can help the person find new interests or meet new people. It can also help prevent him or her from focusing on depression.  Help the person get equipment that will increase his or her comfort and mobility. Examples are hearing aids, glasses, large print books, and walkers. These can help him or her enjoy activities and feel more independent.  Encourage the person to continue taking medicine and going to therapy. Medicine and therapy can help improve his or her mental health.  What are the signs and symptoms of depression in older adults?  Appetite changes, or weight gain or loss.  Trouble going to sleep or staying asleep, or sleeping too much  Fatigue or lack of energy  Feeling restless, irritable, or withdrawn  Hallucinations or delusions  Feeling worthless, hopeless, discouraged, or guilty  Trouble concentrating, remembering things, doing daily tasks, or making decisions.  Statements about wanting to hurt or kill himself or herself.  Where can I go for more help if I think the person is considering suicide? The following are available at any time:  National Suicide Prevention Lifeline: 0-119-739-0687 (1-800-273     PRINCIPAL DISCHARGE DIAGNOSIS  Diagnosis: MICHEL (acute kidney injury)  Assessment and Plan of Treatment: You were sent to hospital from facility with worsening kidney function noted on your blood work with elevated serum creatinine.  This is likely due to uncontrolled blood pressure.  You were followed by nephrologist with adjustments to your blood pressure medications with gradually improving kidney function.  Renal US showed  Echogenic kidneys suggestive of parenchymal disease. No hydronephrosis. Bladder wall thickening.    MICHEL gradually improving, Scr.......  In order to prevent further disease progression, continue to follow recommendations made by your primary provider/nephrologist. Continue a diet that is low in sodium and avoid foods that are concentrated in potassium and phosphorus. Continue your medications/supplementations as directed and avoid over-the-counter drugs that are harmful to kidneys, such as, Non-Steroidal Anti-Inflammatory Drugs (NSAIDs). Follow-up as outpatient to monitor your kidney function, as well as, vitamin D, Calcium, potassium, and phosphorus levels.        SECONDARY DISCHARGE DIAGNOSES  Diagnosis: Hypertensive urgency  Assessment and Plan of Treatment: You are being treated for high blood pressure.  Continue taking your medication as prescribed to help prevent or minimize your risk of end organ damage.  Follow up with your doctor for routine blood pressure evaluation and medication regimen.  Report any symptoms of headaces with nausea or vomiting, visual changes, heart palpitation, chest pain or shortness.      Diagnosis: MG (myasthenia gravis)  Assessment and Plan of Treatment: Continue to take your medication as prescribe and follow up with your doctor as outpatient.  Report any changes in  your condition.    Diagnosis: Major depression  Assessment and Plan of Treatment: Depression is a condition that causes feelings of sadness or hopelessness that do not go away. The person may lose interest in things he or she used to enjoy. Depression is common in older adults, but it is not a normal part of aging. Treatment is very important and can help improve the person's daily life. You can help support the person by encouraging him or her to work with healthcare providers to manage depression.  Help the person connect with others. Encourage him or her to become involved in the community. Some examples include tutoring a young student or volunteering at a local organization. The person may need help setting up a computer or creating an e-mail account to help him or her remain connected to others. You may also be able to help set up a visit for the person with his or her Lutheran or spiritual leader.  Encourage the person to try new things. This can help the person find new interests or meet new people. It can also help prevent him or her from focusing on depression.  Help the person get equipment that will increase his or her comfort and mobility. Examples are hearing aids, glasses, large print books, and walkers. These can help him or her enjoy activities and feel more independent.  Encourage the person to continue taking medicine and going to therapy. Medicine and therapy can help improve his or her mental health.  What are the signs and symptoms of depression in older adults?  Appetite changes, or weight gain or loss.  Trouble going to sleep or staying asleep, or sleeping too much  Fatigue or lack of energy  Feeling restless, irritable, or withdrawn  Hallucinations or delusions  Feeling worthless, hopeless, discouraged, or guilty  Trouble concentrating, remembering things, doing daily tasks, or making decisions.  Statements about wanting to hurt or kill himself or herself.  Where can I go for more help if I think the person is considering suicide? The following are available at any time:  National Suicide Prevention Lifeline: 9-672-332-0238 (1-800-273    Diagnosis: BPH (benign prostatic hyperplasia)  Assessment and Plan of Treatment: You have an enlarged prostate gland which gets bigger as men get older - it is a very common problem and has nothing to do with prostate cancer  Call your doctor if you are urinating more frequently, have trouble starting to urinate, have weak stream, urine leaking or dribbling, and feeling as though bladder is not empty after urination  Your doctor will monitor your prostate with a rectal exam as well as urine or blood testing  You can help yourself by reducing the amount of fluid you drink before going to bed, limiting the amount of alcohol & caffeine you drink   Avoid cold & allergy medication that contain decongestants or antihistamines which make BPH symptoms worse  You can also "double void" by waiting a moment after urinating & trying again  Take your medication as prescribed - one medication helps to relax the muscle around the urethra and the other medication you may take prevents the prostate from growing more or even shrinking the prostate      Diagnosis: History of CVA (cerebrovascular accident)  Assessment and Plan of Treatment: History of TIA, continue medications as ordered. TIA is a small transient stroke . A stroke is a brain attack that occurs when an artery or a blood vessel becomes occluded breaks, interrupting blood flow to an area of the brain cells begin to die. Please call 911 for facial droop slurring speech, unable to move a limb or sudden weakness in one limb or one side of the body or confusion.       PRINCIPAL DISCHARGE DIAGNOSIS  Diagnosis: MICHEL (acute kidney injury)  Assessment and Plan of Treatment: You were sent to hospital from facility with worsening kidney function noted on your blood work with elevated serum creatinine.  This is likely due to uncontrolled blood pressure.  You were followed by nephrologist with adjustments to your blood pressure medications with gradually improving kidney function.  Renal US showed  Echogenic kidneys suggestive of parenchymal disease. No hydronephrosis. Bladder wall thickening.    MICHEL gradually improving.  In order to prevent further disease progression, continue to follow recommendations made by your primary provider/nephrologist. Continue a diet that is low in sodium and avoid foods that are concentrated in potassium and phosphorus. Continue your medications/supplementations as directed and avoid over-the-counter drugs that are harmful to kidneys, such as, Non-Steroidal Anti-Inflammatory Drugs (NSAIDs). Follow-up as outpatient to -   Continue to monitor on As per policy patient does not require tele monitoring while at MRI.  Patient only on tele for stroke work up.  Patient has no known cardiac history, no arrhythmia noted on telemetry, vital signs stable since hospital admission, patient not on any cardiac drips and is not an ICU patient your kidney function, as well as, vitamin D, Calcium, potassium, and phosphorus levels.        SECONDARY DISCHARGE DIAGNOSES  Diagnosis: Hypertensive urgency  Assessment and Plan of Treatment: You are being treated for high blood pressure.  Continue taking your medication as prescribed to help prevent or minimize your risk of end organ damage.  Follow up with your doctor for routine blood pressure evaluation and medication regimen.  Report any symptoms of headaces with nausea or vomiting, visual changes, heart palpitation, chest pain or shortness.      Diagnosis: MG (myasthenia gravis)  Assessment and Plan of Treatment: Continue to take your medication as prescribe and follow up with your doctor as outpatient.  Report any changes in  your condition.    Diagnosis: Major depression  Assessment and Plan of Treatment: Depression is a condition that causes feelings of sadness or hopelessness that do not go away. The person may lose interest in things he or she used to enjoy. Depression is common in older adults, but it is not a normal part of aging. Treatment is very important and can help improve the person's daily life. You can help support the person by encouraging him or her to work with healthcare providers to manage depression.  Help the person connect with others. Encourage him or her to become involved in the community. Some examples include tutoring a young student or volunteering at a local organization. The person may need help setting up a computer or creating an e-mail account to help him or her remain connected to others. You may also be able to help set up a visit for the person with his or her Anabaptist or spiritual leader.  Encourage the person to try new things. This can help the person find new interests or meet new people. It can also help prevent him or her from focusing on depression.  Help the person get equipment that will increase his or her comfort and mobility. Examples are hearing aids, glasses, large print books, and walkers. These can help him or her enjoy activities and feel more independent.  Encourage the person to continue taking medicine and going to therapy. Medicine and therapy can help improve his or her mental health.  What are the signs and symptoms of depression in older adults?  Appetite changes, or weight gain or loss.  Trouble going to sleep or staying asleep, or sleeping too much  Fatigue or lack of energy  Feeling restless, irritable, or withdrawn  Hallucinations or delusions  Feeling worthless, hopeless, discouraged, or guilty  Trouble concentrating, remembering things, doing daily tasks, or making decisions.  Statements about wanting to hurt or kill himself or herself.  Where can I go for more help if I think the person is considering suicide? The following are available at any time:  National Suicide Prevention Lifeline: 1-237.324.1960 (1-800-273    Diagnosis: Stomatitis  Assessment and Plan of Treatment: You had complaints of mouth pain during the hospitalization and diagnosed with gingival stomatis. You were evaluated by Infectious diseae and treated with IV antibiotics.  You are now stable for discharge with oral antiobiotics to complete the course.    Diagnosis: BPH (benign prostatic hyperplasia)  Assessment and Plan of Treatment: You have an enlarged prostate gland which gets bigger as men get older - it is a very common problem and has nothing to do with prostate cancer  Call your doctor if you are urinating more frequently, have trouble starting to urinate, have weak stream, urine leaking or dribbling, and feeling as though bladder is not empty after urination  Your doctor will monitor your prostate with a rectal exam as well as urine or blood testing  You can help yourself by reducing the amount of fluid you drink before going to bed, limiting the amount of alcohol & caffeine you drink   Avoid cold & allergy medication that contain decongestants or antihistamines which make BPH symptoms worse  You can also "double void" by waiting a moment after urinating & trying again  Take your medication as prescribed - one medication helps to relax the muscle around the urethra and the other medication you may take prevents the prostate from growing more or even shrinking the prostate      Diagnosis: History of CVA (cerebrovascular accident)  Assessment and Plan of Treatment: History of TIA, continue medications as ordered. TIA is a small transient stroke . A stroke is a brain attack that occurs when an artery or a blood vessel becomes occluded breaks, interrupting blood flow to an area of the brain cells begin to die. Please call 911 for facial droop slurring speech, unable to move a limb or sudden weakness in one limb or one side of the body or confusion.

## 2025-06-28 NOTE — PROGRESS NOTE ADULT - SUBJECTIVE AND OBJECTIVE BOX
Patient is a 70y old  Male who presents with a chief complaint of MICHEL (2025 08:58)    PATIENT IS SEEN AND EXAMINED IN MEDICAL FLOOR.  NGT [    ]    ARCADIO [   ]      GT [   ]    ALLERGIES:  No Known Allergies      Daily     Daily Weight in k.9 (2025 05:26)    VITALS:    Vital Signs Last 24 Hrs  T(C): 36.6 (2025 10:59), Max: 37 (2025 23:51)  T(F): 97.8 (2025 10:59), Max: 98.6 (2025 23:51)  HR: 63 (2025 10:59) (60 - 69)  BP: 127/53 (2025 10:59) (103/50 - 227/75)  BP(mean): 104 (2025 05:26) (101 - 104)  RR: 18 (2025 10:59) (18 - 19)  SpO2: 95% (2025 10:59) (93% - 100%)    Parameters below as of 2025 10:59  Patient On (Oxygen Delivery Method): room air        LABS:    CBC Full  -  ( 2025 06:02 )  WBC Count : 5.14 K/uL  RBC Count : 2.28 M/uL  Hemoglobin : 7.8 g/dL  Hematocrit : 24.2 %  Platelet Count - Automated : 203 K/uL  Mean Cell Volume : 106.1 fl  Mean Cell Hemoglobin : 34.2 pg  Mean Cell Hemoglobin Concentration : 32.2 g/dL  Auto Neutrophil # : x  Auto Lymphocyte # : x  Auto Monocyte # : x  Auto Eosinophil # : x  Auto Basophil # : x  Auto Neutrophil % : x  Auto Lymphocyte % : x  Auto Monocyte % : x  Auto Eosinophil % : x  Auto Basophil % : x      -    138  |  105  |  14  ----------------------------<  49[LL]  3.6   |  30  |  1.40[H]    Ca    8.5      2025 06:02      CAPILLARY BLOOD GLUCOSE      POCT Blood Glucose.: 114 mg/dL (2025 21:14)  POCT Blood Glucose.: 128 mg/dL (2025 16:57)  POCT Blood Glucose.: 165 mg/dL (2025 11:12)          Creatinine Trend: 1.40<--, 1.45<--, 1.54<--, 1.55<--, 1.81<--  I&O's Summary    2025 07:01  -  2025 07:00  --------------------------------------------------------  IN: 50 mL / OUT: 650 mL / NET: -600 mL                MEDICATIONS:    MEDICATIONS  (STANDING):  aspirin enteric coated 81 milliGRAM(s) Oral daily  atorvastatin 80 milliGRAM(s) Oral at bedtime  azaTHIOprine 100 milliGRAM(s) Oral daily  cholecalciferol 1000 Unit(s) Oral daily  cyanocobalamin 1000 MICROGram(s) Oral daily  dextrose 5%. 1000 milliLiter(s) (50 mL/Hr) IV Continuous <Continuous>  dextrose 50% Injectable 25 Gram(s) IV Push once  ferrous    sulfate 325 milliGRAM(s) Oral two times a day  finasteride 5 milliGRAM(s) Oral daily  folic acid 1 milliGRAM(s) Oral daily  heparin   Injectable 5000 Unit(s) SubCutaneous every 8 hours  hydrALAZINE 100 milliGRAM(s) Oral every 8 hours  insulin glargine Injectable (LANTUS) 6 Unit(s) SubCutaneous at bedtime  insulin lispro (ADMELOG) corrective regimen sliding scale   SubCutaneous three times a day before meals  insulin lispro (ADMELOG) corrective regimen sliding scale   SubCutaneous at bedtime  iron sucrose IVPB 200 milliGRAM(s) IV Intermittent every 24 hours  labetalol 400 milliGRAM(s) Oral every 8 hours  losartan 100 milliGRAM(s) Oral daily  NIFEdipine XL 90 milliGRAM(s) Oral daily  predniSONE   Tablet 20 milliGRAM(s) Oral daily  pyridostigmine 60 milliGRAM(s) Oral <User Schedule>  sertraline 50 milliGRAM(s) Oral daily  tamsulosin 0.4 milliGRAM(s) Oral at bedtime      MEDICATIONS  (PRN):  acetaminophen     Tablet .. 650 milliGRAM(s) Oral every 6 hours PRN Temp greater or equal to 38C (100.4F), Mild Pain (1 - 3)  dextrose Oral Gel 15 Gram(s) Oral once PRN Blood Glucose LESS THAN 70 milliGRAM(s)/deciliter  ondansetron Injectable 4 milliGRAM(s) IV Push every 8 hours PRN Nausea and/or Vomiting      REVIEW OF SYSTEMS:                           ALL ROS DONE [ X   ]    CONSTITUTIONAL:  LETHARGIC [   ], FEVER [   ], UNRESPONSIVE [   ]  CVS:  CP  [   ], SOB, [   ], PALPITATIONS [   ], DIZZYNESS [   ]  RS: COUGH [   ], SPUTUM [   ]  GI: ABDOMINAL PAIN [   ], NAUSEA [   ], VOMITINGS [   ], DIARRHEA [   ], CONSTIPATION [   ]  :  DYSURIA [   ], NOCTURIA [   ], INCREASED FREQUENCY [   ], DRIBLING [   ],  SKELETAL: PAINFUL JOINTS [   ], SWOLLEN JOINTS [   ], NECK ACHE [   ], LOW BACK ACHE [   ],  SKIN : ULCERS [   ], RASH [   ], ITCHING [   ]  CNS: HEAD ACHE [   ], DOUBLE VISION [   ], BLURRED VISION [   ], AMS / CONFUSION [   ], SEIZURES [   ], WEAKNESS [   ],TINGLING / NUMBNESS [   ]        PHYSICAL EXAMINATION:  GENERAL APPEARANCE: NO DISTRESS  HEENT:  NO PALLOR, NO  JVD,  NO   NODES, NECK SUPPLE  CVS: S1 +, S2 +,   RS: AEEB,  OCCASIONAL  RALES +,   NO RONCHI  ABD: SOFT, NT, NO, BS +  EXT: NO PE  SKIN: WARM,   SKELETAL:  ROM ACCEPTABLE  CNS:  AAO X 3      RADIOLOGY :  RADIOLOGY AND READINGS REVIEWED      ASSESSMENT :     Acute renal failure    HTN (hypertension)    DM (diabetes mellitus)    Myasthenia gravis    Hypercholesterolemia    CVA (cerebrovascular accident)    Peripheral neuropathy    BPH (benign prostatic hyperplasia)    Major depression    Lipoma of chest wall    MG with thymoma (myasthena gravis)    H/O cataract extraction        PLAN:  HPI:  Patient is a 70M, from [facility] ambulates with a walker, with PMHx of Myasthenia gravis s/p thymectomy, HTN, 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 Patient reports he has been feeling nauseous for a few days but no vomiting. He reports chronic increased urinary frequency and sensation of incomplete voiding. He denies all other symptoms - fever, chills, cough, chest pain, SoB, palpitations abdominal pain, diarrhea, dysuria, arm or leg numbness or tingling. Reports regular brown stool - denies black or bloody stool.  (2025 21:32)      # HYPERTENSIVE URGENCY  # UNDERLYING HTN  - TELEMETRY  - ECHO - LV EF - 70 - 75%, G1DD  - NOTED TROPONINS  - S/P IV HYDRALAZINE  - HYDRALAZINE  - INCREASE PROCARDIA  - SWITCH METOPROLOL TO LABETALOL  - F/U SERUM RENIN, ALDOSTERONE LEVELS  - CARDIOLOGY CONSULT  - NEPHROLOGY CONSULT    # INTRAVASCULAR VOLUME DEPLETION  # MICHEL  # BPH  - IV FLUIDS  - PVR - 0 ML  - MONITOR CR  - AVOID NEPHROTOXIC AGENTS  - NEPHROLOGY CONSULT    # ANEMIA  - TREND HGB    # MYASTHENIA GRAVIS S/P THYMECTOMY    # DM  - SSI + FS    # CVA W/ MILD RIGHT HP    # PAD  # GI AND DVT PPX   Patient is a 70y old  Male who presents with a chief complaint of MICHEL (2025 08:58)    PATIENT IS SEEN AND EXAMINED IN MEDICAL FLOOR.      ALLERGIES:  No Known Allergies      Daily     Daily Weight in k.9 (2025 05:26)    VITALS:    Vital Signs Last 24 Hrs  T(C): 36.6 (2025 10:59), Max: 37 (2025 23:51)  T(F): 97.8 (2025 10:59), Max: 98.6 (2025 23:51)  HR: 63 (2025 10:59) (60 - 69)  BP: 127/53 (2025 10:59) (103/50 - 227/75)  BP(mean): 104 (2025 05:26) (101 - 104)  RR: 18 (2025 10:59) (18 - 19)  SpO2: 95% (2025 10:59) (93% - 100%)    Parameters below as of 2025 10:59  Patient On (Oxygen Delivery Method): room air        LABS:    CBC Full  -  ( 2025 06:02 )  WBC Count : 5.14 K/uL  RBC Count : 2.28 M/uL  Hemoglobin : 7.8 g/dL  Hematocrit : 24.2 %  Platelet Count - Automated : 203 K/uL  Mean Cell Volume : 106.1 fl  Mean Cell Hemoglobin : 34.2 pg  Mean Cell Hemoglobin Concentration : 32.2 g/dL  Auto Neutrophil # : x  Auto Lymphocyte # : x  Auto Monocyte # : x  Auto Eosinophil # : x  Auto Basophil # : x  Auto Neutrophil % : x  Auto Lymphocyte % : x  Auto Monocyte % : x  Auto Eosinophil % : x  Auto Basophil % : x          138  |  105  |  14  ----------------------------<  49[LL]  3.6   |  30  |  1.40[H]    Ca    8.5      2025 06:02      CAPILLARY BLOOD GLUCOSE      POCT Blood Glucose.: 114 mg/dL (2025 21:14)  POCT Blood Glucose.: 128 mg/dL (2025 16:57)  POCT Blood Glucose.: 165 mg/dL (2025 11:12)          Creatinine Trend: 1.40<--, 1.45<--, 1.54<--, 1.55<--, 1.81<--  I&O's Summary    2025 07:01  -  2025 07:00  --------------------------------------------------------  IN: 50 mL / OUT: 650 mL / NET: -600 mL                MEDICATIONS:    MEDICATIONS  (STANDING):  aspirin enteric coated 81 milliGRAM(s) Oral daily  atorvastatin 80 milliGRAM(s) Oral at bedtime  azaTHIOprine 100 milliGRAM(s) Oral daily  cholecalciferol 1000 Unit(s) Oral daily  cyanocobalamin 1000 MICROGram(s) Oral daily  dextrose 5%. 1000 milliLiter(s) (50 mL/Hr) IV Continuous <Continuous>  dextrose 50% Injectable 25 Gram(s) IV Push once  ferrous    sulfate 325 milliGRAM(s) Oral two times a day  finasteride 5 milliGRAM(s) Oral daily  folic acid 1 milliGRAM(s) Oral daily  heparin   Injectable 5000 Unit(s) SubCutaneous every 8 hours  hydrALAZINE 100 milliGRAM(s) Oral every 8 hours  insulin glargine Injectable (LANTUS) 6 Unit(s) SubCutaneous at bedtime  insulin lispro (ADMELOG) corrective regimen sliding scale   SubCutaneous three times a day before meals  insulin lispro (ADMELOG) corrective regimen sliding scale   SubCutaneous at bedtime  iron sucrose IVPB 200 milliGRAM(s) IV Intermittent every 24 hours  labetalol 400 milliGRAM(s) Oral every 8 hours  losartan 100 milliGRAM(s) Oral daily  NIFEdipine XL 90 milliGRAM(s) Oral daily  predniSONE   Tablet 20 milliGRAM(s) Oral daily  pyridostigmine 60 milliGRAM(s) Oral <User Schedule>  sertraline 50 milliGRAM(s) Oral daily  tamsulosin 0.4 milliGRAM(s) Oral at bedtime      MEDICATIONS  (PRN):  acetaminophen     Tablet .. 650 milliGRAM(s) Oral every 6 hours PRN Temp greater or equal to 38C (100.4F), Mild Pain (1 - 3)  dextrose Oral Gel 15 Gram(s) Oral once PRN Blood Glucose LESS THAN 70 milliGRAM(s)/deciliter  ondansetron Injectable 4 milliGRAM(s) IV Push every 8 hours PRN Nausea and/or Vomiting      REVIEW OF SYSTEMS:                           ALL ROS DONE [ X   ]    CONSTITUTIONAL:  LETHARGIC [   ], FEVER [   ], UNRESPONSIVE [   ]  CVS:  CP  [   ], SOB, [   ], PALPITATIONS [   ], DIZZYNESS [   ]  RS: COUGH [   ], SPUTUM [   ]  GI: ABDOMINAL PAIN [   ], NAUSEA [   ], VOMITINGS [   ], DIARRHEA [   ], CONSTIPATION [   ]  :  DYSURIA [   ], NOCTURIA [   ], INCREASED FREQUENCY [   ], DRIBLING [   ],  SKELETAL: PAINFUL JOINTS [   ], SWOLLEN JOINTS [   ], NECK ACHE [   ], LOW BACK ACHE [   ],  SKIN : ULCERS [   ], RASH [   ], ITCHING [   ]  CNS: HEAD ACHE [   ], DOUBLE VISION [   ], BLURRED VISION [   ], AMS / CONFUSION [   ], SEIZURES [   ], WEAKNESS [   ],TINGLING / NUMBNESS [   ]        PHYSICAL EXAMINATION:  GENERAL APPEARANCE: NO DISTRESS  HEENT:  NO PALLOR, NO  JVD,  NO   NODES, NECK SUPPLE  CVS: S1 +, S2 +,   RS: AEEB,  OCCASIONAL  RALES +,   NO RONCHI  ABD: SOFT, NT, NO, BS +  EXT: NO PE  SKIN: WARM,   SKELETAL:  ROM ACCEPTABLE  CNS:  AAO X 3      RADIOLOGY :  RADIOLOGY AND READINGS REVIEWED      ASSESSMENT :     Acute renal failure    HTN (hypertension)    DM (diabetes mellitus)    Myasthenia gravis    Hypercholesterolemia    CVA (cerebrovascular accident)    Peripheral neuropathy    BPH (benign prostatic hyperplasia)    Major depression    Lipoma of chest wall    MG with thymoma (myasthena gravis)    H/O cataract extraction        PLAN:  HPI:  Patient is a 70M, from [facility] ambulates with a walker, with PMHx of Myasthenia gravis s/p thymectomy, HTN, 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 Patient reports he has been feeling nauseous for a few days but no vomiting. He reports chronic increased urinary frequency and sensation of incomplete voiding. He denies all other symptoms - fever, chills, cough, chest pain, SoB, palpitations abdominal pain, diarrhea, dysuria, arm or leg numbness or tingling. Reports regular brown stool - denies black or bloody stool.  (2025 21:32)      # HYPERTENSIVE URGENCY  # UNDERLYING HTN  - TELEMETRY  - ECHO - LV EF - 70 - 75%, G1DD  - NOTED TROPONINS  - S/P IV HYDRALAZINE  - HYDRALAZINE  - INCREASE PROCARDIA  - SWITCH METOPROLOL TO LABETALOL  - F/U SERUM RENIN, ALDOSTERONE LEVELS  - CARDIOLOGY CONSULT  - NEPHROLOGY CONSULT    # INTRAVASCULAR VOLUME DEPLETION  # MICHEL  # BPH  - IV FLUIDS  - PVR - 0 ML  - MONITOR CR  - AVOID NEPHROTOXIC AGENTS  - NEPHROLOGY CONSULT    # HYPOGLYCEMIA - RESOLVED  - MONITORING FINGERSTICKS    # ANEMIA  - TREND HGB    # MYASTHENIA GRAVIS S/P THYMECTOMY    # DM  - SSI + FS    # CVA W/ MILD RIGHT HP    # PAD  # GI AND DVT PPX

## 2025-06-28 NOTE — PROGRESS NOTE ADULT - SUBJECTIVE AND OBJECTIVE BOX
[facility] NEPHROLOGY- PROGRESS NOTE    69 yo Male from [facility] ambulates with a walker, with PMHx of Myasthenia gravis s/p thymectomy, HTN, 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 Pt a/w hypertensive urgency and MICHEL. Nephrology consulted for Elevated serum creatinine.    Hospital Medications: Medications reviewed.    REVIEW OF SYSTEMS:  Gen: no changes in weight  HEENT: no rhinorrhea  Neck: no sore throat  Cards: no chest pain  Resp: no dyspnea  GI: no nausea or vomiting or diarrhea  : no dysuria or hematuria  Vascular: no LE edema  Derm: no rashes  Neuro: no numbness/tingling    VITALS:  T(F): 97.8 (25 @ 10:59), Max: 98.6 (25 @ 23:51)  HR: 63 (25 @ 10:59)  BP: 127/53 (25 @ 10:59)  RR: 18 (25 @ 10:59)  SpO2: 95% (25 @ 10:59)  Wt(kg): --    Weight (kg): 62.1 ( @ 16:04)     @ 07:01  -   @ 07:00  --------------------------------------------------------  IN: 50 mL / OUT: 650 mL / NET: -600 mL      PHYSICAL EXAM:  Gen: NAD, calm  HEENT: +facial/ periorbital edema  Neck: no JVD  Cards: RRR, +S1/S2,+BOUBACAR  Resp: CTA B/L  GI: soft, NT/ND, NABS  : no CVA tenderness  Extremities: no LE edema B/L  Derm: no rashes  Neuro: non-focal    LABS:      138  |  105  |  14  ----------------------------<  49[LL]  3.6   |  30  |  1.40[H]    Ca    8.5      2025 06:02      Creatinine Trend: 1.40 <--, 1.45 <--, 1.54 <--, 1.55 <--, 1.81 <--                        9.1    6.75  )-----------( 234      ( 2025 11:36 )             28.5     Urine Studies:  Urinalysis Basic - ( 2025 11:30 )    Color: Yellow / Appearance: Clear / S.014 / pH:   Gluc:  / Ketone:   / Bili: Negative / Urobili: 1.0 mg/dL   Blood:  / Protein: 100 mg/dL / Nitrite: Negative   Leuk Esterase: Negative / RBC: 2 /HPF / WBC 2 /HPF   Sq Epi:  / Non Sq Epi:  / Bacteria: Few /HPF      Creatinine, Random Urine: 127 mg/dL ( @ 11:30)  Protein/Creatinine Ratio Calculation: 0.9 Ratio ( @ 11:30)  Potassium, Random Urine: 43 mmol/L ( @ 11:30)  Potassium, Random Urine: 50 mmol/L ( @ 17:35)  Creatinine, Random Urine: 53 mg/dL ( @ 17:35)  Sodium, Random Urine: 119 mmol/L ( @ 21:58)  Creatinine, Random Urine: <13 mg/dL ( @ 21:58)  Protein/Creatinine Ratio Calculation: unable to calc Ratio ( @ 21:58)    RADIOLOGY & ADDITIONAL STUDIES:    < from: TTE W or WO Ultrasound Enhancing Agent (25 @ 12:04) >    TRANSTHORACIC ECHOCARDIOGRAM REPORT  ________________________________________________________________________________                                      _______       Pt. Name:       BRITTANIE GROSS  Study Date:    2025  MRN:            [id number]  YOB: 1955  Accession #:    [id number]   Age:           70 years  Account#:       [id number]  Gender:        M  Heart Rate:                 Height:        68.00 in (172.72 cm)  Rhythm:                     Weight:        136.99 lb (62.14 kg)  Blood Pressure: 160/57 mmHg BSA/BMI:       1.74 m² / 20.83 kg/m²  ________________________________________________________________________________________  Referring Physician:    [id number] Shai Goldstein  Interpreting Physician: Geo James  Primary Sonographer:    Harsha Henao RDCS    CPT:               ECHO TTE WO CON COMP W DOPP - 87972.m  Indication(s):     Hypertensive heart disease without heart failure - I11.9  Procedure:         Transthoracic echocardiogram with 2-D, M-mode andcomplete                     spectral and color flow Doppler.  Ordering Location: [facility]  Admission Status:  Inpatient    _______________________________________________________________________________________     CONCLUSIONS:      1. Left ventricular wall thickness is severely increased. Left ventricular systolic function is hyperdynamic with an ejection fraction visually estimated at 70 to 75 %.   2. Differential includes hypertensive, hypertrophic, and infiltrative cardiomyopathy, among others. Consider cardiac MRI for further evaluation if clinically indicated.   3. There is mild (grade 1) left ventricular diastolic dysfunction.   4. Left atrium is mildly dilated.   5. Trace mitral regurgitation.   6. Trace tricuspid regurgitation.   7. Trace pulmonic regurgitation.   8. Trace pericardial effusion.   9. Bilateral pleural effusion noted.  10. No prior echocardiogram is available for comparison.      < end of copied text >

## 2025-06-28 NOTE — DISCHARGE NOTE PROVIDER - PROVIDER TOKENS
PROVIDER:[TOKEN:[[id number]]],PROVIDER:[TOKEN:[[id number]]],PROVIDER:[TOKEN:[[id number]]]

## 2025-06-28 NOTE — DISCHARGE NOTE PROVIDER - NSDCMRMEDTOKEN_GEN_ALL_CORE_FT
Aspirin Enteric Coated 81 mg oral delayed release tablet: 1 tab(s) orally once a day  atorvastatin 80 mg oral tablet: 1 tab(s) orally once a day  azaTHIOprine 50 mg oral tablet: 2 tab(s) orally once a day  cyanocobalamin 1000 mcg oral tablet: 1 tab(s) orally once a day  ferrous sulfate 325 mg (65 mg elemental iron) oral tablet: 1 tab(s) orally 2 times a day  folic acid 1 mg oral tablet: 1 tab(s) orally once a day  HumuLIN R 100 units/mL injectable solution: unit(s) injectable 2 times a day   hydrALAZINE 100 mg oral tablet: 1 tab(s) orally every 8 hours  losartan 100 mg oral tablet: 1 tab(s) orally once a day  Metoprolol Tartrate 100 mg oral tablet: 1 tab(s) orally 2 times a day  NIFEdipine 60 mg oral tablet, extended release: 1 tab(s) orally once a day  NovoLIN 70/30 subcutaneous suspension: 10 unit(s) subcutaneous once a day  predniSONE 20 mg oral tablet: 1 tab(s) orally once a day  Proscar 5 mg oral tablet: 1 tab(s) orally once a day  pyridostigmine 60 mg oral tablet: 1 tab(s) orally 6 times a day  tamsulosin 0.4 mg oral capsule: 1 cap(s) orally once a day  Vitamin D3 25 mcg (1000 intl units) oral tablet: 1 tab(s) orally once a day  Zoloft 50 mg oral tablet: 1 tab(s) orally once a day   acetaminophen 325 mg oral tablet: 2 tab(s) orally every 6 hours As needed Temp greater or equal to 38C (100.4F), Mild Pain (1 - 3)  Aspirin Enteric Coated 81 mg oral delayed release tablet: 1 tab(s) orally once a day  atorvastatin 80 mg oral tablet: 1 tab(s) orally once a day  azaTHIOprine 50 mg oral tablet: 2 tab(s) orally once a day  cyanocobalamin 1000 mcg oral tablet: 1 tab(s) orally once a day  ferrous sulfate 325 mg (65 mg elemental iron) oral tablet: 1 tab(s) orally 2 times a day  folic acid 1 mg oral tablet: 1 tab(s) orally once a day  HumuLIN R 100 units/mL injectable solution: unit(s) injectable 2 times a day   hydrALAZINE 100 mg oral tablet: 1 tab(s) orally every 8 hours  labetalol 400 mg oral tablet: 1 tab(s) orally every 8 hours  losartan 100 mg oral tablet: 1 tab(s) orally once a day  NIFEdipine 90 mg oral tablet, extended release: 1 tab(s) orally once a day GIVE AT 12NOON  NovoLIN 70/30 subcutaneous suspension: 10 unit(s) subcutaneous once a day  predniSONE 20 mg oral tablet: 1 tab(s) orally once a day  Proscar 5 mg oral tablet: 1 tab(s) orally once a day  pyridostigmine 60 mg oral tablet: 1 tab(s) orally 6 times a day  tamsulosin 0.4 mg oral capsule: 1 cap(s) orally once a day  Vitamin D3 25 mcg (1000 intl units) oral tablet: 1 tab(s) orally once a day  Zoloft 50 mg oral tablet: 1 tab(s) orally once a day   acetaminophen 325 mg oral tablet: 2 tab(s) orally every 6 hours As needed Temp greater or equal to 38C (100.4F), Mild Pain (1 - 3)  amoxicillin-clavulanate 875 mg-125 mg oral tablet: 1 tab(s) orally every 12 hours  Aspirin Enteric Coated 81 mg oral delayed release tablet: 1 tab(s) orally once a day  atorvastatin 80 mg oral tablet: 1 tab(s) orally once a day  azaTHIOprine 50 mg oral tablet: 2 tab(s) orally once a day  cyanocobalamin 1000 mcg oral tablet: 1 tab(s) orally once a day  diphenhydramine/lidocaine/aluminum hydroxide/MgOH/simethicone mucous membrane suspension: 30 milliliter(s) mucous membrane 4 times a day  ferrous sulfate 325 mg (65 mg elemental iron) oral tablet: 1 tab(s) orally 2 times a day  folic acid 1 mg oral tablet: 1 tab(s) orally once a day  HumuLIN R 100 units/mL injectable solution: unit(s) injectable 2 times a day   hydrALAZINE 100 mg oral tablet: 1 tab(s) orally every 8 hours  labetalol 400 mg oral tablet: 1 tab(s) orally every 8 hours  losartan 100 mg oral tablet: 1 tab(s) orally once a day  NIFEdipine 90 mg oral tablet, extended release: 1 tab(s) orally once a day GIVE AT 12NOON  NovoLIN 70/30 subcutaneous suspension: 10 unit(s) subcutaneous once a day  predniSONE 20 mg oral tablet: 1 tab(s) orally once a day  Proscar 5 mg oral tablet: 1 tab(s) orally once a day  pyridostigmine 60 mg oral tablet: 1 tab(s) orally 6 times a day  spironolactone 25 mg oral tablet: 2 tab(s) orally once a day  tamsulosin 0.4 mg oral capsule: 1 cap(s) orally once a day  Vitamin D3 25 mcg (1000 intl units) oral tablet: 1 tab(s) orally once a day  Zoloft 50 mg oral tablet: 1 tab(s) orally once a day

## 2025-06-28 NOTE — DISCHARGE NOTE PROVIDER - HOSPITAL COURSE
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 Patient reports he has been feeling nauseous for a few days but no vomiting. He reports chronic increased urinary frequency and sensation of incomplete voiding. He denies all other symptoms - fever, chills, cough, chest pain, SoB, palpitations abdominal pain, diarrhea, dysuria, arm or leg numbness or tingling. Reports regular brown stool - denies black or bloody stool.   Out pt labs from NH for Hgb of 7.9 in a routine lab. BUN/Cr 30/1.81.     Admitted for MICHEL. BP meds adjusted for hypertensive urgency and received IV hydralazine 5mg IVP.      Cardiology and nephrology following.  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 Patient reports he has been feeling nauseous for a few days but no vomiting. He reports chronic increased urinary frequency and sensation of incomplete voiding. He denies all other symptoms - fever, chills, cough, chest pain, SoB, palpitations abdominal pain, diarrhea, dysuria, arm or leg numbness or tingling. Reports regular brown stool - denies black or bloody stool.   Out pt labs from NH for Hgb of 7.9 in a routine lab. BUN/Cr 30/1.81.     Admitted for MICHEL and hypertensive urgency.     Pt. followed by nephrology for MICHEL and hypertensive urgency, suspects MICHEL is hemodynamically mediated 2/2 uncontrolled HTN.  Renal US revealed Echogenic kidneys suggestive of parenchymal disease. No hydronephrosis. Bladder wall thickening.  MICHEL gradually improving, Scr.......Pt. with labile b/p, treated with multiple anti hypertensives:  Labetalol, Hydralazine, Losartan, Nifedipine.  b/p more stable at this time.      Pt. followed by cardio, suspects hypertensive heart disease ISO uncontrolled HTN, may need OP cardiac MRI.      Pt. followed by endo for further valuation of HTN, recc starting on Aldactone 25 mg PO qd for BP; Consider increasing dose as BP continues to be elevated    Pt. followed by hem/onc for anemia, suspects moderate to severe Macrocytic anemia suggestive of myelodysplastic anemia ISO Retroperitoneal mass Lymphadenopathy vs overlapping vessels on CT A/P. Recommend bone marrow aspiration and biopsy as an outpatient.             Patient is a 70M, from 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 Patient reports he has been feeling nauseous for a few days but no vomiting. He reports chronic increased urinary frequency and sensation of incomplete voiding. He denies all other symptoms - fever, chills, cough, chest pain, SoB, palpitations abdominal pain, diarrhea, dysuria, arm or leg numbness or tingling. Reports regular brown stool - denies black or bloody stool.   Out pt labs from NH for Hgb of 7.9 in a routine lab. BUN/Cr 30/1.81.     Admitted for MICHEL and hypertensive urgency.     Pt. followed by nephrology for MICHEL and hypertensive urgency, suspects MICHEL is hemodynamically mediated 2/2 uncontrolled HTN.  Renal US revealed Echogenic kidneys suggestive of parenchymal disease. No hydronephrosis. Bladder wall thickening.  MICHEL gradually improving, Scr.......Pt. with labile b/p, treated with multiple anti hypertensives:  Labetalol, Hydralazine, Losartan, Nifedipine.  b/p more stable at this time.      Pt. followed by cardio, suspects hypertensive heart disease ISO uncontrolled HTN, may need OP cardiac MRI.      Pt. followed by endo for further evaluation of HTN, recommends starting on Aldactone 25 mg PO qd for BP; Consider increasing dose as BP continues to be elevated    Pt. followed by hem/onc for anemia, suspects moderate to severe Macrocytic anemia suggestive of myelodysplastic anemia ISO Retroperitoneal mass Lymphadenopathy vs overlapping vessels on CT A/P. Recommend bone marrow aspiration and biopsy as an outpatient.    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 Patient reports he has been feeling nauseous for a few days but no vomiting. He reports chronic increased urinary frequency and sensation of incomplete voiding. He denies all other symptoms - fever, chills, cough, chest pain, SoB, palpitations abdominal pain, diarrhea, dysuria, arm or leg numbness or tingling. Reports regular brown stool - denies black or bloody stool.   Out pt labs from NH for Hgb of 7.9 in a routine lab. BUN/Cr 30/1.81.     Admitted for MICHEL and hypertensive urgency.     Pt. followed by nephrology for MICHEL and hypertensive urgency, suspects MICHEL is hemodynamically mediated 2/2 uncontrolled HTN.  Renal US revealed Echogenic kidneys suggestive of parenchymal disease. No hydronephrosis. Bladder wall thickening.  MICHLE gradually improving, Scr.......Pt. with labile b/p, treated with multiple anti hypertensives:  Labetalol, Hydralazine, Losartan, Nifedipine.  b/p more stable at this time.      Pt. followed by cardio, suspects hypertensive heart disease ISO uncontrolled HTN, may need OP cardiac MRI.      Pt. followed by endo for further evaluation of HTN, recommends starting on Aldactone 25 mg PO qd for BP; Consider increasing dose as BP continues to be elevated    Pt. followed by hem/onc for anemia, suspects moderate to severe Macrocytic anemia suggestive of myelodysplastic anemia ISO Retroperitoneal mass Lymphadenopathy vs overlapping vessels on CT A/P. Recommend bone marrow aspiration and biopsy as an outpatient.    Patient was found to have mouth pain with positive blood culture ( questionable contaminant)  given his symptoms patient was seen by ID and treated with Unasyn and a dose of vancomycin.   Patient now free from pain,  stable for discharge with oral antibiotics for two more days.

## 2025-06-28 NOTE — PROGRESS NOTE ADULT - SUBJECTIVE AND OBJECTIVE BOX
C A R D I O L O G Y  **********************************     DATE OF SERVICE: 06-28-25       pt is resting in bed, he offers no complaints this morning        acetaminophen     Tablet .. 650 milliGRAM(s) Oral every 6 hours PRN  aspirin enteric coated 81 milliGRAM(s) Oral daily  atorvastatin 80 milliGRAM(s) Oral at bedtime  azaTHIOprine 100 milliGRAM(s) Oral daily  cholecalciferol 1000 Unit(s) Oral daily  cyanocobalamin 1000 MICROGram(s) Oral daily  dextrose 5%. 1000 milliLiter(s) IV Continuous <Continuous>  dextrose 50% Injectable 25 Gram(s) IV Push once  dextrose Oral Gel 15 Gram(s) Oral once PRN  ferrous    sulfate 325 milliGRAM(s) Oral two times a day  finasteride 5 milliGRAM(s) Oral daily  folic acid 1 milliGRAM(s) Oral daily  heparin   Injectable 5000 Unit(s) SubCutaneous every 8 hours  hydrALAZINE 100 milliGRAM(s) Oral every 8 hours  insulin glargine Injectable (LANTUS) 6 Unit(s) SubCutaneous at bedtime  insulin lispro (ADMELOG) corrective regimen sliding scale   SubCutaneous three times a day before meals  insulin lispro (ADMELOG) corrective regimen sliding scale   SubCutaneous at bedtime  iron sucrose IVPB 200 milliGRAM(s) IV Intermittent every 24 hours  labetalol 400 milliGRAM(s) Oral every 8 hours  losartan 100 milliGRAM(s) Oral daily  NIFEdipine XL 90 milliGRAM(s) Oral daily  ondansetron Injectable 4 milliGRAM(s) IV Push every 8 hours PRN  predniSONE   Tablet 20 milliGRAM(s) Oral daily  pyridostigmine 60 milliGRAM(s) Oral <User Schedule>  sertraline 50 milliGRAM(s) Oral daily  tamsulosin 0.4 milliGRAM(s) Oral at bedtime                            7.8    5.14  )-----------( 203 ( 28 Jun 2025 06:02 )             24.2       Hemoglobin: 7.8 g/dL (06-28 @ 06:02)  Hemoglobin: 9.8 g/dL (06-26 @ 05:10)  Hemoglobin: 9.9 g/dL (06-25 @ 18:00)      06-28    138  |  105  |  14  ----------------------------<  49[LL]  3.6   |  30  |  1.40[H]    Ca    8.5      28 Jun 2025 06:02      Creatinine Trend: 1.40<--, 1.45<--, 1.54<--, 1.55<--, 1.81<--    COAGS:           T(C): 36.7 (06-28-25 @ 07:53), Max: 37 (06-27-25 @ 23:51)  HR: 67 (06-28-25 @ 07:53) (60 - 69)  BP: 103/50 (06-28-25 @ 07:53) (103/50 - 227/75)  RR: 18 (06-28-25 @ 07:53) (18 - 19)  SpO2: 93% (06-28-25 @ 07:53) (93% - 100%)  Wt(kg): --    I&O's Summary    27 Jun 2025 07:01  -  28 Jun 2025 07:00  --------------------------------------------------------  IN: 50 mL / OUT: 650 mL / NET: -600 mL        HEENT:  (-)icterus (-)pallor  CV: N S1 S2 1/6 BOUBACAR (+)2 Pulses B/l  Resp:  Clear to ausculatation B/L, normal effort  GI: (+) BS Soft, NT, ND  Lymph:  (-)Edema, (-)obvious lymphadenopathy  Skin: Warm to touch, Normal turgor  Psych: Appropriate mood and affect      TELEMETRY: 	  sinus        ASSESSMENT/PLAN: 	70y  Male PMHx of Myasthenia gravis s/p thymectomy, HTN, 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 hyperaldosterone state would check serum renin and aldosterone levels   - cont Procardia at 90 mg PO daily   - plan to resume ARB when BP allows  -  TSH wnl     # Abnormal EKG  - echo noted, suspect it is due to hypertensive herat disease givent his profoundly elevated blood pressures however can arrange for outpt cardiac MRI

## 2025-06-28 NOTE — DISCHARGE NOTE PROVIDER - CARE PROVIDER_API CALL
Shai Goldstein  Internal Medicine  [address], [address]  [place] [address]  Phone: [phone]  Fax: [phone]  Follow Up Time:     Kathleen Lan  Nephrology  [address], [address]  [place] [address]  Phone: [phone]  Fax: [phone]  Follow Up Time:     Ta Mercado  Cardiovascular Disease  [address], [address]  [place] [address]  Phone: [phone]  Fax: [phone]  Follow Up Time:

## 2025-06-28 NOTE — PROGRESS NOTE ADULT - ASSESSMENT
Agree with above assessment and plan as outlined above.    - Slow uptitration of BP meds  - COnt labetyalol 400 TID  - started on losartan   - TSH wnl    Ta Mercado MD, [facility]  BEEPER [phone]

## 2025-06-28 NOTE — PROGRESS NOTE ADULT - ASSESSMENT
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 Pt a/w hypertensive urgency and MICHEL.   Nephrology consulted for Elevated serum creatinine.    1. MICHEL- as per H& P; last SCr 1.3. MICHEL likely hemodynamically mediated due to uncontrolled HTN. Renal function continuing to improve. Losartan briefly held but now resumed due to controlled HTN. UA with 300 protein, no blood. FeNa 10%; Check Renal US to r/o retention. TTE with grade 1 diastolic dysfunction, EF 70-75%; ?hypertensive vs infiltrative cardiomyopathy. Check SIFE and K/l. Pt b/l pleural effusions on TTE. Check CXR; consider lasix. Strict I/Os. Avoid nephrotoxins/ NSAIDs/ RCA. Monitor BMP.    2. Proteinuria- UA with 300 protein, no blood. Unable to calculate Upr/Cr since Urine Cr <13. Repeat Urine Pr/Cr.   3. Hypertensive urgency- BP elevated for which Labetalol was increased to 400mg PO q 8hrs by primary team. Ok to resume Losartan 100mg Po daily since MICHEL is resolving and BP is uncontrolled. Consider diuretics; recc Lasix 80mg PO daily. c/w Nifedipine ER 90mg p daily. Consider tapering prednisone off if able. c/w low salt diet. Monitor BP  4. Hypokalemia- f/u zahra/ renin; pending. Pt given KCl PO and IV. Check FeK; can be falsely elevated due to repletion. Hypokalemia due to shifting from excessive insulin. If persistent hypokalemia;  Recc aldactone 25mg PO daily. Monitor serum potassium  5. Iron deficiency anemia- low hgb. Check FOBT. tsat 16% with ferriitn 236- will give Venofer 200mg IV qd x 3 doses. Monitor hgb    [facility] NEPHROLOGY  Bethel Smith M.D.  Guillermo Jimenez D.O.  Kathleen Lan M.D.  MD Kimi Rodríguez, MSN, ANP-C    Telephone: [phone]  Facsimile: [phone], [address]  [place] [address]

## 2025-06-29 LAB
ANION GAP SERPL CALC-SCNC: 5 MMOL/L — SIGNIFICANT CHANGE UP (ref 5–17)
APPEARANCE UR: CLEAR — SIGNIFICANT CHANGE UP
BACTERIA # UR AUTO: ABNORMAL /HPF
BILIRUB UR-MCNC: NEGATIVE — SIGNIFICANT CHANGE UP
BUN SERPL-MCNC: 18 MG/DL — SIGNIFICANT CHANGE UP (ref 7–18)
CALCIUM SERPL-MCNC: 8.6 MG/DL — SIGNIFICANT CHANGE UP (ref 8.4–10.5)
CHLORIDE SERPL-SCNC: 102 MMOL/L — SIGNIFICANT CHANGE UP (ref 96–108)
CO2 SERPL-SCNC: 29 MMOL/L — SIGNIFICANT CHANGE UP (ref 22–31)
COLOR SPEC: SIGNIFICANT CHANGE UP
CREAT ?TM UR-MCNC: 175 MG/DL — SIGNIFICANT CHANGE UP
CREAT SERPL-MCNC: 1.76 MG/DL — HIGH (ref 0.5–1.3)
DIFF PNL FLD: NEGATIVE — SIGNIFICANT CHANGE UP
EGFR: 42 ML/MIN/1.73M2 — LOW
EGFR: 42 ML/MIN/1.73M2 — LOW
EPI CELLS # UR: PRESENT
GLUCOSE BLDC GLUCOMTR-MCNC: 188 MG/DL — HIGH (ref 70–99)
GLUCOSE BLDC GLUCOMTR-MCNC: 253 MG/DL — HIGH (ref 70–99)
GLUCOSE BLDC GLUCOMTR-MCNC: 86 MG/DL — SIGNIFICANT CHANGE UP (ref 70–99)
GLUCOSE BLDC GLUCOMTR-MCNC: 87 MG/DL — SIGNIFICANT CHANGE UP (ref 70–99)
GLUCOSE SERPL-MCNC: 65 MG/DL — LOW (ref 70–99)
GLUCOSE UR QL: NEGATIVE MG/DL — SIGNIFICANT CHANGE UP
HCT VFR BLD CALC: 23.5 % — LOW (ref 39–50)
HCT VFR BLD CALC: 24.8 % — LOW (ref 39–50)
HGB BLD-MCNC: 7.8 G/DL — LOW (ref 13–17)
HGB BLD-MCNC: 8.1 G/DL — LOW (ref 13–17)
KETONES UR QL: ABNORMAL MG/DL
LEUKOCYTE ESTERASE UR-ACNC: NEGATIVE — SIGNIFICANT CHANGE UP
MCHC RBC-ENTMCNC: 32.7 G/DL — SIGNIFICANT CHANGE UP (ref 32–36)
MCHC RBC-ENTMCNC: 33.2 G/DL — SIGNIFICANT CHANGE UP (ref 32–36)
MCHC RBC-ENTMCNC: 35 PG — HIGH (ref 27–34)
MCHC RBC-ENTMCNC: 35.1 PG — HIGH (ref 27–34)
MCV RBC AUTO: 105.4 FL — HIGH (ref 80–100)
MCV RBC AUTO: 107.4 FL — HIGH (ref 80–100)
NITRITE UR-MCNC: NEGATIVE — SIGNIFICANT CHANGE UP
NRBC BLD AUTO-RTO: 0 /100 WBCS — SIGNIFICANT CHANGE UP (ref 0–0)
NRBC BLD AUTO-RTO: 0 /100 WBCS — SIGNIFICANT CHANGE UP (ref 0–0)
PH UR: 5.5 — SIGNIFICANT CHANGE UP (ref 5–8)
PLATELET # BLD AUTO: 224 K/UL — SIGNIFICANT CHANGE UP (ref 150–400)
PLATELET # BLD AUTO: 233 K/UL — SIGNIFICANT CHANGE UP (ref 150–400)
POTASSIUM SERPL-MCNC: 3.8 MMOL/L — SIGNIFICANT CHANGE UP (ref 3.5–5.3)
POTASSIUM SERPL-SCNC: 3.8 MMOL/L — SIGNIFICANT CHANGE UP (ref 3.5–5.3)
PROT ?TM UR-MCNC: 84 MG/DL — HIGH (ref 0–12)
PROT UR-MCNC: 100 MG/DL
PROT/CREAT UR-RTO: 0.5 RATIO — HIGH (ref 0–0.2)
RBC # BLD: 2.23 M/UL — LOW (ref 4.2–5.8)
RBC # BLD: 2.31 M/UL — LOW (ref 4.2–5.8)
RBC # FLD: 17.1 % — HIGH (ref 10.3–14.5)
RBC # FLD: 17.2 % — HIGH (ref 10.3–14.5)
RBC CASTS # UR COMP ASSIST: 3 /HPF — SIGNIFICANT CHANGE UP (ref 0–4)
SODIUM SERPL-SCNC: 136 MMOL/L — SIGNIFICANT CHANGE UP (ref 135–145)
SP GR SPEC: 1.01 — SIGNIFICANT CHANGE UP (ref 1–1.03)
UROBILINOGEN FLD QL: 0.2 MG/DL — SIGNIFICANT CHANGE UP (ref 0.2–1)
WBC # BLD: 6.22 K/UL — SIGNIFICANT CHANGE UP (ref 3.8–10.5)
WBC # BLD: 7.38 K/UL — SIGNIFICANT CHANGE UP (ref 3.8–10.5)
WBC # FLD AUTO: 6.22 K/UL — SIGNIFICANT CHANGE UP (ref 3.8–10.5)
WBC # FLD AUTO: 7.38 K/UL — SIGNIFICANT CHANGE UP (ref 3.8–10.5)
WBC UR QL: 4 /HPF — SIGNIFICANT CHANGE UP (ref 0–5)

## 2025-06-29 PROCEDURE — 74176 CT ABD & PELVIS W/O CONTRAST: CPT | Mod: 26

## 2025-06-29 RX ADMIN — LOSARTAN POTASSIUM 100 MILLIGRAM(S): 100 TABLET, FILM COATED ORAL at 06:17

## 2025-06-29 RX ADMIN — IRON SUCROSE 110 MILLIGRAM(S): 20 INJECTION, SOLUTION INTRAVENOUS at 17:36

## 2025-06-29 RX ADMIN — PYRIDOSTIGMINE BROMIDE 60 MILLIGRAM(S): 5 INJECTION, SOLUTION INTRAVENOUS; PARENTERAL at 10:06

## 2025-06-29 RX ADMIN — Medication 90 MILLIGRAM(S): at 06:15

## 2025-06-29 RX ADMIN — PYRIDOSTIGMINE BROMIDE 60 MILLIGRAM(S): 5 INJECTION, SOLUTION INTRAVENOUS; PARENTERAL at 06:15

## 2025-06-29 RX ADMIN — PYRIDOSTIGMINE BROMIDE 60 MILLIGRAM(S): 5 INJECTION, SOLUTION INTRAVENOUS; PARENTERAL at 17:11

## 2025-06-29 RX ADMIN — INSULIN GLARGINE-YFGN 6 UNIT(S): 100 INJECTION, SOLUTION SUBCUTANEOUS at 21:09

## 2025-06-29 RX ADMIN — LABETALOL HYDROCHLORIDE 400 MILLIGRAM(S): 200 TABLET, FILM COATED ORAL at 06:15

## 2025-06-29 RX ADMIN — HEPARIN SODIUM 5000 UNIT(S): 1000 INJECTION INTRAVENOUS; SUBCUTANEOUS at 06:16

## 2025-06-29 RX ADMIN — INSULIN LISPRO 1: 100 INJECTION, SOLUTION INTRAVENOUS; SUBCUTANEOUS at 16:59

## 2025-06-29 RX ADMIN — Medication 100 MILLIGRAM(S): at 06:15

## 2025-06-29 RX ADMIN — TAMSULOSIN HYDROCHLORIDE 0.4 MILLIGRAM(S): 0.4 CAPSULE ORAL at 21:11

## 2025-06-29 RX ADMIN — Medication 1000 UNIT(S): at 11:59

## 2025-06-29 RX ADMIN — Medication 325 MILLIGRAM(S): at 06:15

## 2025-06-29 RX ADMIN — Medication 100 MILLIGRAM(S): at 21:10

## 2025-06-29 RX ADMIN — PREDNISONE 20 MILLIGRAM(S): 20 TABLET ORAL at 06:15

## 2025-06-29 RX ADMIN — SERTRALINE 50 MILLIGRAM(S): 100 TABLET, FILM COATED ORAL at 11:59

## 2025-06-29 RX ADMIN — LABETALOL HYDROCHLORIDE 400 MILLIGRAM(S): 200 TABLET, FILM COATED ORAL at 14:25

## 2025-06-29 RX ADMIN — PYRIDOSTIGMINE BROMIDE 60 MILLIGRAM(S): 5 INJECTION, SOLUTION INTRAVENOUS; PARENTERAL at 12:36

## 2025-06-29 RX ADMIN — CYANOCOBALAMIN 1000 MICROGRAM(S): 1000 INJECTION INTRAMUSCULAR; SUBCUTANEOUS at 11:59

## 2025-06-29 RX ADMIN — INSULIN LISPRO 3: 100 INJECTION, SOLUTION INTRAVENOUS; SUBCUTANEOUS at 12:01

## 2025-06-29 RX ADMIN — PYRIDOSTIGMINE BROMIDE 60 MILLIGRAM(S): 5 INJECTION, SOLUTION INTRAVENOUS; PARENTERAL at 01:14

## 2025-06-29 RX ADMIN — LABETALOL HYDROCHLORIDE 400 MILLIGRAM(S): 200 TABLET, FILM COATED ORAL at 21:10

## 2025-06-29 RX ADMIN — Medication 325 MILLIGRAM(S): at 17:11

## 2025-06-29 RX ADMIN — PYRIDOSTIGMINE BROMIDE 60 MILLIGRAM(S): 5 INJECTION, SOLUTION INTRAVENOUS; PARENTERAL at 20:58

## 2025-06-29 RX ADMIN — Medication 100 MILLIGRAM(S): at 14:26

## 2025-06-29 RX ADMIN — Medication 81 MILLIGRAM(S): at 11:59

## 2025-06-29 RX ADMIN — HEPARIN SODIUM 5000 UNIT(S): 1000 INJECTION INTRAVENOUS; SUBCUTANEOUS at 14:26

## 2025-06-29 RX ADMIN — FOLIC ACID 1 MILLIGRAM(S): 1 TABLET ORAL at 12:06

## 2025-06-29 RX ADMIN — HEPARIN SODIUM 5000 UNIT(S): 1000 INJECTION INTRAVENOUS; SUBCUTANEOUS at 21:12

## 2025-06-29 RX ADMIN — FINASTERIDE 5 MILLIGRAM(S): 1 TABLET, FILM COATED ORAL at 11:59

## 2025-06-29 RX ADMIN — ATORVASTATIN CALCIUM 80 MILLIGRAM(S): 80 TABLET, FILM COATED ORAL at 21:10

## 2025-06-29 RX ADMIN — AZATHIOPRINE 100 MILLIGRAM(S): 50 TABLET ORAL at 12:00

## 2025-06-29 NOTE — PROGRESS NOTE ADULT - ASSESSMENT
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 Pt a/w hypertensive urgency and MICHEL.   Nephrology consulted for Elevated serum creatinine.    1. MICHEL- as per H& P; last SCr 1.3. MICHEL likely hemodynamically mediated due to uncontrolled HTN. Renal function continuing to improve. Losartan briefly held but now resumed due to controlled HTN. UA with 300 protein, no blood. FeNa 10%; Check Renal US to r/o retention. TTE with grade 1 diastolic dysfunction, EF 70-75%; ?hypertensive vs infiltrative cardiomyopathy. Check SIFE and K/l. Pt b/l pleural effusions on TTE. Check CXR; consider lasix. Strict I/Os. Avoid nephrotoxins/ NSAIDs/ RCA. Monitor BMP.    Reoccurring MICHEL on 6/29. Following up UA, urine creatinine, urine urea nitrogen. Check bladder scan to rule out presence of obstruction. The patient states that he barely ate in the previous day.    2. Proteinuria- UA with 300 protein, no blood. Rechecked UPCr on 6/29, result is 0.48 g/day. No acute interventions at this time.    3. Hypertensive urgency- BP elevated for which Labetalol was increased to 400mg PO q 8hrs by primary team. Ok to resume Losartan 100mg Po daily since MICHEL is resolving and BP is uncontrolled. Consider diuretics; recc Lasix 80mg PO daily. c/w Nifedipine ER 90mg p daily. Consider tapering prednisone off if able. c/w low salt diet. Monitor BP  4. Hypokalemia- f/u zahra/ renin; pending. Pt given KCl PO and IV. Check FeK; can be falsely elevated due to repletion. Hypokalemia due to shifting from excessive insulin. If persistent hypokalemia;  Recc aldactone 25mg PO daily. Monitor serum potassium  5. Iron deficiency anemia- low hgb. Check FOBT. tsat 16% with ferriitn 236- will give Venofer 200mg IV qd x 3 doses. Monitor hgb    [facility] NEPHROLOGY  Bethel Smith M.D.  Guillermo Jimenez D.O.  Kathleen Lan M.D.  MD Kimi Rodríguez, MSN, ANP-C    Telephone: [phone]  Facsimile: [phone]    [address] [place] [address], [address]  [place] [address]

## 2025-06-29 NOTE — PROGRESS NOTE ADULT - SUBJECTIVE AND OBJECTIVE BOX
Patient is a 70y old  Male who presents with a chief complaint of MICHEL (29 Jun 2025 01:03)    PATIENT IS SEEN AND EXAMINED IN MEDICAL FLOOR.  EDGARDOT [    ]    ARCADIO [   ]      GT [   ]    ALLERGIES:  No Known Allergies      Daily     Daily     VITALS:    Vital Signs Last 24 Hrs  T(C): 36.7 (29 Jun 2025 08:17), Max: 36.7 (29 Jun 2025 04:45)  T(F): 98.1 (29 Jun 2025 08:17), Max: 98.1 (29 Jun 2025 04:45)  HR: 66 (29 Jun 2025 08:17) (64 - 72)  BP: 102/48 (29 Jun 2025 08:17) (102/48 - 189/81)  BP(mean): --  RR: 18 (29 Jun 2025 08:17) (18 - 19)  SpO2: 93% (29 Jun 2025 08:17) (93% - 98%)    Parameters below as of 29 Jun 2025 08:17  Patient On (Oxygen Delivery Method): room air        LABS:    CBC Full  -  ( 29 Jun 2025 05:06 )  WBC Count : 6.22 K/uL  RBC Count : 2.23 M/uL  Hemoglobin : 7.8 g/dL  Hematocrit : 23.5 %  Platelet Count - Automated : 233 K/uL  Mean Cell Volume : 105.4 fl  Mean Cell Hemoglobin : 35.0 pg  Mean Cell Hemoglobin Concentration : 33.2 g/dL  Auto Neutrophil # : x  Auto Lymphocyte # : x  Auto Monocyte # : x  Auto Eosinophil # : x  Auto Basophil # : x  Auto Neutrophil % : x  Auto Lymphocyte % : x  Auto Monocyte % : x  Auto Eosinophil % : x  Auto Basophil % : x      06-29    136  |  102  |  18  ----------------------------<  65[L]  3.8   |  29  |  1.76[H]    Ca    8.6      29 Jun 2025 05:06      CAPILLARY BLOOD GLUCOSE      POCT Blood Glucose.: 86 mg/dL (29 Jun 2025 07:55)  POCT Blood Glucose.: 100 mg/dL (28 Jun 2025 20:59)  POCT Blood Glucose.: 119 mg/dL (28 Jun 2025 17:03)  POCT Blood Glucose.: 221 mg/dL (28 Jun 2025 11:28)          Creatinine Trend: 1.76<--, 1.40<--, 1.45<--, 1.54<--, 1.55<--, 1.81<--  I&O's Summary    28 Jun 2025 07:01  -  29 Jun 2025 07:00  --------------------------------------------------------  IN: 0 mL / OUT: 700 mL / NET: -700 mL                MEDICATIONS:    MEDICATIONS  (STANDING):  aspirin enteric coated 81 milliGRAM(s) Oral daily  atorvastatin 80 milliGRAM(s) Oral at bedtime  azaTHIOprine 100 milliGRAM(s) Oral daily  cholecalciferol 1000 Unit(s) Oral daily  cyanocobalamin 1000 MICROGram(s) Oral daily  dextrose 5%. 1000 milliLiter(s) (50 mL/Hr) IV Continuous <Continuous>  dextrose 50% Injectable 25 Gram(s) IV Push once  ferrous    sulfate 325 milliGRAM(s) Oral two times a day  finasteride 5 milliGRAM(s) Oral daily  folic acid 1 milliGRAM(s) Oral daily  heparin   Injectable 5000 Unit(s) SubCutaneous every 8 hours  hydrALAZINE 100 milliGRAM(s) Oral every 8 hours  insulin glargine Injectable (LANTUS) 6 Unit(s) SubCutaneous at bedtime  insulin lispro (ADMELOG) corrective regimen sliding scale   SubCutaneous three times a day before meals  insulin lispro (ADMELOG) corrective regimen sliding scale   SubCutaneous at bedtime  iron sucrose IVPB 200 milliGRAM(s) IV Intermittent every 24 hours  labetalol 400 milliGRAM(s) Oral every 8 hours  losartan 100 milliGRAM(s) Oral daily  NIFEdipine XL 90 milliGRAM(s) Oral daily  predniSONE   Tablet 20 milliGRAM(s) Oral daily  pyridostigmine 60 milliGRAM(s) Oral <User Schedule>  sertraline 50 milliGRAM(s) Oral daily  tamsulosin 0.4 milliGRAM(s) Oral at bedtime      MEDICATIONS  (PRN):  acetaminophen     Tablet .. 650 milliGRAM(s) Oral every 6 hours PRN Temp greater or equal to 38C (100.4F), Mild Pain (1 - 3)  dextrose Oral Gel 15 Gram(s) Oral once PRN Blood Glucose LESS THAN 70 milliGRAM(s)/deciliter  ondansetron Injectable 4 milliGRAM(s) IV Push every 8 hours PRN Nausea and/or Vomiting      REVIEW OF SYSTEMS:                           ALL ROS DONE [ X   ]    CONSTITUTIONAL:  LETHARGIC [   ], FEVER [   ], UNRESPONSIVE [   ]  CVS:  CP  [   ], SOB, [   ], PALPITATIONS [   ], DIZZYNESS [   ]  RS: COUGH [   ], SPUTUM [   ]  GI: ABDOMINAL PAIN [   ], NAUSEA [   ], VOMITINGS [   ], DIARRHEA [   ], CONSTIPATION [   ]  :  DYSURIA [   ], NOCTURIA [   ], INCREASED FREQUENCY [   ], DRIBLING [   ],  SKELETAL: PAINFUL JOINTS [   ], SWOLLEN JOINTS [   ], NECK ACHE [   ], LOW BACK ACHE [   ],  SKIN : ULCERS [   ], RASH [   ], ITCHING [   ]  CNS: HEAD ACHE [   ], DOUBLE VISION [   ], BLURRED VISION [   ], AMS / CONFUSION [   ], SEIZURES [   ], WEAKNESS [   ],TINGLING / NUMBNESS [   ]        PHYSICAL EXAMINATION:  GENERAL APPEARANCE: NO DISTRESS  HEENT:  NO PALLOR, NO  JVD,  NO   NODES, NECK SUPPLE  CVS: S1 +, S2 +,   RS: AEEB,  OCCASIONAL  RALES +,   NO RONCHI  ABD: SOFT, NT, NO, BS +  EXT: NO PE  SKIN: WARM,   SKELETAL:  ROM ACCEPTABLE  CNS:  AAO X 3      RADIOLOGY :  RADIOLOGY AND READINGS REVIEWED      ASSESSMENT :     Acute renal failure    HTN (hypertension)    DM (diabetes mellitus)    Myasthenia gravis    Hypercholesterolemia    CVA (cerebrovascular accident)    Peripheral neuropathy    BPH (benign prostatic hyperplasia)    Major depression    Lipoma of chest wall    MG with thymoma (myasthena gravis)    H/O cataract extraction        PLAN:  HPI:  Patient is a 70M, from [facility] ambulates with a walker, with PMHx of Myasthenia gravis s/p thymectomy, HTN, 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 Patient reports he has been feeling nauseous for a few days but no vomiting. He reports chronic increased urinary frequency and sensation of incomplete voiding. He denies all other symptoms - fever, chills, cough, chest pain, SoB, palpitations abdominal pain, diarrhea, dysuria, arm or leg numbness or tingling. Reports regular brown stool - denies black or bloody stool.  (25 Jun 2025 21:32)      # HYPERTENSIVE URGENCY  # UNDERLYING HTN  - TELEMETRY  - ECHO - LV EF - 70 - 75%, G1DD  - NOTED TROPONINS  - S/P IV HYDRALAZINE  - HYDRALAZINE  - INCREASE PROCARDIA  - SWITCH METOPROLOL TO LABETALOL  - F/U SERUM RENIN, ALDOSTERONE LEVELS  - CARDIOLOGY CONSULT  - NEPHROLOGY CONSULT    # INTRAVASCULAR VOLUME DEPLETION  # MICHEL  # BPH  - IV FLUIDS  - PVR - 0 ML  - MONITOR CR  - AVOID NEPHROTOXIC AGENTS  - NEPHROLOGY CONSULT    # HYPOGLYCEMIA - RESOLVED  - MONITORING FINGERSTICKS    # ANEMIA  - TREND HGB    # MYASTHENIA GRAVIS S/P THYMECTOMY    # DM  - SSI + FS    # CVA W/ MILD RIGHT HP    # PAD  # GI AND DVT PPX   Patient is a 70y old  Male who presents with a chief complaint of MICHEL (29 Jun 2025 01:03)    PATIENT IS SEEN AND EXAMINED IN MEDICAL FLOOR.      ALLERGIES:  No Known Allergies      VITALS:    Vital Signs Last 24 Hrs  T(C): 36.7 (29 Jun 2025 08:17), Max: 36.7 (29 Jun 2025 04:45)  T(F): 98.1 (29 Jun 2025 08:17), Max: 98.1 (29 Jun 2025 04:45)  HR: 66 (29 Jun 2025 08:17) (64 - 72)  BP: 102/48 (29 Jun 2025 08:17) (102/48 - 189/81)  BP(mean): --  RR: 18 (29 Jun 2025 08:17) (18 - 19)  SpO2: 93% (29 Jun 2025 08:17) (93% - 98%)    Parameters below as of 29 Jun 2025 08:17  Patient On (Oxygen Delivery Method): room air        LABS:    CBC Full  -  ( 29 Jun 2025 05:06 )  WBC Count : 6.22 K/uL  RBC Count : 2.23 M/uL  Hemoglobin : 7.8 g/dL  Hematocrit : 23.5 %  Platelet Count - Automated : 233 K/uL  Mean Cell Volume : 105.4 fl  Mean Cell Hemoglobin : 35.0 pg  Mean Cell Hemoglobin Concentration : 33.2 g/dL  Auto Neutrophil # : x  Auto Lymphocyte # : x  Auto Monocyte # : x  Auto Eosinophil # : x  Auto Basophil # : x  Auto Neutrophil % : x  Auto Lymphocyte % : x  Auto Monocyte % : x  Auto Eosinophil % : x  Auto Basophil % : x      06-29    136  |  102  |  18  ----------------------------<  65[L]  3.8   |  29  |  1.76[H]    Ca    8.6      29 Jun 2025 05:06      CAPILLARY BLOOD GLUCOSE      POCT Blood Glucose.: 86 mg/dL (29 Jun 2025 07:55)  POCT Blood Glucose.: 100 mg/dL (28 Jun 2025 20:59)  POCT Blood Glucose.: 119 mg/dL (28 Jun 2025 17:03)  POCT Blood Glucose.: 221 mg/dL (28 Jun 2025 11:28)          Creatinine Trend: 1.76<--, 1.40<--, 1.45<--, 1.54<--, 1.55<--, 1.81<--  I&O's Summary    28 Jun 2025 07:01  -  29 Jun 2025 07:00  --------------------------------------------------------  IN: 0 mL / OUT: 700 mL / NET: -700 mL                MEDICATIONS:    MEDICATIONS  (STANDING):  aspirin enteric coated 81 milliGRAM(s) Oral daily  atorvastatin 80 milliGRAM(s) Oral at bedtime  azaTHIOprine 100 milliGRAM(s) Oral daily  cholecalciferol 1000 Unit(s) Oral daily  cyanocobalamin 1000 MICROGram(s) Oral daily  dextrose 5%. 1000 milliLiter(s) (50 mL/Hr) IV Continuous <Continuous>  dextrose 50% Injectable 25 Gram(s) IV Push once  ferrous    sulfate 325 milliGRAM(s) Oral two times a day  finasteride 5 milliGRAM(s) Oral daily  folic acid 1 milliGRAM(s) Oral daily  heparin   Injectable 5000 Unit(s) SubCutaneous every 8 hours  hydrALAZINE 100 milliGRAM(s) Oral every 8 hours  insulin glargine Injectable (LANTUS) 6 Unit(s) SubCutaneous at bedtime  insulin lispro (ADMELOG) corrective regimen sliding scale   SubCutaneous three times a day before meals  insulin lispro (ADMELOG) corrective regimen sliding scale   SubCutaneous at bedtime  iron sucrose IVPB 200 milliGRAM(s) IV Intermittent every 24 hours  labetalol 400 milliGRAM(s) Oral every 8 hours  losartan 100 milliGRAM(s) Oral daily  NIFEdipine XL 90 milliGRAM(s) Oral daily  predniSONE   Tablet 20 milliGRAM(s) Oral daily  pyridostigmine 60 milliGRAM(s) Oral <User Schedule>  sertraline 50 milliGRAM(s) Oral daily  tamsulosin 0.4 milliGRAM(s) Oral at bedtime      MEDICATIONS  (PRN):  acetaminophen     Tablet .. 650 milliGRAM(s) Oral every 6 hours PRN Temp greater or equal to 38C (100.4F), Mild Pain (1 - 3)  dextrose Oral Gel 15 Gram(s) Oral once PRN Blood Glucose LESS THAN 70 milliGRAM(s)/deciliter  ondansetron Injectable 4 milliGRAM(s) IV Push every 8 hours PRN Nausea and/or Vomiting      REVIEW OF SYSTEMS:                           ALL ROS DONE [ X   ]    CONSTITUTIONAL:  LETHARGIC [   ], FEVER [   ], UNRESPONSIVE [   ]  CVS:  CP  [   ], SOB, [   ], PALPITATIONS [   ], DIZZYNESS [   ]  RS: COUGH [   ], SPUTUM [   ]  GI: ABDOMINAL PAIN [   ], NAUSEA [   ], VOMITINGS [   ], DIARRHEA [   ], CONSTIPATION [   ]  :  DYSURIA [   ], NOCTURIA [   ], INCREASED FREQUENCY [   ], DRIBLING [   ],  SKELETAL: PAINFUL JOINTS [   ], SWOLLEN JOINTS [   ], NECK ACHE [   ], LOW BACK ACHE [   ],  SKIN : ULCERS [   ], RASH [   ], ITCHING [   ]  CNS: HEAD ACHE [   ], DOUBLE VISION [   ], BLURRED VISION [   ], AMS / CONFUSION [   ], SEIZURES [   ], WEAKNESS [   ],TINGLING / NUMBNESS [   ]        PHYSICAL EXAMINATION:  GENERAL APPEARANCE: NO DISTRESS  HEENT:  NO PALLOR, NO  JVD,  NO   NODES, NECK SUPPLE  CVS: S1 +, S2 +,   RS: AEEB,  OCCASIONAL  RALES +,   NO RONCHI  ABD: SOFT, NT, NO, BS +  EXT: NO PE  SKIN: WARM,   SKELETAL:  ROM ACCEPTABLE  CNS:  AAO X 3      RADIOLOGY :  RADIOLOGY AND READINGS REVIEWED      ASSESSMENT :     Acute renal failure    HTN (hypertension)    DM (diabetes mellitus)    Myasthenia gravis    Hypercholesterolemia    CVA (cerebrovascular accident)    Peripheral neuropathy    BPH (benign prostatic hyperplasia)    Major depression    Lipoma of chest wall    MG with thymoma (myasthena gravis)    H/O cataract extraction        PLAN:  HPI:  Patient is a 70M, from [facility] ambulates with a walker, with PMHx of Myasthenia gravis s/p thymectomy, HTN, 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 Patient reports he has been feeling nauseous for a few days but no vomiting. He reports chronic increased urinary frequency and sensation of incomplete voiding. He denies all other symptoms - fever, chills, cough, chest pain, SoB, palpitations abdominal pain, diarrhea, dysuria, arm or leg numbness or tingling. Reports regular brown stool - denies black or bloody stool.  (25 Jun 2025 21:32)      # HYPERTENSIVE URGENCY  # UNDERLYING HTN  - TELEMETRY  - ECHO - LV EF - 70 - 75%, G1DD  - NOTED TROPONINS  - S/P IV HYDRALAZINE  - HYDRALAZINE  - INCREASE PROCARDIA  - SWITCH METOPROLOL TO LABETALOL  - F/U SERUM RENIN, ALDOSTERONE LEVELS  - CARDIOLOGY CONSULT  - NEPHROLOGY CONSULT    # INTRAVASCULAR VOLUME DEPLETION  # MICHEL ON  ? CKD  # BPH  - IV FLUIDS  - PVR - 0 ML  - MONITOR CR  - AVOID NEPHROTOXIC AGENTS  - NEPHROLOGY CONSULT    # HYPOGLYCEMIA - RESOLVED  - MONITORING FINGERSTICKS    # ANEMIA  - TREND HGB  - ANEMIA PANEL  - DENIES MELENA, HEMATOCHEZIA, HEMATEMESIS, HEMATURIA  - GASTROENTEROLOGY CONSULT IN A.M.      # MYASTHENIA GRAVIS S/P THYMECTOMY    # DM  - SSI + FS    # CVA W/ MILD RIGHT HP    # PAD  # GI AND DVT PPX

## 2025-06-29 NOTE — PROGRESS NOTE ADULT - ASSESSMENT
Agree with above assessment and plan as outlined above.    - BP slowly improving    Ta Mercado MD, FACC  BEEPER [phone]

## 2025-06-29 NOTE — PROGRESS NOTE ADULT - SUBJECTIVE AND OBJECTIVE BOX
C A R D I O L O G Y  **********************************     DATE OF SERVICE: 06-29-25           pt is resting in bed, no events so far tonight        acetaminophen     Tablet .. 650 milliGRAM(s) Oral every 6 hours PRN  aspirin enteric coated 81 milliGRAM(s) Oral daily  atorvastatin 80 milliGRAM(s) Oral at bedtime  azaTHIOprine 100 milliGRAM(s) Oral daily  cholecalciferol 1000 Unit(s) Oral daily  cyanocobalamin 1000 MICROGram(s) Oral daily  dextrose 5%. 1000 milliLiter(s) IV Continuous <Continuous>  dextrose 50% Injectable 25 Gram(s) IV Push once  dextrose Oral Gel 15 Gram(s) Oral once PRN  ferrous    sulfate 325 milliGRAM(s) Oral two times a day  finasteride 5 milliGRAM(s) Oral daily  folic acid 1 milliGRAM(s) Oral daily  heparin   Injectable 5000 Unit(s) SubCutaneous every 8 hours  hydrALAZINE 100 milliGRAM(s) Oral every 8 hours  insulin glargine Injectable (LANTUS) 6 Unit(s) SubCutaneous at bedtime  insulin lispro (ADMELOG) corrective regimen sliding scale   SubCutaneous three times a day before meals  insulin lispro (ADMELOG) corrective regimen sliding scale   SubCutaneous at bedtime  iron sucrose IVPB 200 milliGRAM(s) IV Intermittent every 24 hours  labetalol 400 milliGRAM(s) Oral every 8 hours  losartan 100 milliGRAM(s) Oral daily  NIFEdipine XL 90 milliGRAM(s) Oral daily  ondansetron Injectable 4 milliGRAM(s) IV Push every 8 hours PRN  predniSONE   Tablet 20 milliGRAM(s) Oral daily  pyridostigmine 60 milliGRAM(s) Oral <User Schedule>  sertraline 50 milliGRAM(s) Oral daily  tamsulosin 0.4 milliGRAM(s) Oral at bedtime                            9.1    6.75  )-----------( 234      ( 28 Jun 2025 11:36 )             28.5       Hemoglobin: 9.1 g/dL (06-28 @ 11:36)  Hemoglobin: 7.8 g/dL (06-28 @ 06:02)  Hemoglobin: 9.8 g/dL (06-26 @ 05:10)  Hemoglobin: 9.9 g/dL (06-25 @ 18:00)      06-28    138  |  105  |  14  ----------------------------<  49[LL]  3.6   |  30  |  1.40[H]    Ca    8.5      28 Jun 2025 06:02      Creatinine Trend: 1.40<--, 1.45<--, 1.54<--, 1.55<--, 1.81<--    COAGS:           T(C): 36.5 (06-28-25 @ 23:36), Max: 36.7 (06-28-25 @ 05:26)  HR: 66 (06-28-25 @ 23:36) (63 - 72)  BP: 143/57 (06-28-25 @ 23:36) (103/50 - 198/69)  RR: 18 (06-28-25 @ 23:36) (18 - 19)  SpO2: 96% (06-28-25 @ 23:36) (93% - 98%)  Wt(kg): --    I&O's Summary    27 Jun 2025 07:01  -  28 Jun 2025 07:00  --------------------------------------------------------  IN: 50 mL / OUT: 650 mL / NET: -600 mL        HEENT:  (-)icterus (-)pallor  CV: N S1 S2 1/6 BOUBACAR (+)2 Pulses B/l  Resp:  Clear to ausculatation B/L, normal effort  GI: (+) BS Soft, NT, ND  Lymph:  (-)Edema, (-)obvious lymphadenopathy  Skin: Warm to touch, Normal turgor  Psych: Appropriate mood and affect      TELEMETRY: 	  sinus        ASSESSMENT/PLAN: 	70y  Male PMHx of Myasthenia gravis s/p thymectomy, HTN, 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 hyperaldosterone state would check serum renin and aldosterone levels   - cont Procardia at 90 mg PO daily   - tolerating ARB.   -  TSH wnl     # Abnormal EKG  - echo noted, suspect it is due to hypertensive herat disease givent his profoundly elevated blood pressures however can arrange for outpt cardiac MRI

## 2025-06-29 NOTE — PROGRESS NOTE ADULT - SUBJECTIVE AND OBJECTIVE BOX
[facility] NEPHROLOGY- PROGRESS NOTE    69 yo Male from [facility] ambulates with a walker, with PMHx of Myasthenia gravis s/p thymectomy, HTN, 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 Pt a/w hypertensive urgency and MICHEL. Nephrology consulted for Elevated serum creatinine.    Hospital Medications: Medications reviewed.    REVIEW OF SYSTEMS:  Gen: no changes in weight  HEENT: no rhinorrhea  Neck: no sore throat  Cards: no chest pain  Resp: no dyspnea  GI: no nausea or vomiting or diarrhea  : no dysuria or hematuria  Vascular: no LE edema  Derm: no rashes  Neuro: no numbness/tingling    VITALS:  T(F): 97.5 (25 @ 12:11), Max: 98.1 (25 @ 04:45)  HR: 69 (25 @ 12:11)  BP: 110/49 (25 @ 12:11)  RR: 18 (25 @ 12:11)  SpO2: 96% (25 @ 12:11)  Wt(kg): --     @ 07:01  -   @ 07:00  --------------------------------------------------------  IN: 0 mL / OUT: 700 mL / NET: -700 mL      PHYSICAL EXAM:  Gen: NAD, calm  HEENT: +facial/ periorbital edema  Neck: no JVD  Cards: RRR, +S1/S2,+BOUBACAR  Resp: CTA B/L  GI: soft, NT/ND, NABS  : no CVA tenderness  Extremities: no LE edema B/L  Derm: no rashes  Neuro: non-focal    LABS:      136  |  102  |  18  ----------------------------<  65[L]  3.8   |  29  |  1.76[H]    Ca    8.6      2025 05:06      Creatinine Trend: 1.76 <--, 1.40 <--, 1.45 <--, 1.54 <--, 1.55 <--, 1.81 <--                        8.1    7.38  )-----------( 224      ( 2025 12:42 )             24.8     Urine Studies:  Urinalysis Basic - ( 2025 11:45 )    Color: Dark Yellow / Appearance: Clear / S.015 / pH:   Gluc:  / Ketone:   / Bili: Negative / Urobili: 0.2 mg/dL   Blood:  / Protein: 100 mg/dL / Nitrite: Negative   Leuk Esterase: Negative / RBC: 3 /HPF / WBC 4 /HPF   Sq Epi:  / Non Sq Epi:  / Bacteria: Moderate /HPF      Creatinine, Random Urine: 175 mg/dL ( @ 11:45)  Protein/Creatinine Ratio Calculation: 0.5 Ratio ( @ 11:45)  Creatinine, Random Urine: 127 mg/dL ( @ 11:30)  Protein/Creatinine Ratio Calculation: 0.9 Ratio ( @ 11:30)  Potassium, Random Urine: 43 mmol/L ( @ 11:30)  Potassium, Random Urine: 50 mmol/L ( @ 17:35)  Creatinine, Random Urine: 53 mg/dL ( @ 17:35)  Sodium, Random Urine: 119 mmol/L ( @ 21:58)  Creatinine, Random Urine: <13 mg/dL ( @ 21:58)  Protein/Creatinine Ratio Calculation: unable to calc Ratio ( @ 21:58)        RADIOLOGY & ADDITIONAL STUDIES:    < from: TTE W or WO Ultrasound Enhancing Agent (25 @ 12:04) >    TRANSTHORACIC ECHOCARDIOGRAM REPORT  ________________________________________________________________________________                                      _______       Pt. Name:       BRITTANIE GROSS  Study Date:    2025  MRN:            [id number]  YOB: 1955  Accession #:    [id number]   Age:           70 years  Account#:       [id number]  Gender:        M  Heart Rate:                 Height:        68.00 in (172.72 cm)  Rhythm:                     Weight:        136.99 lb (62.14 kg)  Blood Pressure: 160/57 mmHg BSA/BMI:       1.74 m² / 20.83 kg/m²  ________________________________________________________________________________________  Referring Physician:    [id number] Shai Goldstein  Interpreting Physician: Geo James  Primary Sonographer:    Harsha Henao RDCS    CPT:               ECHO TTE WO CON COMP W DOPP - 84371.m  Indication(s):     Hypertensive heart disease without heart failure - I11.9  Procedure:         Transthoracic echocardiogram with 2-D, M-mode andcomplete                     spectral and color flow Doppler.  Ordering Location: [facility]  Admission Status:  Inpatient    _______________________________________________________________________________________     CONCLUSIONS:      1. Left ventricular wall thickness is severely increased. Left ventricular systolic function is hyperdynamic with an ejection fraction visually estimated at 70 to 75 %.   2. Differential includes hypertensive, hypertrophic, and infiltrative cardiomyopathy, among others. Consider cardiac MRI for further evaluation if clinically indicated.   3. There is mild (grade 1) left ventricular diastolic dysfunction.   4. Left atrium is mildly dilated.   5. Trace mitral regurgitation.   6. Trace tricuspid regurgitation.   7. Trace pulmonic regurgitation.   8. Trace pericardial effusion.   9. Bilateral pleural effusion noted.  10. No prior echocardiogram is available for comparison.      < end of copied text >

## 2025-06-30 LAB
ALDOST SERPL-MCNC: 20.6 NG/DL — SIGNIFICANT CHANGE UP
ANION GAP SERPL CALC-SCNC: 2 MMOL/L — LOW (ref 5–17)
BUN SERPL-MCNC: 23 MG/DL — HIGH (ref 7–18)
CALCIUM SERPL-MCNC: 8.4 MG/DL — SIGNIFICANT CHANGE UP (ref 8.4–10.5)
CHLORIDE SERPL-SCNC: 105 MMOL/L — SIGNIFICANT CHANGE UP (ref 96–108)
CO2 SERPL-SCNC: 31 MMOL/L — SIGNIFICANT CHANGE UP (ref 22–31)
CREAT SERPL-MCNC: 1.94 MG/DL — HIGH (ref 0.5–1.3)
EGFR: 37 ML/MIN/1.73M2 — LOW
EGFR: 37 ML/MIN/1.73M2 — LOW
FERRITIN SERPL-MCNC: 466 NG/ML — HIGH (ref 30–400)
GLUCOSE BLDC GLUCOMTR-MCNC: 108 MG/DL — HIGH (ref 70–99)
GLUCOSE BLDC GLUCOMTR-MCNC: 122 MG/DL — HIGH (ref 70–99)
GLUCOSE BLDC GLUCOMTR-MCNC: 150 MG/DL — HIGH (ref 70–99)
GLUCOSE BLDC GLUCOMTR-MCNC: 159 MG/DL — HIGH (ref 70–99)
GLUCOSE BLDC GLUCOMTR-MCNC: 223 MG/DL — HIGH (ref 70–99)
GLUCOSE BLDC GLUCOMTR-MCNC: 75 MG/DL — SIGNIFICANT CHANGE UP (ref 70–99)
GLUCOSE BLDC GLUCOMTR-MCNC: 86 MG/DL — SIGNIFICANT CHANGE UP (ref 70–99)
GLUCOSE SERPL-MCNC: 47 MG/DL — CRITICAL LOW (ref 70–99)
HCT VFR BLD CALC: 22.1 % — LOW (ref 39–50)
HGB BLD-MCNC: 7.4 G/DL — LOW (ref 13–17)
IRON SATN MFR SERPL: 125 UG/DL — SIGNIFICANT CHANGE UP (ref 65–170)
IRON SATN MFR SERPL: 81 % — HIGH (ref 20–55)
KAPPA LC SER QL IFE: 3.5 MG/DL — HIGH (ref 0.33–1.94)
KAPPA/LAMBDA FREE LIGHT CHAIN RATIO, SERUM: 0.95 RATIO — SIGNIFICANT CHANGE UP (ref 0.26–1.65)
LAMBDA LC SER QL IFE: 3.67 MG/DL — HIGH (ref 0.57–2.63)
MCHC RBC-ENTMCNC: 33.5 G/DL — SIGNIFICANT CHANGE UP (ref 32–36)
MCHC RBC-ENTMCNC: 35.6 PG — HIGH (ref 27–34)
MCV RBC AUTO: 106.3 FL — HIGH (ref 80–100)
NRBC BLD AUTO-RTO: 0 /100 WBCS — SIGNIFICANT CHANGE UP (ref 0–0)
PLATELET # BLD AUTO: 222 K/UL — SIGNIFICANT CHANGE UP (ref 150–400)
POTASSIUM SERPL-MCNC: 3.8 MMOL/L — SIGNIFICANT CHANGE UP (ref 3.5–5.3)
POTASSIUM SERPL-SCNC: 3.8 MMOL/L — SIGNIFICANT CHANGE UP (ref 3.5–5.3)
RBC # BLD: 2.08 M/UL — LOW (ref 4.2–5.8)
RBC # FLD: 16.8 % — HIGH (ref 10.3–14.5)
RENIN PLAS-CCNC: 6.48 NG/ML/HR — HIGH (ref 0.17–5.38)
SODIUM SERPL-SCNC: 138 MMOL/L — SIGNIFICANT CHANGE UP (ref 135–145)
TIBC SERPL-MCNC: 154 UG/DL — LOW (ref 250–450)
TRANSFERRIN SERPL-MCNC: 129 MG/DL — LOW (ref 200–360)
UIBC SERPL-MCNC: 29 UG/DL — LOW (ref 110–370)
UUN UR-MCNC: 242 MG/DL — SIGNIFICANT CHANGE UP
WBC # BLD: 5.38 K/UL — SIGNIFICANT CHANGE UP (ref 3.8–10.5)
WBC # FLD AUTO: 5.38 K/UL — SIGNIFICANT CHANGE UP (ref 3.8–10.5)

## 2025-06-30 PROCEDURE — 99223 1ST HOSP IP/OBS HIGH 75: CPT

## 2025-06-30 PROCEDURE — 76775 US EXAM ABDO BACK WALL LIM: CPT | Mod: 26

## 2025-06-30 RX ORDER — EPOETIN ALFA 10000 [IU]/ML
10000 SOLUTION INTRAVENOUS; SUBCUTANEOUS ONCE
Refills: 0 | Status: COMPLETED | OUTPATIENT
Start: 2025-06-30 | End: 2025-06-30

## 2025-06-30 RX ADMIN — HEPARIN SODIUM 5000 UNIT(S): 1000 INJECTION INTRAVENOUS; SUBCUTANEOUS at 06:23

## 2025-06-30 RX ADMIN — ATORVASTATIN CALCIUM 80 MILLIGRAM(S): 80 TABLET, FILM COATED ORAL at 21:09

## 2025-06-30 RX ADMIN — Medication 325 MILLIGRAM(S): at 18:25

## 2025-06-30 RX ADMIN — TAMSULOSIN HYDROCHLORIDE 0.4 MILLIGRAM(S): 0.4 CAPSULE ORAL at 21:08

## 2025-06-30 RX ADMIN — FINASTERIDE 5 MILLIGRAM(S): 1 TABLET, FILM COATED ORAL at 12:01

## 2025-06-30 RX ADMIN — Medication 81 MILLIGRAM(S): at 12:02

## 2025-06-30 RX ADMIN — EPOETIN ALFA 10000 UNIT(S): 10000 SOLUTION INTRAVENOUS; SUBCUTANEOUS at 18:40

## 2025-06-30 RX ADMIN — Medication 100 MILLIGRAM(S): at 06:20

## 2025-06-30 RX ADMIN — Medication 90 MILLIGRAM(S): at 06:20

## 2025-06-30 RX ADMIN — PYRIDOSTIGMINE BROMIDE 60 MILLIGRAM(S): 5 INJECTION, SOLUTION INTRAVENOUS; PARENTERAL at 06:20

## 2025-06-30 RX ADMIN — PYRIDOSTIGMINE BROMIDE 60 MILLIGRAM(S): 5 INJECTION, SOLUTION INTRAVENOUS; PARENTERAL at 11:50

## 2025-06-30 RX ADMIN — LABETALOL HYDROCHLORIDE 400 MILLIGRAM(S): 200 TABLET, FILM COATED ORAL at 21:09

## 2025-06-30 RX ADMIN — PYRIDOSTIGMINE BROMIDE 60 MILLIGRAM(S): 5 INJECTION, SOLUTION INTRAVENOUS; PARENTERAL at 18:23

## 2025-06-30 RX ADMIN — LABETALOL HYDROCHLORIDE 400 MILLIGRAM(S): 200 TABLET, FILM COATED ORAL at 15:22

## 2025-06-30 RX ADMIN — HEPARIN SODIUM 5000 UNIT(S): 1000 INJECTION INTRAVENOUS; SUBCUTANEOUS at 15:21

## 2025-06-30 RX ADMIN — Medication 100 MILLIGRAM(S): at 15:22

## 2025-06-30 RX ADMIN — LABETALOL HYDROCHLORIDE 400 MILLIGRAM(S): 200 TABLET, FILM COATED ORAL at 06:20

## 2025-06-30 RX ADMIN — PYRIDOSTIGMINE BROMIDE 60 MILLIGRAM(S): 5 INJECTION, SOLUTION INTRAVENOUS; PARENTERAL at 21:09

## 2025-06-30 RX ADMIN — Medication 1000 UNIT(S): at 12:01

## 2025-06-30 RX ADMIN — SERTRALINE 50 MILLIGRAM(S): 100 TABLET, FILM COATED ORAL at 12:01

## 2025-06-30 RX ADMIN — PYRIDOSTIGMINE BROMIDE 60 MILLIGRAM(S): 5 INJECTION, SOLUTION INTRAVENOUS; PARENTERAL at 01:05

## 2025-06-30 RX ADMIN — INSULIN LISPRO 2: 100 INJECTION, SOLUTION INTRAVENOUS; SUBCUTANEOUS at 12:03

## 2025-06-30 RX ADMIN — FOLIC ACID 1 MILLIGRAM(S): 1 TABLET ORAL at 12:00

## 2025-06-30 RX ADMIN — HEPARIN SODIUM 5000 UNIT(S): 1000 INJECTION INTRAVENOUS; SUBCUTANEOUS at 21:08

## 2025-06-30 RX ADMIN — Medication 100 MILLIGRAM(S): at 21:09

## 2025-06-30 RX ADMIN — LOSARTAN POTASSIUM 100 MILLIGRAM(S): 100 TABLET, FILM COATED ORAL at 06:20

## 2025-06-30 RX ADMIN — AZATHIOPRINE 100 MILLIGRAM(S): 50 TABLET ORAL at 12:02

## 2025-06-30 RX ADMIN — Medication 325 MILLIGRAM(S): at 06:20

## 2025-06-30 RX ADMIN — PREDNISONE 20 MILLIGRAM(S): 20 TABLET ORAL at 06:19

## 2025-06-30 RX ADMIN — INSULIN GLARGINE-YFGN 6 UNIT(S): 100 INJECTION, SOLUTION SUBCUTANEOUS at 21:50

## 2025-06-30 RX ADMIN — CYANOCOBALAMIN 1000 MICROGRAM(S): 1000 INJECTION INTRAMUSCULAR; SUBCUTANEOUS at 12:02

## 2025-06-30 RX ADMIN — PYRIDOSTIGMINE BROMIDE 60 MILLIGRAM(S): 5 INJECTION, SOLUTION INTRAVENOUS; PARENTERAL at 15:22

## 2025-06-30 NOTE — CONSULT NOTE ADULT - SUBJECTIVE AND OBJECTIVE BOX
INITIAL GI CONSULTATION    Patient is a 70y old  Male who presents with a chief complaint of MICHEL (30 Jun 2025 10:36)    HPI: Patient is a 70M, from 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 Patient reports he has been feeling nauseous for a few days but no vomiting. He reports chronic increased urinary frequency and sensation of incomplete voiding. He denies all other symptoms - fever, chills, cough, chest pain, SoB, palpitations abdominal pain, diarrhea, dysuria, arm or leg numbness or tingling. Reports regular brown stool - denies black or bloody stool.      GI HPI: Patient was seen and examined on unit. Resting in bed.  He endorses low hemoglobin a few months ago. He endorses being on ferrous sulfate and folic acid for anemia.  Denies N/V/D/C, melena, hematochezia and hematemesis. Hx of Myasthenia gravis, on Imuran. Taking asprin.  Denies NSAIDs.  He does not recall if he ever had a colonoscopy or endoscopy.       PMH/PSH:  PAST MEDICAL & SURGICAL HISTORY:  HTN (hypertension)      DM (diabetes mellitus)      Myasthenia gravis      Hypercholesterolemia      CVA (cerebrovascular accident)      Peripheral neuropathy      BPH (benign prostatic hyperplasia)      Major depression      Lipoma of chest wall      MG with thymoma (myasthena gravis)      H/O cataract extraction  , right eye            FH:  FAMILY HISTORY:  Family history of hypertension (Mother)          MEDS:  MEDICATIONS  (STANDING):  aspirin enteric coated 81 milliGRAM(s) Oral daily  atorvastatin 80 milliGRAM(s) Oral at bedtime  azaTHIOprine 100 milliGRAM(s) Oral daily  cholecalciferol 1000 Unit(s) Oral daily  cyanocobalamin 1000 MICROGram(s) Oral daily  dextrose 5%. 1000 milliLiter(s) (50 mL/Hr) IV Continuous <Continuous>  dextrose 50% Injectable 25 Gram(s) IV Push once  ferrous    sulfate 325 milliGRAM(s) Oral two times a day  finasteride 5 milliGRAM(s) Oral daily  folic acid 1 milliGRAM(s) Oral daily  heparin   Injectable 5000 Unit(s) SubCutaneous every 8 hours  hydrALAZINE 100 milliGRAM(s) Oral every 8 hours  insulin glargine Injectable (LANTUS) 6 Unit(s) SubCutaneous at bedtime  insulin lispro (ADMELOG) corrective regimen sliding scale   SubCutaneous three times a day before meals  insulin lispro (ADMELOG) corrective regimen sliding scale   SubCutaneous at bedtime  labetalol 400 milliGRAM(s) Oral every 8 hours  losartan 100 milliGRAM(s) Oral daily  NIFEdipine XL 90 milliGRAM(s) Oral daily  predniSONE   Tablet 20 milliGRAM(s) Oral daily  pyridostigmine 60 milliGRAM(s) Oral <User Schedule>  sertraline 50 milliGRAM(s) Oral daily  tamsulosin 0.4 milliGRAM(s) Oral at bedtime    MEDICATIONS  (PRN):  acetaminophen     Tablet .. 650 milliGRAM(s) Oral every 6 hours PRN Temp greater or equal to 38C (100.4F), Mild Pain (1 - 3)  dextrose Oral Gel 15 Gram(s) Oral once PRN Blood Glucose LESS THAN 70 milliGRAM(s)/deciliter  ondansetron Injectable 4 milliGRAM(s) IV Push every 8 hours PRN Nausea and/or Vomiting    Allergies    No Known Allergies    Intolerances          ROS: A detailed set of ROS were asked and negative except those outlined in GI HPI.  ______________________________________________________________________  PHYSICAL EXAM:  T(C): 36.5 (06-30-25 @ 11:46), Max: 36.6 (06-30-25 @ 05:24)  HR: 65 (06-30-25 @ 11:46)  BP: 129/62 (06-30-25 @ 11:46)  RR: 18 (06-30-25 @ 11:46)  SpO2: 93% (06-30-25 @ 11:46)  Wt(kg): --    06-29 - 06-30  --------------------------------------------------------            GEN: NAD  HEENT: EOMI, conjunctivae anicteric, neck supple, moist mucous membranes  PULM: LSCTAB, no wheezing, rales, or rhonchi  CV: RRR, no m/r/b  GI: Soft, NT, ND; +BS in all four quadrants, no ascites, no Walker's sign  MSK: HEWITT, no edema  NEURO: A&O x 3, no gross deficits  Rectal: ERICA negative   ______________________________________________________________________  LABS:                        7.4    5.38  )-----------( 222      ( 30 Jun 2025 05:42 )             22.1     06-30    138  |  105  |  23[H]  ----------------------------<  47[LL]  3.8   |  31  |  1.94[H]    Ca    8.4      30 Jun 2025 05:42          ____________________________________________    IMAGING:    CT Abdomen and Pelvis No Cont (06.29.25 @ 23:36) >  FINDINGS:  LOWER CHEST: Small bilateral pleural effusions. Signs of anemia.    LIVER: Within normal limits.  BILE DUCTS: Normal caliber.  GALLBLADDER: Contracted gallbladder.  SPLEEN: Within normal limits.  PANCREAS: Within normal limits. No obvious ductal dilatation.   Periampullary diverticula.  ADRENALS: Within normal limits.  KIDNEYS/URETERS: Within normal limits.    BLADDER: The urinary bladder is partially decompressed. There is   circumferential wall thickening.  REPRODUCTIVE ORGANS: Prostate within normal limits.    BOWEL: No bowel obstruction. Appendix is normal. Colonic diverticulosis   without acute diverticulitis.  PERITONEUM/RETROPERITONEUM: No ascites, pneumoperitoneum or loculated   fluid collections. No retroperitoneal hematoma.  VESSELS: Atherosclerosis. The aorta has normal caliber. Left-sided   paraspinal and paraaortic soft tissue densities are noted suggestive of   tortuous vessels and collaterals versus lymphadenopathy or both. This   finding is new since prior study 3/2/2022  LYMPH NODES: No confined enlarged lymph nodes are noted in the   retroperitoneum, for example a aortocaval adenopathy at 2.1 x 2.8 cm,   image 2:47.  ABDOMINAL WALL: There are small bilateral inguinal hernia containing   mainly fluid and fat on the left and a short segment of bowel on the   right without obstruction.  BONES: Degenerative changes.    IMPRESSION:  No retroperitoneal hematoma.    Soft tissue densities within the retroperitoneum next to the aorta   concerning for extensive lymphadenopathy versus tortuous vessels or a   combination of both. The evaluation is limited due to the lack of IV   contrast. Recommend contrast enhanced cross-sectional imaging for better   assessment. Further workup for lymphoproliferative disease may be helpful.    Circumferential bladder wall thickening as can be seen with decompression   or component of cystitis.        Prelim: No obvious retroperitoneal hematoma. Gallbladder wall thickening.   Bladder wall thickening. Retroperitoneal/pelvic lymphadenopathy.   Persistent right > left pleural effusion. Small pericardial effusion.   Official report to follow.    --- End of Report ---    VIVIANE LOMELI MD; Attending Radiologist  This document has been electronically signed. 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 Patient reports he has been feeling nauseous for a few days but no vomiting. He reports chronic increased urinary frequency and sensation of incomplete voiding. He denies all other symptoms - fever, chills, cough, chest pain, SoB, palpitations abdominal pain, diarrhea, dysuria, arm or leg numbness or tingling. Reports regular brown stool - denies black or bloody stool.      GI HPI: Patient was seen and examined on unit. Resting in bed.  He endorses low hemoglobin a few months ago. He endorses being on ferrous sulfate and folic acid for anemia.  Denies N/V/D/C, melena, hematochezia and hematemesis. Hx of Myasthenia gravis, on Imuran. Taking asprin.  Denies NSAIDs.  He does not recall if he ever had a colonoscopy or endoscopy.       PMH/PSH:  PAST MEDICAL & SURGICAL HISTORY:  HTN (hypertension)      DM (diabetes mellitus)      Myasthenia gravis      Hypercholesterolemia      CVA (cerebrovascular accident)      Peripheral neuropathy      BPH (benign prostatic hyperplasia)      Major depression      Lipoma of chest wall      MG with thymoma (myasthena gravis)      H/O cataract extraction  , right eye            FH:  FAMILY HISTORY:  Family history of hypertension (Mother)          MEDS:  MEDICATIONS  (STANDING):  aspirin enteric coated 81 milliGRAM(s) Oral daily  atorvastatin 80 milliGRAM(s) Oral at bedtime  azaTHIOprine 100 milliGRAM(s) Oral daily  cholecalciferol 1000 Unit(s) Oral daily  cyanocobalamin 1000 MICROGram(s) Oral daily  dextrose 5%. 1000 milliLiter(s) (50 mL/Hr) IV Continuous <Continuous>  dextrose 50% Injectable 25 Gram(s) IV Push once  ferrous    sulfate 325 milliGRAM(s) Oral two times a day  finasteride 5 milliGRAM(s) Oral daily  folic acid 1 milliGRAM(s) Oral daily  heparin   Injectable 5000 Unit(s) SubCutaneous every 8 hours  hydrALAZINE 100 milliGRAM(s) Oral every 8 hours  insulin glargine Injectable (LANTUS) 6 Unit(s) SubCutaneous at bedtime  insulin lispro (ADMELOG) corrective regimen sliding scale   SubCutaneous three times a day before meals  insulin lispro (ADMELOG) corrective regimen sliding scale   SubCutaneous at bedtime  labetalol 400 milliGRAM(s) Oral every 8 hours  losartan 100 milliGRAM(s) Oral daily  NIFEdipine XL 90 milliGRAM(s) Oral daily  predniSONE   Tablet 20 milliGRAM(s) Oral daily  pyridostigmine 60 milliGRAM(s) Oral <User Schedule>  sertraline 50 milliGRAM(s) Oral daily  tamsulosin 0.4 milliGRAM(s) Oral at bedtime    MEDICATIONS  (PRN):  acetaminophen     Tablet .. 650 milliGRAM(s) Oral every 6 hours PRN Temp greater or equal to 38C (100.4F), Mild Pain (1 - 3)  dextrose Oral Gel 15 Gram(s) Oral once PRN Blood Glucose LESS THAN 70 milliGRAM(s)/deciliter  ondansetron Injectable 4 milliGRAM(s) IV Push every 8 hours PRN Nausea and/or Vomiting    Allergies    No Known Allergies    Intolerances          ROS: A detailed set of ROS were asked and negative except those outlined in GI HPI.  ______________________________________________________________________  PHYSICAL EXAM:  T(C): 36.5 (06-30-25 @ 11:46), Max: 36.6 (06-30-25 @ 05:24)  HR: 65 (06-30-25 @ 11:46)  BP: 129/62 (06-30-25 @ 11:46)  RR: 18 (06-30-25 @ 11:46)  SpO2: 93% (06-30-25 @ 11:46)  Wt(kg): --    06-29 - 06-30  --------------------------------------------------------            GEN: NAD  HEENT:  conjunctivae anicteric, neck supple, moist mucous membranes  PULM: clear to ausculatation  CV: Regular rate  GI: Soft, NT, ND; +BS in all four quadrants,   MSK:  edema  Rectal: ERICA negative   ______________________________________________________________________  LABS:                        7.4    5.38  )-----------( 222      ( 30 Jun 2025 05:42 )             22.1     06-30    138  |  105  |  23[H]  ----------------------------<  47[LL]  3.8   |  31  |  1.94[H]    Ca    8.4      30 Jun 2025 05:42          ____________________________________________    IMAGING:    CT Abdomen and Pelvis No Cont (06.29.25 @ 23:36) >  FINDINGS:  LOWER CHEST: Small bilateral pleural effusions. Signs of anemia.    LIVER: Within normal limits.  BILE DUCTS: Normal caliber.  GALLBLADDER: Contracted gallbladder.  SPLEEN: Within normal limits.  PANCREAS: Within normal limits. No obvious ductal dilatation.   Periampullary diverticula.  ADRENALS: Within normal limits.  KIDNEYS/URETERS: Within normal limits.    BLADDER: The urinary bladder is partially decompressed. There is   circumferential wall thickening.  REPRODUCTIVE ORGANS: Prostate within normal limits.    BOWEL: No bowel obstruction. Appendix is normal. Colonic diverticulosis   without acute diverticulitis.  PERITONEUM/RETROPERITONEUM: No ascites, pneumoperitoneum or loculated   fluid collections. No retroperitoneal hematoma.  VESSELS: Atherosclerosis. The aorta has normal caliber. Left-sided   paraspinal and paraaortic soft tissue densities are noted suggestive of   tortuous vessels and collaterals versus lymphadenopathy or both. This   finding is new since prior study 3/2/2022  LYMPH NODES: No confined enlarged lymph nodes are noted in the   retroperitoneum, for example a aortocaval adenopathy at 2.1 x 2.8 cm,   image 2:47.  ABDOMINAL WALL: There are small bilateral inguinal hernia containing   mainly fluid and fat on the left and a short segment of bowel on the   right without obstruction.  BONES: Degenerative changes.    IMPRESSION:  No retroperitoneal hematoma.    Soft tissue densities within the retroperitoneum next to the aorta   concerning for extensive lymphadenopathy versus tortuous vessels or a   combination of both. The evaluation is limited due to the lack of IV   contrast. Recommend contrast enhanced cross-sectional imaging for better   assessment. Further workup for lymphoproliferative disease may be helpful.    Circumferential bladder wall thickening as can be seen with decompression   or component of cystitis.        Prelim: No obvious retroperitoneal hematoma. Gallbladder wall thickening.   Bladder wall thickening. Retroperitoneal/pelvic lymphadenopathy.   Persistent right > left pleural effusion. Small pericardial effusion.   Official report to follow.    --- End of Report ---    VIVIANE LOMELI MD; Attending Radiologist  This document has been electronically signed. Javad

## 2025-06-30 NOTE — PROGRESS NOTE ADULT - SUBJECTIVE AND OBJECTIVE BOX
C A R D I O L O G Y  **********************************     DATE OF SERVICE: 06-30-25    Patient denies chest pain or shortness of breath.   Review of symptoms otherwise negative.    acetaminophen     Tablet .. 650 milliGRAM(s) Oral every 6 hours PRN  aspirin enteric coated 81 milliGRAM(s) Oral daily  atorvastatin 80 milliGRAM(s) Oral at bedtime  azaTHIOprine 100 milliGRAM(s) Oral daily  cholecalciferol 1000 Unit(s) Oral daily  cyanocobalamin 1000 MICROGram(s) Oral daily  dextrose 5%. 1000 milliLiter(s) IV Continuous <Continuous>  dextrose 50% Injectable 25 Gram(s) IV Push once  dextrose Oral Gel 15 Gram(s) Oral once PRN  ferrous    sulfate 325 milliGRAM(s) Oral two times a day  finasteride 5 milliGRAM(s) Oral daily  folic acid 1 milliGRAM(s) Oral daily  heparin   Injectable 5000 Unit(s) SubCutaneous every 8 hours  hydrALAZINE 100 milliGRAM(s) Oral every 8 hours  insulin glargine Injectable (LANTUS) 6 Unit(s) SubCutaneous at bedtime  insulin lispro (ADMELOG) corrective regimen sliding scale   SubCutaneous three times a day before meals  insulin lispro (ADMELOG) corrective regimen sliding scale   SubCutaneous at bedtime  labetalol 400 milliGRAM(s) Oral every 8 hours  losartan 100 milliGRAM(s) Oral daily  NIFEdipine XL 90 milliGRAM(s) Oral daily  ondansetron Injectable 4 milliGRAM(s) IV Push every 8 hours PRN  predniSONE   Tablet 20 milliGRAM(s) Oral daily  pyridostigmine 60 milliGRAM(s) Oral <User Schedule>  sertraline 50 milliGRAM(s) Oral daily  tamsulosin 0.4 milliGRAM(s) Oral at bedtime                            7.4    5.38  )-----------( 222 ( 30 Jun 2025 05:42 )             22.1       Hemoglobin: 7.4 g/dL (06-30 @ 05:42)  Hemoglobin: 8.1 g/dL (06-29 @ 12:42)  Hemoglobin: 7.8 g/dL (06-29 @ 05:06)  Hemoglobin: 9.1 g/dL (06-28 @ 11:36)  Hemoglobin: 7.8 g/dL (06-28 @ 06:02)      06-30    138  |  105  |  23[H]  ----------------------------<  47[LL]  3.8   |  31  |  1.94[H]    Ca    8.4      30 Jun 2025 05:42      Creatinine Trend: 1.94<--, 1.76<--, 1.40<--, 1.45<--, 1.54<--, 1.55<--    COAGS:           T(C): 36.4 (06-30-25 @ 08:02), Max: 36.6 (06-30-25 @ 05:24)  HR: 70 (06-30-25 @ 08:02) (69 - 77)  BP: 105/51 (06-30-25 @ 08:02) (105/51 - 176/75)  RR: 17 (06-30-25 @ 08:02) (17 - 19)  SpO2: 91% (06-30-25 @ 08:02) (91% - 97%)  Wt(kg): --    I&O's Summary    29 Jun 2025 07:01  -  30 Jun 2025 07:00  --------------------------------------------------------  IN: 0 mL / OUT: 500 mL / NET: -500 mL        HEENT:  (-)icterus (-)pallor  CV: N S1 S2 1/6 BOUBACAR (+)2 Pulses B/l  Resp:  Clear to ausculatation B/L, normal effort  GI: (+) BS Soft, NT, ND  Lymph:  (-)Edema, (-)obvious lymphadenopathy  Skin: Warm to touch, Normal turgor  Psych: Appropriate mood and affect      TELEMETRY: 	  sinus        ASSESSMENT/PLAN: 	70y  Male PMHx of Myasthenia gravis s/p thymectomy, HTN, 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 hyperaldosterone state would check serum renin and aldosterone levels   - cont Procardia at 90 mg PO daily   - tolerating ARB.   -  TSH wnl     # Abnormal EKG  - echo noted, suspect it is due to hypertensive heart disease given his profoundly elevated blood pressures however can arrange for outpt cardiac MRI     Ta Mercado MD, [facility]  BEEPER [phone]

## 2025-06-30 NOTE — PROGRESS NOTE ADULT - PROBLEM SELECTOR PLAN 9
DVT prophylaxis - heparin SQ    ####Discharge planning  from [facility]   f/u SCr, f/u nephro reccs  monitor BP to be improved. fluctuates  f/u renal US result

## 2025-06-30 NOTE — PROGRESS NOTE ADULT - PROBLEM SELECTOR PLAN 2
BUN/Cr 30/1.81 (Baseline Scr 1.3)  s/p 1L NS in ED   Post-void bladder scan - 0mL  - s/p IVF  - creatinine improved then now worsening 1.94.   - trend BMP  - f/u Renal US  - Nephrology dr. Lan consulted, d/c'd ARB iso worsening MICHEL

## 2025-06-30 NOTE — PROGRESS NOTE ADULT - PROBLEM SELECTOR PLAN 3
Was sent in from NH for Hgb 7.9 in a routine lab   Total iron 37, TIBC 233, % iron 16; anemia of chronic disease and AYAD   No active signs of bleeding   - c/w home iron and folic acid supplement  - Hgb dropped 7.4 from 9.1  - started Epogen per nephrology

## 2025-06-30 NOTE — PROGRESS NOTE ADULT - PROBLEM SELECTOR PLAN 1
Hx of HTN on hydralazine 100mg tid, losartan 100mg qd, nifedipine 60mg qd, metoprolol 100mg bid   Troponin 43.9 > 39  BP remains elevated   - echo: Left ventricular wall thickness is severely increased. Left ventricular systolic function is hyperdynamic with an ejection fraction visually estimated at 70 to 75 %.  Differential includes hypertensive, hypertrophic, and infiltrative cardiomyopathy, among others. cardiac MRI for further evaluation was recommended, can be done outp   - telemetry monitoring   - Cardiology Dr. Mercado follows  - increased Nifedipine to 90 mg qd  - changed metoprolol to labetalol 400 mg q 8hr and added Hydralazine 100mg q8h.   - hold losartan iso worsening MICHEL Hx of HTN on hydralazine 100mg tid, losartan 100mg qd, nifedipine 60mg qd, metoprolol 100mg bid   Troponin 43.9 > 39  BP remains elevated   - echo: Left ventricular wall thickness is severely increased. Left ventricular systolic function is hyperdynamic with an ejection fraction visually estimated at 70 to 75 %.  Differential includes hypertensive, hypertrophic, and infiltrative cardiomyopathy, among others. cardiac MRI for further evaluation was recommended, can be done outp   - telemetry monitoring   - Cardiology Dr. Mercado follows  -serum aldosterone level normal. serum renin test unable as per [facility] lab  - increased Nifedipine to 90 mg qd  - changed metoprolol to labetalol 400 mg q 8hr and added Hydralazine 100mg q8h.   - hold losartan iso worsening MICHEL

## 2025-06-30 NOTE — PROGRESS NOTE ADULT - SUBJECTIVE AND OBJECTIVE BOX
Patient is a 70y old  Male who presents with a chief complaint of MICHEL (30 Jun 2025 16:01)    PATIENT IS SEEN AND EXAMINED IN MEDICAL FLOOR.      ALLERGIES:  No Known Allergies      VITALS:    Vital Signs Last 24 Hrs  T(C): 36.9 (30 Jun 2025 20:05), Max: 36.9 (30 Jun 2025 20:05)  T(F): 98.4 (30 Jun 2025 20:05), Max: 98.4 (30 Jun 2025 20:05)  HR: 67 (30 Jun 2025 20:05) (65 - 77)  BP: 179/74 (30 Jun 2025 20:05) (105/51 - 179/74)  BP(mean): 66 (30 Jun 2025 08:02) (66 - 66)  RR: 18 (30 Jun 2025 20:05) (17 - 18)  SpO2: 95% (30 Jun 2025 20:05) (91% - 97%)    Parameters below as of 30 Jun 2025 20:05  Patient On (Oxygen Delivery Method): room air        LABS:    CBC Full  -  ( 30 Jun 2025 05:42 )  WBC Count : 5.38 K/uL  RBC Count : 2.08 M/uL  Hemoglobin : 7.4 g/dL  Hematocrit : 22.1 %  Platelet Count - Automated : 222 K/uL  Mean Cell Volume : 106.3 fl  Mean Cell Hemoglobin : 35.6 pg  Mean Cell Hemoglobin Concentration : 33.5 g/dL  Auto Neutrophil # : x  Auto Lymphocyte # : x  Auto Monocyte # : x  Auto Eosinophil # : x  Auto Basophil # : x  Auto Neutrophil % : x  Auto Lymphocyte % : x  Auto Monocyte % : x  Auto Eosinophil % : x  Auto Basophil % : x      06-30    138  |  105  |  23[H]  ----------------------------<  47[LL]  3.8   |  31  |  1.94[H]    Ca    8.4      30 Jun 2025 05:42      CAPILLARY BLOOD GLUCOSE      POCT Blood Glucose.: 150 mg/dL (30 Jun 2025 21:26)  POCT Blood Glucose.: 122 mg/dL (30 Jun 2025 16:45)  POCT Blood Glucose.: 223 mg/dL (30 Jun 2025 11:39)  POCT Blood Glucose.: 159 mg/dL (30 Jun 2025 09:31)  POCT Blood Glucose.: 108 mg/dL (30 Jun 2025 07:51)  POCT Blood Glucose.: 75 mg/dL (30 Jun 2025 07:21)  POCT Blood Glucose.: 86 mg/dL (30 Jun 2025 02:57)          Creatinine Trend: 1.94<--, 1.76<--, 1.40<--, 1.45<--, 1.54<--, 1.55<--  I&O's Summary    29 Jun 2025 07:01  -  30 Jun 2025 07:00  --------------------------------------------------------  IN: 0 mL / OUT: 500 mL / NET: -500 mL    30 Jun 2025 07:01  -  30 Jun 2025 23:33  --------------------------------------------------------  IN: 0 mL / OUT: 750 mL / NET: -750 mL                MEDICATIONS:    MEDICATIONS  (STANDING):  aspirin enteric coated 81 milliGRAM(s) Oral daily  atorvastatin 80 milliGRAM(s) Oral at bedtime  azaTHIOprine 100 milliGRAM(s) Oral daily  cholecalciferol 1000 Unit(s) Oral daily  cyanocobalamin 1000 MICROGram(s) Oral daily  dextrose 5%. 1000 milliLiter(s) (50 mL/Hr) IV Continuous <Continuous>  dextrose 50% Injectable 25 Gram(s) IV Push once  ferrous    sulfate 325 milliGRAM(s) Oral two times a day  finasteride 5 milliGRAM(s) Oral daily  folic acid 1 milliGRAM(s) Oral daily  heparin   Injectable 5000 Unit(s) SubCutaneous every 8 hours  hydrALAZINE 100 milliGRAM(s) Oral every 8 hours  insulin glargine Injectable (LANTUS) 6 Unit(s) SubCutaneous at bedtime  insulin lispro (ADMELOG) corrective regimen sliding scale   SubCutaneous three times a day before meals  insulin lispro (ADMELOG) corrective regimen sliding scale   SubCutaneous at bedtime  labetalol 400 milliGRAM(s) Oral every 8 hours  NIFEdipine XL 90 milliGRAM(s) Oral daily  predniSONE   Tablet 20 milliGRAM(s) Oral daily  pyridostigmine 60 milliGRAM(s) Oral <User Schedule>  sertraline 50 milliGRAM(s) Oral daily  tamsulosin 0.4 milliGRAM(s) Oral at bedtime      MEDICATIONS  (PRN):  acetaminophen     Tablet .. 650 milliGRAM(s) Oral every 6 hours PRN Temp greater or equal to 38C (100.4F), Mild Pain (1 - 3)  dextrose Oral Gel 15 Gram(s) Oral once PRN Blood Glucose LESS THAN 70 milliGRAM(s)/deciliter  ondansetron Injectable 4 milliGRAM(s) IV Push every 8 hours PRN Nausea and/or Vomiting      REVIEW OF SYSTEMS:                           ALL ROS DONE [ X   ]    CONSTITUTIONAL:  LETHARGIC [   ], FEVER [   ], UNRESPONSIVE [   ]  CVS:  CP  [   ], SOB, [   ], PALPITATIONS [   ], DIZZYNESS [   ]  RS: COUGH [   ], SPUTUM [   ]  GI: ABDOMINAL PAIN [   ], NAUSEA [   ], VOMITINGS [   ], DIARRHEA [   ], CONSTIPATION [   ]  :  DYSURIA [   ], NOCTURIA [   ], INCREASED FREQUENCY [   ], DRIBLING [   ],  SKELETAL: PAINFUL JOINTS [   ], SWOLLEN JOINTS [   ], NECK ACHE [   ], LOW BACK ACHE [   ],  SKIN : ULCERS [   ], RASH [   ], ITCHING [   ]  CNS: HEAD ACHE [   ], DOUBLE VISION [   ], BLURRED VISION [   ], AMS / CONFUSION [   ], SEIZURES [   ], WEAKNESS [   ],TINGLING / NUMBNESS [   ]        PHYSICAL EXAMINATION:  GENERAL APPEARANCE: NO DISTRESS  HEENT:  NO PALLOR, NO  JVD,  NO   NODES, NECK SUPPLE  CVS: S1 +, S2 +,   RS: AEEB,  OCCASIONAL  RALES +,   NO RONCHI  ABD: SOFT, NT, NO, BS +  EXT: NO PE  SKIN: WARM,   SKELETAL:  ROM ACCEPTABLE  CNS:  AAO X 3      RADIOLOGY :  RADIOLOGY AND READINGS REVIEWED      ASSESSMENT :     Acute renal failure    HTN (hypertension)    DM (diabetes mellitus)    Myasthenia gravis    Hypercholesterolemia    CVA (cerebrovascular accident)    Peripheral neuropathy    BPH (benign prostatic hyperplasia)    Major depression    Lipoma of chest wall    MG with thymoma (myasthena gravis)    H/O cataract extraction        PLAN:  HPI:  Patient is a 70M, from [facility] ambulates with a walker, with PMHx of Myasthenia gravis s/p thymectomy, HTN, 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 Patient reports he has been feeling nauseous for a few days but no vomiting. He reports chronic increased urinary frequency and sensation of incomplete voiding. He denies all other symptoms - fever, chills, cough, chest pain, SoB, palpitations abdominal pain, diarrhea, dysuria, arm or leg numbness or tingling. Reports regular brown stool - denies black or bloody stool.  (25 Jun 2025 21:32)    # CASE D/W PATIENT AND PARTNER MILLI DONOVAN AT BEDSIDE, ALL QUESTIONS ANSWERED. DISCUSSED RECOMMENDATIONS AS MADE BY MULTIDISCIPLINARY TEAM. DISCUSSED THAT PROGNOSIS IS GUARDED. THEY VERBALIZED UNDERSTANDING. IN DISCUSSION REGARDING GOC - PATIENT WISHES TO BE FULL CODE.    # HYPERTENSIVE URGENCY  # UNDERLYING HTN  - TELEMETRY  - ECHO - LV EF - 70 - 75%, G1DD  - NOTED TROPONINS  - S/P IV HYDRALAZINE  - HYDRALAZINE  - INCREASE PROCARDIA  - SWITCH METOPROLOL TO LABETALOL  - F/U SERUM RENIN, ALDOSTERONE LEVELS  - CARDIOLOGY CONSULT  - NEPHROLOGY CONSULT      # MICHEL ON  ? CKD  # BPH  - IV FLUIDS  - PVR - 0 ML  - MONITOR CR  - AVOID NEPHROTOXIC AGENTS  - NEPHROLOGY CONSULT    # HYPOGLYCEMIA - RESOLVED  - MONITORING FINGERSTICKS    # ANEMIA  - TREND HGB  - ANEMIA PANEL  - DENIES MELENA, HEMATOCHEZIA, HEMATEMESIS, HEMATURIA  - NOTED CT A/P  - GASTROENTEROLOGY CONSULT    # ? LYMPHADENOPATHY ON CT A/P   - HEME/ONC CONSULT    # DENIES DYSURIA, UA NEGATIVE FOR LEUKOCYTE ESTERASE AND NITRITES      # MYASTHENIA GRAVIS S/P THYMECTOMY    # DM  - SSI + FS    # CVA W/ MILD RIGHT HP    # PAD  # GI AND DVT PPX

## 2025-06-30 NOTE — PROGRESS NOTE ADULT - ASSESSMENT
71 y/o M, from [facility] ambulates with a walker, with PMHx of Myasthenia gravis s/p thymectomy, HTN, 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 Hgb 9.9. BUN/Cr 30/1.81.   Admitted for MICHEL on CKD and uncontrolled HTN, Nephrology and Cardiology consulted. Medications adjusted   TTE: Left ventricular wall thickness is severely increased. Left ventricular systolic function is hyperdynamic with an ejection fraction visually estimated at 70 to 75 %.  Differential includes hypertensive, hypertrophic, and infiltrative cardiomyopathy, among others. Cardiac MRI for further evaluation was recommended, can be done outp   Serum Cr up-trended, renal US ordered per nephrology. d/c'd ARB. Epogen started for anemia.

## 2025-06-30 NOTE — PROGRESS NOTE ADULT - SUBJECTIVE AND OBJECTIVE BOX
NP Note discussed with  Primary Attending    INTERVAL HPI/OVERNIGHT EVENTS: seen at bedside, Pt denies any complaints, but decreased oral intake, glucose 47 in BMP this am. FS 75, improved 108 with eating.     MEDICATIONS  (STANDING):  aspirin enteric coated 81 milliGRAM(s) Oral daily  atorvastatin 80 milliGRAM(s) Oral at bedtime  azaTHIOprine 100 milliGRAM(s) Oral daily  cholecalciferol 1000 Unit(s) Oral daily  cyanocobalamin 1000 MICROGram(s) Oral daily  dextrose 5%. 1000 milliLiter(s) (50 mL/Hr) IV Continuous <Continuous>  dextrose 50% Injectable 25 Gram(s) IV Push once  epoetin damián-epbx (RETACRIT) Injectable 69116 Unit(s) SubCutaneous once  ferrous    sulfate 325 milliGRAM(s) Oral two times a day  finasteride 5 milliGRAM(s) Oral daily  folic acid 1 milliGRAM(s) Oral daily  heparin   Injectable 5000 Unit(s) SubCutaneous every 8 hours  hydrALAZINE 100 milliGRAM(s) Oral every 8 hours  insulin glargine Injectable (LANTUS) 6 Unit(s) SubCutaneous at bedtime  insulin lispro (ADMELOG) corrective regimen sliding scale   SubCutaneous three times a day before meals  insulin lispro (ADMELOG) corrective regimen sliding scale   SubCutaneous at bedtime  labetalol 400 milliGRAM(s) Oral every 8 hours  NIFEdipine XL 90 milliGRAM(s) Oral daily  predniSONE   Tablet 20 milliGRAM(s) Oral daily  pyridostigmine 60 milliGRAM(s) Oral <User Schedule>  sertraline 50 milliGRAM(s) Oral daily  tamsulosin 0.4 milliGRAM(s) Oral at bedtime    MEDICATIONS  (PRN):  acetaminophen     Tablet .. 650 milliGRAM(s) Oral every 6 hours PRN Temp greater or equal to 38C (100.4F), Mild Pain (1 - 3)  dextrose Oral Gel 15 Gram(s) Oral once PRN Blood Glucose LESS THAN 70 milliGRAM(s)/deciliter  ondansetron Injectable 4 milliGRAM(s) IV Push every 8 hours PRN Nausea and/or Vomiting      __________________________________________________  REVIEW OF SYSTEMS:    CONSTITUTIONAL: No fever,   EYES: no acute visual disturbances  NECK: No pain or stiffness  RESPIRATORY: No cough; No shortness of breath  CARDIOVASCULAR: No chest pain, no palpitations  GASTROINTESTINAL: No pain. No nausea or vomiting; No diarrhea   NEUROLOGICAL: No headache or numbness, no tremors  MUSCULOSKELETAL: No joint pain, no muscle pain  GENITOURINARY: no dysuria, no frequency, no hesitancy  PSYCHIATRY: no depression , no anxiety  ALL OTHER  ROS negative        Vital Signs Last 24 Hrs  T(C): 36.7 (30 Jun 2025 15:52), Max: 36.7 (30 Jun 2025 15:52)  T(F): 98.1 (30 Jun 2025 15:52), Max: 98.1 (30 Jun 2025 15:52)  HR: 66 (30 Jun 2025 15:52) (65 - 77)  BP: 167/60 (30 Jun 2025 15:52) (105/51 - 176/75)  BP(mean): 66 (30 Jun 2025 08:02) (66 - 104)  RR: 18 (30 Jun 2025 15:52) (17 - 19)  SpO2: 96% (30 Jun 2025 15:52) (91% - 97%)    Parameters below as of 30 Jun 2025 15:52  Patient On (Oxygen Delivery Method): room air        ________________________________________________  PHYSICAL EXAM:  GENERAL: NAD  HEENT: Normocephalic;  conjunctivae and sclerae clear; moist mucous membranes;   NECK : supple  CHEST/LUNG: Clear to auscultation bilaterally with good air entry   HEART: S1 S2  regular; no murmurs, gallops or rubs  ABDOMEN: Soft, Nontender, Nondistended; Bowel sounds present  EXTREMITIES: no cyanosis; no edema; no calf tenderness  SKIN: warm and dry; no rash  NERVOUS SYSTEM:  Awake and alert; Oriented  to place, person and time ; no new deficits    _________________________________________________  LABS:                        7.4    5.38  )-----------( 222      ( 30 Jun 2025 05:42 )             22.1     06-30    138  |  105  |  23[H]  ----------------------------<  47[LL]  3.8   |  31  |  1.94[H]    Ca    8.4      30 Jun 2025 05:42        Urinalysis Basic - ( 30 Jun 2025 05:42 )    Color: x / Appearance: x / SG: x / pH: x  Gluc: 47 mg/dL / Ketone: x  / Bili: x / Urobili: x   Blood: x / Protein: x / Nitrite: x   Leuk Esterase: x / RBC: x / WBC x   Sq Epi: x / Non Sq Epi: x / Bacteria: x      CAPILLARY BLOOD GLUCOSE      POCT Blood Glucose.: 223 mg/dL (30 Jun 2025 11:39)  POCT Blood Glucose.: 159 mg/dL (30 Jun 2025 09:31)  POCT Blood Glucose.: 108 mg/dL (30 Jun 2025 07:51)  POCT Blood Glucose.: 75 mg/dL (30 Jun 2025 07:21)  POCT Blood Glucose.: 86 mg/dL (30 Jun 2025 02:57)  POCT Blood Glucose.: 87 mg/dL (29 Jun 2025 20:58)  POCT Blood Glucose.: 188 mg/dL (29 Jun 2025 16:55)    RADIOLOGY & ADDITIONAL TESTS:    Imaging  Reviewed:  YES    < from: US Renal (06.30.25 @ 09:48) >    ACC: [id number] EXAM:  US KIDNEY(S)   ORDERED BY: DUONG ALCANTAR     PROCEDURE DATE:  06/30/2025          INTERPRETATION:  CLINICAL INFORMATION: 70-year-old male with MICHEL, rule   out retention    COMPARISON: Abdominal pelvic CT 6/29/2025, renal ultrasound 1/21/2022    TECHNIQUE: Portable sonography of the kidneys and bladder.    FINDINGS:  Right kidney: 11.1 cm. No renal mass, hydronephrosis or calculi.   Increased cortical echogenicity.    Left kidney: 9.9 cm. No renal mass, hydronephrosis or calculi. Increased   cortical echogenicity. Partially imaged hypoechoic structure posterior to   the left kidney may correspond to the lymphadenopathy questioned on the   prior CT, not evaluated on this exam.    Urinary bladder: Not fully evaluated on this examination. However, on   submitted views, there appears to be mild diffuse bladder wall   thickening, as seen on the prior CT.    A small left pleural effusion is noted.    IMPRESSION:  1. No hydronephrosis.  2. Increased renal cortical echogenicity compatible with renal   parenchymal disease.  3. There is limited visualization of the urinary bladder; however, on   limited views, the wall appears diffusely thickened. Suggest correlation   with urinalysis to exclude infection.  4. Partially imaged hypoechoic structure posterior to the left kidney may   correspond to the lymphadenopathy questioned on the prior CT. Further   evaluation is recommended as clinically.    --- End of Report ---    MATTIE VILLARREAL MD; Attending Radiologist  This document has been electronically signed. Jun 30 2025 12:05PM    < end of copied text >  < from: CT Abdomen and Pelvis No Cont (06.29.25 @ 23:36) >    ACC: [id number] EXAM:  CT ABDOMEN AND PELVIS   ORDERED BY: MURTAZA AMEZQUITA     PROCEDURE DATE:  06/29/2025          INTERPRETATION:  CLINICAL INFORMATION: Anemia    COMPARISON: CT chest 3/2/2022.    CONTRAST/COMPLICATIONS:  IV Contrast: NONE  Oral Contrast: NONE.    PROCEDURE:  CT of the Abdomen and Pelvis was performed.  Sagittal and coronal reformats were performed.    FINDINGS:  LOWER CHEST: Small bilateral pleural effusions. Signs of anemia.    LIVER: Within normal limits.  BILE DUCTS: Normal caliber.  GALLBLADDER: Contracted gallbladder.  SPLEEN: Within normal limits.  PANCREAS: Within normal limits. No obvious ductal dilatation.   Periampullary diverticula.  ADRENALS: Within normal limits.  KIDNEYS/URETERS: Within normal limits.    BLADDER: The urinary bladder is partially decompressed. There is   circumferential wall thickening.  REPRODUCTIVE ORGANS: Prostate within normal limits.    BOWEL: No bowel obstruction. Appendix is normal. Colonic diverticulosis   without acute diverticulitis.  PERITONEUM/RETROPERITONEUM: No ascites, pneumoperitoneum or loculated   fluid collections. No retroperitoneal hematoma.  VESSELS: Atherosclerosis. The aorta has normal caliber. Left-sided   paraspinal and paraaortic soft tissue densities are noted suggestive of   tortuous vessels and collaterals versus lymphadenopathy or both. This   finding is new since prior study 3/2/2022  LYMPH NODES: No confined enlarged lymph nodes are noted in the   retroperitoneum, for example a aortocaval adenopathy at 2.1 x 2.8 cm,   image 2:47.  ABDOMINAL WALL: There are small bilateral inguinal hernia containing   mainly fluid and fat on the left and a short segment of bowel on the   right without obstruction.  BONES: Degenerative changes.    IMPRESSION:  No retroperitoneal hematoma.    Soft tissue densities within the retroperitoneum next to the aorta   concerning for extensive lymphadenopathy versus tortuous vessels or a   combination of both. The evaluation is limited due to the lack of IV   contrast. Recommend contrast enhanced cross-sectional imaging for better   assessment. Further workup for lymphoproliferative disease may be helpful.    Circumferential bladder wall thickening as can be seen with decompression   or component of cystitis.        Prelim: No obvious retroperitoneal hematoma. Gallbladder wall thickening.   Bladder wall thickening. Retroperitoneal/pelvic lymphadenopathy.   Persistent right > left pleural effusion. Small pericardial effusion.   Official report to follow.    --- End of Report ---            VIVIANE LOMELI MD; Attending Radiologist  This document has been electronically signed. Jun 30 2025 10:28AM    < end of copied text >  Consultant(s) Notes Reviewed:   YES      Plan of care was discussed with patient and /or primary care giver; all questions and concerns were addressed

## 2025-06-30 NOTE — CONSULT NOTE ADULT - ASSESSMENT
Patient is a 70M, from [facility] ambulates with a walker, with PMHx of Myasthenia gravis s/p thymectomy, HTN, 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 GI consulted for anemia. ERICA negative.  CTAP demonstrated No bowel obstruction. Appendix is normal. Colonic diverticulosis  without acute diverticulitis.  Anemia  likely due to chronic disease vs Aplastic Anemia.       Plan:  -Continue Ferrous sulfate and folic acid   -Pantoprazole 40 mg po daily  -Check TMPT   -Follow up with GI outpatient             This note and its recommendations herein are preliminary until such time as cosigned by an attending. Patient is a 70M, from [facility] ambulates with a walker, with PMHx of Myasthenia gravis s/p thymectomy, HTN, 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 GI consulted for anemia. ERICA negative.  CTAP demonstrated No bowel obstruction. Appendix is normal. Colonic diverticulosis  without acute diverticulitis.  Anemia  likely due to chronic disease vs Aplastic Anemia.       Plan:  -Continue Ferrous sulfate and folic acid   -Pantoprazole 40 mg po daily  -Check TMPT   -Hematology referral             This note and its recommendations herein are preliminary until such time as cosigned by an attending. 70 year old male with a history of DM, Myasthenia Gravis, CVA, PAD here with anemia.       Macrocytic Anemia    Plan:  -Continue Ferrous sulfate and folic acid   -Pantoprazole 40 mg po daily  -Hematology referral             This note and its recommendations herein are preliminary until such time as cosigned by an attending.

## 2025-06-30 NOTE — PROVIDER CONTACT NOTE (CRITICAL VALUE NOTIFICATION) - ACTION/TREATMENT ORDERED:
Will follow with dextrose and follow for potassium
apple juice and dante crackers given, endorsed to primary RN Sonia

## 2025-06-30 NOTE — PROGRESS NOTE ADULT - SUBJECTIVE AND OBJECTIVE BOX
[facility] NEPHROLOGY- PROGRESS NOTE    69 yo Male from [facility] ambulates with a walker, with PMHx of Myasthenia gravis s/p thymectomy, HTN, 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 Pt a/w hypertensive urgency and MICHEL. Nephrology consulted for Elevated serum creatinine.    Hospital Medications: Medications reviewed.    REVIEW OF SYSTEMS:  Gen: no changes in weight  HEENT: no rhinorrhea  Neck: no sore throat  Cards: no chest pain  Resp: no dyspnea  GI: no nausea or vomiting or diarrhea  : no dysuria or hematuria  Vascular: no LE edema  Derm: no rashes  Neuro: no numbness/tingling    VITALS:  T(F): 97.7 (06-30-25 @ 11:46), Max: 97.8 (06-30-25 @ 05:24)  HR: 65 (06-30-25 @ 11:46)  BP: 129/62 (06-30-25 @ 11:46)  RR: 18 (06-30-25 @ 11:46)  SpO2: 93% (06-30-25 @ 11:46)  Wt(kg): --    06-29 @ 07:01  -  06-30 @ 07:00  --------------------------------------------------------  IN: 0 mL / OUT: 500 mL / NET: -500 mL          PHYSICAL EXAM:  Gen: NAD, calm  HEENT: +facial/ periorbital edema improving  Neck: no JVD  Cards: RRR, +S1/S2,+BOUBACAR  Resp: CTA B/L  GI: soft, NT/ND, NABS  : no CVA tenderness  Extremities: +trace LE edema B/L     LABS:  06-30    138  |  105  |  23[H]  ----------------------------<  47[LL]  3.8   |  31  |  1.94[H]    Ca    8.4      30 Jun 2025 05:42      Creatinine Trend: 1.94 <--, 1.76 <--, 1.40 <--, 1.45 <--, 1.54 <--, 1.55 <--, 1.81 <--                        7.4    5.38  )-----------( 222      ( 30 Jun 2025 05:42 )             22.1     Urine Studies:  Urinalysis Basic - ( 30 Jun 2025 05:42 )    Color:  / Appearance:  / SG:  / pH:   Gluc: 47 mg/dL / Ketone:   / Bili:  / Urobili:    Blood:  / Protein:  / Nitrite:    Leuk Esterase:  / RBC:  / WBC    Sq Epi:  / Non Sq Epi:  / Bacteria:       Creatinine, Random Urine: 175 mg/dL (06-29 @ 11:45)  Protein/Creatinine Ratio Calculation: 0.5 Ratio (06-29 @ 11:45)  Creatinine, Random Urine: 127 mg/dL (06-28 @ 11:30)  Protein/Creatinine Ratio Calculation: 0.9 Ratio (06-28 @ 11:30)  Potassium, Random Urine: 43 mmol/L (06-28 @ 11:30)  Potassium, Random Urine: 50 mmol/L (06-27 @ 17:35)  Creatinine, Random Urine: 53 mg/dL (06-27 @ 17:35)  Sodium, Random Urine: 119 mmol/L (06-25 @ 21:58)  Creatinine, Random Urine: <13 mg/dL (06-25 @ 21:58)  Protein/Creatinine Ratio Calculation: unable to calc Ratio (06-25 @ 21:58)    < from: US Renal (06.30.25 @ 09:48) >    ACC: [id number] EXAM:  US KIDNEY(S)   ORDERED BY: DUONG ALCANTAR     PROCEDURE DATE:  06/30/2025          INTERPRETATION:  CLINICAL INFORMATION: 70-year-old male with MICHEL, rule   out retention    COMPARISON: Abdominal pelvic CT 6/29/2025, renal ultrasound 1/21/2022    TECHNIQUE: Portable sonography of the kidneys and bladder.    FINDINGS:  Right kidney: 11.1 cm. No renal mass, hydronephrosis or calculi.   Increased cortical echogenicity.    Left kidney: 9.9 cm. No renal mass, hydronephrosis or calculi. Increased   cortical echogenicity. Partially imaged hypoechoic structure posterior to   the left kidney may correspond to the lymphadenopathy questioned on the   prior CT, not evaluated on this exam.    Urinary bladder: Not fully evaluated on this examination. However, on   submitted views, there appears to be mild diffuse bladder wall   thickening, as seen on the prior CT.    A small left pleural effusion is noted.    IMPRESSION:  1. No hydronephrosis.  2. Increased renal cortical echogenicity compatible with renal   parenchymal disease.  3. There is limited visualization of the urinary bladder; however, on   limited views, the wall appears diffusely thickened. Suggest correlation   with urinalysis to exclude infection.  4. Partially imaged hypoechoic structure posterior to the left kidney may   correspond to the lymphadenopathy questioned on the prior CT. Further   evaluation is recommended as clinically.        < end of copied text >

## 2025-06-30 NOTE — PROGRESS NOTE ADULT - CONVERSATION DETAILS
# CASE D/W PATIENT AND PARTNER MILLI DONOVAN AT BEDSIDE, ALL QUESTIONS ANSWERED. DISCUSSED RECOMMENDATIONS AS MADE BY MULTIDISCIPLINARY TEAM. DISCUSSED THAT PROGNOSIS IS GUARDED. THEY VERBALIZED UNDERSTANDING. IN DISCUSSION REGARDING GOC - PATIENT WISHES TO BE FULL CODE.

## 2025-06-30 NOTE — PROGRESS NOTE ADULT - ASSESSMENT
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 Pt a/w hypertensive urgency and MICHEL.   Nephrology consulted for Elevated serum creatinine.    1. MICHEL- as per H& P; last SCr 1.3. MICHEL likely hemodynamically mediated due to uncontrolled HTN. Renal function was improving and now worsening. Feurea 13.52%; Recc to hold Losartan if renal function continues to worsen  UA with 300 protein, no blood. FeNa 10%;  Renal US with no hydro.  TTE with grade 1 diastolic dysfunction, EF 70-75%; ?hypertensive vs infiltrative cardiomyopathy. f/u SIFE however K/l wnl. Pt b/l pleural effusions on TTE. Check CXR;  Strict I/Os. Avoid nephrotoxins/ NSAIDs/ RCA. Monitor BMP.    2. Proteinuria- UA with 300 protein, no blood. Rechecked UPCr on 6/29, result is 0.48 g/day. No acute interventions at this time.    3. Hypertensive urgency- BP labile. c/w Labetalol 400mg PO q 8hrs. Consider Aldactone 25mg PO daily. Would hold Losartan 100mg Po daily if renal f unction worsening.   c/w Nifedipine ER 90mg p daily.  c/w low salt diet. Monitor BP  4. Hypokalemia- f/u zahra/ renin; pending.   Hypokalemia due to shifting from excessive insulin.   Recc aldactone 25mg PO daily. Monitor serum potassium  5. Iron deficiency anemia- low hgb. Check FOBT. tsat 16% with ferriitn 236- Finished Venofer 200mg IV qd x 3 doses. Will give Epo 10k SC x1 today. Monitor hgb    [facility] NEPHROLOGY  Bethel Smith M.D.  Guillermo Jimenez D.O.  Kathleen Lan M.D.  MD Kimi Rodríguez, MSN, ANP-C    Telephone: [phone]  Facsimile: [phone] [id number], [address]  [place] [id number]

## 2025-07-01 DIAGNOSIS — R59.0 LOCALIZED ENLARGED LYMPH NODES: ICD-10-CM

## 2025-07-01 LAB
ALBUMIN SERPL ELPH-MCNC: 2.5 G/DL — LOW (ref 3.5–5)
ALDOST SERPL-MCNC: 13.7 NG/DL — SIGNIFICANT CHANGE UP
ALP SERPL-CCNC: 87 U/L — SIGNIFICANT CHANGE UP (ref 40–120)
ALT FLD-CCNC: 21 U/L DA — SIGNIFICANT CHANGE UP (ref 10–60)
ANION GAP SERPL CALC-SCNC: 3 MMOL/L — LOW (ref 5–17)
AST SERPL-CCNC: 26 U/L — SIGNIFICANT CHANGE UP (ref 10–40)
BILIRUB SERPL-MCNC: 0.4 MG/DL — SIGNIFICANT CHANGE UP (ref 0.2–1.2)
BUN SERPL-MCNC: 25 MG/DL — HIGH (ref 7–18)
CALCIUM SERPL-MCNC: 8.4 MG/DL — SIGNIFICANT CHANGE UP (ref 8.4–10.5)
CHLORIDE SERPL-SCNC: 104 MMOL/L — SIGNIFICANT CHANGE UP (ref 96–108)
CO2 SERPL-SCNC: 29 MMOL/L — SIGNIFICANT CHANGE UP (ref 22–31)
CREAT SERPL-MCNC: 1.99 MG/DL — HIGH (ref 0.5–1.3)
EGFR: 36 ML/MIN/1.73M2 — LOW
EGFR: 36 ML/MIN/1.73M2 — LOW
FOLATE SERPL-MCNC: >20 NG/ML — SIGNIFICANT CHANGE UP
GLUCOSE BLDC GLUCOMTR-MCNC: 115 MG/DL — HIGH (ref 70–99)
GLUCOSE BLDC GLUCOMTR-MCNC: 123 MG/DL — HIGH (ref 70–99)
GLUCOSE BLDC GLUCOMTR-MCNC: 124 MG/DL — HIGH (ref 70–99)
GLUCOSE BLDC GLUCOMTR-MCNC: 132 MG/DL — HIGH (ref 70–99)
GLUCOSE BLDC GLUCOMTR-MCNC: 275 MG/DL — HIGH (ref 70–99)
GLUCOSE BLDC GLUCOMTR-MCNC: 360 MG/DL — HIGH (ref 70–99)
GLUCOSE SERPL-MCNC: 96 MG/DL — SIGNIFICANT CHANGE UP (ref 70–99)
HCT VFR BLD CALC: 24.7 % — LOW (ref 39–50)
HGB BLD-MCNC: 7.9 G/DL — LOW (ref 13–17)
INTERPRETATION SERPL IFE-IMP: SIGNIFICANT CHANGE UP
MCHC RBC-ENTMCNC: 32 G/DL — SIGNIFICANT CHANGE UP (ref 32–36)
MCHC RBC-ENTMCNC: 34.8 PG — HIGH (ref 27–34)
MCV RBC AUTO: 108.8 FL — HIGH (ref 80–100)
NRBC BLD AUTO-RTO: 0 /100 WBCS — SIGNIFICANT CHANGE UP (ref 0–0)
PLATELET # BLD AUTO: 228 K/UL — SIGNIFICANT CHANGE UP (ref 150–400)
POTASSIUM SERPL-MCNC: 4.3 MMOL/L — SIGNIFICANT CHANGE UP (ref 3.5–5.3)
POTASSIUM SERPL-SCNC: 4.3 MMOL/L — SIGNIFICANT CHANGE UP (ref 3.5–5.3)
PROT SERPL-MCNC: 5.4 G/DL — LOW (ref 6–8.3)
RBC # BLD: 2.27 M/UL — LOW (ref 4.2–5.8)
RBC # FLD: 17.1 % — HIGH (ref 10.3–14.5)
SODIUM SERPL-SCNC: 136 MMOL/L — SIGNIFICANT CHANGE UP (ref 135–145)
VIT B12 SERPL-MCNC: 1900 PG/ML — HIGH (ref 232–1245)
WBC # BLD: 6.21 K/UL — SIGNIFICANT CHANGE UP (ref 3.8–10.5)
WBC # FLD AUTO: 6.21 K/UL — SIGNIFICANT CHANGE UP (ref 3.8–10.5)

## 2025-07-01 RX ORDER — INSULIN GLARGINE-YFGN 100 [IU]/ML
4 INJECTION, SOLUTION SUBCUTANEOUS AT BEDTIME
Refills: 0 | Status: DISCONTINUED | OUTPATIENT
Start: 2025-07-01 | End: 2025-07-03

## 2025-07-01 RX ADMIN — LABETALOL HYDROCHLORIDE 400 MILLIGRAM(S): 200 TABLET, FILM COATED ORAL at 05:18

## 2025-07-01 RX ADMIN — HEPARIN SODIUM 5000 UNIT(S): 1000 INJECTION INTRAVENOUS; SUBCUTANEOUS at 21:27

## 2025-07-01 RX ADMIN — LABETALOL HYDROCHLORIDE 400 MILLIGRAM(S): 200 TABLET, FILM COATED ORAL at 13:25

## 2025-07-01 RX ADMIN — PYRIDOSTIGMINE BROMIDE 60 MILLIGRAM(S): 5 INJECTION, SOLUTION INTRAVENOUS; PARENTERAL at 13:24

## 2025-07-01 RX ADMIN — PYRIDOSTIGMINE BROMIDE 60 MILLIGRAM(S): 5 INJECTION, SOLUTION INTRAVENOUS; PARENTERAL at 11:43

## 2025-07-01 RX ADMIN — Medication 100 MILLIGRAM(S): at 21:27

## 2025-07-01 RX ADMIN — PYRIDOSTIGMINE BROMIDE 60 MILLIGRAM(S): 5 INJECTION, SOLUTION INTRAVENOUS; PARENTERAL at 01:00

## 2025-07-01 RX ADMIN — Medication 650 MILLIGRAM(S): at 07:23

## 2025-07-01 RX ADMIN — CYANOCOBALAMIN 1000 MICROGRAM(S): 1000 INJECTION INTRAMUSCULAR; SUBCUTANEOUS at 11:43

## 2025-07-01 RX ADMIN — INSULIN LISPRO 5: 100 INJECTION, SOLUTION INTRAVENOUS; SUBCUTANEOUS at 12:03

## 2025-07-01 RX ADMIN — Medication 90 MILLIGRAM(S): at 05:17

## 2025-07-01 RX ADMIN — Medication 325 MILLIGRAM(S): at 18:35

## 2025-07-01 RX ADMIN — LABETALOL HYDROCHLORIDE 400 MILLIGRAM(S): 200 TABLET, FILM COATED ORAL at 21:27

## 2025-07-01 RX ADMIN — Medication 100 MILLIGRAM(S): at 05:17

## 2025-07-01 RX ADMIN — SERTRALINE 50 MILLIGRAM(S): 100 TABLET, FILM COATED ORAL at 11:44

## 2025-07-01 RX ADMIN — AZATHIOPRINE 100 MILLIGRAM(S): 50 TABLET ORAL at 11:44

## 2025-07-01 RX ADMIN — Medication 100 MILLIGRAM(S): at 13:25

## 2025-07-01 RX ADMIN — Medication 1000 UNIT(S): at 11:43

## 2025-07-01 RX ADMIN — Medication 650 MILLIGRAM(S): at 09:13

## 2025-07-01 RX ADMIN — HEPARIN SODIUM 5000 UNIT(S): 1000 INJECTION INTRAVENOUS; SUBCUTANEOUS at 13:25

## 2025-07-01 RX ADMIN — PYRIDOSTIGMINE BROMIDE 60 MILLIGRAM(S): 5 INJECTION, SOLUTION INTRAVENOUS; PARENTERAL at 18:35

## 2025-07-01 RX ADMIN — Medication 325 MILLIGRAM(S): at 05:18

## 2025-07-01 RX ADMIN — Medication 81 MILLIGRAM(S): at 11:47

## 2025-07-01 RX ADMIN — ATORVASTATIN CALCIUM 80 MILLIGRAM(S): 80 TABLET, FILM COATED ORAL at 21:27

## 2025-07-01 RX ADMIN — FINASTERIDE 5 MILLIGRAM(S): 1 TABLET, FILM COATED ORAL at 11:44

## 2025-07-01 RX ADMIN — HEPARIN SODIUM 5000 UNIT(S): 1000 INJECTION INTRAVENOUS; SUBCUTANEOUS at 05:17

## 2025-07-01 RX ADMIN — PYRIDOSTIGMINE BROMIDE 60 MILLIGRAM(S): 5 INJECTION, SOLUTION INTRAVENOUS; PARENTERAL at 21:28

## 2025-07-01 RX ADMIN — PYRIDOSTIGMINE BROMIDE 60 MILLIGRAM(S): 5 INJECTION, SOLUTION INTRAVENOUS; PARENTERAL at 05:17

## 2025-07-01 RX ADMIN — TAMSULOSIN HYDROCHLORIDE 0.4 MILLIGRAM(S): 0.4 CAPSULE ORAL at 21:29

## 2025-07-01 RX ADMIN — FOLIC ACID 1 MILLIGRAM(S): 1 TABLET ORAL at 11:47

## 2025-07-01 RX ADMIN — INSULIN GLARGINE-YFGN 4 UNIT(S): 100 INJECTION, SOLUTION SUBCUTANEOUS at 22:19

## 2025-07-01 RX ADMIN — PREDNISONE 20 MILLIGRAM(S): 20 TABLET ORAL at 05:17

## 2025-07-01 NOTE — PROGRESS NOTE ADULT - PROBLEM SELECTOR PLAN 3
Was sent in from NH for Hgb 7.9 in a routine lab   Total iron 37, TIBC 233, % iron 16  No active signs of bleeding   - macrocytic anemia   - c/w home iron and folic acid supplement  - Hgb remains stable at 7.9   - started Epogen per nephrology  - f/u b12 and folate serum   - QMA consulted apprec recs Was sent in from NH for Hgb 7.9 in a routine lab   Total iron 37, TIBC 233, % iron 16  No active signs of bleeding   - macrocytic anemia   - c/w home iron and folic acid supplement  - Hgb remains stable at 7.9   - Finished Venofer 200mg IV qd x 3 doses  - started Epogen per nephrology  - f/u b12 and folate serum   - QMA consulted apprec recs Was sent in from NH for Hgb 7.9 in a routine lab   Total iron 37, TIBC 233, % iron 16  No active signs of bleeding   - macrocytic anemia   - c/w home iron and folic acid supplement  - Hgb remains stable at 7.9   - Finished Venofer 200mg IV qd x 3 doses  - started Epogen per nephrology  - f/u b12 and folate serum   - QMA Dr. Moralez consulted apprec recs

## 2025-07-01 NOTE — PROGRESS NOTE ADULT - ASSESSMENT
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 Hgb 9.9. BUN/Cr 30/1.81.   Admitted for MICHEL on CKD and uncontrolled HTN, Nephrology and Cardiology consulted. Medications adjusted   TTE: Left ventricular wall thickness is severely increased. Left ventricular systolic function is hyperdynamic with an ejection fraction visually estimated at 70 to 75 %.  Differential includes hypertensive, hypertrophic, and infiltrative cardiomyopathy, among others. Cardiac MRI for further evaluation was recommended, can be done outp   During hospitalization pt was found to have elevated creatinine, renal US negative for hydronephrosis, d/c'd ARB. Creatinine is still 1.99.  QMA hematology consulted for macrocytic anemia and retroperitoneal lymphadenopathy.      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 Hgb 9.9. BUN/Cr 30/1.81.   Admitted for MICHEL on CKD and uncontrolled HTN, Nephrology and Cardiology consulted. Medications adjusted   TTE: Left ventricular wall thickness is severely increased. Left ventricular systolic function is hyperdynamic with an ejection fraction visually estimated at 70 to 75 %.  Differential includes hypertensive, hypertrophic, and infiltrative cardiomyopathy, among others. Cardiac MRI for further evaluation was recommended, can be done outp   During hospitalization pt was found to have elevated creatinine, renal US negative for hydronephrosis, d/c'd ARB. Creatinine is still 1.99.  QMA hematology consulted for macrocytic anemia and retroperitoneal lymphadenopathy.   Endocrine Dr. Farr consulted possible hormonal causes of uncontrolled HTN.

## 2025-07-01 NOTE — PROGRESS NOTE ADULT - SUBJECTIVE AND OBJECTIVE BOX
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 Pt a/w hypertensive urgency and MICHEL. Nephrology consulted for Elevated serum creatinine.    Hospital Medications: Medications reviewed.    REVIEW OF SYSTEMS:  Gen: no changes in weight  HEENT: no rhinorrhea  Neck: no sore throat  Cards: no chest pain  Resp: no dyspnea  GI: no nausea or vomiting or diarrhea  : no dysuria or hematuria  Vascular: no LE edema  Derm: no rashes  Neuro: no numbness/tingling    VITALS:  T(F): 97.3 (07-01-25 @ 11:44), Max: 98.6 (06-30-25 @ 23:46)  HR: 69 (07-01-25 @ 11:44)  BP: 116/53 (07-01-25 @ 11:44)  RR: 18 (07-01-25 @ 11:44)  SpO2: 95% (07-01-25 @ 11:44)  Wt(kg): --    06-30 @ 07:01  -  07-01 @ 07:00  --------------------------------------------------------  IN: 0 mL / OUT: 1350 mL / NET: -1350 mL      PHYSICAL EXAM:  Gen: NAD, calm  HEENT: +facial/ periorbital edema improving  Neck: no JVD  Cards: RRR, +S1/S2,+BOUBACAR  Resp: CTA B/L  GI: soft, NT/ND, NABS  : no CVA tenderness  Extremities: +trace ankle edema B/L     LABS:  07-01    136  |  104  |  25[H]  ----------------------------<  96  4.3   |  29  |  1.99[H]    Ca    8.4      01 Jul 2025 07:04    TPro  5.4[L]  /  Alb  2.5[L]  /  TBili  0.4  /  DBili      /  AST  26  /  ALT  21  /  AlkPhos  87  07-01    Creatinine Trend: 1.99 <--, 1.94 <--, 1.76 <--, 1.40 <--, 1.45 <--, 1.54 <--, 1.55 <--, 1.81 <--                        7.9    6.21  )-----------( 228      ( 01 Jul 2025 07:04 )             24.7     Urine Studies:  Urinalysis Basic - ( 01 Jul 2025 07:04 )    Color:  / Appearance:  / SG:  / pH:   Gluc: 96 mg/dL / Ketone:   / Bili:  / Urobili:    Blood:  / Protein:  / Nitrite:    Leuk Esterase:  / RBC:  / WBC    Sq Epi:  / Non Sq Epi:  / Bacteria:       Creatinine, Random Urine: 175 mg/dL (06-29 @ 11:45)  Protein/Creatinine Ratio Calculation: 0.5 Ratio (06-29 @ 11:45)  Creatinine, Random Urine: 127 mg/dL (06-28 @ 11:30)  Protein/Creatinine Ratio Calculation: 0.9 Ratio (06-28 @ 11:30)  Potassium, Random Urine: 43 mmol/L (06-28 @ 11:30)  Potassium, Random Urine: 50 mmol/L (06-27 @ 17:35)  Creatinine, Random Urine: 53 mg/dL (06-27 @ 17:35)  Sodium, Random Urine: 119 mmol/L (06-25 @ 21:58)  Creatinine, Random Urine: <13 mg/dL (06-25 @ 21:58)  Protein/Creatinine Ratio Calculation: unable to calc Ratio (06-25 @ 21:58)

## 2025-07-01 NOTE — PROGRESS NOTE ADULT - SUBJECTIVE AND OBJECTIVE BOX
C A R D I O L O G Y  **********************************     DATE OF SERVICE: 07-01-25    Patient denies chest pain or shortness of breath.   Review of symptoms otherwise negative.    acetaminophen     Tablet .. 650 milliGRAM(s) Oral every 6 hours PRN  aspirin enteric coated 81 milliGRAM(s) Oral daily  atorvastatin 80 milliGRAM(s) Oral at bedtime  azaTHIOprine 100 milliGRAM(s) Oral daily  cholecalciferol 1000 Unit(s) Oral daily  cyanocobalamin 1000 MICROGram(s) Oral daily  dextrose 5%. 1000 milliLiter(s) IV Continuous <Continuous>  dextrose 50% Injectable 25 Gram(s) IV Push once  dextrose Oral Gel 15 Gram(s) Oral once PRN  ferrous    sulfate 325 milliGRAM(s) Oral two times a day  finasteride 5 milliGRAM(s) Oral daily  folic acid 1 milliGRAM(s) Oral daily  heparin   Injectable 5000 Unit(s) SubCutaneous every 8 hours  hydrALAZINE 100 milliGRAM(s) Oral every 8 hours  insulin glargine Injectable (LANTUS) 6 Unit(s) SubCutaneous at bedtime  insulin lispro (ADMELOG) corrective regimen sliding scale   SubCutaneous three times a day before meals  insulin lispro (ADMELOG) corrective regimen sliding scale   SubCutaneous at bedtime  labetalol 400 milliGRAM(s) Oral every 8 hours  NIFEdipine XL 90 milliGRAM(s) Oral daily  ondansetron Injectable 4 milliGRAM(s) IV Push every 8 hours PRN  predniSONE   Tablet 20 milliGRAM(s) Oral daily  pyridostigmine 60 milliGRAM(s) Oral <User Schedule>  sertraline 50 milliGRAM(s) Oral daily  tamsulosin 0.4 milliGRAM(s) Oral at bedtime                            7.9    6.21  )-----------( 228      ( 01 Jul 2025 07:04 )             24.7       Hemoglobin: 7.9 g/dL (07-01 @ 07:04)  Hemoglobin: 7.4 g/dL (06-30 @ 05:42)  Hemoglobin: 8.1 g/dL (06-29 @ 12:42)  Hemoglobin: 7.8 g/dL (06-29 @ 05:06)  Hemoglobin: 9.1 g/dL (06-28 @ 11:36)      07-01    136  |  104  |  25[H]  ----------------------------<  96  4.3   |  29  |  1.99[H]    Ca    8.4      01 Jul 2025 07:04    TPro  5.4[L]  /  Alb  2.5[L]  /  TBili  0.4  /  DBili  x   /  AST  26  /  ALT  21  /  AlkPhos  87  07-01    Creatinine Trend: 1.99<--, 1.94<--, 1.76<--, 1.40<--, 1.45<--, 1.54<--    COAGS:           T(C): 36.6 (07-01-25 @ 07:57), Max: 37 (06-30-25 @ 23:46)  HR: 63 (07-01-25 @ 07:57) (63 - 69)  BP: 103/55 (07-01-25 @ 07:57) (103/55 - 180/66)  RR: 18 (07-01-25 @ 07:57) (18 - 18)  SpO2: 94% (07-01-25 @ 07:57) (93% - 97%)  Wt(kg): --    I&O's Summary    30 Jun 2025 07:01  -  01 Jul 2025 07:00  --------------------------------------------------------  IN: 0 mL / OUT: 1350 mL / NET: -1350 mL        HEENT:  (-)icterus (-)pallor  CV: N S1 S2 1/6 BOUBACAR (+)2 Pulses B/l  Resp:  Clear to ausculatation B/L, normal effort  GI: (+) BS Soft, NT, ND  Lymph:  (-)Edema, (-)obvious lymphadenopathy  Skin: Warm to touch, Normal turgor  Psych: Appropriate mood and affect      TELEMETRY: 	  sinus        ASSESSMENT/PLAN: 	70y  Male PMHx of Myasthenia gravis s/p thymectomy, HTN, 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 hyperaldosterone state  aldosterone appears wnl  - cont Procardia at 90 mg PO daily   - tolerating ARB.   -  TSH wnl     # Abnormal EKG  - echo noted, suspect it is due to hypertensive heart disease given his profoundly elevated blood pressures however can arrange for outpt cardiac MRI     Ta Mercado MD, [facility]  BEEPER [phone]

## 2025-07-01 NOTE — PROGRESS NOTE ADULT - SUBJECTIVE AND OBJECTIVE BOX
Patient is a 70y old  Male who presents with a chief complaint of MICHEL (01 Jul 2025 10:12)    OVERNIGHT EVENTS: no acute changes.     Pt is aox2 (not time), tolerating PO, found to have a empty [employer] energy drink can at the bedside, reports feeling weak, remains in bed.   still with high blood pressure 160-180s systolic.   on telemetry - sinus rhythm 60s bpm.   blood glucose this am was 275.     REVIEW OF SYSTEMS:  CONSTITUTIONAL: +weakness. No fever, chills  ENMT:  No difficulty hearing, no change in vision  NECK: No pain or stiffness  RESPIRATORY: No cough, SOB  CARDIOVASCULAR: No chest pain, palpitations  GASTROINTESTINAL: No abdominal pain. No nausea, vomiting, or diarrhea  GENITOURINARY: No dysuria  NEUROLOGICAL: No HA  SKIN: No itching, burning, rashes, or lesions   LYMPH NODES: No enlarged glands  ENDOCRINE: No heat or cold intolerance; No hair loss  MUSCULOSKELETAL: No joint pain or swelling; No muscle, back, or extremity pain  PSYCHIATRIC: No depression, anxiety  HEME/LYMPH: No easy bruising, or bleeding gums    T(C): 36.6 (07-01-25 @ 07:57), Max: 37 (06-30-25 @ 23:46)  HR: 63 (07-01-25 @ 07:57) (63 - 69)  BP: 103/55 (07-01-25 @ 07:57) (103/55 - 180/66)  RR: 18 (07-01-25 @ 07:57) (18 - 18)  SpO2: 94% (07-01-25 @ 07:57) (93% - 97%)  Wt(kg): --Vital Signs Last 24 Hrs  T(C): 36.6 (01 Jul 2025 07:57), Max: 37 (30 Jun 2025 23:46)  T(F): 97.9 (01 Jul 2025 07:57), Max: 98.6 (30 Jun 2025 23:46)  HR: 63 (01 Jul 2025 07:57) (63 - 69)  BP: 103/55 (01 Jul 2025 07:57) (103/55 - 180/66)  BP(mean): 69 (01 Jul 2025 07:57) (69 - 100)  RR: 18 (01 Jul 2025 07:57) (18 - 18)  SpO2: 94% (01 Jul 2025 07:57) (93% - 97%)    Parameters below as of 01 Jul 2025 07:57  Patient On (Oxygen Delivery Method): room air        MEDICATIONS  (STANDING):  aspirin enteric coated 81 milliGRAM(s) Oral daily  atorvastatin 80 milliGRAM(s) Oral at bedtime  azaTHIOprine 100 milliGRAM(s) Oral daily  cholecalciferol 1000 Unit(s) Oral daily  cyanocobalamin 1000 MICROGram(s) Oral daily  dextrose 5%. 1000 milliLiter(s) (50 mL/Hr) IV Continuous <Continuous>  dextrose 50% Injectable 25 Gram(s) IV Push once  ferrous    sulfate 325 milliGRAM(s) Oral two times a day  finasteride 5 milliGRAM(s) Oral daily  folic acid 1 milliGRAM(s) Oral daily  heparin   Injectable 5000 Unit(s) SubCutaneous every 8 hours  hydrALAZINE 100 milliGRAM(s) Oral every 8 hours  insulin glargine Injectable (LANTUS) 6 Unit(s) SubCutaneous at bedtime  insulin lispro (ADMELOG) corrective regimen sliding scale   SubCutaneous three times a day before meals  insulin lispro (ADMELOG) corrective regimen sliding scale   SubCutaneous at bedtime  labetalol 400 milliGRAM(s) Oral every 8 hours  NIFEdipine XL 90 milliGRAM(s) Oral daily  predniSONE   Tablet 20 milliGRAM(s) Oral daily  pyridostigmine 60 milliGRAM(s) Oral <User Schedule>  sertraline 50 milliGRAM(s) Oral daily  tamsulosin 0.4 milliGRAM(s) Oral at bedtime    MEDICATIONS  (PRN):  acetaminophen     Tablet .. 650 milliGRAM(s) Oral every 6 hours PRN Temp greater or equal to 38C (100.4F), Mild Pain (1 - 3)  dextrose Oral Gel 15 Gram(s) Oral once PRN Blood Glucose LESS THAN 70 milliGRAM(s)/deciliter  ondansetron Injectable 4 milliGRAM(s) IV Push every 8 hours PRN Nausea and/or Vomiting      PHYSICAL EXAM:  GENERAL: NAD  EYES: clear conjunctiva   ENMT: Moist mucous membranes  NECK: Supple, No JVD, Normal thyroid  CHEST/LUNG: Clear to auscultation bilaterally; No rales, rhonchi, wheezing, or rubs  HEART: S1, S2, Regular rate and rhythm  ABDOMEN: Soft, Nontender, Nondistended; Bowel sounds present  NEURO: Alert & Oriented X2 (not time)  EXTREMITIES: No LE edema, no calf tenderness  LYMPH: No lymphadenopathy noted  SKIN: No rashes or lesions    Consultant(s) Notes Reviewed:  [x ] YES  [ ] NO  Care Discussed with Consultants/Other Providers [ x] YES  [ ] NO    LABS:                        7.9    6.21  )-----------( 228      ( 01 Jul 2025 07:04 )             24.7     07-01    136  |  104  |  25[H]  ----------------------------<  96  4.3   |  29  |  1.99[H]    Ca    8.4      01 Jul 2025 07:04    TPro  5.4[L]  /  Alb  2.5[L]  /  TBili  0.4  /  DBili  x   /  AST  26  /  ALT  21  /  AlkPhos  87  07-01      CAPILLARY BLOOD GLUCOSE      POCT Blood Glucose.: 275 mg/dL (01 Jul 2025 09:11)  POCT Blood Glucose.: 124 mg/dL (01 Jul 2025 07:31)  POCT Blood Glucose.: 150 mg/dL (30 Jun 2025 21:26)  POCT Blood Glucose.: 122 mg/dL (30 Jun 2025 16:45)  POCT Blood Glucose.: 223 mg/dL (30 Jun 2025 11:39)        Urinalysis Basic - ( 01 Jul 2025 07:04 )    Color: x / Appearance: x / SG: x / pH: x  Gluc: 96 mg/dL / Ketone: x  / Bili: x / Urobili: x   Blood: x / Protein: x / Nitrite: x   Leuk Esterase: x / RBC: x / WBC x   Sq Epi: x / Non Sq Epi: x / Bacteria: x        RADIOLOGY & ADDITIONAL TESTS:  < from: US Renal (06.30.25 @ 09:48) >    ACC: [id number] EXAM:  US KIDNEY(S)   ORDERED BY: DUONG ALCNATAR     PROCEDURE DATE:  06/30/2025          INTERPRETATION:  CLINICAL INFORMATION: 70-year-old male with MICHEL, rule   out retention    COMPARISON: Abdominal pelvic CT 6/29/2025, renal ultrasound 1/21/2022    TECHNIQUE: Portable sonography of the kidneys and bladder.    FINDINGS:  Right kidney: 11.1 cm. No renal mass, hydronephrosis or calculi.   Increased cortical echogenicity.    Left kidney: 9.9 cm. No renal mass, hydronephrosis or calculi. Increased   cortical echogenicity. Partially imaged hypoechoic structure posterior to   the left kidney may correspond to the lymphadenopathy questioned on the   prior CT, not evaluated on this exam.    Urinary bladder: Not fully evaluated on this examination. However, on   submitted views, there appears to be mild diffuse bladder wall   thickening, as seen on the prior CT.    A small left pleural effusion is noted.    IMPRESSION:  1. No hydronephrosis.  2. Increased renal cortical echogenicity compatible with renal   parenchymal disease.  3. There is limited visualization of the urinary bladder; however, on   limited views, the wall appears diffusely thickened. Suggest correlation   with urinalysis to exclude infection.  4. Partially imaged hypoechoic structure posterior to the left kidney may   correspond to the lymphadenopathy questioned on the prior CT. Further   evaluation is recommended as clinically.        --- End of Report ---            MATTIE VILLARREAL MD; Attending Radiologist  This document has been electronically signed. Jun 30 2025 12:05PM    < end of copied text >  < from: CT Abdomen and Pelvis No Cont (06.29.25 @ 23:36) >    ACC: [id number] EXAM:  CT ABDOMEN AND PELVIS   ORDERED BY: MURTAZA AMEZQUITA     PROCEDURE DATE:  06/29/2025          INTERPRETATION:  CLINICAL INFORMATION: Anemia    COMPARISON: CT chest 3/2/2022.    CONTRAST/COMPLICATIONS:  IV Contrast: NONE  Oral Contrast: NONE.    PROCEDURE:  CT of the Abdomen and Pelvis was performed.  Sagittal and coronal reformats were performed.    FINDINGS:  LOWER CHEST: Small bilateral pleural effusions. Signs of anemia.    LIVER: Within normal limits.  BILE DUCTS: Normal caliber.  GALLBLADDER: Contracted gallbladder.  SPLEEN: Within normal limits.  PANCREAS: Within normal limits. No obvious ductal dilatation.   Periampullary diverticula.  ADRENALS: Within normal limits.  KIDNEYS/URETERS: Within normal limits.    BLADDER: The urinary bladder is partially decompressed. There is   circumferential wall thickening.  REPRODUCTIVE ORGANS: Prostate within normal limits.    BOWEL: No bowel obstruction. Appendix is normal. Colonic diverticulosis   without acute diverticulitis.  PERITONEUM/RETROPERITONEUM: No ascites, pneumoperitoneum or loculated   fluid collections. No retroperitoneal hematoma.  VESSELS: Atherosclerosis. The aorta has normal caliber. Left-sided   paraspinal and paraaortic soft tissue densities are noted suggestive of   tortuous vessels and collaterals versus lymphadenopathy or both. This   finding is new since prior study 3/2/2022  LYMPH NODES: No confined enlarged lymph nodes are noted in the   retroperitoneum, for example a aortocaval adenopathy at 2.1 x 2.8 cm,   image 2:47.  ABDOMINAL WALL: There are small bilateral inguinal hernia containing   mainly fluid and fat on the left and a short segment of bowel on the   right without obstruction.  BONES: Degenerative changes.    IMPRESSION:  No retroperitoneal hematoma.    Soft tissue densities within the retroperitoneum next to the aorta   concerning for extensive lymphadenopathy versus tortuous vessels or a   combination of both. The evaluation is limited due to the lack of IV   contrast. Recommend contrast enhanced cross-sectional imaging for better   assessment. Further workup for lymphoproliferative disease may be helpful.    Circumferential bladder wall thickening as can be seen with decompression   or component of cystitis.        Prelim: No obvious retroperitoneal hematoma. Gallbladder wall thickening.   Bladder wall thickening. Retroperitoneal/pelvic lymphadenopathy.   Persistent right > left pleural effusion. Small pericardial effusion.   Official report to follow.    --- End of Report ---            VIVIANE LOMELI MD; Attending Radiologist  This document has been electronically signed. Jun 30 2025 10:28AM    < end of copied text >      Imaging Personally Reviewed:  [ ] YES  [ ] NO

## 2025-07-01 NOTE — PROGRESS NOTE ADULT - SUBJECTIVE AND OBJECTIVE BOX
Patient is a 70y old  Male who presents with a chief complaint of MICHEL (2025 09:48)    PATIENT IS SEEN AND EXAMINED IN MEDICAL FLOOR.  NGT [    ]    ARCADIO [   ]      GT [   ]    ALLERGIES:  No Known Allergies      Daily     Daily Weight in k (2025 05:06)    VITALS:    Vital Signs Last 24 Hrs  T(C): 36.6 (2025 07:57), Max: 37 (2025 23:46)  T(F): 97.9 (2025 07:57), Max: 98.6 (2025 23:46)  HR: 63 (2025 07:57) (63 - 69)  BP: 103/55 (2025 07:57) (103/55 - 180/66)  BP(mean): 69 (2025 07:57) (69 - 100)  RR: 18 (2025 07:57) (18 - 18)  SpO2: 94% (2025 07:57) (93% - 97%)    Parameters below as of 2025 07:57  Patient On (Oxygen Delivery Method): room air        LABS:    CBC Full  -  ( 2025 07:04 )  WBC Count : 6.21 K/uL  RBC Count : 2.27 M/uL  Hemoglobin : 7.9 g/dL  Hematocrit : 24.7 %  Platelet Count - Automated : 228 K/uL  Mean Cell Volume : 108.8 fl  Mean Cell Hemoglobin : 34.8 pg  Mean Cell Hemoglobin Concentration : 32.0 g/dL  Auto Neutrophil # : x  Auto Lymphocyte # : x  Auto Monocyte # : x  Auto Eosinophil # : x  Auto Basophil # : x  Auto Neutrophil % : x  Auto Lymphocyte % : x  Auto Monocyte % : x  Auto Eosinophil % : x  Auto Basophil % : x      -    136  |  104  |  25[H]  ----------------------------<  96  4.3   |  29  |  1.99[H]    Ca    8.4      2025 07:04    TPro  5.4[L]  /  Alb  2.5[L]  /  TBili  0.4  /  DBili  x   /  AST  26  /  ALT  21  /  AlkPhos  87  07-    CAPILLARY BLOOD GLUCOSE      POCT Blood Glucose.: 275 mg/dL (2025 09:11)  POCT Blood Glucose.: 124 mg/dL (2025 07:31)  POCT Blood Glucose.: 150 mg/dL (2025 21:26)  POCT Blood Glucose.: 122 mg/dL (2025 16:45)  POCT Blood Glucose.: 223 mg/dL (2025 11:39)        LIVER FUNCTIONS - ( 2025 07:04 )  Alb: 2.5 g/dL / Pro: 5.4 g/dL / ALK PHOS: 87 U/L / ALT: 21 U/L DA / AST: 26 U/L / GGT: x           Creatinine Trend: 1.99<--, 1.94<--, 1.76<--, 1.40<--, 1.45<--, 1.54<--  I&O's Summary    2025 07:01  -  2025 07:00  --------------------------------------------------------  IN: 0 mL / OUT: 1350 mL / NET: -1350 mL                MEDICATIONS:    MEDICATIONS  (STANDING):  aspirin enteric coated 81 milliGRAM(s) Oral daily  atorvastatin 80 milliGRAM(s) Oral at bedtime  azaTHIOprine 100 milliGRAM(s) Oral daily  cholecalciferol 1000 Unit(s) Oral daily  cyanocobalamin 1000 MICROGram(s) Oral daily  dextrose 5%. 1000 milliLiter(s) (50 mL/Hr) IV Continuous <Continuous>  dextrose 50% Injectable 25 Gram(s) IV Push once  ferrous    sulfate 325 milliGRAM(s) Oral two times a day  finasteride 5 milliGRAM(s) Oral daily  folic acid 1 milliGRAM(s) Oral daily  heparin   Injectable 5000 Unit(s) SubCutaneous every 8 hours  hydrALAZINE 100 milliGRAM(s) Oral every 8 hours  insulin glargine Injectable (LANTUS) 6 Unit(s) SubCutaneous at bedtime  insulin lispro (ADMELOG) corrective regimen sliding scale   SubCutaneous three times a day before meals  insulin lispro (ADMELOG) corrective regimen sliding scale   SubCutaneous at bedtime  labetalol 400 milliGRAM(s) Oral every 8 hours  NIFEdipine XL 90 milliGRAM(s) Oral daily  predniSONE   Tablet 20 milliGRAM(s) Oral daily  pyridostigmine 60 milliGRAM(s) Oral <User Schedule>  sertraline 50 milliGRAM(s) Oral daily  tamsulosin 0.4 milliGRAM(s) Oral at bedtime      MEDICATIONS  (PRN):  acetaminophen     Tablet .. 650 milliGRAM(s) Oral every 6 hours PRN Temp greater or equal to 38C (100.4F), Mild Pain (1 - 3)  dextrose Oral Gel 15 Gram(s) Oral once PRN Blood Glucose LESS THAN 70 milliGRAM(s)/deciliter  ondansetron Injectable 4 milliGRAM(s) IV Push every 8 hours PRN Nausea and/or Vomiting      REVIEW OF SYSTEMS:                           ALL ROS DONE [ X   ]    CONSTITUTIONAL:  LETHARGIC [   ], FEVER [   ], UNRESPONSIVE [   ]  CVS:  CP  [   ], SOB, [   ], PALPITATIONS [   ], DIZZYNESS [   ]  RS: COUGH [   ], SPUTUM [   ]  GI: ABDOMINAL PAIN [   ], NAUSEA [   ], VOMITINGS [   ], DIARRHEA [   ], CONSTIPATION [   ]  :  DYSURIA [   ], NOCTURIA [   ], INCREASED FREQUENCY [   ], DRIBLING [   ],  SKELETAL: PAINFUL JOINTS [   ], SWOLLEN JOINTS [   ], NECK ACHE [   ], LOW BACK ACHE [   ],  SKIN : ULCERS [   ], RASH [   ], ITCHING [   ]  CNS: HEAD ACHE [   ], DOUBLE VISION [   ], BLURRED VISION [   ], AMS / CONFUSION [   ], SEIZURES [   ], WEAKNESS [   ],TINGLING / NUMBNESS [   ]      PHYSICAL EXAMINATION:  GENERAL APPEARANCE: NO DISTRESS  HEENT:  NO PALLOR, NO  JVD,  NO   NODES, NECK SUPPLE  CVS: S1 +, S2 +,   RS: AEEB,  OCCASIONAL  RALES +,   NO RONCHI  ABD: SOFT, NT, NO, BS +  EXT: NO PE  SKIN: WARM,   SKELETAL:  ROM ACCEPTABLE  CNS:  AAO X 3      RADIOLOGY :  RADIOLOGY AND READINGS REVIEWED      ASSESSMENT :     Acute renal failure    HTN (hypertension)    DM (diabetes mellitus)    Myasthenia gravis    Hypercholesterolemia    CVA (cerebrovascular accident)    Peripheral neuropathy    BPH (benign prostatic hyperplasia)    Major depression    Lipoma of chest wall    MG with thymoma (myasthena gravis)    H/O cataract extraction        PLAN:  HPI:  Patient is a 70M, from [facility] ambulates with a walker, with PMHx of Myasthenia gravis s/p thymectomy, HTN, 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 Patient reports he has been feeling nauseous for a few days but no vomiting. He reports chronic increased urinary frequency and sensation of incomplete voiding. He denies all other symptoms - fever, chills, cough, chest pain, SoB, palpitations abdominal pain, diarrhea, dysuria, arm or leg numbness or tingling. Reports regular brown stool - denies black or bloody stool.  (2025 21:32)    # CASE D/W PATIENT AND PARTNER MILLI DONOVAN AT BEDSIDE, ALL QUESTIONS ANSWERED. DISCUSSED RECOMMENDATIONS AS MADE BY MULTIDISCIPLINARY TEAM. DISCUSSED THAT PROGNOSIS IS GUARDED. THEY VERBALIZED UNDERSTANDING. IN DISCUSSION REGARDING GOC - PATIENT WISHES TO BE FULL CODE.    # HYPERTENSIVE URGENCY  # UNDERLYING HTN  - TELEMETRY  - ECHO - LV EF - 70 - 75%, G1DD  - NOTED TROPONINS  - S/P IV HYDRALAZINE  - HYDRALAZINE  - INCREASE PROCARDIA  - SWITCH METOPROLOL TO LABETALOL  - F/U SERUM RENIN, ALDOSTERONE LEVELS  - CARDIOLOGY CONSULT  - NEPHROLOGY CONSULT      # MICHEL ON  ? CKD  # BPH  - IV FLUIDS  - PVR - 0 ML  - MONITOR CR  - AVOID NEPHROTOXIC AGENTS  - NEPHROLOGY CONSULT    # HYPOGLYCEMIA - RESOLVED  - MONITORING FINGERSTICKS    # ANEMIA  - TREND HGB  - ANEMIA PANEL  - DENIES MELENA, HEMATOCHEZIA, HEMATEMESIS, HEMATURIA  - NOTED CT A/P  - GASTROENTEROLOGY CONSULT    # ? LYMPHADENOPATHY ON CT A/P   - HEME/ONC CONSULT    # DENIES DYSURIA, UA NEGATIVE FOR LEUKOCYTE ESTERASE AND NITRITES      # MYASTHENIA GRAVIS S/P THYMECTOMY    # DM  - SSI + FS    # CVA W/ MILD RIGHT HP    # PAD  # GI AND DVT PPX     Patient is a 70y old  Male who presents with a chief complaint of MICHEL (2025 09:48)    PATIENT IS SEEN AND EXAMINED IN MEDICAL FLOOR.      ALLERGIES:  No Known Allergies      Daily     Daily Weight in k (2025 05:06)    VITALS:    Vital Signs Last 24 Hrs  T(C): 36.6 (2025 07:57), Max: 37 (2025 23:46)  T(F): 97.9 (2025 07:57), Max: 98.6 (2025 23:46)  HR: 63 (2025 07:57) (63 - 69)  BP: 103/55 (2025 07:57) (103/55 - 180/66)  BP(mean): 69 (2025 07:57) (69 - 100)  RR: 18 (2025 07:57) (18 - 18)  SpO2: 94% (2025 07:57) (93% - 97%)    Parameters below as of 2025 07:57  Patient On (Oxygen Delivery Method): room air        LABS:    CBC Full  -  ( 2025 07:04 )  WBC Count : 6.21 K/uL  RBC Count : 2.27 M/uL  Hemoglobin : 7.9 g/dL  Hematocrit : 24.7 %  Platelet Count - Automated : 228 K/uL  Mean Cell Volume : 108.8 fl  Mean Cell Hemoglobin : 34.8 pg  Mean Cell Hemoglobin Concentration : 32.0 g/dL  Auto Neutrophil # : x  Auto Lymphocyte # : x  Auto Monocyte # : x  Auto Eosinophil # : x  Auto Basophil # : x  Auto Neutrophil % : x  Auto Lymphocyte % : x  Auto Monocyte % : x  Auto Eosinophil % : x  Auto Basophil % : x          136  |  104  |  25[H]  ----------------------------<  96  4.3   |  29  |  1.99[H]    Ca    8.4      2025 07:04    TPro  5.4[L]  /  Alb  2.5[L]  /  TBili  0.4  /  DBili  x   /  AST  26  /  ALT  21  /  AlkPhos  87  07-    CAPILLARY BLOOD GLUCOSE      POCT Blood Glucose.: 275 mg/dL (2025 09:11)  POCT Blood Glucose.: 124 mg/dL (2025 07:31)  POCT Blood Glucose.: 150 mg/dL (2025 21:26)  POCT Blood Glucose.: 122 mg/dL (2025 16:45)  POCT Blood Glucose.: 223 mg/dL (2025 11:39)        LIVER FUNCTIONS - ( 2025 07:04 )  Alb: 2.5 g/dL / Pro: 5.4 g/dL / ALK PHOS: 87 U/L / ALT: 21 U/L DA / AST: 26 U/L / GGT: x           Creatinine Trend: 1.99<--, 1.94<--, 1.76<--, 1.40<--, 1.45<--, 1.54<--  I&O's Summary    2025 07:01  -  2025 07:00  --------------------------------------------------------  IN: 0 mL / OUT: 1350 mL / NET: -1350 mL                MEDICATIONS:    MEDICATIONS  (STANDING):  aspirin enteric coated 81 milliGRAM(s) Oral daily  atorvastatin 80 milliGRAM(s) Oral at bedtime  azaTHIOprine 100 milliGRAM(s) Oral daily  cholecalciferol 1000 Unit(s) Oral daily  cyanocobalamin 1000 MICROGram(s) Oral daily  dextrose 5%. 1000 milliLiter(s) (50 mL/Hr) IV Continuous <Continuous>  dextrose 50% Injectable 25 Gram(s) IV Push once  ferrous    sulfate 325 milliGRAM(s) Oral two times a day  finasteride 5 milliGRAM(s) Oral daily  folic acid 1 milliGRAM(s) Oral daily  heparin   Injectable 5000 Unit(s) SubCutaneous every 8 hours  hydrALAZINE 100 milliGRAM(s) Oral every 8 hours  insulin glargine Injectable (LANTUS) 6 Unit(s) SubCutaneous at bedtime  insulin lispro (ADMELOG) corrective regimen sliding scale   SubCutaneous three times a day before meals  insulin lispro (ADMELOG) corrective regimen sliding scale   SubCutaneous at bedtime  labetalol 400 milliGRAM(s) Oral every 8 hours  NIFEdipine XL 90 milliGRAM(s) Oral daily  predniSONE   Tablet 20 milliGRAM(s) Oral daily  pyridostigmine 60 milliGRAM(s) Oral <User Schedule>  sertraline 50 milliGRAM(s) Oral daily  tamsulosin 0.4 milliGRAM(s) Oral at bedtime      MEDICATIONS  (PRN):  acetaminophen     Tablet .. 650 milliGRAM(s) Oral every 6 hours PRN Temp greater or equal to 38C (100.4F), Mild Pain (1 - 3)  dextrose Oral Gel 15 Gram(s) Oral once PRN Blood Glucose LESS THAN 70 milliGRAM(s)/deciliter  ondansetron Injectable 4 milliGRAM(s) IV Push every 8 hours PRN Nausea and/or Vomiting      REVIEW OF SYSTEMS:                           ALL ROS DONE [ X   ]    CONSTITUTIONAL:  LETHARGIC [   ], FEVER [   ], UNRESPONSIVE [   ]  CVS:  CP  [   ], SOB, [   ], PALPITATIONS [   ], DIZZYNESS [   ]  RS: COUGH [   ], SPUTUM [   ]  GI: ABDOMINAL PAIN [   ], NAUSEA [   ], VOMITINGS [   ], DIARRHEA [   ], CONSTIPATION [   ]  :  DYSURIA [   ], NOCTURIA [   ], INCREASED FREQUENCY [   ], DRIBLING [   ],  SKELETAL: PAINFUL JOINTS [   ], SWOLLEN JOINTS [   ], NECK ACHE [   ], LOW BACK ACHE [   ],  SKIN : ULCERS [   ], RASH [   ], ITCHING [   ]  CNS: HEAD ACHE [   ], DOUBLE VISION [   ], BLURRED VISION [   ], AMS / CONFUSION [   ], SEIZURES [   ], WEAKNESS [   ],TINGLING / NUMBNESS [   ]        PHYSICAL EXAMINATION:  GENERAL APPEARANCE: NO DISTRESS  HEENT:  NO PALLOR, NO  JVD,  NO   NODES, NECK SUPPLE  CVS: S1 +, S2 +,   RS: AEEB,  OCCASIONAL  RALES +,   NO RONCHI  ABD: SOFT, NT, NO, BS +  EXT: NO PE  SKIN: WARM,   SKELETAL:  ROM ACCEPTABLE  CNS:  AAO X 3        RADIOLOGY :  RADIOLOGY AND READINGS REVIEWED      ASSESSMENT :     Acute renal failure    HTN (hypertension)    DM (diabetes mellitus)    Myasthenia gravis    Hypercholesterolemia    CVA (cerebrovascular accident)    Peripheral neuropathy    BPH (benign prostatic hyperplasia)    Major depression    Lipoma of chest wall    MG with thymoma (myasthena gravis)    H/O cataract extraction        PLAN:  HPI:  Patient is a 70M, from [facility] ambulates with a walker, with PMHx of Myasthenia gravis s/p thymectomy, HTN, 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 Patient reports he has been feeling nauseous for a few days but no vomiting. He reports chronic increased urinary frequency and sensation of incomplete voiding. He denies all other symptoms - fever, chills, cough, chest pain, SoB, palpitations abdominal pain, diarrhea, dysuria, arm or leg numbness or tingling. Reports regular brown stool - denies black or bloody stool.  (2025 21:32)    # CASE D/W PATIENT AND PARTNER MILLI DONOVAN AT BEDSIDE, ALL QUESTIONS ANSWERED. DISCUSSED RECOMMENDATIONS AS MADE BY MULTIDISCIPLINARY TEAM. DISCUSSED THAT PROGNOSIS IS GUARDED. THEY VERBALIZED UNDERSTANDING. IN DISCUSSION REGARDING GOC - PATIENT WISHES TO BE FULL CODE.    # HYPERTENSIVE URGENCY  # UNDERLYING HTN  - TELEMETRY  - ECHO - LV EF - 70 - 75%, G1DD  - NOTED TROPONINS  - S/P IV HYDRALAZINE  - HYDRALAZINE  - PROCARDIA  - LABETALOL  - F/U SERUM RENIN, ALDOSTERONE LEVELS  - CARDIOLOGY CONSULT  - NEPHROLOGY CONSULT  - ENDOCRINOLOGY CONSULT      # MICHEL ON  ? CKD  # BPH  - IV FLUIDS  - PVR - 0 ML  - MONITOR CR  - AVOID NEPHROTOXIC AGENTS  - NEPHROLOGY CONSULT    # HYPOGLYCEMIA - RESOLVED  - MONITORING FINGERSTICKS    # ANEMIA  - TREND HGB  - ANEMIA PANEL  - DENIES MELENA, HEMATOCHEZIA, HEMATEMESIS, HEMATURIA  - NOTED CT A/P  - GASTROENTEROLOGY CONSULT    # ? LYMPHADENOPATHY ON CT A/P   - HEME/ONC CONSULT    # DENIES DYSURIA, UA NEGATIVE FOR LEUKOCYTE ESTERASE AND NITRITES      # MYASTHENIA GRAVIS S/P THYMECTOMY    # DM  - SSI + FS    # CVA W/ MILD RIGHT HP    # PAD  # GI AND DVT PPX

## 2025-07-01 NOTE — PROGRESS NOTE ADULT - PROBLEM SELECTOR PLAN 10
DVT prophylaxis - heparin SQ    ####Discharge planning  from [facility]   f/u SCr, f/u nephro reccs  monitor BP to be improved. fluctuates  f/u QMA recs

## 2025-07-01 NOTE — PROGRESS NOTE ADULT - ASSESSMENT
Patient is a 69yo Male from [facility] ambulates with a walker, with PMHx of Myasthenia gravis s/p thymectomy, HTN, 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 Pt a/w hypertensive urgency and MICHEL.   Nephrology consulted for Elevated serum creatinine.    1. MICHEL- as per H& P; last SCr 1.3. MICHEL likely hemodynamically mediated due to uncontrolled HTN. Renal function now stable. Losartan held due to worsening MICHEL. Feurea 13.52%;   UA with 300 protein, no blood. FeNa 10%;  Renal US with no hydro.  TTE with grade 1 diastolic dysfunction, EF 70-75%; ?hypertensive vs infiltrative cardiomyopathy. f/u SIFE however K/l wnl. Pt b/l pleural effusions on TTE. Check CXR;  Strict I/Os. Avoid nephrotoxins/ NSAIDs/ RCA. Monitor BMP.    2. Proteinuria- UA with 300 protein, no blood. Rechecked UPCr on 6/29, result is 0.48 g/day. No acute interventions at this time.    3. Hypertensive urgency- BP labile. c/w Labetalol 400mg PO q 8hrs. Consider Aldactone 25mg PO daily. Losartan 100mg Po daily held. Rec Renal US with dopplers as an outpt to r/o HOANG.    c/w Nifedipine ER 90mg p daily.  c/w low salt diet. Monitor BP  4. Hypokalemia- f/u zahra. Renin 6.479.   Hypokalemia due to shifting from excessive insulin.   Recc aldactone 25mg PO daily. Monitor serum potassium  5. Iron deficiency anemia- low hgb. Check FOBT. tsat 16% with ferriitn 236- Finished Venofer 200mg IV qd x 3 doses. s/p Epo 10k SC x1 on 6/30. Monitor hgb    [facility] NE[facility]  Bethel Smith M.D.  Guillermo Jimenez D.O.  Kathleen Lan M.D.  MD Kimi Rodríguez, MSN, ANP-C    Telephone: [phone]  Facsimile: [phone]    [address] [place] [address], [address]  [place] [id number]

## 2025-07-01 NOTE — PROGRESS NOTE ADULT - PROBLEM SELECTOR PLAN 4
CT A/P - Soft tissue densities within the retroperitoneum next to the aorta   concerning for extensive lymphadenopathy versus tortuous vessels or a   combination of both.   - QMA consulted apprec recs CT A/P - Soft tissue densities within the retroperitoneum next to the aorta   concerning for extensive lymphadenopathy versus tortuous vessels or a   combination of both.   - QMA Dr. Moralez consulted apprec recs

## 2025-07-01 NOTE — PROGRESS NOTE ADULT - PROBLEM SELECTOR PLAN 6
Hx of IDT2DM on [id number] 10U qd and Novolin 100 ISS   - has been drinking rockstar energy drinks causing high sugars   - A1C 6.3  - recent hypoglycemia event    - c/w lantus 6U qhs + BILLIE AC QHS  - Monitor BS ACHS Hx of IDT2DM on [id number] 10U qd and Novolin 100 ISS   - has been drinking rockstar energy drinks causing high sugars   - A1C 6.3  - recent hypoglycemia event, fasting glucose 80-120s  - decrease lantus to 4 units qhs   - Monitor BS ACHS

## 2025-07-01 NOTE — PROGRESS NOTE ADULT - PROBLEM SELECTOR PLAN 2
BUN/Cr 30/1.81 (Baseline Scr 1.3)  s/p 1L NS in ED   Post-void bladder scan - 0mL  Renal US - negative for hydronephrosis   - s/p IVF  - creatinine improved then now worsening 1.94 > 1.99   - trend BMP  - Nephrology dr. Lan consulted, d/c'd ARB iso worsening MICHEL

## 2025-07-01 NOTE — PROGRESS NOTE ADULT - PROBLEM SELECTOR PLAN 1
Hx of HTN on hydralazine 100mg tid, losartan 100mg qd, nifedipine 60mg qd, metoprolol 100mg bid   Troponin 43.9 > 39  BP remains elevated   - echo: Left ventricular wall thickness is severely increased. Left ventricular systolic function is hyperdynamic with an ejection fraction visually estimated at 70 to 75 %.  Differential includes hypertensive, hypertrophic, and infiltrative cardiomyopathy, among others. cardiac MRI for further evaluation was recommended, can be done outp   - telemetry monitoring   - Cardiology Dr. Mercado follows  -serum aldosterone level normal. serum renin test unable as per [facility] lab  - increased Nifedipine to 90 mg qd  - changed metoprolol to labetalol 400 mg q 8hr and added Hydralazine 100mg q8h.   - hold losartan iso worsening MICHEL Hx of HTN on hydralazine 100mg tid, losartan 100mg qd, nifedipine 60mg qd, metoprolol 100mg bid   Troponin 43.9 > 39  BP remains elevated   - echo: Left ventricular wall thickness is severely increased. Left ventricular systolic function is hyperdynamic with an ejection fraction visually estimated at 70 to 75 %.  Differential includes hypertensive, hypertrophic, and infiltrative cardiomyopathy, among others. cardiac MRI for further evaluation was recommended, can be done outp   - telemetry monitoring   - Cardiology Dr. Mercado follows  -serum aldosterone level normal. serum renin test unable as per [facility] lab  - increased Nifedipine to 90 mg qd  - changed metoprolol to labetalol 400 mg q 8hr and added Hydralazine 100mg q8h.   - hold losartan iso worsening MICHEL  - Endo Dr. Farr consulted

## 2025-07-02 LAB
ANION GAP SERPL CALC-SCNC: 4 MMOL/L — LOW (ref 5–17)
BUN SERPL-MCNC: 23 MG/DL — HIGH (ref 7–18)
CALCIUM SERPL-MCNC: 8.8 MG/DL — SIGNIFICANT CHANGE UP (ref 8.4–10.5)
CHLORIDE SERPL-SCNC: 102 MMOL/L — SIGNIFICANT CHANGE UP (ref 96–108)
CO2 SERPL-SCNC: 30 MMOL/L — SIGNIFICANT CHANGE UP (ref 22–31)
CREAT SERPL-MCNC: 1.66 MG/DL — HIGH (ref 0.5–1.3)
EGFR: 45 ML/MIN/1.73M2 — LOW
EGFR: 45 ML/MIN/1.73M2 — LOW
GLUCOSE BLDC GLUCOMTR-MCNC: 124 MG/DL — HIGH (ref 70–99)
GLUCOSE BLDC GLUCOMTR-MCNC: 174 MG/DL — HIGH (ref 70–99)
GLUCOSE BLDC GLUCOMTR-MCNC: 219 MG/DL — HIGH (ref 70–99)
GLUCOSE BLDC GLUCOMTR-MCNC: 92 MG/DL — SIGNIFICANT CHANGE UP (ref 70–99)
GLUCOSE SERPL-MCNC: 68 MG/DL — LOW (ref 70–99)
HCT VFR BLD CALC: 25.4 % — LOW (ref 39–50)
HGB BLD-MCNC: 8.5 G/DL — LOW (ref 13–17)
MAGNESIUM SERPL-MCNC: 2.1 MG/DL — SIGNIFICANT CHANGE UP (ref 1.6–2.6)
MCHC RBC-ENTMCNC: 33.5 G/DL — SIGNIFICANT CHANGE UP (ref 32–36)
MCHC RBC-ENTMCNC: 35.3 PG — HIGH (ref 27–34)
MCV RBC AUTO: 105.4 FL — HIGH (ref 80–100)
NRBC BLD AUTO-RTO: 0 /100 WBCS — SIGNIFICANT CHANGE UP (ref 0–0)
PHOSPHATE SERPL-MCNC: 3 MG/DL — SIGNIFICANT CHANGE UP (ref 2.5–4.5)
PLATELET # BLD AUTO: 252 K/UL — SIGNIFICANT CHANGE UP (ref 150–400)
POTASSIUM SERPL-MCNC: 3.8 MMOL/L — SIGNIFICANT CHANGE UP (ref 3.5–5.3)
POTASSIUM SERPL-SCNC: 3.8 MMOL/L — SIGNIFICANT CHANGE UP (ref 3.5–5.3)
RBC # BLD: 2.41 M/UL — LOW (ref 4.2–5.8)
RBC # FLD: 16.5 % — HIGH (ref 10.3–14.5)
SODIUM SERPL-SCNC: 136 MMOL/L — SIGNIFICANT CHANGE UP (ref 135–145)
WBC # BLD: 5.76 K/UL — SIGNIFICANT CHANGE UP (ref 3.8–10.5)
WBC # FLD AUTO: 5.76 K/UL — SIGNIFICANT CHANGE UP (ref 3.8–10.5)

## 2025-07-02 RX ORDER — LOSARTAN POTASSIUM 100 MG/1
25 TABLET, FILM COATED ORAL AT BEDTIME
Refills: 0 | Status: DISCONTINUED | OUTPATIENT
Start: 2025-07-02 | End: 2025-07-04

## 2025-07-02 RX ADMIN — HEPARIN SODIUM 5000 UNIT(S): 1000 INJECTION INTRAVENOUS; SUBCUTANEOUS at 05:33

## 2025-07-02 RX ADMIN — CYANOCOBALAMIN 1000 MICROGRAM(S): 1000 INJECTION INTRAMUSCULAR; SUBCUTANEOUS at 11:52

## 2025-07-02 RX ADMIN — TAMSULOSIN HYDROCHLORIDE 0.4 MILLIGRAM(S): 0.4 CAPSULE ORAL at 21:48

## 2025-07-02 RX ADMIN — SERTRALINE 50 MILLIGRAM(S): 100 TABLET, FILM COATED ORAL at 11:52

## 2025-07-02 RX ADMIN — PYRIDOSTIGMINE BROMIDE 60 MILLIGRAM(S): 5 INJECTION, SOLUTION INTRAVENOUS; PARENTERAL at 14:06

## 2025-07-02 RX ADMIN — Medication 1000 UNIT(S): at 11:51

## 2025-07-02 RX ADMIN — Medication 90 MILLIGRAM(S): at 06:09

## 2025-07-02 RX ADMIN — HEPARIN SODIUM 5000 UNIT(S): 1000 INJECTION INTRAVENOUS; SUBCUTANEOUS at 14:09

## 2025-07-02 RX ADMIN — LABETALOL HYDROCHLORIDE 400 MILLIGRAM(S): 200 TABLET, FILM COATED ORAL at 14:09

## 2025-07-02 RX ADMIN — Medication 325 MILLIGRAM(S): at 17:10

## 2025-07-02 RX ADMIN — LABETALOL HYDROCHLORIDE 400 MILLIGRAM(S): 200 TABLET, FILM COATED ORAL at 21:53

## 2025-07-02 RX ADMIN — Medication 100 MILLIGRAM(S): at 05:32

## 2025-07-02 RX ADMIN — HEPARIN SODIUM 5000 UNIT(S): 1000 INJECTION INTRAVENOUS; SUBCUTANEOUS at 21:48

## 2025-07-02 RX ADMIN — INSULIN GLARGINE-YFGN 4 UNIT(S): 100 INJECTION, SOLUTION SUBCUTANEOUS at 21:53

## 2025-07-02 RX ADMIN — ATORVASTATIN CALCIUM 80 MILLIGRAM(S): 80 TABLET, FILM COATED ORAL at 21:44

## 2025-07-02 RX ADMIN — Medication 325 MILLIGRAM(S): at 05:33

## 2025-07-02 RX ADMIN — Medication 100 MILLIGRAM(S): at 21:49

## 2025-07-02 RX ADMIN — FOLIC ACID 1 MILLIGRAM(S): 1 TABLET ORAL at 11:51

## 2025-07-02 RX ADMIN — Medication 81 MILLIGRAM(S): at 11:52

## 2025-07-02 RX ADMIN — PYRIDOSTIGMINE BROMIDE 60 MILLIGRAM(S): 5 INJECTION, SOLUTION INTRAVENOUS; PARENTERAL at 11:51

## 2025-07-02 RX ADMIN — PYRIDOSTIGMINE BROMIDE 60 MILLIGRAM(S): 5 INJECTION, SOLUTION INTRAVENOUS; PARENTERAL at 06:09

## 2025-07-02 RX ADMIN — INSULIN LISPRO 1: 100 INJECTION, SOLUTION INTRAVENOUS; SUBCUTANEOUS at 12:02

## 2025-07-02 RX ADMIN — AZATHIOPRINE 100 MILLIGRAM(S): 50 TABLET ORAL at 11:51

## 2025-07-02 RX ADMIN — PYRIDOSTIGMINE BROMIDE 60 MILLIGRAM(S): 5 INJECTION, SOLUTION INTRAVENOUS; PARENTERAL at 21:53

## 2025-07-02 RX ADMIN — Medication 100 MILLIGRAM(S): at 14:09

## 2025-07-02 RX ADMIN — PREDNISONE 20 MILLIGRAM(S): 20 TABLET ORAL at 06:09

## 2025-07-02 RX ADMIN — INSULIN LISPRO 2: 100 INJECTION, SOLUTION INTRAVENOUS; SUBCUTANEOUS at 17:04

## 2025-07-02 RX ADMIN — LOSARTAN POTASSIUM 25 MILLIGRAM(S): 100 TABLET, FILM COATED ORAL at 21:49

## 2025-07-02 RX ADMIN — LABETALOL HYDROCHLORIDE 400 MILLIGRAM(S): 200 TABLET, FILM COATED ORAL at 06:09

## 2025-07-02 RX ADMIN — FINASTERIDE 5 MILLIGRAM(S): 1 TABLET, FILM COATED ORAL at 11:51

## 2025-07-02 RX ADMIN — PYRIDOSTIGMINE BROMIDE 60 MILLIGRAM(S): 5 INJECTION, SOLUTION INTRAVENOUS; PARENTERAL at 17:04

## 2025-07-02 RX ADMIN — PYRIDOSTIGMINE BROMIDE 60 MILLIGRAM(S): 5 INJECTION, SOLUTION INTRAVENOUS; PARENTERAL at 00:17

## 2025-07-02 NOTE — PROGRESS NOTE ADULT - PROBLEM SELECTOR PLAN 3
Was sent in from NH for Hgb 7.9 in a routine lab   Total iron 37, TIBC 233, % iron 16  No active signs of bleeding   - macrocytic anemia   - b12 and folate serum wnl   - c/w home iron and folic acid supplement  - Hgb remains stable at 7.9   - Finished Venofer 200mg IV qd x 3 doses  - started Epogen per nephrology  - QMA Dr. Moralez consulted apprec recs

## 2025-07-02 NOTE — DIETITIAN INITIAL EVALUATION ADULT - OTHER INFO
Pt from skilled nursing facility, alert, oriented, weak, but well-communicated; Reported Ht=5' 8" appetite good, but losing wt ( details unclear, unable to confirm), denied GI distress, chewing or swallowing problem; Unknown food allergy, no specific food choice/intolerance; varied intake depending on food, was on oral nutritional supplement at skilled nursing facility; h/o DM, [id number]=4.3, POCT glucose=75 to 360  Wt data in EMR reviewed: 137 lb 9/27/22; 136 lb 6/25/25; 141 lb 7/1/25 ( limited recent wt data )

## 2025-07-02 NOTE — PROGRESS NOTE ADULT - SUBJECTIVE AND OBJECTIVE BOX
C A R D I O L O G Y  **********************************     DATE OF SERVICE: 07-02-25    Patient denies chest pain or shortness of breath.   Review of symptoms otherwise negative.    acetaminophen     Tablet .. 650 milliGRAM(s) Oral every 6 hours PRN  aspirin enteric coated 81 milliGRAM(s) Oral daily  atorvastatin 80 milliGRAM(s) Oral at bedtime  azaTHIOprine 100 milliGRAM(s) Oral daily  cholecalciferol 1000 Unit(s) Oral daily  cyanocobalamin 1000 MICROGram(s) Oral daily  dextrose 5%. 1000 milliLiter(s) IV Continuous <Continuous>  dextrose 50% Injectable 25 Gram(s) IV Push once  dextrose Oral Gel 15 Gram(s) Oral once PRN  ferrous    sulfate 325 milliGRAM(s) Oral two times a day  finasteride 5 milliGRAM(s) Oral daily  folic acid 1 milliGRAM(s) Oral daily  heparin   Injectable 5000 Unit(s) SubCutaneous every 8 hours  hydrALAZINE 100 milliGRAM(s) Oral every 8 hours  insulin glargine Injectable (LANTUS) 4 Unit(s) SubCutaneous at bedtime  insulin lispro (ADMELOG) corrective regimen sliding scale   SubCutaneous three times a day before meals  insulin lispro (ADMELOG) corrective regimen sliding scale   SubCutaneous at bedtime  labetalol 400 milliGRAM(s) Oral every 8 hours  NIFEdipine XL 90 milliGRAM(s) Oral daily  ondansetron Injectable 4 milliGRAM(s) IV Push every 8 hours PRN  predniSONE   Tablet 20 milliGRAM(s) Oral daily  pyridostigmine 60 milliGRAM(s) Oral <User Schedule>  sertraline 50 milliGRAM(s) Oral daily  tamsulosin 0.4 milliGRAM(s) Oral at bedtime                            8.5    5.76  )-----------( 252      ( 02 Jul 2025 06:16 )             25.4       Hemoglobin: 8.5 g/dL (07-02 @ 06:16)  Hemoglobin: 7.9 g/dL (07-01 @ 07:04)  Hemoglobin: 7.4 g/dL (06-30 @ 05:42)  Hemoglobin: 8.1 g/dL (06-29 @ 12:42)  Hemoglobin: 7.8 g/dL (06-29 @ 05:06)      07-02    136  |  102  |  23[H]  ----------------------------<  68[L]  3.8   |  30  |  1.66[H]    Ca    8.8      02 Jul 2025 06:16  Phos  3.0     07-02  Mg     2.1     07-02    TPro  5.4[L]  /  Alb  2.5[L]  /  TBili  0.4  /  DBili  x   /  AST  26  /  ALT  21  /  AlkPhos  87  07-01    Creatinine Trend: 1.66<--, 1.99<--, 1.94<--, 1.76<--, 1.40<--, 1.45<--    COAGS:           T(C): 36.3 (07-02-25 @ 07:55), Max: 37.1 (07-01-25 @ 23:48)  HR: 65 (07-02-25 @ 07:55) (64 - 69)  BP: 126/58 (07-02-25 @ 07:55) (116/53 - 219/75)  RR: 18 (07-02-25 @ 07:55) (18 - 18)  SpO2: 98% (07-02-25 @ 05:38) (95% - 98%)  Wt(kg): --    I&O's Summary    01 Jul 2025 07:01  -  02 Jul 2025 07:00  --------------------------------------------------------  IN: 0 mL / OUT: 1040 mL / NET: -1040 mL          HEENT:  (-)icterus (-)pallor  CV: N S1 S2 1/6 BOUBACAR (+)2 Pulses B/l  Resp:  Clear to ausculatation B/L, normal effort  GI: (+) BS Soft, NT, ND  Lymph:  (-)Edema, (-)obvious lymphadenopathy  Skin: Warm to touch, Normal turgor  Psych: Appropriate mood and affect      TELEMETRY: 	  sinus        ASSESSMENT/PLAN: 	70y  Male PMHx of Myasthenia gravis s/p thymectomy, HTN, 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 hyperaldosterone state  aldosterone appears wnl  - cont Procardia at 90 mg PO daily   - tolerating ARB.   -  TSH wnl   - pLan to Add aldactone once seen by endocrine and appropriate blood testing is aquired     # Abnormal EKG  - echo noted, suspect it is due to hypertensive heart disease given his profoundly elevated blood pressures however can arrange for outpt cardiac MRI     Ta Mercado MD, [facility]  [place]EE[place] [phone]

## 2025-07-02 NOTE — PROGRESS NOTE ADULT - SUBJECTIVE AND OBJECTIVE BOX
Patient is a 70y old  Male who presents with a chief complaint of MICHEL (02 Jul 2025 13:54)    OVERNIGHT EVENTS: no acute changes.     Pt is aox2 (not time), tolerating PO, remains in bed due to weakness.   Vital signs reviewed- still has intermittent elevated BP in the 200s.   on telemetry - sinus bradycardia 50s bpm.   glucose remains controlled.     REVIEW OF SYSTEMS:  CONSTITUTIONAL: No fever, chills  ENMT:  No difficulty hearing, no change in vision  NECK: No pain or stiffness  RESPIRATORY: No cough, SOB  CARDIOVASCULAR: No chest pain, palpitations  GASTROINTESTINAL: No abdominal pain. No nausea, vomiting, or diarrhea  GENITOURINARY: No dysuria  NEUROLOGICAL: No HA  SKIN: No itching, burning, rashes, or lesions   LYMPH NODES: No enlarged glands  ENDOCRINE: No heat or cold intolerance; No hair loss  MUSCULOSKELETAL: No joint pain or swelling; No muscle, back, or extremity pain  PSYCHIATRIC: No depression, anxiety  HEME/LYMPH: No easy bruising, or bleeding gums    T(C): 36.6 (07-02-25 @ 16:23), Max: 37.4 (07-02-25 @ 11:51)  HR: 68 (07-02-25 @ 16:23) (64 - 72)  BP: 95/71 (07-02-25 @ 16:23) (95/71 - 219/75)  RR: 18 (07-02-25 @ 16:23) (18 - 18)  SpO2: 94% (07-02-25 @ 16:23) (93% - 98%)  Wt(kg): --Vital Signs Last 24 Hrs  T(C): 36.6 (02 Jul 2025 16:23), Max: 37.4 (02 Jul 2025 11:51)  T(F): 97.9 (02 Jul 2025 16:23), Max: 99.3 (02 Jul 2025 11:51)  HR: 68 (02 Jul 2025 16:23) (64 - 72)  BP: 95/71 (02 Jul 2025 16:23) (95/71 - 219/75)  BP(mean): 99 (02 Jul 2025 14:10) (83 - 99)  RR: 18 (02 Jul 2025 16:23) (18 - 18)  SpO2: 94% (02 Jul 2025 16:23) (93% - 98%)    Parameters below as of 02 Jul 2025 16:23  Patient On (Oxygen Delivery Method): room air        MEDICATIONS  (STANDING):  aspirin enteric coated 81 milliGRAM(s) Oral daily  atorvastatin 80 milliGRAM(s) Oral at bedtime  azaTHIOprine 100 milliGRAM(s) Oral daily  cholecalciferol 1000 Unit(s) Oral daily  cyanocobalamin 1000 MICROGram(s) Oral daily  dextrose 5%. 1000 milliLiter(s) (50 mL/Hr) IV Continuous <Continuous>  dextrose 50% Injectable 25 Gram(s) IV Push once  ferrous    sulfate 325 milliGRAM(s) Oral two times a day  finasteride 5 milliGRAM(s) Oral daily  folic acid 1 milliGRAM(s) Oral daily  heparin   Injectable 5000 Unit(s) SubCutaneous every 8 hours  hydrALAZINE 100 milliGRAM(s) Oral every 8 hours  insulin glargine Injectable (LANTUS) 4 Unit(s) SubCutaneous at bedtime  insulin lispro (ADMELOG) corrective regimen sliding scale   SubCutaneous three times a day before meals  insulin lispro (ADMELOG) corrective regimen sliding scale   SubCutaneous at bedtime  labetalol 400 milliGRAM(s) Oral every 8 hours  losartan 25 milliGRAM(s) Oral at bedtime  NIFEdipine XL 90 milliGRAM(s) Oral daily  predniSONE   Tablet 20 milliGRAM(s) Oral daily  pyridostigmine 60 milliGRAM(s) Oral <User Schedule>  sertraline 50 milliGRAM(s) Oral daily  tamsulosin 0.4 milliGRAM(s) Oral at bedtime    MEDICATIONS  (PRN):  acetaminophen     Tablet .. 650 milliGRAM(s) Oral every 6 hours PRN Temp greater or equal to 38C (100.4F), Mild Pain (1 - 3)  dextrose Oral Gel 15 Gram(s) Oral once PRN Blood Glucose LESS THAN 70 milliGRAM(s)/deciliter  ondansetron Injectable 4 milliGRAM(s) IV Push every 8 hours PRN Nausea and/or Vomiting      PHYSICAL EXAM:  GENERAL: NAD  EYES: clear conjunctiva   ENMT: Moist mucous membranes  NECK: Supple, No JVD, Normal thyroid  CHEST/LUNG: Clear to auscultation bilaterally; No rales, rhonchi, wheezing, or rubs  HEART: S1, S2, Regular rate and rhythm  ABDOMEN: Soft, Nontender, Nondistended; Bowel sounds present  NEURO: Alert & Oriented X2 (not time)  EXTREMITIES: No LE edema, no calf tenderness  LYMPH: No lymphadenopathy noted  SKIN: No rashes or lesions    Consultant(s) Notes Reviewed:  [x ] YES  [ ] NO  Care Discussed with Consultants/Other Providers [ x] YES  [ ] NO    LABS:                        8.5    5.76  )-----------( 252      ( 02 Jul 2025 06:16 )             25.4     07-02    136  |  102  |  23[H]  ----------------------------<  68[L]  3.8   |  30  |  1.66[H]    Ca    8.8      02 Jul 2025 06:16  Phos  3.0     07-02  Mg     2.1     07-02    TPro  5.4[L]  /  Alb  2.5[L]  /  TBili  0.4  /  DBili  x   /  AST  26  /  ALT  21  /  AlkPhos  87  07-01      CAPILLARY BLOOD GLUCOSE      POCT Blood Glucose.: 174 mg/dL (02 Jul 2025 12:01)  POCT Blood Glucose.: 92 mg/dL (02 Jul 2025 07:36)  POCT Blood Glucose.: 115 mg/dL (01 Jul 2025 22:12)  POCT Blood Glucose.: 132 mg/dL (01 Jul 2025 20:52)  POCT Blood Glucose.: 123 mg/dL (01 Jul 2025 16:51)        Urinalysis Basic - ( 02 Jul 2025 06:16 )    Color: x / Appearance: x / SG: x / pH: x  Gluc: 68 mg/dL / Ketone: x  / Bili: x / Urobili: x   Blood: x / Protein: x / Nitrite: x   Leuk Esterase: x / RBC: x / WBC x   Sq Epi: x / Non Sq Epi: x / Bacteria: x        RADIOLOGY & ADDITIONAL TESTS:  < from: US Renal (06.30.25 @ 09:48) >    ACC: [id number] EXAM:  US KIDNEY(S)   ORDERED BY: DUONG ALCANTAR     PROCEDURE DATE:  06/30/2025          INTERPRETATION:  CLINICAL INFORMATION: 70-year-old male with MICHEL, rule   out retention    COMPARISON: Abdominal pelvic CT 6/29/2025, renal ultrasound 1/21/2022    TECHNIQUE: Portable sonography of the kidneys and bladder.    FINDINGS:  Right kidney: 11.1 cm. No renal mass, hydronephrosis or calculi.   Increased cortical echogenicity.    Left kidney: 9.9 cm. No renal mass, hydronephrosis or calculi. Increased   cortical echogenicity. Partially imaged hypoechoic structure posterior to   the left kidney may correspond to the lymphadenopathy questioned on the   prior CT, not evaluated on this exam.    Urinary bladder: Not fully evaluated on this examination. However, on   submitted views, there appears to be mild diffuse bladder wall   thickening, as seen on the prior CT.    A small left pleural effusion is noted.    IMPRESSION:  1. No hydronephrosis.  2. Increased renal cortical echogenicity compatible with renal   parenchymal disease.  3. There is limited visualization of the urinary bladder; however, on   limited views, the wall appears diffusely thickened. Suggest correlation   with urinalysis to exclude infection.  4. Partially imaged hypoechoic structure posterior to the left kidney may   correspond to the lymphadenopathy questioned on the prior CT. Further   evaluation is recommended as clinically.        --- End of Report ---            MATTIE VILLARREAL MD; Attending Radiologist  This document has been electronically signed. Jun 30 2025 12:05PM    < end of copied text >  < from: CT Abdomen and Pelvis No Cont (06.29.25 @ 23:36) >    ACC: [id number] EXAM:  CT ABDOMEN AND PELVIS   ORDERED BY: MURTAZA AMEZQUTIA     PROCEDURE DATE:  06/29/2025          INTERPRETATION:  CLINICAL INFORMATION: Anemia    COMPARISON: CT chest 3/2/2022.    CONTRAST/COMPLICATIONS:  IV Contrast: NONE  Oral Contrast: NONE.    PROCEDURE:  CT of the Abdomen and Pelvis was performed.  Sagittal and coronal reformats were performed.    FINDINGS:  LOWER CHEST: Small bilateral pleural effusions. Signs of anemia.    LIVER: Within normal limits.  BILE DUCTS: Normal caliber.  GALLBLADDER: Contracted gallbladder.  SPLEEN: Within normal limits.  PANCREAS: Within normal limits. No obvious ductal dilatation.   Periampullary diverticula.  ADRENALS: Within normal limits.  KIDNEYS/URETERS: Within normal limits.    BLADDER: The urinary bladder is partially decompressed. There is   circumferential wall thickening.  REPRODUCTIVE ORGANS: Prostate within normal limits.    BOWEL: No bowel obstruction. Appendix is normal. Colonic diverticulosis   without acute diverticulitis.  PERITONEUM/RETROPERITONEUM: No ascites, pneumoperitoneum or loculated   fluid collections. No retroperitoneal hematoma.  VESSELS: Atherosclerosis. The aorta has normal caliber. Left-sided   paraspinal and paraaortic soft tissue densities are noted suggestive of   tortuous vessels and collaterals versus lymphadenopathy or both. This   finding is new since prior study 3/2/2022  LYMPH NODES: No confined enlarged lymph nodes are noted in the   retroperitoneum, for example a aortocaval adenopathy at 2.1 x 2.8 cm,   image 2:47.  ABDOMINAL WALL: There are small bilateral inguinal hernia containing   mainly fluid and fat on the left and a short segment of bowel on the   right without obstruction.  BONES: Degenerative changes.    IMPRESSION:  No retroperitoneal hematoma.    Soft tissue densities within the retroperitoneum next to the aorta   concerning for extensive lymphadenopathy versus tortuous vessels or a   combination of both. The evaluation is limited due to the lack of IV   contrast. Recommend contrast enhanced cross-sectional imaging for better   assessment. Further workup for lymphoproliferative disease may be helpful.    Circumferential bladder wall thickening as can be seen with decompression   or component of cystitis.        Prelim: No obvious retroperitoneal hematoma. Gallbladder wall thickening.   Bladder wall thickening. Retroperitoneal/pelvic lymphadenopathy.   Persistent right > left pleural effusion. Small pericardial effusion.   Official report to follow.    --- End of Report ---            VIVIANE LOMELI MD; Attending Radiologist  This document has been electronically signed. Jun 30 2025 10:28AM    < end of copied text >        Imaging Personally Reviewed:  [ ] YES  [ ] NO

## 2025-07-02 NOTE — CONSULT NOTE ADULT - ASSESSMENT
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 Patient reports he has been feeling nauseous for a few days but no vomiting. He reports chronic increased urinary frequency and sensation of incomplete voiding. He denies all other symptoms - fever, chills, cough, chest pain, SoB, palpitations abdominal pain, diarrhea, dysuria, arm or leg numbness or tingling. Reports regular brown stool - denies black or bloody stool.  (25 Jun 2025 21:32) Hematology called to evaluate soft tissue swelling in the retroperitoneum vs overlapping blood vessels. Anemia not on admission < from: CT Abdomen and Pelvis No Cont (06.29.25 @ 23:36) >  Soft tissue densities within the retroperitoneum next to the aorta   concerning for extensive lymphadenopathy versus tortuous vessels or a   combination of both. The evaluation is limited due to the lack of IV   contrast. Recommend contrast enhanced cross-sectional imaging for better   assessment.  Problem # 1 Retroperitoneal mass Lymphadenopathy vs overlapping vessels  Recommend  either CT abd / pelvis with contrast or MRI abdomen w/ contrast or US abdomen to differentiate blood vessels vs LN's   Leukemia / lymphoma testing U acid LDH SPEP    Problem # 2 Anemia   -Inc Ferritin and Fe sat 16% c/w ACD   -K/L ratio normal - recommend immunofixatin  -TFT Vit B12 Folate all normal - may need bone marrow evaluation as an outpatient.    Will follow. Thank you for the consult. For questions please call [phone]    Silver ROA

## 2025-07-02 NOTE — DIETITIAN INITIAL EVALUATION ADULT - PERTINENT LABORATORY DATA
07-02    136  |  102  |  23[H]  ----------------------------<  68[L]  3.8   |  30  |  1.66[H]    Ca    8.8      02 Jul 2025 06:16  Phos  3.0     07-02  Mg     2.1     07-02    TPro  5.4[L]  /  Alb  2.5[L]  /  TBili  0.4  /  DBili  x   /  AST  26  /  ALT  21  /  AlkPhos  87  07-01  POCT Blood Glucose.: 174 mg/dL (07-02-25 @ 12:01)  A1C with Estimated Average Glucose Result: 6.3 % (06-27-25 @ 05:47)

## 2025-07-02 NOTE — DIETITIAN INITIAL EVALUATION ADULT - PERTINENT MEDS FT
MEDICATIONS  (STANDING):  aspirin enteric coated 81 milliGRAM(s) Oral daily  atorvastatin 80 milliGRAM(s) Oral at bedtime  azaTHIOprine 100 milliGRAM(s) Oral daily  cholecalciferol 1000 Unit(s) Oral daily  cyanocobalamin 1000 MICROGram(s) Oral daily  dextrose 5%. 1000 milliLiter(s) (50 mL/Hr) IV Continuous <Continuous>  dextrose 50% Injectable 25 Gram(s) IV Push once  ferrous    sulfate 325 milliGRAM(s) Oral two times a day  finasteride 5 milliGRAM(s) Oral daily  folic acid 1 milliGRAM(s) Oral daily  heparin   Injectable 5000 Unit(s) SubCutaneous every 8 hours  hydrALAZINE 100 milliGRAM(s) Oral every 8 hours  insulin glargine Injectable (LANTUS) 4 Unit(s) SubCutaneous at bedtime  insulin lispro (ADMELOG) corrective regimen sliding scale   SubCutaneous three times a day before meals  insulin lispro (ADMELOG) corrective regimen sliding scale   SubCutaneous at bedtime  labetalol 400 milliGRAM(s) Oral every 8 hours  losartan 25 milliGRAM(s) Oral at bedtime  NIFEdipine XL 90 milliGRAM(s) Oral daily  predniSONE   Tablet 20 milliGRAM(s) Oral daily  pyridostigmine 60 milliGRAM(s) Oral <User Schedule>  sertraline 50 milliGRAM(s) Oral daily  tamsulosin 0.4 milliGRAM(s) Oral at bedtime    MEDICATIONS  (PRN):  acetaminophen     Tablet .. 650 milliGRAM(s) Oral every 6 hours PRN Temp greater or equal to 38C (100.4F), Mild Pain (1 - 3)  dextrose Oral Gel 15 Gram(s) Oral once PRN Blood Glucose LESS THAN 70 milliGRAM(s)/deciliter  ondansetron Injectable 4 milliGRAM(s) IV Push every 8 hours PRN Nausea and/or Vomiting

## 2025-07-02 NOTE — CONSULT NOTE ADULT - SUBJECTIVE AND OBJECTIVE BOX
Patient is a 70y old  Male who presents with a chief complaint of MICHEL (02 Jul 2025 16:51)      HPI:  Patient is a 70M, from 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 Patient reports he has been feeling nauseous for a few days but no vomiting. He reports chronic increased urinary frequency and sensation of incomplete voiding. He denies all other symptoms - fever, chills, cough, chest pain, SoB, palpitations abdominal pain, diarrhea, dysuria, arm or leg numbness or tingling. Reports regular brown stool - denies black or bloody stool.  (25 Jun 2025 21:32) Hematology called for incidental finding of lymphadenopathy and soft tissue mass found on CT abdomen / pelvis. < from: CT Abdomen and Pelvis No Cont (06.29.25 @ 23:36) >  Soft tissue densities within the retroperitoneum next to the aorta   concerning for extensive lymphadenopathy versus tortuous vessels or a   combination of both. The evaluation is limited due to the lack of IV   contrast.     ROS:  Negative except for:    PAST MEDICAL & SURGICAL HISTORY:  HTN (hypertension)      DM (diabetes mellitus)      Myasthenia gravis      Hypercholesterolemia      CVA (cerebrovascular accident)      Peripheral neuropathy      BPH (benign prostatic hyperplasia)      Major depression      Lipoma of chest wall      MG with thymoma (myasthena gravis)      H/O cataract extraction  , right eye          SOCIAL HISTORY:    FAMILY HISTORY:  Family history of hypertension (Mother)        MEDICATIONS  (STANDING):  aspirin enteric coated 81 milliGRAM(s) Oral daily  atorvastatin 80 milliGRAM(s) Oral at bedtime  azaTHIOprine 100 milliGRAM(s) Oral daily  cholecalciferol 1000 Unit(s) Oral daily  cyanocobalamin 1000 MICROGram(s) Oral daily  dextrose 5%. 1000 milliLiter(s) (50 mL/Hr) IV Continuous <Continuous>  dextrose 50% Injectable 25 Gram(s) IV Push once  ferrous    sulfate 325 milliGRAM(s) Oral two times a day  finasteride 5 milliGRAM(s) Oral daily  folic acid 1 milliGRAM(s) Oral daily  heparin   Injectable 5000 Unit(s) SubCutaneous every 8 hours  hydrALAZINE 100 milliGRAM(s) Oral every 8 hours  insulin glargine Injectable (LANTUS) 4 Unit(s) SubCutaneous at bedtime  insulin lispro (ADMELOG) corrective regimen sliding scale   SubCutaneous three times a day before meals  insulin lispro (ADMELOG) corrective regimen sliding scale   SubCutaneous at bedtime  labetalol 400 milliGRAM(s) Oral every 8 hours  losartan 25 milliGRAM(s) Oral at bedtime  NIFEdipine XL 90 milliGRAM(s) Oral daily  predniSONE   Tablet 20 milliGRAM(s) Oral daily  pyridostigmine 60 milliGRAM(s) Oral <User Schedule>  sertraline 50 milliGRAM(s) Oral daily  tamsulosin 0.4 milliGRAM(s) Oral at bedtime    MEDICATIONS  (PRN):  acetaminophen     Tablet .. 650 milliGRAM(s) Oral every 6 hours PRN Temp greater or equal to 38C (100.4F), Mild Pain (1 - 3)  dextrose Oral Gel 15 Gram(s) Oral once PRN Blood Glucose LESS THAN 70 milliGRAM(s)/deciliter  ondansetron Injectable 4 milliGRAM(s) IV Push every 8 hours PRN Nausea and/or Vomiting      Allergies    No Known Allergies    Intolerances        Vital Signs Last 24 Hrs  T(C): 37.1 (02 Jul 2025 20:48), Max: 37.4 (02 Jul 2025 11:51)  T(F): 98.8 (02 Jul 2025 20:48), Max: 99.3 (02 Jul 2025 11:51)  HR: 63 (02 Jul 2025 20:48) (63 - 72)  BP: 185/60 (02 Jul 2025 20:48) (95/71 - 219/75)  BP(mean): 99 (02 Jul 2025 14:10) (83 - 99)  RR: 18 (02 Jul 2025 20:48) (18 - 18)  SpO2: 93% (02 Jul 2025 20:48) (93% - 98%)    Parameters below as of 02 Jul 2025 20:48  Patient On (Oxygen Delivery Method): room air        PHYSICAL EXAM  General: adult in NAD  HEENT: clear oropharynx, anicteric sclera, pink conjunctiva  Neck: supple  CV: normal S1/S2 with no murmur rubs or gallops  Lungs: positive air movement b/l ant lungs,clear to auscultation, no wheezes, no rales  Abdomen: soft non-tender non-distended, no hepatosplenomegaly  Ext: no clubbing cyanosis or edema  Skin: no rashes and no petechiae  Neuro: alert and oriented X 4, no focal deficits      LABS:                          8.5    5.76  )-----------( 252      ( 02 Jul 2025 06:16 )             25.4         Mean Cell Volume : 105.4 fl  Mean Cell Hemoglobin : 35.3 pg  Mean Cell Hemoglobin Concentration : 33.5 g/dL  Auto Neutrophil # : x  Auto Lymphocyte # : x  Auto Monocyte # : x  Auto Eosinophil # : x  Auto Basophil # : x  Auto Neutrophil % : x  Auto Lymphocyte % : x  Auto Monocyte % : x  Auto Eosinophil % : x  Auto Basophil % : x      Serial CBC's  07-02 @ 06:16  Hct-25.4 / Hgb-8.5 / Plat-252 / RBC-2.41 / WBC-5.76  Serial CBC's  07-01 @ 07:04  Hct-24.7 / Hgb-7.9 / Plat-228 / RBC-2.27 / WBC-6.21  Serial CBC's  06-30 @ 05:42  Hct-22.1 / Hgb-7.4 / Plat-222 / RBC-2.08 / WBC-5.38  Serial CBC's  06-29 @ 12:42  Hct-24.8 / Hgb-8.1 / Plat-224 / RBC-2.31 / WBC-7.38  Serial CBC's  06-29 @ 05:06  Hct-23.5 / Hgb-7.8 / Plat-233 / RBC-2.23 / WBC-6.22      07-02    136  |  102  |  23[H]  ----------------------------<  68[L]  3.8   |  30  |  1.66[H]    Ca    8.8      02 Jul 2025 06:16  Phos  3.0     07-02  Mg     2.1     07-02    TPro  5.4[L]  /  Alb  2.5[L]  /  TBili  0.4  /  DBili  x   /  AST  26  /  ALT  21  /  AlkPhos  87  07-01          Vitamin B12, Serum: 1900 pg/mL (07-01 @ 12:40)  Folate, Serum: >20.0 ng/mL (07-01 @ 12:40)  Iron - Total Binding Capacity.: 154 ug/dL (06-30 @ 05:42)  Ferritin: 466 ng/mL (06-30 @ 05:42)      BEAU Kappa: 3.50 mg/dL (06-28 @ 06:02)  BEAU Lambda: 3.67 mg/dL (06-28 @ 06:02)  Immunofixation, Serum:   No Monoclonal Band Identified      Reference Range: None Detected (06-28 @ 06:02)          BLOOD SMEAR INTERPRETATION:       RADIOLOGY & ADDITIONAL STUDIES:

## 2025-07-02 NOTE — PROGRESS NOTE ADULT - PROBLEM SELECTOR PLAN 10
DVT prophylaxis - heparin SQ    ####Discharge planning  from [facility]   f/u HTN  f/u SCr, f/u nephro reccs  monitor BP to be improved. fluctuates  f/u QMA recs

## 2025-07-02 NOTE — PROGRESS NOTE ADULT - ASSESSMENT
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 Hgb 9.9. BUN/Cr 30/1.81.   Admitted for MICHEL on CKD and uncontrolled HTN, Nephrology and Cardiology consulted. Medications adjusted   TTE: Left ventricular wall thickness is severely increased. Left ventricular systolic function is hyperdynamic with an ejection fraction visually estimated at 70 to 75 %.  Differential includes hypertensive, hypertrophic, and infiltrative cardiomyopathy, among others. Cardiac MRI for further evaluation was recommended, can be done outp   During hospitalization pt was found to have elevated creatinine, renal US negative for hydronephrosis, d/c'd ARB. Creatinine is still 1.99.  QMA Dr. Moralez consulted for macrocytic anemia and retroperitoneal lymphadenopathy.   Endocrine Dr. Farr consulted possible hormonal causes of uncontrolled HTN.

## 2025-07-02 NOTE — DIETITIAN INITIAL EVALUATION ADULT - ORAL INTAKE PTA/DIET HISTORY
Diet Rx at [facility] nursing facility PTA: No added salt, No concentrated sweets, Chopped, Glucerna Shake 1 can bid, Snack at 2pm, 8pm

## 2025-07-02 NOTE — PROGRESS NOTE ADULT - SUBJECTIVE AND OBJECTIVE BOX
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 Pt a/w hypertensive urgency and MICHEL. Nephrology consulted for Elevated serum creatinine.    Hospital Medications: Medications reviewed.    REVIEW OF SYSTEMS:  Gen: no changes in weight  Cards: no chest pain  Resp: no dyspnea  GI: no nausea or vomiting or diarrhea  : no dysuria or hematuria  Vascular: no LE edema     VITALS:  T(F): 97.9 (07-02-25 @ 16:23), Max: 99.3 (07-02-25 @ 11:51)  HR: 68 (07-02-25 @ 16:23)  BP: 95/71 (07-02-25 @ 16:23)  RR: 18 (07-02-25 @ 16:23)  SpO2: 94% (07-02-25 @ 16:23)  Wt(kg): --    07-01 @ 07:01  -  07-02 @ 07:00  --------------------------------------------------------  IN: 0 mL / OUT: 1040 mL / NET: -1040 mL    07-02 @ 07:01  -  07-02 @ 16:51  --------------------------------------------------------  IN: 0 mL / OUT: 300 mL / NET: -300 mL      PHYSICAL EXAM:  Gen: NAD, calm  HEENT: +facial/ periorbital edema improving  Neck: no JVD  Cards: RRR, +S1/S2,+BOUBACAR  Resp: CTA B/L  GI: soft, NT/ND, NABS  : no CVA tenderness  Extremities: +trace ankle edema B/L     LABS:  07-02    136  |  102  |  23[H]  ----------------------------<  68[L]  3.8   |  30  |  1.66[H]    Ca    8.8      02 Jul 2025 06:16  Phos  3.0     07-02  Mg     2.1     07-02    TPro  5.4[L]  /  Alb  2.5[L]  /  TBili  0.4  /  DBili      /  AST  26  /  ALT  21  /  AlkPhos  87  07-01    Creatinine Trend: 1.66 <--, 1.99 <--, 1.94 <--, 1.76 <--, 1.40 <--, 1.45 <--, 1.54 <--, 1.55 <--, 1.81 <--                        8.5    5.76  )-----------( 252      ( 02 Jul 2025 06:16 )             25.4     Urine Studies:  Urinalysis Basic - ( 02 Jul 2025 06:16 )    Color:  / Appearance:  / SG:  / pH:   Gluc: 68 mg/dL / Ketone:   / Bili:  / Urobili:    Blood:  / Protein:  / Nitrite:    Leuk Esterase:  / RBC:  / WBC    Sq Epi:  / Non Sq Epi:  / Bacteria:       Creatinine, Random Urine: 175 mg/dL (06-29 @ 11:45)  Protein/Creatinine Ratio Calculation: 0.5 Ratio (06-29 @ 11:45)  Creatinine, Random Urine: 127 mg/dL (06-28 @ 11:30)  Protein/Creatinine Ratio Calculation: 0.9 Ratio (06-28 @ 11:30)  Potassium, Random Urine: 43 mmol/L (06-28 @ 11:30)  Potassium, Random Urine: 50 mmol/L (06-27 @ 17:35)  Creatinine, Random Urine: 53 mg/dL (06-27 @ 17:35)  Sodium, Random Urine: 119 mmol/L (06-25 @ 21:58)  Creatinine, Random Urine: <13 mg/dL (06-25 @ 21:58)  Protein/Creatinine Ratio Calculation: unable to calc Ratio (06-25 @ 21:58)

## 2025-07-02 NOTE — PROGRESS NOTE ADULT - PROBLEM SELECTOR PLAN 4
CT A/P - Soft tissue densities within the retroperitoneum next to the aorta   concerning for extensive lymphadenopathy versus tortuous vessels or a   combination of both.   - QMA Dr. Moralez consulted apprec recs

## 2025-07-02 NOTE — PROGRESS NOTE ADULT - ASSESSMENT
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 Pt a/w hypertensive urgency and MICHEL.   Nephrology consulted for Elevated serum creatinine.    1. MICHEL- as per H& P; last SCr 1.3. MICHEL likely hemodynamically mediated due to uncontrolled HTN. Renal function improving off Losartan. Will try Losartan again and monitor renal function closely. Feurea 13.52%;   UA with 300 protein, no blood. FeNa 10%;  Renal US with no hydro.  TTE with grade 1 diastolic dysfunction, EF 70-75%; ?hypertensive vs infiltrative cardiomyopathy. SIFE neg & K/l wnl. Pt b/l pleural effusions on TTE. Check CXR;  Strict I/Os. Avoid nephrotoxins/ NSAIDs/ RCA. Monitor BMP.    2. Proteinuria- UA with 300 protein, no blood. Rechecked UPCr on 6/29, result is 0.48 g/day. No acute interventions at this time.    3. Hypertensive urgency- BP labile. c/w Labetalol 400mg PO q 8hrs. Consider Aldactone 25mg PO daily. Will try Losartan 25mg PO qhs. Rec Renal US with dopplers as an outpt to r/o HOANG.    c/w Nifedipine ER 90mg p daily.  c/w low salt diet. Monitor BP  4. Hypokalemia- Kuldip/ Renin; 3.19. Not consistent with hypoaldosteronism. Renin 6.479.   Hypokalemia due to shifting from excessive insulin.   Recc aldactone 25mg PO daily. Check VBG. Monitor serum potassium  5. Iron deficiency anemia- low hgb. Check FOBT. tsat 16% with ferriitn 236- Finished Venofer 200mg IV qd x 3 doses. s/p Epo 10k SC x1 on 6/30. Monitor hgb    [facility] NEPHROLOGY  Bethel Smith M.D.  Guillermo Jimenez D.O.  Kathleen Lan M.D.  MD Kimi Rodríguez, MSN, ANP-C    Telephone: [phone]  Facsimile: [phone] [id number], [address]  [place] [address]

## 2025-07-02 NOTE — PROGRESS NOTE ADULT - PROBLEM SELECTOR PLAN 6
Hx of IDT2DM on [id number]/30 10U qd and Novolin 100 ISS   - has been drinking rockstar energy drinks causing high sugars   - A1C 6.3  - recent hypoglycemia event, fasting glucose 80-120s  - decrease lantus to 4 units qhs   - Monitor BS ACHS  - Endocrine following

## 2025-07-02 NOTE — CONSULT NOTE ADULT - ASSESSMENT
Patient is a 70M, from 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 Patient reports he has been feeling nauseous for a few days but no vomiting. He reports chronic increased urinary frequency and sensation of incomplete voiding. He denies all other symptoms - fever, chills, cough, chest pain, SoB, palpitations abdominal pain, diarrhea, dysuria, arm or leg numbness or tingling. Reports regular brown stool - denies black or bloody stool.  (25 Jun 2025 21:32) Hematology called for incidental finding of lymphadenopathy and soft tissue mass found on CT abdomen / pelvis. < from: CT Abdomen and Pelvis No Cont (06.29.25 @ 23:36) >  Soft tissue densities within the retroperitoneum next to the aorta   concerning for extensive lymphadenopathy versus tortuous vessels or a   combination of both. The evaluation is limited due to the lack of IV   contrast.     Problem # 1 Suggestion of retroperitoneal LN vs tortuous vessels seen on CT abd/pelvis  -Recommend: Leukemia / lymphoma test, LDH Uric Acid -SPEP  -Recommend additional imaging either CT abd/ pelvis with IV contrast or MRI abdomen w/ contrast or US abdomen - imaging to differentiate between vessels or lymph nodes   Recommendations after additional imaging.

## 2025-07-02 NOTE — PROGRESS NOTE ADULT - PROBLEM SELECTOR PLAN 1
Hx of HTN on hydralazine 100mg tid, losartan 100mg qd, nifedipine 60mg qd, metoprolol 100mg bid   Troponin 43.9 > 39  BP remains elevated   - echo: Left ventricular wall thickness is severely increased. Left ventricular systolic function is hyperdynamic with an ejection fraction visually estimated at 70 to 75 %.  Differential includes hypertensive, hypertrophic, and infiltrative cardiomyopathy, among others. cardiac MRI for further evaluation was recommended, can be done outp   - telemetry monitoring   - Cardiology Dr. Mercado follows  -serum aldosterone level normal. serum renin test unable as per [facility] lab  - increased Nifedipine to 90 mg qd  - changed metoprolol to labetalol 400 mg q 8hr and added Hydralazine 100mg q8h.   - resumed losartan 25 mg qd   - Endo Dr. Farr consulted

## 2025-07-02 NOTE — PROGRESS NOTE ADULT - SUBJECTIVE AND OBJECTIVE BOX
Patient is a 70y old  Male who presents with a chief complaint of MICHEL (01 Jul 2025 12:49)    PATIENT IS SEEN AND EXAMINED IN MEDICAL FLOOR.  NGT [    ]    ARCADIO [   ]      GT [   ]    ALLERGIES:  No Known Allergies      Daily     Daily     VITALS:    Vital Signs Last 24 Hrs  T(C): 36.3 (02 Jul 2025 07:55), Max: 37.1 (01 Jul 2025 23:48)  T(F): 97.3 (02 Jul 2025 07:55), Max: 98.8 (01 Jul 2025 23:48)  HR: 65 (02 Jul 2025 07:55) (64 - 69)  BP: 126/58 (02 Jul 2025 07:55) (116/53 - 219/75)  BP(mean): --  RR: 18 (02 Jul 2025 07:55) (18 - 18)  SpO2: 98% (02 Jul 2025 05:38) (95% - 98%)    Parameters below as of 02 Jul 2025 07:55  Patient On (Oxygen Delivery Method): room air        LABS:    CBC Full  -  ( 02 Jul 2025 06:16 )  WBC Count : 5.76 K/uL  RBC Count : 2.41 M/uL  Hemoglobin : 8.5 g/dL  Hematocrit : 25.4 %  Platelet Count - Automated : 252 K/uL  Mean Cell Volume : 105.4 fl  Mean Cell Hemoglobin : 35.3 pg  Mean Cell Hemoglobin Concentration : 33.5 g/dL  Auto Neutrophil # : x  Auto Lymphocyte # : x  Auto Monocyte # : x  Auto Eosinophil # : x  Auto Basophil # : x  Auto Neutrophil % : x  Auto Lymphocyte % : x  Auto Monocyte % : x  Auto Eosinophil % : x  Auto Basophil % : x      07-02    136  |  102  |  23[H]  ----------------------------<  68[L]  3.8   |  30  |  1.66[H]    Ca    8.8      02 Jul 2025 06:16  Phos  3.0     07-02  Mg     2.1     07-02    TPro  5.4[L]  /  Alb  2.5[L]  /  TBili  0.4  /  DBili  x   /  AST  26  /  ALT  21  /  AlkPhos  87  07-01    CAPILLARY BLOOD GLUCOSE      POCT Blood Glucose.: 92 mg/dL (02 Jul 2025 07:36)  POCT Blood Glucose.: 115 mg/dL (01 Jul 2025 22:12)  POCT Blood Glucose.: 132 mg/dL (01 Jul 2025 20:52)  POCT Blood Glucose.: 123 mg/dL (01 Jul 2025 16:51)  POCT Blood Glucose.: 360 mg/dL (01 Jul 2025 11:58)        LIVER FUNCTIONS - ( 01 Jul 2025 07:04 )  Alb: 2.5 g/dL / Pro: 5.4 g/dL / ALK PHOS: 87 U/L / ALT: 21 U/L DA / AST: 26 U/L / GGT: x           Creatinine Trend: 1.66<--, 1.99<--, 1.94<--, 1.76<--, 1.40<--, 1.45<--  I&O's Summary    01 Jul 2025 07:01  -  02 Jul 2025 07:00  --------------------------------------------------------  IN: 0 mL / OUT: 1040 mL / NET: -1040 mL                MEDICATIONS:    MEDICATIONS  (STANDING):  aspirin enteric coated 81 milliGRAM(s) Oral daily  atorvastatin 80 milliGRAM(s) Oral at bedtime  azaTHIOprine 100 milliGRAM(s) Oral daily  cholecalciferol 1000 Unit(s) Oral daily  cyanocobalamin 1000 MICROGram(s) Oral daily  dextrose 5%. 1000 milliLiter(s) (50 mL/Hr) IV Continuous <Continuous>  dextrose 50% Injectable 25 Gram(s) IV Push once  ferrous    sulfate 325 milliGRAM(s) Oral two times a day  finasteride 5 milliGRAM(s) Oral daily  folic acid 1 milliGRAM(s) Oral daily  heparin   Injectable 5000 Unit(s) SubCutaneous every 8 hours  hydrALAZINE 100 milliGRAM(s) Oral every 8 hours  insulin glargine Injectable (LANTUS) 4 Unit(s) SubCutaneous at bedtime  insulin lispro (ADMELOG) corrective regimen sliding scale   SubCutaneous three times a day before meals  insulin lispro (ADMELOG) corrective regimen sliding scale   SubCutaneous at bedtime  labetalol 400 milliGRAM(s) Oral every 8 hours  NIFEdipine XL 90 milliGRAM(s) Oral daily  predniSONE   Tablet 20 milliGRAM(s) Oral daily  pyridostigmine 60 milliGRAM(s) Oral <User Schedule>  sertraline 50 milliGRAM(s) Oral daily  tamsulosin 0.4 milliGRAM(s) Oral at bedtime      MEDICATIONS  (PRN):  acetaminophen     Tablet .. 650 milliGRAM(s) Oral every 6 hours PRN Temp greater or equal to 38C (100.4F), Mild Pain (1 - 3)  dextrose Oral Gel 15 Gram(s) Oral once PRN Blood Glucose LESS THAN 70 milliGRAM(s)/deciliter  ondansetron Injectable 4 milliGRAM(s) IV Push every 8 hours PRN Nausea and/or Vomiting      REVIEW OF SYSTEMS:                           ALL ROS DONE [ X   ]    CONSTITUTIONAL:  LETHARGIC [   ], FEVER [   ], UNRESPONSIVE [   ]  CVS:  CP  [   ], SOB, [   ], PALPITATIONS [   ], DIZZYNESS [   ]  RS: COUGH [   ], SPUTUM [   ]  GI: ABDOMINAL PAIN [   ], NAUSEA [   ], VOMITINGS [   ], DIARRHEA [   ], CONSTIPATION [   ]  :  DYSURIA [   ], NOCTURIA [   ], INCREASED FREQUENCY [   ], DRIBLING [   ],  SKELETAL: PAINFUL JOINTS [   ], SWOLLEN JOINTS [   ], NECK ACHE [   ], LOW BACK ACHE [   ],  SKIN : ULCERS [   ], RASH [   ], ITCHING [   ]  CNS: HEAD ACHE [   ], DOUBLE VISION [   ], BLURRED VISION [   ], AMS / CONFUSION [   ], SEIZURES [   ], WEAKNESS [   ],TINGLING / NUMBNESS [   ]      PHYSICAL EXAMINATION:  GENERAL APPEARANCE: NO DISTRESS  HEENT:  NO PALLOR, NO  JVD,  NO   NODES, NECK SUPPLE  CVS: S1 +, S2 +,   RS: AEEB,  OCCASIONAL  RALES +,   NO RONCHI  ABD: SOFT, NT, NO, BS +  EXT: NO PE  SKIN: WARM,   SKELETAL:  ROM ACCEPTABLE  CNS:  AAO X 3        RADIOLOGY :  RADIOLOGY AND READINGS REVIEWED      ASSESSMENT :     Acute renal failure    HTN (hypertension)    DM (diabetes mellitus)    Myasthenia gravis    Hypercholesterolemia    CVA (cerebrovascular accident)    Peripheral neuropathy    BPH (benign prostatic hyperplasia)    Major depression    Lipoma of chest wall    MG with thymoma (myasthena gravis)    H/O cataract extraction        PLAN:  HPI:  Patient is a 70M, from [facility] ambulates with a walker, with PMHx of Myasthenia gravis s/p thymectomy, HTN, 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 Patient reports he has been feeling nauseous for a few days but no vomiting. He reports chronic increased urinary frequency and sensation of incomplete voiding. He denies all other symptoms - fever, chills, cough, chest pain, SoB, palpitations abdominal pain, diarrhea, dysuria, arm or leg numbness or tingling. Reports regular brown stool - denies black or bloody stool.  (25 Jun 2025 21:32)    # CASE D/W PATIENT AND PARTNER MILLI DONOVAN AT BEDSIDE, ALL QUESTIONS ANSWERED. DISCUSSED RECOMMENDATIONS AS MADE BY MULTIDISCIPLINARY TEAM. DISCUSSED THAT PROGNOSIS IS GUARDED. THEY VERBALIZED UNDERSTANDING. IN DISCUSSION REGARDING GOC - PATIENT WISHES TO BE FULL CODE.    # HYPERTENSIVE URGENCY  # UNDERLYING HTN  - TELEMETRY  - ECHO - LV EF - 70 - 75%, G1DD  - NOTED TROPONINS  - S/P IV HYDRALAZINE  - HYDRALAZINE  - PROCARDIA  - LABETALOL  - F/U SERUM RENIN, ALDOSTERONE LEVELS  - CARDIOLOGY CONSULT  - NEPHROLOGY CONSULT  - ENDOCRINOLOGY CONSULT      # MICHEL ON  ? CKD  # BPH  - IV FLUIDS  - PVR - 0 ML  - MONITOR CR  - AVOID NEPHROTOXIC AGENTS  - NEPHROLOGY CONSULT    # HYPOGLYCEMIA - RESOLVED  - MONITORING FINGERSTICKS    # ANEMIA  - TREND HGB  - ANEMIA PANEL  - DENIES MELENA, HEMATOCHEZIA, HEMATEMESIS, HEMATURIA  - NOTED CT A/P  - GASTROENTEROLOGY CONSULT    # ? LYMPHADENOPATHY ON CT A/P   - HEME/ONC CONSULT    # DENIES DYSURIA, UA NEGATIVE FOR LEUKOCYTE ESTERASE AND NITRITES      # MYASTHENIA GRAVIS S/P THYMECTOMY    # DM  - SSI + FS    # CVA W/ MILD RIGHT HP    # PAD  # GI AND DVT PPX   Patient is a 70y old  Male who presents with a chief complaint of MICHEL (01 Jul 2025 12:49)    PATIENT IS SEEN AND EXAMINED IN MEDICAL FLOOR.      ALLERGIES:  No Known Allergies      VITALS:    Vital Signs Last 24 Hrs  T(C): 36.3 (02 Jul 2025 07:55), Max: 37.1 (01 Jul 2025 23:48)  T(F): 97.3 (02 Jul 2025 07:55), Max: 98.8 (01 Jul 2025 23:48)  HR: 65 (02 Jul 2025 07:55) (64 - 69)  BP: 126/58 (02 Jul 2025 07:55) (116/53 - 219/75)  BP(mean): --  RR: 18 (02 Jul 2025 07:55) (18 - 18)  SpO2: 98% (02 Jul 2025 05:38) (95% - 98%)    Parameters below as of 02 Jul 2025 07:55  Patient On (Oxygen Delivery Method): room air        LABS:    CBC Full  -  ( 02 Jul 2025 06:16 )  WBC Count : 5.76 K/uL  RBC Count : 2.41 M/uL  Hemoglobin : 8.5 g/dL  Hematocrit : 25.4 %  Platelet Count - Automated : 252 K/uL  Mean Cell Volume : 105.4 fl  Mean Cell Hemoglobin : 35.3 pg  Mean Cell Hemoglobin Concentration : 33.5 g/dL  Auto Neutrophil # : x  Auto Lymphocyte # : x  Auto Monocyte # : x  Auto Eosinophil # : x  Auto Basophil # : x  Auto Neutrophil % : x  Auto Lymphocyte % : x  Auto Monocyte % : x  Auto Eosinophil % : x  Auto Basophil % : x      07-02    136  |  102  |  23[H]  ----------------------------<  68[L]  3.8   |  30  |  1.66[H]    Ca    8.8      02 Jul 2025 06:16  Phos  3.0     07-02  Mg     2.1     07-02    TPro  5.4[L]  /  Alb  2.5[L]  /  TBili  0.4  /  DBili  x   /  AST  26  /  ALT  21  /  AlkPhos  87  07-01    CAPILLARY BLOOD GLUCOSE      POCT Blood Glucose.: 92 mg/dL (02 Jul 2025 07:36)  POCT Blood Glucose.: 115 mg/dL (01 Jul 2025 22:12)  POCT Blood Glucose.: 132 mg/dL (01 Jul 2025 20:52)  POCT Blood Glucose.: 123 mg/dL (01 Jul 2025 16:51)  POCT Blood Glucose.: 360 mg/dL (01 Jul 2025 11:58)        LIVER FUNCTIONS - ( 01 Jul 2025 07:04 )  Alb: 2.5 g/dL / Pro: 5.4 g/dL / ALK PHOS: 87 U/L / ALT: 21 U/L DA / AST: 26 U/L / GGT: x           Creatinine Trend: 1.66<--, 1.99<--, 1.94<--, 1.76<--, 1.40<--, 1.45<--  I&O's Summary    01 Jul 2025 07:01  -  02 Jul 2025 07:00  --------------------------------------------------------  IN: 0 mL / OUT: 1040 mL / NET: -1040 mL                MEDICATIONS:    MEDICATIONS  (STANDING):  aspirin enteric coated 81 milliGRAM(s) Oral daily  atorvastatin 80 milliGRAM(s) Oral at bedtime  azaTHIOprine 100 milliGRAM(s) Oral daily  cholecalciferol 1000 Unit(s) Oral daily  cyanocobalamin 1000 MICROGram(s) Oral daily  dextrose 5%. 1000 milliLiter(s) (50 mL/Hr) IV Continuous <Continuous>  dextrose 50% Injectable 25 Gram(s) IV Push once  ferrous    sulfate 325 milliGRAM(s) Oral two times a day  finasteride 5 milliGRAM(s) Oral daily  folic acid 1 milliGRAM(s) Oral daily  heparin   Injectable 5000 Unit(s) SubCutaneous every 8 hours  hydrALAZINE 100 milliGRAM(s) Oral every 8 hours  insulin glargine Injectable (LANTUS) 4 Unit(s) SubCutaneous at bedtime  insulin lispro (ADMELOG) corrective regimen sliding scale   SubCutaneous three times a day before meals  insulin lispro (ADMELOG) corrective regimen sliding scale   SubCutaneous at bedtime  labetalol 400 milliGRAM(s) Oral every 8 hours  NIFEdipine XL 90 milliGRAM(s) Oral daily  predniSONE   Tablet 20 milliGRAM(s) Oral daily  pyridostigmine 60 milliGRAM(s) Oral <User Schedule>  sertraline 50 milliGRAM(s) Oral daily  tamsulosin 0.4 milliGRAM(s) Oral at bedtime      MEDICATIONS  (PRN):  acetaminophen     Tablet .. 650 milliGRAM(s) Oral every 6 hours PRN Temp greater or equal to 38C (100.4F), Mild Pain (1 - 3)  dextrose Oral Gel 15 Gram(s) Oral once PRN Blood Glucose LESS THAN 70 milliGRAM(s)/deciliter  ondansetron Injectable 4 milliGRAM(s) IV Push every 8 hours PRN Nausea and/or Vomiting      REVIEW OF SYSTEMS:                           ALL ROS DONE [ X   ]    CONSTITUTIONAL:  LETHARGIC [   ], FEVER [   ], UNRESPONSIVE [   ]  CVS:  CP  [   ], SOB, [   ], PALPITATIONS [   ], DIZZYNESS [   ]  RS: COUGH [   ], SPUTUM [   ]  GI: ABDOMINAL PAIN [   ], NAUSEA [   ], VOMITINGS [   ], DIARRHEA [   ], CONSTIPATION [   ]  :  DYSURIA [   ], NOCTURIA [   ], INCREASED FREQUENCY [   ], DRIBLING [   ],  SKELETAL: PAINFUL JOINTS [   ], SWOLLEN JOINTS [   ], NECK ACHE [   ], LOW BACK ACHE [   ],  SKIN : ULCERS [   ], RASH [   ], ITCHING [   ]  CNS: HEAD ACHE [   ], DOUBLE VISION [   ], BLURRED VISION [   ], AMS / CONFUSION [   ], SEIZURES [   ], WEAKNESS [   ],TINGLING / NUMBNESS [   ]      PHYSICAL EXAMINATION:  GENERAL APPEARANCE: NO DISTRESS  HEENT:  NO PALLOR, NO  JVD,  NO   NODES, NECK SUPPLE  CVS: S1 +, S2 +,   RS: AEEB,  OCCASIONAL  RALES +,   NO RONCHI  ABD: SOFT, NT, NO, BS +  EXT: NO PE  SKIN: WARM,   SKELETAL:  ROM ACCEPTABLE  CNS:  AAO X 3        RADIOLOGY :  RADIOLOGY AND READINGS REVIEWED      ASSESSMENT :     Acute renal failure    HTN (hypertension)    DM (diabetes mellitus)    Myasthenia gravis    Hypercholesterolemia    CVA (cerebrovascular accident)    Peripheral neuropathy    BPH (benign prostatic hyperplasia)    Major depression    Lipoma of chest wall    MG with thymoma (myasthena gravis)    H/O cataract extraction        PLAN:  HPI:  Patient is a 70M, from [facility] ambulates with a walker, with PMHx of Myasthenia gravis s/p thymectomy, HTN, 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 Patient reports he has been feeling nauseous for a few days but no vomiting. He reports chronic increased urinary frequency and sensation of incomplete voiding. He denies all other symptoms - fever, chills, cough, chest pain, SoB, palpitations abdominal pain, diarrhea, dysuria, arm or leg numbness or tingling. Reports regular brown stool - denies black or bloody stool.  (25 Jun 2025 21:32)    # CASE D/W PATIENT AND PARTNER MILLI DONOVAN AT BEDSIDE, ALL QUESTIONS ANSWERED. DISCUSSED RECOMMENDATIONS AS MADE BY MULTIDISCIPLINARY TEAM. DISCUSSED THAT PROGNOSIS IS GUARDED. THEY VERBALIZED UNDERSTANDING. IN DISCUSSION REGARDING GOC - PATIENT WISHES TO BE FULL CODE.    # HYPERTENSIVE URGENCY  # UNDERLYING HTN  - TELEMETRY  - ECHO - LV EF - 70 - 75%, G1DD  - NOTED TROPONINS  - S/P IV HYDRALAZINE  - HYDRALAZINE  - PROCARDIA  - LABETALOL  - RESUME LOSARTAN  - NOTED SERUM RENIN, ALDOSTERONE LEVELS  - CARDIOLOGY CONSULT  - NEPHROLOGY CONSULT  - ENDOCRINOLOGY CONSULT      # MICHEL ON  ? CKD  # BPH  - IV FLUIDS  - PVR - 0 ML  - MONITOR CR  - AVOID NEPHROTOXIC AGENTS  - NEPHROLOGY CONSULT    # HYPOGLYCEMIA - RESOLVED  - MONITORING FINGERSTICKS    # ANEMIA  - TREND HGB  - ANEMIA PANEL  - DENIES MELENA, HEMATOCHEZIA, HEMATEMESIS, HEMATURIA  - NOTED CT A/P  - GASTROENTEROLOGY CONSULT    # ? LYMPHADENOPATHY ON CT A/P   - HEME/ONC CONSULT    # DENIES DYSURIA, UA NEGATIVE FOR LEUKOCYTE ESTERASE AND NITRITES      # MYASTHENIA GRAVIS S/P THYMECTOMY    # DM  - SSI + FS    # CVA W/ MILD RIGHT HP    # PAD  # GI AND DVT PPX

## 2025-07-02 NOTE — CONSULT NOTE ADULT - SUBJECTIVE AND OBJECTIVE BOX
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 Patient reports he has been feeling nauseous for a few days but no vomiting. He reports chronic increased urinary frequency and sensation of incomplete voiding. He denies all other symptoms - fever, chills, cough, chest pain, SoB, palpitations abdominal pain, diarrhea, dysuria, arm or leg numbness or tingling. Reports regular brown stool - denies black or bloody stool.     Endocrinology has been consulted for Management of Uncontrolled HTN. Hx of myasthenia gravis s/p thymectomy on prednisone 20mg qd, azathioprine 50mg qd, pyridostigmine 60mg @1AM, 6AM, 10AM, 1PM, 5PM, 9PM.     ALLERGIES  No Known Allergies      CURRENT MEDICATIONS  acetaminophen     Tablet .. 650 milliGRAM(s) Oral every 6 hours PRN  aspirin enteric coated 81 milliGRAM(s) Oral daily  atorvastatin 80 milliGRAM(s) Oral at bedtime  azaTHIOprine 100 milliGRAM(s) Oral daily  cholecalciferol 1000 Unit(s) Oral daily  cyanocobalamin 1000 MICROGram(s) Oral daily  dextrose 5%. 1000 milliLiter(s) IV Continuous <Continuous>  dextrose 50% Injectable 25 Gram(s) IV Push once  dextrose Oral Gel 15 Gram(s) Oral once PRN  ferrous    sulfate 325 milliGRAM(s) Oral two times a day  finasteride 5 milliGRAM(s) Oral daily  folic acid 1 milliGRAM(s) Oral daily  heparin   Injectable 5000 Unit(s) SubCutaneous every 8 hours  hydrALAZINE 100 milliGRAM(s) Oral every 8 hours  insulin glargine Injectable (LANTUS) 4 Unit(s) SubCutaneous at bedtime  insulin lispro (ADMELOG) corrective regimen sliding scale   SubCutaneous three times a day before meals  insulin lispro (ADMELOG) corrective regimen sliding scale   SubCutaneous at bedtime  labetalol 400 milliGRAM(s) Oral every 8 hours  losartan 25 milliGRAM(s) Oral at bedtime  NIFEdipine XL 90 milliGRAM(s) Oral daily  ondansetron Injectable 4 milliGRAM(s) IV Push every 8 hours PRN  predniSONE   Tablet 20 milliGRAM(s) Oral daily  pyridostigmine 60 milliGRAM(s) Oral <User Schedule>  sertraline 50 milliGRAM(s) Oral daily  tamsulosin 0.4 milliGRAM(s) Oral at bedtime      REVIEW OF SYSTEMS  Constitutional:  Negative fever, chills or loss of appetite.  Eyes:  Negative blurry vision or double vision.  Cardiovascular:  Negative for chest pain or palpitations.  Respiratory:  Negative for cough, wheezing, or shortness of breath.   Gastrointestinal:  Negative for nausea, vomiting, diarrhea, constipation, or abdominal pain.  Genitourinary:  Negative frequency, urgency or dysuria.  Neurologic:  No headache, confusion, dizziness, lightheadedness.    PHYSICAL EXAM  Vital Signs Last 24 Hrs  T(C): 37.4 (02 Jul 2025 11:51), Max: 37.4 (02 Jul 2025 11:51)  T(F): 99.3 (02 Jul 2025 11:51), Max: 99.3 (02 Jul 2025 11:51)  HR: 64 (02 Jul 2025 11:51) (64 - 67)  BP: 127/62 (02 Jul 2025 11:51) (126/58 - 219/75)  BP(mean): 83 (02 Jul 2025 11:51) (83 - 83)  RR: 18 (02 Jul 2025 11:51) (18 - 18)  SpO2: 94% (02 Jul 2025 11:51) (93% - 98%)    Parameters below as of 02 Jul 2025 11:51  Patient On (Oxygen Delivery Method): room air    Constitutional: Awake, alert, in no acute distress.   HEENT: Normocephalic, atraumatic, NILSA, no proptosis or lid retraction.   Neck: supple, no acanthosis, no thyromegaly or palpable thyroid nodules.  Respiratory: Lungs clear to ausculation bilaterally.   Cardiovascular: regular rhythm, normal S1 and S2, no audible murmurs.   GI: soft, non-tender, non-distended, bowel sounds present, no masses appreciated.  Extremities: No lower extremity edema, peripheral pulses present.   Skin: no rashes.   Psychiatric: AAO x 3. Normal affect/mood.     LABS  CBC - WBC/HGB/HTC/PLT: 5.76/8.5/25.4/252 (07-02-25)  BMP: Na/K/Cl/Bicarb/BUN/Cr/Gluc: 136/3.8/102/30/23/1.66/68 (07-02-25)  Anion Gap: 4 (07-02-25)  eGFR: 45 (07-02-25)  Calcium: 8.8 (07-02-25)  Phosphorus: 3.0 (07-02-25)  Magnesium: 2.1 (07-02-25)  LFT - Alb/Tprot/Tbili/Dbili/AlkPhos/ALT/AST: 2.5/--/0.4/--/87/21/26 (07-01-25)  PT/aPTT/INR: 10.0/25.6/0.86 (06-25-25)  Thyroid Stimulating Hormone, Serum: 4.28 (06-27-25)    CBC - WBC/HGB/HTC/PLT: 5.76/8.5/25.4/252 (07-02-25)BMP: Na/K/Cl/Bicarb/BUN/Cr/Gluc: 136/3.8/102/30/23/1.66/68 (07-02-25)  Anion Gap: 4 (07-02-25)  eGFR: 45 (07-02-25)  Calcium: 8.8 (07-02-25)  Phosphorus: 3.0 (07-02-25)  Magnesium: 2.1 (07-02-25)    CAPILLARY BLOOD GLUCOSE & INSULIN RECEIVED  124 mg/dL (07-01 @ 07:31)  275 mg/dL (07-01 @ 09:11)  360 mg/dL (07-01 @ 11:58)  123 mg/dL (07-01 @ 16:51)  132 mg/dL (07-01 @ 20:52)  115 mg/dL (07-01 @ 22:12)  92 mg/dL (07-02 @ 07:36)  174 mg/dL (07-02 @ 12:01)        07-01-25 @ 07:01  -  07-02-25 @ 07:00  --------------------------------------------------------  IN: 0 mL / OUT: 1040 mL / NET: -1040 mL        ASSESSMENT / RECOMMENDATIONS    A1C: 6.3 %  BUN: 23  Creatinine: 1.66  GFR: 45  Weight: 62.1  BMI:   EF:     # Uncontrolled HTN  -r/o Hyperaldo   -r/o Hyperthyroidism  - r/o pheo  -r/o Cushing    #Myasthenia Gravis   HISTORY OF PRESENT ILLNESS  BRITTANIE GROSS, Patient is a 70M, from [facility] ambulates with a walker, with PMHx of Myasthenia gravis s/p thymectomy, HTN, 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 Patient reports he has been feeling nauseous for a few days but no vomiting. He reports chronic increased urinary frequency and sensation of incomplete voiding. He denies all other symptoms - fever, chills, cough, chest pain, SoB, palpitations abdominal pain, diarrhea, dysuria, arm or leg numbness or tingling. Reports regular brown stool - denies black or bloody stool.     Endocrinology has been consulted for Management of Uncontrolled HTN. Hx of myasthenia gravis s/p thymectomy on prednisone 20mg qd, azathioprine 50mg qd, pyridostigmine 60mg @1AM, 6AM, 10AM, 1PM, 5PM, 9PM.     ALLERGIES  No Known Allergies      CURRENT MEDICATIONS  acetaminophen     Tablet .. 650 milliGRAM(s) Oral every 6 hours PRN  aspirin enteric coated 81 milliGRAM(s) Oral daily  atorvastatin 80 milliGRAM(s) Oral at bedtime  azaTHIOprine 100 milliGRAM(s) Oral daily  cholecalciferol 1000 Unit(s) Oral daily  cyanocobalamin 1000 MICROGram(s) Oral daily  dextrose 5%. 1000 milliLiter(s) IV Continuous <Continuous>  dextrose 50% Injectable 25 Gram(s) IV Push once  dextrose Oral Gel 15 Gram(s) Oral once PRN  ferrous    sulfate 325 milliGRAM(s) Oral two times a day  finasteride 5 milliGRAM(s) Oral daily  folic acid 1 milliGRAM(s) Oral daily  heparin   Injectable 5000 Unit(s) SubCutaneous every 8 hours  hydrALAZINE 100 milliGRAM(s) Oral every 8 hours  insulin glargine Injectable (LANTUS) 4 Unit(s) SubCutaneous at bedtime  insulin lispro (ADMELOG) corrective regimen sliding scale   SubCutaneous three times a day before meals  insulin lispro (ADMELOG) corrective regimen sliding scale   SubCutaneous at bedtime  labetalol 400 milliGRAM(s) Oral every 8 hours  losartan 25 milliGRAM(s) Oral at bedtime  NIFEdipine XL 90 milliGRAM(s) Oral daily  ondansetron Injectable 4 milliGRAM(s) IV Push every 8 hours PRN  predniSONE   Tablet 20 milliGRAM(s) Oral daily  pyridostigmine 60 milliGRAM(s) Oral <User Schedule>  sertraline 50 milliGRAM(s) Oral daily  tamsulosin 0.4 milliGRAM(s) Oral at bedtime      REVIEW OF SYSTEMS  Constitutional:  Negative fever, chills or loss of appetite.  Eyes:  Negative blurry vision or double vision.  Cardiovascular:  Negative for chest pain or palpitations.  Respiratory:  Negative for cough, wheezing, or shortness of breath.   Gastrointestinal:  Negative for nausea, vomiting, diarrhea, constipation, or abdominal pain.  Genitourinary:  Negative frequency, urgency or dysuria.  Neurologic:  No headache, confusion, dizziness, lightheadedness.    PHYSICAL EXAM  Vital Signs Last 24 Hrs  T(C): 37.4 (02 Jul 2025 11:51), Max: 37.4 (02 Jul 2025 11:51)  T(F): 99.3 (02 Jul 2025 11:51), Max: 99.3 (02 Jul 2025 11:51)  HR: 64 (02 Jul 2025 11:51) (64 - 67)  BP: 127/62 (02 Jul 2025 11:51) (126/58 - 219/75)  BP(mean): 83 (02 Jul 2025 11:51) (83 - 83)  RR: 18 (02 Jul 2025 11:51) (18 - 18)  SpO2: 94% (02 Jul 2025 11:51) (93% - 98%)    Parameters below as of 02 Jul 2025 11:51  Patient On (Oxygen Delivery Method): room air    Constitutional: Awake, alert, in no acute distress.   HEENT: Normocephalic, atraumatic, NILSA, no proptosis or lid retraction.   Neck: supple, no acanthosis, no thyromegaly or palpable thyroid nodules.  Respiratory: Lungs clear to ausculation bilaterally.   Cardiovascular: regular rhythm, normal S1 and S2, no audible murmurs.   GI: soft, non-tender, non-distended, bowel sounds present, no masses appreciated.  Extremities: No lower extremity edema, peripheral pulses present.   Skin: no rashes.   Psychiatric: AAO x 3. Normal affect/mood.     LABS  CBC - WBC/HGB/HTC/PLT: 5.76/8.5/25.4/252 (07-02-25)  BMP: Na/K/Cl/Bicarb/BUN/Cr/Gluc: 136/3.8/102/30/23/1.66/68 (07-02-25)  Anion Gap: 4 (07-02-25)  eGFR: 45 (07-02-25)  Calcium: 8.8 (07-02-25)  Phosphorus: 3.0 (07-02-25)  Magnesium: 2.1 (07-02-25)  LFT - Alb/Tprot/Tbili/Dbili/AlkPhos/ALT/AST: 2.5/--/0.4/--/87/21/26 (07-01-25)  PT/aPTT/INR: 10.0/25.6/0.86 (06-25-25)  Thyroid Stimulating Hormone, Serum: 4.28 (06-27-25)    CBC - WBC/HGB/HTC/PLT: 5.76/8.5/25.4/252 (07-02-25)BMP: Na/K/Cl/Bicarb/BUN/Cr/Gluc: 136/3.8/102/30/23/1.66/68 (07-02-25)  Anion Gap: 4 (07-02-25)  eGFR: 45 (07-02-25)  Calcium: 8.8 (07-02-25)  Phosphorus: 3.0 (07-02-25)  Magnesium: 2.1 (07-02-25)    CAPILLARY BLOOD GLUCOSE & INSULIN RECEIVED  124 mg/dL (07-01 @ 07:31)  275 mg/dL (07-01 @ 09:11)  360 mg/dL (07-01 @ 11:58)  123 mg/dL (07-01 @ 16:51)  132 mg/dL (07-01 @ 20:52)  115 mg/dL (07-01 @ 22:12)  92 mg/dL (07-02 @ 07:36)  174 mg/dL (07-02 @ 12:01)        07-01-25 @ 07:01  -  07-02-25 @ 07:00  --------------------------------------------------------  IN: 0 mL / OUT: 1040 mL / NET: -1040 mL        ASSESSMENT / RECOMMENDATIONS: 69 y/o M, from [facility] ambulates with a walker, with PMHx of Myasthenia gravis s/p thymectomy, HTN, 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 Hgb 9.9. BUN/Cr 30/1.81. Admitted for MICHEL on CKD and uncontrolled HTN, Nephrology and Cardiology following.    Endocrinology consulted for further evaluation of uncontrolled HTN. Due to hx of MG with thymectomy, pt on chronic Prednisone 20 mg qd.     A1C: 6.3 %  BUN: 23  Creatinine: 1.66  GFR: 45  Weight: 62.1  BMI:   EF:     # Uncontrolled HTN  - pt noted with persistently labile and elevated HTN despite use of multiple anti-HTN agents  - TTE noted  - Cardio and Nephro following  - pt on chronic Prednisone 20 mg qd for MG tx > consider exogenous Cushing Syndrome leading to persistently elevated, labile BP  - Renin mildly elev to 6.47, Aldosterone 13.7 (r/o Hyperaldosteronism > less likely given renin level, consider renovascular HTN, f/u Nephro)   - Labs noted with TSH 4.38 wnl (r/o hyperthyroidism > less likely given TSH wnl)  - pt denies any HA, palpitations (low suspicion for pheochromocytoma)   - continue to monitor BP at this time    #Diabetes Mellitus  - c/w Lantus 4 u at bedtime and ISS ACHS  - Patient's fingerstick glucose goal is 100-180 mg/dL.    - Discharge recommendations to be discussed.    HISTORY OF PRESENT ILLNESS  BRITTANIE GROSS, Patient is a 70M, from 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 Patient reports he has been feeling nauseous for a few days but no vomiting. He reports chronic increased urinary frequency and sensation of incomplete voiding. He denies all other symptoms - fever, chills, cough, chest pain, SoB, palpitations abdominal pain, diarrhea, dysuria, arm or leg numbness or tingling. Reports regular brown stool - denies black or bloody stool.     Endocrinology has been consulted for Management of Uncontrolled HTN. Hx of myasthenia gravis s/p thymectomy on prednisone 20mg qd, azathioprine 50mg qd, pyridostigmine 60mg @1AM, 6AM, 10AM, 1PM, 5PM, 9PM.     ALLERGIES  No Known Allergies      CURRENT MEDICATIONS  acetaminophen     Tablet .. 650 milliGRAM(s) Oral every 6 hours PRN  aspirin enteric coated 81 milliGRAM(s) Oral daily  atorvastatin 80 milliGRAM(s) Oral at bedtime  azaTHIOprine 100 milliGRAM(s) Oral daily  cholecalciferol 1000 Unit(s) Oral daily  cyanocobalamin 1000 MICROGram(s) Oral daily  dextrose 5%. 1000 milliLiter(s) IV Continuous <Continuous>  dextrose 50% Injectable 25 Gram(s) IV Push once  dextrose Oral Gel 15 Gram(s) Oral once PRN  ferrous    sulfate 325 milliGRAM(s) Oral two times a day  finasteride 5 milliGRAM(s) Oral daily  folic acid 1 milliGRAM(s) Oral daily  heparin   Injectable 5000 Unit(s) SubCutaneous every 8 hours  hydrALAZINE 100 milliGRAM(s) Oral every 8 hours  insulin glargine Injectable (LANTUS) 4 Unit(s) SubCutaneous at bedtime  insulin lispro (ADMELOG) corrective regimen sliding scale   SubCutaneous three times a day before meals  insulin lispro (ADMELOG) corrective regimen sliding scale   SubCutaneous at bedtime  labetalol 400 milliGRAM(s) Oral every 8 hours  losartan 25 milliGRAM(s) Oral at bedtime  NIFEdipine XL 90 milliGRAM(s) Oral daily  ondansetron Injectable 4 milliGRAM(s) IV Push every 8 hours PRN  predniSONE   Tablet 20 milliGRAM(s) Oral daily  pyridostigmine 60 milliGRAM(s) Oral <User Schedule>  sertraline 50 milliGRAM(s) Oral daily  tamsulosin 0.4 milliGRAM(s) Oral at bedtime      REVIEW OF SYSTEMS  Constitutional:  Negative fever, chills or loss of appetite.  Eyes:  Negative blurry vision or double vision.  Cardiovascular:  Negative for chest pain or palpitations.  Respiratory:  Negative for cough, wheezing, or shortness of breath.   Gastrointestinal:  Negative for nausea, vomiting, diarrhea, constipation, or abdominal pain.  Genitourinary:  Negative frequency, urgency or dysuria.  Neurologic:  No headache, confusion, dizziness, lightheadedness.    PHYSICAL EXAM  Vital Signs Last 24 Hrs  T(C): 37.4 (02 Jul 2025 11:51), Max: 37.4 (02 Jul 2025 11:51)  T(F): 99.3 (02 Jul 2025 11:51), Max: 99.3 (02 Jul 2025 11:51)  HR: 64 (02 Jul 2025 11:51) (64 - 67)  BP: 127/62 (02 Jul 2025 11:51) (126/58 - 219/75)  BP(mean): 83 (02 Jul 2025 11:51) (83 - 83)  RR: 18 (02 Jul 2025 11:51) (18 - 18)  SpO2: 94% (02 Jul 2025 11:51) (93% - 98%)    Parameters below as of 02 Jul 2025 11:51  Patient On (Oxygen Delivery Method): room air    Constitutional: Awake, alert, in no acute distress.   HEENT: Normocephalic, atraumatic, NILSA, no proptosis or lid retraction.   Neck: supple, no acanthosis, no thyromegaly or palpable thyroid nodules.  Respiratory: Lungs clear to ausculation bilaterally.   Cardiovascular: regular rhythm, normal S1 and S2, no audible murmurs.   GI: soft, non-tender, non-distended, bowel sounds present, no masses appreciated.  Extremities: No lower extremity edema, peripheral pulses present.   Skin: no rashes.   Psychiatric: AAO x 3. Normal affect/mood.     LABS  CBC - WBC/HGB/HTC/PLT: 5.76/8.5/25.4/252 (07-02-25)  BMP: Na/K/Cl/Bicarb/BUN/Cr/Gluc: 136/3.8/102/30/23/1.66/68 (07-02-25)  Anion Gap: 4 (07-02-25)  eGFR: 45 (07-02-25)  Calcium: 8.8 (07-02-25)  Phosphorus: 3.0 (07-02-25)  Magnesium: 2.1 (07-02-25)  LFT - Alb/Tprot/Tbili/Dbili/AlkPhos/ALT/AST: 2.5/--/0.4/--/87/21/26 (07-01-25)  PT/aPTT/INR: 10.0/25.6/0.86 (06-25-25)  Thyroid Stimulating Hormone, Serum: 4.28 (06-27-25)    CBC - WBC/HGB/HTC/PLT: 5.76/8.5/25.4/252 (07-02-25)BMP: Na/K/Cl/Bicarb/BUN/Cr/Gluc: 136/3.8/102/30/23/1.66/68 (07-02-25)  Anion Gap: 4 (07-02-25)  eGFR: 45 (07-02-25)  Calcium: 8.8 (07-02-25)  Phosphorus: 3.0 (07-02-25)  Magnesium: 2.1 (07-02-25)    CAPILLARY BLOOD GLUCOSE & INSULIN RECEIVED  124 mg/dL (07-01 @ 07:31)  275 mg/dL (07-01 @ 09:11)  360 mg/dL (07-01 @ 11:58)  123 mg/dL (07-01 @ 16:51)  132 mg/dL (07-01 @ 20:52)  115 mg/dL (07-01 @ 22:12)  92 mg/dL (07-02 @ 07:36)  174 mg/dL (07-02 @ 12:01)        07-01-25 @ 07:01  -  07-02-25 @ 07:00  --------------------------------------------------------  IN: 0 mL / OUT: 1040 mL / NET: -1040 mL        ASSESSMENT / RECOMMENDATIONS: 69 y/o M, from 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 Hgb 9.9. BUN/Cr 30/1.81. Admitted for MICHEL on CKD and uncontrolled HTN, Nephrology and Cardiology following.    Endocrinology consulted for further evaluation of uncontrolled HTN. Due to hx of MG with thymectomy, pt on chronic Prednisone 20 mg qd.     A1C: 6.3 %  BUN: 23  Creatinine: 1.66  GFR: 45  Weight: 62.1  BMI:   EF:     # Uncontrolled HTN  - pt noted with persistently labile and elevated HTN despite use of multiple anti-HTN agents  - TTE noted  - Cardio and Nephro following  - pt on chronic Prednisone 20 mg qd for MG tx > ? exogenous Cushing Syndrome leading to persistently elevated, labile BP  - Renin mildly elev to 6.47, Aldosterone 13.7- Hyperaldosteronism ruled out  , consider renovascular HTN, f/u Nephro)   - Labs noted with TSH 4.38 wnl - no hyperthyroidism   - pt denies any HA, palpitations (low suspicion for pheochromocytoma) - check Plasma mets - consider 24 hr urine for mets/cats    - continue to monitor BP at this time    #Diabetes Mellitus  - c/w Lantus 4 u at bedtime and ISS ACHS  - Patient's fingerstick glucose goal is 100-180 mg/dL.    - Discharge recommendations to be discussed.

## 2025-07-02 NOTE — CONSULT NOTE ADULT - SUBJECTIVE AND OBJECTIVE BOX
Patient is a 70y old  Male who presents with a chief complaint of MICHEL (02 Jul 2025 21:02)      HPI:  Patient is a 70M, from 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 Patient reports he has been feeling nauseous for a few days but no vomiting. He reports chronic increased urinary frequency and sensation of incomplete voiding. He denies all other symptoms - fever, chills, cough, chest pain, SoB, palpitations abdominal pain, diarrhea, dysuria, arm or leg numbness or tingling. Reports regular brown stool - denies black or bloody stool.  (25 Jun 2025 21:32) Hematology called to evaluate soft tissue swelling in the retroperitoneum vs overlapping blood vessels. Anemia not on admission < from: CT Abdomen and Pelvis No Cont (06.29.25 @ 23:36) >  Soft tissue densities within the retroperitoneum next to the aorta   concerning for extensive lymphadenopathy versus tortuous vessels or a   combination of both. The evaluation is limited due to the lack of IV   contrast. Recommend contrast enhanced cross-sectional imaging for better   assessment.           ROS:  Negative except for:    PAST MEDICAL & SURGICAL HISTORY:  HTN (hypertension)      DM (diabetes mellitus)      Myasthenia gravis      Hypercholesterolemia      CVA (cerebrovascular accident)      Peripheral neuropathy      BPH (benign prostatic hyperplasia)      Major depression      Lipoma of chest wall      MG with thymoma (myasthena gravis)      H/O cataract extraction  , right eye          SOCIAL HISTORY:    FAMILY HISTORY:  Family history of hypertension (Mother)        MEDICATIONS  (STANDING):  aspirin enteric coated 81 milliGRAM(s) Oral daily  atorvastatin 80 milliGRAM(s) Oral at bedtime  azaTHIOprine 100 milliGRAM(s) Oral daily  cholecalciferol 1000 Unit(s) Oral daily  cyanocobalamin 1000 MICROGram(s) Oral daily  dextrose 5%. 1000 milliLiter(s) (50 mL/Hr) IV Continuous <Continuous>  dextrose 50% Injectable 25 Gram(s) IV Push once  ferrous    sulfate 325 milliGRAM(s) Oral two times a day  finasteride 5 milliGRAM(s) Oral daily  folic acid 1 milliGRAM(s) Oral daily  heparin   Injectable 5000 Unit(s) SubCutaneous every 8 hours  hydrALAZINE 100 milliGRAM(s) Oral every 8 hours  insulin glargine Injectable (LANTUS) 4 Unit(s) SubCutaneous at bedtime  insulin lispro (ADMELOG) corrective regimen sliding scale   SubCutaneous three times a day before meals  insulin lispro (ADMELOG) corrective regimen sliding scale   SubCutaneous at bedtime  labetalol 400 milliGRAM(s) Oral every 8 hours  losartan 25 milliGRAM(s) Oral at bedtime  NIFEdipine XL 90 milliGRAM(s) Oral daily  predniSONE   Tablet 20 milliGRAM(s) Oral daily  pyridostigmine 60 milliGRAM(s) Oral <User Schedule>  sertraline 50 milliGRAM(s) Oral daily  tamsulosin 0.4 milliGRAM(s) Oral at bedtime    MEDICATIONS  (PRN):  acetaminophen     Tablet .. 650 milliGRAM(s) Oral every 6 hours PRN Temp greater or equal to 38C (100.4F), Mild Pain (1 - 3)  dextrose Oral Gel 15 Gram(s) Oral once PRN Blood Glucose LESS THAN 70 milliGRAM(s)/deciliter  ondansetron Injectable 4 milliGRAM(s) IV Push every 8 hours PRN Nausea and/or Vomiting      Allergies    No Known Allergies    Intolerances        Vital Signs Last 24 Hrs  T(C): 37.1 (02 Jul 2025 20:48), Max: 37.4 (02 Jul 2025 11:51)  T(F): 98.8 (02 Jul 2025 20:48), Max: 99.3 (02 Jul 2025 11:51)  HR: 63 (02 Jul 2025 20:48) (63 - 72)  BP: 185/60 (02 Jul 2025 20:48) (95/71 - 219/75)  BP(mean): 99 (02 Jul 2025 14:10) (83 - 99)  RR: 18 (02 Jul 2025 20:48) (18 - 18)  SpO2: 93% (02 Jul 2025 20:48) (93% - 98%)    Parameters below as of 02 Jul 2025 20:48  Patient On (Oxygen Delivery Method): room air        PHYSICAL EXAM  General: adult in NAD  HEENT: clear oropharynx, anicteric sclera, pink conjunctiva  Neck: supple  CV: normal S1/S2 with no murmur rubs or gallops  Lungs: positive air movement b/l ant lungs,clear to auscultation, no wheezes, no rales  Abdomen: soft non-tender non-distended, no hepatosplenomegaly  Ext: no clubbing cyanosis or edema  Skin: no rashes and no petechiae  Neuro: alert and oriented X 4, no focal deficits      LABS:                          8.5    5.76  )-----------( 252      ( 02 Jul 2025 06:16 )             25.4         Mean Cell Volume : 105.4 fl  Mean Cell Hemoglobin : 35.3 pg  Mean Cell Hemoglobin Concentration : 33.5 g/dL  Auto Neutrophil # : x  Auto Lymphocyte # : x  Auto Monocyte # : x  Auto Eosinophil # : x  Auto Basophil # : x  Auto Neutrophil % : x  Auto Lymphocyte % : x  Auto Monocyte % : x  Auto Eosinophil % : x  Auto Basophil % : x      Serial CBC's  07-02 @ 06:16  Hct-25.4 / Hgb-8.5 / Plat-252 / RBC-2.41 / WBC-5.76  Serial CBC's  07-01 @ 07:04  Hct-24.7 / Hgb-7.9 / Plat-228 / RBC-2.27 / WBC-6.21  Serial CBC's  06-30 @ 05:42  Hct-22.1 / Hgb-7.4 / Plat-222 / RBC-2.08 / WBC-5.38  Serial CBC's  06-29 @ 12:42  Hct-24.8 / Hgb-8.1 / Plat-224 / RBC-2.31 / WBC-7.38  Serial CBC's  06-29 @ 05:06  Hct-23.5 / Hgb-7.8 / Plat-233 / RBC-2.23 / WBC-6.22      07-02    136  |  102  |  23[H]  ----------------------------<  68[L]  3.8   |  30  |  1.66[H]    Ca    8.8      02 Jul 2025 06:16  Phos  3.0     07-02  Mg     2.1     07-02    TPro  5.4[L]  /  Alb  2.5[L]  /  TBili  0.4  /  DBili  x   /  AST  26  /  ALT  21  /  AlkPhos  87  07-01          Vitamin B12, Serum: 1900 pg/mL (07-01 @ 12:40)  Folate, Serum: >20.0 ng/mL (07-01 @ 12:40)  Iron - Total Binding Capacity.: 154 ug/dL (06-30 @ 05:42)  Ferritin: 466 ng/mL (06-30 @ 05:42)      BEAU Kappa: 3.50 mg/dL (06-28 @ 06:02)  BEAU Lambda: 3.67 mg/dL (06-28 @ 06:02)  Immunofixation, Serum:   No Monoclonal Band Identified      Reference Range: None Detected (06-28 @ 06:02)          BLOOD SMEAR INTERPRETATION:       RADIOLOGY & ADDITIONAL STUDIES:

## 2025-07-02 NOTE — PROGRESS NOTE ADULT - PROBLEM SELECTOR PLAN 2
BUN/Cr 30/1.81 (Baseline Scr 1.3)  s/p 1L NS in ED   Post-void bladder scan - 0mL  Renal US - negative for hydronephrosis   - s/p IVF  - creatinine improved then now worsening 1.94 > 1.99 > 1.66   - trend BMP  - Nephrology dr. Lan following

## 2025-07-03 LAB
ANION GAP SERPL CALC-SCNC: 3 MMOL/L — LOW (ref 5–17)
BASE EXCESS BLDV CALC-SCNC: 5 MMOL/L — SIGNIFICANT CHANGE UP
BLOOD GAS COMMENTS, VENOUS: SIGNIFICANT CHANGE UP
BUN SERPL-MCNC: 25 MG/DL — HIGH (ref 7–18)
CALCIUM SERPL-MCNC: 8.5 MG/DL — SIGNIFICANT CHANGE UP (ref 8.4–10.5)
CHLORIDE SERPL-SCNC: 104 MMOL/L — SIGNIFICANT CHANGE UP (ref 96–108)
CO2 SERPL-SCNC: 29 MMOL/L — SIGNIFICANT CHANGE UP (ref 22–31)
CREAT SERPL-MCNC: 1.74 MG/DL — HIGH (ref 0.5–1.3)
EGFR: 42 ML/MIN/1.73M2 — LOW
EGFR: 42 ML/MIN/1.73M2 — LOW
GLUCOSE BLDC GLUCOMTR-MCNC: 117 MG/DL — HIGH (ref 70–99)
GLUCOSE BLDC GLUCOMTR-MCNC: 145 MG/DL — HIGH (ref 70–99)
GLUCOSE BLDC GLUCOMTR-MCNC: 200 MG/DL — HIGH (ref 70–99)
GLUCOSE BLDC GLUCOMTR-MCNC: 82 MG/DL — SIGNIFICANT CHANGE UP (ref 70–99)
GLUCOSE SERPL-MCNC: 60 MG/DL — LOW (ref 70–99)
HCO3 BLDV-SCNC: 29 MMOL/L — SIGNIFICANT CHANGE UP (ref 22–29)
HCT VFR BLD CALC: 22.9 % — LOW (ref 39–50)
HGB BLD-MCNC: 7.6 G/DL — LOW (ref 13–17)
IGA FLD-MCNC: 292 MG/DL — SIGNIFICANT CHANGE UP (ref 84–499)
IGG FLD-MCNC: 713 MG/DL — SIGNIFICANT CHANGE UP (ref 610–1660)
IGM SERPL-MCNC: 91 MG/DL — SIGNIFICANT CHANGE UP (ref 35–242)
KAPPA LC SER QL IFE: 3.89 MG/DL — HIGH (ref 0.33–1.94)
KAPPA/LAMBDA FREE LIGHT CHAIN RATIO, SERUM: 0.96 RATIO — SIGNIFICANT CHANGE UP (ref 0.26–1.65)
LAMBDA LC SER QL IFE: 4.06 MG/DL — HIGH (ref 0.57–2.63)
LDH SERPL L TO P-CCNC: 188 U/L — SIGNIFICANT CHANGE UP (ref 120–225)
MCHC RBC-ENTMCNC: 33.2 G/DL — SIGNIFICANT CHANGE UP (ref 32–36)
MCHC RBC-ENTMCNC: 35.5 PG — HIGH (ref 27–34)
MCV RBC AUTO: 107 FL — HIGH (ref 80–100)
NRBC BLD AUTO-RTO: 0 /100 WBCS — SIGNIFICANT CHANGE UP (ref 0–0)
PCO2 BLDV: 40 MMHG — LOW (ref 42–55)
PH BLDV: 7.47 — HIGH (ref 7.32–7.43)
PLATELET # BLD AUTO: 234 K/UL — SIGNIFICANT CHANGE UP (ref 150–400)
PO2 BLDV: 89 MMHG — SIGNIFICANT CHANGE UP
POTASSIUM SERPL-MCNC: 3.9 MMOL/L — SIGNIFICANT CHANGE UP (ref 3.5–5.3)
POTASSIUM SERPL-SCNC: 3.9 MMOL/L — SIGNIFICANT CHANGE UP (ref 3.5–5.3)
PROT SERPL-MCNC: 4.9 G/DL — LOW (ref 6–8.3)
RBC # BLD: 2.14 M/UL — LOW (ref 4.2–5.8)
RBC # FLD: 16.5 % — HIGH (ref 10.3–14.5)
SAO2 % BLDV: 98 % — SIGNIFICANT CHANGE UP
SODIUM SERPL-SCNC: 136 MMOL/L — SIGNIFICANT CHANGE UP (ref 135–145)
URATE SERPL-MCNC: 5.1 MG/DL — SIGNIFICANT CHANGE UP (ref 3.4–8.8)
WBC # BLD: 4.96 K/UL — SIGNIFICANT CHANGE UP (ref 3.8–10.5)
WBC # FLD AUTO: 4.96 K/UL — SIGNIFICANT CHANGE UP (ref 3.8–10.5)

## 2025-07-03 PROCEDURE — 71045 X-RAY EXAM CHEST 1 VIEW: CPT | Mod: 26

## 2025-07-03 PROCEDURE — 88189 FLOWCYTOMETRY/READ 16 & >: CPT | Mod: 59

## 2025-07-03 RX ADMIN — TAMSULOSIN HYDROCHLORIDE 0.4 MILLIGRAM(S): 0.4 CAPSULE ORAL at 21:05

## 2025-07-03 RX ADMIN — PREDNISONE 20 MILLIGRAM(S): 20 TABLET ORAL at 05:15

## 2025-07-03 RX ADMIN — Medication 100 MILLIGRAM(S): at 05:15

## 2025-07-03 RX ADMIN — HEPARIN SODIUM 5000 UNIT(S): 1000 INJECTION INTRAVENOUS; SUBCUTANEOUS at 05:16

## 2025-07-03 RX ADMIN — Medication 90 MILLIGRAM(S): at 05:15

## 2025-07-03 RX ADMIN — INSULIN LISPRO 1: 100 INJECTION, SOLUTION INTRAVENOUS; SUBCUTANEOUS at 12:28

## 2025-07-03 RX ADMIN — AZATHIOPRINE 100 MILLIGRAM(S): 50 TABLET ORAL at 11:27

## 2025-07-03 RX ADMIN — LABETALOL HYDROCHLORIDE 400 MILLIGRAM(S): 200 TABLET, FILM COATED ORAL at 05:16

## 2025-07-03 RX ADMIN — LABETALOL HYDROCHLORIDE 400 MILLIGRAM(S): 200 TABLET, FILM COATED ORAL at 21:06

## 2025-07-03 RX ADMIN — Medication 325 MILLIGRAM(S): at 05:15

## 2025-07-03 RX ADMIN — Medication 1000 UNIT(S): at 11:26

## 2025-07-03 RX ADMIN — PYRIDOSTIGMINE BROMIDE 60 MILLIGRAM(S): 5 INJECTION, SOLUTION INTRAVENOUS; PARENTERAL at 21:06

## 2025-07-03 RX ADMIN — CYANOCOBALAMIN 1000 MICROGRAM(S): 1000 INJECTION INTRAMUSCULAR; SUBCUTANEOUS at 11:27

## 2025-07-03 RX ADMIN — LOSARTAN POTASSIUM 25 MILLIGRAM(S): 100 TABLET, FILM COATED ORAL at 21:05

## 2025-07-03 RX ADMIN — PYRIDOSTIGMINE BROMIDE 60 MILLIGRAM(S): 5 INJECTION, SOLUTION INTRAVENOUS; PARENTERAL at 13:21

## 2025-07-03 RX ADMIN — PYRIDOSTIGMINE BROMIDE 60 MILLIGRAM(S): 5 INJECTION, SOLUTION INTRAVENOUS; PARENTERAL at 01:32

## 2025-07-03 RX ADMIN — PYRIDOSTIGMINE BROMIDE 60 MILLIGRAM(S): 5 INJECTION, SOLUTION INTRAVENOUS; PARENTERAL at 05:16

## 2025-07-03 RX ADMIN — PYRIDOSTIGMINE BROMIDE 60 MILLIGRAM(S): 5 INJECTION, SOLUTION INTRAVENOUS; PARENTERAL at 11:26

## 2025-07-03 RX ADMIN — FOLIC ACID 1 MILLIGRAM(S): 1 TABLET ORAL at 11:27

## 2025-07-03 RX ADMIN — SERTRALINE 50 MILLIGRAM(S): 100 TABLET, FILM COATED ORAL at 11:27

## 2025-07-03 RX ADMIN — Medication 100 MILLIGRAM(S): at 21:05

## 2025-07-03 RX ADMIN — ATORVASTATIN CALCIUM 80 MILLIGRAM(S): 80 TABLET, FILM COATED ORAL at 21:05

## 2025-07-03 RX ADMIN — HEPARIN SODIUM 5000 UNIT(S): 1000 INJECTION INTRAVENOUS; SUBCUTANEOUS at 21:05

## 2025-07-03 RX ADMIN — FINASTERIDE 5 MILLIGRAM(S): 1 TABLET, FILM COATED ORAL at 11:26

## 2025-07-03 RX ADMIN — Medication 81 MILLIGRAM(S): at 11:27

## 2025-07-03 RX ADMIN — Medication 325 MILLIGRAM(S): at 17:37

## 2025-07-03 RX ADMIN — PYRIDOSTIGMINE BROMIDE 60 MILLIGRAM(S): 5 INJECTION, SOLUTION INTRAVENOUS; PARENTERAL at 17:37

## 2025-07-03 RX ADMIN — Medication 100 MILLIGRAM(S): at 13:22

## 2025-07-03 RX ADMIN — LABETALOL HYDROCHLORIDE 400 MILLIGRAM(S): 200 TABLET, FILM COATED ORAL at 13:21

## 2025-07-03 RX ADMIN — HEPARIN SODIUM 5000 UNIT(S): 1000 INJECTION INTRAVENOUS; SUBCUTANEOUS at 13:22

## 2025-07-03 NOTE — PROGRESS NOTE ADULT - SUBJECTIVE AND OBJECTIVE BOX
SUBJECTIVE / INTERVAL HPI: Patient was seen and examined this morning.     Overnight events: No complaints, comfortable at bedside. Labile BP noted. Pt started on Aldactone for resistant HTN.     REVIEW OF SYSTEMS  Constitutional:  Negative fever, chills or loss of appetite, changes in weight  Eyes:  Negative blurry vision or double vision.  Cardiovascular:  Negative for chest pain or palpitations.  Respiratory:  Negative for cough, wheezing, or shortness of breath.    Gastrointestinal:  Negative for nausea, vomiting, diarrhea, constipation, or abdominal pain.  Genitourinary:  Negative frequency, urgency or dysuria.  Neurologic:  No headache, confusion, dizziness, lightheadedness, no slow movement and slow speech, no delayed relaxation of tendon reflexes  Skin: Negative for edema and dry skin  Psychiatriy: Negative for depression and anxiety    PHYSICAL EXAM  Vital Signs Last 24 Hrs  T(C): 36.6 (03 Jul 2025 15:40), Max: 37.4 (02 Jul 2025 23:31)  T(F): 97.9 (03 Jul 2025 15:40), Max: 99.3 (02 Jul 2025 23:31)  HR: 68 (03 Jul 2025 15:40) (60 - 70)  BP: 153/69 (03 Jul 2025 15:40) (95/71 - 211/69)  BP(mean): --  RR: 18 (03 Jul 2025 15:40) (18 - 18)  SpO2: 97% (03 Jul 2025 15:40) (92% - 97%)    Parameters below as of 03 Jul 2025 15:40  Patient On (Oxygen Delivery Method): room air      T(C): 36.6 (07-03-25 @ 15:40), Max: 37.4 (07-02-25 @ 23:31)  HR: 68 (07-03-25 @ 15:40) (60 - 70)  BP: 153/69 (07-03-25 @ 15:40) (95/71 - 211/69)  RR: 18 (07-03-25 @ 15:40) (18 - 18)  SpO2: 97% (07-03-25 @ 15:40) (92% - 97%)    Constitutional: Awake, alert, in no acute distress.   HEENT: Normocephalic, atraumatic, INLSA.  Respiratory: Lungs clear to ausculation bilaterally.   Cardiovascular: regular rhythm, normal S1 and S2, no audible murmurs.   GI: soft, non-tender, non-distended, bowel sounds present.  Extremities: No lower extremity edema.  Psychiatric: AAO x 3. Normal affect/mood.     LABS  CBC - WBC/HGB/HTC/PLT: 4.96/7.6/22.9/234 (07-03-25)  BMP - Na/K/Cl/Bicarb/BUN/Cr/Gluc/AG/eGFR: 136/3.9/104/29/25/1.74/60/3/42 (07-03-25)  Ca - 8.5 (07-03-25)  Phos - -- (07-03-25)  Mg - -- (07-03-25)  LFT - Alb/Tprot/Tbili/Dbili/AlkPhos/ALT/AST: 2.5/--/0.4/--/87/21/26 (07-01-25)    Thyroid Stimulating Hormone, Serum: 4.28 (06-27-25)        07-02-25 @ 07:01  -  07-03-25 @ 07:00  --------------------------------------------------------  IN: 0 mL / OUT: 1600 mL / NET: -1600 mL        MEDICATIONS  MEDICATIONS  (STANDING):  aspirin enteric coated 81 milliGRAM(s) Oral daily  atorvastatin 80 milliGRAM(s) Oral at bedtime  azaTHIOprine 100 milliGRAM(s) Oral daily  cholecalciferol 1000 Unit(s) Oral daily  cyanocobalamin 1000 MICROGram(s) Oral daily  dextrose 5%. 1000 milliLiter(s) (50 mL/Hr) IV Continuous <Continuous>  dextrose 50% Injectable 25 Gram(s) IV Push once  ferrous    sulfate 325 milliGRAM(s) Oral two times a day  finasteride 5 milliGRAM(s) Oral daily  folic acid 1 milliGRAM(s) Oral daily  heparin   Injectable 5000 Unit(s) SubCutaneous every 8 hours  hydrALAZINE 100 milliGRAM(s) Oral every 8 hours  insulin lispro (ADMELOG) corrective regimen sliding scale   SubCutaneous three times a day before meals  insulin lispro (ADMELOG) corrective regimen sliding scale   SubCutaneous at bedtime  labetalol 400 milliGRAM(s) Oral every 8 hours  losartan 25 milliGRAM(s) Oral at bedtime  NIFEdipine XL 90 milliGRAM(s) Oral daily  predniSONE   Tablet 20 milliGRAM(s) Oral daily  pyridostigmine 60 milliGRAM(s) Oral <User Schedule>  sertraline 50 milliGRAM(s) Oral daily  tamsulosin 0.4 milliGRAM(s) Oral at bedtime    MEDICATIONS  (PRN):  acetaminophen     Tablet .. 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 Hgb 9.9. BUN/Cr 30/1.81. Admitted for MICHEL on CKD and uncontrolled HTN, Nephrology and Cardiology following.    Endocrinology consulted for further evaluation of uncontrolled HTN. Due to hx of MG with thymectomy, pt on chronic Prednisone 20 mg qd.     A1C: 6.3 %  BUN: 23  Creatinine: 1.66  GFR: 45  Weight: 62.1  BMI:   EF:     # Uncontrolled HTN  - pt noted with persistently labile and elevated HTN despite use of multiple anti-HTN agents  - TTE noted  - Cardio and Nephro following  - pt on chronic Prednisone 20 mg qd for MG tx > ? exogenous Cushing Syndrome leading to persistently elevated, labile BP  - unable to obtain AM cortisol and ACTH levels at this time as pt already on chronic Prednisone  - Renin mildly elev to 6.47, Aldosterone 13.7- Hyperaldosteronism ruled out  , consider renovascular HTN  - per Nephro, Recc Renal US with dopplers as an outpt to r/o HOANG.    - started on Aldactone 25 mg PO qd for BP, monitor for alkalosis  - Labs noted with TSH 4.38 wnl - no hyperthyroidism   - pt denies any HA, palpitations (low suspicion for pheochromocytoma) - f/u Plasma mets and 24 hr urine for mets/cats    - continue to monitor BP at this time    #Diabetes Mellitus  - discontinue Lantus at bedtime  - c/w BILLIE ACHS  - Patient's fingerstick glucose goal is 100-180 mg/dL.    - Discharge recommendations to be discussed.  SUBJECTIVE / INTERVAL HPI: Patient was seen and examined this morning.     Overnight events: No complaints, comfortable at bedside. Labile BP noted. Pt started on Aldactone for resistant HTN.     REVIEW OF SYSTEMS  Constitutional:  Negative fever, chills or loss of appetite, changes in weight  Eyes:  Negative blurry vision or double vision.  Cardiovascular:  Negative for chest pain or palpitations.  Respiratory:  Negative for cough, wheezing, or shortness of breath.    Gastrointestinal:  Negative for nausea, vomiting, diarrhea, constipation, or abdominal pain.  Genitourinary:  Negative frequency, urgency or dysuria.  Neurologic:  No headache, confusion, dizziness, lightheadedness, no slow movement and slow speech, no delayed relaxation of tendon reflexes  Skin: Negative for edema and dry skin  Psychiatriy: Negative for depression and anxiety    PHYSICAL EXAM  Vital Signs Last 24 Hrs  T(C): 36.6 (03 Jul 2025 15:40), Max: 37.4 (02 Jul 2025 23:31)  T(F): 97.9 (03 Jul 2025 15:40), Max: 99.3 (02 Jul 2025 23:31)  HR: 68 (03 Jul 2025 15:40) (60 - 70)  BP: 153/69 (03 Jul 2025 15:40) (95/71 - 211/69)  BP(mean): --  RR: 18 (03 Jul 2025 15:40) (18 - 18)  SpO2: 97% (03 Jul 2025 15:40) (92% - 97%)    Parameters below as of 03 Jul 2025 15:40  Patient On (Oxygen Delivery Method): room air      T(C): 36.6 (07-03-25 @ 15:40), Max: 37.4 (07-02-25 @ 23:31)  HR: 68 (07-03-25 @ 15:40) (60 - 70)  BP: 153/69 (07-03-25 @ 15:40) (95/71 - 211/69)  RR: 18 (07-03-25 @ 15:40) (18 - 18)  SpO2: 97% (07-03-25 @ 15:40) (92% - 97%)    Constitutional: Awake, alert, in no acute distress.   HEENT: Normocephalic, atraumatic, NILSA.  Respiratory: Lungs clear to ausculation bilaterally.   Cardiovascular: regular rhythm, normal S1 and S2, no audible murmurs.   GI: soft, non-tender, non-distended, bowel sounds present.  Extremities: No lower extremity edema.  Psychiatric: AAO x 3. Normal affect/mood.     LABS  CBC - WBC/HGB/HTC/PLT: 4.96/7.6/22.9/234 (07-03-25)  BMP - Na/K/Cl/Bicarb/BUN/Cr/Gluc/AG/eGFR: 136/3.9/104/29/25/1.74/60/3/42 (07-03-25)  Ca - 8.5 (07-03-25)  Phos - -- (07-03-25)  Mg - -- (07-03-25)  LFT - Alb/Tprot/Tbili/Dbili/AlkPhos/ALT/AST: 2.5/--/0.4/--/87/21/26 (07-01-25)    Thyroid Stimulating Hormone, Serum: 4.28 (06-27-25)        07-02-25 @ 07:01  -  07-03-25 @ 07:00  --------------------------------------------------------  IN: 0 mL / OUT: 1600 mL / NET: -1600 mL        MEDICATIONS  MEDICATIONS  (STANDING):  aspirin enteric coated 81 milliGRAM(s) Oral daily  atorvastatin 80 milliGRAM(s) Oral at bedtime  azaTHIOprine 100 milliGRAM(s) Oral daily  cholecalciferol 1000 Unit(s) Oral daily  cyanocobalamin 1000 MICROGram(s) Oral daily  dextrose 5%. 1000 milliLiter(s) (50 mL/Hr) IV Continuous <Continuous>  dextrose 50% Injectable 25 Gram(s) IV Push once  ferrous    sulfate 325 milliGRAM(s) Oral two times a day  finasteride 5 milliGRAM(s) Oral daily  folic acid 1 milliGRAM(s) Oral daily  heparin   Injectable 5000 Unit(s) SubCutaneous every 8 hours  hydrALAZINE 100 milliGRAM(s) Oral every 8 hours  insulin lispro (ADMELOG) corrective regimen sliding scale   SubCutaneous three times a day before meals  insulin lispro (ADMELOG) corrective regimen sliding scale   SubCutaneous at bedtime  labetalol 400 milliGRAM(s) Oral every 8 hours  losartan 25 milliGRAM(s) Oral at bedtime  NIFEdipine XL 90 milliGRAM(s) Oral daily  predniSONE   Tablet 20 milliGRAM(s) Oral daily  pyridostigmine 60 milliGRAM(s) Oral <User Schedule>  sertraline 50 milliGRAM(s) Oral daily  tamsulosin 0.4 milliGRAM(s) Oral at bedtime    MEDICATIONS  (PRN):  acetaminophen     Tablet .. 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 Hgb 9.9. BUN/Cr 30/1.81. Admitted for MICHEL on CKD and uncontrolled HTN, Nephrology and Cardiology following.    Endocrinology consulted for further evaluation of uncontrolled HTN. Due to hx of MG with thymectomy, pt on chronic Prednisone 20 mg qd.     A1C: 6.3 %  BUN: 23  Creatinine: 1.66  GFR: 45  Weight: 62.1  BMI:   EF:     # Uncontrolled HTN  - pt noted with persistently labile and elevated HTN despite use of multiple anti-HTN agents  - TTE noted  - Cardio and Nephro following  - pt on chronic Prednisone 20 mg qd for MG tx > ? exogenous Cushing Syndrome leading to persistently elevated, labile BP  - Renin mildly elev to 6.47, Aldosterone 13.7- Hyperaldosteronism ruled out  , consider renovascular HTN  - per Nephro, Recc Renal US with dopplers as an outpt to r/o HOANG.    - started on Aldactone 25 mg PO qd for BP  - Labs noted with TSH 4.38 wnl - no hyperthyroidism   - pt denies any HA, palpitations (low suspicion for pheochromocytoma) - f/u Plasma mets and 24 hr urine for mets/cats    - continue to monitor BP at this time    #Diabetes Mellitus  - discontinue Lantus at bedtime  - c/w BILLIENAINA PUENTES  - Patient's fingerstick glucose goal is 100-180 mg/dL.    - Discharge recommendations to be discussed.

## 2025-07-03 NOTE — PROGRESS NOTE ADULT - SUBJECTIVE AND OBJECTIVE BOX
Patient is a 70y old  Male who presents with a chief complaint of MICHEL (03 Jul 2025 11:44)    OVERNIGHT EVENTS: no acute changes.     Pt is aox2 (not time), tolerating PO, remains in bed due to weakness.   Vital signs reviewed- still has intermittent elevated BP in the 200s.   on telemetry - sinus bradycardia 50s bpm.   glucose remains controlled.     REVIEW OF SYSTEMS:  CONSTITUTIONAL: No fever, chills  ENMT:  No difficulty hearing, no change in vision  NECK: No pain or stiffness  RESPIRATORY: No cough, SOB  CARDIOVASCULAR: No chest pain, palpitations  GASTROINTESTINAL: No abdominal pain. No nausea, vomiting, or diarrhea  GENITOURINARY: No dysuria  NEUROLOGICAL: No HA  SKIN: No itching, burning, rashes, or lesions   LYMPH NODES: No enlarged glands  ENDOCRINE: No heat or cold intolerance; No hair loss  MUSCULOSKELETAL: No joint pain or swelling; No muscle, back, or extremity pain  PSYCHIATRIC: No depression, anxiety  HEME/LYMPH: No easy bruising, or bleeding gums    T(C): 36.5 (07-03-25 @ 11:28), Max: 37.4 (07-02-25 @ 23:31)  HR: 68 (07-03-25 @ 13:19) (60 - 72)  BP: 155/68 (07-03-25 @ 13:19) (95/71 - 211/69)  RR: 18 (07-03-25 @ 11:28) (18 - 18)  SpO2: 97% (07-03-25 @ 11:28) (92% - 97%)  Wt(kg): --Vital Signs Last 24 Hrs  T(C): 36.5 (03 Jul 2025 11:28), Max: 37.4 (02 Jul 2025 23:31)  T(F): 97.7 (03 Jul 2025 11:28), Max: 99.3 (02 Jul 2025 23:31)  HR: 68 (03 Jul 2025 13:19) (60 - 72)  BP: 155/68 (03 Jul 2025 13:19) (95/71 - 211/69)  BP(mean): 99 (02 Jul 2025 14:10) (99 - 99)  RR: 18 (03 Jul 2025 11:28) (18 - 18)  SpO2: 97% (03 Jul 2025 11:28) (92% - 97%)    Parameters below as of 03 Jul 2025 11:28  Patient On (Oxygen Delivery Method): room air        MEDICATIONS  (STANDING):  aspirin enteric coated 81 milliGRAM(s) Oral daily  atorvastatin 80 milliGRAM(s) Oral at bedtime  azaTHIOprine 100 milliGRAM(s) Oral daily  cholecalciferol 1000 Unit(s) Oral daily  cyanocobalamin 1000 MICROGram(s) Oral daily  dextrose 5%. 1000 milliLiter(s) (50 mL/Hr) IV Continuous <Continuous>  dextrose 50% Injectable 25 Gram(s) IV Push once  ferrous    sulfate 325 milliGRAM(s) Oral two times a day  finasteride 5 milliGRAM(s) Oral daily  folic acid 1 milliGRAM(s) Oral daily  heparin   Injectable 5000 Unit(s) SubCutaneous every 8 hours  hydrALAZINE 100 milliGRAM(s) Oral every 8 hours  insulin glargine Injectable (LANTUS) 4 Unit(s) SubCutaneous at bedtime  insulin lispro (ADMELOG) corrective regimen sliding scale   SubCutaneous three times a day before meals  insulin lispro (ADMELOG) corrective regimen sliding scale   SubCutaneous at bedtime  labetalol 400 milliGRAM(s) Oral every 8 hours  losartan 25 milliGRAM(s) Oral at bedtime  NIFEdipine XL 90 milliGRAM(s) Oral daily  predniSONE   Tablet 20 milliGRAM(s) Oral daily  pyridostigmine 60 milliGRAM(s) Oral <User Schedule>  sertraline 50 milliGRAM(s) Oral daily  tamsulosin 0.4 milliGRAM(s) Oral at bedtime    MEDICATIONS  (PRN):  acetaminophen     Tablet .. 650 milliGRAM(s) Oral every 6 hours PRN Temp greater or equal to 38C (100.4F), Mild Pain (1 - 3)  dextrose Oral Gel 15 Gram(s) Oral once PRN Blood Glucose LESS THAN 70 milliGRAM(s)/deciliter  ondansetron Injectable 4 milliGRAM(s) IV Push every 8 hours PRN Nausea and/or Vomiting      PHYSICAL EXAM:  GENERAL: NAD  EYES: clear conjunctiva   ENMT: +mild periorbital swelling. Moist mucous membranes  NECK: Supple, No JVD, Normal thyroid  CHEST/LUNG: Clear to auscultation bilaterally; No rales, rhonchi, wheezing, or rubs  HEART: S1, S2, Regular rate and rhythm  ABDOMEN: Soft, Nontender, Nondistended; Bowel sounds present  NEURO: Alert & Oriented X2 (not time)  EXTREMITIES: No LE edema, no calf tenderness  LYMPH: No lymphadenopathy noted  SKIN: No rashes or lesions    Consultant(s) Notes Reviewed:  [x ] YES  [ ] NO  Care Discussed with Consultants/Other Providers [ x] YES  [ ] NO    LABS:                        7.6    4.96  )-----------( 234      ( 03 Jul 2025 05:32 )             22.9     07-03    136  |  104  |  25[H]  ----------------------------<  60[L]  3.9   |  29  |  1.74[H]    Ca    8.5      03 Jul 2025 05:32  Phos  3.0     07-02  Mg     2.1     07-02    TPro  4.9[L]  /  Alb  x   /  TBili  x   /  DBili  x   /  AST  x   /  ALT  x   /  AlkPhos  x   07-03      CAPILLARY BLOOD GLUCOSE      POCT Blood Glucose.: 200 mg/dL (03 Jul 2025 11:46)  POCT Blood Glucose.: 82 mg/dL (03 Jul 2025 07:30)  POCT Blood Glucose.: 124 mg/dL (02 Jul 2025 20:54)  POCT Blood Glucose.: 219 mg/dL (02 Jul 2025 16:54)        Urinalysis Basic - ( 03 Jul 2025 05:32 )    Color: x / Appearance: x / SG: x / pH: x  Gluc: 60 mg/dL / Ketone: x  / Bili: x / Urobili: x   Blood: x / Protein: x / Nitrite: x   Leuk Esterase: x / RBC: x / WBC x   Sq Epi: x / Non Sq Epi: x / Bacteria: x        RADIOLOGY & ADDITIONAL TESTS:  < from: US Renal (06.30.25 @ 09:48) >    ACC: [id number] EXAM:  US KIDNEY(S)   ORDERED BY: DUONG ALCANTAR     PROCEDURE DATE:  06/30/2025          INTERPRETATION:  CLINICAL INFORMATION: 70-year-old male with MICHEL, rule   out retention    COMPARISON: Abdominal pelvic CT 6/29/2025, renal ultrasound 1/21/2022    TECHNIQUE: Portable sonography of the kidneys and bladder.    FINDINGS:  Right kidney: 11.1 cm. No renal mass, hydronephrosis or calculi.   Increased cortical echogenicity.    Left kidney: 9.9 cm. No renal mass, hydronephrosis or calculi. Increased   cortical echogenicity. Partially imaged hypoechoic structure posterior to   the left kidney may correspond to the lymphadenopathy questioned on the   prior CT, not evaluated on this exam.    Urinary bladder: Not fully evaluated on this examination. However, on   submitted views, there appears to be mild diffuse bladder wall   thickening, as seen on the prior CT.    A small left pleural effusion is noted.    IMPRESSION:  1. No hydronephrosis.  2. Increased renal cortical echogenicity compatible with renal   parenchymal disease.  3. There is limited visualization of the urinary bladder; however, on   limited views, the wall appears diffusely thickened. Suggest correlation   with urinalysis to exclude infection.  4. Partially imaged hypoechoic structure posterior to the left kidney may   correspond to the lymphadenopathy questioned on the prior CT. Further   evaluation is recommended as clinically.        --- End of Report ---            MATTIE VILLARREAL MD; Attending Radiologist  This document has been electronically signed. Jun 30 2025 12:05PM    < end of copied text >      < from: US Renal (06.30.25 @ 09:48) >    ACC: [id number] EXAM:  US KIDNEY(S)   ORDERED BY: DUONG ALCANTAR     PROCEDURE DATE:  06/30/2025          INTERPRETATION:  CLINICAL INFORMATION: 70-year-old male with MICHEL, rule   out retention    COMPARISON: Abdominal pelvic CT 6/29/2025, renal ultrasound 1/21/2022    TECHNIQUE: Portable sonography of the kidneys and bladder.    FINDINGS:  Right kidney: 11.1 cm. No renal mass, hydronephrosis or calculi.   Increased cortical echogenicity.    Left kidney: 9.9 cm. No renal mass, hydronephrosis or calculi. Increased   cortical echogenicity. Partially imaged hypoechoic structure posterior to   the left kidney may correspond to the lymphadenopathy questioned on the   prior CT, not evaluated on this exam.    Urinary bladder: Not fully evaluated on this examination. However, on   submitted views, there appears to be mild diffuse bladder wall   thickening, as seen on the prior CT.    A small left pleural effusion is noted.    IMPRESSION:  1. No hydronephrosis.  2. Increased renal cortical echogenicity compatible with renal   parenchymal disease.  3. There is limited visualization of the urinary bladder; however, on   limited views, the wall appears diffusely thickened. Suggest correlation   with urinalysis to exclude infection.  4. Partially imaged hypoechoic structure posterior to the left kidney may   correspond to the lymphadenopathy questioned on the prior CT. Further   evaluation is recommended as clinically.        --- End of Report ---            MATTIE VILLARREAL MD; Attending Radiologist  This document has been electronically signed. Jun 30 2025 12:05PM    < end of copied text >    Imaging Personally Reviewed:  [ ] YES  [ ] NO

## 2025-07-03 NOTE — PROGRESS NOTE ADULT - SUBJECTIVE AND OBJECTIVE BOX
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 Pt a/w hypertensive urgency and MICHEL. Nephrology consulted for Elevated serum creatinine.    Hospital Medications: Medications reviewed.    REVIEW OF SYSTEMS:  Gen: no changes in weight  Cards: no chest pain  Resp: no dyspnea  GI: no nausea or vomiting or diarrhea  : no dysuria or hematuria  Vascular: no LE edema     VITALS:  T(F): 97.7 (07-03-25 @ 11:28), Max: 99.3 (07-02-25 @ 23:31)  HR: 68 (07-03-25 @ 13:19)  BP: 155/68 (07-03-25 @ 13:19)  RR: 18 (07-03-25 @ 11:28)  SpO2: 97% (07-03-25 @ 11:28)  Wt(kg): --    07-02 @ 07:01  -  07-03 @ 07:00  --------------------------------------------------------  IN: 0 mL / OUT: 1600 mL / NET: -1600 mL      PHYSICAL EXAM:  Gen: NAD, calm  HEENT: +facial/ periorbital edema    Neck: no JVD  Cards: RRR, +S1/S2,+BOUBACAR  Resp: CTA B/L  GI: soft, NT/ND, NABS  : no CVA tenderness  Extremities: no edema B/L     LABS:  07-03    136  |  104  |  25[H]  ----------------------------<  60[L]  3.9   |  29  |  1.74[H]    Ca    8.5      03 Jul 2025 05:32  Phos  3.0     07-02  Mg     2.1     07-02    TPro  4.9[L]  /  Alb      /  TBili      /  DBili      /  AST      /  ALT      /  AlkPhos      07-03    Creatinine Trend: 1.74 <--, 1.66 <--, 1.99 <--, 1.94 <--, 1.76 <--, 1.40 <--, 1.45 <--, 1.54 <--                        7.6    4.96  )-----------( 234      ( 03 Jul 2025 05:32 )             22.9     Urine Studies:  Urinalysis Basic - ( 03 Jul 2025 05:32 )    Color:  / Appearance:  / SG:  / pH:   Gluc: 60 mg/dL / Ketone:   / Bili:  / Urobili:    Blood:  / Protein:  / Nitrite:    Leuk Esterase:  / RBC:  / WBC    Sq Epi:  / Non Sq Epi:  / Bacteria:       Creatinine, Random Urine: 175 mg/dL (06-29 @ 11:45)  Protein/Creatinine Ratio Calculation: 0.5 Ratio (06-29 @ 11:45)  Creatinine, Random Urine: 127 mg/dL (06-28 @ 11:30)  Protein/Creatinine Ratio Calculation: 0.9 Ratio (06-28 @ 11:30)  Potassium, Random Urine: 43 mmol/L (06-28 @ 11:30)  Potassium, Random Urine: 50 mmol/L (06-27 @ 17:35)  Creatinine, Random Urine: 53 mg/dL (06-27 @ 17:35)

## 2025-07-03 NOTE — PROGRESS NOTE ADULT - PROBLEM SELECTOR PLAN 3
Was sent in from NH for Hgb 7.9 in a routine lab   Total iron 37, TIBC 233, % iron 16  No active signs of bleeding   - macrocytic anemia   - b12 and folate serum wnl   - c/w home iron and folic acid supplement  - Hgb remains stable at 7.6   - Finished Venofer 200mg IV qd x 3 doses  - started Epogen per nephrology  - [facility] Dr. Moralez consulted apprec recs

## 2025-07-03 NOTE — PROGRESS NOTE ADULT - PROBLEM SELECTOR PLAN 4
CT A/P - Soft tissue densities within the retroperitoneum next to the aorta   concerning for extensive lymphadenopathy versus tortuous vessels or a   combination of both.   - f/u MRI a/p with IV contrast   - [facility] Dr. Moralez following CT A/P - Soft tissue densities within the retroperitoneum next to the aorta   concerning for extensive lymphadenopathy versus tortuous vessels or a   combination of both.   - not a candidate for MRI - has loop recorder, cervical hardware and sternotomy  - f/u US A/P   - [facility] Dr. Moralez following

## 2025-07-03 NOTE — PROGRESS NOTE ADULT - ASSESSMENT
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 Pt a/w hypertensive urgency and MICHEL.   Nephrology consulted for Elevated serum creatinine.    1. MICHEL- as per H& P; last SCr 1.3. MICHEL likely hemodynamically mediated due to uncontrolled HTN. Renal function improving  for which Losartan was resumed at a lower dose.  Feurea 13.52%;   UA with 300 protein, no blood. FeNa 10%;  Renal US with no hydro.  TTE with grade 1 diastolic dysfunction, EF 70-75%; ?hypertensive vs infiltrative cardiomyopathy. SIFE neg & K/l wnl. Pt b/l pleural effusions on TTE. Check CXR;  Strict I/Os. Avoid nephrotoxins/ NSAIDs/ RCA. Monitor BMP.    2. Proteinuria- UA with 300 protein, no blood.  UPCr on 6/29,  0.48 g/day. No acute interventions at this time.    3. Hypertensive urgency- BP labile. c/w Labetalol 400mg PO q 8hrs. Consider Aldactone 25mg PO daily for resistant HTN. c/w Losartan 25mg PO qhs. Recc Renal US with dopplers as an outpt to r/o HOANG.    c/w Nifedipine ER 90mg p daily.  c/w low salt diet. Monitor BP  4. Hypokalemia- Kuldip/ Renin; 3.19. Not consistent with hypoaldosteronism. Renin 6.479.   Hypokalemia due to shifting from excessive insulin.   Recc aldactone 25mg PO daily. Check VBG. Monitor serum potassium  5. Iron deficiency anemia- low hgb. Check FOBT. tsat 16% with ferriitn 236- Finished Venofer 200mg IV qd x 3 doses. s/p Epo 10k SC x1 on 6/30. Monitor hgb    [facility] NEPHROLOGY  Bethel Smith M.D.  Guillermo Jimenez D.O.  Kathleen Lan M.D.  MD Kimi Rodríguez, MSN, ANP-C    Telephone: [phone]  Facsimile: [phone]    [address] [place] [address] Road, [address]  [place] [address]

## 2025-07-03 NOTE — PROGRESS NOTE ADULT - SUBJECTIVE AND OBJECTIVE BOX
Patient is a 70y old  Male who presents with a chief complaint of MICHEL (2025 21:22)    PATIENT IS SEEN AND EXAMINED IN MEDICAL FLOOR.  NGT [    ]    ARCADIO [   ]      GT [   ]    ALLERGIES:  No Known Allergies      Daily     Daily Weight in k (2025 05:06)    VITALS:    Vital Signs Last 24 Hrs  T(C): 36.3 (2025 07:56), Max: 37.4 (2025 11:51)  T(F): 97.3 (2025 07:56), Max: 99.3 (2025 11:51)  HR: 64 (2025 07:56) (60 - 72)  BP: 116/53 (2025 07:56) (95/71 - 211/69)  BP(mean): 99 (2025 14:10) (83 - 99)  RR: 18 (2025 07:56) (18 - 18)  SpO2: 97% (2025 07:56) (92% - 97%)    Parameters below as of 2025 07:56  Patient On (Oxygen Delivery Method): room air        LABS:    CBC Full  -  ( 2025 05:32 )  WBC Count : 4.96 K/uL  RBC Count : 2.14 M/uL  Hemoglobin : 7.6 g/dL  Hematocrit : 22.9 %  Platelet Count - Automated : 234 K/uL  Mean Cell Volume : 107.0 fl  Mean Cell Hemoglobin : 35.5 pg  Mean Cell Hemoglobin Concentration : 33.2 g/dL  Auto Neutrophil # : x  Auto Lymphocyte # : x  Auto Monocyte # : x  Auto Eosinophil # : x  Auto Basophil # : x  Auto Neutrophil % : x  Auto Lymphocyte % : x  Auto Monocyte % : x  Auto Eosinophil % : x  Auto Basophil % : x      07-03    136  |  104  |  25[H]  ----------------------------<  60[L]  3.9   |  29  |  1.74[H]    Ca    8.5      2025 05:32  Phos  3.0     07-02  Mg     2.1     07-02      CAPILLARY BLOOD GLUCOSE      POCT Blood Glucose.: 82 mg/dL (2025 07:30)  POCT Blood Glucose.: 124 mg/dL (2025 20:54)  POCT Blood Glucose.: 219 mg/dL (2025 16:54)  POCT Blood Glucose.: 174 mg/dL (2025 12:01)          Creatinine Trend: 1.74<--, 1.66<--, 1.99<--, 1.94<--, 1.76<--, 1.40<--  I&O's Summary    2025 07:01  -  2025 07:00  --------------------------------------------------------  IN: 0 mL / OUT: 1600 mL / NET: -1600 mL                MEDICATIONS:    MEDICATIONS  (STANDING):  aspirin enteric coated 81 milliGRAM(s) Oral daily  atorvastatin 80 milliGRAM(s) Oral at bedtime  azaTHIOprine 100 milliGRAM(s) Oral daily  cholecalciferol 1000 Unit(s) Oral daily  cyanocobalamin 1000 MICROGram(s) Oral daily  dextrose 5%. 1000 milliLiter(s) (50 mL/Hr) IV Continuous <Continuous>  dextrose 50% Injectable 25 Gram(s) IV Push once  ferrous    sulfate 325 milliGRAM(s) Oral two times a day  finasteride 5 milliGRAM(s) Oral daily  folic acid 1 milliGRAM(s) Oral daily  heparin   Injectable 5000 Unit(s) SubCutaneous every 8 hours  hydrALAZINE 100 milliGRAM(s) Oral every 8 hours  insulin glargine Injectable (LANTUS) 4 Unit(s) SubCutaneous at bedtime  insulin lispro (ADMELOG) corrective regimen sliding scale   SubCutaneous three times a day before meals  insulin lispro (ADMELOG) corrective regimen sliding scale   SubCutaneous at bedtime  labetalol 400 milliGRAM(s) Oral every 8 hours  losartan 25 milliGRAM(s) Oral at bedtime  NIFEdipine XL 90 milliGRAM(s) Oral daily  predniSONE   Tablet 20 milliGRAM(s) Oral daily  pyridostigmine 60 milliGRAM(s) Oral <User Schedule>  sertraline 50 milliGRAM(s) Oral daily  tamsulosin 0.4 milliGRAM(s) Oral at bedtime      MEDICATIONS  (PRN):  acetaminophen     Tablet .. 650 milliGRAM(s) Oral every 6 hours PRN Temp greater or equal to 38C (100.4F), Mild Pain (1 - 3)  dextrose Oral Gel 15 Gram(s) Oral once PRN Blood Glucose LESS THAN 70 milliGRAM(s)/deciliter  ondansetron Injectable 4 milliGRAM(s) IV Push every 8 hours PRN Nausea and/or Vomiting      REVIEW OF SYSTEMS:                           ALL ROS DONE [ X   ]    CONSTITUTIONAL:  LETHARGIC [   ], FEVER [   ], UNRESPONSIVE [   ]  CVS:  CP  [   ], SOB, [   ], PALPITATIONS [   ], DIZZYNESS [   ]  RS: COUGH [   ], SPUTUM [   ]  GI: ABDOMINAL PAIN [   ], NAUSEA [   ], VOMITINGS [   ], DIARRHEA [   ], CONSTIPATION [   ]  :  DYSURIA [   ], NOCTURIA [   ], INCREASED FREQUENCY [   ], DRIBLING [   ],  SKELETAL: PAINFUL JOINTS [   ], SWOLLEN JOINTS [   ], NECK ACHE [   ], LOW BACK ACHE [   ],  SKIN : ULCERS [   ], RASH [   ], ITCHING [   ]  CNS: HEAD ACHE [   ], DOUBLE VISION [   ], BLURRED VISION [   ], AMS / CONFUSION [   ], SEIZURES [   ], WEAKNESS [   ],TINGLING / NUMBNESS [   ]        PHYSICAL EXAMINATION:  GENERAL APPEARANCE: NO DISTRESS  HEENT:  NO PALLOR, NO  JVD,  NO   NODES, NECK SUPPLE  CVS: S1 +, S2 +,   RS: AEEB,  OCCASIONAL  RALES +,   NO RONCHI  ABD: SOFT, NT, NO, BS +  EXT: NO PE  SKIN: WARM,   SKELETAL:  ROM ACCEPTABLE  CNS:  AAO X 3        RADIOLOGY :  RADIOLOGY AND READINGS REVIEWED      ASSESSMENT :     Acute renal failure    HTN (hypertension)    DM (diabetes mellitus)    Myasthenia gravis    Hypercholesterolemia    CVA (cerebrovascular accident)    Peripheral neuropathy    BPH (benign prostatic hyperplasia)    Major depression    Lipoma of chest wall    MG with thymoma (myasthena gravis)    H/O cataract extraction        PLAN:  HPI:  Patient is a 70M, from [facility] ambulates with a walker, with PMHx of Myasthenia gravis s/p thymectomy, HTN, 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 Patient reports he has been feeling nauseous for a few days but no vomiting. He reports chronic increased urinary frequency and sensation of incomplete voiding. He denies all other symptoms - fever, chills, cough, chest pain, SoB, palpitations abdominal pain, diarrhea, dysuria, arm or leg numbness or tingling. Reports regular brown stool - denies black or bloody stool.  (2025 21:32)    # CASE D/W PATIENT AND PARTNER MILLI DONOVAN AT BEDSIDE, ALL QUESTIONS ANSWERED. DISCUSSED RECOMMENDATIONS AS MADE BY MULTIDISCIPLINARY TEAM. DISCUSSED THAT PROGNOSIS IS GUARDED. THEY VERBALIZED UNDERSTANDING. IN DISCUSSION REGARDING GOC - PATIENT WISHES TO BE FULL CODE.    # HYPERTENSIVE URGENCY  # UNDERLYING HTN  - TELEMETRY  - ECHO - LV EF - 70 - 75%, G1DD  - NOTED TROPONINS  - S/P IV HYDRALAZINE  - HYDRALAZINE  - PROCARDIA  - LABETALOL  - RESUME LOSARTAN  - NOTED SERUM RENIN, ALDOSTERONE LEVELS  - CARDIOLOGY CONSULT  - NEPHROLOGY CONSULT  - ENDOCRINOLOGY CONSULT      # MICHEL ON  ? CKD  # BPH  - IV FLUIDS  - PVR - 0 ML  - MONITOR CR  - AVOID NEPHROTOXIC AGENTS  - NEPHROLOGY CONSULT    # HYPOGLYCEMIA - RESOLVED  - MONITORING FINGERSTICKS    # ANEMIA  - TREND HGB  - ANEMIA PANEL  - DENIES MELENA, HEMATOCHEZIA, HEMATEMESIS, HEMATURIA  - NOTED CT A/P  - GASTROENTEROLOGY CONSULT    # ? LYMPHADENOPATHY ON CT A/P   - HEME/ONC CONSULT    # DENIES DYSURIA, UA NEGATIVE FOR LEUKOCYTE ESTERASE AND NITRITES      # MYASTHENIA GRAVIS S/P THYMECTOMY    # DM  - SSI + FS    # CVA W/ MILD RIGHT HP    # PAD  # GI AND DVT PPX       Patient is a 70y old  Male who presents with a chief complaint of MICHEL (2025 21:22)    PATIENT IS SEEN AND EXAMINED IN MEDICAL FLOOR.      ALLERGIES:  No Known Allergies      Daily     Daily Weight in k (2025 05:06)    VITALS:    Vital Signs Last 24 Hrs  T(C): 36.3 (2025 07:56), Max: 37.4 (2025 11:51)  T(F): 97.3 (2025 07:56), Max: 99.3 (2025 11:51)  HR: 64 (2025 07:56) (60 - 72)  BP: 116/53 (2025 07:56) (95/71 - 211/69)  BP(mean): 99 (2025 14:10) (83 - 99)  RR: 18 (2025 07:56) (18 - 18)  SpO2: 97% (2025 07:56) (92% - 97%)    Parameters below as of 2025 07:56  Patient On (Oxygen Delivery Method): room air        LABS:    CBC Full  -  ( 2025 05:32 )  WBC Count : 4.96 K/uL  RBC Count : 2.14 M/uL  Hemoglobin : 7.6 g/dL  Hematocrit : 22.9 %  Platelet Count - Automated : 234 K/uL  Mean Cell Volume : 107.0 fl  Mean Cell Hemoglobin : 35.5 pg  Mean Cell Hemoglobin Concentration : 33.2 g/dL  Auto Neutrophil # : x  Auto Lymphocyte # : x  Auto Monocyte # : x  Auto Eosinophil # : x  Auto Basophil # : x  Auto Neutrophil % : x  Auto Lymphocyte % : x  Auto Monocyte % : x  Auto Eosinophil % : x  Auto Basophil % : x      -    136  |  104  |  25[H]  ----------------------------<  60[L]  3.9   |  29  |  1.74[H]    Ca    8.5      2025 05:32  Phos  3.0     07-02  Mg     2.1     07-02      CAPILLARY BLOOD GLUCOSE      POCT Blood Glucose.: 82 mg/dL (2025 07:30)  POCT Blood Glucose.: 124 mg/dL (2025 20:54)  POCT Blood Glucose.: 219 mg/dL (2025 16:54)  POCT Blood Glucose.: 174 mg/dL (2025 12:01)          Creatinine Trend: 1.74<--, 1.66<--, 1.99<--, 1.94<--, 1.76<--, 1.40<--  I&O's Summary    2025 07:01  -  2025 07:00  --------------------------------------------------------  IN: 0 mL / OUT: 1600 mL / NET: -1600 mL                MEDICATIONS:    MEDICATIONS  (STANDING):  aspirin enteric coated 81 milliGRAM(s) Oral daily  atorvastatin 80 milliGRAM(s) Oral at bedtime  azaTHIOprine 100 milliGRAM(s) Oral daily  cholecalciferol 1000 Unit(s) Oral daily  cyanocobalamin 1000 MICROGram(s) Oral daily  dextrose 5%. 1000 milliLiter(s) (50 mL/Hr) IV Continuous <Continuous>  dextrose 50% Injectable 25 Gram(s) IV Push once  ferrous    sulfate 325 milliGRAM(s) Oral two times a day  finasteride 5 milliGRAM(s) Oral daily  folic acid 1 milliGRAM(s) Oral daily  heparin   Injectable 5000 Unit(s) SubCutaneous every 8 hours  hydrALAZINE 100 milliGRAM(s) Oral every 8 hours  insulin glargine Injectable (LANTUS) 4 Unit(s) SubCutaneous at bedtime  insulin lispro (ADMELOG) corrective regimen sliding scale   SubCutaneous three times a day before meals  insulin lispro (ADMELOG) corrective regimen sliding scale   SubCutaneous at bedtime  labetalol 400 milliGRAM(s) Oral every 8 hours  losartan 25 milliGRAM(s) Oral at bedtime  NIFEdipine XL 90 milliGRAM(s) Oral daily  predniSONE   Tablet 20 milliGRAM(s) Oral daily  pyridostigmine 60 milliGRAM(s) Oral <User Schedule>  sertraline 50 milliGRAM(s) Oral daily  tamsulosin 0.4 milliGRAM(s) Oral at bedtime      MEDICATIONS  (PRN):  acetaminophen     Tablet .. 650 milliGRAM(s) Oral every 6 hours PRN Temp greater or equal to 38C (100.4F), Mild Pain (1 - 3)  dextrose Oral Gel 15 Gram(s) Oral once PRN Blood Glucose LESS THAN 70 milliGRAM(s)/deciliter  ondansetron Injectable 4 milliGRAM(s) IV Push every 8 hours PRN Nausea and/or Vomiting      REVIEW OF SYSTEMS:                           ALL ROS DONE [ X   ]    CONSTITUTIONAL:  LETHARGIC [   ], FEVER [   ], UNRESPONSIVE [   ]  CVS:  CP  [   ], SOB, [   ], PALPITATIONS [   ], DIZZYNESS [   ]  RS: COUGH [   ], SPUTUM [   ]  GI: ABDOMINAL PAIN [   ], NAUSEA [   ], VOMITINGS [   ], DIARRHEA [   ], CONSTIPATION [   ]  :  DYSURIA [   ], NOCTURIA [   ], INCREASED FREQUENCY [   ], DRIBLING [   ],  SKELETAL: PAINFUL JOINTS [   ], SWOLLEN JOINTS [   ], NECK ACHE [   ], LOW BACK ACHE [   ],  SKIN : ULCERS [   ], RASH [   ], ITCHING [   ]  CNS: HEAD ACHE [   ], DOUBLE VISION [   ], BLURRED VISION [   ], AMS / CONFUSION [   ], SEIZURES [   ], WEAKNESS [   ],TINGLING / NUMBNESS [   ]        PHYSICAL EXAMINATION:  GENERAL APPEARANCE: NO DISTRESS  HEENT:  NO PALLOR, NO  JVD,  NO   NODES, NECK SUPPLE  CVS: S1 +, S2 +,   RS: AEEB,  OCCASIONAL  RALES +,   NO RONCHI  ABD: SOFT, NT, NO, BS +  EXT: NO PE  SKIN: WARM,   SKELETAL:  ROM ACCEPTABLE  CNS:  AAO X 3        RADIOLOGY :  RADIOLOGY AND READINGS REVIEWED      ASSESSMENT :     Acute renal failure    HTN (hypertension)    DM (diabetes mellitus)    Myasthenia gravis    Hypercholesterolemia    CVA (cerebrovascular accident)    Peripheral neuropathy    BPH (benign prostatic hyperplasia)    Major depression    Lipoma of chest wall    MG with thymoma (myasthena gravis)    H/O cataract extraction        PLAN:  HPI:  Patient is a 70M, from [facility] ambulates with a walker, with PMHx of Myasthenia gravis s/p thymectomy, HTN, 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 Patient reports he has been feeling nauseous for a few days but no vomiting. He reports chronic increased urinary frequency and sensation of incomplete voiding. He denies all other symptoms - fever, chills, cough, chest pain, SoB, palpitations abdominal pain, diarrhea, dysuria, arm or leg numbness or tingling. Reports regular brown stool - denies black or bloody stool.  (2025 21:32)    # CASE D/W PATIENT AND PARTNER MILLI DONOVAN AT BEDSIDE, ALL QUESTIONS ANSWERED. DISCUSSED RECOMMENDATIONS AS MADE BY MULTIDISCIPLINARY TEAM. DISCUSSED THAT PROGNOSIS IS GUARDED. THEY VERBALIZED UNDERSTANDING. IN DISCUSSION REGARDING GOC - PATIENT WISHES TO BE FULL CODE.    # HYPERTENSIVE URGENCY  # UNDERLYING HTN  - TELEMETRY  - ECHO - LV EF - 70 - 75%, G1DD  - NOTED TROPONINS  - S/P IV HYDRALAZINE  - HYDRALAZINE  - PROCARDIA  - LABETALOL  - RESUME LOSARTAN  - NOTED SERUM RENIN, ALDOSTERONE LEVELS  - F/U PLASMA METANEPHRINES  - CARDIOLOGY CONSULT  - NEPHROLOGY CONSULT  - ENDOCRINOLOGY CONSULT    # MICHEL ON LIKELY CKD  # BPH  - IV FLUIDS  - PVR - 0 ML  - MONITOR CR  - AVOID NEPHROTOXIC AGENTS  - NEPHROLOGY CONSULT    # HYPOGLYCEMIA - RESOLVED  - MONITORING FINGERSTICKS    # ANEMIA  - TREND HGB  - ANEMIA PANEL  - DENIES MELENA, HEMATOCHEZIA, HEMATEMESIS, HEMATURIA  - NOTED CT A/P  - GASTROENTEROLOGY CONSULT    # ? LYMPHADENOPATHY ON CT A/P   - F/U U/S ABDOMEN  - DEFER CT W/ CONTRAST GIVEN RENAL DYSFUNCTION  - PER FAMILY PATIENT MAY HAVE HARDWARE THAT IS NOT COMPATIBLE WITH MRI  - HEME/ONC CONSULT    # DENIES DYSURIA, UA NEGATIVE FOR LEUKOCYTE ESTERASE AND NITRITES      # MYASTHENIA GRAVIS S/P THYMECTOMY    # DM  - SSI + FS    # CVA W/ MILD RIGHT HP    # PAD  # GI AND DVT PPX

## 2025-07-03 NOTE — PROGRESS NOTE ADULT - PROBLEM SELECTOR PLAN 2
BUN/Cr 30/1.81 (Baseline Scr 1.3)  s/p 1L NS in ED   Post-void bladder scan - 0mL  Renal US - negative for hydronephrosis   - s/p IVF  - creatinine improved then now worsening 1.99 > 1.66 > 1.7  - trend BMP  - Nephrology dr. Lan following

## 2025-07-03 NOTE — PROGRESS NOTE ADULT - PROBLEM SELECTOR PLAN 6
Hx of IDT2DM on [id number] 10U qd and Novolin 100 ISS   - has been drinking rockstar energy drinks causing high sugars   - A1C 6.3  - recent hypoglycemia event, fasting glucose 80-120s  - decrease lantus to 4 units qhs   - Monitor BS ACHS  - Endocrine following

## 2025-07-03 NOTE — PROGRESS NOTE ADULT - PROBLEM SELECTOR PLAN 1
Hx of HTN on hydralazine 100mg tid, losartan 100mg qd, nifedipine 60mg qd, metoprolol 100mg bid   Troponin 43.9 > 39  BP remains elevated   - echo: Left ventricular wall thickness is severely increased. Left ventricular systolic function is hyperdynamic with an ejection fraction visually estimated at 70 to 75 %.  Differential includes hypertensive, hypertrophic, and infiltrative cardiomyopathy, among others. cardiac MRI for further evaluation was recommended, can be done outp   - telemetry monitoring   - Cardiology Dr. Mercado follows  -serum aldosterone level normal. serum renin test unable as per [facility] lab  - increased Nifedipine to 90 mg qd  - changed metoprolol to labetalol 400 mg q 8hr and added Hydralazine 100mg q8h.   - resumed losartan 25 mg qd  - Endo Dr. Farr consulted  - Nephro Dr. Lan following

## 2025-07-03 NOTE — PROGRESS NOTE ADULT - SUBJECTIVE AND OBJECTIVE BOX
C A R D I O L O G Y  **********************************     DATE OF SERVICE: 07-03-25    Patient denies chest pain or shortness of breath.   Review of symptoms otherwise negative.    acetaminophen     Tablet .. 650 milliGRAM(s) Oral every 6 hours PRN  aspirin enteric coated 81 milliGRAM(s) Oral daily  atorvastatin 80 milliGRAM(s) Oral at bedtime  azaTHIOprine 100 milliGRAM(s) Oral daily  cholecalciferol 1000 Unit(s) Oral daily  cyanocobalamin 1000 MICROGram(s) Oral daily  dextrose 5%. 1000 milliLiter(s) IV Continuous <Continuous>  dextrose 50% Injectable 25 Gram(s) IV Push once  dextrose Oral Gel 15 Gram(s) Oral once PRN  ferrous    sulfate 325 milliGRAM(s) Oral two times a day  finasteride 5 milliGRAM(s) Oral daily  folic acid 1 milliGRAM(s) Oral daily  heparin   Injectable 5000 Unit(s) SubCutaneous every 8 hours  hydrALAZINE 100 milliGRAM(s) Oral every 8 hours  insulin glargine Injectable (LANTUS) 4 Unit(s) SubCutaneous at bedtime  insulin lispro (ADMELOG) corrective regimen sliding scale   SubCutaneous three times a day before meals  insulin lispro (ADMELOG) corrective regimen sliding scale   SubCutaneous at bedtime  labetalol 400 milliGRAM(s) Oral every 8 hours  losartan 25 milliGRAM(s) Oral at bedtime  NIFEdipine XL 90 milliGRAM(s) Oral daily  ondansetron Injectable 4 milliGRAM(s) IV Push every 8 hours PRN  predniSONE   Tablet 20 milliGRAM(s) Oral daily  pyridostigmine 60 milliGRAM(s) Oral <User Schedule>  sertraline 50 milliGRAM(s) Oral daily  tamsulosin 0.4 milliGRAM(s) Oral at bedtime                            7.6    4.96  )-----------( 234      ( 03 Jul 2025 05:32 )             22.9       Hemoglobin: 7.6 g/dL (07-03 @ 05:32)  Hemoglobin: 8.5 g/dL (07-02 @ 06:16)  Hemoglobin: 7.9 g/dL (07-01 @ 07:04)  Hemoglobin: 7.4 g/dL (06-30 @ 05:42)  Hemoglobin: 8.1 g/dL (06-29 @ 12:42)      07-03    136  |  104  |  25[H]  ----------------------------<  60[L]  3.9   |  29  |  1.74[H]    Ca    8.5      03 Jul 2025 05:32  Phos  3.0     07-02  Mg     2.1     07-02      Creatinine Trend: 1.74<--, 1.66<--, 1.99<--, 1.94<--, 1.76<--, 1.40<--    COAGS:           T(C): 36.5 (07-03-25 @ 11:28), Max: 37.4 (07-02-25 @ 23:31)  HR: 65 (07-03-25 @ 11:28) (60 - 72)  BP: 129/56 (07-03-25 @ 11:28) (95/71 - 211/69)  RR: 18 (07-03-25 @ 11:28) (18 - 18)  SpO2: 97% (07-03-25 @ 11:28) (92% - 97%)  Wt(kg): --    I&O's Summary    02 Jul 2025 07:01  -  03 Jul 2025 07:00  --------------------------------------------------------  IN: 0 mL / OUT: 1600 mL / NET: -1600 mL          HEENT:  (-)icterus (-)pallor  CV: N S1 S2 1/6 BOUBACAR (+)2 Pulses B/l  Resp:  Clear to ausculatation B/L, normal effort  GI: (+) BS Soft, NT, ND  Lymph:  (-)Edema, (-)obvious lymphadenopathy  Skin: Warm to touch, Normal turgor  Psych: Appropriate mood and affect      TELEMETRY: 	  sinus        ASSESSMENT/PLAN: 	70y  Male PMHx of Myasthenia gravis s/p thymectomy, HTN, 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 hyperaldosterone state  aldosterone appears wnl  - cont Procardia at 90 mg PO daily   - tolerating ARB.   -  TSH wnl   - pLan to Add aldactone once seen by endocrine and appropriate blood testing is aquired   - w/u for secondar causes in progress 24 hour urine metanephrines  - would need Renal artery dopplers likely as an oupt since may not be done in house     # Abnormal EKG  - echo noted, suspect it is due to hypertensive heart disease given his profoundly elevated blood pressures however can arrange for outpt cardiac MRI     Ta Mercado MD, [facility]  BEEPER [phone]

## 2025-07-03 NOTE — PROGRESS NOTE ADULT - ASSESSMENT
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 Hgb 9.9. BUN/Cr 30/1.81.   Admitted for MICHEL on CKD and uncontrolled HTN, Nephrology and Cardiology consulted.   TTE: Left ventricular wall thickness is severely increased. Left ventricular systolic function is hyperdynamic with an ejection fraction visually estimated at 70 to 75 %.  Differential includes hypertensive, hypertrophic, and infiltrative cardiomyopathy, among others. Cardiac MRI for further evaluation was recommended, can be done outpt.    During hospitalization pt was found to have elevated creatinine, renal US negative for hydronephrosis, d/c'd ARB.  MICHEL on CKD now improving from 1.99 > 1.7.   QMA Dr. Moralez consulted for macrocytic anemia and retroperitoneal lymphadenopathy.   Endocrine Dr. Farr consulted possible hormonal causes of uncontrolled HTN.     Now pending MRI A/P to further evaluate retroperitoneal lymphadenopathy. Wife reported that patient has possible metal in his collarbone.   Will check for metal with CXR and evaluate his pleural effusions. noted on CT a/p imaging. Remains comfortable on room air.   Currently undergoing 24 urine studies to check for metanephrine and catecholamines to r/o pheochromocytoma.     Wife Jada Still updated on pt's status and current plan.    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 Hgb 9.9. BUN/Cr 30/1.81.   Admitted for MICHEL on CKD and uncontrolled HTN, Nephrology and Cardiology consulted.   TTE: Left ventricular wall thickness is severely increased. Left ventricular systolic function is hyperdynamic with an ejection fraction visually estimated at 70 to 75 %.  Differential includes hypertensive, hypertrophic, and infiltrative cardiomyopathy, among others. Cardiac MRI for further evaluation was recommended, can be done outpt.    During hospitalization pt was found to have elevated creatinine, renal US negative for hydronephrosis, d/c'd ARB.  MICHEL on CKD now improving from 1.99 > 1.7.   QMA Dr. Moralez consulted for macrocytic anemia and retroperitoneal lymphadenopathy.   Endocrine Dr. Farr consulted possible hormonal causes of uncontrolled HTN.     CXR - found to have Sternotomy, cervical spine hardware, and left loop recorder.   Pt is not a candidate for MRI A/P to further evaluate retroperitoneal lymphadenopathy at this time  Will further evaluate retroperitoneal lymphadenopathy with US A/P.   CXR - no evidence of pleural effusions noted on CT a/p imaging. Remains comfortable on room air.   Currently undergoing 24 urine studies to check for metanephrine and catecholamines to r/o pheochromocytoma.     Wife Jada Still updated on pt's status and current plan.

## 2025-07-04 LAB
ANION GAP SERPL CALC-SCNC: 4 MMOL/L — LOW (ref 5–17)
BLD GP AB SCN SERPL QL: SIGNIFICANT CHANGE UP
BUN SERPL-MCNC: 24 MG/DL — HIGH (ref 7–18)
CALCIUM SERPL-MCNC: 8.4 MG/DL — SIGNIFICANT CHANGE UP (ref 8.4–10.5)
CHLORIDE SERPL-SCNC: 104 MMOL/L — SIGNIFICANT CHANGE UP (ref 96–108)
CO2 SERPL-SCNC: 28 MMOL/L — SIGNIFICANT CHANGE UP (ref 22–31)
CREAT SERPL-MCNC: 1.74 MG/DL — HIGH (ref 0.5–1.3)
EGFR: 42 ML/MIN/1.73M2 — LOW
EGFR: 42 ML/MIN/1.73M2 — LOW
GLUCOSE BLDC GLUCOMTR-MCNC: 120 MG/DL — HIGH (ref 70–99)
GLUCOSE BLDC GLUCOMTR-MCNC: 130 MG/DL — HIGH (ref 70–99)
GLUCOSE BLDC GLUCOMTR-MCNC: 202 MG/DL — HIGH (ref 70–99)
GLUCOSE BLDC GLUCOMTR-MCNC: 282 MG/DL — HIGH (ref 70–99)
GLUCOSE SERPL-MCNC: 87 MG/DL — SIGNIFICANT CHANGE UP (ref 70–99)
HCT VFR BLD CALC: 23.3 % — LOW (ref 39–50)
HGB BLD-MCNC: 7.6 G/DL — LOW (ref 13–17)
MCHC RBC-ENTMCNC: 32.6 G/DL — SIGNIFICANT CHANGE UP (ref 32–36)
MCHC RBC-ENTMCNC: 35.2 PG — HIGH (ref 27–34)
MCV RBC AUTO: 107.9 FL — HIGH (ref 80–100)
NRBC BLD AUTO-RTO: 0 /100 WBCS — SIGNIFICANT CHANGE UP (ref 0–0)
PLATELET # BLD AUTO: 241 K/UL — SIGNIFICANT CHANGE UP (ref 150–400)
POTASSIUM SERPL-MCNC: 3.8 MMOL/L — SIGNIFICANT CHANGE UP (ref 3.5–5.3)
POTASSIUM SERPL-SCNC: 3.8 MMOL/L — SIGNIFICANT CHANGE UP (ref 3.5–5.3)
RBC # BLD: 2.16 M/UL — LOW (ref 4.2–5.8)
RBC # FLD: 17.2 % — HIGH (ref 10.3–14.5)
SODIUM SERPL-SCNC: 136 MMOL/L — SIGNIFICANT CHANGE UP (ref 135–145)
WBC # BLD: 5.19 K/UL — SIGNIFICANT CHANGE UP (ref 3.8–10.5)
WBC # FLD AUTO: 5.19 K/UL — SIGNIFICANT CHANGE UP (ref 3.8–10.5)

## 2025-07-04 RX ORDER — LOSARTAN POTASSIUM 100 MG/1
100 TABLET, FILM COATED ORAL AT BEDTIME
Refills: 0 | Status: DISCONTINUED | OUTPATIENT
Start: 2025-07-04 | End: 2025-07-14

## 2025-07-04 RX ORDER — ASPIRIN 325 MG
81 TABLET ORAL DAILY
Refills: 0 | Status: DISCONTINUED | OUTPATIENT
Start: 2025-07-04 | End: 2025-07-14

## 2025-07-04 RX ADMIN — HEPARIN SODIUM 5000 UNIT(S): 1000 INJECTION INTRAVENOUS; SUBCUTANEOUS at 13:25

## 2025-07-04 RX ADMIN — FINASTERIDE 5 MILLIGRAM(S): 1 TABLET, FILM COATED ORAL at 11:10

## 2025-07-04 RX ADMIN — Medication 100 MILLIGRAM(S): at 06:36

## 2025-07-04 RX ADMIN — Medication 325 MILLIGRAM(S): at 17:07

## 2025-07-04 RX ADMIN — PYRIDOSTIGMINE BROMIDE 60 MILLIGRAM(S): 5 INJECTION, SOLUTION INTRAVENOUS; PARENTERAL at 10:18

## 2025-07-04 RX ADMIN — Medication 81 MILLIGRAM(S): at 11:10

## 2025-07-04 RX ADMIN — INSULIN LISPRO 2: 100 INJECTION, SOLUTION INTRAVENOUS; SUBCUTANEOUS at 16:57

## 2025-07-04 RX ADMIN — PYRIDOSTIGMINE BROMIDE 60 MILLIGRAM(S): 5 INJECTION, SOLUTION INTRAVENOUS; PARENTERAL at 17:07

## 2025-07-04 RX ADMIN — LABETALOL HYDROCHLORIDE 400 MILLIGRAM(S): 200 TABLET, FILM COATED ORAL at 06:35

## 2025-07-04 RX ADMIN — LABETALOL HYDROCHLORIDE 400 MILLIGRAM(S): 200 TABLET, FILM COATED ORAL at 21:27

## 2025-07-04 RX ADMIN — LOSARTAN POTASSIUM 100 MILLIGRAM(S): 100 TABLET, FILM COATED ORAL at 21:29

## 2025-07-04 RX ADMIN — Medication 1000 UNIT(S): at 11:10

## 2025-07-04 RX ADMIN — CYANOCOBALAMIN 1000 MICROGRAM(S): 1000 INJECTION INTRAMUSCULAR; SUBCUTANEOUS at 11:10

## 2025-07-04 RX ADMIN — AZATHIOPRINE 100 MILLIGRAM(S): 50 TABLET ORAL at 11:10

## 2025-07-04 RX ADMIN — PYRIDOSTIGMINE BROMIDE 60 MILLIGRAM(S): 5 INJECTION, SOLUTION INTRAVENOUS; PARENTERAL at 06:36

## 2025-07-04 RX ADMIN — PYRIDOSTIGMINE BROMIDE 60 MILLIGRAM(S): 5 INJECTION, SOLUTION INTRAVENOUS; PARENTERAL at 13:25

## 2025-07-04 RX ADMIN — INSULIN LISPRO 3: 100 INJECTION, SOLUTION INTRAVENOUS; SUBCUTANEOUS at 11:47

## 2025-07-04 RX ADMIN — HEPARIN SODIUM 5000 UNIT(S): 1000 INJECTION INTRAVENOUS; SUBCUTANEOUS at 06:35

## 2025-07-04 RX ADMIN — FOLIC ACID 1 MILLIGRAM(S): 1 TABLET ORAL at 11:10

## 2025-07-04 RX ADMIN — TAMSULOSIN HYDROCHLORIDE 0.4 MILLIGRAM(S): 0.4 CAPSULE ORAL at 21:28

## 2025-07-04 RX ADMIN — HEPARIN SODIUM 5000 UNIT(S): 1000 INJECTION INTRAVENOUS; SUBCUTANEOUS at 21:28

## 2025-07-04 RX ADMIN — LABETALOL HYDROCHLORIDE 400 MILLIGRAM(S): 200 TABLET, FILM COATED ORAL at 13:25

## 2025-07-04 RX ADMIN — PREDNISONE 20 MILLIGRAM(S): 20 TABLET ORAL at 06:35

## 2025-07-04 RX ADMIN — Medication 100 MILLIGRAM(S): at 13:25

## 2025-07-04 RX ADMIN — SERTRALINE 50 MILLIGRAM(S): 100 TABLET, FILM COATED ORAL at 11:10

## 2025-07-04 RX ADMIN — Medication 90 MILLIGRAM(S): at 06:35

## 2025-07-04 RX ADMIN — PYRIDOSTIGMINE BROMIDE 60 MILLIGRAM(S): 5 INJECTION, SOLUTION INTRAVENOUS; PARENTERAL at 01:11

## 2025-07-04 RX ADMIN — Medication 325 MILLIGRAM(S): at 06:35

## 2025-07-04 RX ADMIN — Medication 100 MILLIGRAM(S): at 21:28

## 2025-07-04 RX ADMIN — PYRIDOSTIGMINE BROMIDE 60 MILLIGRAM(S): 5 INJECTION, SOLUTION INTRAVENOUS; PARENTERAL at 21:28

## 2025-07-04 RX ADMIN — ATORVASTATIN CALCIUM 80 MILLIGRAM(S): 80 TABLET, FILM COATED ORAL at 21:27

## 2025-07-04 NOTE — PROGRESS NOTE ADULT - ASSESSMENT
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 Pt a/w hypertensive urgency and MICHEL.   Nephrology consulted for Elevated serum creatinine.    1. MICHEL- as per H& P; last SCr 1.3. MICHEL likely hemodynamically mediated due to uncontrolled HTN. Renal function improving  for which Losartan was resumed. Renal function stable.  Feurea 13.52%;   UA with 300 protein, no blood. FeNa 10%;  Renal US with no hydro.  TTE with grade 1 diastolic dysfunction, EF 70-75%; ?hypertensive vs infiltrative cardiomyopathy. SIFE neg & K/l wnl. Pt b/l pleural effusions on TTE. Check CXR;  Strict I/Os. Avoid nephrotoxins/ NSAIDs/ RCA. Monitor BMP.    2. Proteinuria- UA with 300 protein, no blood.  UPCr on 6/29,  0.48 g/day. No acute interventions at this time.    3. Hypertensive urgency- BP labile. c/w Labetalol 400mg PO q 8hrs. Consider Aldactone 25mg PO daily for resistant HTN. Will increase Losartan to 100 mg PO qhs due to elevated BP at night;   ?Consider decreasing frequency of pyridostigmine?- defer to Neuro   Rec Renal US with dopplers as an outpt to r/o HOANG (unavailable at (.    c/w Nifedipine ER 90mg p daily.  c/w low salt diet. Monitor BP  4. Hypokalemia- Kuldip/ Renin; 3.19. Not consistent with hypoaldosteronism. Renin 6.479.   Hypokalemia due to shifting from excessive insulin.   Recc aldactone 25mg PO daily. Check VBG. Monitor serum potassium  5. Iron deficiency anemia- low hgb. Check FOBT. tsat 16% with ferriitn 236- Finished Venofer 200mg IV qd x 3 doses. s/p Epo 10k SC x1 on 6/30. Monitor hgb    [facility] NEPHROLOGY  Bethel Smith M.D.  Guillermo Jimenez D.O.  Kathleen Lan M.D.  MD Kimi Rodríguez, MSN, ANP-C    Telephone: [phone]  Facsimile: [phone]    [facility] [place] [address], [address]  [place] [id number]

## 2025-07-04 NOTE — PROGRESS NOTE ADULT - SUBJECTIVE AND OBJECTIVE BOX
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 Pt a/w hypertensive urgency and MICHEL. Nephrology consulted for Elevated serum creatinine.    Hospital Medications: Medications reviewed.    REVIEW OF SYSTEMS:  Gen: no fever or chills  Cards: no chest pain  Resp: no dyspnea  GI: no nausea or vomiting or diarrhea  : no dysuria or hematuria  Vascular: no LE edema    VITALS:  T(F): 98.1 (07-04-25 @ 11:23), Max: 99 (07-03-25 @ 20:23)  HR: 66 (07-04-25 @ 13:17)  BP: 136/57 (07-04-25 @ 13:17)  RR: 18 (07-04-25 @ 11:23)  SpO2: 94% (07-04-25 @ 11:23)  Wt(kg): --    07-03 @ 07:01  -  07-04 @ 07:00  --------------------------------------------------------  IN: 0 mL / OUT: 725 mL / NET: -725 mL    07-04 @ 07:01  -  07-04 @ 15:33  --------------------------------------------------------  IN: 0 mL / OUT: 150 mL / NET: -150 mL      PHYSICAL EXAM:  Gen: NAD, calm  HEENT: +facial/ periorbital edema    Neck: no JVD  Cards: RRR, +S1/S2,+BOUBACAR  Resp: CTA B/L  GI: soft, NT/ND, NABS  : no CVA tenderness  Extremities: no edema B/L     LABS:  07-04    136  |  104  |  24[H]  ----------------------------<  87  3.8   |  28  |  1.74[H]    Ca    8.4      04 Jul 2025 06:25    TPro  4.9[L]  /  Alb      /  TBili      /  DBili      /  AST      /  ALT      /  AlkPhos      07-03    Creatinine Trend: 1.74 <--, 1.74 <--, 1.66 <--, 1.99 <--, 1.94 <--, 1.76 <--, 1.40 <--, 1.45 <--                        7.6    5.19  )-----------( 241      ( 04 Jul 2025 06:25 )             23.3     Urine Studies:  Urinalysis Basic - ( 04 Jul 2025 06:25 )    Color:  / Appearance:  / SG:  / pH:   Gluc: 87 mg/dL / Ketone:   / Bili:  / Urobili:    Blood:  / Protein:  / Nitrite:    Leuk Esterase:  / RBC:  / WBC    Sq Epi:  / Non Sq Epi:  / Bacteria:       Creatinine, Random Urine: 175 mg/dL (06-29 @ 11:45)  Protein/Creatinine Ratio Calculation: 0.5 Ratio (06-29 @ 11:45)  Creatinine, Random Urine: 127 mg/dL (06-28 @ 11:30)  Protein/Creatinine Ratio Calculation: 0.9 Ratio (06-28 @ 11:30)  Potassium, Random Urine: 43 mmol/L (06-28 @ 11:30)  Potassium, Random Urine: 50 mmol/L (06-27 @ 17:35)  Creatinine, Random Urine: 53 mg/dL (06-27 @ 17:35)

## 2025-07-04 NOTE — PROGRESS NOTE ADULT - SUBJECTIVE AND OBJECTIVE BOX
C A R D I O L O G Y  **********************************  DATE OF SERVICE: 07-04-25    resting in bed, no events    MEDICATIONS:  acetaminophen     Tablet .. 650 milliGRAM(s) Oral every 6 hours PRN  aspirin  chewable 81 milliGRAM(s) Oral daily  atorvastatin 80 milliGRAM(s) Oral at bedtime  azaTHIOprine 100 milliGRAM(s) Oral daily  cholecalciferol 1000 Unit(s) Oral daily  cyanocobalamin 1000 MICROGram(s) Oral daily  dextrose 5%. 1000 milliLiter(s) IV Continuous <Continuous>  dextrose 50% Injectable 25 Gram(s) IV Push once  dextrose Oral Gel 15 Gram(s) Oral once PRN  ferrous    sulfate 325 milliGRAM(s) Oral two times a day  finasteride 5 milliGRAM(s) Oral daily  folic acid 1 milliGRAM(s) Oral daily  heparin   Injectable 5000 Unit(s) SubCutaneous every 8 hours  hydrALAZINE 100 milliGRAM(s) Oral every 8 hours  insulin lispro (ADMELOG) corrective regimen sliding scale   SubCutaneous three times a day before meals  insulin lispro (ADMELOG) corrective regimen sliding scale   SubCutaneous at bedtime  labetalol 400 milliGRAM(s) Oral every 8 hours  losartan 25 milliGRAM(s) Oral at bedtime  NIFEdipine XL 90 milliGRAM(s) Oral daily  ondansetron Injectable 4 milliGRAM(s) IV Push every 8 hours PRN  predniSONE   Tablet 20 milliGRAM(s) Oral daily  pyridostigmine 60 milliGRAM(s) Oral <User Schedule>  sertraline 50 milliGRAM(s) Oral daily  tamsulosin 0.4 milliGRAM(s) Oral at bedtime      LABS:                        7.6    5.19  )-----------( 241      ( 04 Jul 2025 06:25 )             23.3       Hemoglobin: 7.6 g/dL (07-04 @ 06:25)  Hemoglobin: 7.6 g/dL (07-03 @ 05:32)  Hemoglobin: 8.5 g/dL (07-02 @ 06:16)  Hemoglobin: 7.9 g/dL (07-01 @ 07:04)  Hemoglobin: 7.4 g/dL (06-30 @ 05:42)      07-04    136  |  104  |  24[H]  ----------------------------<  87  3.8   |  28  |  1.74[H]    Ca    8.4      04 Jul 2025 06:25    TPro  4.9[L]  /  Alb  x   /  TBili  x   /  DBili  x   /  AST  x   /  ALT  x   /  AlkPhos  x   07-03    Creatinine Trend: 1.74<--, 1.74<--, 1.66<--, 1.99<--, 1.94<--, 1.76<--      PHYSICAL EXAM:  T(C): 37 (07-04-25 @ 07:51), Max: 37.2 (07-03-25 @ 20:23)  HR: 64 (07-04-25 @ 07:51) (63 - 68)  BP: 126/52 (07-04-25 @ 07:51) (126/52 - 215/71)  RR: 18 (07-04-25 @ 07:51) (18 - 18)  SpO2: 92% (07-04-25 @ 07:51) (92% - 97%)  Wt(kg): --    I&O's Summary    03 Jul 2025 07:01  -  04 Jul 2025 07:00  --------------------------------------------------------  IN: 0 mL / OUT: 725 mL / NET: -725 mL        HEENT:  (-)icterus (-)pallor  CV: N S1 S2 1/6 BOUBACAR (+)2 Pulses B/l  Resp:  Clear to ausculatation B/L, normal effort  GI: (+) BS Soft, NT, ND  Lymph:  (-)Edema, (-)obvious lymphadenopathy  Skin: Warm to touch, Normal turgor  Psych: Appropriate mood and affect      TELEMETRY: 	  sinus    < from: TTE W or WO Ultrasound Enhancing Agent (06.26.25 @ 12:04) >  _______________________________________________________________________________________     CONCLUSIONS:      1. Left ventricular wall thickness is severely increased. Left ventricular systolic function is hyperdynamic with an ejection fraction visually estimated at 70 to 75 %.   2. Differential includes hypertensive, hypertrophic, and infiltrative cardiomyopathy, among others. Consider cardiac MRI for further evaluation if clinically indicated.   3. There is mild (grade 1) left ventricular diastolic dysfunction.   4. Left atrium is mildly dilated.   5. Trace mitral regurgitation.   6. Trace tricuspid regurgitation.   7. Trace pulmonic regurgitation.   8. Trace pericardial effusion.   9. Bilateral pleural effusion noted.  10. No prior echocardiogram is available for comparison.    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 hyperaldosterone state  aldosterone appears wnl  -  TSH wnl   - plan to Add aldactone once seen by endocrine and appropriate blood testing is aquired   - w/u for secondary causes in progress 24 hour urine metanephrines  - would need Renal artery dopplers likely as an oupt since may not be done in house     # Abnormal EKG  - echo noted, suspect it is due to hypertensive heart disease given his profoundly elevated blood pressures however can arrange for outpt cardiac MRI     Veto Rios MD  Pager: [phone]  Office: [phone]

## 2025-07-04 NOTE — PROGRESS NOTE ADULT - SUBJECTIVE AND OBJECTIVE BOX
Interval Events:  pt in nad    Allergies    No Known Allergies    Intolerances      Endocrine/Metabolic Medications:  atorvastatin 80 milliGRAM(s) Oral at bedtime  dextrose 50% Injectable 25 Gram(s) IV Push once  dextrose Oral Gel 15 Gram(s) Oral once PRN  finasteride 5 milliGRAM(s) Oral daily  insulin lispro (ADMELOG) corrective regimen sliding scale   SubCutaneous three times a day before meals  insulin lispro (ADMELOG) corrective regimen sliding scale   SubCutaneous at bedtime  predniSONE   Tablet 20 milliGRAM(s) Oral daily      Vital Signs Last 24 Hrs  T(C): 36.7 (04 Jul 2025 11:23), Max: 37.2 (03 Jul 2025 20:23)  T(F): 98.1 (04 Jul 2025 11:23), Max: 99 (03 Jul 2025 20:23)  HR: 62 (04 Jul 2025 11:23) (62 - 68)  BP: 109/50 (04 Jul 2025 11:23) (109/50 - 215/71)  BP(mean): 68 (04 Jul 2025 04:50) (68 - 89)  RR: 18 (04 Jul 2025 11:23) (18 - 18)  SpO2: 94% (04 Jul 2025 11:23) (92% - 97%)    Parameters below as of 04 Jul 2025 11:23  Patient On (Oxygen Delivery Method): room air          PHYSICAL EXAM  All physical exam findings normal, except those marked:  General:	Alert, active, cooperative, NAD, well hydrated  .		[] Abnormal:  Neck		Normal: supple, no cervical adenopathy, no palpable thyroid  .		[] Abnormal:  Cardiovascular	Normal: regular rate, normal S1, S2, no murmurs  .		[] Abnormal:  Respiratory	Normal: no chest wall deformity, normal respiratory pattern, CTA B/L  .		[] Abnormal:  Abdominal	Normal: soft, ND, NT, bowel sounds present, no masses, no organomegaly  .		[] Abnormal:  		Normal normal genitalia, testes descended, circumcised/uncircumcised  .		Arvind stage:			Breast arvind:  .		Menstrual history:  .		[] Abnormal:  Extremities	Normal: FROM x4  .		[] Abnormal:  Skin		Normal: intact and not indurated, no rash, no acanthosis nigricans  .		[] Abnormal:  Neurologic	Normal: grossly intact  .		[] Abnormal:    LABS                        7.6    5.19  )-----------( 241      ( 04 Jul 2025 06:25 )             23.3                               136    |  104    |  24                  Calcium: 8.4   / iCa: x      (07-04 @ 06:25)    ----------------------------<  87        Magnesium: x                                3.8     |  28     |  1.74             Phosphorous: x          CAPILLARY BLOOD GLUCOSE      POCT Blood Glucose.: 282 mg/dL (04 Jul 2025 11:33)  POCT Blood Glucose.: 130 mg/dL (04 Jul 2025 07:31)  POCT Blood Glucose.: 117 mg/dL (03 Jul 2025 21:11)  POCT Blood Glucose.: 145 mg/dL (03 Jul 2025 17:01)  POCT Blood Glucose.: 200 mg/dL (03 Jul 2025 11:46)        Assesment/plan     71 y/o M, from [facility] ambulates with a walker, with PMHx of Myasthenia gravis s/p thymectomy, HTN, 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 Hgb 9.9. BUN/Cr 30/1.81. Admitted for MICHEL on CKD and uncontrolled HTN, Nephrology and Cardiology following.    Endocrinology consulted for further evaluation of uncontrolled HTN. Due to hx of MG with thymectomy, pt on chronic Prednisone 20 mg qd.     A1C: 6.3 %  BUN: 23  Creatinine: 1.66  GFR: 45  Weight: 62.1  BMI:   EF:     # Uncontrolled HTN  - pt noted with persistently labile and elevated HTN despite use of multiple anti-HTN agents  - pt on chronic Prednisone 20 mg qd for MG tx > ? exogenous Cushing Syndrome leading to persistently elevated, labile BP  - Renin mildly elev to 6.47, Aldosterone 13.7- Hyperaldosteronism ruled out  , consider renovascular HTN  - per Nephro, Recc Renal US with dopplers as an outpt to r/o HOANG.    - started on Aldactone 25 mg PO qd for BP  -  TSH 4.38 wnl   - pt denies any HA, palpitations (low suspicion for pheochromocytoma) - f/u Plasma mets and 24 hr urine for mets/cats    - continue to monitor BP at this time    #Diabetes Mellitus  - discontinue Lantus at bedtime  - c/w BILLIE ACHS  - Patient's fingerstick glucose goal is 100-180 mg/dL.    - Discharge recommendations to be discussed.

## 2025-07-04 NOTE — PROGRESS NOTE ADULT - SUBJECTIVE AND OBJECTIVE BOX
Patient is a 70y old  Male who presents with a chief complaint of MICHEL (2025 15:33)    PATIENT IS SEEN AND EXAMINED IN MEDICAL FLOOR.    NGT [    ]    ERVIN [   ]      GT [   ]    ALLERGIES:  No Known Allergies      Daily     Daily Weight in k.6 (2025 04:50)    VITALS:    Vital Signs Last 24 Hrs  T(C): 36.9 (2025 16:30), Max: 37.2 (2025 20:23)  T(F): 98.4 (2025 16:30), Max: 99 (2025 20:23)  HR: 64 (2025 16:30) (62 - 66)  BP: 142/70 (2025 16:30) (109/50 - 215/71)  BP(mean): 68 (2025 04:50) (68 - 89)  RR: 18 (2025 16:30) (18 - 18)  SpO2: 96% (2025 16:30) (92% - 96%)    Parameters below as of 2025 16:30  Patient On (Oxygen Delivery Method): room air        LABS:    CBC Full  -  ( 2025 06:25 )  WBC Count : 5.19 K/uL  RBC Count : 2.16 M/uL  Hemoglobin : 7.6 g/dL  Hematocrit : 23.3 %  Platelet Count - Automated : 241 K/uL  Mean Cell Volume : 107.9 fl  Mean Cell Hemoglobin : 35.2 pg  Mean Cell Hemoglobin Concentration : 32.6 g/dL  Auto Neutrophil # : x  Auto Lymphocyte # : x  Auto Monocyte # : x  Auto Eosinophil # : x  Auto Basophil # : x  Auto Neutrophil % : x  Auto Lymphocyte % : x  Auto Monocyte % : x  Auto Eosinophil % : x  Auto Basophil % : x      07-04    136  |  104  |  24[H]  ----------------------------<  87  3.8   |  28  |  1.74[H]    Ca    8.4      2025 06:25    TPro  4.9[L]  /  Alb  x   /  TBili  x   /  DBili  x   /  AST  x   /  ALT  x   /  AlkPhos  x   07-03    CAPILLARY BLOOD GLUCOSE      POCT Blood Glucose.: 282 mg/dL (2025 11:33)  POCT Blood Glucose.: 130 mg/dL (2025 07:31)  POCT Blood Glucose.: 117 mg/dL (2025 21:11)  POCT Blood Glucose.: 145 mg/dL (2025 17:01)        LIVER FUNCTIONS - ( 2025 05:32 )  Alb: x     / Pro: 4.9 g/dL / ALK PHOS: x     / ALT: x     / AST: x     / GGT: x           Creatinine Trend: 1.74<--, 1.74<--, 1.66<--, 1.99<--, 1.94<--, 1.76<--  I&O's Summary    2025 07:01  -  2025 07:00  --------------------------------------------------------  IN: 0 mL / OUT: 725 mL / NET: -725 mL    2025 07:01  -  2025 16:42  --------------------------------------------------------  IN: 0 mL / OUT: 150 mL / NET: -150 mL                MEDICATIONS:    MEDICATIONS  (STANDING):  aspirin  chewable 81 milliGRAM(s) Oral daily  atorvastatin 80 milliGRAM(s) Oral at bedtime  azaTHIOprine 100 milliGRAM(s) Oral daily  cholecalciferol 1000 Unit(s) Oral daily  cyanocobalamin 1000 MICROGram(s) Oral daily  dextrose 5%. 1000 milliLiter(s) (50 mL/Hr) IV Continuous <Continuous>  dextrose 50% Injectable 25 Gram(s) IV Push once  ferrous    sulfate 325 milliGRAM(s) Oral two times a day  finasteride 5 milliGRAM(s) Oral daily  folic acid 1 milliGRAM(s) Oral daily  heparin   Injectable 5000 Unit(s) SubCutaneous every 8 hours  hydrALAZINE 100 milliGRAM(s) Oral every 8 hours  insulin lispro (ADMELOG) corrective regimen sliding scale   SubCutaneous three times a day before meals  insulin lispro (ADMELOG) corrective regimen sliding scale   SubCutaneous at bedtime  labetalol 400 milliGRAM(s) Oral every 8 hours  losartan 100 milliGRAM(s) Oral at bedtime  NIFEdipine XL 90 milliGRAM(s) Oral daily  predniSONE   Tablet 20 milliGRAM(s) Oral daily  pyridostigmine 60 milliGRAM(s) Oral <User Schedule>  sertraline 50 milliGRAM(s) Oral daily  tamsulosin 0.4 milliGRAM(s) Oral at bedtime      MEDICATIONS  (PRN):  acetaminophen     Tablet .. 650 milliGRAM(s) Oral every 6 hours PRN Temp greater or equal to 38C (100.4F), Mild Pain (1 - 3)  dextrose Oral Gel 15 Gram(s) Oral once PRN Blood Glucose LESS THAN 70 milliGRAM(s)/deciliter  ondansetron Injectable 4 milliGRAM(s) IV Push every 8 hours PRN Nausea and/or Vomiting        REVIEW OF SYSTEMS:                           ALL ROS DONE [ X   ]      CONSTITUTIONAL:  LETHARGIC [   ], FEVER [   ], UNRESPONSIVE [   ]  CVS:  CP  [   ], SOB, [   ], PALPITATIONS [   ], DIZZYNESS [   ]  RS: COUGH [   ], SPUTUM [   ]  GI: ABDOMINAL PAIN [   ], NAUSEA [   ], VOMITINGS [   ], DIARRHEA [   ], CONSTIPATION [   ]  :  DYSURIA [   ], NOCTURIA [   ], INCREASED FREQUENCY [   ], DRIBLING [   ],  SKELETAL: PAINFUL JOINTS [   ], SWOLLEN JOINTS [   ], NECK ACHE [   ], LOW BACK ACHE [   ],  SKIN : ULCERS [   ], RASH [   ], ITCHING [   ]  CNS: HEAD ACHE [   ], DOUBLE VISION [   ], BLURRED VISION [   ], AMS / CONFUSION [   ], SEIZURES [   ], WEAKNESS [   ],TINGLING / NUMBNESS [   ]          PHYSICAL EXAMINATION:  GENERAL APPEARANCE: NO DISTRESS  HEENT:  NO PALLOR, NO  JVD,  NO   NODES, NECK SUPPLE  CVS: S1 +, S2 +,   RS: AEEB,  OCCASIONAL  RALES +,   NO RONCHI  ABD: SOFT, NT, NO, BS +  EXT: NO PE  SKIN: WARM,   SKELETAL:  ROM ACCEPTABLE  CNS:  AAO X 3        RADIOLOGY :  RADIOLOGY AND READINGS REVIEWED      ASSESSMENT :     Acute renal failure    HTN (hypertension)    DM (diabetes mellitus)    Myasthenia gravis    Hypercholesterolemia    CVA (cerebrovascular accident)    Peripheral neuropathy    BPH (benign prostatic hyperplasia)    Major depression    Lipoma of chest wall    MG with thymoma (myasthena gravis)    H/O cataract extraction        PLAN:  HPI:  Patient is a 70M, from 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 Patient reports he has been feeling nauseous for a few days but no vomiting. He reports chronic increased urinary frequency and sensation of incomplete voiding. He denies all other symptoms - fever, chills, cough, chest pain, SoB, palpitations abdominal pain, diarrhea, dysuria, arm or leg numbness or tingling. Reports regular brown stool - denies black or bloody stool.  (2025 21:32)    # CASE D/W PATIENT AND PARTNER MILLI DONOVAN AT BEDSIDE, ALL QUESTIONS ANSWERED. DISCUSSED RECOMMENDATIONS AS MADE BY MULTIDISCIPLINARY TEAM. DISCUSSED THAT PROGNOSIS IS GUARDED. THEY VERBALIZED UNDERSTANDING. IN DISCUSSION REGARDING GOC - PATIENT WISHES TO BE FULL CODE.    # HYPERTENSIVE URGENCY  # UNDERLYING HTN  - TELEMETRY  - ECHO - LV EF - 70 - 75%, G1DD  - NOTED TROPONINS  - S/P IV HYDRALAZINE  - HYDRALAZINE  - PROCARDIA  - LABETALOL  - RESUME LOSARTAN  - NOTED SERUM RENIN, ALDOSTERONE LEVELS  - F/U PLASMA METANEPHRINES  - CARDIOLOGY CONSULT  - NEPHROLOGY CONSULT  - ENDOCRINOLOGY CONSULT    # MICHEL ON LIKELY CKD  # BPH  - IV FLUIDS  - PVR - 0 ML  - MONITOR CR  - AVOID NEPHROTOXIC AGENTS  - NEPHROLOGY CONSULT    # HYPOGLYCEMIA - RESOLVED  - MONITORING FINGERSTICKS    # ANEMIA  - TREND HGB  - ANEMIA PANEL  - DENIES MELENA, HEMATOCHEZIA, HEMATEMESIS, HEMATURIA  - NOTED CT A/P  - GASTROENTEROLOGY CONSULT    # ? LYMPHADENOPATHY ON CT A/P   - F/U U/S ABDOMEN  - DEFER CT W/ CONTRAST GIVEN RENAL DYSFUNCTION  - PER FAMILY PATIENT MAY HAVE HARDWARE THAT IS NOT COMPATIBLE WITH MRI  - HEME/ONC CONSULT    # DENIES DYSURIA, UA NEGATIVE FOR LEUKOCYTE ESTERASE AND NITRITES      # MYASTHENIA GRAVIS S/P THYMECTOMY    # DM  - SSI + FS    # CVA W/ MILD RIGHT HP    # PAD  # GI AND DVT PPX    Dr. ADELA AMEZQUITA

## 2025-07-05 LAB
% ALBUMIN: 55.1 % — SIGNIFICANT CHANGE UP
% ALPHA 1: 6.5 % — SIGNIFICANT CHANGE UP
% ALPHA 2: 10.9 % — SIGNIFICANT CHANGE UP
% BETA: 12.3 % — SIGNIFICANT CHANGE UP
% GAMMA: 15.2 % — SIGNIFICANT CHANGE UP
ALBUMIN SERPL ELPH-MCNC: 2.7 G/DL — LOW (ref 3.6–5.5)
ALBUMIN/GLOB SERPL ELPH: 1.2 RATIO — SIGNIFICANT CHANGE UP
ALPHA1 GLOB SERPL ELPH-MCNC: 0.3 G/DL — SIGNIFICANT CHANGE UP (ref 0.1–0.4)
ALPHA2 GLOB SERPL ELPH-MCNC: 0.5 G/DL — SIGNIFICANT CHANGE UP (ref 0.5–1)
B-GLOBULIN SERPL ELPH-MCNC: 0.6 G/DL — SIGNIFICANT CHANGE UP (ref 0.5–1)
GAMMA GLOBULIN: 0.7 G/DL — SIGNIFICANT CHANGE UP (ref 0.6–1.6)
GLUCOSE BLDC GLUCOMTR-MCNC: 111 MG/DL — HIGH (ref 70–99)
GLUCOSE BLDC GLUCOMTR-MCNC: 115 MG/DL — HIGH (ref 70–99)
GLUCOSE BLDC GLUCOMTR-MCNC: 218 MG/DL — HIGH (ref 70–99)
GLUCOSE BLDC GLUCOMTR-MCNC: 263 MG/DL — HIGH (ref 70–99)
INTERPRETATION SERPL IFE-IMP: SIGNIFICANT CHANGE UP
PROT PATTERN SERPL ELPH-IMP: SIGNIFICANT CHANGE UP
PROT SERPL-MCNC: 4.9 G/DL — LOW (ref 6–8.3)

## 2025-07-05 RX ADMIN — AZATHIOPRINE 100 MILLIGRAM(S): 50 TABLET ORAL at 11:31

## 2025-07-05 RX ADMIN — FINASTERIDE 5 MILLIGRAM(S): 1 TABLET, FILM COATED ORAL at 11:31

## 2025-07-05 RX ADMIN — PYRIDOSTIGMINE BROMIDE 60 MILLIGRAM(S): 5 INJECTION, SOLUTION INTRAVENOUS; PARENTERAL at 21:30

## 2025-07-05 RX ADMIN — PYRIDOSTIGMINE BROMIDE 60 MILLIGRAM(S): 5 INJECTION, SOLUTION INTRAVENOUS; PARENTERAL at 05:27

## 2025-07-05 RX ADMIN — Medication 90 MILLIGRAM(S): at 05:27

## 2025-07-05 RX ADMIN — FOLIC ACID 1 MILLIGRAM(S): 1 TABLET ORAL at 11:31

## 2025-07-05 RX ADMIN — Medication 650 MILLIGRAM(S): at 05:17

## 2025-07-05 RX ADMIN — PYRIDOSTIGMINE BROMIDE 60 MILLIGRAM(S): 5 INJECTION, SOLUTION INTRAVENOUS; PARENTERAL at 04:05

## 2025-07-05 RX ADMIN — Medication 100 MILLIGRAM(S): at 05:27

## 2025-07-05 RX ADMIN — Medication 650 MILLIGRAM(S): at 04:11

## 2025-07-05 RX ADMIN — LABETALOL HYDROCHLORIDE 400 MILLIGRAM(S): 200 TABLET, FILM COATED ORAL at 21:30

## 2025-07-05 RX ADMIN — HEPARIN SODIUM 5000 UNIT(S): 1000 INJECTION INTRAVENOUS; SUBCUTANEOUS at 21:31

## 2025-07-05 RX ADMIN — Medication 81 MILLIGRAM(S): at 11:31

## 2025-07-05 RX ADMIN — PYRIDOSTIGMINE BROMIDE 60 MILLIGRAM(S): 5 INJECTION, SOLUTION INTRAVENOUS; PARENTERAL at 11:31

## 2025-07-05 RX ADMIN — Medication 325 MILLIGRAM(S): at 05:26

## 2025-07-05 RX ADMIN — PREDNISONE 20 MILLIGRAM(S): 20 TABLET ORAL at 05:27

## 2025-07-05 RX ADMIN — ATORVASTATIN CALCIUM 80 MILLIGRAM(S): 80 TABLET, FILM COATED ORAL at 21:31

## 2025-07-05 RX ADMIN — PYRIDOSTIGMINE BROMIDE 60 MILLIGRAM(S): 5 INJECTION, SOLUTION INTRAVENOUS; PARENTERAL at 16:53

## 2025-07-05 RX ADMIN — SERTRALINE 50 MILLIGRAM(S): 100 TABLET, FILM COATED ORAL at 11:37

## 2025-07-05 RX ADMIN — TAMSULOSIN HYDROCHLORIDE 0.4 MILLIGRAM(S): 0.4 CAPSULE ORAL at 21:31

## 2025-07-05 RX ADMIN — HEPARIN SODIUM 5000 UNIT(S): 1000 INJECTION INTRAVENOUS; SUBCUTANEOUS at 05:27

## 2025-07-05 RX ADMIN — CYANOCOBALAMIN 1000 MICROGRAM(S): 1000 INJECTION INTRAMUSCULAR; SUBCUTANEOUS at 11:31

## 2025-07-05 RX ADMIN — HEPARIN SODIUM 5000 UNIT(S): 1000 INJECTION INTRAVENOUS; SUBCUTANEOUS at 13:23

## 2025-07-05 RX ADMIN — LABETALOL HYDROCHLORIDE 400 MILLIGRAM(S): 200 TABLET, FILM COATED ORAL at 05:27

## 2025-07-05 RX ADMIN — INSULIN LISPRO 3: 100 INJECTION, SOLUTION INTRAVENOUS; SUBCUTANEOUS at 13:23

## 2025-07-05 RX ADMIN — INSULIN LISPRO 2: 100 INJECTION, SOLUTION INTRAVENOUS; SUBCUTANEOUS at 08:01

## 2025-07-05 RX ADMIN — LOSARTAN POTASSIUM 100 MILLIGRAM(S): 100 TABLET, FILM COATED ORAL at 21:31

## 2025-07-05 RX ADMIN — Medication 100 MILLIGRAM(S): at 21:30

## 2025-07-05 NOTE — PROGRESS NOTE ADULT - SUBJECTIVE AND OBJECTIVE BOX
Interval Events:  pt in nad    Allergies    No Known Allergies    Intolerances      Endocrine/Metabolic Medications:  atorvastatin 80 milliGRAM(s) Oral at bedtime  dextrose 50% Injectable 25 Gram(s) IV Push once  dextrose Oral Gel 15 Gram(s) Oral once PRN  finasteride 5 milliGRAM(s) Oral daily  insulin lispro (ADMELOG) corrective regimen sliding scale   SubCutaneous three times a day before meals  insulin lispro (ADMELOG) corrective regimen sliding scale   SubCutaneous at bedtime  predniSONE   Tablet 20 milliGRAM(s) Oral daily      Vital Signs Last 24 Hrs  T(C): 36.7 (05 Jul 2025 20:26), Max: 37.2 (04 Jul 2025 23:10)  T(F): 98.1 (05 Jul 2025 20:26), Max: 99 (04 Jul 2025 23:10)  HR: 61 (05 Jul 2025 20:26) (61 - 67)  BP: 223/78 (05 Jul 2025 20:26) (103/49 - 223/78)  BP(mean): --  RR: 18 (05 Jul 2025 20:26) (18 - 18)  SpO2: 99% (05 Jul 2025 20:26) (95% - 99%)    Parameters below as of 05 Jul 2025 20:26  Patient On (Oxygen Delivery Method): room air          PHYSICAL EXAM  All physical exam findings normal, except those marked:  General:	Alert, active, cooperative, NAD, well hydrated  .		[] Abnormal:  Neck		Normal: supple, no cervical adenopathy, no palpable thyroid  .		[] Abnormal:  Cardiovascular	Normal: regular rate, normal S1, S2, no murmurs  .		[] Abnormal:  Respiratory	Normal: no chest wall deformity, normal respiratory pattern, CTA B/L  .		[] Abnormal:  Abdominal	Normal: soft, ND, NT, bowel sounds present, no masses, no organomegaly  .		[] Abnormal:  		Normal normal genitalia, testes descended, circumcised/uncircumcised  .		Arvind stage:			Breast arvind:  .		Menstrual history:  .		[] Abnormal:  Extremities	Normal: FROM x4  .		[] Abnormal:  Skin		Normal: intact and not indurated, no rash, no acanthosis nigricans  .		[] Abnormal:  Neurologic	Normal: grossly intact  .		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 Hgb 9.9. BUN/Cr 30/1.81. Admitted for MICHEL on CKD and uncontrolled HTN, Nephrology and Cardiology following.    Endocrinology consulted for further evaluation of uncontrolled HTN. Due to hx of MG with thymectomy, pt on chronic Prednisone 20 mg qd.     A1C: 6.3 %    # Uncontrolled HTN  - pt noted with persistently labile and elevated HTN despite use of multiple anti-HTN agents  - pt on chronic Prednisone 20 mg qd for MG tx > ? exogenous Cushing Syndrome leading to persistently elevated, labile BP  - Renin mildly elev to 6.47, Aldosterone 13.7- Hyperaldosteronism ruled out  , consider renovascular HTN  - per Nephro, Recc Renal US with dopplers as an outpt to r/o HOANG.    - started on Aldactone 25 mg PO qd for BP  -  TSH 4.38 wnl   - pt denies any HA, palpitations (low suspicion for pheochromocytoma) - f/u Plasma mets and 24 hr urine for mets/cats    - continue to monitor BP at this time    #Diabetes Mellitus  - discontinue Lantus at bedtime  - c/w BILLIE ACHS  - Patient's fingerstick glucose goal is 100-180 mg/dL.    - Discharge recommendations to be discussed.

## 2025-07-05 NOTE — PROGRESS NOTE ADULT - SUBJECTIVE AND OBJECTIVE BOX
Patient is a 70y old  Male who presents with a chief complaint of MICHEL (2025 15:53)       Pt is seen and examined  pt is awake and lying in bed  pt seems comfortable and denies any complaints at this time          ROS:  Negative except for:    MEDICATIONS  (STANDING):  aspirin  chewable 81 milliGRAM(s) Oral daily  atorvastatin 80 milliGRAM(s) Oral at bedtime  azaTHIOprine 100 milliGRAM(s) Oral daily  cholecalciferol 1000 Unit(s) Oral daily  cyanocobalamin 1000 MICROGram(s) Oral daily  dextrose 5%. 1000 milliLiter(s) (50 mL/Hr) IV Continuous <Continuous>  dextrose 50% Injectable 25 Gram(s) IV Push once  ferrous    sulfate 325 milliGRAM(s) Oral two times a day  finasteride 5 milliGRAM(s) Oral daily  folic acid 1 milliGRAM(s) Oral daily  heparin   Injectable 5000 Unit(s) SubCutaneous every 8 hours  hydrALAZINE 100 milliGRAM(s) Oral every 8 hours  insulin lispro (ADMELOG) corrective regimen sliding scale   SubCutaneous three times a day before meals  insulin lispro (ADMELOG) corrective regimen sliding scale   SubCutaneous at bedtime  labetalol 400 milliGRAM(s) Oral every 8 hours  losartan 100 milliGRAM(s) Oral at bedtime  NIFEdipine XL 90 milliGRAM(s) Oral daily  predniSONE   Tablet 20 milliGRAM(s) Oral daily  pyridostigmine 60 milliGRAM(s) Oral <User Schedule>  sertraline 50 milliGRAM(s) Oral daily  tamsulosin 0.4 milliGRAM(s) Oral at bedtime    MEDICATIONS  (PRN):  acetaminophen     Tablet .. 650 milliGRAM(s) Oral every 6 hours PRN Temp greater or equal to 38C (100.4F), Mild Pain (1 - 3)  dextrose Oral Gel 15 Gram(s) Oral once PRN Blood Glucose LESS THAN 70 milliGRAM(s)/deciliter  ondansetron Injectable 4 milliGRAM(s) IV Push every 8 hours PRN Nausea and/or Vomiting      Allergies    No Known Allergies    Intolerances        Vital Signs Last 24 Hrs  T(C): 36.8 (2025 16:32), Max: 37.2 (2025 23:10)  T(F): 98.2 (2025 16:32), Max: 99 (2025 23:10)  HR: 63 (2025 16:32) (63 - 69)  BP: 206/75 (2025 16:32) (103/49 - 215/74)  BP(mean): --  RR: 18 (2025 16:32) (18 - 18)  SpO2: 98% (2025 16:32) (95% - 98%)    Parameters below as of 2025 16:32  Patient On (Oxygen Delivery Method): room air        PHYSICAL EXAM  General: adult in NAD  HEENT: clear oropharynx, anicteric sclera, pink conjunctiva  Neck: supple  CV: normal S1/S2 with no murmur rubs or gallops  Lungs: positive air movement b/l ant lungs,clear to auscultation, no wheezes, no rales  Abdomen: soft non-tender non-distended, no hepatosplenomegaly  Ext: no clubbing cyanosis or edema  Skin: no rashes and no petechiae  Neuro: alert and oriented X 4, no focal deficits  LABS:                          7.6    5.19  )-----------( 241      ( 2025 06:25 )             23.3         Mean Cell Volume : 107.9 fl  Mean Cell Hemoglobin : 35.2 pg  Mean Cell Hemoglobin Concentration : 32.6 g/dL  Auto Neutrophil # : x  Auto Lymphocyte # : x  Auto Monocyte # : x  Auto Eosinophil # : x  Auto Basophil # : x  Auto Neutrophil % : x  Auto Lymphocyte % : x  Auto Monocyte % : x  Auto Eosinophil % : x  Auto Basophil % : x    Serial CBC's   @ 06:25  Hct-23.3 / Hgb-7.6 / Plat-241 / RBC-2.16 / WBC-5.19          Serial CBC's   @ 05:32  Hct-22.9 / Hgb-7.6 / Plat-234 / RBC-2.14 / WBC-4.96          Serial CBC's   @ 06:16  Hct-25.4 / Hgb-8.5 / Plat-252 / RBC-2.41 / WBC-5.76                136  |  104  |  24[H]  ----------------------------<  87  3.8   |  28  |  1.74[H]    Ca    8.4      2025 06:25            WBC Count: 5.19 K/uL (25 @ 06:25)  Hemoglobin: 7.6 g/dL (25 @ 06:25)  Hematocrit: 23.3 % (25 @ 06:25)  Platelet Count - Automated: 241 K/uL (25 @ 06:25)  WBC Count: 4.96 K/uL (25 @ 05:32)  Hemoglobin: 7.6 g/dL (25 @ 05:32)  Hematocrit: 22.9 % (25 @ 05:32)  Platelet Count - Automated: 234 K/uL (25 @ 05:32)  WBC Count: 5.76 K/uL (25 @ 06:16)  Hemoglobin: 8.5 g/dL (25 @ 06:16)  Hematocrit: 25.4 % (25 @ 06:16)  Platelet Count - Automated: 252 K/uL (25 @ 06:16)  Vitamin B12, Serum: 1900 pg/mL (25 @ 12:40)  Folate, Serum: >20.0 ng/mL (25 @ 12:40)  WBC Count: 6.21 K/uL (25 @ 07:04)  Hemoglobin: 7.9 g/dL (25 @ 07:04)  Hematocrit: 24.7 % (25 @ 07:04)  Platelet Count - Automated: 228 K/uL (25 @ 07:04)  WBC Count: 5.38 K/uL (25 @ 05:42)  Hemoglobin: 7.4 g/dL (25 @ 05:42)  Hematocrit: 22.1 % (25 @ 05:42)  Platelet Count - Automated: 222 K/uL (25 @ 05:42)  Iron - Total Binding Capacity.: 154 ug/dL (25 @ 05:42)  Ferritin: 466 ng/mL (25 @ 05:42)  WBC Count: 7.38 K/uL (25 @ 12:42)  Hemoglobin: 8.1 g/dL (25 @ 12:42)  Hematocrit: 24.8 % (25 @ 12:42)  Platelet Count - Automated: 224 K/uL (25 @ 12:42)  WBC Count: 6.22 K/uL (25 @ 05:06)  Hemoglobin: 7.8 g/dL (25 @ 05:06)  Hematocrit: 23.5 % (25 @ 05:06)  Platelet Count - Automated: 233 K/uL (25 @ 05:06)  WBC Count: 6.75 K/uL (25 @ 11:36)  Hemoglobin: 9.1 g/dL (25 @ 11:36)  Hematocrit: 28.5 % (25 @ 11:36)  Platelet Count - Automated: 234 K/uL (25 @ 11:36)  WBC Count: 5.14 K/uL (25 @ 06:02)  Hemoglobin: 7.8 g/dL (25 @ 06:02)  Hematocrit: 24.2 % (25 @ 06:02)  Platelet Count - Automated: 203 K/uL (25 @ 06:02)  WBC Count: 6.60 K/uL (25 @ 05:10)  Hemoglobin: 9.8 g/dL (25 @ 05:10)  Hematocrit: 29.5 % (25 @ 05:10)  Platelet Count - Automated: 241 K/uL (25 @ 05:10)      Quantitative Ig mg/dL (:32)  Quantitative IgA: 292 mg/dL (:32)  Quantitative IgM: 91 mg/dL (:32)  BEAU Kappa: 3.89 mg/dL (:32)  BEAU Lambda: 4.06 mg/dL (:32)  Immunofixation, Serum: No Monoclonal Band Identified      Reference Range: None Detected (:32)  Serum Protein Electrophoresis Interp: Normal Electrophoresis Pattern (:32)  BEAU Kappa: 3.50 mg/dL (06-28 @ 06:02)  BEAU Lambda: 3.67 mg/dL ( @ 06:02)  Immunofixation, Serum:   No Monoclonal Band Identified      Reference Range: None Detected ( @ 06:02)            BLOOD SMEAR INTERPRETATION:       RADIOLOGY & ADDITIONAL STUDIES:

## 2025-07-05 NOTE — PROGRESS NOTE ADULT - ASSESSMENT
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 Pt a/w hypertensive urgency and MICHEL.   Nephrology consulted for Elevated serum creatinine.    1. MICHEL- as per H& P; last SCr 1.3. MICHEL likely hemodynamically mediated due to uncontrolled HTN. Renal function improving  for which Losartan was resumed. Renal function stable.  Feurea 13.52%;   UA with 300 protein, no blood. FeNa 10%;  Renal US with no hydro.  TTE with grade 1 diastolic dysfunction, EF 70-75%; ?hypertensive vs infiltrative cardiomyopathy. SIFE neg & K/l wnl. Pt b/l pleural effusions on TTE. Check CXR;  Strict I/Os. Avoid nephrotoxins/ NSAIDs/ RCA. Monitor BMP.    2. Proteinuria- UA with 300 protein, no blood.  UPCr on 6/29,  0.48 g/day. No acute interventions at this time.    3. Hypertensive urgency- BP labile. c/w Labetalol 400mg PO q 8hrs. Consider Aldactone 25mg PO daily for resistant HTN. c/w Losartan  100 mg PO qhs due to elevated BP at night; +nocturnal HTN; recc outpt sleep study to r/o BRET.   ?Consider decreasing frequency of pyridostigmine?- defer to Neuro   Rec Renal US with dopplers as an outpt to r/o HOANG ([facility]    c/w Nifedipine ER 90mg p daily.  c/w low salt diet. Monitor BP  4. Hypokalemia- Kuldip/ Renin; 3.19. Not consistent with hypoaldosteronism. Renin 6.479.   Hypokalemia due to shifting from excessive insulin.   Recc aldactone 25mg PO daily. Check VBG. Monitor serum potassium  5. Iron deficiency anemia- low hgb. Check FOBT. tsat 16% with ferriitn 236- Finished Venofer 200mg IV qd x 3 doses. s/p Epo 10k SC x1 on 6/30. Monitor hgb    [facility] NEPHROLOGY  Bethel Smith M.D.  JOSEPH Kendall M.D.  MD Kimi Rodríguez, MSN, ANP-C    Telephone: [phone]  Facsimile: [phone]    [facility] [place] [address], [address]  [place] [address]

## 2025-07-05 NOTE — PROGRESS NOTE ADULT - ASSESSMENT
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 Patient reports he has been feeling nauseous for a few days but no vomiting. He reports chronic increased urinary frequency and sensation of incomplete voiding. He denies all other symptoms - fever, chills, cough, chest pain, SoB, palpitations abdominal pain, diarrhea, dysuria, arm or leg numbness or tingling. Reports regular brown stool - denies black or bloody stool.  (25 Jun 2025 21:32) Hematology called to evaluate soft tissue swelling in the retroperitoneum vs overlapping blood vessels. Anemia not on admission < from: CT Abdomen and Pelvis No Cont (06.29.25 @ 23:36) >  Soft tissue densities within the retroperitoneum next to the aorta   concerning for extensive lymphadenopathy versus tortuous vessels or a   combination of both. The evaluation is limited due to the lack of IV   contrast. Recommend contrast enhanced cross-sectional imaging for better   assessment.  Problem # 1 Retroperitoneal mass Lymphadenopathy vs overlapping vessels  Recommend  either CT abd / pelvis with contrast or MRI abdomen w/ contrast or US abdomen to differentiate blood vessels vs LN's   -as a follow up Cr is not improved to enable CT w/ contrast -> recommend US abdomen - imaging can be ordered as an outpatient on return to his Nursing Home.  Leukemia / lymphoma testing U acid LDH SPEP    Problem # 2 Anemia   -Inc Ferritin and Fe sat 16% c/w ACD Vit B12 Folate and TSH all normal   -K/L ratio normal - recommend immunofixation:  Immunofixation, Serum: No Monoclonal Band Identified   -Pt has moderate to severe Macrocytic anemia suggestive of myelodysplastic anemia   Recommend bone marrow aspiration and biopsy as an outpatient.    Will follow. Thank you for the consult. For questions please call [phone]    Silver ROA

## 2025-07-05 NOTE — PROGRESS NOTE ADULT - SUBJECTIVE AND OBJECTIVE BOX
C A R D I O L O G Y  **********************************  DATE OF SERVICE: 07-05-25    Patient denies chest pain or shortness of breath.   Review of symptoms otherwise negative.    MEDICATIONS:  acetaminophen     Tablet .. 650 milliGRAM(s) Oral every 6 hours PRN  aspirin  chewable 81 milliGRAM(s) Oral daily  atorvastatin 80 milliGRAM(s) Oral at bedtime  azaTHIOprine 100 milliGRAM(s) Oral daily  cholecalciferol 1000 Unit(s) Oral daily  cyanocobalamin 1000 MICROGram(s) Oral daily  dextrose 5%. 1000 milliLiter(s) IV Continuous <Continuous>  dextrose 50% Injectable 25 Gram(s) IV Push once  dextrose Oral Gel 15 Gram(s) Oral once PRN  ferrous    sulfate 325 milliGRAM(s) Oral two times a day  finasteride 5 milliGRAM(s) Oral daily  folic acid 1 milliGRAM(s) Oral daily  heparin   Injectable 5000 Unit(s) SubCutaneous every 8 hours  hydrALAZINE 100 milliGRAM(s) Oral every 8 hours  insulin lispro (ADMELOG) corrective regimen sliding scale   SubCutaneous three times a day before meals  insulin lispro (ADMELOG) corrective regimen sliding scale   SubCutaneous at bedtime  labetalol 400 milliGRAM(s) Oral every 8 hours  losartan 100 milliGRAM(s) Oral at bedtime  NIFEdipine XL 90 milliGRAM(s) Oral daily  ondansetron Injectable 4 milliGRAM(s) IV Push every 8 hours PRN  predniSONE   Tablet 20 milliGRAM(s) Oral daily  pyridostigmine 60 milliGRAM(s) Oral <User Schedule>  sertraline 50 milliGRAM(s) Oral daily  tamsulosin 0.4 milliGRAM(s) Oral at bedtime      LABS:                        7.6    5.19  )-----------( 241      ( 04 Jul 2025 06:25 )             23.3       Hemoglobin: 7.6 g/dL (07-04 @ 06:25)  Hemoglobin: 7.6 g/dL (07-03 @ 05:32)  Hemoglobin: 8.5 g/dL (07-02 @ 06:16)  Hemoglobin: 7.9 g/dL (07-01 @ 07:04)      07-04    136  |  104  |  24[H]  ----------------------------<  87  3.8   |  28  |  1.74[H]    Ca    8.4      04 Jul 2025 06:25      Creatinine Trend: 1.74<--, 1.74<--, 1.66<--, 1.99<--, 1.94<--, 1.76<--      PHYSICAL EXAM:  T(C): 36.5 (07-05-25 @ 11:26), Max: 37.2 (07-04-25 @ 23:10)  HR: 67 (07-05-25 @ 11:26) (63 - 69)  BP: 128/49 (07-05-25 @ 11:26) (103/49 - 215/74)  RR: 189 (07-05-25 @ 11:26) (18 - 189)  SpO2: 96% (07-05-25 @ 11:26) (95% - 97%)  Wt(kg): --    I&O's Summary    04 Jul 2025 07:01  -  05 Jul 2025 07:00  --------------------------------------------------------  IN: 0 mL / OUT: 900 mL / NET: -900 mL            HEENT:  (-)icterus (-)pallor  CV: N S1 S2 1/6 BOUBACAR (+)2 Pulses B/l  Resp:  Clear to ausculatation B/L, normal effort  GI: (+) BS Soft, NT, ND  Lymph:  (-)Edema, (-)obvious lymphadenopathy  Skin: Warm to touch, Normal turgor  Psych: Appropriate mood and affect      TELEMETRY: 	  sinus    < from: TTE W or WO Ultrasound Enhancing Agent (06.26.25 @ 12:04) >  _______________________________________________________________________________________     CONCLUSIONS:      1. Left ventricular wall thickness is severely increased. Left ventricular systolic function is hyperdynamic with an ejection fraction visually estimated at 70 to 75 %.   2. Differential includes hypertensive, hypertrophic, and infiltrative cardiomyopathy, among others. Consider cardiac MRI for further evaluation if clinically indicated.   3. There is mild (grade 1) left ventricular diastolic dysfunction.   4. Left atrium is mildly dilated.   5. Trace mitral regurgitation.   6. Trace tricuspid regurgitation.   7. Trace pulmonic regurgitation.   8. Trace pericardial effusion.   9. Bilateral pleural effusion noted.  10. No prior echocardiogram is available for comparison.    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 hyperaldosterone state  aldosterone appears wnl, endo recs noted  -  TSH wnl   - plan to Add aldactone once seen by endocrine and appropriate blood testing is acquired   - w/u for secondary causes in progress 24 hour urine metanephrines  - would need Renal artery dopplers likely as an oupt since may not be done in house     # Abnormal EKG  - echo noted, suspect it is due to hypertensive heart disease given his profoundly elevated blood pressures however can arrange for outpt cardiac MRI     Veto Rios MD  Pager: [phone]  Office: [phone]

## 2025-07-05 NOTE — PROGRESS NOTE ADULT - SUBJECTIVE AND OBJECTIVE BOX
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 Pt a/w hypertensive urgency and MICHEL. Nephrology consulted for Elevated serum creatinine.    Hospital Medications: Medications reviewed.    REVIEW OF SYSTEMS:  Gen: no fever or chills  Cards: no chest pain  Resp: no dyspnea  GI: no nausea or vomiting or diarrhea  : no dysuria or hematuria  Vascular: no LE edema    VITALS:  T(F): 97.7 (07-05-25 @ 11:26), Max: 99 (07-04-25 @ 23:10)  HR: 67 (07-05-25 @ 11:26)  BP: 128/49 (07-05-25 @ 11:26)  RR: 18 (07-05-25 @ 11:26)  SpO2: 96% (07-05-25 @ 11:26)  Wt(kg): --    07-04 @ 07:01  -  07-05 @ 07:00  --------------------------------------------------------  IN: 0 mL / OUT: 900 mL / NET: -900 mL        PHYSICAL EXAM:  Gen: NAD, calm  HEENT: +facial/ periorbital edema    Neck: no JVD  Cards: RRR, +S1/S2,+BOUBACAR  Resp: CTA B/L  GI: soft, NT/ND, NABS  : no CVA tenderness  Extremities: no edema B/L     LABS:  07-04    136  |  104  |  24[H]  ----------------------------<  87  3.8   |  28  |  1.74[H]    Ca    8.4      04 Jul 2025 06:25      Creatinine Trend: 1.74 <--, 1.74 <--, 1.66 <--, 1.99 <--, 1.94 <--, 1.76 <--                        7.6    5.19  )-----------( 241      ( 04 Jul 2025 06:25 )             23.3     Urine Studies:  Urinalysis Basic - ( 04 Jul 2025 06:25 )    Color:  / Appearance:  / SG:  / pH:   Gluc: 87 mg/dL / Ketone:   / Bili:  / Urobili:    Blood:  / Protein:  / Nitrite:    Leuk Esterase:  / RBC:  / WBC    Sq Epi:  / Non Sq Epi:  / Bacteria:       Creatinine, Random Urine: 175 mg/dL (06-29 @ 11:45)  Protein/Creatinine Ratio Calculation: 0.5 Ratio (06-29 @ 11:45)

## 2025-07-05 NOTE — PROGRESS NOTE ADULT - SUBJECTIVE AND OBJECTIVE BOX
Patient is a 70y old  Male who presents with a chief complaint of MICHEL (2025 14:58)    PATIENT IS SEEN AND EXAMINED IN MEDICAL FLOOR.    NGT [    ]    ARCADIO [   ]      GT [   ]    ALLERGIES:  No Known Allergies      Daily     Daily Weight in k.6 (2025 04:40)    VITALS:    Vital Signs Last 24 Hrs  T(C): 36.5 (2025 11:26), Max: 37.2 (2025 23:10)  T(F): 97.7 (2025 11:26), Max: 99 (2025 23:10)  HR: 67 (2025 11:26) (63 - 69)  BP: 128/49 (2025 11:26) (103/49 - 215/74)  BP(mean): --  RR: 18 (2025 11:26) (18 - 18)  SpO2: 96% (2025 11:26) (95% - 97%)    Parameters below as of 2025 11:26  Patient On (Oxygen Delivery Method): room air        LABS:    CBC Full  -  ( 2025 06:25 )  WBC Count : 5.19 K/uL  RBC Count : 2.16 M/uL  Hemoglobin : 7.6 g/dL  Hematocrit : 23.3 %  Platelet Count - Automated : 241 K/uL  Mean Cell Volume : 107.9 fl  Mean Cell Hemoglobin : 35.2 pg  Mean Cell Hemoglobin Concentration : 32.6 g/dL  Auto Neutrophil # : x  Auto Lymphocyte # : x  Auto Monocyte # : x  Auto Eosinophil # : x  Auto Basophil # : x  Auto Neutrophil % : x  Auto Lymphocyte % : x  Auto Monocyte % : x  Auto Eosinophil % : x  Auto Basophil % : x      07-    136  |  104  |  24[H]  ----------------------------<  87  3.8   |  28  |  1.74[H]    Ca    8.4      2025 06:25      CAPILLARY BLOOD GLUCOSE      POCT Blood Glucose.: 263 mg/dL (2025 13:14)  POCT Blood Glucose.: 218 mg/dL (2025 07:49)  POCT Blood Glucose.: 120 mg/dL (2025 21:14)  POCT Blood Glucose.: 202 mg/dL (2025 16:50)          Creatinine Trend: 1.74<--, 1.74<--, 1.66<--, 1.99<--, 1.94<--, 1.76<--  I&O's Summary    2025 07:01  -  2025 07:00  --------------------------------------------------------  IN: 0 mL / OUT: 900 mL / NET: -900 mL                MEDICATIONS:    MEDICATIONS  (STANDING):  aspirin  chewable 81 milliGRAM(s) Oral daily  atorvastatin 80 milliGRAM(s) Oral at bedtime  azaTHIOprine 100 milliGRAM(s) Oral daily  cholecalciferol 1000 Unit(s) Oral daily  cyanocobalamin 1000 MICROGram(s) Oral daily  dextrose 5%. 1000 milliLiter(s) (50 mL/Hr) IV Continuous <Continuous>  dextrose 50% Injectable 25 Gram(s) IV Push once  ferrous    sulfate 325 milliGRAM(s) Oral two times a day  finasteride 5 milliGRAM(s) Oral daily  folic acid 1 milliGRAM(s) Oral daily  heparin   Injectable 5000 Unit(s) SubCutaneous every 8 hours  hydrALAZINE 100 milliGRAM(s) Oral every 8 hours  insulin lispro (ADMELOG) corrective regimen sliding scale   SubCutaneous three times a day before meals  insulin lispro (ADMELOG) corrective regimen sliding scale   SubCutaneous at bedtime  labetalol 400 milliGRAM(s) Oral every 8 hours  losartan 100 milliGRAM(s) Oral at bedtime  NIFEdipine XL 90 milliGRAM(s) Oral daily  predniSONE   Tablet 20 milliGRAM(s) Oral daily  pyridostigmine 60 milliGRAM(s) Oral <User Schedule>  sertraline 50 milliGRAM(s) Oral daily  tamsulosin 0.4 milliGRAM(s) Oral at bedtime      MEDICATIONS  (PRN):  acetaminophen     Tablet .. 650 milliGRAM(s) Oral every 6 hours PRN Temp greater or equal to 38C (100.4F), Mild Pain (1 - 3)  dextrose Oral Gel 15 Gram(s) Oral once PRN Blood Glucose LESS THAN 70 milliGRAM(s)/deciliter  ondansetron Injectable 4 milliGRAM(s) IV Push every 8 hours PRN Nausea and/or Vomiting        REVIEW OF SYSTEMS:                           ALL ROS DONE [ X   ]      CONSTITUTIONAL:  LETHARGIC [   ], FEVER [   ], UNRESPONSIVE [   ]  CVS:  CP  [   ], SOB, [   ], PALPITATIONS [   ], DIZZYNESS [   ]  RS: COUGH [   ], SPUTUM [   ]  GI: ABDOMINAL PAIN [   ], NAUSEA [   ], VOMITINGS [   ], DIARRHEA [   ], CONSTIPATION [   ]  :  DYSURIA [   ], NOCTURIA [   ], INCREASED FREQUENCY [   ], DRIBLING [   ],  SKELETAL: PAINFUL JOINTS [   ], SWOLLEN JOINTS [   ], NECK ACHE [   ], LOW BACK ACHE [   ],  SKIN : ULCERS [   ], RASH [   ], ITCHING [   ]  CNS: HEAD ACHE [   ], DOUBLE VISION [   ], BLURRED VISION [   ], AMS / CONFUSION [   ], SEIZURES [   ], WEAKNESS [   ],TINGLING / NUMBNESS [   ]      PHYSICAL EXAMINATION:    GENERAL APPEARANCE: NO DISTRESS  HEENT:  NO PALLOR, NO  JVD,  NO   NODES, NECK SUPPLE  CVS: S1 +, S2 +,   RS: AEEB,  OCCASIONAL  RALES +,   NO RONCHI  ABD: SOFT, NT, NO, BS +  EXT: NO PE  SKIN: WARM,   SKELETAL:  ROM ACCEPTABLE  CNS:  AAO X    ,   DEFICITS        RADIOLOGY :          ASSESSMENT :     Acute renal failure    HTN (hypertension)    DM (diabetes mellitus)    Myasthenia gravis    Hypercholesterolemia    CVA (cerebrovascular accident)    Peripheral neuropathy    BPH (benign prostatic hyperplasia)    Major depression    Lipoma of chest wall    MG with thymoma (myasthena gravis)    H/O cataract extraction        PLAN:  HPI:  Patient is a 70M, from [facility] ambulates with a walker, with PMHx of Myasthenia gravis s/p thymectomy, HTN, 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 Patient reports he has been feeling nauseous for a few days but no vomiting. He reports chronic increased urinary frequency and sensation of incomplete voiding. He denies all other symptoms - fever, chills, cough, chest pain, SoB, palpitations abdominal pain, diarrhea, dysuria, arm or leg numbness or tingling. Reports regular brown stool - denies black or bloody stool.  (2025 21:32)        PHYSICAL EXAMINATION:  GENERAL APPEARANCE: NO DISTRESS  HEENT:  NO PALLOR, NO  JVD,  NO   NODES, NECK SUPPLE  CVS: S1 +, S2 +,   RS: AEEB,  OCCASIONAL  RALES +,   NO RONCHI  ABD: SOFT, NT, NO, BS +  EXT: NO PE  SKIN: WARM,   SKELETAL:  ROM ACCEPTABLE  CNS:  AAO X 3        RADIOLOGY :  RADIOLOGY AND READINGS REVIEWED      < from: US Renal (25 @ 09:48) >  IMPRESSION:  1. No hydronephrosis.  2. Increased renal cortical echogenicity compatible with renal   parenchymal disease.  3. There is limited visualization of the urinary bladder; however, on   limited views, the wall appears diffusely thickened. Suggest correlation   with urinalysis to exclude infection.  4. Partially imaged hypoechoic structure posterior to the left kidney may   correspond to the lymphadenopathy questioned on the prior CT. Further   evaluation is recommended as clinically.    < end of copied text >  < from: CT Abdomen and Pelvis No Cont (25 @ 23:36) >  IMPRESSION:  No retroperitoneal hematoma.    Soft tissue densities within the retroperitoneum next to the aorta   concerning for extensive lymphadenopathy versus tortuous vessels or a   combination of both. The evaluation is limited due to the lack of IV   contrast. Recommend contrast enhanced cross-sectional imaging for better   assessment. Further workup for lymphoproliferative disease may be helpful.    Circumferential bladder wall thickening as can be seen with decompression   or component of cystitis.        Prelim: No obvious retroperitoneal hematoma. Gallbladder wall thickening.   Bladder wall thickening. Retroperitoneal/pelvic lymphadenopathy.   Persistent right > left pleural effusion. Small pericardial effusion.   Official report to follow.    < end of copied text >  < from: Xray Chest 1 View- PORTABLE-Urgent (Xray Chest 1 View- PORTABLE-Urgent .) (25 @ 16:53) >    IMPRESSION: Sternotomy, left loop recorder, cervical spine hardware   noted. Increasing atelectatic consolidation of left lower lobe with   effusion.    < end of copied text >        ASSESSMENT :     Acute renal failure    HTN (hypertension)    DM (diabetes mellitus)    Myasthenia gravis    Hypercholesterolemia    CVA (cerebrovascular accident)    Peripheral neuropathy    BPH (benign prostatic hyperplasia)    Major depression    Lipoma of chest wall    MG with thymoma (myasthena gravis)    H/O cataract extraction        PLAN:  HPI:  Patient is a 70M, from [facility] ambulates with a walker, with PMHx of Myasthenia gravis s/p thymectomy, HTN, 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 Patient reports he has been feeling nauseous for a few days but no vomiting. He reports chronic increased urinary frequency and sensation of incomplete voiding. He denies all other symptoms - fever, chills, cough, chest pain, SoB, palpitations abdominal pain, diarrhea, dysuria, arm or leg numbness or tingling. Reports regular brown stool - denies black or bloody stool.  (2025 21:32)          # CASE D/W PATIENT AND PARTNER MILLI DONOVAN AT BEDSIDE, ALL QUESTIONS ANSWERED. DISCUSSED RECOMMENDATIONS AS MADE BY MULTIDISCIPLINARY TEAM. DISCUSSED THAT PROGNOSIS IS GUARDED. THEY VERBALIZED UNDERSTANDING. IN DISCUSSION REGARDING GOC - PATIENT WISHES TO BE FULL CODE.        # HYPERTENSIVE URGENCY    # HTN - CONTROLLED   - TELEMETRY  - ECHO - LV EF - 70 - 75%, G1DD  - NOTED TROPONINS  - S/P IV HYDRALAZINE  - HYDRALAZINE  - PROCARDIA  - LABETALOL  - RESUME LOSARTAN  - NOTED SERUM RENIN, ALDOSTERONE LEVELS  - F/U PLASMA METANEPHRINES  - CARDIOLOGY CONSULT  - NEPHROLOGY CONSULT  - ENDOCRINOLOGY CONSULT    # H/O CARDIAC ARRHYTHMIA - LOOP RECORDER IN PLACE  # H/O STERNOTOMY       # MICHEL ON LIKELY CKD  # BPH  - IV FLUIDS  - PVR - 0 ML  - MONITOR CR  - AVOID NEPHROTOXIC AGENTS  - NEPHROLOGY CONSULT    # HYPOGLYCEMIA - RESOLVED  - MONITORING FINGERSTICKS    # ANEMIA  - TREND HGB  - ANEMIA PANEL  - DENIES MELENA, HEMATOCHEZIA, HEMATEMESIS, HEMATURIA  - NOTED CT A/P  - GASTROENTEROLOGY CONSULT    # ? LYMPHADENOPATHY ON CT A/P   - F/U U/S ABDOMEN  - DEFER CT W/ CONTRAST GIVEN RENAL DYSFUNCTION  - PER FAMILY PATIENT MAY HAVE HARDWARE THAT IS NOT COMPATIBLE WITH MRI  - HEME/ONC CONSULT    # DENIES DYSURIA, UA NEGATIVE FOR LEUKOCYTE ESTERASE AND NITRITES      # MYASTHENIA GRAVIS S/P THYMECTOMY    # DM, DIABETIC NEPHROPATHY, DIABETIC RETINOPATHY, DIABETIC PERIPHERAL NEUROPATHY  - SSI + FS    # CVA W/ MILD RIGHT HP    # IMPAIRED GAIT DUE TO GENERALIZED MUSCLE WEAKNESS, CVA, MYASTHENIA GRAVIS, CERVICAL SPINAL STENOSIS - H/O CERVICAL SPINE INSTRUMENTATION - HARDWARE IN PLACE     # PAD  # GI AND DVT PPX    Dr. ADELA AMEZQUITA

## 2025-07-06 LAB
ANION GAP SERPL CALC-SCNC: 4 MMOL/L — LOW (ref 5–17)
BUN SERPL-MCNC: 30 MG/DL — HIGH (ref 7–18)
CALCIUM SERPL-MCNC: 8.7 MG/DL — SIGNIFICANT CHANGE UP (ref 8.4–10.5)
CHLORIDE SERPL-SCNC: 104 MMOL/L — SIGNIFICANT CHANGE UP (ref 96–108)
CO2 SERPL-SCNC: 29 MMOL/L — SIGNIFICANT CHANGE UP (ref 22–31)
CREAT SERPL-MCNC: 1.68 MG/DL — HIGH (ref 0.5–1.3)
EGFR: 44 ML/MIN/1.73M2 — LOW
EGFR: 44 ML/MIN/1.73M2 — LOW
GLUCOSE BLDC GLUCOMTR-MCNC: 114 MG/DL — HIGH (ref 70–99)
GLUCOSE BLDC GLUCOMTR-MCNC: 128 MG/DL — HIGH (ref 70–99)
GLUCOSE BLDC GLUCOMTR-MCNC: 191 MG/DL — HIGH (ref 70–99)
GLUCOSE BLDC GLUCOMTR-MCNC: 250 MG/DL — HIGH (ref 70–99)
GLUCOSE SERPL-MCNC: 86 MG/DL — SIGNIFICANT CHANGE UP (ref 70–99)
HCT VFR BLD CALC: 24.2 % — LOW (ref 39–50)
HGB BLD-MCNC: 7.8 G/DL — LOW (ref 13–17)
MCHC RBC-ENTMCNC: 32.2 G/DL — SIGNIFICANT CHANGE UP (ref 32–36)
MCHC RBC-ENTMCNC: 34.7 PG — HIGH (ref 27–34)
MCV RBC AUTO: 107.6 FL — HIGH (ref 80–100)
NRBC BLD AUTO-RTO: 0 /100 WBCS — SIGNIFICANT CHANGE UP (ref 0–0)
PLATELET # BLD AUTO: 259 K/UL — SIGNIFICANT CHANGE UP (ref 150–400)
POTASSIUM SERPL-MCNC: 4 MMOL/L — SIGNIFICANT CHANGE UP (ref 3.5–5.3)
POTASSIUM SERPL-SCNC: 4 MMOL/L — SIGNIFICANT CHANGE UP (ref 3.5–5.3)
RBC # BLD: 2.25 M/UL — LOW (ref 4.2–5.8)
RBC # FLD: 17.6 % — HIGH (ref 10.3–14.5)
SODIUM SERPL-SCNC: 137 MMOL/L — SIGNIFICANT CHANGE UP (ref 135–145)
WBC # BLD: 5.74 K/UL — SIGNIFICANT CHANGE UP (ref 3.8–10.5)
WBC # FLD AUTO: 5.74 K/UL — SIGNIFICANT CHANGE UP (ref 3.8–10.5)

## 2025-07-06 RX ORDER — EPOETIN ALFA 10000 [IU]/ML
10000 SOLUTION INTRAVENOUS; SUBCUTANEOUS ONCE
Refills: 0 | Status: COMPLETED | OUTPATIENT
Start: 2025-07-06 | End: 2025-07-06

## 2025-07-06 RX ORDER — NIFEDIPINE 30 MG
90 TABLET, EXTENDED RELEASE 24 HR ORAL
Refills: 0 | Status: DISCONTINUED | OUTPATIENT
Start: 2025-07-07 | End: 2025-07-14

## 2025-07-06 RX ADMIN — INSULIN LISPRO 1: 100 INJECTION, SOLUTION INTRAVENOUS; SUBCUTANEOUS at 07:50

## 2025-07-06 RX ADMIN — LOSARTAN POTASSIUM 100 MILLIGRAM(S): 100 TABLET, FILM COATED ORAL at 21:10

## 2025-07-06 RX ADMIN — FINASTERIDE 5 MILLIGRAM(S): 1 TABLET, FILM COATED ORAL at 11:21

## 2025-07-06 RX ADMIN — CYANOCOBALAMIN 1000 MICROGRAM(S): 1000 INJECTION INTRAMUSCULAR; SUBCUTANEOUS at 11:20

## 2025-07-06 RX ADMIN — Medication 325 MILLIGRAM(S): at 17:31

## 2025-07-06 RX ADMIN — PYRIDOSTIGMINE BROMIDE 60 MILLIGRAM(S): 5 INJECTION, SOLUTION INTRAVENOUS; PARENTERAL at 23:51

## 2025-07-06 RX ADMIN — PYRIDOSTIGMINE BROMIDE 60 MILLIGRAM(S): 5 INJECTION, SOLUTION INTRAVENOUS; PARENTERAL at 21:10

## 2025-07-06 RX ADMIN — Medication 1000 UNIT(S): at 11:20

## 2025-07-06 RX ADMIN — SERTRALINE 50 MILLIGRAM(S): 100 TABLET, FILM COATED ORAL at 11:21

## 2025-07-06 RX ADMIN — PYRIDOSTIGMINE BROMIDE 60 MILLIGRAM(S): 5 INJECTION, SOLUTION INTRAVENOUS; PARENTERAL at 09:54

## 2025-07-06 RX ADMIN — FOLIC ACID 1 MILLIGRAM(S): 1 TABLET ORAL at 11:22

## 2025-07-06 RX ADMIN — HEPARIN SODIUM 5000 UNIT(S): 1000 INJECTION INTRAVENOUS; SUBCUTANEOUS at 14:10

## 2025-07-06 RX ADMIN — Medication 100 MILLIGRAM(S): at 05:26

## 2025-07-06 RX ADMIN — Medication 90 MILLIGRAM(S): at 05:28

## 2025-07-06 RX ADMIN — PYRIDOSTIGMINE BROMIDE 60 MILLIGRAM(S): 5 INJECTION, SOLUTION INTRAVENOUS; PARENTERAL at 01:39

## 2025-07-06 RX ADMIN — PYRIDOSTIGMINE BROMIDE 60 MILLIGRAM(S): 5 INJECTION, SOLUTION INTRAVENOUS; PARENTERAL at 05:27

## 2025-07-06 RX ADMIN — INSULIN LISPRO 2: 100 INJECTION, SOLUTION INTRAVENOUS; SUBCUTANEOUS at 12:02

## 2025-07-06 RX ADMIN — ATORVASTATIN CALCIUM 80 MILLIGRAM(S): 80 TABLET, FILM COATED ORAL at 21:10

## 2025-07-06 RX ADMIN — PREDNISONE 20 MILLIGRAM(S): 20 TABLET ORAL at 05:26

## 2025-07-06 RX ADMIN — HEPARIN SODIUM 5000 UNIT(S): 1000 INJECTION INTRAVENOUS; SUBCUTANEOUS at 21:11

## 2025-07-06 RX ADMIN — EPOETIN ALFA 10000 UNIT(S): 10000 SOLUTION INTRAVENOUS; SUBCUTANEOUS at 14:10

## 2025-07-06 RX ADMIN — PYRIDOSTIGMINE BROMIDE 60 MILLIGRAM(S): 5 INJECTION, SOLUTION INTRAVENOUS; PARENTERAL at 14:11

## 2025-07-06 RX ADMIN — Medication 81 MILLIGRAM(S): at 11:20

## 2025-07-06 RX ADMIN — Medication 325 MILLIGRAM(S): at 05:26

## 2025-07-06 RX ADMIN — AZATHIOPRINE 100 MILLIGRAM(S): 50 TABLET ORAL at 12:02

## 2025-07-06 RX ADMIN — LABETALOL HYDROCHLORIDE 400 MILLIGRAM(S): 200 TABLET, FILM COATED ORAL at 14:11

## 2025-07-06 RX ADMIN — LABETALOL HYDROCHLORIDE 400 MILLIGRAM(S): 200 TABLET, FILM COATED ORAL at 05:26

## 2025-07-06 RX ADMIN — HEPARIN SODIUM 5000 UNIT(S): 1000 INJECTION INTRAVENOUS; SUBCUTANEOUS at 05:27

## 2025-07-06 RX ADMIN — TAMSULOSIN HYDROCHLORIDE 0.4 MILLIGRAM(S): 0.4 CAPSULE ORAL at 21:10

## 2025-07-06 RX ADMIN — Medication 100 MILLIGRAM(S): at 14:11

## 2025-07-06 RX ADMIN — Medication 650 MILLIGRAM(S): at 08:59

## 2025-07-06 RX ADMIN — LABETALOL HYDROCHLORIDE 400 MILLIGRAM(S): 200 TABLET, FILM COATED ORAL at 21:11

## 2025-07-06 RX ADMIN — Medication 650 MILLIGRAM(S): at 07:59

## 2025-07-06 RX ADMIN — PYRIDOSTIGMINE BROMIDE 60 MILLIGRAM(S): 5 INJECTION, SOLUTION INTRAVENOUS; PARENTERAL at 17:31

## 2025-07-06 RX ADMIN — Medication 100 MILLIGRAM(S): at 21:20

## 2025-07-06 NOTE — PROGRESS NOTE ADULT - SUBJECTIVE AND OBJECTIVE BOX
[facility]  **********************************  DATE OF SERVICE: 07-06-25    Resting in bed, no overnight events    MEDICATIONS:  acetaminophen     Tablet .. 650 milliGRAM(s) Oral every 6 hours PRN  aspirin  chewable 81 milliGRAM(s) Oral daily  atorvastatin 80 milliGRAM(s) Oral at bedtime  azaTHIOprine 100 milliGRAM(s) Oral daily  cholecalciferol 1000 Unit(s) Oral daily  cyanocobalamin 1000 MICROGram(s) Oral daily  dextrose 5%. 1000 milliLiter(s) IV Continuous <Continuous>  dextrose 50% Injectable 25 Gram(s) IV Push once  dextrose Oral Gel 15 Gram(s) Oral once PRN  ferrous    sulfate 325 milliGRAM(s) Oral two times a day  finasteride 5 milliGRAM(s) Oral daily  folic acid 1 milliGRAM(s) Oral daily  heparin   Injectable 5000 Unit(s) SubCutaneous every 8 hours  hydrALAZINE 100 milliGRAM(s) Oral every 8 hours  insulin lispro (ADMELOG) corrective regimen sliding scale   SubCutaneous three times a day before meals  insulin lispro (ADMELOG) corrective regimen sliding scale   SubCutaneous at bedtime  labetalol 400 milliGRAM(s) Oral every 8 hours  losartan 100 milliGRAM(s) Oral at bedtime  NIFEdipine XL 90 milliGRAM(s) Oral daily  ondansetron Injectable 4 milliGRAM(s) IV Push every 8 hours PRN  predniSONE   Tablet 20 milliGRAM(s) Oral daily  pyridostigmine 60 milliGRAM(s) Oral <User Schedule>  sertraline 50 milliGRAM(s) Oral daily  tamsulosin 0.4 milliGRAM(s) Oral at bedtime      LABS:                        7.8    5.74  )-----------( 259      ( 06 Jul 2025 05:44 )             24.2       Hemoglobin: 7.8 g/dL (07-06 @ 05:44)  Hemoglobin: 7.6 g/dL (07-04 @ 06:25)  Hemoglobin: 7.6 g/dL (07-03 @ 05:32)  Hemoglobin: 8.5 g/dL (07-02 @ 06:16)      07-06    137  |  104  |  30[H]  ----------------------------<  86  4.0   |  29  |  1.68[H]    Ca    8.7      06 Jul 2025 05:44      Creatinine Trend: 1.68<--, 1.74<--, 1.74<--, 1.66<--, 1.99<--, 1.94<--    COAGS:           PHYSICAL EXAM:  T(C): 36.8 (07-06-25 @ 08:08), Max: 36.9 (07-06-25 @ 00:23)  HR: 59 (07-06-25 @ 08:08) (59 - 70)  BP: 121/56 (07-06-25 @ 08:08) (121/56 - 223/78)  RR: 18 (07-06-25 @ 08:08) (18 - 19)  SpO2: 93% (07-06-25 @ 08:08) (93% - 99%)  Wt(kg): --    I&O's Summary    05 Jul 2025 07:01  -  06 Jul 2025 07:00  --------------------------------------------------------  IN: 0 mL / OUT: 600 mL / NET: -600 mL            HEENT:  (-)icterus (-)pallor  CV: N S1 S2 1/6 BOUBACAR (+)2 Pulses B/l  Resp:  Clear to ausculatation B/L, normal effort  GI: (+) BS Soft, NT, ND  Lymph:  (-)Edema, (-)obvious lymphadenopathy  Skin: Warm to touch, Normal turgor  Psych: Appropriate mood and affect      TELEMETRY: 	  sinus    < from: TTE W or WO Ultrasound Enhancing Agent (06.26.25 @ 12:04) >  _______________________________________________________________________________________     CONCLUSIONS:      1. Left ventricular wall thickness is severely increased. Left ventricular systolic function is hyperdynamic with an ejection fraction visually estimated at 70 to 75 %.   2. Differential includes hypertensive, hypertrophic, and infiltrative cardiomyopathy, among others. Consider cardiac MRI for further evaluation if clinically indicated.   3. There is mild (grade 1) left ventricular diastolic dysfunction.   4. Left atrium is mildly dilated.   5. Trace mitral regurgitation.   6. Trace tricuspid regurgitation.   7. Trace pulmonic regurgitation.   8. Trace pericardial effusion.   9. Bilateral pleural effusion noted.  10. No prior echocardiogram is available for comparison.    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 hyperaldosterone state  aldosterone appears wnl, endo recs noted  -  TSH wnl   - plan to Add aldactone once seen by endocrine and appropriate blood testing is acquired   - w/u for secondary causes in progress 24 hour urine metanephrines  - would need Renal artery dopplers likely as an oupt since may not be done in house     # Abnormal EKG  - echo noted, suspect it is due to hypertensive heart disease given his profoundly elevated blood pressures however can arrange for outpt cardiac MRI     Veto Rios MD  Pager: [phone]  Office: [phone]

## 2025-07-06 NOTE — PROGRESS NOTE ADULT - SUBJECTIVE AND OBJECTIVE BOX
[facility] NEPHROLOGY- PROGRESS NOTE    69 yo Male from [facility] ambulates with a walker, with PMHx of Myasthenia gravis s/p thymectomy, HTN, 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 Pt a/w hypertensive urgency and MICHEL. Nephrology consulted for Elevated serum creatinine.    Hospital Medications: Medications reviewed.    REVIEW OF SYSTEMS:  Gen: no fever or chills  Cards: no chest pain  Resp: no dyspnea  GI: no nausea or vomiting or diarrhea  : no dysuria or hematuria  Vascular: no LE edema    VITALS:  T(F): 97.9 (07-06-25 @ 11:22), Max: 98.4 (07-06-25 @ 00:23)  HR: 64 (07-06-25 @ 11:22)  BP: 120/53 (07-06-25 @ 11:22)  RR: 18 (07-06-25 @ 11:22)  SpO2: 96% (07-06-25 @ 11:22)  Wt(kg): --    07-05 @ 07:01  -  07-06 @ 07:00  --------------------------------------------------------  IN: 0 mL / OUT: 600 mL / NET: -600 mL        PHYSICAL EXAM:  Gen: NAD, calm  HEENT: +facial/ periorbital edema    Neck: no JVD  Cards: RRR, +S1/S2,+BOUBACAR  Resp: CTA B/L  GI: soft, NT/ND, NABS  : no CVA tenderness  Extremities: no edema B/L     LABS:  07-06    137  |  104  |  30[H]  ----------------------------<  86  4.0   |  29  |  1.68[H]    Ca    8.7      06 Jul 2025 05:44      Creatinine Trend: 1.68 <--, 1.74 <--, 1.74 <--, 1.66 <--, 1.99 <--, 1.94 <--                        7.8    5.74  )-----------( 259      ( 06 Jul 2025 05:44 )             24.2     Urine Studies:  Urinalysis Basic - ( 06 Jul 2025 05:44 )    Color:  / Appearance:  / SG:  / pH:   Gluc: 86 mg/dL / Ketone:   / Bili:  / Urobili:    Blood:  / Protein:  / Nitrite:    Leuk Esterase:  / RBC:  / WBC    Sq Epi:  / Non Sq Epi:  / Bacteria:

## 2025-07-06 NOTE — PROGRESS NOTE ADULT - SUBJECTIVE AND OBJECTIVE BOX
Patient is a 70y old  Male who presents with a chief complaint of MICHEL (2025 11:13)    PATIENT IS SEEN AND EXAMINED IN MEDICAL FLOOR.    NGT [    ]    ERVIN [   ]      GT [   ]    ALLERGIES:  No Known Allergies      Daily     Daily Weight in k (2025 05:25)    VITALS:    Vital Signs Last 24 Hrs  T(C): 36.8 (2025 08:08), Max: 36.9 (2025 00:23)  T(F): 98.2 (2025 08:08), Max: 98.4 (2025 00:23)  HR: 59 (2025 08:08) (59 - 70)  BP: 121/56 (2025 08:08) (121/56 - 223/78)  BP(mean): 117 (2025 05:25) (104 - 117)  RR: 18 (2025 08:08) (18 - 19)  SpO2: 93% (2025 08:08) (93% - 99%)    Parameters below as of 2025 08:08  Patient On (Oxygen Delivery Method): room air        LABS:    CBC Full  -  ( 2025 05:44 )  WBC Count : 5.74 K/uL  RBC Count : 2.25 M/uL  Hemoglobin : 7.8 g/dL  Hematocrit : 24.2 %  Platelet Count - Automated : 259 K/uL  Mean Cell Volume : 107.6 fl  Mean Cell Hemoglobin : 34.7 pg  Mean Cell Hemoglobin Concentration : 32.2 g/dL  Auto Neutrophil # : x  Auto Lymphocyte # : x  Auto Monocyte # : x  Auto Eosinophil # : x  Auto Basophil # : x  Auto Neutrophil % : x  Auto Lymphocyte % : x  Auto Monocyte % : x  Auto Eosinophil % : x  Auto Basophil % : x      07-06    137  |  104  |  30[H]  ----------------------------<  86  4.0   |  29  |  1.68[H]    Ca    8.7      2025 05:44      CAPILLARY BLOOD GLUCOSE      POCT Blood Glucose.: 191 mg/dL (2025 07:39)  POCT Blood Glucose.: 111 mg/dL (2025 20:53)  POCT Blood Glucose.: 115 mg/dL (2025 16:53)  POCT Blood Glucose.: 263 mg/dL (2025 13:14)          Creatinine Trend: 1.68<--, 1.74<--, 1.74<--, 1.66<--, 1.99<--, 1.94<--  I&O's Summary    2025 07:01  -  2025 07:00  --------------------------------------------------------  IN: 0 mL / OUT: 600 mL / NET: -600 mL                MEDICATIONS:    MEDICATIONS  (STANDING):  aspirin  chewable 81 milliGRAM(s) Oral daily  atorvastatin 80 milliGRAM(s) Oral at bedtime  azaTHIOprine 100 milliGRAM(s) Oral daily  cholecalciferol 1000 Unit(s) Oral daily  cyanocobalamin 1000 MICROGram(s) Oral daily  dextrose 5%. 1000 milliLiter(s) (50 mL/Hr) IV Continuous <Continuous>  dextrose 50% Injectable 25 Gram(s) IV Push once  ferrous    sulfate 325 milliGRAM(s) Oral two times a day  finasteride 5 milliGRAM(s) Oral daily  folic acid 1 milliGRAM(s) Oral daily  heparin   Injectable 5000 Unit(s) SubCutaneous every 8 hours  hydrALAZINE 100 milliGRAM(s) Oral every 8 hours  insulin lispro (ADMELOG) corrective regimen sliding scale   SubCutaneous three times a day before meals  insulin lispro (ADMELOG) corrective regimen sliding scale   SubCutaneous at bedtime  labetalol 400 milliGRAM(s) Oral every 8 hours  losartan 100 milliGRAM(s) Oral at bedtime  NIFEdipine XL 90 milliGRAM(s) Oral daily  predniSONE   Tablet 20 milliGRAM(s) Oral daily  pyridostigmine 60 milliGRAM(s) Oral <User Schedule>  sertraline 50 milliGRAM(s) Oral daily  tamsulosin 0.4 milliGRAM(s) Oral at bedtime      MEDICATIONS  (PRN):  acetaminophen     Tablet .. 650 milliGRAM(s) Oral every 6 hours PRN Temp greater or equal to 38C (100.4F), Mild Pain (1 - 3)  dextrose Oral Gel 15 Gram(s) Oral once PRN Blood Glucose LESS THAN 70 milliGRAM(s)/deciliter  ondansetron Injectable 4 milliGRAM(s) IV Push every 8 hours PRN Nausea and/or Vomiting        REVIEW OF SYSTEMS:                           ALL ROS DONE [ X   ]      CONSTITUTIONAL:  LETHARGIC [   ], FEVER [   ], UNRESPONSIVE [   ]  CVS:  CP  [   ], SOB, [   ], PALPITATIONS [   ], DIZZYNESS [   ]  RS: COUGH [   ], SPUTUM [   ]  GI: ABDOMINAL PAIN [   ], NAUSEA [   ], VOMITINGS [   ], DIARRHEA [   ], CONSTIPATION [   ]  :  DYSURIA [   ], NOCTURIA [   ], INCREASED FREQUENCY [   ], DRIBLING [   ],  SKELETAL: PAINFUL JOINTS [   ], SWOLLEN JOINTS [   ], NECK ACHE [   ], LOW BACK ACHE [   ],  SKIN : ULCERS [   ], RASH [   ], ITCHING [   ]  CNS: HEAD ACHE [   ], DOUBLE VISION [   ], BLURRED VISION [   ], AMS / CONFUSION [   ], SEIZURES [   ], WEAKNESS [   ],TINGLING / NUMBNESS [   ]          PHYSICAL EXAMINATION:    GENERAL APPEARANCE: NO DISTRESS  HEENT:  NO PALLOR, NO  JVD,  NO   NODES, NECK SUPPLE  CVS: S1 +, S2 +,   RS: AEEB,  OCCASIONAL  RALES +,   NO RONCHI  ABD: SOFT, NT, NO, BS +  EXT: NO PE  SKIN: WARM,   SKELETAL:  ROM ACCEPTABLE  CNS:  AAO X 3        RADIOLOGY :  RADIOLOGY AND READINGS REVIEWED      < from: US Renal (25 @ 09:48) >    IMPRESSION:    1. No hydronephrosis.  2. Increased renal cortical echogenicity compatible with renal   parenchymal disease.  3. There is limited visualization of the urinary bladder; however, on   limited views, the wall appears diffusely thickened. Suggest correlation   with urinalysis to exclude infection.  4. Partially imaged hypoechoic structure posterior to the left kidney may   correspond to the lymphadenopathy questioned on the prior CT. Further   evaluation is recommended as clinically.    < end of copied text >      < from: CT Abdomen and Pelvis No Cont (25 @ 23:36) >    IMPRESSION:  No retroperitoneal hematoma.    Soft tissue densities within the retroperitoneum next to the aorta   concerning for extensive lymphadenopathy versus tortuous vessels or a   combination of both. The evaluation is limited due to the lack of IV   contrast. Recommend contrast enhanced cross-sectional imaging for better   assessment. Further workup for lymphoproliferative disease may be helpful.    Circumferential bladder wall thickening as can be seen with decompression   or component of cystitis.        Prelim: No obvious retroperitoneal hematoma. Gallbladder wall thickening.   Bladder wall thickening. Retroperitoneal/pelvic lymphadenopathy.   Persistent right > left pleural effusion. Small pericardial effusion.   Official report to follow.    < end of copied text >  < from: Xray Chest 1 View- PORTABLE-Urgent (Xray Chest 1 View- PORTABLE-Urgent .) (25 @ 16:53) >    IMPRESSION: Sternotomy, left loop recorder, cervical spine hardware   noted. Increasing atelectatic consolidation of left lower lobe with   effusion.    < end of copied text >        ASSESSMENT :     Acute renal failure    HTN (hypertension)    DM (diabetes mellitus)    Myasthenia gravis    Hypercholesterolemia    CVA (cerebrovascular accident)    Peripheral neuropathy    BPH (benign prostatic hyperplasia)    Major depression    Lipoma of chest wall    MG with thymoma (myasthena gravis)    H/O cataract extraction        PLAN:  HPI:  Patient is a 70M, from [facility] ambulates with a walker, with PMHx of Myasthenia gravis s/p thymectomy, HTN, 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 Patient reports he has been feeling nauseous for a few days but no vomiting. He reports chronic increased urinary frequency and sensation of incomplete voiding. He denies all other symptoms - fever, chills, cough, chest pain, SoB, palpitations abdominal pain, diarrhea, dysuria, arm or leg numbness or tingling. Reports regular brown stool - denies black or bloody stool.  (2025 21:32)        # DC PLAN: BACK TO [facility] ON MONDAY AFTER ABDOMINAL U/S TO EVALUATE LIKELY RETROPERITONEAL LYMPHADENOPATHY   - WILL REFER HIM TO ONCOLOGY & GI CONSULT (FOR EGD& COLONOSCOPY) AS OUT PATIENT FOR FURTHER FOLLOWUP  - MACROCYTIC ANEMIA, ANEMIA OF CKD - MAY NEED PRBC # 1 UNIT PRIOR TO DISCHARGE   - LIKELY MYELOSUPPRESSION / ANEMIA DUE TO AZATHIOPRINE  - WILL SUPPLEMENT B12, FA, FESO4         # CASE D/W PATIENT AND PARTNER MILLI DONOVAN AT BEDSIDE, ALL QUESTIONS ANSWERED. DISCUSSED RECOMMENDATIONS AS MADE BY MULTIDISCIPLINARY TEAM. DISCUSSED THAT PROGNOSIS IS GUARDED. THEY VERBALIZED UNDERSTANDING. IN DISCUSSION REGARDING GOC - PATIENT WISHES TO BE FULL CODE.        # HYPERTENSIVE URGENCY    # HTN - CONTROLLED   - TELEMETRY  - ECHO - LV EF - 70 - 75%, G1DD  - NOTED TROPONINS  - S/P IV HYDRALAZINE  - HYDRALAZINE  - PROCARDIA  - LABETALOL  - RESUME LOSARTAN  - NOTED SERUM RENIN, ALDOSTERONE LEVELS  - F/U PLASMA METANEPHRINES  - CARDIOLOGY CONSULT  - NEPHROLOGY CONSULT  - ENDOCRINOLOGY CONSULT    # H/O CARDIAC ARRHYTHMIA - LOOP RECORDER IN PLACE  # H/O STERNOTOMY DUE TO THYMECTOMY      # MICHEL ON CKD STAGE 3 B  # BPH, OBSTRUCTIVE UROPATHY  - IV FLUIDS  - PVR - 0 ML  - MONITOR CR  - AVOID NEPHROTOXIC AGENTS  - NEPHROLOGY CONSULT    # HYPOGLYCEMIA - RESOLVED  - MONITORING FINGERSTICKS    # ANEMIA  - MACROCYTIC ANEMIA, ANEMIA OF CKD   - LIKELY MYELOSUPPRESSION / ANEMIA DUE TO AZATHIOPRINE    - TREND HGB  - ANEMIA PANEL  - DENIES MELENA, HEMATOCHEZIA, HEMATEMESIS, HEMATURIA  - NOTED CT A/P  - GASTROENTEROLOGY CONSULT    # ? LYMPHADENOPATHY ON CT A/P   - F/U U/S ABDOMEN  - DEFER CT W/ CONTRAST GIVEN RENAL DYSFUNCTION  - PER FAMILY PATIENT MAY HAVE HARDWARE THAT IS NOT COMPATIBLE WITH MRI  - HEME/ONC CONSULT    # DENIES DYSURIA, UA NEGATIVE FOR LEUKOCYTE ESTERASE AND NITRITES      # MYASTHENIA GRAVIS S/P THYMECTOMY/STERNOTOMY - ON AZATHIOPRINE, PREDNISONE, PYRIDOSTIGMINE    # DM, DIABETIC NEPHROPATHY, DIABETIC RETINOPATHY, DIABETIC PERIPHERAL NEUROPATHY  - SSI + FS    # CVA W/ MILD RIGHT HP    # IMPAIRED GAIT DUE TO GENERALIZED MUSCLE WEAKNESS, CVA, MYASTHENIA GRAVIS, CERVICAL SPINAL STENOSIS - H/O CERVICAL SPINE INSTRUMENTATION - HARDWARE IN PLACE     # PAD  # GI AND DVT PPX    Dr. ADELA AMEZQUITA

## 2025-07-06 NOTE — PROGRESS NOTE ADULT - ASSESSMENT
Patient is a 69yo Male from [facility] ambulates with a walker, with PMHx of Myasthenia gravis s/p thymectomy, HTN, 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 Pt a/w hypertensive urgency and MICHEL.   Nephrology consulted for Elevated serum creatinine.    1. MICHEL- as per H& P; last SCr 1.3. MICHEL likely hemodynamically mediated due to uncontrolled HTN. Renal function improving  for which Losartan was resumed. Renal function remains stable .  Feurea 13.52%;   UA with 300 protein, no blood. FeNa 10%;  Renal US with no hydro.  TTE with grade 1 diastolic dysfunction, EF 70-75%; ?hypertensive vs infiltrative cardiomyopathy. SIFE neg & K/l wnl. Pt b/l pleural effusions on TTE. Check CXR;  Strict I/Os. Avoid nephrotoxins/ NSAIDs/ RCA. Monitor BMP.    2. Proteinuria- UA with 300 protein, no blood.  UPCr on 6/29,  0.48 g/day. No acute interventions at this time.    3. Hypertensive urgency- BP labile. c/w Labetalol 400mg PO q 8hrs. Consider Aldactone 25mg PO daily for resistant HTN. c/w Losartan 100 mg PO qhs due to elevated BP at night; +nocturnal HTN; recc outpt sleep study to r/o BRET.   ?Consider decreasing frequency of pyridostigmine?- defer to Neuro   Rec Renal US with dopplers as an outpt to r/o HOANG (unavailable at [facility]).    c/w Nifedipine ER 90mg p daily; change timing for noon. c/w low salt diet. Monitor BP  4. Hypokalemia- Kuldip/ Renin; 3.19. Not consistent with hypoaldosteronism. Renin 6.479.   Hypokalemia due to shifting from excessive insulin.   Recc aldactone 25mg PO daily. Check VBG. Monitor serum potassium  5. Iron deficiency anemia- low hgb. Check FOBT. tsat 16% with ferriitn 236- Finished Venofer 200mg IV qd x 3 doses. s/p Epo 10k SC x1 on 6/30, will give another dose today. Monitor hgb    [facility] NEPHROLOGY  Bethel Smith M.D.  Guillermo Jimenez D.O.  Kathleen Lan M.D.  MD Kimi Rodríguez, MSN, ANP-C    Telephone: [phone]  Facsimile: [phone]    [address] [place] [address], [address]  [place] [address]

## 2025-07-07 DIAGNOSIS — I10 ESSENTIAL (PRIMARY) HYPERTENSION: ICD-10-CM

## 2025-07-07 LAB
GLUCOSE BLDC GLUCOMTR-MCNC: 112 MG/DL — HIGH (ref 70–99)
GLUCOSE BLDC GLUCOMTR-MCNC: 155 MG/DL — HIGH (ref 70–99)
GLUCOSE BLDC GLUCOMTR-MCNC: 184 MG/DL — HIGH (ref 70–99)
GLUCOSE BLDC GLUCOMTR-MCNC: 216 MG/DL — HIGH (ref 70–99)

## 2025-07-07 PROCEDURE — 99255 IP/OBS CONSLTJ NEW/EST HI 80: CPT

## 2025-07-07 PROCEDURE — 76775 US EXAM ABDO BACK WALL LIM: CPT | Mod: 26

## 2025-07-07 RX ADMIN — LABETALOL HYDROCHLORIDE 400 MILLIGRAM(S): 200 TABLET, FILM COATED ORAL at 21:48

## 2025-07-07 RX ADMIN — HEPARIN SODIUM 5000 UNIT(S): 1000 INJECTION INTRAVENOUS; SUBCUTANEOUS at 21:47

## 2025-07-07 RX ADMIN — INSULIN LISPRO 1: 100 INJECTION, SOLUTION INTRAVENOUS; SUBCUTANEOUS at 17:04

## 2025-07-07 RX ADMIN — Medication 81 MILLIGRAM(S): at 11:34

## 2025-07-07 RX ADMIN — HEPARIN SODIUM 5000 UNIT(S): 1000 INJECTION INTRAVENOUS; SUBCUTANEOUS at 05:40

## 2025-07-07 RX ADMIN — Medication 100 MILLIGRAM(S): at 05:40

## 2025-07-07 RX ADMIN — Medication 10 MILLIGRAM(S): at 08:00

## 2025-07-07 RX ADMIN — PYRIDOSTIGMINE BROMIDE 60 MILLIGRAM(S): 5 INJECTION, SOLUTION INTRAVENOUS; PARENTERAL at 10:00

## 2025-07-07 RX ADMIN — FINASTERIDE 5 MILLIGRAM(S): 1 TABLET, FILM COATED ORAL at 11:34

## 2025-07-07 RX ADMIN — Medication 325 MILLIGRAM(S): at 17:04

## 2025-07-07 RX ADMIN — CYANOCOBALAMIN 1000 MICROGRAM(S): 1000 INJECTION INTRAMUSCULAR; SUBCUTANEOUS at 11:34

## 2025-07-07 RX ADMIN — AZATHIOPRINE 100 MILLIGRAM(S): 50 TABLET ORAL at 11:34

## 2025-07-07 RX ADMIN — Medication 325 MILLIGRAM(S): at 05:40

## 2025-07-07 RX ADMIN — FOLIC ACID 1 MILLIGRAM(S): 1 TABLET ORAL at 11:35

## 2025-07-07 RX ADMIN — INSULIN LISPRO 2: 100 INJECTION, SOLUTION INTRAVENOUS; SUBCUTANEOUS at 11:43

## 2025-07-07 RX ADMIN — PREDNISONE 20 MILLIGRAM(S): 20 TABLET ORAL at 05:40

## 2025-07-07 RX ADMIN — LOSARTAN POTASSIUM 100 MILLIGRAM(S): 100 TABLET, FILM COATED ORAL at 21:48

## 2025-07-07 RX ADMIN — Medication 100 MILLIGRAM(S): at 21:48

## 2025-07-07 RX ADMIN — PYRIDOSTIGMINE BROMIDE 60 MILLIGRAM(S): 5 INJECTION, SOLUTION INTRAVENOUS; PARENTERAL at 05:40

## 2025-07-07 RX ADMIN — PYRIDOSTIGMINE BROMIDE 60 MILLIGRAM(S): 5 INJECTION, SOLUTION INTRAVENOUS; PARENTERAL at 21:48

## 2025-07-07 RX ADMIN — Medication 90 MILLIGRAM(S): at 11:34

## 2025-07-07 RX ADMIN — PYRIDOSTIGMINE BROMIDE 60 MILLIGRAM(S): 5 INJECTION, SOLUTION INTRAVENOUS; PARENTERAL at 17:04

## 2025-07-07 RX ADMIN — HEPARIN SODIUM 5000 UNIT(S): 1000 INJECTION INTRAVENOUS; SUBCUTANEOUS at 13:02

## 2025-07-07 RX ADMIN — ATORVASTATIN CALCIUM 80 MILLIGRAM(S): 80 TABLET, FILM COATED ORAL at 21:48

## 2025-07-07 RX ADMIN — Medication 1000 UNIT(S): at 11:34

## 2025-07-07 RX ADMIN — LABETALOL HYDROCHLORIDE 400 MILLIGRAM(S): 200 TABLET, FILM COATED ORAL at 05:40

## 2025-07-07 RX ADMIN — SERTRALINE 50 MILLIGRAM(S): 100 TABLET, FILM COATED ORAL at 14:22

## 2025-07-07 RX ADMIN — TAMSULOSIN HYDROCHLORIDE 0.4 MILLIGRAM(S): 0.4 CAPSULE ORAL at 21:48

## 2025-07-07 RX ADMIN — PYRIDOSTIGMINE BROMIDE 60 MILLIGRAM(S): 5 INJECTION, SOLUTION INTRAVENOUS; PARENTERAL at 13:00

## 2025-07-07 NOTE — CONSULT NOTE ADULT - ASSESSMENT
Assessment and Plan :     Assessment: 70M, w/ PMH of MG S/P Thymoma resection, HTN, HLD, CVA X 2, OP, PAD, Peripheral Neuropathy, Cx and Lumbar spondylosis, BPH was admitted to [facility] for MICHEL and HTN. Neuro consulted to assess for any potential contributions of his MG meds namely Mestinon and prednisone contributing to his refractory HTN.     Impression : No weakness/SOB, not in crisis, unclear about Mestinon dosages and frequencies attempted prior to current regimen of Mestinon 60mg Q4hrs and prednisone 20mg daily.   Generally, pyridostigmine (Mestinon) does not typically increase blood pressure.   Prednisone can potentially contribute to HTN, 2/2 steroids due to its effect on salt and water retention, thereby increasing BP. But it appears that there was no recent change to his medications and has been on this for years (Per HIE review).     Reccs :     - Would recommend to obtain prior records (If any available) or Outpt Neuro prior to making any changes made to his MG meds.   - Secondary HTN W/U for refractory HTN per primary team and Nephro.   - C/W Home Mestinon (Pyridostigmine) 60mg Q 4hours.  - C/W Home prednisone 20mg PO daily.   - Further care per primary team.    Informed primary team Dr. Goldstein and LEVAR Vicente.     Pt seen, eval and discussed w/neuro attending Dr. Arthur.

## 2025-07-07 NOTE — CONSULT NOTE ADULT - SUBJECTIVE AND OBJECTIVE BOX
NEUROLOGY CONSULT NOTE    Pt suboptimal Historian     HPI: 70y Male with PMHx of     T(C): 37.1 (07-07-25 @ 16:13), Max: 37.1 (07-07-25 @ 16:13)  HR: 69 (07-07-25 @ 16:13) (57 - 69)  BP: 139/60 (07-07-25 @ 16:13) (117/46 - 217/75)  RR: 18 (07-07-25 @ 16:13) (17 - 19)  SpO2: 98% (07-07-25 @ 16:13) (96% - 100%)    PAST MEDICAL & SURGICAL HISTORY:  HTN (hypertension)  DM (diabetes mellitus)  Myasthenia gravis  Hypercholesterolemia  CVA (cerebrovascular accident)  Peripheral neuropathy  BPH (benign prostatic hyperplasia)  Major depression  Lipoma of chest wall  MG with thymoma (myasthenia gravis)  H/O cataract extraction  , right eye        FAMILY HISTORY:  Family history of hypertension (Mother)        SOCIAL HISTORY: ARLETH      ROS: ARLETH  Constitutional: No fever, weight loss or fatigue  Eyes: No eye pain, visual disturbances, or discharge  ENMT:  No difficulty hearing, tinnitus; No sinus or throat pain  Neck: No pain or stiffness  Respiratory: No cough, wheezing, chills or hemoptysis  Cardiovascular: No chest pain, palpitations, shortness of breath, or leg swelling  Gastrointestinal: No abdominal pain. No nausea, vomiting or hematemesis; No diarrhea or constipation. Nohematochezia.  Genitourinary: No dysuria, frequency, hematuria or incontinence  Neurological: As per HPI  Skin: No itching, burning, rashes or lesions   Endocrine: No heat or cold intolerance; No hair loss  Musculoskeletal: No joint pain or swelling; No muscle, back or extremity pain  Heme/Lymph: No easy bruising or bleeding gums    MEDICATIONS  (STANDING):  aspirin  chewable 81 milliGRAM(s) Oral daily  atorvastatin 80 milliGRAM(s) Oral at bedtime  azaTHIOprine 100 milliGRAM(s) Oral daily  cholecalciferol 1000 Unit(s) Oral daily  cyanocobalamin 1000 MICROGram(s) Oral daily  dextrose 5%. 1000 milliLiter(s) (50 mL/Hr) IV Continuous <Continuous>  dextrose 50% Injectable 25 Gram(s) IV Push once  ferrous    sulfate 325 milliGRAM(s) Oral two times a day  finasteride 5 milliGRAM(s) Oral daily  folic acid 1 milliGRAM(s) Oral daily  heparin   Injectable 5000 Unit(s) SubCutaneous every 8 hours  hydrALAZINE 100 milliGRAM(s) Oral every 8 hours  insulin lispro (ADMELOG) corrective regimen sliding scale   SubCutaneous three times a day before meals  insulin lispro (ADMELOG) corrective regimen sliding scale   SubCutaneous at bedtime  labetalol 400 milliGRAM(s) Oral every 8 hours  losartan 100 milliGRAM(s) Oral at bedtime  NIFEdipine XL 90 milliGRAM(s) Oral <User Schedule>  predniSONE   Tablet 20 milliGRAM(s) Oral daily  pyridostigmine 60 milliGRAM(s) Oral <User Schedule>  sertraline 50 milliGRAM(s) Oral daily  tamsulosin 0.4 milliGRAM(s) Oral at bedtime    MEDICATIONS  (PRN):  acetaminophen     Tablet .. 650 milliGRAM(s) Oral every 6 hours PRN Temp greater or equal to 38C (100.4F), Mild Pain (1 - 3)  dextrose Oral Gel 15 Gram(s) Oral once PRN Blood Glucose LESS THAN 70 milliGRAM(s)/deciliter  ondansetron Injectable 4 milliGRAM(s) IV Push every 8 hours PRN Nausea and/or Vomiting    Allergies    No Known Allergies    Intolerances      Vital Signs Last 24 Hrs  T(C): 37.1 (07 Jul 2025 16:13), Max: 37.1 (07 Jul 2025 16:13)  T(F): 98.8 (07 Jul 2025 16:13), Max: 98.8 (07 Jul 2025 16:13)  HR: 69 (07 Jul 2025 16:13) (57 - 69)  BP: 139/60 (07 Jul 2025 16:13) (117/46 - 217/75)  BP(mean): 69 (07 Jul 2025 04:45) (69 - 83)  RR: 18 (07 Jul 2025 16:13) (17 - 19)  SpO2: 98% (07 Jul 2025 16:13) (96% - 100%)    Parameters below as of 07 Jul 2025 16:13  Patient On (Oxygen Delivery Method): room air        Physical exam:  Constitutional: No acute distress, conversant  Eyes: Anicteric sclerae, moist conjunctivae, see below for CNs  Neck: trachea midline, FROM, supple, no thyromegaly or lymphadenopathy  Cardiovascular: Regular rate and rhythm, no murmurs, rubs, or gallops. No carotid bruits.   Pulmonary: Anterior breath sounds clear bilaterally, no crackles or wheezing. No use of accessory muscles  GI: Abdomen soft, non-distended, non-tender  Extremities: Radial and DP pulses +2, no edema    Neurologic:  -Mental status: Awake, alert, oriented to person, place, and time. Speech is fluent with intact naming, repetition, and comprehension, no dysarthria. Recent and remote memory intact. Follows commands. Attention/concentration intact. Fund of knowledge appropriate.  -Cranial nerves:   II: Visual fields are full to confrontation.  III, IV, VI: Extraocular movements are intact without nystagmus. Pupils equally round and reactive to light  V:  Facial sensation V1-V3 equal and intact   VII: Face is symmetric with normal eye closure and smile  VIII: Hearing is bilaterally intact to finger rub  IX, X: Uvula is midline and soft palate rises symmetrically  XI: Head turning and shoulder shrug are intact.  XII: Tongue protrudes midline  Motor: Normal bulk and tone. No pronator drift. Strength bilateral upper extremity 5/5, bilateral lower extremities 5/5.  Rapid alternating movements intact and symmetric  Sensation: Intact to light touch bilaterally. No neglect or extinction on double simultaneous testing.  Coordination: No dysmetria on finger-to-nose and heel-to-shin bilaterally  Reflexes: Downgoing toes bilaterally   Gait: Narrow gait and steady    NIHSS: **** ASPECT Score: ***** ICH Score: ****** (GCS)    Fingerstick Blood Glucose: CAPILLARY BLOOD GLUCOSE      POCT Blood Glucose.: 184 mg/dL (07 Jul 2025 16:51)    LABS:                        7.8    5.74  )-----------( 259      ( 06 Jul 2025 05:44 )             24.2     07-06    137  |  104  |  30[H]  ----------------------------<  86  4.0   |  29  |  1.68[H]    Ca    8.7      06 Jul 2025 05:44            Urinalysis Basic - ( 06 Jul 2025 05:44 )    Color: x / Appearance: x / SG: x / pH: x  Gluc: 86 mg/dL / Ketone: x  / Bili: x / Urobili: x   Blood: x / Protein: x / Nitrite: x   Leuk Esterase: x / RBC: x / WBC x   Sq Epi: x / Non Sq Epi: x / Bacteria: x        RADIOLOGY & ADDITIONAL STUDIES:      -----------------------------------------------------------------------------------------------------------------  IV-tPA (Y/N):    ***                              Bolus time:    Alteplase Dose Verification w/ RN:  Reason IV-tPA not given: ***   NEUROLOGY CONSULT NOTE    Pt suboptimal Historian. Most of the info obtained from chart review and D/W primary team.     HPI: 70y LHD  Male with PMHx of  Myasthenia Gravis s/p Thymoma resection, HTN, HLD, CVA x 2, Osteoporosis, PAD, Peripheral Neuropathy, Cervical and Lumbar spondylosis, BPH was admitted to [facility] for MICHEL and elevated BP. Neuro consulted for possible dose adjustments of MG meds and if any of his MG meds (Mestinon or steroid) has been contributing to his Refractory hypertension. Pt suboptimal Historian. Unable to obtain reliable Hx regarding his outpt Neurologist, Mestinon dosages in the past. Alert, awake, pleasant denies any SOB or new weakness. Able to speak and swallow. States he is unsure of his neurologist name, has seen one long time ago. Noted to be eating his dinner during my encounter. Reports he lives @ NH, ambulates w/walker.         T(C): 37.1 (25 @ 16:13), Max: 37.1 (25 @ 16:13)  HR: 69 (25 @ 16:13) (57 - 69)  BP: 139/60 (25 @ 16:13) (117/46 - 217/75)  RR: 18 (25 @ 16:13) (17 - 19)  SpO2: 98% (25 @ 16:13) (96% - 100%)    PAST MEDICAL & SURGICAL HISTORY:  HTN (hypertension)  DM (diabetes mellitus)  Myasthenia gravis  Hypercholesterolemia  CVA (cerebrovascular accident)  Peripheral neuropathy  BPH (benign prostatic hyperplasia)  Major depression  Lipoma of chest wall  MG with thymoma (myasthenia gravis)  H/O cataract extraction  , right eye        FAMILY HISTORY:  Family history of hypertension (Mother)        SOCIAL HISTORY: ARLETH      ROS: ARLETH      MEDICATIONS  (STANDING):  aspirin  chewable 81 milliGRAM(s) Oral daily  atorvastatin 80 milliGRAM(s) Oral at bedtime  azaTHIOprine 100 milliGRAM(s) Oral daily  cholecalciferol 1000 Unit(s) Oral daily  cyanocobalamin 1000 MICROGram(s) Oral daily  dextrose 5%. 1000 milliLiter(s) (50 mL/Hr) IV Continuous <Continuous>  dextrose 50% Injectable 25 Gram(s) IV Push once  ferrous    sulfate 325 milliGRAM(s) Oral two times a day  finasteride 5 milliGRAM(s) Oral daily  folic acid 1 milliGRAM(s) Oral daily  heparin   Injectable 5000 Unit(s) SubCutaneous every 8 hours  hydrALAZINE 100 milliGRAM(s) Oral every 8 hours  insulin lispro (ADMELOG) corrective regimen sliding scale   SubCutaneous three times a day before meals  insulin lispro (ADMELOG) corrective regimen sliding scale   SubCutaneous at bedtime  labetalol 400 milliGRAM(s) Oral every 8 hours  losartan 100 milliGRAM(s) Oral at bedtime  NIFEdipine XL 90 milliGRAM(s) Oral <User Schedule>  predniSONE   Tablet 20 milliGRAM(s) Oral daily  pyridostigmine 60 milliGRAM(s) Oral <User Schedule>  sertraline 50 milliGRAM(s) Oral daily  tamsulosin 0.4 milliGRAM(s) Oral at bedtime    MEDICATIONS  (PRN):  acetaminophen     Tablet .. 650 milliGRAM(s) Oral every 6 hours PRN Temp greater or equal to 38C (100.4F), Mild Pain (1 - 3)  dextrose Oral Gel 15 Gram(s) Oral once PRN Blood Glucose LESS THAN 70 milliGRAM(s)/deciliter  ondansetron Injectable 4 milliGRAM(s) IV Push every 8 hours PRN Nausea and/or Vomiting    Allergies  No Known Allergies    Vital Signs Last 24 Hrs  T(C): 37.1 (2025 16:13), Max: 37.1 (2025 16:13)  T(F): 98.8 (2025 16:13), Max: 98.8 (2025 16:13)  HR: 69 (2025 16:13) (57 - 69)  BP: 139/60 (2025 16:13) (117/46 - 217/75)  BP(mean): 69 (2025 04:45) (69 - 83)  RR: 18 (2025 16:13) (17 - 19)  SpO2: 98% (2025 16:13) (96% - 100%)    Parameters below as of 2025 16:13  Patient On (Oxygen Delivery Method): room air        Physical exam:  Constitutional: No acute distress, conversant and pleasant.   Eyes: Anicteric sclerae, moist conjunctivae, see below for CNs  Neck: trachea midline, FROM, supple.   Cardiovascular: Regular rate and rhythm,.   Pulmonary: Respiration appears to be even and non labored.     Neurologic:  -Mental status: Awake, alert, oriented to person ( able to state his name, age and ), place as hospital thou  unsure of the name of the hospital  and not oriented to time. Speech is somewhat fluent with intact naming, appropriate responses and, and comprehension, no dysarthria. Follows some simple commands.     -Cranial nerves:   II: B/L BTT intact.   III, IV, VI: Extraocular movements are intact without nystagmus. Pupils equally round and reactive to light  V:  Facial sensation V1-V3 equal and intact   VII: Face w/ R eye lid drooping of unclear chronicity and significance.   VIII: Hearing is bilaterally intact grossly.   Motor: Normal bulk and mild increased tone. B/L UE and LE grossly atleast 3+/5.   Sensation: Intact to light touch bilaterally.   Coordination: ARLETH  Gait: Deferred 2/2 safety concerns.     NIHSS: Not a stroke pt.       LABS:    Fingerstick Blood Glucose: CAPILLARY BLOOD GLUCOSE  POCT Blood Glucose.: 184 mg/dL (2025 16:51)                          7.8    5.74  )-----------( 259      ( 2025 05:44 )             24.2     -    137  |  104  |  30[H]  ----------------------------<  86  4.0   |  29  |  1.68[H]    Ca    8.7      2025 05:44        RADIOLOGY & ADDITIONAL STUDIES: No current neuroimaging.     PRIOR IMAGING :     CT Head No Cont (22 @ 11:52)   IMPRESSION: Moderate atrophy and small vessel white matter ischemic   changes. Old left medial occipital infarct without change since 3/2/2022.

## 2025-07-07 NOTE — PROGRESS NOTE ADULT - PROBLEM SELECTOR PLAN 1
Hx of HTN on hydralazine 100mg tid, losartan 100mg qd, nifedipine 60mg qd, metoprolol 100mg bid   Troponin 43.9 > 39  - echo: Left ventricular wall thickness is severely increased. Left ventricular systolic function is hyperdynamic with an ejection fraction visually estimated at 70 to 75 %.  Differential includes hypertensive, hypertrophic, and infiltrative cardiomyopathy, among others. cardiac MRI for further evaluation was recommended, can be done outp   - telemetry monitoring   -serum aldosterone level normal. serum renin test unable as per [facility] lab  - increased Nifedipine to 90 mg qd  - changed metoprolol to labetalol 400 mg q 8hr and added Hydralazine 100mg q8h.   - resumed losartan 25 mg qd  - cardio Dr. Mercado following  - Endo Dr. Farr following  - Nephro Dr. Lan following Hx of HTN on hydralazine 100mg tid, losartan 100mg qd, nifedipine 60mg qd, metoprolol 100mg bid   Troponin 43.9 > 39  - Hyperdynamic LVSF on ECHO with EF estimated at 70 to 75%   - telemetry monitoring   -serum aldosterone level normal. serum renin test unable as per [facility] lab  - increased Nifedipine to 90 mg qd  - changed metoprolol to labetalol 400 mg q 8hr and added Hydralazine 100mg q8h.   - resumed losartan 25 mg qd  - cardio Dr. Mercado following  - Endo Dr. Farr following  - Nephro Dr. Lan following

## 2025-07-07 NOTE — PROGRESS NOTE ADULT - PROBLEM SELECTOR PLAN 10
DVT prophylaxis - heparin SQ    ####Discharge planning  from [facility]   f/u HTN  f/u SCr, f/u nephro reccs  monitor BP to be improved. fluctuates  f/u QMA recs DVT prophylaxis - heparin SQ    ####Discharge planning  from [facility], will discharge back to facility pending stable b/p    f/u QMA recs  CM following

## 2025-07-07 NOTE — CONSULT NOTE ADULT - NS ATTEND AMEND GEN_ALL_CORE FT
spent 80 min
Patient seen and examined at bedside. Labs and imaging reviewed. He is here with macrocytic anemia without signs of overt bleeding. Recommend hematology evaluation for macrocytic anemia and lymphadenopathy. Check B12. Avoid nsaids. In the absence of overt bleeding, would avoid sedation given Myasthenia with ongoing need for steroids, PPI daily for mucosal protection as chronic steroids can lead to PUD.

## 2025-07-07 NOTE — PROGRESS NOTE ADULT - ASSESSMENT
Patient is a 69yo Male from [facility] ambulates with a walker, with PMHx of Myasthenia gravis s/p thymectomy, HTN, 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 Pt a/w hypertensive urgency and MICHEL.   Nephrology consulted for Elevated serum creatinine.    1. MICHEL- as per H& P; last SCr 1.3. MICHEL likely hemodynamically mediated due to uncontrolled HTN. Renal function improving  for which Losartan was resumed. Renal function remains stable .  Feurea 13.52%;   UA with 300 protein, no blood. FeNa 10%;  Renal US with no hydro.  TTE with grade 1 diastolic dysfunction, EF 70-75%; ?hypertensive vs infiltrative cardiomyopathy. SIFE neg & K/l wnl. Pt b/l pleural effusions on TTE. Check CXR;  Strict I/Os. Avoid nephrotoxins/ NSAIDs/ RCA. Monitor BMP.    2. Proteinuria- UA with 300 protein, no blood.  UPCr on 6/29,  0.48 g/day. No acute interventions at this time.    3. Hypertensive urgency- BP labile. c/w Labetalol 400mg PO q 8hrs. c/w Nifedipine ER 90mg at noon. Consider Aldactone 25mg PO daily for resistant HTN. c/w Losartan 100 mg PO qhs due to elevated BP at night; +nocturnal HTN; recc outpt sleep study to r/o BRET.   ?Consider decreasing frequency of pyridostigmine?- defer to Neuro   Rec Renal US with dopplers as an outpt to r/o HOANG (unavailable at [facility]).    c/w low salt diet. Monitor BP  4. Hypokalemia- Kuldip/ Renin; 3.19. Not consistent with hypoaldosteronism. Renin 6.479.   Hypokalemia due to shifting from excessive insulin.   Recc aldactone 25mg PO daily. Check VBG. Monitor serum potassium  5. Iron deficiency anemia- low hgb. Check FOBT. tsat 16% with ferriitn 236- Finished Venofer 200mg IV qd x 3 doses. s/p Epo 10k SC x1 on 6/30 & 7/6. Monitor hgb    [facility] NEPHROLOGY  Bethel Smith M.D.  JOSEPH Kendall M.D.  MD Kimi Rodríguez, MSN, ANP-C    Telephone: [phone]  Facsimile: [phone]    [address] [place] [address], [address]  [place] [address]

## 2025-07-07 NOTE — PROGRESS NOTE ADULT - PROBLEM SELECTOR PLAN 4
CT A/P - Soft tissue densities within the retroperitoneum next to the aorta   concerning for extensive lymphadenopathy versus tortuous vessels or a   combination of both.   - not a candidate for MRI - has loop recorder, cervical hardware and sternotomy  - f/u US A/P   - QMA Dr. Moralez following

## 2025-07-07 NOTE — PROGRESS NOTE ADULT - SUBJECTIVE AND OBJECTIVE BOX
NP Note discussed with  Primary Attending    Patient is a 70y old  Male who presents with a chief complaint of MICHEL (07 Jul 2025 13:53).  Comfortable despite elevated b/p, denies HA, dizziness, blurred vision or other discomfort.  Pt. with labile b/p with periods of hypertensive with SBP as high as 220, states "I've always had high blood pressure"        INTERVAL HPI/OVERNIGHT EVENTS: no new complaints    MEDICATIONS  (STANDING):  aspirin  chewable 81 milliGRAM(s) Oral daily  atorvastatin 80 milliGRAM(s) Oral at bedtime  azaTHIOprine 100 milliGRAM(s) Oral daily  cholecalciferol 1000 Unit(s) Oral daily  cyanocobalamin 1000 MICROGram(s) Oral daily  dextrose 5%. 1000 milliLiter(s) (50 mL/Hr) IV Continuous <Continuous>  dextrose 50% Injectable 25 Gram(s) IV Push once  ferrous    sulfate 325 milliGRAM(s) Oral two times a day  finasteride 5 milliGRAM(s) Oral daily  folic acid 1 milliGRAM(s) Oral daily  heparin   Injectable 5000 Unit(s) SubCutaneous every 8 hours  hydrALAZINE 100 milliGRAM(s) Oral every 8 hours  insulin lispro (ADMELOG) corrective regimen sliding scale   SubCutaneous three times a day before meals  insulin lispro (ADMELOG) corrective regimen sliding scale   SubCutaneous at bedtime  labetalol 400 milliGRAM(s) Oral every 8 hours  losartan 100 milliGRAM(s) Oral at bedtime  NIFEdipine XL 90 milliGRAM(s) Oral <User Schedule>  predniSONE   Tablet 20 milliGRAM(s) Oral daily  pyridostigmine 60 milliGRAM(s) Oral <User Schedule>  sertraline 50 milliGRAM(s) Oral daily  tamsulosin 0.4 milliGRAM(s) Oral at bedtime    MEDICATIONS  (PRN):  acetaminophen     Tablet .. 650 milliGRAM(s) Oral every 6 hours PRN Temp greater or equal to 38C (100.4F), Mild Pain (1 - 3)  dextrose Oral Gel 15 Gram(s) Oral once PRN Blood Glucose LESS THAN 70 milliGRAM(s)/deciliter  ondansetron Injectable 4 milliGRAM(s) IV Push every 8 hours PRN Nausea and/or Vomiting      __________________________________________________  REVIEW OF SYSTEMS:    CONSTITUTIONAL: No fever,   EYES: no acute visual disturbances  NECK: No pain or stiffness  RESPIRATORY: No cough; No shortness of breath  CARDIOVASCULAR: No chest pain, no palpitations  GASTROINTESTINAL: No pain. No nausea or vomiting; No diarrhea   NEUROLOGICAL: No headache or numbness, no tremors  MUSCULOSKELETAL: No joint pain, no muscle pain  GENITOURINARY: no dysuria, no frequency, no hesitancy  PSYCHIATRY: no depression , no anxiety  ALL OTHER  ROS negative        Vital Signs Last 24 Hrs  T(C): 37 (07 Jul 2025 11:20), Max: 37 (07 Jul 2025 11:20)  T(F): 98.6 (07 Jul 2025 11:20), Max: 98.6 (07 Jul 2025 11:20)  HR: 69 (07 Jul 2025 12:55) (57 - 69)  BP: 117/46 (07 Jul 2025 12:55) (117/46 - 217/75)  BP(mean): 69 (07 Jul 2025 04:45) (69 - 83)  RR: 18 (07 Jul 2025 11:20) (16 - 19)  SpO2: 97% (07 Jul 2025 11:20) (96% - 100%)    Parameters below as of 07 Jul 2025 11:20  Patient On (Oxygen Delivery Method): room air        ________________________________________________  PHYSICAL EXAM:  Well developed  GENERAL: NAD  HEENT: Normocephalic;  conjunctivae and sclerae clear; moist mucous membranes;   NECK : supple  CHEST/LUNG: Clear to auscultation bilaterally with good air entry   HEART: S1 S2  regular; no murmurs, gallops or rubs  ABDOMEN: Soft, Nontender, Nondistended; Bowel sounds present  EXTREMITIES: no cyanosis; no edema; no calf tenderness  SKIN: warm and dry; no rash  NERVOUS SYSTEM:  Awake and alert; Oriented  to place, person and time ; no new deficits    _________________________________________________  LABS:                        7.8    5.74  )-----------( 259      ( 06 Jul 2025 05:44 )             24.2     07-06    137  |  104  |  30[H]  ----------------------------<  86  4.0   |  29  |  1.68[H]    Ca    8.7      06 Jul 2025 05:44        Urinalysis Basic - ( 06 Jul 2025 05:44 )    Color: x / Appearance: x / SG: x / pH: x  Gluc: 86 mg/dL / Ketone: x  / Bili: x / Urobili: x   Blood: x / Protein: x / Nitrite: x   Leuk Esterase: x / RBC: x / WBC x   Sq Epi: x / Non Sq Epi: x / Bacteria: x      CAPILLARY BLOOD GLUCOSE      POCT Blood Glucose.: 216 mg/dL (07 Jul 2025 11:32)  POCT Blood Glucose.: 112 mg/dL (07 Jul 2025 07:32)  POCT Blood Glucose.: 114 mg/dL (06 Jul 2025 21:23)  POCT Blood Glucose.: 128 mg/dL (06 Jul 2025 16:39)    RADIOLOGY & ADDITIONAL TESTS:    < from: US Renal (06.30.25 @ 09:48) >    ACC: [id number] EXAM:  US KIDNEY(S)   ORDERED BY: DUONG ALCANTAR     PROCEDURE DATE:  06/30/2025          INTERPRETATION:  CLINICAL INFORMATION: 70-year-old male with MICHEL, rule   out retention    COMPARISON: Abdominal pelvic CT 6/29/2025, renal ultrasound 1/21/2022    TECHNIQUE: Portable sonography of the kidneys and bladder.    FINDINGS:  Right kidney: 11.1 cm. No renal mass, hydronephrosis or calculi.   Increased cortical echogenicity.    Left kidney: 9.9 cm. No renal mass, hydronephrosis or calculi. Increased   cortical echogenicity. Partially imaged hypoechoic structure posterior to   the left kidney may correspond to the lymphadenopathy questioned on the   prior CT, not evaluated on this exam.    Urinary bladder: Not fully evaluated on this examination. However, on   submitted views, there appears to be mild diffuse bladder wall   thickening, as seen on the prior CT.    A small left pleural effusion is noted.    IMPRESSION:  1. No hydronephrosis.  2. Increased renal cortical echogenicity compatible with renal   parenchymal disease.  3. There is limited visualization of the urinary bladder; however, on   limited views, the wall appears diffusely thickened. Suggest correlation   with urinalysis to exclude infection.  4. Partially imaged hypoechoic structure posterior to the left kidney may   correspond to the lymphadenopathy questioned on the prior CT. Further   evaluation is recommended as clinically.    < end of copied text >    < from: Xray Chest 1 View- PORTABLE-Urgent (Xray Chest 1 View- PORTABLE-Urgent .) (07.03.25 @ 16:53) >    ACC: [id number] EXAM:  XR CHEST PORTABLE URGENT 1V   ORDERED BY: AMADEO AGUILAR     PROCEDURE DATE:  07/03/2025          INTERPRETATION:  AP semierect chest on July 3, 2025 at 4:41 PM. Patient   has renal failure and is being considered for MR.    Heart magnified by technique.    Sternotomy, cervical spine hardware, and left loop recorder noted.    There is atelectasis of the left lower lobe with adjacent fluid which has   increased from April 2, 2022. Otherwise stable findings.    IMPRESSION: Sternotomy, left loop recorder, cervical spine hardware   noted. Increasing atelectatic consolidation of left lower lobe with   effusion.    --- End of Report ---    < end of copied text >        < from: CT Abdomen and Pelvis No Cont (06.29.25 @ 23:36) >    ACC: [id number] EXAM:  CT ABDOMEN AND PELVIS   ORDERED BY: MURTAZA AMEZQUITA     PROCEDURE DATE:  06/29/2025          INTERPRETATION:  CLINICAL INFORMATION: Anemia    COMPARISON: CT chest 3/2/2022.    CONTRAST/COMPLICATIONS:  IV Contrast: NONE  Oral Contrast: NONE.    PROCEDURE:  CT of the Abdomen and Pelvis was performed.  Sagittal and coronal reformats were performed.    FINDINGS:  LOWER CHEST: Small bilateral pleural effusions. Signs of anemia.    LIVER: Within normal limits.  BILE DUCTS: Normal caliber.  GALLBLADDER: Contracted gallbladder.  SPLEEN: Within normal limits.  PANCREAS: Within normal limits. No obvious ductal dilatation.   Periampullary diverticula.  ADRENALS: Within normal limits.  KIDNEYS/URETERS: Within normal limits.    BLADDER: The urinary bladder is partially decompressed. There is   circumferential wall thickening.  REPRODUCTIVE ORGANS: Prostate within normal limits.    BOWEL: No bowel obstruction. Appendix is normal. Colonic diverticulosis   without acute diverticulitis.  PERITONEUM/RETROPERITONEUM: No ascites, pneumoperitoneum or loculated   fluid collections. No retroperitoneal hematoma.  VESSELS: Atherosclerosis. The aorta has normal caliber. Left-sided   paraspinal and paraaortic soft tissue densities are noted suggestive of   tortuous vessels and collaterals versus lymphadenopathy or both. This   finding is new since prior study 3/2/2022  LYMPH NODES: No confined enlarged lymph nodes are noted in the   retroperitoneum, for example a aortocaval adenopathy at 2.1 x 2.8 cm,   image 2:47.  ABDOMINAL WALL: There are small bilateral inguinal hernia containing   mainly fluid and fat on the left and a short segment of bowel on the   right without obstruction.  BONES: Degenerative changes.    IMPRESSION:  No retroperitoneal hematoma.    Soft tissue densities within the retroperitoneum next to the aorta   concerning for extensive lymphadenopathy versus tortuous vessels or a   combination of both. The evaluation is limited due to the lack of IV   contrast. Recommend contrast enhanced cross-sectional imaging for better   assessment. Further workup for lymphoproliferative disease may be helpful.    Circumferential bladder wall thickening as can be seen with decompression   or component of cystitis.        Prelim: No obvious retroperitoneal hematoma. Gallbladder wall thickening.   Bladder wall thickening. Retroperitoneal/pelvic lymphadenopathy.   Persistent right > left pleural effusion. Small pericardial effusion.   Official report to follow.    --- End of Report ---    < end of copied text >      Imaging Personally Reviewed:  YES/NO    Consultant(s) Notes Reviewed:   YES/ No    Care Discussed with Consultants :     Plan of care was discussed with patient and /or primary care giver; all questions and concerns were addressed and care was aligned with patient's wishes.     NP Note discussed with  Primary Attending    Patient is a 70y old  Male who presents with a chief complaint of MICHEL (07 Jul 2025 13:53).  Comfortable despite elevated b/p, denies HA, dizziness, blurred vision or other discomfort.  Pt. with labile b/p with periods of hypertension with SBP as high as 220, states "I've always had high blood pressure" , then dips down to 110's/40's.      INTERVAL HPI/OVERNIGHT EVENTS: no new complaints    MEDICATIONS  (STANDING):  aspirin  chewable 81 milliGRAM(s) Oral daily  atorvastatin 80 milliGRAM(s) Oral at bedtime  azaTHIOprine 100 milliGRAM(s) Oral daily  cholecalciferol 1000 Unit(s) Oral daily  cyanocobalamin 1000 MICROGram(s) Oral daily  dextrose 5%. 1000 milliLiter(s) (50 mL/Hr) IV Continuous <Continuous>  dextrose 50% Injectable 25 Gram(s) IV Push once  ferrous    sulfate 325 milliGRAM(s) Oral two times a day  finasteride 5 milliGRAM(s) Oral daily  folic acid 1 milliGRAM(s) Oral daily  heparin   Injectable 5000 Unit(s) SubCutaneous every 8 hours  hydrALAZINE 100 milliGRAM(s) Oral every 8 hours  insulin lispro (ADMELOG) corrective regimen sliding scale   SubCutaneous three times a day before meals  insulin lispro (ADMELOG) corrective regimen sliding scale   SubCutaneous at bedtime  labetalol 400 milliGRAM(s) Oral every 8 hours  losartan 100 milliGRAM(s) Oral at bedtime  NIFEdipine XL 90 milliGRAM(s) Oral <User Schedule>  predniSONE   Tablet 20 milliGRAM(s) Oral daily  pyridostigmine 60 milliGRAM(s) Oral <User Schedule>  sertraline 50 milliGRAM(s) Oral daily  tamsulosin 0.4 milliGRAM(s) Oral at bedtime    MEDICATIONS  (PRN):  acetaminophen     Tablet .. 650 milliGRAM(s) Oral every 6 hours PRN Temp greater or equal to 38C (100.4F), Mild Pain (1 - 3)  dextrose Oral Gel 15 Gram(s) Oral once PRN Blood Glucose LESS THAN 70 milliGRAM(s)/deciliter  ondansetron Injectable 4 milliGRAM(s) IV Push every 8 hours PRN Nausea and/or Vomiting      __________________________________________________  REVIEW OF SYSTEMS:    CONSTITUTIONAL: No fever,   EYES: no acute visual disturbances  NECK: No pain or stiffness  RESPIRATORY: No cough; No shortness of breath  CARDIOVASCULAR: No chest pain, no palpitations  GASTROINTESTINAL: No pain. No nausea or vomiting; No diarrhea   NEUROLOGICAL: No headache or numbness, no tremors  MUSCULOSKELETAL: No joint pain, no muscle pain  GENITOURINARY: no dysuria, no frequency, no hesitancy  PSYCHIATRY: no depression , no anxiety  ALL OTHER  ROS negative        Vital Signs Last 24 Hrs  T(C): 37 (07 Jul 2025 11:20), Max: 37 (07 Jul 2025 11:20)  T(F): 98.6 (07 Jul 2025 11:20), Max: 98.6 (07 Jul 2025 11:20)  HR: 69 (07 Jul 2025 12:55) (57 - 69)  BP: 117/46 (07 Jul 2025 12:55) (117/46 - 217/75)  BP(mean): 69 (07 Jul 2025 04:45) (69 - 83)  RR: 18 (07 Jul 2025 11:20) (16 - 19)  SpO2: 97% (07 Jul 2025 11:20) (96% - 100%)    Parameters below as of 07 Jul 2025 11:20  Patient On (Oxygen Delivery Method): room air        ________________________________________________  PHYSICAL EXAM:  Well developed, pleasant  GENERAL: NAD  HEENT: Normocephalic;  conjunctivae and sclerae clear; moist mucous membranes;   NECK : supple  CHEST/LUNG: Clear to auscultation bilaterally with good air entry   HEART: S1 S2  regular; no murmurs, gallops or rubs  ABDOMEN: Soft, Nontender, Nondistended; Bowel sounds present  EXTREMITIES: no cyanosis; no edema; no calf tenderness  SKIN: warm and dry; no rash  NERVOUS SYSTEM:  Awake and alert; Oriented  to place, person and time ; no new deficits    _________________________________________________  LABS:                        7.8    5.74  )-----------( 259      ( 06 Jul 2025 05:44 )             24.2     07-06    137  |  104  |  30[H]  ----------------------------<  86  4.0   |  29  |  1.68[H]    Ca    8.7      06 Jul 2025 05:44        Urinalysis Basic - ( 06 Jul 2025 05:44 )    Color: x / Appearance: x / SG: x / pH: x  Gluc: 86 mg/dL / Ketone: x  / Bili: x / Urobili: x   Blood: x / Protein: x / Nitrite: x   Leuk Esterase: x / RBC: x / WBC x   Sq Epi: x / Non Sq Epi: x / Bacteria: x      CAPILLARY BLOOD GLUCOSE      POCT Blood Glucose.: 216 mg/dL (07 Jul 2025 11:32)  POCT Blood Glucose.: 112 mg/dL (07 Jul 2025 07:32)  POCT Blood Glucose.: 114 mg/dL (06 Jul 2025 21:23)  POCT Blood Glucose.: 128 mg/dL (06 Jul 2025 16:39)    RADIOLOGY & ADDITIONAL TESTS:    < from: US Renal (06.30.25 @ 09:48) >    ACC: [id number] EXAM:  US KIDNEY(S)   ORDERED BY: DUONG ALCANTAR     PROCEDURE DATE:  06/30/2025          INTERPRETATION:  CLINICAL INFORMATION: 70-year-old male with MICHEL, rule   out retention    COMPARISON: Abdominal pelvic CT 6/29/2025, renal ultrasound 1/21/2022    TECHNIQUE: Portable sonography of the kidneys and bladder.    FINDINGS:  Right kidney: 11.1 cm. No renal mass, hydronephrosis or calculi.   Increased cortical echogenicity.    Left kidney: 9.9 cm. No renal mass, hydronephrosis or calculi. Increased   cortical echogenicity. Partially imaged hypoechoic structure posterior to   the left kidney may correspond to the lymphadenopathy questioned on the   prior CT, not evaluated on this exam.    Urinary bladder: Not fully evaluated on this examination. However, on   submitted views, there appears to be mild diffuse bladder wall   thickening, as seen on the prior CT.    A small left pleural effusion is noted.    IMPRESSION:  1. No hydronephrosis.  2. Increased renal cortical echogenicity compatible with renal   parenchymal disease.  3. There is limited visualization of the urinary bladder; however, on   limited views, the wall appears diffusely thickened. Suggest correlation   with urinalysis to exclude infection.  4. Partially imaged hypoechoic structure posterior to the left kidney may   correspond to the lymphadenopathy questioned on the prior CT. Further   evaluation is recommended as clinically.    < end of copied text >    < from: Xray Chest 1 View- PORTABLE-Urgent (Xray Chest 1 View- PORTABLE-Urgent .) (07.03.25 @ 16:53) >    ACC: [id number] EXAM:  XR CHEST PORTABLE URGENT 1V   ORDERED BY: AMADEO AGUILAR     PROCEDURE DATE:  07/03/2025          INTERPRETATION:  AP semierect chest on July 3, 2025 at 4:41 PM. Patient   has renal failure and is being considered for MR.    Heart magnified by technique.    Sternotomy, cervical spine hardware, and left loop recorder noted.    There is atelectasis of the left lower lobe with adjacent fluid which has   increased from April 2, 2022. Otherwise stable findings.    IMPRESSION: Sternotomy, left loop recorder, cervical spine hardware   noted. Increasing atelectatic consolidation of left lower lobe with   effusion.    --- End of Report ---    < end of copied text >        < from: CT Abdomen and Pelvis No Cont (06.29.25 @ 23:36) >    ACC: [id number] EXAM:  CT ABDOMEN AND PELVIS   ORDERED BY: MURTAZA AMEZQUITA     PROCEDURE DATE:  06/29/2025          INTERPRETATION:  CLINICAL INFORMATION: Anemia    COMPARISON: CT chest 3/2/2022.    CONTRAST/COMPLICATIONS:  IV Contrast: NONE  Oral Contrast: NONE.    PROCEDURE:  CT of the Abdomen and Pelvis was performed.  Sagittal and coronal reformats were performed.    FINDINGS:  LOWER CHEST: Small bilateral pleural effusions. Signs of anemia.    LIVER: Within normal limits.  BILE DUCTS: Normal caliber.  GALLBLADDER: Contracted gallbladder.  SPLEEN: Within normal limits.  PANCREAS: Within normal limits. No obvious ductal dilatation.   Periampullary diverticula.  ADRENALS: Within normal limits.  KIDNEYS/URETERS: Within normal limits.    BLADDER: The urinary bladder is partially decompressed. There is   circumferential wall thickening.  REPRODUCTIVE ORGANS: Prostate within normal limits.    BOWEL: No bowel obstruction. Appendix is normal. Colonic diverticulosis   without acute diverticulitis.  PERITONEUM/RETROPERITONEUM: No ascites, pneumoperitoneum or loculated   fluid collections. No retroperitoneal hematoma.  VESSELS: Atherosclerosis. The aorta has normal caliber. Left-sided   paraspinal and paraaortic soft tissue densities are noted suggestive of   tortuous vessels and collaterals versus lymphadenopathy or both. This   finding is new since prior study 3/2/2022  LYMPH NODES: No confined enlarged lymph nodes are noted in the   retroperitoneum, for example a aortocaval adenopathy at 2.1 x 2.8 cm,   image 2:47.  ABDOMINAL WALL: There are small bilateral inguinal hernia containing   mainly fluid and fat on the left and a short segment of bowel on the   right without obstruction.  BONES: Degenerative changes.    IMPRESSION:  No retroperitoneal hematoma.    Soft tissue densities within the retroperitoneum next to the aorta   concerning for extensive lymphadenopathy versus tortuous vessels or a   combination of both. The evaluation is limited due to the lack of IV   contrast. Recommend contrast enhanced cross-sectional imaging for better   assessment. Further workup for lymphoproliferative disease may be helpful.    Circumferential bladder wall thickening as can be seen with decompression   or component of cystitis.        Prelim: No obvious retroperitoneal hematoma. Gallbladder wall thickening.   Bladder wall thickening. Retroperitoneal/pelvic lymphadenopathy.   Persistent right > left pleural effusion. Small pericardial effusion.   Official report to follow.    --- End of Report ---    < end of copied text >    < from: TTE W or WO Ultrasound Enhancing Agent (06.26.25 @ 12:04) >      1. Left ventricular wall thickness is severely increased. Left ventricular systolic function is hyperdynamic with an ejection fraction visually estimated at 70 to 75 %.   2. Differential includes hypertensive, hypertrophic, and infiltrative cardiomyopathy, among others. Consider cardiac MRI for further evaluation if clinically indicated.   3. There is mild (grade 1) left ventricular diastolic dysfunction.   4. Left atrium is mildly dilated.   5. Trace mitral regurgitation.   6. Trace tricuspid regurgitation.   7. Trace pulmonic regurgitation.   8. Trace pericardial effusion.   9. Bilateral pleural effusion noted.  10. No prior echocardiogram is available for comparison.    < end of copied text >      Imaging Personally Reviewed:  YES/NO    Consultant(s) Notes Reviewed:   YES/ No    Care Discussed with Consultants :     Plan of care was discussed with patient and /or primary care giver; all questions and concerns were addressed and care was aligned with patient's wishes.

## 2025-07-07 NOTE — PROGRESS NOTE ADULT - SUBJECTIVE AND OBJECTIVE BOX
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 Pt a/w hypertensive urgency and MICHEL. Nephrology consulted for Elevated serum creatinine.    Hospital Medications: Medications reviewed.    REVIEW OF SYSTEMS:  Gen: no fever or chills  Cards: no chest pain  Resp: no dyspnea  GI: no nausea or vomiting or diarrhea  : no dysuria or hematuria  Vascular: no LE edema    VITALS:  T(F): 98.6 (07-07-25 @ 11:20), Max: 98.6 (07-07-25 @ 11:20)  HR: 69 (07-07-25 @ 12:55)  BP: 117/46 (07-07-25 @ 12:55)  RR: 18 (07-07-25 @ 11:20)  SpO2: 97% (07-07-25 @ 11:20)  Wt(kg): --    07-06 @ 07:01  -  07-07 @ 07:00  --------------------------------------------------------  IN: 400 mL / OUT: 1400 mL / NET: -1000 mL      PHYSICAL EXAM:  Gen: NAD, calm  HEENT: +facial/ periorbital edema    Neck: no JVD  Cards: RRR, +S1/S2,+BOUBACAR  Resp: CTA B/L  GI: soft, NT/ND, NABS  : no CVA tenderness  Extremities: no edema B/L     LABS:  07-06    137  |  104  |  30[H]  ----------------------------<  86  4.0   |  29  |  1.68[H]    Ca    8.7      06 Jul 2025 05:44      Creatinine Trend: 1.68 <--, 1.74 <--, 1.74 <--, 1.66 <--, 1.99 <--                        7.8    5.74  )-----------( 259      ( 06 Jul 2025 05:44 )             24.2     Urine Studies:  Urinalysis Basic - ( 06 Jul 2025 05:44 )    Color:  / Appearance:  / SG:  / pH:   Gluc: 86 mg/dL / Ketone:   / Bili:  / Urobili:    Blood:  / Protein:  / Nitrite:    Leuk Esterase:  / RBC:  / WBC    Sq Epi:  / Non Sq Epi:  / Bacteria:

## 2025-07-07 NOTE — PROGRESS NOTE ADULT - PROBLEM SELECTOR PLAN 3
Was sent in from NH for Hgb 7.9 in a routine lab   Total iron 37, TIBC 233, % iron 16  No active signs of bleeding   - macrocytic anemia   - b12 and folate serum wnl   - c/w home iron and folic acid supplement  - Hgb remains stable at 7.6   - Finished Venofer 200mg IV qd x 3 doses  - started Epogen per nephrology  - [facility] Dr. Moralez consulted apprec recs Was sent in from NH for Hgb 7.9 in a routine lab   Total iron 37, TIBC 233, % iron 16  No active signs of bleeding   - macrocytic anemia   - b12 and folate serum wnl   - c/w home iron and folic acid supplement  - Hgb remains stable at 7.6   - Finished Venofer 200mg IV qd x 3 doses  - started Epogen per nephrology  - [facility] Dr. Moralez followinf  - Pt has moderate to severe Macrocytic anemia suggestive of myelodysplastic anemia per hem/onc  - Recommend bone marrow aspiration and biopsy as an outpatient.

## 2025-07-07 NOTE — PROGRESS NOTE ADULT - PROBLEM SELECTOR PLAN 6
Hx of IDT2DM on Kazhdbr80/30 10U qd and Novolin 100 ISS   - has been drinking rockstar energy drinks causing high sugars   - A1C 6.3  - recent hypoglycemia event, fasting glucose 80-120s  - decrease lantus to 4 units qhs   - Monitor BS ACHS  - Endocrine following

## 2025-07-07 NOTE — PROGRESS NOTE ADULT - SUBJECTIVE AND OBJECTIVE BOX
SUBJECTIVE / INTERVAL HPI: Patient was seen and examined this morning.     Overnight events:    REVIEW OF SYSTEMS  Constitutional:  Negative fever, chills or loss of appetite, changes in weight  Eyes:  Negative blurry vision or double vision.  Cardiovascular:  Negative for chest pain or palpitations.  Respiratory:  Negative for cough, wheezing, or shortness of breath.    Gastrointestinal:  Negative for nausea, vomiting, diarrhea, constipation, or abdominal pain.  Genitourinary:  Negative frequency, urgency or dysuria.  Neurologic:  No headache, confusion, dizziness, lightheadedness, no slow movement and slow speech, no delayed relaxation of tendon reflexes  Skin: Negative for edema and dry skin  Psychiatriy: Negative for depression and anxiety    PHYSICAL EXAM  Vital Signs Last 24 Hrs  T(C): 36.8 (07 Jul 2025 08:07), Max: 36.8 (07 Jul 2025 04:45)  T(F): 98.2 (07 Jul 2025 08:07), Max: 98.2 (07 Jul 2025 04:45)  HR: 64 (07 Jul 2025 08:15) (57 - 66)  BP: 206/72 (07 Jul 2025 08:15) (120/53 - 217/75)  BP(mean): 69 (07 Jul 2025 04:45) (69 - 83)  RR: 18 (07 Jul 2025 08:07) (16 - 19)  SpO2: 100% (07 Jul 2025 08:07) (96% - 100%)    Parameters below as of 07 Jul 2025 08:07  Patient On (Oxygen Delivery Method): room air      T(C): 36.8 (07-07-25 @ 08:07), Max: 36.8 (07-07-25 @ 04:45)  HR: 64 (07-07-25 @ 08:15) (57 - 66)  BP: 206/72 (07-07-25 @ 08:15) (120/53 - 217/75)  RR: 18 (07-07-25 @ 08:07) (16 - 19)  SpO2: 100% (07-07-25 @ 08:07) (96% - 100%)    Constitutional: Awake, alert, in no acute distress.   HEENT: Normocephalic, atraumatic, NILSA.  Respiratory: Lungs clear to ausculation bilaterally.   Cardiovascular: regular rhythm, normal S1 and S2, no audible murmurs.   GI: soft, non-tender, non-distended, bowel sounds present.  Extremities: No lower extremity edema.  Psychiatric: AAO x 3. Normal affect/mood.     LABS  CBC - WBC/HGB/HTC/PLT: 5.74/7.8/24.2/259 (07-06-25)  BMP - Na/K/Cl/Bicarb/BUN/Cr/Gluc/AG/eGFR: 137/4.0/104/29/30/1.68/86/4/44 (07-06-25)  Ca - 8.7 (07-06-25)  Phos - -- (07-06-25)  Mg - -- (07-06-25)  LFT - Alb/Tprot/Tbili/Dbili/AlkPhos/ALT/AST: 2.7/4.9/--/--/--/--/-- (07-03-25)    Thyroid Stimulating Hormone, Serum: 4.28 (06-27-25)        07-06-25 @ 07:01  -  07-07-25 @ 07:00  --------------------------------------------------------  IN: 400 mL / OUT: 1400 mL / NET: -1000 mL        MEDICATIONS  MEDICATIONS  (STANDING):  aspirin  chewable 81 milliGRAM(s) Oral daily  atorvastatin 80 milliGRAM(s) Oral at bedtime  azaTHIOprine 100 milliGRAM(s) Oral daily  cholecalciferol 1000 Unit(s) Oral daily  cyanocobalamin 1000 MICROGram(s) Oral daily  dextrose 5%. 1000 milliLiter(s) (50 mL/Hr) IV Continuous <Continuous>  dextrose 50% Injectable 25 Gram(s) IV Push once  ferrous    sulfate 325 milliGRAM(s) Oral two times a day  finasteride 5 milliGRAM(s) Oral daily  folic acid 1 milliGRAM(s) Oral daily  heparin   Injectable 5000 Unit(s) SubCutaneous every 8 hours  hydrALAZINE 100 milliGRAM(s) Oral every 8 hours  insulin lispro (ADMELOG) corrective regimen sliding scale   SubCutaneous three times a day before meals  insulin lispro (ADMELOG) corrective regimen sliding scale   SubCutaneous at bedtime  labetalol 400 milliGRAM(s) Oral every 8 hours  losartan 100 milliGRAM(s) Oral at bedtime  NIFEdipine XL 90 milliGRAM(s) Oral <User Schedule>  predniSONE   Tablet 20 milliGRAM(s) Oral daily  pyridostigmine 60 milliGRAM(s) Oral <User Schedule>  sertraline 50 milliGRAM(s) Oral daily  tamsulosin 0.4 milliGRAM(s) Oral at bedtime    MEDICATIONS  (PRN):  acetaminophen     Tablet .. 650 milliGRAM(s) Oral every 6 hours PRN Temp greater or equal to 38C (100.4F), Mild Pain (1 - 3)  dextrose Oral Gel 15 Gram(s) Oral once PRN Blood Glucose LESS THAN 70 milliGRAM(s)/deciliter  ondansetron Injectable 4 milliGRAM(s) IV Push every 8 hours PRN Nausea and/or Vomiting        ASSESSMENT / RECOMMENDATIONS:  69 y/o M, from 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 Hgb 9.9. BUN/Cr 30/1.81. Admitted for MICHEL on CKD and uncontrolled HTN, Nephrology and Cardiology following.    Endocrinology consulted for further evaluation of uncontrolled HTN. Due to hx of MG with thymectomy, pt on chronic Prednisone 20 mg qd.     A1C: 6.3 %    # Uncontrolled HTN  - pt noted with persistently labile and elevated HTN despite use of multiple anti-HTN agents  - pt on chronic Prednisone 20 mg qd for MG tx > ? exogenous Cushing Syndrome leading to persistently elevated, labile BP  - Renin mildly elev to 6.47, Aldosterone 13.7- Hyperaldosteronism ruled out  , consider renovascular HTN  - per Nephro, Recc Renal US with dopplers as an outpt to r/o HOANG.    - started on Aldactone 25 mg PO qd for BP; Consider increasing dose as BP continues to be elevated  - TSH 4.38 wnl   - pt denies any HA, palpitations (low suspicion for pheochromocytoma) - f/u Plasma mets and 24 hr urine for mets/cats (collected, pending result)  - continue to monitor BP at this time    #Diabetes Mellitus  - c/w BILLIE CINDIS  - Patient's fingerstick glucose goal is 100-180 mg/dL.   SUBJECTIVE / INTERVAL HPI: Patient was seen and examined this morning.     Overnight events: No acute overnight events. Pt BP continues to be elevated.     REVIEW OF SYSTEMS  Constitutional:  Negative fever, chills or loss of appetite, changes in weight  Eyes:  Negative blurry vision or double vision.  Cardiovascular:  Negative for chest pain or palpitations.  Respiratory:  Negative for cough, wheezing, or shortness of breath.    Gastrointestinal:  Negative for nausea, vomiting, diarrhea, constipation, or abdominal pain.  Genitourinary:  Negative frequency, urgency or dysuria.  Neurologic:  No headache, confusion, dizziness, lightheadedness, no slow movement and slow speech, no delayed relaxation of tendon reflexes  Skin: Negative for edema and dry skin    PHYSICAL EXAM  Vital Signs Last 24 Hrs  T(C): 36.8 (07 Jul 2025 08:07), Max: 36.8 (07 Jul 2025 04:45)  T(F): 98.2 (07 Jul 2025 08:07), Max: 98.2 (07 Jul 2025 04:45)  HR: 64 (07 Jul 2025 08:15) (57 - 66)  BP: 206/72 (07 Jul 2025 08:15) (120/53 - 217/75)  BP(mean): 69 (07 Jul 2025 04:45) (69 - 83)  RR: 18 (07 Jul 2025 08:07) (16 - 19)  SpO2: 100% (07 Jul 2025 08:07) (96% - 100%)    Parameters below as of 07 Jul 2025 08:07  Patient On (Oxygen Delivery Method): room air      T(C): 36.8 (07-07-25 @ 08:07), Max: 36.8 (07-07-25 @ 04:45)  HR: 64 (07-07-25 @ 08:15) (57 - 66)  BP: 206/72 (07-07-25 @ 08:15) (120/53 - 217/75)  RR: 18 (07-07-25 @ 08:07) (16 - 19)  SpO2: 100% (07-07-25 @ 08:07) (96% - 100%)    PHYSICAL EXAM:  Gen: NAD, calm  HEENT: +facial/ periorbital edema    Neck: no JVD  Cards: RRR, +S1/S2,+BOUBACAR  Resp: CTA B/L  GI: soft, NT/ND, NABS  : no CVA tenderness  Extremities: no edema B/L    LABS  CBC - WBC/HGB/HTC/PLT: 5.74/7.8/24.2/259 (07-06-25)  BMP - Na/K/Cl/Bicarb/BUN/Cr/Gluc/AG/eGFR: 137/4.0/104/29/30/1.68/86/4/44 (07-06-25)  Ca - 8.7 (07-06-25)  Phos - -- (07-06-25)  Mg - -- (07-06-25)  LFT - Alb/Tprot/Tbili/Dbili/AlkPhos/ALT/AST: 2.7/4.9/--/--/--/--/-- (07-03-25)    Thyroid Stimulating Hormone, Serum: 4.28 (06-27-25)        07-06-25 @ 07:01  -  07-07-25 @ 07:00  --------------------------------------------------------  IN: 400 mL / OUT: 1400 mL / NET: -1000 mL        MEDICATIONS  MEDICATIONS  (STANDING):  aspirin  chewable 81 milliGRAM(s) Oral daily  atorvastatin 80 milliGRAM(s) Oral at bedtime  azaTHIOprine 100 milliGRAM(s) Oral daily  cholecalciferol 1000 Unit(s) Oral daily  cyanocobalamin 1000 MICROGram(s) Oral daily  dextrose 5%. 1000 milliLiter(s) (50 mL/Hr) IV Continuous <Continuous>  dextrose 50% Injectable 25 Gram(s) IV Push once  ferrous    sulfate 325 milliGRAM(s) Oral two times a day  finasteride 5 milliGRAM(s) Oral daily  folic acid 1 milliGRAM(s) Oral daily  heparin   Injectable 5000 Unit(s) SubCutaneous every 8 hours  hydrALAZINE 100 milliGRAM(s) Oral every 8 hours  insulin lispro (ADMELOG) corrective regimen sliding scale   SubCutaneous three times a day before meals  insulin lispro (ADMELOG) corrective regimen sliding scale   SubCutaneous at bedtime  labetalol 400 milliGRAM(s) Oral every 8 hours  losartan 100 milliGRAM(s) Oral at bedtime  NIFEdipine XL 90 milliGRAM(s) Oral <User Schedule>  predniSONE   Tablet 20 milliGRAM(s) Oral daily  pyridostigmine 60 milliGRAM(s) Oral <User Schedule>  sertraline 50 milliGRAM(s) Oral daily  tamsulosin 0.4 milliGRAM(s) Oral at bedtime    MEDICATIONS  (PRN):  acetaminophen     Tablet .. 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 Hgb 9.9. BUN/Cr 30/1.81. Admitted for MICHEL on CKD and uncontrolled HTN, Nephrology and Cardiology following.    Endocrinology consulted for further evaluation of uncontrolled HTN. Due to hx of MG with thymectomy, pt on chronic Prednisone 20 mg qd.     A1C: 6.3 %    # Uncontrolled HTN  - pt noted with persistently labile and elevated HTN despite use of multiple anti-HTN agents  - pt on chronic Prednisone 20 mg qd for MG tx > ? exogenous Cushing Syndrome leading to persistently elevated, labile BP, consider decreasing dose of Pyridostigmine as this medication may also cause elevated BP > f/u Neuro  - Renin mildly elev to 6.47, Aldosterone 13.7- Hyperaldosteronism ruled out  , consider renovascular HTN  - per Nephro, Recc Renal US with dopplers as an outpt to r/o HOANG.    -Recommend starting on Aldactone 25 mg PO qd for BP; Consider increasing dose as BP continues to be elevated  - TSH 4.38 wnl   - pt denies any HA, palpitations (low suspicion for pheochromocytoma) - f/u Plasma mets and 24 hr urine for mets/cats (collected, pending result)  - continue to monitor BP at this time    #Diabetes Mellitus  - c/w BILLIE ACHS  - Patient's fingerstick glucose goal is 100-180 mg/dL.   SUBJECTIVE / INTERVAL HPI: Patient was seen and examined this morning.     Overnight events: No acute overnight events. Pt BP continues to be elevated.     REVIEW OF SYSTEMS  Constitutional:  Negative fever, chills or loss of appetite, changes in weight  Eyes:  Negative blurry vision or double vision.  Cardiovascular:  Negative for chest pain or palpitations.  Respiratory:  Negative for cough, wheezing, or shortness of breath.    Gastrointestinal:  Negative for nausea, vomiting, diarrhea, constipation, or abdominal pain.  Genitourinary:  Negative frequency, urgency or dysuria.  Neurologic:  No headache, confusion, dizziness, lightheadedness, no slow movement and slow speech, no delayed relaxation of tendon reflexes  Skin: Negative for edema and dry skin    PHYSICAL EXAM  Vital Signs Last 24 Hrs  T(C): 36.8 (07 Jul 2025 08:07), Max: 36.8 (07 Jul 2025 04:45)  T(F): 98.2 (07 Jul 2025 08:07), Max: 98.2 (07 Jul 2025 04:45)  HR: 64 (07 Jul 2025 08:15) (57 - 66)  BP: 206/72 (07 Jul 2025 08:15) (120/53 - 217/75)  BP(mean): 69 (07 Jul 2025 04:45) (69 - 83)  RR: 18 (07 Jul 2025 08:07) (16 - 19)  SpO2: 100% (07 Jul 2025 08:07) (96% - 100%)    Parameters below as of 07 Jul 2025 08:07  Patient On (Oxygen Delivery Method): room air      T(C): 36.8 (07-07-25 @ 08:07), Max: 36.8 (07-07-25 @ 04:45)  HR: 64 (07-07-25 @ 08:15) (57 - 66)  BP: 206/72 (07-07-25 @ 08:15) (120/53 - 217/75)  RR: 18 (07-07-25 @ 08:07) (16 - 19)  SpO2: 100% (07-07-25 @ 08:07) (96% - 100%)    PHYSICAL EXAM:  Gen: NAD, calm  HEENT: +facial/ periorbital edema    Neck: no JVD  Cards: RRR, +S1/S2,+BOUBACAR  Resp: CTA B/L  GI: soft, NT/ND, NABS  : no CVA tenderness  Extremities: no edema B/L    LABS  CBC - WBC/HGB/HTC/PLT: 5.74/7.8/24.2/259 (07-06-25)  BMP - Na/K/Cl/Bicarb/BUN/Cr/Gluc/AG/eGFR: 137/4.0/104/29/30/1.68/86/4/44 (07-06-25)  Ca - 8.7 (07-06-25)  Phos - -- (07-06-25)  Mg - -- (07-06-25)  LFT - Alb/Tprot/Tbili/Dbili/AlkPhos/ALT/AST: 2.7/4.9/--/--/--/--/-- (07-03-25)    Thyroid Stimulating Hormone, Serum: 4.28 (06-27-25)        07-06-25 @ 07:01  -  07-07-25 @ 07:00  --------------------------------------------------------  IN: 400 mL / OUT: 1400 mL / NET: -1000 mL        MEDICATIONS  MEDICATIONS  (STANDING):  aspirin  chewable 81 milliGRAM(s) Oral daily  atorvastatin 80 milliGRAM(s) Oral at bedtime  azaTHIOprine 100 milliGRAM(s) Oral daily  cholecalciferol 1000 Unit(s) Oral daily  cyanocobalamin 1000 MICROGram(s) Oral daily  dextrose 5%. 1000 milliLiter(s) (50 mL/Hr) IV Continuous <Continuous>  dextrose 50% Injectable 25 Gram(s) IV Push once  ferrous    sulfate 325 milliGRAM(s) Oral two times a day  finasteride 5 milliGRAM(s) Oral daily  folic acid 1 milliGRAM(s) Oral daily  heparin   Injectable 5000 Unit(s) SubCutaneous every 8 hours  hydrALAZINE 100 milliGRAM(s) Oral every 8 hours  insulin lispro (ADMELOG) corrective regimen sliding scale   SubCutaneous three times a day before meals  insulin lispro (ADMELOG) corrective regimen sliding scale   SubCutaneous at bedtime  labetalol 400 milliGRAM(s) Oral every 8 hours  losartan 100 milliGRAM(s) Oral at bedtime  NIFEdipine XL 90 milliGRAM(s) Oral <User Schedule>  predniSONE   Tablet 20 milliGRAM(s) Oral daily  pyridostigmine 60 milliGRAM(s) Oral <User Schedule>  sertraline 50 milliGRAM(s) Oral daily  tamsulosin 0.4 milliGRAM(s) Oral at bedtime    MEDICATIONS  (PRN):  acetaminophen     Tablet .. 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 Hgb 9.9. BUN/Cr 30/1.81. Admitted for MICHEL on CKD and uncontrolled HTN, Nephrology and Cardiology following.    Endocrinology consulted for further evaluation of uncontrolled HTN. Due to hx of MG with thymectomy, pt on chronic Prednisone 20 mg qd.     A1C: 6.3 %    # Uncontrolled HTN  - pt noted with persistently labile and elevated HTN despite use of multiple anti-HTN agents  - pt on chronic Prednisone 20 mg qd for MG tx > ? exogenous Cushing Syndrome leading to persistently elevated, labile BP, consider decreasing dose of Pyridostigmine as this medication may also cause elevated BP > f/u Neuro  - Renin mildly elev to 6.47, Aldosterone 13.7- Hyperaldosteronism ruled out  , consider renovascular HTN  - per Nephro, Recc Renal US with dopplers as an outpt to r/o HOANG.    -Recommend starting on Aldactone 25 mg PO qd for BP; Consider increasing dose as BP continues to be elevated  - TSH 4.38 wnl   - pt denies any HA, palpitations (low suspicion for pheochromocytoma) - f/u Plasma mets and 24 hr urine for mets/cats (collected, pending result)  - continue to monitor BP at this time.    #Diabetes Mellitus  - c/w BILLIE ACHS  - Patient's fingerstick glucose goal is 100-180 mg/dL.

## 2025-07-07 NOTE — PROGRESS NOTE ADULT - ASSESSMENT
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 Hgb 9.9. BUN/Cr 30/1.81.   Admitted for MICHEL on CKD and uncontrolled HTN, Nephrology and Cardiology consulted.   TTE: Left ventricular wall thickness is severely increased. Left ventricular systolic function is hyperdynamic with an ejection fraction visually estimated at 70 to 75 %.  Differential includes hypertensive, hypertrophic, and infiltrative cardiomyopathy, among others. Cardiac MRI for further evaluation was recommended, can be done outpt.    During hospitalization pt was found to have elevated creatinine, renal US negative for hydronephrosis, d/c'd ARB.  MICHEL on CKD now improving from 1.99 > 1.7.   QMA Dr. Moralez consulted for macrocytic anemia and retroperitoneal lymphadenopathy.   Endocrine Dr. Farr consulted possible hormonal causes of uncontrolled HTN.     CXR - found to have Sternotomy, cervical spine hardware, and left loop recorder.   Pt is not a candidate for MRI A/P to further evaluate retroperitoneal lymphadenopathy at this time  Will further evaluate retroperitoneal lymphadenopathy with US A/P.   CXR - no evidence of pleural effusions noted on CT a/p imaging. Remains comfortable on room air.   Currently undergoing 24 urine studies to check for metanephrine and catecholamines to r/o pheochromocytoma.     Wife Jada Still updated on pt's status and current plan.     7/7-Pt. with labile b/p with periods of asymptomatic HTN with SBP as high as 220 dropping down to 110's/40's, needs further monitoring with b/p meds adjustment.

## 2025-07-07 NOTE — PROGRESS NOTE ADULT - SUBJECTIVE AND OBJECTIVE BOX
Patient is a 70y old  Male who presents with a chief complaint of MICHEL (2025 12:38)    PATIENT IS SEEN AND EXAMINED IN MEDICAL FLOOR.  NGT [    ]    ERVIN [   ]      GT [   ]    ALLERGIES:  No Known Allergies      Daily     Daily Weight in k.8 (2025 04:45)    VITALS:    Vital Signs Last 24 Hrs  T(C): 36.8 (2025 08:07), Max: 36.8 (2025 04:45)  T(F): 98.2 (2025 08:07), Max: 98.2 (2025 04:45)  HR: 64 (2025 08:15) (57 - 66)  BP: 206/72 (2025 08:15) (120/53 - 217/75)  BP(mean): 69 (2025 04:45) (69 - 83)  RR: 18 (2025 08:07) (16 - 19)  SpO2: 100% (2025 08:07) (96% - 100%)    Parameters below as of 2025 08:07  Patient On (Oxygen Delivery Method): room air        LABS:    CBC Full  -  ( 2025 05:44 )  WBC Count : 5.74 K/uL  RBC Count : 2.25 M/uL  Hemoglobin : 7.8 g/dL  Hematocrit : 24.2 %  Platelet Count - Automated : 259 K/uL  Mean Cell Volume : 107.6 fl  Mean Cell Hemoglobin : 34.7 pg  Mean Cell Hemoglobin Concentration : 32.2 g/dL  Auto Neutrophil # : x  Auto Lymphocyte # : x  Auto Monocyte # : x  Auto Eosinophil # : x  Auto Basophil # : x  Auto Neutrophil % : x  Auto Lymphocyte % : x  Auto Monocyte % : x  Auto Eosinophil % : x  Auto Basophil % : x          137  |  104  |  30[H]  ----------------------------<  86  4.0   |  29  |  1.68[H]    Ca    8.7      2025 05:44      CAPILLARY BLOOD GLUCOSE      POCT Blood Glucose.: 112 mg/dL (2025 07:32)  POCT Blood Glucose.: 114 mg/dL (2025 21:23)  POCT Blood Glucose.: 128 mg/dL (2025 16:39)  POCT Blood Glucose.: 250 mg/dL (2025 11:50)          Creatinine Trend: 1.68<--, 1.74<--, 1.74<--, 1.66<--, 1.99<--, 1.94<--  I&O's Summary    2025 07:01  -  2025 07:00  --------------------------------------------------------  IN: 400 mL / OUT: 1400 mL / NET: -1000 mL                MEDICATIONS:    MEDICATIONS  (STANDING):  aspirin  chewable 81 milliGRAM(s) Oral daily  atorvastatin 80 milliGRAM(s) Oral at bedtime  azaTHIOprine 100 milliGRAM(s) Oral daily  cholecalciferol 1000 Unit(s) Oral daily  cyanocobalamin 1000 MICROGram(s) Oral daily  dextrose 5%. 1000 milliLiter(s) (50 mL/Hr) IV Continuous <Continuous>  dextrose 50% Injectable 25 Gram(s) IV Push once  ferrous    sulfate 325 milliGRAM(s) Oral two times a day  finasteride 5 milliGRAM(s) Oral daily  folic acid 1 milliGRAM(s) Oral daily  heparin   Injectable 5000 Unit(s) SubCutaneous every 8 hours  hydrALAZINE 100 milliGRAM(s) Oral every 8 hours  insulin lispro (ADMELOG) corrective regimen sliding scale   SubCutaneous three times a day before meals  insulin lispro (ADMELOG) corrective regimen sliding scale   SubCutaneous at bedtime  labetalol 400 milliGRAM(s) Oral every 8 hours  losartan 100 milliGRAM(s) Oral at bedtime  NIFEdipine XL 90 milliGRAM(s) Oral <User Schedule>  predniSONE   Tablet 20 milliGRAM(s) Oral daily  pyridostigmine 60 milliGRAM(s) Oral <User Schedule>  sertraline 50 milliGRAM(s) Oral daily  tamsulosin 0.4 milliGRAM(s) Oral at bedtime      MEDICATIONS  (PRN):  acetaminophen     Tablet .. 650 milliGRAM(s) Oral every 6 hours PRN Temp greater or equal to 38C (100.4F), Mild Pain (1 - 3)  dextrose Oral Gel 15 Gram(s) Oral once PRN Blood Glucose LESS THAN 70 milliGRAM(s)/deciliter  ondansetron Injectable 4 milliGRAM(s) IV Push every 8 hours PRN Nausea and/or Vomiting      REVIEW OF SYSTEMS:                           ALL ROS DONE [ X   ]    CONSTITUTIONAL:  LETHARGIC [   ], FEVER [   ], UNRESPONSIVE [   ]  CVS:  CP  [   ], SOB, [   ], PALPITATIONS [   ], DIZZYNESS [   ]  RS: COUGH [   ], SPUTUM [   ]  GI: ABDOMINAL PAIN [   ], NAUSEA [   ], VOMITINGS [   ], DIARRHEA [   ], CONSTIPATION [   ]  :  DYSURIA [   ], NOCTURIA [   ], INCREASED FREQUENCY [   ], DRIBLING [   ],  SKELETAL: PAINFUL JOINTS [   ], SWOLLEN JOINTS [   ], NECK ACHE [   ], LOW BACK ACHE [   ],  SKIN : ULCERS [   ], RASH [   ], ITCHING [   ]  CNS: HEAD ACHE [   ], DOUBLE VISION [   ], BLURRED VISION [   ], AMS / CONFUSION [   ], SEIZURES [   ], WEAKNESS [   ],TINGLING / NUMBNESS [   ]        PHYSICAL EXAMINATION:    GENERAL APPEARANCE: NO DISTRESS  HEENT:  NO PALLOR, NO  JVD,  NO   NODES, NECK SUPPLE  CVS: S1 +, S2 +,   RS: AEEB,  OCCASIONAL  RALES +,   NO RONCHI  ABD: SOFT, NT, NO, BS +  EXT: NO PE  SKIN: WARM,   SKELETAL:  ROM ACCEPTABLE  CNS:  AAO X 3        RADIOLOGY :  RADIOLOGY AND READINGS REVIEWED      < from: US Renal (25 @ 09:48) >    IMPRESSION:    1. No hydronephrosis.  2. Increased renal cortical echogenicity compatible with renal   parenchymal disease.  3. There is limited visualization of the urinary bladder; however, on   limited views, the wall appears diffusely thickened. Suggest correlation   with urinalysis to exclude infection.  4. Partially imaged hypoechoic structure posterior to the left kidney may   correspond to the lymphadenopathy questioned on the prior CT. Further   evaluation is recommended as clinically.    < end of copied text >      < from: CT Abdomen and Pelvis No Cont (25 @ 23:36) >    IMPRESSION:  No retroperitoneal hematoma.    Soft tissue densities within the retroperitoneum next to the aorta   concerning for extensive lymphadenopathy versus tortuous vessels or a   combination of both. The evaluation is limited due to the lack of IV   contrast. Recommend contrast enhanced cross-sectional imaging for better   assessment. Further workup for lymphoproliferative disease may be helpful.    Circumferential bladder wall thickening as can be seen with decompression   or component of cystitis.        Prelim: No obvious retroperitoneal hematoma. Gallbladder wall thickening.   Bladder wall thickening. Retroperitoneal/pelvic lymphadenopathy.   Persistent right > left pleural effusion. Small pericardial effusion.   Official report to follow.    < end of copied text >  < from: Xray Chest 1 View- PORTABLE-Urgent (Xray Chest 1 View- PORTABLE-Urgent .) (25 @ 16:53) >    IMPRESSION: Sternotomy, left loop recorder, cervical spine hardware   noted. Increasing atelectatic consolidation of left lower lobe with   effusion.    < end of copied text >        ASSESSMENT :     Acute renal failure    HTN (hypertension)    DM (diabetes mellitus)    Myasthenia gravis    Hypercholesterolemia    CVA (cerebrovascular accident)    Peripheral neuropathy    BPH (benign prostatic hyperplasia)    Major depression    Lipoma of chest wall    MG with thymoma (myasthena gravis)    H/O cataract extraction        PLAN:  HPI:  Patient is a 70M, from [facility] ambulates with a walker, with PMHx of Myasthenia gravis s/p thymectomy, HTN, 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 Patient reports he has been feeling nauseous for a few days but no vomiting. He reports chronic increased urinary frequency and sensation of incomplete voiding. He denies all other symptoms - fever, chills, cough, chest pain, SoB, palpitations abdominal pain, diarrhea, dysuria, arm or leg numbness or tingling. Reports regular brown stool - denies black or bloody stool.  (2025 21:32)        # DC PLAN: BACK TO [facility] ON MONDAY AFTER ABDOMINAL U/S TO EVALUATE LIKELY RETROPERITONEAL LYMPHADENOPATHY   - WILL REFER HIM TO ONCOLOGY & GI CONSULT (FOR EGD& COLONOSCOPY) AS OUT PATIENT FOR FURTHER FOLLOWUP  - MACROCYTIC ANEMIA, ANEMIA OF CKD - MAY NEED PRBC # 1 UNIT PRIOR TO DISCHARGE   - LIKELY MYELOSUPPRESSION / ANEMIA DUE TO AZATHIOPRINE  - WILL SUPPLEMENT B12, FA, FESO4         # CASE D/W PATIENT AND PARTNER MILLI DONOVAN AT BEDSIDE, ALL QUESTIONS ANSWERED. DISCUSSED RECOMMENDATIONS AS MADE BY MULTIDISCIPLINARY TEAM. DISCUSSED THAT PROGNOSIS IS GUARDED. THEY VERBALIZED UNDERSTANDING. IN DISCUSSION REGARDING GOC - PATIENT WISHES TO BE FULL CODE.        # HYPERTENSIVE URGENCY    # HTN - CONTROLLED   - TELEMETRY  - ECHO - LV EF - 70 - 75%, G1DD  - NOTED TROPONINS  - S/P IV HYDRALAZINE  - HYDRALAZINE  - PROCARDIA  - LABETALOL  - RESUME LOSARTAN  - NOTED SERUM RENIN, ALDOSTERONE LEVELS  - F/U PLASMA METANEPHRINES  - CARDIOLOGY CONSULT  - NEPHROLOGY CONSULT  - ENDOCRINOLOGY CONSULT    # H/O CARDIAC ARRHYTHMIA - LOOP RECORDER IN PLACE  # H/O STERNOTOMY DUE TO THYMECTOMY      # MICHEL ON CKD STAGE 3 B  # BPH, OBSTRUCTIVE UROPATHY  - IV FLUIDS  - PVR - 0 ML  - MONITOR CR  - AVOID NEPHROTOXIC AGENTS  - NEPHROLOGY CONSULT    # HYPOGLYCEMIA - RESOLVED  - MONITORING FINGERSTICKS    # ANEMIA  - MACROCYTIC ANEMIA, ANEMIA OF CKD   - LIKELY MYELOSUPPRESSION / ANEMIA DUE TO AZATHIOPRINE    - TREND HGB  - ANEMIA PANEL  - DENIES MELENA, HEMATOCHEZIA, HEMATEMESIS, HEMATURIA  - NOTED CT A/P  - GASTROENTEROLOGY CONSULT    # ? LYMPHADENOPATHY ON CT A/P   - F/U U/S ABDOMEN  - DEFER CT W/ CONTRAST GIVEN RENAL DYSFUNCTION  - PER FAMILY PATIENT MAY HAVE HARDWARE THAT IS NOT COMPATIBLE WITH MRI  - HEME/ONC CONSULT    # DENIES DYSURIA, UA NEGATIVE FOR LEUKOCYTE ESTERASE AND NITRITES      # MYASTHENIA GRAVIS S/P THYMECTOMY/STERNOTOMY - ON AZATHIOPRINE, PREDNISONE, PYRIDOSTIGMINE    # DM, DIABETIC NEPHROPATHY, DIABETIC RETINOPATHY, DIABETIC PERIPHERAL NEUROPATHY  - SSI + FS    # CVA W/ MILD RIGHT HP    # IMPAIRED GAIT DUE TO GENERALIZED MUSCLE WEAKNESS, CVA, MYASTHENIA GRAVIS, CERVICAL SPINAL STENOSIS - H/O CERVICAL SPINE INSTRUMENTATION - HARDWARE IN PLACE     # PAD  # GI AND DVT PPX       Patient is a 70y old  Male who presents with a chief complaint of MICHEL (2025 12:38)    PATIENT IS SEEN AND EXAMINED IN MEDICAL FLOOR.      ALLERGIES:  No Known Allergies      Daily     Daily Weight in k.8 (2025 04:45)    VITALS:    Vital Signs Last 24 Hrs  T(C): 36.8 (2025 08:07), Max: 36.8 (2025 04:45)  T(F): 98.2 (2025 08:07), Max: 98.2 (2025 04:45)  HR: 64 (2025 08:15) (57 - 66)  BP: 206/72 (2025 08:15) (120/53 - 217/75)  BP(mean): 69 (2025 04:45) (69 - 83)  RR: 18 (2025 08:07) (16 - 19)  SpO2: 100% (2025 08:07) (96% - 100%)    Parameters below as of 2025 08:07  Patient On (Oxygen Delivery Method): room air        LABS:    CBC Full  -  ( 2025 05:44 )  WBC Count : 5.74 K/uL  RBC Count : 2.25 M/uL  Hemoglobin : 7.8 g/dL  Hematocrit : 24.2 %  Platelet Count - Automated : 259 K/uL  Mean Cell Volume : 107.6 fl  Mean Cell Hemoglobin : 34.7 pg  Mean Cell Hemoglobin Concentration : 32.2 g/dL  Auto Neutrophil # : x  Auto Lymphocyte # : x  Auto Monocyte # : x  Auto Eosinophil # : x  Auto Basophil # : x  Auto Neutrophil % : x  Auto Lymphocyte % : x  Auto Monocyte % : x  Auto Eosinophil % : x  Auto Basophil % : x          137  |  104  |  30[H]  ----------------------------<  86  4.0   |  29  |  1.68[H]    Ca    8.7      2025 05:44      CAPILLARY BLOOD GLUCOSE      POCT Blood Glucose.: 112 mg/dL (2025 07:32)  POCT Blood Glucose.: 114 mg/dL (2025 21:23)  POCT Blood Glucose.: 128 mg/dL (2025 16:39)  POCT Blood Glucose.: 250 mg/dL (2025 11:50)          Creatinine Trend: 1.68<--, 1.74<--, 1.74<--, 1.66<--, 1.99<--, 1.94<--  I&O's Summary    2025 07:01  -  2025 07:00  --------------------------------------------------------  IN: 400 mL / OUT: 1400 mL / NET: -1000 mL                MEDICATIONS:    MEDICATIONS  (STANDING):  aspirin  chewable 81 milliGRAM(s) Oral daily  atorvastatin 80 milliGRAM(s) Oral at bedtime  azaTHIOprine 100 milliGRAM(s) Oral daily  cholecalciferol 1000 Unit(s) Oral daily  cyanocobalamin 1000 MICROGram(s) Oral daily  dextrose 5%. 1000 milliLiter(s) (50 mL/Hr) IV Continuous <Continuous>  dextrose 50% Injectable 25 Gram(s) IV Push once  ferrous    sulfate 325 milliGRAM(s) Oral two times a day  finasteride 5 milliGRAM(s) Oral daily  folic acid 1 milliGRAM(s) Oral daily  heparin   Injectable 5000 Unit(s) SubCutaneous every 8 hours  hydrALAZINE 100 milliGRAM(s) Oral every 8 hours  insulin lispro (ADMELOG) corrective regimen sliding scale   SubCutaneous three times a day before meals  insulin lispro (ADMELOG) corrective regimen sliding scale   SubCutaneous at bedtime  labetalol 400 milliGRAM(s) Oral every 8 hours  losartan 100 milliGRAM(s) Oral at bedtime  NIFEdipine XL 90 milliGRAM(s) Oral <User Schedule>  predniSONE   Tablet 20 milliGRAM(s) Oral daily  pyridostigmine 60 milliGRAM(s) Oral <User Schedule>  sertraline 50 milliGRAM(s) Oral daily  tamsulosin 0.4 milliGRAM(s) Oral at bedtime      MEDICATIONS  (PRN):  acetaminophen     Tablet .. 650 milliGRAM(s) Oral every 6 hours PRN Temp greater or equal to 38C (100.4F), Mild Pain (1 - 3)  dextrose Oral Gel 15 Gram(s) Oral once PRN Blood Glucose LESS THAN 70 milliGRAM(s)/deciliter  ondansetron Injectable 4 milliGRAM(s) IV Push every 8 hours PRN Nausea and/or Vomiting      REVIEW OF SYSTEMS:                           ALL ROS DONE [ X   ]    CONSTITUTIONAL:  LETHARGIC [   ], FEVER [   ], UNRESPONSIVE [   ]  CVS:  CP  [   ], SOB, [   ], PALPITATIONS [   ], DIZZYNESS [   ]  RS: COUGH [   ], SPUTUM [   ]  GI: ABDOMINAL PAIN [   ], NAUSEA [   ], VOMITINGS [   ], DIARRHEA [   ], CONSTIPATION [   ]  :  DYSURIA [   ], NOCTURIA [   ], INCREASED FREQUENCY [   ], DRIBLING [   ],  SKELETAL: PAINFUL JOINTS [   ], SWOLLEN JOINTS [   ], NECK ACHE [   ], LOW BACK ACHE [   ],  SKIN : ULCERS [   ], RASH [   ], ITCHING [   ]  CNS: HEAD ACHE [   ], DOUBLE VISION [   ], BLURRED VISION [   ], AMS / CONFUSION [   ], SEIZURES [   ], WEAKNESS [   ],TINGLING / NUMBNESS [   ]        PHYSICAL EXAMINATION:    GENERAL APPEARANCE: NO DISTRESS  HEENT:  NO PALLOR, NO  JVD,  NO   NODES, NECK SUPPLE  CVS: S1 +, S2 +,   RS: AEEB,  OCCASIONAL  RALES +,   NO RONCHI  ABD: SOFT, NT, NO, BS +  EXT: NO PE  SKIN: WARM,   SKELETAL:  ROM ACCEPTABLE  CNS:  AAO X 3        RADIOLOGY :  RADIOLOGY AND READINGS REVIEWED      < from: US Renal (25 @ 09:48) >    IMPRESSION:    1. No hydronephrosis.  2. Increased renal cortical echogenicity compatible with renal   parenchymal disease.  3. There is limited visualization of the urinary bladder; however, on   limited views, the wall appears diffusely thickened. Suggest correlation   with urinalysis to exclude infection.  4. Partially imaged hypoechoic structure posterior to the left kidney may   correspond to the lymphadenopathy questioned on the prior CT. Further   evaluation is recommended as clinically.    < end of copied text >      < from: CT Abdomen and Pelvis No Cont (25 @ 23:36) >    IMPRESSION:  No retroperitoneal hematoma.    Soft tissue densities within the retroperitoneum next to the aorta   concerning for extensive lymphadenopathy versus tortuous vessels or a   combination of both. The evaluation is limited due to the lack of IV   contrast. Recommend contrast enhanced cross-sectional imaging for better   assessment. Further workup for lymphoproliferative disease may be helpful.    Circumferential bladder wall thickening as can be seen with decompression   or component of cystitis.        Prelim: No obvious retroperitoneal hematoma. Gallbladder wall thickening.   Bladder wall thickening. Retroperitoneal/pelvic lymphadenopathy.   Persistent right > left pleural effusion. Small pericardial effusion.   Official report to follow.    < end of copied text >  < from: Xray Chest 1 View- PORTABLE-Urgent (Xray Chest 1 View- PORTABLE-Urgent .) (25 @ 16:53) >    IMPRESSION: Sternotomy, left loop recorder, cervical spine hardware   noted. Increasing atelectatic consolidation of left lower lobe with   effusion.    < end of copied text >        ASSESSMENT :     Acute renal failure    HTN (hypertension)    DM (diabetes mellitus)    Myasthenia gravis    Hypercholesterolemia    CVA (cerebrovascular accident)    Peripheral neuropathy    BPH (benign prostatic hyperplasia)    Major depression    Lipoma of chest wall    MG with thymoma (myasthena gravis)    H/O cataract extraction        PLAN:  HPI:  Patient is a 70M, from [facility] ambulates with a walker, with PMHx of Myasthenia gravis s/p thymectomy, HTN, 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 Patient reports he has been feeling nauseous for a few days but no vomiting. He reports chronic increased urinary frequency and sensation of incomplete voiding. He denies all other symptoms - fever, chills, cough, chest pain, SoB, palpitations abdominal pain, diarrhea, dysuria, arm or leg numbness or tingling. Reports regular brown stool - denies black or bloody stool.  (2025 21:32)        # DC PLAN: BACK TO [facility] AFTER ABDOMINAL U/S TO EVALUATE LIKELY RETROPERITONEAL LYMPHADENOPATHY   - WILL REFER HIM TO ONCOLOGY & GI CONSULT (FOR EGD& COLONOSCOPY) AS OUT PATIENT FOR FURTHER FOLLOWUP  - MACROCYTIC ANEMIA, ANEMIA OF CKD - MAY NEED PRBC # 1 UNIT PRIOR TO DISCHARGE   - LIKELY MYELOSUPPRESSION / ANEMIA DUE TO AZATHIOPRINE  - WILL SUPPLEMENT B12, FA, FESO4         # CASE D/W PATIENT AND PARTNER MILLI DONOVAN AT BEDSIDE, ALL QUESTIONS ANSWERED. DISCUSSED RECOMMENDATIONS AS MADE BY MULTIDISCIPLINARY TEAM. DISCUSSED THAT PROGNOSIS IS GUARDED. THEY VERBALIZED UNDERSTANDING. IN DISCUSSION REGARDING GOC - PATIENT WISHES TO BE FULL CODE.        # HYPERTENSIVE URGENCY    # HTN - CONTROLLED   - TELEMETRY  - ECHO - LV EF - 70 - 75%, G1DD  - NOTED TROPONINS  - S/P IV HYDRALAZINE  - HYDRALAZINE  - PROCARDIA  - LABETALOL  - RESUME LOSARTAN  - STARTING SPIRONOLACTONE  - NOTED SERUM RENIN, ALDOSTERONE LEVELS  - F/U PLASMA METANEPHRINES  - CARDIOLOGY CONSULT  - NEPHROLOGY CONSULT  - ENDOCRINOLOGY CONSULT    # H/O CARDIAC ARRHYTHMIA - LOOP RECORDER IN PLACE  # H/O STERNOTOMY DUE TO THYMECTOMY      # MICHEL ON CKD STAGE 3 B  # BPH, OBSTRUCTIVE UROPATHY  - IV FLUIDS  - PVR - 0 ML  - MONITOR CR  - AVOID NEPHROTOXIC AGENTS  - NEPHROLOGY CONSULT    # HYPOGLYCEMIA - RESOLVED  - MONITORING FINGERSTICKS    # ANEMIA  - MACROCYTIC ANEMIA, ANEMIA OF CKD   - LIKELY MYELOSUPPRESSION / ANEMIA DUE TO AZATHIOPRINE    - TREND HGB  - ANEMIA PANEL  - DENIES MELENA, HEMATOCHEZIA, HEMATEMESIS, HEMATURIA  - NOTED CT A/P  - GASTROENTEROLOGY CONSULT    # ? LYMPHADENOPATHY ON CT A/P   - F/U U/S ABDOMEN  - DEFER CT W/ CONTRAST GIVEN RENAL DYSFUNCTION  - PER FAMILY PATIENT MAY HAVE HARDWARE THAT IS NOT COMPATIBLE WITH MRI  - HEME/ONC CONSULT    # DENIES DYSURIA, UA NEGATIVE FOR LEUKOCYTE ESTERASE AND NITRITES      # MYASTHENIA GRAVIS S/P THYMECTOMY/STERNOTOMY - ON AZATHIOPRINE, PREDNISONE, PYRIDOSTIGMINE  - NEUROLOGY CONSULT    # DM, DIABETIC NEPHROPATHY, DIABETIC RETINOPATHY, DIABETIC PERIPHERAL NEUROPATHY  - SSI + FS    # CVA W/ MILD RIGHT HP    # IMPAIRED GAIT DUE TO GENERALIZED MUSCLE WEAKNESS, CVA, MYASTHENIA GRAVIS, CERVICAL SPINAL STENOSIS - H/O CERVICAL SPINE INSTRUMENTATION - HARDWARE IN PLACE     # PAD  # GI AND DVT PPX

## 2025-07-07 NOTE — PROGRESS NOTE ADULT - SUBJECTIVE AND OBJECTIVE BOX
C A R D I O L O G Y  **********************************    DATE OF SERVICE: 07-07-25    Patient denies chest pain or shortness of breath.   Review of symptoms otherwise negative.    acetaminophen     Tablet .. 650 milliGRAM(s) Oral every 6 hours PRN  aspirin  chewable 81 milliGRAM(s) Oral daily  atorvastatin 80 milliGRAM(s) Oral at bedtime  azaTHIOprine 100 milliGRAM(s) Oral daily  cholecalciferol 1000 Unit(s) Oral daily  cyanocobalamin 1000 MICROGram(s) Oral daily  dextrose 5%. 1000 milliLiter(s) IV Continuous <Continuous>  dextrose 50% Injectable 25 Gram(s) IV Push once  dextrose Oral Gel 15 Gram(s) Oral once PRN  ferrous    sulfate 325 milliGRAM(s) Oral two times a day  finasteride 5 milliGRAM(s) Oral daily  folic acid 1 milliGRAM(s) Oral daily  heparin   Injectable 5000 Unit(s) SubCutaneous every 8 hours  hydrALAZINE 100 milliGRAM(s) Oral every 8 hours  insulin lispro (ADMELOG) corrective regimen sliding scale   SubCutaneous three times a day before meals  insulin lispro (ADMELOG) corrective regimen sliding scale   SubCutaneous at bedtime  labetalol 400 milliGRAM(s) Oral every 8 hours  losartan 100 milliGRAM(s) Oral at bedtime  NIFEdipine XL 90 milliGRAM(s) Oral <User Schedule>  ondansetron Injectable 4 milliGRAM(s) IV Push every 8 hours PRN  predniSONE   Tablet 20 milliGRAM(s) Oral daily  pyridostigmine 60 milliGRAM(s) Oral <User Schedule>  sertraline 50 milliGRAM(s) Oral daily  tamsulosin 0.4 milliGRAM(s) Oral at bedtime                            7.8    5.74  )-----------( 259      ( 06 Jul 2025 05:44 )             24.2       Hemoglobin: 7.8 g/dL (07-06 @ 05:44)  Hemoglobin: 7.6 g/dL (07-04 @ 06:25)  Hemoglobin: 7.6 g/dL (07-03 @ 05:32)      07-06    137  |  104  |  30[H]  ----------------------------<  86  4.0   |  29  |  1.68[H]    Ca    8.7      06 Jul 2025 05:44      Creatinine Trend: 1.68<--, 1.74<--, 1.74<--, 1.66<--, 1.99<--, 1.94<--    COAGS:           T(C): 36.8 (07-07-25 @ 08:07), Max: 36.8 (07-07-25 @ 04:45)  HR: 64 (07-07-25 @ 08:15) (57 - 66)  BP: 206/72 (07-07-25 @ 08:15) (120/53 - 217/75)  RR: 18 (07-07-25 @ 08:07) (16 - 19)  SpO2: 100% (07-07-25 @ 08:07) (96% - 100%)  Wt(kg): --    I&O's Summary    06 Jul 2025 07:01  -  07 Jul 2025 07:00  --------------------------------------------------------  IN: 400 mL / OUT: 1400 mL / NET: -1000 mL            HEENT:  (-)icterus (-)pallor  CV: N S1 S2 1/6 BOUBACAR (+)2 Pulses B/l  Resp:  Clear to ausculatation B/L, normal effort  GI: (+) BS Soft, NT, ND  Lymph:  (-)Edema, (-)obvious lymphadenopathy  Skin: Warm to touch, Normal turgor  Psych: Appropriate mood and affect      TELEMETRY: 	  sinus        ASSESSMENT/PLAN: 	70y  Male PMHx of Myasthenia gravis s/p thymectomy, HTN, 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 hyperaldosterone state  aldosterone appears wnl, endo recs noted  -  TSH wnl   - plan to Add aldactone once seen by endocrine and appropriate blood testing is acquired   - w/u for secondary causes in progress 24 hour urine metanephrines  - would need Renal artery dopplers likely as an oupt since may not be done in house     # Abnormal EKG  - echo noted, suspect it is due to hypertensive heart disease given his profoundly elevated blood pressures however can arrange for outpt cardiac MRI     Ta Mercado MD, [facility]  BEEPER [phone]

## 2025-07-08 LAB
ANION GAP SERPL CALC-SCNC: 5 MMOL/L — SIGNIFICANT CHANGE UP (ref 5–17)
BUN SERPL-MCNC: 31 MG/DL — HIGH (ref 7–18)
CALCIUM SERPL-MCNC: 8.9 MG/DL — SIGNIFICANT CHANGE UP (ref 8.4–10.5)
CHLORIDE SERPL-SCNC: 103 MMOL/L — SIGNIFICANT CHANGE UP (ref 96–108)
CO2 SERPL-SCNC: 28 MMOL/L — SIGNIFICANT CHANGE UP (ref 22–31)
CREAT SERPL-MCNC: 1.81 MG/DL — HIGH (ref 0.5–1.3)
EGFR: 40 ML/MIN/1.73M2 — LOW
EGFR: 40 ML/MIN/1.73M2 — LOW
FLOW CYTOMETRY FINAL REPORT: SIGNIFICANT CHANGE UP
GLUCOSE BLDC GLUCOMTR-MCNC: 111 MG/DL — HIGH (ref 70–99)
GLUCOSE BLDC GLUCOMTR-MCNC: 144 MG/DL — HIGH (ref 70–99)
GLUCOSE BLDC GLUCOMTR-MCNC: 228 MG/DL — HIGH (ref 70–99)
GLUCOSE BLDC GLUCOMTR-MCNC: 296 MG/DL — HIGH (ref 70–99)
GLUCOSE SERPL-MCNC: 114 MG/DL — HIGH (ref 70–99)
HCT VFR BLD CALC: 25.3 % — LOW (ref 39–50)
HGB BLD-MCNC: 8.1 G/DL — LOW (ref 13–17)
MCHC RBC-ENTMCNC: 32 G/DL — SIGNIFICANT CHANGE UP (ref 32–36)
MCHC RBC-ENTMCNC: 35.4 PG — HIGH (ref 27–34)
MCV RBC AUTO: 110.5 FL — HIGH (ref 80–100)
NRBC BLD AUTO-RTO: 0 /100 WBCS — SIGNIFICANT CHANGE UP (ref 0–0)
PLATELET # BLD AUTO: 261 K/UL — SIGNIFICANT CHANGE UP (ref 150–400)
POTASSIUM SERPL-MCNC: 3.7 MMOL/L — SIGNIFICANT CHANGE UP (ref 3.5–5.3)
POTASSIUM SERPL-SCNC: 3.7 MMOL/L — SIGNIFICANT CHANGE UP (ref 3.5–5.3)
RBC # BLD: 2.29 M/UL — LOW (ref 4.2–5.8)
RBC # FLD: 17.9 % — HIGH (ref 10.3–14.5)
SODIUM SERPL-SCNC: 136 MMOL/L — SIGNIFICANT CHANGE UP (ref 135–145)
WBC # BLD: 6.07 K/UL — SIGNIFICANT CHANGE UP (ref 3.8–10.5)
WBC # FLD AUTO: 6.07 K/UL — SIGNIFICANT CHANGE UP (ref 3.8–10.5)

## 2025-07-08 RX ORDER — AMPICILLIN SODIUM AND SULBACTAM SODIUM 1; .5 G/1; G/1
3 INJECTION, POWDER, FOR SOLUTION INTRAMUSCULAR; INTRAVENOUS ONCE
Refills: 0 | Status: COMPLETED | OUTPATIENT
Start: 2025-07-08 | End: 2025-07-09

## 2025-07-08 RX ORDER — AMPICILLIN SODIUM AND SULBACTAM SODIUM 1; .5 G/1; G/1
3 INJECTION, POWDER, FOR SOLUTION INTRAMUSCULAR; INTRAVENOUS EVERY 6 HOURS
Refills: 0 | Status: DISCONTINUED | OUTPATIENT
Start: 2025-07-09 | End: 2025-07-12

## 2025-07-08 RX ORDER — ACETAMINOPHEN 500 MG/5ML
2 LIQUID (ML) ORAL
Refills: 0 | DISCHARGE
Start: 2025-07-08

## 2025-07-08 RX ORDER — AZATHIOPRINE 50 MG/1
2 TABLET ORAL
Qty: 0 | Refills: 0 | DISCHARGE
Start: 2025-07-08

## 2025-07-08 RX ORDER — LABETALOL HYDROCHLORIDE 200 MG/1
1 TABLET, FILM COATED ORAL
Qty: 0 | Refills: 0 | DISCHARGE
Start: 2025-07-08

## 2025-07-08 RX ORDER — SPIRONOLACTONE 25 MG
25 TABLET ORAL ONCE
Refills: 0 | Status: COMPLETED | OUTPATIENT
Start: 2025-07-08 | End: 2025-07-08

## 2025-07-08 RX ORDER — AMPICILLIN SODIUM AND SULBACTAM SODIUM 1; .5 G/1; G/1
INJECTION, POWDER, FOR SOLUTION INTRAMUSCULAR; INTRAVENOUS
Refills: 0 | Status: DISCONTINUED | OUTPATIENT
Start: 2025-07-09 | End: 2025-07-12

## 2025-07-08 RX ORDER — NIFEDIPINE 30 MG
1 TABLET, EXTENDED RELEASE 24 HR ORAL
Qty: 0 | Refills: 0 | DISCHARGE
Start: 2025-07-08

## 2025-07-08 RX ADMIN — Medication 650 MILLIGRAM(S): at 05:20

## 2025-07-08 RX ADMIN — LABETALOL HYDROCHLORIDE 400 MILLIGRAM(S): 200 TABLET, FILM COATED ORAL at 21:28

## 2025-07-08 RX ADMIN — PYRIDOSTIGMINE BROMIDE 60 MILLIGRAM(S): 5 INJECTION, SOLUTION INTRAVENOUS; PARENTERAL at 06:31

## 2025-07-08 RX ADMIN — PYRIDOSTIGMINE BROMIDE 60 MILLIGRAM(S): 5 INJECTION, SOLUTION INTRAVENOUS; PARENTERAL at 16:47

## 2025-07-08 RX ADMIN — PREDNISONE 20 MILLIGRAM(S): 20 TABLET ORAL at 06:31

## 2025-07-08 RX ADMIN — LOSARTAN POTASSIUM 100 MILLIGRAM(S): 100 TABLET, FILM COATED ORAL at 21:28

## 2025-07-08 RX ADMIN — Medication 100 MILLIGRAM(S): at 16:48

## 2025-07-08 RX ADMIN — Medication 81 MILLIGRAM(S): at 11:17

## 2025-07-08 RX ADMIN — Medication 90 MILLIGRAM(S): at 11:19

## 2025-07-08 RX ADMIN — HEPARIN SODIUM 5000 UNIT(S): 1000 INJECTION INTRAVENOUS; SUBCUTANEOUS at 21:27

## 2025-07-08 RX ADMIN — HEPARIN SODIUM 5000 UNIT(S): 1000 INJECTION INTRAVENOUS; SUBCUTANEOUS at 06:31

## 2025-07-08 RX ADMIN — Medication 100 MILLIGRAM(S): at 06:32

## 2025-07-08 RX ADMIN — PYRIDOSTIGMINE BROMIDE 60 MILLIGRAM(S): 5 INJECTION, SOLUTION INTRAVENOUS; PARENTERAL at 09:46

## 2025-07-08 RX ADMIN — PYRIDOSTIGMINE BROMIDE 60 MILLIGRAM(S): 5 INJECTION, SOLUTION INTRAVENOUS; PARENTERAL at 13:59

## 2025-07-08 RX ADMIN — Medication 25 MILLIGRAM(S): at 15:08

## 2025-07-08 RX ADMIN — FOLIC ACID 1 MILLIGRAM(S): 1 TABLET ORAL at 11:27

## 2025-07-08 RX ADMIN — Medication 650 MILLIGRAM(S): at 04:19

## 2025-07-08 RX ADMIN — AZATHIOPRINE 100 MILLIGRAM(S): 50 TABLET ORAL at 11:18

## 2025-07-08 RX ADMIN — PYRIDOSTIGMINE BROMIDE 60 MILLIGRAM(S): 5 INJECTION, SOLUTION INTRAVENOUS; PARENTERAL at 21:26

## 2025-07-08 RX ADMIN — CYANOCOBALAMIN 1000 MICROGRAM(S): 1000 INJECTION INTRAMUSCULAR; SUBCUTANEOUS at 11:18

## 2025-07-08 RX ADMIN — INSULIN LISPRO 3: 100 INJECTION, SOLUTION INTRAVENOUS; SUBCUTANEOUS at 16:49

## 2025-07-08 RX ADMIN — Medication 100 MILLIGRAM(S): at 21:26

## 2025-07-08 RX ADMIN — ATORVASTATIN CALCIUM 80 MILLIGRAM(S): 80 TABLET, FILM COATED ORAL at 21:27

## 2025-07-08 RX ADMIN — HEPARIN SODIUM 5000 UNIT(S): 1000 INJECTION INTRAVENOUS; SUBCUTANEOUS at 13:59

## 2025-07-08 RX ADMIN — PYRIDOSTIGMINE BROMIDE 60 MILLIGRAM(S): 5 INJECTION, SOLUTION INTRAVENOUS; PARENTERAL at 04:16

## 2025-07-08 RX ADMIN — FINASTERIDE 5 MILLIGRAM(S): 1 TABLET, FILM COATED ORAL at 11:17

## 2025-07-08 RX ADMIN — TAMSULOSIN HYDROCHLORIDE 0.4 MILLIGRAM(S): 0.4 CAPSULE ORAL at 21:27

## 2025-07-08 RX ADMIN — INSULIN LISPRO 2: 100 INJECTION, SOLUTION INTRAVENOUS; SUBCUTANEOUS at 11:35

## 2025-07-08 RX ADMIN — Medication 1000 UNIT(S): at 11:19

## 2025-07-08 RX ADMIN — Medication 325 MILLIGRAM(S): at 17:08

## 2025-07-08 RX ADMIN — LABETALOL HYDROCHLORIDE 400 MILLIGRAM(S): 200 TABLET, FILM COATED ORAL at 16:47

## 2025-07-08 RX ADMIN — Medication 325 MILLIGRAM(S): at 06:31

## 2025-07-08 RX ADMIN — SERTRALINE 50 MILLIGRAM(S): 100 TABLET, FILM COATED ORAL at 11:18

## 2025-07-08 RX ADMIN — LABETALOL HYDROCHLORIDE 400 MILLIGRAM(S): 200 TABLET, FILM COATED ORAL at 06:32

## 2025-07-08 NOTE — PROGRESS NOTE ADULT - PROBLEM SELECTOR PLAN 3
Was sent in from NH for Hgb 7.9 in a routine lab   Total iron 37, TIBC 233, % iron 16  No active signs of bleeding   - macrocytic anemia   - b12 and folate serum wnl   - c/w home iron and folic acid supplement  - Hgb remains stable at 7.6   - Finished Venofer 200mg IV qd x 3 doses  - started Epogen per nephrology  - QMA Dr. Moralez followinf  - Pt has moderate to severe Macrocytic anemia suggestive of myelodysplastic anemia per hem/onc  - Recommend bone marrow aspiration and biopsy as an outpatient. Was sent in from NH for Hgb 7.9 in a routine lab   Total iron 37, TIBC 233, % iron 16  No active signs of bleeding   - macrocytic anemia   - b12 and folate serum wnl   - c/w home iron and folic acid supplement  - Completed Venofer 200mg IV qd x 3 doses  - started Epogen per nephrology  - QMA Dr. Moralez followinf  - Pt has moderate to severe Macrocytic anemia suggestive of myelodysplastic anemia per hem/onc  - Recommend bone marrow aspiration and biopsy as an outpatient.

## 2025-07-08 NOTE — PROGRESS NOTE ADULT - SUBJECTIVE AND OBJECTIVE BOX
Patient is a 70y old  Male who presents with a chief complaint of MICHEL (2025 16:50)    PATIENT IS SEEN AND EXAMINED IN MEDICAL FLOOR.  NGT [    ]    ARCADIO [   ]      GT [   ]    ALLERGIES:  No Known Allergies      Daily     Daily Weight in k (2025 05:15)    VITALS:    Vital Signs Last 24 Hrs  T(C): 36.8 (2025 09:30), Max: 37.1 (2025 16:13)  T(F): 98.2 (2025 09:30), Max: 98.8 (2025 16:13)  HR: 64 (2025 09:30) (64 - 71)  BP: 146/62 (2025 09:30) (117/46 - 189/65)  BP(mean): 103 (2025 05:15) (93 - 103)  RR: 18 (2025 09:30) (18 - 18)  SpO2: 99% (2025 09:30) (95% - 99%)    Parameters below as of 2025 09:30  Patient On (Oxygen Delivery Method): room air        LABS:    CBC Full  -  ( 2025 07:39 )  WBC Count : 6.07 K/uL  RBC Count : 2.29 M/uL  Hemoglobin : 8.1 g/dL  Hematocrit : 25.3 %  Platelet Count - Automated : 261 K/uL  Mean Cell Volume : 110.5 fl  Mean Cell Hemoglobin : 35.4 pg  Mean Cell Hemoglobin Concentration : 32.0 g/dL  Auto Neutrophil # : x  Auto Lymphocyte # : x  Auto Monocyte # : x  Auto Eosinophil # : x  Auto Basophil # : x  Auto Neutrophil % : x  Auto Lymphocyte % : x  Auto Monocyte % : x  Auto Eosinophil % : x  Auto Basophil % : x          136  |  103  |  31[H]  ----------------------------<  114[H]  3.7   |  28  |  1.81[H]    Ca    8.9      2025 07:39      CAPILLARY BLOOD GLUCOSE      POCT Blood Glucose.: 144 mg/dL (2025 07:40)  POCT Blood Glucose.: 155 mg/dL (2025 21:06)  POCT Blood Glucose.: 184 mg/dL (2025 16:51)  POCT Blood Glucose.: 216 mg/dL (2025 11:32)          Creatinine Trend: 1.81<--, 1.68<--, 1.74<--, 1.74<--, 1.66<--, 1.99<--  I&O's Summary    2025 07:01  -  2025 07:00  --------------------------------------------------------  IN: 150 mL / OUT: 1250 mL / NET: -1100 mL                MEDICATIONS:    MEDICATIONS  (STANDING):  aspirin  chewable 81 milliGRAM(s) Oral daily  atorvastatin 80 milliGRAM(s) Oral at bedtime  azaTHIOprine 100 milliGRAM(s) Oral daily  cholecalciferol 1000 Unit(s) Oral daily  cyanocobalamin 1000 MICROGram(s) Oral daily  dextrose 5%. 1000 milliLiter(s) (50 mL/Hr) IV Continuous <Continuous>  dextrose 50% Injectable 25 Gram(s) IV Push once  ferrous    sulfate 325 milliGRAM(s) Oral two times a day  finasteride 5 milliGRAM(s) Oral daily  folic acid 1 milliGRAM(s) Oral daily  heparin   Injectable 5000 Unit(s) SubCutaneous every 8 hours  hydrALAZINE 100 milliGRAM(s) Oral every 8 hours  insulin lispro (ADMELOG) corrective regimen sliding scale   SubCutaneous three times a day before meals  insulin lispro (ADMELOG) corrective regimen sliding scale   SubCutaneous at bedtime  labetalol 400 milliGRAM(s) Oral every 8 hours  losartan 100 milliGRAM(s) Oral at bedtime  NIFEdipine XL 90 milliGRAM(s) Oral <User Schedule>  predniSONE   Tablet 20 milliGRAM(s) Oral daily  pyridostigmine 60 milliGRAM(s) Oral <User Schedule>  sertraline 50 milliGRAM(s) Oral daily  tamsulosin 0.4 milliGRAM(s) Oral at bedtime      MEDICATIONS  (PRN):  acetaminophen     Tablet .. 650 milliGRAM(s) Oral every 6 hours PRN Temp greater or equal to 38C (100.4F), Mild Pain (1 - 3)  dextrose Oral Gel 15 Gram(s) Oral once PRN Blood Glucose LESS THAN 70 milliGRAM(s)/deciliter  ondansetron Injectable 4 milliGRAM(s) IV Push every 8 hours PRN Nausea and/or Vomiting      REVIEW OF SYSTEMS:                           ALL ROS DONE [ X   ]    CONSTITUTIONAL:  LETHARGIC [   ], FEVER [   ], UNRESPONSIVE [   ]  CVS:  CP  [   ], SOB, [   ], PALPITATIONS [   ], DIZZYNESS [   ]  RS: COUGH [   ], SPUTUM [   ]  GI: ABDOMINAL PAIN [   ], NAUSEA [   ], VOMITINGS [   ], DIARRHEA [   ], CONSTIPATION [   ]  :  DYSURIA [   ], NOCTURIA [   ], INCREASED FREQUENCY [   ], DRIBLING [   ],  SKELETAL: PAINFUL JOINTS [   ], SWOLLEN JOINTS [   ], NECK ACHE [   ], LOW BACK ACHE [   ],  SKIN : ULCERS [   ], RASH [   ], ITCHING [   ]  CNS: HEAD ACHE [   ], DOUBLE VISION [   ], BLURRED VISION [   ], AMS / CONFUSION [   ], SEIZURES [   ], WEAKNESS [   ],TINGLING / NUMBNESS [   ]      PHYSICAL EXAMINATION:    GENERAL APPEARANCE: NO DISTRESS  HEENT:  NO PALLOR, NO  JVD,  NO   NODES, NECK SUPPLE  CVS: S1 +, S2 +,   RS: AEEB,  OCCASIONAL  RALES +,   NO RONCHI  ABD: SOFT, NT, NO, BS +  EXT: NO PE  SKIN: WARM,   SKELETAL:  ROM ACCEPTABLE  CNS:  AAO X 3        RADIOLOGY :  RADIOLOGY AND READINGS REVIEWED      < from: US Renal (25 @ 09:48) >    IMPRESSION:    1. No hydronephrosis.  2. Increased renal cortical echogenicity compatible with renal   parenchymal disease.  3. There is limited visualization of the urinary bladder; however, on   limited views, the wall appears diffusely thickened. Suggest correlation   with urinalysis to exclude infection.  4. Partially imaged hypoechoic structure posterior to the left kidney may   correspond to the lymphadenopathy questioned on the prior CT. Further   evaluation is recommended as clinically.    < end of copied text >      < from: CT Abdomen and Pelvis No Cont (25 @ 23:36) >    IMPRESSION:  No retroperitoneal hematoma.    Soft tissue densities within the retroperitoneum next to the aorta   concerning for extensive lymphadenopathy versus tortuous vessels or a   combination of both. The evaluation is limited due to the lack of IV   contrast. Recommend contrast enhanced cross-sectional imaging for better   assessment. Further workup for lymphoproliferative disease may be helpful.    Circumferential bladder wall thickening as can be seen with decompression   or component of cystitis.        Prelim: No obvious retroperitoneal hematoma. Gallbladder wall thickening.   Bladder wall thickening. Retroperitoneal/pelvic lymphadenopathy.   Persistent right > left pleural effusion. Small pericardial effusion.   Official report to follow.    < end of copied text >  < from: Xray Chest 1 View- PORTABLE-Urgent (Xray Chest 1 View- PORTABLE-Urgent .) (25 @ 16:53) >    IMPRESSION: Sternotomy, left loop recorder, cervical spine hardware   noted. Increasing atelectatic consolidation of left lower lobe with   effusion.    < end of copied text >        ASSESSMENT :     Acute renal failure    HTN (hypertension)    DM (diabetes mellitus)    Myasthenia gravis    Hypercholesterolemia    CVA (cerebrovascular accident)    Peripheral neuropathy    BPH (benign prostatic hyperplasia)    Major depression    Lipoma of chest wall    MG with thymoma (myasthena gravis)    H/O cataract extraction        PLAN:  HPI:  Patient is a 70M, from [facility] ambulates with a walker, with PMHx of Myasthenia gravis s/p thymectomy, HTN, 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 Patient reports he has been feeling nauseous for a few days but no vomiting. He reports chronic increased urinary frequency and sensation of incomplete voiding. He denies all other symptoms - fever, chills, cough, chest pain, SoB, palpitations abdominal pain, diarrhea, dysuria, arm or leg numbness or tingling. Reports regular brown stool - denies black or bloody stool.  (2025 21:32)        # DC PLAN: BACK TO [facility] AFTER ABDOMINAL U/S TO EVALUATE LIKELY RETROPERITONEAL LYMPHADENOPATHY   - WILL REFER HIM TO ONCOLOGY & GI CONSULT (FOR EGD& COLONOSCOPY) AS OUT PATIENT FOR FURTHER FOLLOWUP  - MACROCYTIC ANEMIA, ANEMIA OF CKD - MAY NEED PRBC # 1 UNIT PRIOR TO DISCHARGE   - LIKELY MYELOSUPPRESSION / ANEMIA DUE TO AZATHIOPRINE  - WILL SUPPLEMENT B12, FA, FESO4         # CASE D/W PATIENT AND PARTNER MILLI DONOVAN AT BEDSIDE, ALL QUESTIONS ANSWERED. DISCUSSED RECOMMENDATIONS AS MADE BY MULTIDISCIPLINARY TEAM. DISCUSSED THAT PROGNOSIS IS GUARDED. THEY VERBALIZED UNDERSTANDING. IN DISCUSSION REGARDING GOC - PATIENT WISHES TO BE FULL CODE.        # HYPERTENSIVE URGENCY    # HTN - CONTROLLED   - TELEMETRY  - ECHO - LV EF - 70 - 75%, G1DD  - NOTED TROPONINS  - S/P IV HYDRALAZINE  - HYDRALAZINE  - PROCARDIA  - LABETALOL  - RESUME LOSARTAN  - STARTING SPIRONOLACTONE  - NOTED SERUM RENIN, ALDOSTERONE LEVELS  - F/U PLASMA METANEPHRINES  - CARDIOLOGY CONSULT  - NEPHROLOGY CONSULT  - ENDOCRINOLOGY CONSULT    # H/O CARDIAC ARRHYTHMIA - LOOP RECORDER IN PLACE  # H/O STERNOTOMY DUE TO THYMECTOMY      # MICHEL ON CKD STAGE 3 B  # BPH, OBSTRUCTIVE UROPATHY  - IV FLUIDS  - PVR - 0 ML  - MONITOR CR  - AVOID NEPHROTOXIC AGENTS  - NEPHROLOGY CONSULT    # HYPOGLYCEMIA - RESOLVED  - MONITORING FINGERSTICKS    # ANEMIA  - MACROCYTIC ANEMIA, ANEMIA OF CKD   - LIKELY MYELOSUPPRESSION / ANEMIA DUE TO AZATHIOPRINE    - TREND HGB  - ANEMIA PANEL  - DENIES MELENA, HEMATOCHEZIA, HEMATEMESIS, HEMATURIA  - NOTED CT A/P  - GASTROENTEROLOGY CONSULT    # ? LYMPHADENOPATHY ON CT A/P   - F/U U/S ABDOMEN  - DEFER CT W/ CONTRAST GIVEN RENAL DYSFUNCTION  - PER FAMILY PATIENT MAY HAVE HARDWARE THAT IS NOT COMPATIBLE WITH MRI  - HEME/ONC CONSULT    # DENIES DYSURIA, UA NEGATIVE FOR LEUKOCYTE ESTERASE AND NITRITES      # MYASTHENIA GRAVIS S/P THYMECTOMY/STERNOTOMY - ON AZATHIOPRINE, PREDNISONE, PYRIDOSTIGMINE  - NEUROLOGY CONSULT    # DM, DIABETIC NEPHROPATHY, DIABETIC RETINOPATHY, DIABETIC PERIPHERAL NEUROPATHY  - SSI + FS    # CVA W/ MILD RIGHT HP    # IMPAIRED GAIT DUE TO GENERALIZED MUSCLE WEAKNESS, CVA, MYASTHENIA GRAVIS, CERVICAL SPINAL STENOSIS - H/O CERVICAL SPINE INSTRUMENTATION - HARDWARE IN PLACE     # PAD  # GI AND DVT PPX   Patient is a 70y old  Male who presents with a chief complaint of MICHEL (2025 16:50)    PATIENT IS SEEN AND EXAMINED IN MEDICAL FLOOR.      ALLERGIES:  No Known Allergies      Daily     Daily Weight in k (2025 05:15)    VITALS:    Vital Signs Last 24 Hrs  T(C): 36.8 (2025 09:30), Max: 37.1 (2025 16:13)  T(F): 98.2 (2025 09:30), Max: 98.8 (2025 16:13)  HR: 64 (2025 09:30) (64 - 71)  BP: 146/62 (2025 09:30) (117/46 - 189/65)  BP(mean): 103 (2025 05:15) (93 - 103)  RR: 18 (2025 09:30) (18 - 18)  SpO2: 99% (2025 09:30) (95% - 99%)    Parameters below as of 2025 09:30  Patient On (Oxygen Delivery Method): room air        LABS:    CBC Full  -  ( 2025 07:39 )  WBC Count : 6.07 K/uL  RBC Count : 2.29 M/uL  Hemoglobin : 8.1 g/dL  Hematocrit : 25.3 %  Platelet Count - Automated : 261 K/uL  Mean Cell Volume : 110.5 fl  Mean Cell Hemoglobin : 35.4 pg  Mean Cell Hemoglobin Concentration : 32.0 g/dL  Auto Neutrophil # : x  Auto Lymphocyte # : x  Auto Monocyte # : x  Auto Eosinophil # : x  Auto Basophil # : x  Auto Neutrophil % : x  Auto Lymphocyte % : x  Auto Monocyte % : x  Auto Eosinophil % : x  Auto Basophil % : x          136  |  103  |  31[H]  ----------------------------<  114[H]  3.7   |  28  |  1.81[H]    Ca    8.9      2025 07:39      CAPILLARY BLOOD GLUCOSE      POCT Blood Glucose.: 144 mg/dL (2025 07:40)  POCT Blood Glucose.: 155 mg/dL (2025 21:06)  POCT Blood Glucose.: 184 mg/dL (2025 16:51)  POCT Blood Glucose.: 216 mg/dL (2025 11:32)          Creatinine Trend: 1.81<--, 1.68<--, 1.74<--, 1.74<--, 1.66<--, 1.99<--  I&O's Summary    2025 07:01  -  2025 07:00  --------------------------------------------------------  IN: 150 mL / OUT: 1250 mL / NET: -1100 mL                MEDICATIONS:    MEDICATIONS  (STANDING):  aspirin  chewable 81 milliGRAM(s) Oral daily  atorvastatin 80 milliGRAM(s) Oral at bedtime  azaTHIOprine 100 milliGRAM(s) Oral daily  cholecalciferol 1000 Unit(s) Oral daily  cyanocobalamin 1000 MICROGram(s) Oral daily  dextrose 5%. 1000 milliLiter(s) (50 mL/Hr) IV Continuous <Continuous>  dextrose 50% Injectable 25 Gram(s) IV Push once  ferrous    sulfate 325 milliGRAM(s) Oral two times a day  finasteride 5 milliGRAM(s) Oral daily  folic acid 1 milliGRAM(s) Oral daily  heparin   Injectable 5000 Unit(s) SubCutaneous every 8 hours  hydrALAZINE 100 milliGRAM(s) Oral every 8 hours  insulin lispro (ADMELOG) corrective regimen sliding scale   SubCutaneous three times a day before meals  insulin lispro (ADMELOG) corrective regimen sliding scale   SubCutaneous at bedtime  labetalol 400 milliGRAM(s) Oral every 8 hours  losartan 100 milliGRAM(s) Oral at bedtime  NIFEdipine XL 90 milliGRAM(s) Oral <User Schedule>  predniSONE   Tablet 20 milliGRAM(s) Oral daily  pyridostigmine 60 milliGRAM(s) Oral <User Schedule>  sertraline 50 milliGRAM(s) Oral daily  tamsulosin 0.4 milliGRAM(s) Oral at bedtime      MEDICATIONS  (PRN):  acetaminophen     Tablet .. 650 milliGRAM(s) Oral every 6 hours PRN Temp greater or equal to 38C (100.4F), Mild Pain (1 - 3)  dextrose Oral Gel 15 Gram(s) Oral once PRN Blood Glucose LESS THAN 70 milliGRAM(s)/deciliter  ondansetron Injectable 4 milliGRAM(s) IV Push every 8 hours PRN Nausea and/or Vomiting      REVIEW OF SYSTEMS:                           ALL ROS DONE [ X   ]    CONSTITUTIONAL:  LETHARGIC [   ], FEVER [   ], UNRESPONSIVE [   ]  CVS:  CP  [   ], SOB, [   ], PALPITATIONS [   ], DIZZYNESS [   ]  RS: COUGH [   ], SPUTUM [   ]  GI: ABDOMINAL PAIN [   ], NAUSEA [   ], VOMITINGS [   ], DIARRHEA [   ], CONSTIPATION [   ]  :  DYSURIA [   ], NOCTURIA [   ], INCREASED FREQUENCY [   ], DRIBLING [   ],  SKELETAL: PAINFUL JOINTS [   ], SWOLLEN JOINTS [   ], NECK ACHE [   ], LOW BACK ACHE [   ],  SKIN : ULCERS [   ], RASH [   ], ITCHING [   ]  CNS: HEAD ACHE [   ], DOUBLE VISION [   ], BLURRED VISION [   ], AMS / CONFUSION [   ], SEIZURES [   ], WEAKNESS [   ],TINGLING / NUMBNESS [   ]      PHYSICAL EXAMINATION:    GENERAL APPEARANCE: NO DISTRESS  HEENT:  NO PALLOR, NO  JVD,  NO   NODES, NECK SUPPLE  CVS: S1 +, S2 +,   RS: AEEB,  OCCASIONAL  RALES +,   NO RONCHI  ABD: SOFT, NT, NO, BS +  EXT: NO PE  SKIN: WARM,   SKELETAL:  ROM ACCEPTABLE  CNS:  AAO X 3        RADIOLOGY :  RADIOLOGY AND READINGS REVIEWED      < from: US Renal (25 @ 09:48) >    IMPRESSION:    1. No hydronephrosis.  2. Increased renal cortical echogenicity compatible with renal   parenchymal disease.  3. There is limited visualization of the urinary bladder; however, on   limited views, the wall appears diffusely thickened. Suggest correlation   with urinalysis to exclude infection.  4. Partially imaged hypoechoic structure posterior to the left kidney may   correspond to the lymphadenopathy questioned on the prior CT. Further   evaluation is recommended as clinically.    < end of copied text >      < from: CT Abdomen and Pelvis No Cont (25 @ 23:36) >    IMPRESSION:  No retroperitoneal hematoma.    Soft tissue densities within the retroperitoneum next to the aorta   concerning for extensive lymphadenopathy versus tortuous vessels or a   combination of both. The evaluation is limited due to the lack of IV   contrast. Recommend contrast enhanced cross-sectional imaging for better   assessment. Further workup for lymphoproliferative disease may be helpful.    Circumferential bladder wall thickening as can be seen with decompression   or component of cystitis.        Prelim: No obvious retroperitoneal hematoma. Gallbladder wall thickening.   Bladder wall thickening. Retroperitoneal/pelvic lymphadenopathy.   Persistent right > left pleural effusion. Small pericardial effusion.   Official report to follow.    < end of copied text >  < from: Xray Chest 1 View- PORTABLE-Urgent (Xray Chest 1 View- PORTABLE-Urgent .) (25 @ 16:53) >    IMPRESSION: Sternotomy, left loop recorder, cervical spine hardware   noted. Increasing atelectatic consolidation of left lower lobe with   effusion.    < end of copied text >        ASSESSMENT :     Acute renal failure    HTN (hypertension)    DM (diabetes mellitus)    Myasthenia gravis    Hypercholesterolemia    CVA (cerebrovascular accident)    Peripheral neuropathy    BPH (benign prostatic hyperplasia)    Major depression    Lipoma of chest wall    MG with thymoma (myasthena gravis)    H/O cataract extraction        PLAN:  HPI:  Patient is a 70M, from [facility] ambulates with a walker, with PMHx of Myasthenia gravis s/p thymectomy, HTN, 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 Patient reports he has been feeling nauseous for a few days but no vomiting. He reports chronic increased urinary frequency and sensation of incomplete voiding. He denies all other symptoms - fever, chills, cough, chest pain, SoB, palpitations abdominal pain, diarrhea, dysuria, arm or leg numbness or tingling. Reports regular brown stool - denies black or bloody stool.  (2025 21:32)        # DC PLAN: BACK TO [facility] AFTER ABDOMINAL U/S TO EVALUATE LIKELY RETROPERITONEAL LYMPHADENOPATHY   - WILL REFER HIM TO ONCOLOGY & GI CONSULT (FOR EGD& COLONOSCOPY) AS OUT PATIENT FOR FURTHER FOLLOWUP  - MACROCYTIC ANEMIA, ANEMIA OF CKD - MAY NEED PRBC # 1 UNIT PRIOR TO DISCHARGE   - LIKELY MYELOSUPPRESSION / ANEMIA DUE TO AZATHIOPRINE  - WILL SUPPLEMENT B12, FA, FESO4         # CASE D/W PATIENT AND PARTNER MILLI DONOVAN AT BEDSIDE, ALL QUESTIONS ANSWERED. DISCUSSED RECOMMENDATIONS AS MADE BY MULTIDISCIPLINARY TEAM. DISCUSSED THAT PROGNOSIS IS GUARDED. THEY VERBALIZED UNDERSTANDING. IN DISCUSSION REGARDING GOC - PATIENT WISHES TO BE FULL CODE.        # HYPERTENSIVE URGENCY    # HTN - CONTROLLED   - TELEMETRY  - ECHO - LV EF - 70 - 75%, G1DD  - NOTED TROPONINS  - S/P IV HYDRALAZINE  - HYDRALAZINE  - PROCARDIA  - LABETALOL  - RESUME LOSARTAN  - STARTING SPIRONOLACTONE  - NOTED SERUM RENIN, ALDOSTERONE LEVELS  - F/U PLASMA METANEPHRINES  - CARDIOLOGY CONSULT  - NEPHROLOGY CONSULT  - ENDOCRINOLOGY CONSULT    # H/O CARDIAC ARRHYTHMIA - LOOP RECORDER IN PLACE  # H/O STERNOTOMY DUE TO THYMECTOMY      # MICHEL ON CKD STAGE 3 B  # BPH, OBSTRUCTIVE UROPATHY  - IV FLUIDS  - PVR - 0 ML  - MONITOR CR  - AVOID NEPHROTOXIC AGENTS  - NEPHROLOGY CONSULT    # ACUTE GINGIVOSTOMATITIS  - PLACE ON UNASYN, F/U BCX  - MAGIC MOUTHWASH  - ID CONSULT    # HYPOGLYCEMIA - RESOLVED  - MONITORING FINGERSTICKS    # ANEMIA  - MACROCYTIC ANEMIA, ANEMIA OF CKD   - LIKELY MYELOSUPPRESSION / ANEMIA DUE TO AZATHIOPRINE    - TREND HGB  - ANEMIA PANEL  - DENIES MELENA, HEMATOCHEZIA, HEMATEMESIS, HEMATURIA  - NOTED CT A/P  - GASTROENTEROLOGY CONSULT    # ? LYMPHADENOPATHY ON CT A/P   - F/U U/S ABDOMEN  - DEFER CT W/ CONTRAST GIVEN RENAL DYSFUNCTION  - PER FAMILY PATIENT MAY HAVE HARDWARE THAT IS NOT COMPATIBLE WITH MRI  - HEME/ONC CONSULT    # DENIES DYSURIA, UA NEGATIVE FOR LEUKOCYTE ESTERASE AND NITRITES      # MYASTHENIA GRAVIS S/P THYMECTOMY/STERNOTOMY - ON AZATHIOPRINE, PREDNISONE, PYRIDOSTIGMINE  - D/W NEUROLOGY - RECOMMENDED F/U AS OUTPATIENT FOR FURTHER PREDNISONE TAPER/MANAGEMENT  - NEUROLOGY CONSULT    # DM, DIABETIC NEPHROPATHY, DIABETIC RETINOPATHY, DIABETIC PERIPHERAL NEUROPATHY  - SSI + FS    # CVA W/ MILD RIGHT HP    # IMPAIRED GAIT DUE TO GENERALIZED MUSCLE WEAKNESS, CVA, MYASTHENIA GRAVIS, CERVICAL SPINAL STENOSIS - H/O CERVICAL SPINE INSTRUMENTATION - HARDWARE IN PLACE     # PAD  # GI AND DVT PPX

## 2025-07-08 NOTE — PROGRESS NOTE ADULT - PROBLEM SELECTOR PLAN 10
DVT prophylaxis - heparin SQ    ####Discharge planning  from [facility], will discharge back to facility pending stable b/p    f/u QMA recs  CM following

## 2025-07-08 NOTE — PROGRESS NOTE ADULT - PROBLEM SELECTOR PLAN 4
CT A/P - Soft tissue densities within the retroperitoneum next to the aorta   concerning for extensive lymphadenopathy versus tortuous vessels or a   combination of both.   - not a candidate for MRI - has loop recorder, cervical hardware and sternotomy  - f/u US A/P   - QMA Dr. Moralez following CT A/P - Soft tissue densities within the retroperitoneum next to the aorta   concerning for extensive lymphadenopathy versus tortuous vessels or a   combination of both.   - not a candidate for MRI - has loop recorder, cervical hardware and sternotomy  - US A/P   - QMA Dr. Moralez following

## 2025-07-08 NOTE — PROGRESS NOTE ADULT - ASSESSMENT
Patient is a 71yo Male from [facility] ambulates with a walker, with PMHx of Myasthenia gravis s/p thymectomy, HTN, 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 Pt a/w hypertensive urgency and MICHEL.   Nephrology consulted for Elevated serum creatinine.    1. MICHEL- as per H& P; last SCr 1.3. MICHEL likely hemodynamically mediated due to uncontrolled HTN. Renal function improving  for which Losartan was resumed. Renal function overall remains stable .  Feurea 13.52%;   UA with 300 protein, no blood. FeNa 10%;  Renal US with no hydro.  TTE with grade 1 diastolic dysfunction, EF 70-75%; ?hypertensive vs infiltrative cardiomyopathy. SIFE neg & K/l wnl. Pt b/l pleural effusions on TTE. Check CXR;  Strict I/Os. Avoid nephrotoxins/ NSAIDs/ RCA. Monitor BMP.    2. Proteinuria- UA with 300 protein, no blood.  UPCr on 6/29,  0.48 g/day. No acute interventions at this time.    3. Hypertensive urgency- BP labile. c/w Labetalol 400mg PO q 8hrs. c/w Nifedipine ER 90mg at noon. Consider Aldactone 25mg PO daily for resistant HTN; will give one time dose toay. c/w Losartan 100 mg PO qhs due to elevated BP at night; +nocturnal HTN; recc outpt sleep study to r/o BRET.   ?Consider decreasing frequency of pyridostigmine?- defer to Neuro   Rec Renal US with dopplers as an outpt to r/o HOANG (unavailable at [facility]).    c/w low salt diet. Monitor BP  4. Hypokalemia- Kuldip/ Renin; 3.19. Not consistent with hypoaldosteronism. Renin 6.479.   Hypokalemia due to shifting from excessive insulin.   Recc aldactone 25mg PO daily. Check VBG. Monitor serum potassium  5. Iron deficiency anemia- low hgb. Check FOBT. tsat 16% with ferriitn 236- Finished Venofer 200mg IV qd x 3 doses. s/p Epo 10k SC x1 on 6/30 & 7/6. Monitor [facility] NEPHROLOGY  Bethel Smith M.D.  JOSEPH Kendall M.D.  MD Kimi Rodríguez, MSN, ANP-C    Telephone: [phone]  Facsimile: [phone]    [address] [place] [address], [id number]  [place] [address]

## 2025-07-08 NOTE — PROGRESS NOTE ADULT - SUBJECTIVE AND OBJECTIVE BOX
NP Note discussed with  Primary Attending    Patient is a 70y old  Male who presents with a chief complaint of MICHEL (08 Jul 2025 11:46).  Resting comfortably with no complaints      INTERVAL HPI/OVERNIGHT EVENTS: no new complaints    MEDICATIONS  (STANDING):  aspirin  chewable 81 milliGRAM(s) Oral daily  atorvastatin 80 milliGRAM(s) Oral at bedtime  azaTHIOprine 100 milliGRAM(s) Oral daily  cholecalciferol 1000 Unit(s) Oral daily  cyanocobalamin 1000 MICROGram(s) Oral daily  dextrose 5%. 1000 milliLiter(s) (50 mL/Hr) IV Continuous <Continuous>  dextrose 50% Injectable 25 Gram(s) IV Push once  ferrous    sulfate 325 milliGRAM(s) Oral two times a day  finasteride 5 milliGRAM(s) Oral daily  folic acid 1 milliGRAM(s) Oral daily  heparin   Injectable 5000 Unit(s) SubCutaneous every 8 hours  hydrALAZINE 100 milliGRAM(s) Oral every 8 hours  insulin lispro (ADMELOG) corrective regimen sliding scale   SubCutaneous three times a day before meals  insulin lispro (ADMELOG) corrective regimen sliding scale   SubCutaneous at bedtime  labetalol 400 milliGRAM(s) Oral every 8 hours  losartan 100 milliGRAM(s) Oral at bedtime  NIFEdipine XL 90 milliGRAM(s) Oral <User Schedule>  predniSONE   Tablet 20 milliGRAM(s) Oral daily  pyridostigmine 60 milliGRAM(s) Oral <User Schedule>  sertraline 50 milliGRAM(s) Oral daily  spironolactone 25 milliGRAM(s) Oral once  tamsulosin 0.4 milliGRAM(s) Oral at bedtime    MEDICATIONS  (PRN):  acetaminophen     Tablet .. 650 milliGRAM(s) Oral every 6 hours PRN Temp greater or equal to 38C (100.4F), Mild Pain (1 - 3)  dextrose Oral Gel 15 Gram(s) Oral once PRN Blood Glucose LESS THAN 70 milliGRAM(s)/deciliter  ondansetron Injectable 4 milliGRAM(s) IV Push every 8 hours PRN Nausea and/or Vomiting      __________________________________________________  REVIEW OF SYSTEMS:    CONSTITUTIONAL: No fever,   EYES: no acute visual disturbances  NECK: No pain or stiffness  RESPIRATORY: No cough; No shortness of breath  CARDIOVASCULAR: No chest pain, no palpitations  GASTROINTESTINAL: No pain. No nausea or vomiting; No diarrhea   NEUROLOGICAL: No headache or numbness, no tremors  MUSCULOSKELETAL: No joint pain, no muscle pain  GENITOURINARY: no dysuria, no frequency, no hesitancy  PSYCHIATRY: no depression , no anxiety  ALL OTHER  ROS negative        Vital Signs Last 24 Hrs  T(C): 36.7 (08 Jul 2025 11:48), Max: 37.1 (07 Jul 2025 16:13)  T(F): 98.1 (08 Jul 2025 11:48), Max: 98.8 (07 Jul 2025 16:13)  HR: 67 (08 Jul 2025 11:48) (64 - 71)  BP: 120/53 (08 Jul 2025 14:00) (120/53 - 207/69)  BP(mean): 103 (08 Jul 2025 05:15) (93 - 103)  RR: 19 (08 Jul 2025 11:48) (18 - 19)  SpO2: 97% (08 Jul 2025 11:48) (95% - 99%)    Parameters below as of 08 Jul 2025 11:48  Patient On (Oxygen Delivery Method): room air        ________________________________________________  PHYSICAL EXAM:  comfortable  GENERAL: NAD  HEENT: Normocephalic;  conjunctivae and sclerae clear; moist mucous membranes;   NECK : supple  CHEST/LUNG: Clear to auscultation bilaterally with good air entry   HEART: S1 S2  regular; no murmurs, gallops or rubs  ABDOMEN: Soft, Nontender, Nondistended; Bowel sounds present  EXTREMITIES: no cyanosis; no edema; no calf tenderness  SKIN: warm and dry; no rash  NERVOUS SYSTEM:  Awake and alert; Oriented  to place, person and time ; no new deficits    _________________________________________________  LABS:                        8.1    6.07  )-----------( 261      ( 08 Jul 2025 07:39 )             25.3     07-08    136  |  103  |  31[H]  ----------------------------<  114[H]  3.7   |  28  |  1.81[H]    Ca    8.9      08 Jul 2025 07:39        Urinalysis Basic - ( 08 Jul 2025 07:39 )    Color: x / Appearance: x / SG: x / pH: x  Gluc: 114 mg/dL / Ketone: x  / Bili: x / Urobili: x   Blood: x / Protein: x / Nitrite: x   Leuk Esterase: x / RBC: x / WBC x   Sq Epi: x / Non Sq Epi: x / Bacteria: x      CAPILLARY BLOOD GLUCOSE      POCT Blood Glucose.: 228 mg/dL (08 Jul 2025 11:28)  POCT Blood Glucose.: 144 mg/dL (08 Jul 2025 07:40)  POCT Blood Glucose.: 155 mg/dL (07 Jul 2025 21:06)  POCT Blood Glucose.: 184 mg/dL (07 Jul 2025 16:51)    RADIOLOGY & ADDITIONAL TESTS:    < from: US Renal (07.07.25 @ 18:01) >    ACC: [id number] EXAM:  US KIDNEY(S)   ORDERED BY: CHITO TAYLOR     PROCEDURE DATE:  07/07/2025          INTERPRETATION:  CLINICAL INFORMATION: eval retroperitoneal   lymphadenopathy.    COMPARISON: CT dated 6/29/2025    TECHNIQUE: Sonography of the kidneys and bladder.    FINDINGS:    Limited portable exam.    Echogenic kidneys suggestive of parenchymal disease.    Right kidney: 8.9 cm. No hydronephrosis.    Left kidney: 9.7 cm. No hydronephrosis.    Urinary bladder: Bladder wall thickening.    Unable to evaluate for lymphadenopathy on this ultrasound.    IMPRESSION:    Limited exam.    Unable to evaluate for lymphadenopathy on this ultrasound. See recent CT.    Echogenic kidneys suggestive of parenchymal disease.    No hydronephrosis.    Bladder wall thickening. Correlate with urinalysis.    --- End of Report ---    < end of copied text >        < from: Xray Chest 1 View- PORTABLE-Urgent (Xray Chest 1 View- PORTABLE-Urgent .) (07.03.25 @ 16:53) >    ACC: [id number] EXAM:  XR CHEST PORTABLE URGENT 1V   ORDERED BY: AMADEO AGUILAR     PROCEDURE DATE:  07/03/2025          INTERPRETATION:  AP semierect chest on July 3, 2025 at 4:41 PM. Patient   has renal failure and is being considered for MR.    Heart magnified by technique.    Sternotomy, cervical spine hardware, and left loop recorder noted.    There is atelectasis of the left lower lobe with adjacent fluid which has   increased from April 2, 2022. Otherwise stable findings.    IMPRESSION: Sternotomy, left loop recorder, cervical spine hardware   noted. Increasing atelectatic consolidation of left lower lobe with   effusion.    --- End of Report ---    < end of copied text >      Imaging Personally Reviewed:  YES/NO    Consultant(s) Notes Reviewed:   YES/ No    Care Discussed with Consultants :     Plan of care was discussed with patient and /or primary care giver; all questions and concerns were addressed and care was aligned with patient's wishes.

## 2025-07-08 NOTE — PROGRESS NOTE ADULT - SUBJECTIVE AND OBJECTIVE BOX
SUBJECTIVE / INTERVAL HPI: Patient was seen and examined this morning.     Overnight events: No acute overnight events. Mild improvement in BP noted.    CAPILLARY BLOOD GLUCOSE  112 mg/dL (07-07 @ 07:32)  216 mg/dL (07-07 @ 11:32)  184 mg/dL (07-07 @ 16:51)  155 mg/dL (07-07 @ 21:06)  144 mg/dL (07-08 @ 07:40)      REVIEW OF SYSTEMS  Constitutional:  Negative fever, chills or loss of appetite.  Eyes:  Negative blurry vision or double vision.  Cardiovascular:  Negative for chest pain or palpitations.  Respiratory:  Negative for cough, wheezing, or shortness of breath.    Gastrointestinal:  Negative for nausea, vomiting, diarrhea, constipation, or abdominal pain.  Genitourinary:  Negative frequency, urgency or dysuria.  Neurologic:  No headache, confusion, dizziness, lightheadedness.    PHYSICAL EXAM  Vital Signs Last 24 Hrs  T(C): 36.8 (08 Jul 2025 09:30), Max: 37.1 (07 Jul 2025 16:13)  T(F): 98.2 (08 Jul 2025 09:30), Max: 98.8 (07 Jul 2025 16:13)  HR: 64 (08 Jul 2025 09:30) (64 - 71)  BP: 146/62 (08 Jul 2025 09:30) (117/46 - 189/65)  BP(mean): 103 (08 Jul 2025 05:15) (93 - 103)  RR: 18 (08 Jul 2025 09:30) (18 - 18)  SpO2: 99% (08 Jul 2025 09:30) (95% - 99%)    Parameters below as of 08 Jul 2025 09:30  Patient On (Oxygen Delivery Method): room air    Gen: NAD, calm  HEENT: +facial/ periorbital edema    Neck: no JVD  Cards: RRR, +S1/S2,+BOUBACAR  Resp: CTA B/L  GI: soft, NT/ND, NABS  : no CVA tenderness  Extremities: no edema B/L    LABS  CBC - WBC/HGB/HTC/PLT: 6.07/8.1/25.3/261 (07-08-25)  BMP - Na/K/Cl/Bicarb/BUN/Cr/Gluc/AG/eGFR: 136/3.7/103/28/31/1.81/114/5/40 (07-08-25)  Ca - 8.9 (07-08-25)  Phos - -- (07-08-25)  Mg - -- (07-08-25)  LFT - Alb/Tprot/Tbili/Dbili/AlkPhos/ALT/AST: 2.7/4.9/--/--/--/--/-- (07-03-25)    Thyroid Stimulating Hormone, Serum: 4.28 (06-27-25)        07-07-25 @ 07:01  -  07-08-25 @ 07:00  --------------------------------------------------------  IN: 150 mL / OUT: 1250 mL / NET: -1100 mL        MEDICATIONS  MEDICATIONS  (STANDING):  aspirin  chewable 81 milliGRAM(s) Oral daily  atorvastatin 80 milliGRAM(s) Oral at bedtime  azaTHIOprine 100 milliGRAM(s) Oral daily  cholecalciferol 1000 Unit(s) Oral daily  cyanocobalamin 1000 MICROGram(s) Oral daily  dextrose 5%. 1000 milliLiter(s) (50 mL/Hr) IV Continuous <Continuous>  dextrose 50% Injectable 25 Gram(s) IV Push once  ferrous    sulfate 325 milliGRAM(s) Oral two times a day  finasteride 5 milliGRAM(s) Oral daily  folic acid 1 milliGRAM(s) Oral daily  heparin   Injectable 5000 Unit(s) SubCutaneous every 8 hours  hydrALAZINE 100 milliGRAM(s) Oral every 8 hours  insulin lispro (ADMELOG) corrective regimen sliding scale   SubCutaneous three times a day before meals  insulin lispro (ADMELOG) corrective regimen sliding scale   SubCutaneous at bedtime  labetalol 400 milliGRAM(s) Oral every 8 hours  losartan 100 milliGRAM(s) Oral at bedtime  NIFEdipine XL 90 milliGRAM(s) Oral <User Schedule>  predniSONE   Tablet 20 milliGRAM(s) Oral daily  pyridostigmine 60 milliGRAM(s) Oral <User Schedule>  sertraline 50 milliGRAM(s) Oral daily  tamsulosin 0.4 milliGRAM(s) Oral at bedtime    MEDICATIONS  (PRN):  acetaminophen     Tablet .. 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 Hgb 9.9. BUN/Cr 30/1.81. Admitted for MICHEL on CKD and uncontrolled HTN, Nephrology and Cardiology following.    Endocrinology consulted for further evaluation of uncontrolled HTN. Due to hx of MG with thymectomy, pt on chronic Prednisone 20 mg qd.     A1C: 6.3 %    # Uncontrolled HTN  - pt noted with persistently labile and elevated HTN despite use of multiple anti-HTN agents  - pt on chronic Prednisone 20 mg qd for MG tx > ? exogenous Cushing Syndrome leading to persistently elevated, labile BP, consider decreasing dose of Pyridostigmine as this medication may also cause elevated BP > f/u Neuro  - Renin mildly elev to 6.47, Aldosterone 13.7- Hyperaldosteronism ruled out  , consider renovascular HTN  - per Nephro, Recc Renal US with dopplers as an outpt to r/o HOANG.    -Recommend starting on Aldactone 25 mg PO qd for BP; Consider increasing dose as BP continues to be elevated  - TSH 4.38 wnl   - pt denies any HA, palpitations (low suspicion for pheochromocytoma) - f/u Plasma mets and 24 hr urine for mets/cats (collected, pending result)  - continue to monitor BP at this time.    #Diabetes Mellitus  - c/w BILLIE ACHS  - Patient's fingerstick glucose goal is 100-180 mg/dL.       SUBJECTIVE / INTERVAL HPI: Patient was seen and examined this morning.     Overnight events: No acute overnight events. Mild improvement in BP noted.    CAPILLARY BLOOD GLUCOSE  112 mg/dL (07-07 @ 07:32)  216 mg/dL (07-07 @ 11:32)  184 mg/dL (07-07 @ 16:51)  155 mg/dL (07-07 @ 21:06)  144 mg/dL (07-08 @ 07:40)      REVIEW OF SYSTEMS  Constitutional:  Negative fever, chills or loss of appetite.  Eyes:  Negative blurry vision or double vision.  Cardiovascular:  Negative for chest pain or palpitations.  Respiratory:  Negative for cough, wheezing, or shortness of breath.    Gastrointestinal:  Negative for nausea, vomiting, diarrhea, constipation, or abdominal pain.  Genitourinary:  Negative frequency, urgency or dysuria.  Neurologic:  No headache, confusion, dizziness, lightheadedness.    PHYSICAL EXAM  Vital Signs Last 24 Hrs  T(C): 36.8 (08 Jul 2025 09:30), Max: 37.1 (07 Jul 2025 16:13)  T(F): 98.2 (08 Jul 2025 09:30), Max: 98.8 (07 Jul 2025 16:13)  HR: 64 (08 Jul 2025 09:30) (64 - 71)  BP: 146/62 (08 Jul 2025 09:30) (117/46 - 189/65)  BP(mean): 103 (08 Jul 2025 05:15) (93 - 103)  RR: 18 (08 Jul 2025 09:30) (18 - 18)  SpO2: 99% (08 Jul 2025 09:30) (95% - 99%)    Parameters below as of 08 Jul 2025 09:30  Patient On (Oxygen Delivery Method): room air    Gen: NAD, calm  HEENT: +facial/ periorbital edema    Neck: no JVD  Cards: RRR, +S1/S2,+BOUBACAR  Resp: CTA B/L  GI: soft, NT/ND, NABS  : no CVA tenderness  Extremities: no edema B/L    LABS  CBC - WBC/HGB/HTC/PLT: 6.07/8.1/25.3/261 (07-08-25)  BMP - Na/K/Cl/Bicarb/BUN/Cr/Gluc/AG/eGFR: 136/3.7/103/28/31/1.81/114/5/40 (07-08-25)  Ca - 8.9 (07-08-25)  Phos - -- (07-08-25)  Mg - -- (07-08-25)  LFT - Alb/Tprot/Tbili/Dbili/AlkPhos/ALT/AST: 2.7/4.9/--/--/--/--/-- (07-03-25)    Thyroid Stimulating Hormone, Serum: 4.28 (06-27-25)        07-07-25 @ 07:01  -  07-08-25 @ 07:00  --------------------------------------------------------  IN: 150 mL / OUT: 1250 mL / NET: -1100 mL        MEDICATIONS  MEDICATIONS  (STANDING):  aspirin  chewable 81 milliGRAM(s) Oral daily  atorvastatin 80 milliGRAM(s) Oral at bedtime  azaTHIOprine 100 milliGRAM(s) Oral daily  cholecalciferol 1000 Unit(s) Oral daily  cyanocobalamin 1000 MICROGram(s) Oral daily  dextrose 5%. 1000 milliLiter(s) (50 mL/Hr) IV Continuous <Continuous>  dextrose 50% Injectable 25 Gram(s) IV Push once  ferrous    sulfate 325 milliGRAM(s) Oral two times a day  finasteride 5 milliGRAM(s) Oral daily  folic acid 1 milliGRAM(s) Oral daily  heparin   Injectable 5000 Unit(s) SubCutaneous every 8 hours  hydrALAZINE 100 milliGRAM(s) Oral every 8 hours  insulin lispro (ADMELOG) corrective regimen sliding scale   SubCutaneous three times a day before meals  insulin lispro (ADMELOG) corrective regimen sliding scale   SubCutaneous at bedtime  labetalol 400 milliGRAM(s) Oral every 8 hours  losartan 100 milliGRAM(s) Oral at bedtime  NIFEdipine XL 90 milliGRAM(s) Oral <User Schedule>  predniSONE   Tablet 20 milliGRAM(s) Oral daily  pyridostigmine 60 milliGRAM(s) Oral <User Schedule>  sertraline 50 milliGRAM(s) Oral daily  tamsulosin 0.4 milliGRAM(s) Oral at bedtime    MEDICATIONS  (PRN):  acetaminophen     Tablet .. 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 Hgb 9.9. BUN/Cr 30/1.81. Admitted for MICHEL on CKD and uncontrolled HTN, Nephrology and Cardiology following.    Endocrinology consulted for further evaluation of uncontrolled HTN. Due to hx of MG with thymectomy, pt on chronic Prednisone 20 mg qd.     A1C: 6.3 %    # Uncontrolled HTN  - pt noted with persistently labile and elevated HTN despite use of multiple anti-HTN agents  - pt on chronic Prednisone 20 mg qd for MG tx > ? exogenous Cushing Syndrome leading to persistently elevated, labile BP, consider decreasing dose of Pyridostigmine as this medication may also cause elevated BP > f/u Neuro  - Renin mildly elev to 6.47, Aldosterone 13.7- Hyperaldosteronism ruled out  , consider renovascular HTN  - per Nephro, Recc Renal US with dopplers as an outpt to r/o HOANG.    -Recommend starting on Aldactone 25 mg PO qd for BP; Consider increasing dose as BP continues to be elevated  - TSH 4.38 wnl   - pt denies any HA, palpitations (low suspicion for pheochromocytoma) - f/u Plasma mets and 24 hr urine for mets/cats (collected, pending result)  - continue to monitor BP at this time    #Diabetes Mellitus  - c/w BILLIE ACHS  - Patient's fingerstick glucose goal is 100-180 mg/dL.

## 2025-07-08 NOTE — PROGRESS NOTE ADULT - SUBJECTIVE AND OBJECTIVE BOX
C A R D I O L O G Y  **********************************    DATE OF SERVICE: 07-08-25    Patient denies chest pain or shortness of breath.   Review of symptoms otherwise negative.    acetaminophen     Tablet .. 650 milliGRAM(s) Oral every 6 hours PRN  aspirin  chewable 81 milliGRAM(s) Oral daily  atorvastatin 80 milliGRAM(s) Oral at bedtime  azaTHIOprine 100 milliGRAM(s) Oral daily  cholecalciferol 1000 Unit(s) Oral daily  cyanocobalamin 1000 MICROGram(s) Oral daily  dextrose 5%. 1000 milliLiter(s) IV Continuous <Continuous>  dextrose 50% Injectable 25 Gram(s) IV Push once  dextrose Oral Gel 15 Gram(s) Oral once PRN  ferrous    sulfate 325 milliGRAM(s) Oral two times a day  finasteride 5 milliGRAM(s) Oral daily  folic acid 1 milliGRAM(s) Oral daily  heparin   Injectable 5000 Unit(s) SubCutaneous every 8 hours  hydrALAZINE 100 milliGRAM(s) Oral every 8 hours  insulin lispro (ADMELOG) corrective regimen sliding scale   SubCutaneous three times a day before meals  insulin lispro (ADMELOG) corrective regimen sliding scale   SubCutaneous at bedtime  labetalol 400 milliGRAM(s) Oral every 8 hours  losartan 100 milliGRAM(s) Oral at bedtime  NIFEdipine XL 90 milliGRAM(s) Oral <User Schedule>  ondansetron Injectable 4 milliGRAM(s) IV Push every 8 hours PRN  predniSONE   Tablet 20 milliGRAM(s) Oral daily  pyridostigmine 60 milliGRAM(s) Oral <User Schedule>  sertraline 50 milliGRAM(s) Oral daily  tamsulosin 0.4 milliGRAM(s) Oral at bedtime                            8.1    6.07  )-----------( 261      ( 08 Jul 2025 07:39 )             25.3       Hemoglobin: 8.1 g/dL (07-08 @ 07:39)  Hemoglobin: 7.8 g/dL (07-06 @ 05:44)  Hemoglobin: 7.6 g/dL (07-04 @ 06:25)      07-08    136  |  103  |  31[H]  ----------------------------<  114[H]  3.7   |  28  |  1.81[H]    Ca    8.9      08 Jul 2025 07:39      Creatinine Trend: 1.81<--, 1.68<--, 1.74<--, 1.74<--, 1.66<--, 1.99<--    COAGS:           T(C): 36.8 (07-08-25 @ 09:30), Max: 37.1 (07-07-25 @ 16:13)  HR: 64 (07-08-25 @ 09:30) (64 - 71)  BP: 146/62 (07-08-25 @ 09:30) (117/46 - 189/65)  RR: 18 (07-08-25 @ 09:30) (18 - 18)  SpO2: 99% (07-08-25 @ 09:30) (95% - 99%)  Wt(kg): --    I&O's Summary    07 Jul 2025 07:01  -  08 Jul 2025 07:00  --------------------------------------------------------  IN: 150 mL / OUT: 1250 mL / NET: -1100 mL          HEENT:  (-)icterus (-)pallor  CV: N S1 S2 1/6 BOUBACAR (+)2 Pulses B/l  Resp:  Clear to ausculatation B/L, normal effort  GI: (+) BS Soft, NT, ND  Lymph:  (-)Edema, (-)obvious lymphadenopathy  Skin: Warm to touch, Normal turgor  Psych: Appropriate mood and affect      TELEMETRY: 	  sinus        ASSESSMENT/PLAN: 	70y  Male PMHx of Myasthenia gravis s/p thymectomy, HTN, 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 hyperaldosterone state  aldosterone appears wnl, endo recs noted  -  TSH wnl   - plan to Add aldactone once seen by endocrine and appropriate blood testing is acquired   - w/u for secondary causes in progress 24 hour urine metanephrines  - would need Renal artery dopplers likely as an oupt since may not be done in house     # Abnormal EKG  - echo noted, suspect it is due to hypertensive heart disease given his profoundly elevated blood pressures however can arrange for outpt cardiac MRI     # MG  - neuro eval  noted    Ta Mercado MD, [facility]  BEEPER [phone]

## 2025-07-08 NOTE — PROGRESS NOTE ADULT - PROBLEM SELECTOR PLAN 2
BUN/Cr 30/1.81 (Baseline Scr 1.3)  s/p 1L NS in ED   Post-void bladder scan - 0mL  Renal US - negative for hydronephrosis   - s/p IVF  - creatinine improved then now worsening 1.99 > 1.66 > 1.7  - trend BMP  - Nephrology dr. Lan following BUN/Cr 30/1.81 (Baseline Scr 1.3)  s/p 1L NS in ED   Post-void bladder scan - 0mL  Renal US - negative for hydronephrosis   - s/p IVF  - creatinine improved then now worsening 1.99 > 1.66 > 1.7>1.81  - trend BMP  - Nephrology dr. Lan following

## 2025-07-08 NOTE — PROGRESS NOTE ADULT - PROBLEM SELECTOR PLAN 6
Hx of IDT2DM on Wbinknd69/30 10U qd and Novolin 100 ISS   - has been drinking rockstar energy drinks causing high sugars   - A1C 6.3  - recent hypoglycemia event, fasting glucose 80-120s  - decrease lantus to 4 units qhs   - Monitor BS ACHS  - Endocrine following

## 2025-07-08 NOTE — PROGRESS NOTE ADULT - SUBJECTIVE AND OBJECTIVE BOX
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 Pt a/w hypertensive urgency and MICHEL. Nephrology consulted for Elevated serum creatinine.    Hospital Medications: Medications reviewed.    REVIEW OF SYSTEMS:  Gen: no fever or chills  Cards: no chest pain  Resp: no dyspnea  GI: no nausea or vomiting or diarrhea  : no dysuria or hematuria  Vascular: no LE edema    VITALS:  T(F): 98.2 (07-08-25 @ 09:30), Max: 98.8 (07-07-25 @ 16:13)  HR: 64 (07-08-25 @ 09:30)  BP: 146/62 (07-08-25 @ 09:30)  RR: 18 (07-08-25 @ 09:30)  SpO2: 99% (07-08-25 @ 09:30)  Wt(kg): --    07-07 @ 07:01  -  07-08 @ 07:00  --------------------------------------------------------  IN: 150 mL / OUT: 1250 mL / NET: -1100 mL        PHYSICAL EXAM:  Gen: NAD, calm  HEENT: +facial/ periorbital edema    Neck: no JVD  Cards: RRR, +S1/S2,+BOUBACAR  Resp: CTA B/L  GI: soft, NT/ND, NABS  : no CVA tenderness  Extremities: no edema B/L     LABS:  07-08    136  |  103  |  31[H]  ----------------------------<  114[H]  3.7   |  28  |  1.81[H]    Ca    8.9      08 Jul 2025 07:39      Creatinine Trend: 1.81 <--, 1.68 <--, 1.74 <--, 1.74 <--, 1.66 <--                        8.1    6.07  )-----------( 261      ( 08 Jul 2025 07:39 )             25.3     Urine Studies:  Urinalysis Basic - ( 08 Jul 2025 07:39 )    Color:  / Appearance:  / SG:  / pH:   Gluc: 114 mg/dL / Ketone:   / Bili:  / Urobili:    Blood:  / Protein:  / Nitrite:    Leuk Esterase:  / RBC:  / WBC    Sq Epi:  / Non Sq Epi:  / Bacteria:

## 2025-07-08 NOTE — PROGRESS NOTE ADULT - PROBLEM SELECTOR PLAN 1
Hx of HTN on hydralazine 100mg tid, losartan 100mg qd, nifedipine 60mg qd, metoprolol 100mg bid   Troponin 43.9 > 39  - Hyperdynamic LVSF on ECHO with EF estimated at 70 to 75%   - telemetry monitoring   -serum aldosterone level normal. serum renin test unable as per [facility] lab  - increased Nifedipine to 90 mg qd  - changed metoprolol to labetalol 400 mg q 8hr and added Hydralazine 100mg q8h.   - resumed losartan 25 mg qd  - cardio Dr. Mercado following  - Endo Dr. Farr following  - Nephro Dr. Lan following  - Aldactone 25mg x 1 dose today (7/8) Hx of HTN on hydralazine 100mg tid, losartan 100mg qd, nifedipine 60mg qd, metoprolol 100mg bid   Troponin 43.9 > 39  - Hyperdynamic LVSF on ECHO with EF estimated at 70 to 75%   - telemetry monitoring   -serum aldosterone level normal. serum renin test unable as per [facility] lab  - increased Nifedipine to 90 mg qd  - changed metoprolol to labetalol 400 mg q 8hr and added Hydralazine 100mg q8h.   - resumed losartan 25 mg qd  - cardio Dr. Mercado following  - Endo Dr. Farr following  - Nephro Dr. Lan following  - Aldactone 25mg x 1 dose today (7/8)  - Still with labile b/p  - May need to spread anti hypertensives schedule throughout day

## 2025-07-08 NOTE — PROGRESS NOTE ADULT - ASSESSMENT
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 Hgb 9.9. BUN/Cr 30/1.81.   Admitted for MICHEL on CKD and uncontrolled HTN, Nephrology and Cardiology consulted.   TTE: Left ventricular wall thickness is severely increased. Left ventricular systolic function is hyperdynamic with an ejection fraction visually estimated at 70 to 75 %.  Differential includes hypertensive, hypertrophic, and infiltrative cardiomyopathy, among others. Cardiac MRI for further evaluation was recommended, can be done outpt.    During hospitalization pt was found to have elevated creatinine, renal US negative for hydronephrosis, d/c'd ARB.  MICHEL on CKD now improving from 1.99 > 1.7.   QMA Dr. Moralez consulted for macrocytic anemia and retroperitoneal lymphadenopathy.   Endocrine Dr. Farr consulted possible hormonal causes of uncontrolled HTN.     CXR - found to have Sternotomy, cervical spine hardware, and left loop recorder.   Pt is not a candidate for MRI A/P to further evaluate retroperitoneal lymphadenopathy at this time  Will further evaluate retroperitoneal lymphadenopathy with US A/P.   CXR - no evidence of pleural effusions noted on CT a/p imaging. Remains comfortable on room air.   Currently undergoing 24 urine studies to check for metanephrine and catecholamines to r/o pheochromocytoma.     Wife Jada Still updated on pt's status and current plan.     7/7-Pt. with labile b/p with periods of asymptomatic HTN with SBP as high as 220 dropping down to 110's/40's, needs further monitoring with b/p meds adjustment.  7/8-b/p somewhat improved but still labile, Aldactone added to regimen by nephro, close monitoring continues.

## 2025-07-09 LAB
ANION GAP SERPL CALC-SCNC: 4 MMOL/L — LOW (ref 5–17)
BUN SERPL-MCNC: 26 MG/DL — HIGH (ref 7–18)
CALCIUM SERPL-MCNC: 8.8 MG/DL — SIGNIFICANT CHANGE UP (ref 8.4–10.5)
CHLORIDE SERPL-SCNC: 103 MMOL/L — SIGNIFICANT CHANGE UP (ref 96–108)
CO2 SERPL-SCNC: 29 MMOL/L — SIGNIFICANT CHANGE UP (ref 22–31)
CREAT SERPL-MCNC: 1.66 MG/DL — HIGH (ref 0.5–1.3)
DOPAMINE - URINE 24 HOUR: 161 UG/24 HR — SIGNIFICANT CHANGE UP (ref 0–510)
DOPAMINE UR-MCNC: 124 UG/L — SIGNIFICANT CHANGE UP
EGFR: 45 ML/MIN/1.73M2 — LOW
EGFR: 45 ML/MIN/1.73M2 — LOW
EPINEPH UR-MCNC: 5 UG/L — SIGNIFICANT CHANGE UP
EPINEPHRINE - URINE, 24 HOUR: 7 UG/24 HR — SIGNIFICANT CHANGE UP (ref 0–20)
GLUCOSE BLDC GLUCOMTR-MCNC: 135 MG/DL — HIGH (ref 70–99)
GLUCOSE BLDC GLUCOMTR-MCNC: 175 MG/DL — HIGH (ref 70–99)
GLUCOSE BLDC GLUCOMTR-MCNC: 255 MG/DL — HIGH (ref 70–99)
GLUCOSE BLDC GLUCOMTR-MCNC: 292 MG/DL — HIGH (ref 70–99)
GLUCOSE BLDC GLUCOMTR-MCNC: 94 MG/DL — SIGNIFICANT CHANGE UP (ref 70–99)
GLUCOSE SERPL-MCNC: 96 MG/DL — SIGNIFICANT CHANGE UP (ref 70–99)
HCT VFR BLD CALC: 24.5 % — LOW (ref 39–50)
HGB BLD-MCNC: 8.1 G/DL — LOW (ref 13–17)
MCHC RBC-ENTMCNC: 33.1 G/DL — SIGNIFICANT CHANGE UP (ref 32–36)
MCHC RBC-ENTMCNC: 35.8 PG — HIGH (ref 27–34)
MCV RBC AUTO: 108.4 FL — HIGH (ref 80–100)
NOREPINEPH UR-MCNC: 32 UG/L — SIGNIFICANT CHANGE UP
NOREPINEPHRINE - URINE, 24 HOUR: 42 UG/24 HR — SIGNIFICANT CHANGE UP (ref 0–135)
NRBC BLD AUTO-RTO: 0 /100 WBCS — SIGNIFICANT CHANGE UP (ref 0–0)
PLATELET # BLD AUTO: 233 K/UL — SIGNIFICANT CHANGE UP (ref 150–400)
POTASSIUM SERPL-MCNC: 3.7 MMOL/L — SIGNIFICANT CHANGE UP (ref 3.5–5.3)
POTASSIUM SERPL-SCNC: 3.7 MMOL/L — SIGNIFICANT CHANGE UP (ref 3.5–5.3)
RBC # BLD: 2.26 M/UL — LOW (ref 4.2–5.8)
RBC # FLD: 18.3 % — HIGH (ref 10.3–14.5)
SODIUM SERPL-SCNC: 136 MMOL/L — SIGNIFICANT CHANGE UP (ref 135–145)
WBC # BLD: 6.15 K/UL — SIGNIFICANT CHANGE UP (ref 3.8–10.5)
WBC # FLD AUTO: 6.15 K/UL — SIGNIFICANT CHANGE UP (ref 3.8–10.5)

## 2025-07-09 RX ORDER — SPIRONOLACTONE 25 MG
25 TABLET ORAL DAILY
Refills: 0 | Status: DISCONTINUED | OUTPATIENT
Start: 2025-07-09 | End: 2025-07-10

## 2025-07-09 RX ADMIN — Medication 325 MILLIGRAM(S): at 05:30

## 2025-07-09 RX ADMIN — HEPARIN SODIUM 5000 UNIT(S): 1000 INJECTION INTRAVENOUS; SUBCUTANEOUS at 21:35

## 2025-07-09 RX ADMIN — AMPICILLIN SODIUM AND SULBACTAM SODIUM 200 GRAM(S): 1; .5 INJECTION, POWDER, FOR SOLUTION INTRAMUSCULAR; INTRAVENOUS at 13:08

## 2025-07-09 RX ADMIN — Medication 650 MILLIGRAM(S): at 21:34

## 2025-07-09 RX ADMIN — SERTRALINE 50 MILLIGRAM(S): 100 TABLET, FILM COATED ORAL at 11:39

## 2025-07-09 RX ADMIN — PREDNISONE 20 MILLIGRAM(S): 20 TABLET ORAL at 05:22

## 2025-07-09 RX ADMIN — LABETALOL HYDROCHLORIDE 400 MILLIGRAM(S): 200 TABLET, FILM COATED ORAL at 21:34

## 2025-07-09 RX ADMIN — LOSARTAN POTASSIUM 100 MILLIGRAM(S): 100 TABLET, FILM COATED ORAL at 21:34

## 2025-07-09 RX ADMIN — Medication 81 MILLIGRAM(S): at 11:38

## 2025-07-09 RX ADMIN — AZATHIOPRINE 100 MILLIGRAM(S): 50 TABLET ORAL at 11:40

## 2025-07-09 RX ADMIN — PYRIDOSTIGMINE BROMIDE 60 MILLIGRAM(S): 5 INJECTION, SOLUTION INTRAVENOUS; PARENTERAL at 16:43

## 2025-07-09 RX ADMIN — PYRIDOSTIGMINE BROMIDE 60 MILLIGRAM(S): 5 INJECTION, SOLUTION INTRAVENOUS; PARENTERAL at 09:19

## 2025-07-09 RX ADMIN — INSULIN LISPRO 3: 100 INJECTION, SOLUTION INTRAVENOUS; SUBCUTANEOUS at 13:17

## 2025-07-09 RX ADMIN — AMPICILLIN SODIUM AND SULBACTAM SODIUM 200 GRAM(S): 1; .5 INJECTION, POWDER, FOR SOLUTION INTRAMUSCULAR; INTRAVENOUS at 05:35

## 2025-07-09 RX ADMIN — Medication 90 MILLIGRAM(S): at 11:41

## 2025-07-09 RX ADMIN — PYRIDOSTIGMINE BROMIDE 60 MILLIGRAM(S): 5 INJECTION, SOLUTION INTRAVENOUS; PARENTERAL at 05:22

## 2025-07-09 RX ADMIN — Medication 650 MILLIGRAM(S): at 22:34

## 2025-07-09 RX ADMIN — TAMSULOSIN HYDROCHLORIDE 0.4 MILLIGRAM(S): 0.4 CAPSULE ORAL at 21:34

## 2025-07-09 RX ADMIN — ATORVASTATIN CALCIUM 80 MILLIGRAM(S): 80 TABLET, FILM COATED ORAL at 21:34

## 2025-07-09 RX ADMIN — FINASTERIDE 5 MILLIGRAM(S): 1 TABLET, FILM COATED ORAL at 11:40

## 2025-07-09 RX ADMIN — CYANOCOBALAMIN 1000 MICROGRAM(S): 1000 INJECTION INTRAMUSCULAR; SUBCUTANEOUS at 11:40

## 2025-07-09 RX ADMIN — Medication 650 MILLIGRAM(S): at 06:34

## 2025-07-09 RX ADMIN — AMPICILLIN SODIUM AND SULBACTAM SODIUM 200 GRAM(S): 1; .5 INJECTION, POWDER, FOR SOLUTION INTRAMUSCULAR; INTRAVENOUS at 00:40

## 2025-07-09 RX ADMIN — Medication 325 MILLIGRAM(S): at 17:20

## 2025-07-09 RX ADMIN — PYRIDOSTIGMINE BROMIDE 60 MILLIGRAM(S): 5 INJECTION, SOLUTION INTRAVENOUS; PARENTERAL at 00:44

## 2025-07-09 RX ADMIN — Medication 650 MILLIGRAM(S): at 05:35

## 2025-07-09 RX ADMIN — LABETALOL HYDROCHLORIDE 400 MILLIGRAM(S): 200 TABLET, FILM COATED ORAL at 05:22

## 2025-07-09 RX ADMIN — FOLIC ACID 1 MILLIGRAM(S): 1 TABLET ORAL at 11:38

## 2025-07-09 RX ADMIN — PYRIDOSTIGMINE BROMIDE 60 MILLIGRAM(S): 5 INJECTION, SOLUTION INTRAVENOUS; PARENTERAL at 13:19

## 2025-07-09 RX ADMIN — Medication 100 MILLIGRAM(S): at 21:34

## 2025-07-09 RX ADMIN — AMPICILLIN SODIUM AND SULBACTAM SODIUM 200 GRAM(S): 1; .5 INJECTION, POWDER, FOR SOLUTION INTRAMUSCULAR; INTRAVENOUS at 17:28

## 2025-07-09 RX ADMIN — HEPARIN SODIUM 5000 UNIT(S): 1000 INJECTION INTRAVENOUS; SUBCUTANEOUS at 16:48

## 2025-07-09 RX ADMIN — Medication 25 MILLIGRAM(S): at 16:48

## 2025-07-09 RX ADMIN — PYRIDOSTIGMINE BROMIDE 60 MILLIGRAM(S): 5 INJECTION, SOLUTION INTRAVENOUS; PARENTERAL at 21:34

## 2025-07-09 RX ADMIN — HEPARIN SODIUM 5000 UNIT(S): 1000 INJECTION INTRAVENOUS; SUBCUTANEOUS at 05:22

## 2025-07-09 RX ADMIN — INSULIN LISPRO 1: 100 INJECTION, SOLUTION INTRAVENOUS; SUBCUTANEOUS at 17:19

## 2025-07-09 RX ADMIN — Medication 1000 UNIT(S): at 11:40

## 2025-07-09 RX ADMIN — Medication 100 MILLIGRAM(S): at 05:22

## 2025-07-09 NOTE — PROGRESS NOTE ADULT - SUBJECTIVE AND OBJECTIVE BOX
SUBJECTIVE / INTERVAL HPI: Patient was seen and examined this morning.     Overnight events: No acute overnight events. BP continues to be elevated    CAPILLARY BLOOD GLUCOSE  144 mg/dL (07-08 @ 07:40)  228 mg/dL (07-08 @ 11:28)  296 mg/dL (07-08 @ 16:37)  111 mg/dL (07-08 @ 20:43)  135 mg/dL (07-09 @ 07:38)      REVIEW OF SYSTEMS  Constitutional:  Negative fever, chills or loss of appetite.  Eyes:  Negative blurry vision or double vision.  Cardiovascular:  Negative for chest pain or palpitations.  Respiratory:  Negative for cough, wheezing, or shortness of breath.    Gastrointestinal:  Negative for nausea, vomiting, diarrhea, constipation, or abdominal pain.  Genitourinary:  Negative frequency, urgency or dysuria.  Neurologic:  No headache, confusion, dizziness, lightheadedness.    PHYSICAL EXAM  Vital Signs Last 24 Hrs  T(C): 36.6 (09 Jul 2025 11:19), Max: 37 (09 Jul 2025 05:27)  T(F): 97.9 (09 Jul 2025 11:19), Max: 98.6 (09 Jul 2025 05:27)  HR: 65 (09 Jul 2025 11:19) (63 - 70)  BP: 176/64 (09 Jul 2025 11:19) (120/53 - 207/69)  BP(mean): 96 (09 Jul 2025 11:19) (96 - 106)  RR: 18 (09 Jul 2025 11:19) (18 - 19)  SpO2: 100% (09 Jul 2025 11:19) (95% - 100%)    Parameters below as of 09 Jul 2025 11:19  Patient On (Oxygen Delivery Method): room air      Gen: NAD, calm  HEENT: +facial/ periorbital edema    Neck: no JVD  Cards: RRR, +S1/S2,+BOUBACAR  Resp: CTA B/L  GI: soft, NT/ND, NABS  : no CVA tenderness  Extremities: no edema B/L    LABS  CBC - WBC/HGB/HTC/PLT: 6.15/8.1/24.5/233 (07-09-25)  BMP - Na/K/Cl/Bicarb/BUN/Cr/Gluc/AG/eGFR: 136/3.7/103/29/26/1.66/96/4/45 (07-09-25)  Ca - 8.8 (07-09-25)  Phos - -- (07-09-25)  Mg - -- (07-09-25)  LFT - Alb/Tprot/Tbili/Dbili/AlkPhos/ALT/AST: 2.7/4.9/--/--/--/--/-- (07-03-25)    Thyroid Stimulating Hormone, Serum: 4.28 (06-27-25)          MEDICATIONS  MEDICATIONS  (STANDING):  ampicillin/sulbactam  IVPB      ampicillin/sulbactam  IVPB 3 Gram(s) IV Intermittent every 6 hours  aspirin  chewable 81 milliGRAM(s) Oral daily  atorvastatin 80 milliGRAM(s) Oral at bedtime  azaTHIOprine 100 milliGRAM(s) Oral daily  cholecalciferol 1000 Unit(s) Oral daily  cyanocobalamin 1000 MICROGram(s) Oral daily  dextrose 5%. 1000 milliLiter(s) (50 mL/Hr) IV Continuous <Continuous>  dextrose 50% Injectable 25 Gram(s) IV Push once  ferrous    sulfate 325 milliGRAM(s) Oral two times a day  finasteride 5 milliGRAM(s) Oral daily  folic acid 1 milliGRAM(s) Oral daily  heparin   Injectable 5000 Unit(s) SubCutaneous every 8 hours  hydrALAZINE 100 milliGRAM(s) Oral every 8 hours  insulin lispro (ADMELOG) corrective regimen sliding scale   SubCutaneous three times a day before meals  insulin lispro (ADMELOG) corrective regimen sliding scale   SubCutaneous at bedtime  labetalol 400 milliGRAM(s) Oral every 8 hours  losartan 100 milliGRAM(s) Oral at bedtime  NIFEdipine XL 90 milliGRAM(s) Oral <User Schedule>  predniSONE   Tablet 20 milliGRAM(s) Oral daily  pyridostigmine 60 milliGRAM(s) Oral <User Schedule>  sertraline 50 milliGRAM(s) Oral daily  tamsulosin 0.4 milliGRAM(s) Oral at bedtime    MEDICATIONS  (PRN):  acetaminophen     Tablet .. 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 Hgb 9.9. BUN/Cr 30/1.81. Admitted for MICHEL on CKD and uncontrolled HTN, Nephrology and Cardiology following.    Endocrinology consulted for further evaluation of uncontrolled HTN. Due to hx of MG with thymectomy, pt on chronic Prednisone 20 mg qd.     A1C: 6.3 %    # Uncontrolled HTN  - pt noted with persistently labile and elevated HTN despite use of multiple anti-HTN agents  - pt on chronic Prednisone 20 mg qd for MG tx > ? exogenous Cushing Syndrome leading to persistently elevated, labile BP, may consider SLOW prednisone taper (f/u primary team and Neuro)  - Renin mildly elev to 6.47, Aldosterone 13.7- Hyperaldosteronism ruled out  , consider renovascular HTN  - per Nephro, Recc Renal US with dopplers as an outpt to r/o HOANG.    - TSH 4.38 wnl   - pt denies any HA, palpitations (low suspicion for pheochromocytoma) - f/u Plasma mets and 24 hr urine for mets/cats (collected, pending result)  - continue to monitor BP at this time    #Diabetes Mellitus  - c/w BILLIE ACHS  - On discharge, please start Jardiance qd as pt will benefit from renoprotective effects and may aid in BP control  - Patient's fingerstick glucose goal is 100-180 mg/dL.       SUBJECTIVE / INTERVAL HPI: Patient was seen and examined this morning.     Overnight events: No acute overnight events. BP continues to be elevated    CAPILLARY BLOOD GLUCOSE  144 mg/dL (07-08 @ 07:40)  228 mg/dL (07-08 @ 11:28)  296 mg/dL (07-08 @ 16:37)  111 mg/dL (07-08 @ 20:43)  135 mg/dL (07-09 @ 07:38)      REVIEW OF SYSTEMS  Constitutional:  Negative fever, chills or loss of appetite.  Eyes:  Negative blurry vision or double vision.  Cardiovascular:  Negative for chest pain or palpitations.  Respiratory:  Negative for cough, wheezing, or shortness of breath.    Gastrointestinal:  Negative for nausea, vomiting, diarrhea, constipation, or abdominal pain.  Genitourinary:  Negative frequency, urgency or dysuria.  Neurologic:  No headache, confusion, dizziness, lightheadedness.    PHYSICAL EXAM  Vital Signs Last 24 Hrs  T(C): 36.6 (09 Jul 2025 11:19), Max: 37 (09 Jul 2025 05:27)  T(F): 97.9 (09 Jul 2025 11:19), Max: 98.6 (09 Jul 2025 05:27)  HR: 65 (09 Jul 2025 11:19) (63 - 70)  BP: 176/64 (09 Jul 2025 11:19) (120/53 - 207/69)  BP(mean): 96 (09 Jul 2025 11:19) (96 - 106)  RR: 18 (09 Jul 2025 11:19) (18 - 19)  SpO2: 100% (09 Jul 2025 11:19) (95% - 100%)    Parameters below as of 09 Jul 2025 11:19  Patient On (Oxygen Delivery Method): room air      Gen: NAD, calm  HEENT: +facial/ periorbital edema    Neck: no JVD  Cards: RRR, +S1/S2,+BOUBACAR  Resp: CTA B/L  GI: soft, NT/ND, NABS  : no CVA tenderness  Extremities: no edema B/L    LABS  CBC - WBC/HGB/HTC/PLT: 6.15/8.1/24.5/233 (07-09-25)  BMP - Na/K/Cl/Bicarb/BUN/Cr/Gluc/AG/eGFR: 136/3.7/103/29/26/1.66/96/4/45 (07-09-25)  Ca - 8.8 (07-09-25)  Phos - -- (07-09-25)  Mg - -- (07-09-25)  LFT - Alb/Tprot/Tbili/Dbili/AlkPhos/ALT/AST: 2.7/4.9/--/--/--/--/-- (07-03-25)    Thyroid Stimulating Hormone, Serum: 4.28 (06-27-25)          MEDICATIONS  MEDICATIONS  (STANDING):  ampicillin/sulbactam  IVPB      ampicillin/sulbactam  IVPB 3 Gram(s) IV Intermittent every 6 hours  aspirin  chewable 81 milliGRAM(s) Oral daily  atorvastatin 80 milliGRAM(s) Oral at bedtime  azaTHIOprine 100 milliGRAM(s) Oral daily  cholecalciferol 1000 Unit(s) Oral daily  cyanocobalamin 1000 MICROGram(s) Oral daily  dextrose 5%. 1000 milliLiter(s) (50 mL/Hr) IV Continuous <Continuous>  dextrose 50% Injectable 25 Gram(s) IV Push once  ferrous    sulfate 325 milliGRAM(s) Oral two times a day  finasteride 5 milliGRAM(s) Oral daily  folic acid 1 milliGRAM(s) Oral daily  heparin   Injectable 5000 Unit(s) SubCutaneous every 8 hours  hydrALAZINE 100 milliGRAM(s) Oral every 8 hours  insulin lispro (ADMELOG) corrective regimen sliding scale   SubCutaneous three times a day before meals  insulin lispro (ADMELOG) corrective regimen sliding scale   SubCutaneous at bedtime  labetalol 400 milliGRAM(s) Oral every 8 hours  losartan 100 milliGRAM(s) Oral at bedtime  NIFEdipine XL 90 milliGRAM(s) Oral <User Schedule>  predniSONE   Tablet 20 milliGRAM(s) Oral daily  pyridostigmine 60 milliGRAM(s) Oral <User Schedule>  sertraline 50 milliGRAM(s) Oral daily  tamsulosin 0.4 milliGRAM(s) Oral at bedtime    MEDICATIONS  (PRN):  acetaminophen     Tablet .. 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 Hgb 9.9. BUN/Cr 30/1.81. Admitted for MICHEL on CKD and uncontrolled HTN, Nephrology and Cardiology following.    Endocrinology consulted for further evaluation of uncontrolled HTN. Due to hx of MG with thymectomy, pt on chronic Prednisone 20 mg qd.     A1C: 6.3 %    # Uncontrolled HTN  - pt noted with persistently labile and elevated HTN despite use of multiple anti-HTN agents  - pt on chronic Prednisone 20 mg qd for MG tx > ? exogenous Cushing Syndrome leading to persistently elevated, labile BP, may consider SLOW prednisone taper (f/u primary team and Neuro)  - Renin mildly elev to 6.47, Aldosterone 13.7- Hyperaldosteronism ruled out  , consider renovascular HTN  - per Nephro, Recc Renal US with dopplers as an outpt to r/o HOANG.    - TSH 4.38 wnl   - pt denies any HA, palpitations (low suspicion for pheochromocytoma) - f/u Plasma mets and 24 hr urine for mets/cats (collected, pending result)  - continue to monitor BP at this time    #Diabetes Mellitus  - c/w BILLIE ACHS  - On discharge, please start Jardiance qd as pt will benefit from renoprotective effects and may aid in BP control  - Patient's fingerstick glucose goal is 100-180 mg/dL

## 2025-07-09 NOTE — PROGRESS NOTE ADULT - SUBJECTIVE AND OBJECTIVE BOX
Patient is a 70y old  Male who presents with a chief complaint of MICHEL       SUBJECTIVE / OVERNIGHT EVENTS:      MEDICATIONS  (STANDING):  ampicillin/sulbactam  IVPB      ampicillin/sulbactam  IVPB 3 Gram(s) IV Intermittent every 6 hours  aspirin  chewable 81 milliGRAM(s) Oral daily  atorvastatin 80 milliGRAM(s) Oral at bedtime  azaTHIOprine 100 milliGRAM(s) Oral daily  cholecalciferol 1000 Unit(s) Oral daily  cyanocobalamin 1000 MICROGram(s) Oral daily  dextrose 5%. 1000 milliLiter(s) (50 mL/Hr) IV Continuous <Continuous>  dextrose 50% Injectable 25 Gram(s) IV Push once  ferrous    sulfate 325 milliGRAM(s) Oral two times a day  finasteride 5 milliGRAM(s) Oral daily  FIRST- Mouthwash  BLM 30 milliLiter(s) Swish and Spit three times a day  folic acid 1 milliGRAM(s) Oral daily  heparin   Injectable 5000 Unit(s) SubCutaneous every 8 hours  hydrALAZINE 100 milliGRAM(s) Oral every 8 hours  insulin lispro (ADMELOG) corrective regimen sliding scale   SubCutaneous three times a day before meals  insulin lispro (ADMELOG) corrective regimen sliding scale   SubCutaneous at bedtime  labetalol 400 milliGRAM(s) Oral every 8 hours  losartan 100 milliGRAM(s) Oral at bedtime  NIFEdipine XL 90 milliGRAM(s) Oral <User Schedule>  predniSONE   Tablet 20 milliGRAM(s) Oral daily  pyridostigmine 60 milliGRAM(s) Oral <User Schedule>  sertraline 50 milliGRAM(s) Oral daily  spironolactone 25 milliGRAM(s) Oral daily  tamsulosin 0.4 milliGRAM(s) Oral at bedtime    MEDICATIONS  (PRN):  acetaminophen     Tablet .. 650 milliGRAM(s) Oral every 6 hours PRN Temp greater or equal to 38C (100.4F), Mild Pain (1 - 3)  dextrose Oral Gel 15 Gram(s) Oral once PRN Blood Glucose LESS THAN 70 milliGRAM(s)/deciliter  ondansetron Injectable 4 milliGRAM(s) IV Push every 8 hours PRN Nausea and/or Vomiting    CAPILLARY BLOOD GLUCOSE      POCT Blood Glucose.: 255 mg/dL (09 Jul 2025 13:09)  POCT Blood Glucose.: 292 mg/dL (09 Jul 2025 11:53)  POCT Blood Glucose.: 135 mg/dL (09 Jul 2025 07:38)  POCT Blood Glucose.: 111 mg/dL (08 Jul 2025 20:43)  POCT Blood Glucose.: 296 mg/dL (08 Jul 2025 16:37)    I&O's Summary      PHYSICAL EXAM:  Vital Signs Last 24 Hrs  T(C): 36.6 (09 Jul 2025 11:19), Max: 37 (09 Jul 2025 05:27)  T(F): 97.9 (09 Jul 2025 11:19), Max: 98.6 (09 Jul 2025 05:27)  HR: 73 (09 Jul 2025 14:09) (63 - 73)  BP: 108/49 (09 Jul 2025 14:09) (108/49 - 202/76)  BP(mean): 96 (09 Jul 2025 11:19) (96 - 106)  RR: 18 (09 Jul 2025 11:19) (18 - 18)  SpO2: 100% (09 Jul 2025 11:19) (95% - 100%)    Parameters below as of 09 Jul 2025 11:19  Patient On (Oxygen Delivery Method): room air        LABS:                        8.1    6.15  )-----------( 233      ( 09 Jul 2025 05:56 )             24.5     07-09    136  |  103  |  26[H]  ----------------------------<  96  3.7   |  29  |  1.66[H]    Ca    8.8      09 Jul 2025 05:56            Urinalysis Basic - ( 09 Jul 2025 05:56 )    Color: x / Appearance: x / SG: x / pH: x  Gluc: 96 mg/dL / Ketone: x  / Bili: x / Urobili: x   Blood: x / Protein: x / Nitrite: x   Leuk Esterase: x / RBC: x / WBC x   Sq Epi: x / Non Sq Epi: x / Bacteria: x               Patient is a 70y old  Male who presents with a chief complaint of MICHEL       SUBJECTIVE / OVERNIGHT EVENTS: events noted. No new complaints, states he feels better      MEDICATIONS  (STANDING):  ampicillin/sulbactam  IVPB      ampicillin/sulbactam  IVPB 3 Gram(s) IV Intermittent every 6 hours  aspirin  chewable 81 milliGRAM(s) Oral daily  atorvastatin 80 milliGRAM(s) Oral at bedtime  azaTHIOprine 100 milliGRAM(s) Oral daily  cholecalciferol 1000 Unit(s) Oral daily  cyanocobalamin 1000 MICROGram(s) Oral daily  dextrose 5%. 1000 milliLiter(s) (50 mL/Hr) IV Continuous <Continuous>  dextrose 50% Injectable 25 Gram(s) IV Push once  ferrous    sulfate 325 milliGRAM(s) Oral two times a day  finasteride 5 milliGRAM(s) Oral daily  FIRST- Mouthwash  BLM 30 milliLiter(s) Swish and Spit three times a day  folic acid 1 milliGRAM(s) Oral daily  heparin   Injectable 5000 Unit(s) SubCutaneous every 8 hours  hydrALAZINE 100 milliGRAM(s) Oral every 8 hours  insulin lispro (ADMELOG) corrective regimen sliding scale   SubCutaneous three times a day before meals  insulin lispro (ADMELOG) corrective regimen sliding scale   SubCutaneous at bedtime  labetalol 400 milliGRAM(s) Oral every 8 hours  losartan 100 milliGRAM(s) Oral at bedtime  NIFEdipine XL 90 milliGRAM(s) Oral <User Schedule>  predniSONE   Tablet 20 milliGRAM(s) Oral daily  pyridostigmine 60 milliGRAM(s) Oral <User Schedule>  sertraline 50 milliGRAM(s) Oral daily  spironolactone 25 milliGRAM(s) Oral daily  tamsulosin 0.4 milliGRAM(s) Oral at bedtime    MEDICATIONS  (PRN):  acetaminophen     Tablet .. 650 milliGRAM(s) Oral every 6 hours PRN Temp greater or equal to 38C (100.4F), Mild Pain (1 - 3)  dextrose Oral Gel 15 Gram(s) Oral once PRN Blood Glucose LESS THAN 70 milliGRAM(s)/deciliter  ondansetron Injectable 4 milliGRAM(s) IV Push every 8 hours PRN Nausea and/or Vomiting    CAPILLARY BLOOD GLUCOSE      POCT Blood Glucose.: 255 mg/dL (09 Jul 2025 13:09)  POCT Blood Glucose.: 292 mg/dL (09 Jul 2025 11:53)  POCT Blood Glucose.: 135 mg/dL (09 Jul 2025 07:38)  POCT Blood Glucose.: 111 mg/dL (08 Jul 2025 20:43)  POCT Blood Glucose.: 296 mg/dL (08 Jul 2025 16:37)    I&O's Summary      PHYSICAL EXAM:  Vital Signs Last 24 Hrs  T(C): 36.6 (09 Jul 2025 11:19), Max: 37 (09 Jul 2025 05:27)  T(F): 97.9 (09 Jul 2025 11:19), Max: 98.6 (09 Jul 2025 05:27)  HR: 73 (09 Jul 2025 14:09) (63 - 73)  BP: 108/49 (09 Jul 2025 14:09) (108/49 - 202/76)  BP(mean): 96 (09 Jul 2025 11:19) (96 - 106)  RR: 18 (09 Jul 2025 11:19) (18 - 18)  SpO2: 100% (09 Jul 2025 11:19) (95% - 100%)    Parameters below as of 09 Jul 2025 11:19  Patient On (Oxygen Delivery Method): room air    GEN: NAD; A and O x 3  LUNGS: CTA B/L  HEART: S1 S2  ABDOMEN: soft, non-tender, non-distended, + BS  EXTREMITIES: no edema        LABS:                        8.1    6.15  )-----------( 233      ( 09 Jul 2025 05:56 )             24.5     07-09    136  |  103  |  26[H]  ----------------------------<  96  3.7   |  29  |  1.66[H]    Ca    8.8      09 Jul 2025 05:56            Urinalysis Basic - ( 09 Jul 2025 05:56 )    Color: x / Appearance: x / SG: x / pH: x  Gluc: 96 mg/dL / Ketone: x  / Bili: x / Urobili: x   Blood: x / Protein: x / Nitrite: x   Leuk Esterase: x / RBC: x / WBC x   Sq Epi: x / Non Sq Epi: x / Bacteria: x

## 2025-07-09 NOTE — PROGRESS NOTE ADULT - PROBLEM SELECTOR PLAN 6
Hx of IDT2DM on Rculbka05/30 10U qd and Novolin 100 ISS   - has been drinking rockstar energy drinks causing high sugars   - A1C 6.3  - recent hypoglycemia event, fasting glucose 80-120s  - decrease lantus to 4 units qhs   - Monitor BS ACHS  - Endocrine following

## 2025-07-09 NOTE — PROGRESS NOTE ADULT - PROBLEM SELECTOR PLAN 1
Hx of HTN on hydralazine 100mg tid, losartan 100mg qd, nifedipine 60mg qd, metoprolol 100mg bid   - Hyperdynamic LVSF on ECHO with EF estimated at 70 to 75%   - telemetry monitoring   -serum aldosterone level normal. serum renin test unable as per [facility] lab  - increased Nifedipine to 90 mg qd  - changed metoprolol to labetalol 400 mg q 8hr and added Hydralazine 100mg q8h.   - resumed losartan 100 mg qd  - Aldactone 25mg x 1 dose  (7/8)  - Aldlactone 25mg qd added (7/9)  - cardio Dr. Mercado following  - Endo Dr. Farr following  - Nephro Dr. Lan following  - Still with labile b/p-asymptomatic  - May need to spread anti hypertensives schedule throughout day

## 2025-07-09 NOTE — PROGRESS NOTE ADULT - SUBJECTIVE AND OBJECTIVE BOX
Patient is a 70y old  Male who presents with a chief complaint of MICHEL (2025 14:26)    PATIENT IS SEEN AND EXAMINED IN MEDICAL FLOOR.  NGT [    ]    ERVIN [   ]      GT [   ]    ALLERGIES:  No Known Allergies      Daily     Daily Weight in k.1 (2025 05:27)    VITALS:    Vital Signs Last 24 Hrs  T(C): 36.6 (2025 11:19), Max: 37 (2025 05:27)  T(F): 97.9 (2025 11:19), Max: 98.6 (2025 05:27)  HR: 65 (2025 11:19) (63 - 70)  BP: 176/64 (2025 11:19) (120/53 - 207/69)  BP(mean): 96 (2025 11:19) (96 - 106)  RR: 18 (2025 11:19) (18 - 19)  SpO2: 100% (2025 11:19) (95% - 100%)    Parameters below as of 2025 11:19  Patient On (Oxygen Delivery Method): room air        LABS:    CBC Full  -  ( 2025 05:56 )  WBC Count : 6.15 K/uL  RBC Count : 2.26 M/uL  Hemoglobin : 8.1 g/dL  Hematocrit : 24.5 %  Platelet Count - Automated : 233 K/uL  Mean Cell Volume : 108.4 fl  Mean Cell Hemoglobin : 35.8 pg  Mean Cell Hemoglobin Concentration : 33.1 g/dL  Auto Neutrophil # : x  Auto Lymphocyte # : x  Auto Monocyte # : x  Auto Eosinophil # : x  Auto Basophil # : x  Auto Neutrophil % : x  Auto Lymphocyte % : x  Auto Monocyte % : x  Auto Eosinophil % : x  Auto Basophil % : x      -    136  |  103  |  26[H]  ----------------------------<  96  3.7   |  29  |  1.66[H]    Ca    8.8      2025 05:56      CAPILLARY BLOOD GLUCOSE      POCT Blood Glucose.: 135 mg/dL (2025 07:38)  POCT Blood Glucose.: 111 mg/dL (2025 20:43)  POCT Blood Glucose.: 296 mg/dL (2025 16:37)          Creatinine Trend: 1.66<--, 1.81<--, 1.68<--, 1.74<--, 1.74<--, 1.66<--  I&O's Summary              MEDICATIONS:    MEDICATIONS  (STANDING):  ampicillin/sulbactam  IVPB      ampicillin/sulbactam  IVPB 3 Gram(s) IV Intermittent every 6 hours  aspirin  chewable 81 milliGRAM(s) Oral daily  atorvastatin 80 milliGRAM(s) Oral at bedtime  azaTHIOprine 100 milliGRAM(s) Oral daily  cholecalciferol 1000 Unit(s) Oral daily  cyanocobalamin 1000 MICROGram(s) Oral daily  dextrose 5%. 1000 milliLiter(s) (50 mL/Hr) IV Continuous <Continuous>  dextrose 50% Injectable 25 Gram(s) IV Push once  ferrous    sulfate 325 milliGRAM(s) Oral two times a day  finasteride 5 milliGRAM(s) Oral daily  folic acid 1 milliGRAM(s) Oral daily  heparin   Injectable 5000 Unit(s) SubCutaneous every 8 hours  hydrALAZINE 100 milliGRAM(s) Oral every 8 hours  insulin lispro (ADMELOG) corrective regimen sliding scale   SubCutaneous three times a day before meals  insulin lispro (ADMELOG) corrective regimen sliding scale   SubCutaneous at bedtime  labetalol 400 milliGRAM(s) Oral every 8 hours  losartan 100 milliGRAM(s) Oral at bedtime  NIFEdipine XL 90 milliGRAM(s) Oral <User Schedule>  predniSONE   Tablet 20 milliGRAM(s) Oral daily  pyridostigmine 60 milliGRAM(s) Oral <User Schedule>  sertraline 50 milliGRAM(s) Oral daily  tamsulosin 0.4 milliGRAM(s) Oral at bedtime      MEDICATIONS  (PRN):  acetaminophen     Tablet .. 650 milliGRAM(s) Oral every 6 hours PRN Temp greater or equal to 38C (100.4F), Mild Pain (1 - 3)  dextrose Oral Gel 15 Gram(s) Oral once PRN Blood Glucose LESS THAN 70 milliGRAM(s)/deciliter  ondansetron Injectable 4 milliGRAM(s) IV Push every 8 hours PRN Nausea and/or Vomiting      REVIEW OF SYSTEMS:                           ALL ROS DONE [ X   ]    CONSTITUTIONAL:  LETHARGIC [   ], FEVER [   ], UNRESPONSIVE [   ]  CVS:  CP  [   ], SOB, [   ], PALPITATIONS [   ], DIZZYNESS [   ]  RS: COUGH [   ], SPUTUM [   ]  GI: ABDOMINAL PAIN [   ], NAUSEA [   ], VOMITINGS [   ], DIARRHEA [   ], CONSTIPATION [   ]  :  DYSURIA [   ], NOCTURIA [   ], INCREASED FREQUENCY [   ], DRIBLING [   ],  SKELETAL: PAINFUL JOINTS [   ], SWOLLEN JOINTS [   ], NECK ACHE [   ], LOW BACK ACHE [   ],  SKIN : ULCERS [   ], RASH [   ], ITCHING [   ]  CNS: HEAD ACHE [   ], DOUBLE VISION [   ], BLURRED VISION [   ], AMS / CONFUSION [   ], SEIZURES [   ], WEAKNESS [   ],TINGLING / NUMBNESS [   ]      PHYSICAL EXAMINATION:    GENERAL APPEARANCE: NO DISTRESS  HEENT:  NO PALLOR, NO  JVD,  NO   NODES, NECK SUPPLE  CVS: S1 +, S2 +,   RS: AEEB,  OCCASIONAL  RALES +,   NO RONCHI  ABD: SOFT, NT, NO, BS +  EXT: NO PE  SKIN: WARM,   SKELETAL:  ROM ACCEPTABLE  CNS:  AAO X 3        RADIOLOGY :  RADIOLOGY AND READINGS REVIEWED      < from: US Renal (25 @ 09:48) >    IMPRESSION:    1. No hydronephrosis.  2. Increased renal cortical echogenicity compatible with renal   parenchymal disease.  3. There is limited visualization of the urinary bladder; however, on   limited views, the wall appears diffusely thickened. Suggest correlation   with urinalysis to exclude infection.  4. Partially imaged hypoechoic structure posterior to the left kidney may   correspond to the lymphadenopathy questioned on the prior CT. Further   evaluation is recommended as clinically.    < end of copied text >      < from: CT Abdomen and Pelvis No Cont (25 @ 23:36) >    IMPRESSION:  No retroperitoneal hematoma.    Soft tissue densities within the retroperitoneum next to the aorta   concerning for extensive lymphadenopathy versus tortuous vessels or a   combination of both. The evaluation is limited due to the lack of IV   contrast. Recommend contrast enhanced cross-sectional imaging for better   assessment. Further workup for lymphoproliferative disease may be helpful.    Circumferential bladder wall thickening as can be seen with decompression   or component of cystitis.        Prelim: No obvious retroperitoneal hematoma. Gallbladder wall thickening.   Bladder wall thickening. Retroperitoneal/pelvic lymphadenopathy.   Persistent right > left pleural effusion. Small pericardial effusion.   Official report to follow.    < end of copied text >  < from: Xray Chest 1 View- PORTABLE-Urgent (Xray Chest 1 View- PORTABLE-Urgent .) (25 @ 16:53) >    IMPRESSION: Sternotomy, left loop recorder, cervical spine hardware   noted. Increasing atelectatic consolidation of left lower lobe with   effusion.    < end of copied text >        ASSESSMENT :     Acute renal failure    HTN (hypertension)    DM (diabetes mellitus)    Myasthenia gravis    Hypercholesterolemia    CVA (cerebrovascular accident)    Peripheral neuropathy    BPH (benign prostatic hyperplasia)    Major depression    Lipoma of chest wall    MG with thymoma (myasthena gravis)    H/O cataract extraction        PLAN:  HPI:  Patient is a 70M, from [facility] ambulates with a walker, with PMHx of Myasthenia gravis s/p thymectomy, HTN, 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 Patient reports he has been feeling nauseous for a few days but no vomiting. He reports chronic increased urinary frequency and sensation of incomplete voiding. He denies all other symptoms - fever, chills, cough, chest pain, SoB, palpitations abdominal pain, diarrhea, dysuria, arm or leg numbness or tingling. Reports regular brown stool - denies black or bloody stool.  (2025 21:32)        # DC PLAN: BACK TO [facility] AFTER ABDOMINAL U/S TO EVALUATE LIKELY RETROPERITONEAL LYMPHADENOPATHY   - WILL REFER HIM TO ONCOLOGY & GI CONSULT (FOR EGD& COLONOSCOPY) AS OUT PATIENT FOR FURTHER FOLLOWUP  - MACROCYTIC ANEMIA, ANEMIA OF CKD - MAY NEED PRBC # 1 UNIT PRIOR TO DISCHARGE   - LIKELY MYELOSUPPRESSION / ANEMIA DUE TO AZATHIOPRINE  - WILL SUPPLEMENT B12, FA, FESO4         # CASE D/W PATIENT AND PARTNER MILLI DONOVAN AT BEDSIDE, ALL QUESTIONS ANSWERED. DISCUSSED RECOMMENDATIONS AS MADE BY MULTIDISCIPLINARY TEAM. DISCUSSED THAT PROGNOSIS IS GUARDED. THEY VERBALIZED UNDERSTANDING. IN DISCUSSION REGARDING GOC - PATIENT WISHES TO BE FULL CODE.        # HYPERTENSIVE URGENCY    # HTN - CONTROLLED   - TELEMETRY  - ECHO - LV EF - 70 - 75%, G1DD  - NOTED TROPONINS  - S/P IV HYDRALAZINE  - HYDRALAZINE  - PROCARDIA  - LABETALOL  - RESUME LOSARTAN  - STARTING SPIRONOLACTONE  - NOTED SERUM RENIN, ALDOSTERONE LEVELS  - F/U PLASMA METANEPHRINES  - CARDIOLOGY CONSULT  - NEPHROLOGY CONSULT  - ENDOCRINOLOGY CONSULT    # H/O CARDIAC ARRHYTHMIA - LOOP RECORDER IN PLACE  # H/O STERNOTOMY DUE TO THYMECTOMY      # MICHEL ON CKD STAGE 3 B  # BPH, OBSTRUCTIVE UROPATHY  - IV FLUIDS  - PVR - 0 ML  - MONITOR CR  - AVOID NEPHROTOXIC AGENTS  - NEPHROLOGY CONSULT    # ACUTE GINGIVOSTOMATITIS  - PLACE ON UNASYN, F/U BCX  - MAGIC MOUTHWASH  - ID CONSULT    # HYPOGLYCEMIA - RESOLVED  - MONITORING FINGERSTICKS    # ANEMIA  - MACROCYTIC ANEMIA, ANEMIA OF CKD   - LIKELY MYELOSUPPRESSION / ANEMIA DUE TO AZATHIOPRINE    - TREND HGB  - ANEMIA PANEL  - DENIES MELENA, HEMATOCHEZIA, HEMATEMESIS, HEMATURIA  - NOTED CT A/P  - GASTROENTEROLOGY CONSULT    # ? LYMPHADENOPATHY ON CT A/P   - F/U U/S ABDOMEN  - DEFER CT W/ CONTRAST GIVEN RENAL DYSFUNCTION  - PER FAMILY PATIENT MAY HAVE HARDWARE THAT IS NOT COMPATIBLE WITH MRI  - HEME/ONC CONSULT    # DENIES DYSURIA, UA NEGATIVE FOR LEUKOCYTE ESTERASE AND NITRITES      # MYASTHENIA GRAVIS S/P THYMECTOMY/STERNOTOMY - ON AZATHIOPRINE, PREDNISONE, PYRIDOSTIGMINE  - D/W NEUROLOGY - RECOMMENDED F/U AS OUTPATIENT FOR FURTHER PREDNISONE TAPER/MANAGEMENT  - NEUROLOGY CONSULT    # DM, DIABETIC NEPHROPATHY, DIABETIC RETINOPATHY, DIABETIC PERIPHERAL NEUROPATHY  - SSI + FS    # CVA W/ MILD RIGHT HP    # IMPAIRED GAIT DUE TO GENERALIZED MUSCLE WEAKNESS, CVA, MYASTHENIA GRAVIS, CERVICAL SPINAL STENOSIS - H/O CERVICAL SPINE INSTRUMENTATION - HARDWARE IN PLACE     # PAD  # GI AND DVT PPX     Patient is a 70y old  Male who presents with a chief complaint of MICHEL (2025 14:26)    PATIENT IS SEEN AND EXAMINED IN MEDICAL FLOOR.      ALLERGIES:  No Known Allergies      Daily     Daily Weight in k.1 (2025 05:27)    VITALS:    Vital Signs Last 24 Hrs  T(C): 36.6 (2025 11:19), Max: 37 (2025 05:27)  T(F): 97.9 (2025 11:19), Max: 98.6 (2025 05:27)  HR: 65 (2025 11:19) (63 - 70)  BP: 176/64 (2025 11:19) (120/53 - 207/69)  BP(mean): 96 (2025 11:19) (96 - 106)  RR: 18 (2025 11:19) (18 - 19)  SpO2: 100% (2025 11:19) (95% - 100%)    Parameters below as of 2025 11:19  Patient On (Oxygen Delivery Method): room air        LABS:    CBC Full  -  ( 2025 05:56 )  WBC Count : 6.15 K/uL  RBC Count : 2.26 M/uL  Hemoglobin : 8.1 g/dL  Hematocrit : 24.5 %  Platelet Count - Automated : 233 K/uL  Mean Cell Volume : 108.4 fl  Mean Cell Hemoglobin : 35.8 pg  Mean Cell Hemoglobin Concentration : 33.1 g/dL  Auto Neutrophil # : x  Auto Lymphocyte # : x  Auto Monocyte # : x  Auto Eosinophil # : x  Auto Basophil # : x  Auto Neutrophil % : x  Auto Lymphocyte % : x  Auto Monocyte % : x  Auto Eosinophil % : x  Auto Basophil % : x      -    136  |  103  |  26[H]  ----------------------------<  96  3.7   |  29  |  1.66[H]    Ca    8.8      2025 05:56      CAPILLARY BLOOD GLUCOSE      POCT Blood Glucose.: 135 mg/dL (2025 07:38)  POCT Blood Glucose.: 111 mg/dL (2025 20:43)  POCT Blood Glucose.: 296 mg/dL (2025 16:37)          Creatinine Trend: 1.66<--, 1.81<--, 1.68<--, 1.74<--, 1.74<--, 1.66<--  I&O's Summary              MEDICATIONS:    MEDICATIONS  (STANDING):  ampicillin/sulbactam  IVPB      ampicillin/sulbactam  IVPB 3 Gram(s) IV Intermittent every 6 hours  aspirin  chewable 81 milliGRAM(s) Oral daily  atorvastatin 80 milliGRAM(s) Oral at bedtime  azaTHIOprine 100 milliGRAM(s) Oral daily  cholecalciferol 1000 Unit(s) Oral daily  cyanocobalamin 1000 MICROGram(s) Oral daily  dextrose 5%. 1000 milliLiter(s) (50 mL/Hr) IV Continuous <Continuous>  dextrose 50% Injectable 25 Gram(s) IV Push once  ferrous    sulfate 325 milliGRAM(s) Oral two times a day  finasteride 5 milliGRAM(s) Oral daily  folic acid 1 milliGRAM(s) Oral daily  heparin   Injectable 5000 Unit(s) SubCutaneous every 8 hours  hydrALAZINE 100 milliGRAM(s) Oral every 8 hours  insulin lispro (ADMELOG) corrective regimen sliding scale   SubCutaneous three times a day before meals  insulin lispro (ADMELOG) corrective regimen sliding scale   SubCutaneous at bedtime  labetalol 400 milliGRAM(s) Oral every 8 hours  losartan 100 milliGRAM(s) Oral at bedtime  NIFEdipine XL 90 milliGRAM(s) Oral <User Schedule>  predniSONE   Tablet 20 milliGRAM(s) Oral daily  pyridostigmine 60 milliGRAM(s) Oral <User Schedule>  sertraline 50 milliGRAM(s) Oral daily  tamsulosin 0.4 milliGRAM(s) Oral at bedtime      MEDICATIONS  (PRN):  acetaminophen     Tablet .. 650 milliGRAM(s) Oral every 6 hours PRN Temp greater or equal to 38C (100.4F), Mild Pain (1 - 3)  dextrose Oral Gel 15 Gram(s) Oral once PRN Blood Glucose LESS THAN 70 milliGRAM(s)/deciliter  ondansetron Injectable 4 milliGRAM(s) IV Push every 8 hours PRN Nausea and/or Vomiting      REVIEW OF SYSTEMS:                           ALL ROS DONE [ X   ]    CONSTITUTIONAL:  LETHARGIC [   ], FEVER [   ], UNRESPONSIVE [   ]  CVS:  CP  [   ], SOB, [   ], PALPITATIONS [   ], DIZZYNESS [   ]  RS: COUGH [   ], SPUTUM [   ]  GI: ABDOMINAL PAIN [   ], NAUSEA [   ], VOMITINGS [   ], DIARRHEA [   ], CONSTIPATION [   ]  :  DYSURIA [   ], NOCTURIA [   ], INCREASED FREQUENCY [   ], DRIBLING [   ],  SKELETAL: PAINFUL JOINTS [   ], SWOLLEN JOINTS [   ], NECK ACHE [   ], LOW BACK ACHE [   ],  SKIN : ULCERS [   ], RASH [   ], ITCHING [   ]  CNS: HEAD ACHE [   ], DOUBLE VISION [   ], BLURRED VISION [   ], AMS / CONFUSION [   ], SEIZURES [   ], WEAKNESS [   ],TINGLING / NUMBNESS [   ]      PHYSICAL EXAMINATION:    GENERAL APPEARANCE: NO DISTRESS  HEENT:  NO PALLOR, NO  JVD,  NO   NODES, NECK SUPPLE  CVS: S1 +, S2 +,   RS: AEEB,  OCCASIONAL  RALES +,   NO RONCHI  ABD: SOFT, NT, NO, BS +  EXT: NO PE  SKIN: WARM,   SKELETAL:  ROM ACCEPTABLE  CNS:  AAO X 3        RADIOLOGY :  RADIOLOGY AND READINGS REVIEWED      < from: US Renal (25 @ 09:48) >    IMPRESSION:    1. No hydronephrosis.  2. Increased renal cortical echogenicity compatible with renal   parenchymal disease.  3. There is limited visualization of the urinary bladder; however, on   limited views, the wall appears diffusely thickened. Suggest correlation   with urinalysis to exclude infection.  4. Partially imaged hypoechoic structure posterior to the left kidney may   correspond to the lymphadenopathy questioned on the prior CT. Further   evaluation is recommended as clinically.    < end of copied text >      < from: CT Abdomen and Pelvis No Cont (25 @ 23:36) >    IMPRESSION:  No retroperitoneal hematoma.    Soft tissue densities within the retroperitoneum next to the aorta   concerning for extensive lymphadenopathy versus tortuous vessels or a   combination of both. The evaluation is limited due to the lack of IV   contrast. Recommend contrast enhanced cross-sectional imaging for better   assessment. Further workup for lymphoproliferative disease may be helpful.    Circumferential bladder wall thickening as can be seen with decompression   or component of cystitis.        Prelim: No obvious retroperitoneal hematoma. Gallbladder wall thickening.   Bladder wall thickening. Retroperitoneal/pelvic lymphadenopathy.   Persistent right > left pleural effusion. Small pericardial effusion.   Official report to follow.    < end of copied text >  < from: Xray Chest 1 View- PORTABLE-Urgent (Xray Chest 1 View- PORTABLE-Urgent .) (25 @ 16:53) >    IMPRESSION: Sternotomy, left loop recorder, cervical spine hardware   noted. Increasing atelectatic consolidation of left lower lobe with   effusion.    < end of copied text >        ASSESSMENT :     Acute renal failure    HTN (hypertension)    DM (diabetes mellitus)    Myasthenia gravis    Hypercholesterolemia    CVA (cerebrovascular accident)    Peripheral neuropathy    BPH (benign prostatic hyperplasia)    Major depression    Lipoma of chest wall    MG with thymoma (myasthena gravis)    H/O cataract extraction        PLAN:  HPI:  Patient is a 70M, from [facility] ambulates with a walker, with PMHx of Myasthenia gravis s/p thymectomy, HTN, 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 Patient reports he has been feeling nauseous for a few days but no vomiting. He reports chronic increased urinary frequency and sensation of incomplete voiding. He denies all other symptoms - fever, chills, cough, chest pain, SoB, palpitations abdominal pain, diarrhea, dysuria, arm or leg numbness or tingling. Reports regular brown stool - denies black or bloody stool.  (2025 21:32)        # DC PLAN: BACK TO [facility] AFTER ABDOMINAL U/S TO EVALUATE LIKELY RETROPERITONEAL LYMPHADENOPATHY   - WILL REFER HIM TO ONCOLOGY & GI CONSULT (FOR EGD& COLONOSCOPY) AS OUT PATIENT FOR FURTHER FOLLOWUP  - MACROCYTIC ANEMIA, ANEMIA OF CKD - MAY NEED PRBC # 1 UNIT PRIOR TO DISCHARGE   - LIKELY MYELOSUPPRESSION / ANEMIA DUE TO AZATHIOPRINE  - WILL SUPPLEMENT B12, FA, FESO4         # CASE D/W PATIENT AND PARTNER MILLI DONOVAN AT BEDSIDE, ALL QUESTIONS ANSWERED. DISCUSSED RECOMMENDATIONS AS MADE BY MULTIDISCIPLINARY TEAM. DISCUSSED THAT PROGNOSIS IS GUARDED. THEY VERBALIZED UNDERSTANDING. IN DISCUSSION REGARDING GOC - PATIENT WISHES TO BE FULL CODE.        # HYPERTENSIVE URGENCY    # HTN - CONTROLLED   - TELEMETRY  - ECHO - LV EF - 70 - 75%, G1DD  - NOTED TROPONINS  - S/P IV HYDRALAZINE  - HYDRALAZINE  - PROCARDIA  - LABETALOL  - LOSARTAN  - SPIRONOLACTONE  - NOTED SERUM RENIN, ALDOSTERONE LEVELS  - F/U PLASMA METANEPHRINES  - CARDIOLOGY CONSULT  - NEPHROLOGY CONSULT  - ENDOCRINOLOGY CONSULT    # H/O CARDIAC ARRHYTHMIA - LOOP RECORDER IN PLACE  # H/O STERNOTOMY DUE TO THYMECTOMY      # MICHEL ON CKD STAGE 3 B  # BPH, OBSTRUCTIVE UROPATHY  - IV FLUIDS  - PVR - 0 ML  - MONITOR CR  - AVOID NEPHROTOXIC AGENTS  - NEPHROLOGY CONSULT    # ACUTE GINGIVOSTOMATITIS  - PLACE ON UNASYN, F/U BCX  - MAGIC MOUTHWASH  - ID CONSULT    # HYPOGLYCEMIA - RESOLVED  - MONITORING FINGERSTICKS    # ANEMIA  - MACROCYTIC ANEMIA, ANEMIA OF CKD   - LIKELY MYELOSUPPRESSION / ANEMIA DUE TO AZATHIOPRINE    - TREND HGB  - ANEMIA PANEL  - DENIES MELENA, HEMATOCHEZIA, HEMATEMESIS, HEMATURIA  - NOTED CT A/P  - GASTROENTEROLOGY CONSULT    # ? LYMPHADENOPATHY ON CT A/P   - F/U U/S ABDOMEN AS OUTPATIENT  - DEFER CT W/ CONTRAST GIVEN RENAL DYSFUNCTION  - PER FAMILY PATIENT MAY HAVE HARDWARE THAT IS NOT COMPATIBLE WITH MRI  - HEME/ONC CONSULT    # DENIES DYSURIA, UA NEGATIVE FOR LEUKOCYTE ESTERASE AND NITRITES      # MYASTHENIA GRAVIS S/P THYMECTOMY/STERNOTOMY - ON AZATHIOPRINE, PREDNISONE, PYRIDOSTIGMINE  - D/W NEUROLOGY - RECOMMENDED F/U AS OUTPATIENT FOR FURTHER PREDNISONE TAPER/MANAGEMENT  - NEUROLOGY CONSULT    # DM, DIABETIC NEPHROPATHY, DIABETIC RETINOPATHY, DIABETIC PERIPHERAL NEUROPATHY  - SSI + FS    # CVA W/ MILD RIGHT HP    # IMPAIRED GAIT DUE TO GENERALIZED MUSCLE WEAKNESS, CVA, MYASTHENIA GRAVIS, CERVICAL SPINAL STENOSIS - H/O CERVICAL SPINE INSTRUMENTATION - HARDWARE IN PLACE     # PAD  # GI AND DVT PPX

## 2025-07-09 NOTE — PROGRESS NOTE ADULT - ASSESSMENT
69 y/o M, from [facility] ambulates with a walker, with PMHx of Myasthenia gravis s/p thymectomy, HTN, 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 Hgb 9.9. BUN/Cr 30/1.81.   Admitted for MICHEL on CKD and uncontrolled HTN, Nephrology and Cardiology consulted.   TTE: Left ventricular wall thickness is severely increased. Left ventricular systolic function is hyperdynamic with an ejection fraction visually estimated at 70 to 75 %.  Differential includes hypertensive, hypertrophic, and infiltrative cardiomyopathy, among others. Cardiac MRI for further evaluation was recommended, can be done outpt.    During hospitalization pt was found to have elevated creatinine, renal US negative for hydronephrosis, d/c'd ARB.  MICHEL on CKD now improving from 1.99 > 1.7.   QMA Dr. Moralez consulted for macrocytic anemia and retroperitoneal lymphadenopathy.   Endocrine Dr. Farr consulted possible hormonal causes of uncontrolled HTN.     CXR - found to have Sternotomy, cervical spine hardware, and left loop recorder.   Pt is not a candidate for MRI A/P to further evaluate retroperitoneal lymphadenopathy at this time  Will further evaluate retroperitoneal lymphadenopathy with US A/P.   CXR - no evidence of pleural effusions noted on CT a/p imaging. Remains comfortable on room air.   Currently undergoing 24 urine studies to check for metanephrine and catecholamines to r/o pheochromocytoma.     Wife Jada Still updated on pt's status and current plan.     7/7-Pt. with labile b/p with periods of asymptomatic HTN with SBP as high as 220 dropping down to 110's/40's, needs further monitoring with b/p meds adjustment.  7/8-b/p somewhat improved but still labile, Aldactone added to regimen by nephro, close monitoring continues.

## 2025-07-09 NOTE — CHART NOTE - NSCHARTNOTEFT_GEN_A_CORE
Assessment:       Nutrition reassessment for follow-up. Chart reviewed, pt visited, alert, oriented, no food/nutrition related complaints at present, tolerating Soft/Bite Sized food and Glucerna Shake     Factors impacting intake: [ x ] other: Mormon/ethnic/cultural/personal food preferences; acute on chronic comorbidities    Diet Prescription:   Diet, Regular:   Consistent Carbohydrate {No Snacks}  DASH/TLC {Sodium & Cholesterol Restricted}  Soft and Bite Sized (SOFTBTSZ)  Supplement Feeding Modality:  Oral  Glucerna Shake Cans or Servings Per Day:  1       Frequency:  Two Times a day (25 @ 14:06)    Daily Weight in k.1 (2025 05:27)-->?  Weight in k (2025 05:15)  Weight in k.8 (2025 04:45)  Weight in k (2025 05:25)  Weight in k.6 (2025 04:40)  Weight in k.6 (2025 04:50)  Weight in k (2025 05:06)    Pertinent Medications: MEDICATIONS  (STANDING):  ampicillin/sulbactam  IVPB      ampicillin/sulbactam  IVPB 3 Gram(s) IV Intermittent every 6 hours  aspirin  chewable 81 milliGRAM(s) Oral daily  atorvastatin 80 milliGRAM(s) Oral at bedtime  azaTHIOprine 100 milliGRAM(s) Oral daily  cholecalciferol 1000 Unit(s) Oral daily  cyanocobalamin 1000 MICROGram(s) Oral daily  dextrose 5%. 1000 milliLiter(s) (50 mL/Hr) IV Continuous <Continuous>  dextrose 50% Injectable 25 Gram(s) IV Push once  ferrous    sulfate 325 milliGRAM(s) Oral two times a day  finasteride 5 milliGRAM(s) Oral daily  FIRST- Mouthwash  BLM 30 milliLiter(s) Swish and Spit three times a day  folic acid 1 milliGRAM(s) Oral daily  heparin   Injectable 5000 Unit(s) SubCutaneous every 8 hours  hydrALAZINE 100 milliGRAM(s) Oral every 8 hours  insulin lispro (ADMELOG) corrective regimen sliding scale   SubCutaneous three times a day before meals  insulin lispro (ADMELOG) corrective regimen sliding scale   SubCutaneous at bedtime  labetalol 400 milliGRAM(s) Oral every 8 hours  losartan 100 milliGRAM(s) Oral at bedtime  NIFEdipine XL 90 milliGRAM(s) Oral <User Schedule>  predniSONE   Tablet 20 milliGRAM(s) Oral daily  pyridostigmine 60 milliGRAM(s) Oral <User Schedule>  sertraline 50 milliGRAM(s) Oral daily  spironolactone 25 milliGRAM(s) Oral daily  tamsulosin 0.4 milliGRAM(s) Oral at bedtime    MEDICATIONS  (PRN):  acetaminophen     Tablet .. 650 milliGRAM(s) Oral every 6 hours PRN Temp greater or equal to 38C (100.4F), Mild Pain (1 - 3)  dextrose Oral Gel 15 Gram(s) Oral once PRN Blood Glucose LESS THAN 70 milliGRAM(s)/deciliter  ondansetron Injectable 4 milliGRAM(s) IV Push every 8 hours PRN Nausea and/or Vomiting    Pertinent Labs:  Na136 mmol/L Glu 96 mg/dL K+ 3.7 mmol/L Cr  1.66 mg/dL[H] BUN 26 mg/dL[H]  Alb 2.7 g/dL[L]     CAPILLARY BLOOD GLUCOSE    POCT Blood Glucose.: 292 mg/dL (2025 11:53)  POCT Blood Glucose.: 135 mg/dL (2025 07:38)  POCT Blood Glucose.: 111 mg/dL (2025 20:43)  POCT Blood Glucose.: 296 mg/dL (2025 16:37)    Skin: skin intact; 1+ leg edema     Estimated Needs:   [ x ] no change since previous assessment  [ ] recalculated:     Previous Nutrition Diagnosis:   [ x ] Predicted Suboptimal Energy Intake ble     New Nutrition Diagnosis: [ x ] not applicable     Interventions/Recommend  [ x ] Continue diet Rx as ordered   [ ] Nutrition Supplement  [ ] Nutrition Support  [ x ] Other: Provide food choices within diet Rx as available/updated                   Pt to be followed by dietitian at skilled nursing facility when medically ready to be discharged     Monitoring and Evaluation:   [ x ] PO intake [ x ] Tolerance to diet prescription [ x ] weights [ x ] labs[ x ] follow up per protocol  [ ] other: Assessment:       Nutrition reassessment for follow-up. Chart reviewed, pt visited, alert, oriented, no food/nutrition related complaints at present, tolerating Soft/Bite Sized food and Glucerna Shake, but 25% lunch intake observed today, mouth pain from oral thrush ( team aware, on Rx), encouraged between meals oral nutritional supplement      Factors impacting intake: [ x ] other: Hoahaoism/ethnic/cultural/personal food preferences; acute on chronic comorbidities    Diet Prescription:   Diet, Regular:   Consistent Carbohydrate {No Snacks}  DASH/TLC {Sodium & Cholesterol Restricted}  Soft and Bite Sized (SOFTBTSZ)  Supplement Feeding Modality:  Oral  Glucerna Shake Cans or Servings Per Day:  1       Frequency:  Two Times a day (25 @ 14:06)    Daily Weight in k.1 (2025 05:27)-->?  Weight in k (2025 05:15)  Weight in k.8 (2025 04:45)  Weight in k (2025 05:25)  Weight in k.6 (2025 04:40)  Weight in k.6 (2025 04:50)  Weight in k (2025 05:06)    Pertinent Medications: MEDICATIONS  (STANDING):  ampicillin/sulbactam  IVPB      ampicillin/sulbactam  IVPB 3 Gram(s) IV Intermittent every 6 hours  aspirin  chewable 81 milliGRAM(s) Oral daily  atorvastatin 80 milliGRAM(s) Oral at bedtime  azaTHIOprine 100 milliGRAM(s) Oral daily  cholecalciferol 1000 Unit(s) Oral daily  cyanocobalamin 1000 MICROGram(s) Oral daily  dextrose 5%. 1000 milliLiter(s) (50 mL/Hr) IV Continuous <Continuous>  dextrose 50% Injectable 25 Gram(s) IV Push once  ferrous    sulfate 325 milliGRAM(s) Oral two times a day  finasteride 5 milliGRAM(s) Oral daily  FIRST- Mouthwash  BLM 30 milliLiter(s) Swish and Spit three times a day  folic acid 1 milliGRAM(s) Oral daily  heparin   Injectable 5000 Unit(s) SubCutaneous every 8 hours  hydrALAZINE 100 milliGRAM(s) Oral every 8 hours  insulin lispro (ADMELOG) corrective regimen sliding scale   SubCutaneous three times a day before meals  insulin lispro (ADMELOG) corrective regimen sliding scale   SubCutaneous at bedtime  labetalol 400 milliGRAM(s) Oral every 8 hours  losartan 100 milliGRAM(s) Oral at bedtime  NIFEdipine XL 90 milliGRAM(s) Oral <User Schedule>  predniSONE   Tablet 20 milliGRAM(s) Oral daily  pyridostigmine 60 milliGRAM(s) Oral <User Schedule>  sertraline 50 milliGRAM(s) Oral daily  spironolactone 25 milliGRAM(s) Oral daily  tamsulosin 0.4 milliGRAM(s) Oral at bedtime    MEDICATIONS  (PRN):  acetaminophen     Tablet .. 650 milliGRAM(s) Oral every 6 hours PRN Temp greater or equal to 38C (100.4F), Mild Pain (1 - 3)  dextrose Oral Gel 15 Gram(s) Oral once PRN Blood Glucose LESS THAN 70 milliGRAM(s)/deciliter  ondansetron Injectable 4 milliGRAM(s) IV Push every 8 hours PRN Nausea and/or Vomiting    Pertinent Labs:  Na136 mmol/L Glu 96 mg/dL K+ 3.7 mmol/L Cr  1.66 mg/dL[H] BUN 26 mg/dL[H]  Alb 2.7 g/dL[L]     CAPILLARY BLOOD GLUCOSE    POCT Blood Glucose.: 292 mg/dL (2025 11:53)  POCT Blood Glucose.: 135 mg/dL (2025 07:38)  POCT Blood Glucose.: 111 mg/dL (2025 20:43)  POCT Blood Glucose.: 296 mg/dL (2025 16:37)    Skin: skin intact; 1+ leg edema     Estimated Needs:   [ x ] no change since previous assessment  [ ] recalculated:     Previous Nutrition Diagnosis:   [ x ] Predicted Suboptimal Energy Intake ble     New Nutrition Diagnosis: [ x ] not applicable     Interventions/Recommend  [ x ] Continue diet Rx as ordered   [ ] Nutrition Supplement  [ ] Nutrition Support  [ x ] Other: Discussed with RN                  Provide food choices within diet Rx as available/updated                   Pt to be followed by dietitian at [facility] when medically ready to be discharged     Monitoring and Evaluation:   [ x ] PO intake [ x ] Tolerance to diet prescription [ x ] weights [ x ] labs[ x ] follow up per protocol  [ ] other:

## 2025-07-09 NOTE — PROGRESS NOTE ADULT - ASSESSMENT
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 Patient reports he has been feeling nauseous for a few days but no vomiting. He reports chronic increased urinary frequency and sensation of incomplete voiding. He denies all other symptoms - fever, chills, cough, chest pain, SoB, palpitations abdominal pain, diarrhea, dysuria, arm or leg numbness or tingling. Reports regular brown stool - denies black or bloody stool.  (25 Jun 2025 21:32) Hematology called to evaluate soft tissue swelling in the retroperitoneum vs overlapping blood vessels. Anemia not on admission < from: CT Abdomen and Pelvis No Cont (06.29.25 @ 23:36) >  Soft tissue densities within the retroperitoneum next to the aorta   concerning for extensive lymphadenopathy versus tortuous vessels or a   combination of both. The evaluation is limited due to the lack of IV   contrast. Recommend contrast enhanced cross-sectional imaging for better   assessment.    #Retroperitoneal mass Lymphadenopathy vs overlapping vessels  Recommend  MRI abdomen w/ contrast or US abdomen to differentiate blood vessels vs LN's   -as a follow up Cr is still elevated can consider US abdomen - imaging can be ordered as an outpatient on return to his Nursing Home.  Leukemia / lymphoma testing-->FLow (-)  - U acid is nl, LDH SPEP is nl  -chest CT  upon d/c pt to f/u with Dr. Mayfield in 1-2 weeks    #Macrocytic Anemia   -Inc Ferritin and Fe sat 16% c/w ACD Vit B12 Folate and TSH all normal   -MICHEL  -K/L ratio normal, Immunofixation: No Monoclonal Band Identified   -Pt has moderate to severe Macrocytic anemia suggestive of myelodysplastic anemia   Recommend bone marrow aspiration and biopsy as an outpatient.    #VTE Prophylaxis    Thank you for the referral. Will continue to monitor the patient.  Please call with any questions [phone]  Above reviewed with Attending Dr. Barnes  [facility]  [address], [address], [address]th [place] [address]  [phone]  *Note not finalized until signed by Attending Physician

## 2025-07-09 NOTE — PROGRESS NOTE ADULT - SUBJECTIVE AND OBJECTIVE BOX
NP Note discussed with  Primary Attending    Patient is a 70y old  Male who presents with a chief complaint of MICHEL (09 Jul 2025 12:09).  Remains comfortable with no complaints.      INTERVAL HPI/OVERNIGHT EVENTS: no new complaints    MEDICATIONS  (STANDING):  ampicillin/sulbactam  IVPB      ampicillin/sulbactam  IVPB 3 Gram(s) IV Intermittent every 6 hours  aspirin  chewable 81 milliGRAM(s) Oral daily  atorvastatin 80 milliGRAM(s) Oral at bedtime  azaTHIOprine 100 milliGRAM(s) Oral daily  cholecalciferol 1000 Unit(s) Oral daily  cyanocobalamin 1000 MICROGram(s) Oral daily  dextrose 5%. 1000 milliLiter(s) (50 mL/Hr) IV Continuous <Continuous>  dextrose 50% Injectable 25 Gram(s) IV Push once  ferrous    sulfate 325 milliGRAM(s) Oral two times a day  finasteride 5 milliGRAM(s) Oral daily  FIRST- Mouthwash  BLM 30 milliLiter(s) Swish and Spit three times a day  folic acid 1 milliGRAM(s) Oral daily  heparin   Injectable 5000 Unit(s) SubCutaneous every 8 hours  hydrALAZINE 100 milliGRAM(s) Oral every 8 hours  insulin lispro (ADMELOG) corrective regimen sliding scale   SubCutaneous three times a day before meals  insulin lispro (ADMELOG) corrective regimen sliding scale   SubCutaneous at bedtime  labetalol 400 milliGRAM(s) Oral every 8 hours  losartan 100 milliGRAM(s) Oral at bedtime  NIFEdipine XL 90 milliGRAM(s) Oral <User Schedule>  predniSONE   Tablet 20 milliGRAM(s) Oral daily  pyridostigmine 60 milliGRAM(s) Oral <User Schedule>  sertraline 50 milliGRAM(s) Oral daily  spironolactone 25 milliGRAM(s) Oral daily  tamsulosin 0.4 milliGRAM(s) Oral at bedtime    MEDICATIONS  (PRN):  acetaminophen     Tablet .. 650 milliGRAM(s) Oral every 6 hours PRN Temp greater or equal to 38C (100.4F), Mild Pain (1 - 3)  dextrose Oral Gel 15 Gram(s) Oral once PRN Blood Glucose LESS THAN 70 milliGRAM(s)/deciliter  ondansetron Injectable 4 milliGRAM(s) IV Push every 8 hours PRN Nausea and/or Vomiting      __________________________________________________  REVIEW OF SYSTEMS:    CONSTITUTIONAL: No fever,   EYES: no acute visual disturbances  NECK: No pain or stiffness  RESPIRATORY: No cough; No shortness of breath  CARDIOVASCULAR: No chest pain, no palpitations  GASTROINTESTINAL: No pain. No nausea or vomiting; No diarrhea   NEUROLOGICAL: No headache or numbness, no tremors  MUSCULOSKELETAL: No joint pain, no muscle pain  GENITOURINARY: no dysuria, no frequency, no hesitancy  PSYCHIATRY: no depression , no anxiety  ALL OTHER  ROS negative        Vital Signs Last 24 Hrs  T(C): 36.6 (09 Jul 2025 11:19), Max: 37 (09 Jul 2025 05:27)  T(F): 97.9 (09 Jul 2025 11:19), Max: 98.6 (09 Jul 2025 05:27)  HR: 65 (09 Jul 2025 11:19) (63 - 70)  BP: 176/64 (09 Jul 2025 11:19) (120/53 - 202/76)  BP(mean): 96 (09 Jul 2025 11:19) (96 - 106)  RR: 18 (09 Jul 2025 11:19) (18 - 18)  SpO2: 100% (09 Jul 2025 11:19) (95% - 100%)    Parameters below as of 09 Jul 2025 11:19  Patient On (Oxygen Delivery Method): room air        ________________________________________________  PHYSICAL EXAM:  GENERAL: NAD  HEENT: Normocephalic;  conjunctivae and sclerae clear; moist mucous membranes;   NECK : supple  CHEST/LUNG: Clear to auscultation bilaterally with good air entry   HEART: S1 S2  regular; no murmurs, gallops or rubs  ABDOMEN: Soft, Nontender, Nondistended; Bowel sounds present  EXTREMITIES: no cyanosis; no edema; no calf tenderness  SKIN: warm and dry; no rash  NERVOUS SYSTEM:  Awake and alert; Oriented  to place, person and time ; no new deficits    _________________________________________________  LABS:                        8.1    6.15  )-----------( 233      ( 09 Jul 2025 05:56 )             24.5     07-09    136  |  103  |  26[H]  ----------------------------<  96  3.7   |  29  |  1.66[H]    Ca    8.8      09 Jul 2025 05:56        Urinalysis Basic - ( 09 Jul 2025 05:56 )    Color: x / Appearance: x / SG: x / pH: x  Gluc: 96 mg/dL / Ketone: x  / Bili: x / Urobili: x   Blood: x / Protein: x / Nitrite: x   Leuk Esterase: x / RBC: x / WBC x   Sq Epi: x / Non Sq Epi: x / Bacteria: x      CAPILLARY BLOOD GLUCOSE      POCT Blood Glucose.: 255 mg/dL (09 Jul 2025 13:09)  POCT Blood Glucose.: 292 mg/dL (09 Jul 2025 11:53)  POCT Blood Glucose.: 135 mg/dL (09 Jul 2025 07:38)  POCT Blood Glucose.: 111 mg/dL (08 Jul 2025 20:43)  POCT Blood Glucose.: 296 mg/dL (08 Jul 2025 16:37)    RADIOLOGY & ADDITIONAL TESTS:    < from: US Renal (07.07.25 @ 18:01) >    ACC: [id number] EXAM:  US KIDNEY(S)   ORDERED BY: CHITO TAYLOR     PROCEDURE DATE:  07/07/2025          INTERPRETATION:  CLINICAL INFORMATION: eval retroperitoneal   lymphadenopathy.    COMPARISON: CT dated 6/29/2025    TECHNIQUE: Sonography of the kidneys and bladder.    FINDINGS:    Limited portable exam.    Echogenic kidneys suggestive of parenchymal disease.    Right kidney: 8.9 cm. No hydronephrosis.    Left kidney: 9.7 cm. No hydronephrosis.    Urinary bladder: Bladder wall thickening.    Unable to evaluate for lymphadenopathy on this ultrasound.    IMPRESSION:    Limited exam.    Unable to evaluate for lymphadenopathy on this ultrasound. See recent CT.    Echogenic kidneys suggestive of parenchymal disease.    No hydronephrosis.    Bladder wall thickening. Correlate with urinalysis.    --- End of Report ---    < end of copied text >    < from: Xray Chest 1 View- PORTABLE-Urgent (Xray Chest 1 View- PORTABLE-Urgent .) (07.03.25 @ 16:53) >    ACC: [id number] EXAM:  XR CHEST PORTABLE URGENT 1V   ORDERED BY: AMADEO AGUILAR     PROCEDURE DATE:  07/03/2025          INTERPRETATION:  AP semierect chest on July 3, 2025 at 4:41 PM. Patient   has renal failure and is being considered for MR.    Heart magnified by technique.    Sternotomy, cervical spine hardware, and left loop recorder noted.    There is atelectasis of the left lower lobe with adjacent fluid which has   increased from April 2, 2022. Otherwise stable findings.    IMPRESSION: Sternotomy, left loop recorder, cervical spine hardware   noted. Increasing atelectatic consolidation of left lower lobe with   effusion.    --- End of Report ---    < end of copied text >      Imaging Personally Reviewed:  YES/NO    Consultant(s) Notes Reviewed:   YES/ No    Care Discussed with Consultants :     Plan of care was discussed with patient and /or primary care giver; all questions and concerns were addressed and care was aligned with patient's wishes.

## 2025-07-09 NOTE — PROGRESS NOTE ADULT - SUBJECTIVE AND OBJECTIVE BOX
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 Pt a/w hypertensive urgency and MICHEL. Nephrology consulted for Elevated serum creatinine.    Hospital Medications: Medications reviewed.    REVIEW OF SYSTEMS:  Gen: no fever or chills  Cards: no chest pain  Resp: no dyspnea  GI: no nausea or vomiting or diarrhea  : no dysuria or hematuria  Vascular: no LE edema    VITALS:  T(F): 97.9 (07-09-25 @ 11:19), Max: 98.6 (07-09-25 @ 05:27)  HR: 73 (07-09-25 @ 14:09)  BP: 108/49 (07-09-25 @ 14:09)  RR: 18 (07-09-25 @ 11:19)  SpO2: 100% (07-09-25 @ 11:19)  Wt(kg): --      PHYSICAL EXAM:  Gen: NAD, calm  HEENT: +facial/ periorbital edema    Neck: no JVD  Cards: RRR, +S1/S2,+BOUBACAR  Resp: CTA B/L  GI: soft, NT/ND, NABS  : no CVA tenderness  Extremities: no edema B/L     LABS:  07-09    136  |  103  |  26[H]  ----------------------------<  96  3.7   |  29  |  1.66[H]    Ca    8.8      09 Jul 2025 05:56      Creatinine Trend: 1.66 <--, 1.81 <--, 1.68 <--, 1.74 <--, 1.74 <--                        8.1    6.15  )-----------( 233      ( 09 Jul 2025 05:56 )             24.5     Urine Studies:  Urinalysis Basic - ( 09 Jul 2025 05:56 )    Color:  / Appearance:  / SG:  / pH:   Gluc: 96 mg/dL / Ketone:   / Bili:  / Urobili:    Blood:  / Protein:  / Nitrite:    Leuk Esterase:  / RBC:  / WBC    Sq Epi:  / Non Sq Epi:  / Bacteria:

## 2025-07-09 NOTE — PROGRESS NOTE ADULT - SUBJECTIVE AND OBJECTIVE BOX
[facility]  **********************************    DATE OF SERVICE: 07-09-25    Patient denies chest pain or shortness of breath.   Review of symptoms otherwise negative.    acetaminophen     Tablet .. 650 milliGRAM(s) Oral every 6 hours PRN  ampicillin/sulbactam  IVPB      ampicillin/sulbactam  IVPB 3 Gram(s) IV Intermittent every 6 hours  aspirin  chewable 81 milliGRAM(s) Oral daily  atorvastatin 80 milliGRAM(s) Oral at bedtime  azaTHIOprine 100 milliGRAM(s) Oral daily  cholecalciferol 1000 Unit(s) Oral daily  cyanocobalamin 1000 MICROGram(s) Oral daily  dextrose 5%. 1000 milliLiter(s) IV Continuous <Continuous>  dextrose 50% Injectable 25 Gram(s) IV Push once  dextrose Oral Gel 15 Gram(s) Oral once PRN  ferrous    sulfate 325 milliGRAM(s) Oral two times a day  finasteride 5 milliGRAM(s) Oral daily  FIRST- Mouthwash  BLM 30 milliLiter(s) Swish and Spit three times a day  folic acid 1 milliGRAM(s) Oral daily  heparin   Injectable 5000 Unit(s) SubCutaneous every 8 hours  hydrALAZINE 100 milliGRAM(s) Oral every 8 hours  insulin lispro (ADMELOG) corrective regimen sliding scale   SubCutaneous three times a day before meals  insulin lispro (ADMELOG) corrective regimen sliding scale   SubCutaneous at bedtime  labetalol 400 milliGRAM(s) Oral every 8 hours  losartan 100 milliGRAM(s) Oral at bedtime  NIFEdipine XL 90 milliGRAM(s) Oral <User Schedule>  ondansetron Injectable 4 milliGRAM(s) IV Push every 8 hours PRN  predniSONE   Tablet 20 milliGRAM(s) Oral daily  pyridostigmine 60 milliGRAM(s) Oral <User Schedule>  sertraline 50 milliGRAM(s) Oral daily  tamsulosin 0.4 milliGRAM(s) Oral at bedtime                            8.1    6.15  )-----------( 233      ( 09 Jul 2025 05:56 )             24.5       Hemoglobin: 8.1 g/dL (07-09 @ 05:56)  Hemoglobin: 8.1 g/dL (07-08 @ 07:39)  Hemoglobin: 7.8 g/dL (07-06 @ 05:44)      07-09    136  |  103  |  26[H]  ----------------------------<  96  3.7   |  29  |  1.66[H]    Ca    8.8      09 Jul 2025 05:56      Creatinine Trend: 1.66<--, 1.81<--, 1.68<--, 1.74<--, 1.74<--, 1.66<--    COAGS:           T(C): 36.6 (07-09-25 @ 11:19), Max: 37 (07-09-25 @ 05:27)  HR: 65 (07-09-25 @ 11:19) (63 - 70)  BP: 176/64 (07-09-25 @ 11:19) (120/53 - 202/76)  RR: 18 (07-09-25 @ 11:19) (18 - 18)  SpO2: 100% (07-09-25 @ 11:19) (95% - 100%)  Wt(kg): --    I&O's Summary          HEENT:  (-)icterus (-)pallor  CV: N S1 S2 1/6 BOUBACAR (+)2 Pulses B/l  Resp:  Clear to ausculatation B/L, normal effort  GI: (+) BS Soft, NT, ND  Lymph:  (-)Edema, (-)obvious lymphadenopathy  Skin: Warm to touch, Normal turgor  Psych: Appropriate mood and affect      TELEMETRY: 	  sinus        ASSESSMENT/PLAN: 	70y  Male PMHx of Myasthenia gravis s/p thymectomy, HTN, 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 hyperaldosterone state  aldosterone appears wnl, endo recs noted  -  TSH wnl   - start aldactone 25 mg PO QD  - w/u for secondary causes in progress 24 hour urine metanephrines  - would need Renal artery dopplers likely as an oupt since may not be done in house     # Abnormal EKG  - echo noted, suspect it is due to hypertensive heart disease given his profoundly elevated blood pressures however can arrange for outpt cardiac MRI     # MG  - neuro eval  noted    Ta Mercado MD, [facility]  [place]EEPER [phone]

## 2025-07-09 NOTE — PROGRESS NOTE ADULT - ASSESSMENT
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 Pt a/w hypertensive urgency and MICHEL.   Nephrology consulted for Elevated serum creatinine.    1. MICHEL- as per H& P; last SCr 1.3. MICHEL likely hemodynamically mediated due to uncontrolled HTN. Renal function improving  for which Losartan was resumed. Renal function overall remains stable .  Feurea 13.52%;   UA with 300 protein, no blood. FeNa 10%;  Renal US with no hydro.  TTE with grade 1 diastolic dysfunction, EF 70-75%; ?hypertensive vs infiltrative cardiomyopathy. SIFE neg & K/l wnl. Pt b/l pleural effusions on TTE. Check CXR;  Strict I/Os. Avoid nephrotoxins/ NSAIDs/ RCA. Monitor BMP.    2. Proteinuria- UA with 300 protein, no blood.  UPCr on 6/29,  0.48 g/day. No acute interventions at this time.    3. Hypertensive urgency- BP labile. c/w Labetalol 400mg PO q 8hrs. c/w Nifedipine ER 90mg at noon. s/p Aldactone 25mg PO x1 on 7/8 with stable SCr and K wnl. Recc Aldactone 50mg PO daily for resistant HTN; . c/w Losartan 100 mg PO qhs due to elevated BP at night; +nocturnal HTN; recc outpt sleep study to r/o BRET.   ?Consider decreasing frequency of pyridostigmine?- defer to Neuro   Recc Renal US with dopplers as an outpt to r/o HOANG (unavailable at [facility]).    c/w low salt diet. Monitor BP  4. Hypokalemia- Kuldip/ Renin; 3.19. Not consistent with hypoaldosteronism. Renin 6.479.   Hypokalemia due to shifting from excessive insulin.   Recc aldactone as above.  Check VBG. Monitor serum potassium  5. Iron deficiency anemia- low hgb. Check FOBT. tsat 16% with ferriitn 236- Finished Venofer 200mg IV qd x 3 doses. s/p Epo 10k SC x1 on 6/30 & 7/6. Monitor b    [facility] NEPHROLOGY  Bethel Smith M.D.  Guillermo Jimenez D.O.  Kathleen Lan M.D.  MD Kimi Rodríguez, MSN, ANP-C    Telephone: [phone]  Facsimile: [phone]    [address] [place] [address], [address]  [place] [address]

## 2025-07-09 NOTE — PROGRESS NOTE ADULT - PROBLEM SELECTOR PLAN 3
Was sent in from [facility] for Hgb 7.9 in a routine lab   Total iron 37, TIBC 233, % iron 16  No active signs of bleeding   - macrocytic anemia   - b12 and folate serum wnl   - c/w home iron and folic acid supplement  - Completed Venofer 200mg IV qd x 3 doses  - started Epogen per nephrology  - [facility] Dr. Moralez followinf  - Pt has moderate to severe Macrocytic anemia suggestive of myelodysplastic anemia per hem/onc  - Recommend bone marrow aspiration and biopsy as an outpatient.

## 2025-07-09 NOTE — PROGRESS NOTE ADULT - PROBLEM SELECTOR PLAN 4
CT A/P - Soft tissue densities within the retroperitoneum next to the aorta   concerning for extensive lymphadenopathy versus tortuous vessels or a   combination of both.   - not a candidate for MRI - has loop recorder, cervical hardware and sternotomy  - US A/P   - [facility] Dr. Moralez following

## 2025-07-09 NOTE — PROGRESS NOTE ADULT - NS ATTEND AMEND GEN_ALL_CORE FT
Patient previously unknown to me as I am starting service today   Mr. Patel is a 70 year old male with a PMHx of Myasthenia gravis s/p thymectomy on Azathioprine, prednisone and pyridostigmine, HTN, CVA with mild residual arm weakness, T2DM, osteoporosis, PAD, Depression, cervical and lumbar spondylosis, BPH who was sent in for a Hb of 7.9. His hospital course included blood pressure management as he has very high systolic pressures refractory to multiple medications, MICHEL. He is on unasyn for gingivostomatisis. Our service was consulted for anemia and also of note, he was found to have a soft tissue density in the paraspinal/paraaortic suggestive of tortuous vessels vs conglomerate of LNs.     #Anemia  #soft tissue density in the retroperitoneum [tortuous vessels vs LNs]     7/9: To date he received Venofer x 3 and Epogen 10,000 units x 1. Myeloma studies were unremarkable. B12 1900 Folate >20. Iron studies 6/30/25: TSAT 81 Ferritin 466. LDH unremarkable. Flow cytometry unrevealing. TSH 4.28 Hb remains stable. Possibly could be related to azathioprine toxicity in the setting of MICHEL. Patient reports that he was told that he will soon be going back to [place]. If still here, would discuss with radiology with U/S duplex abdomen would be useful as an imaging modality or consider MRI if possible. Patient will follow up with Dr. Mayfield on discharge to discuss role of further testing such as a bone marrow biopsy     We will follow along.

## 2025-07-09 NOTE — PROGRESS NOTE ADULT - PROBLEM SELECTOR PLAN 2
BUN/Cr 30/1.81 (Baseline Scr 1.3)  s/p 1L NS in ED   Post-void bladder scan - 0mL  Renal US - negative for hydronephrosis   - s/p IVF  - creatinine improving 1.99 > 1.66 > 1.7>1.81>1.41  - trend BMP  - Nephrology dr. Lan following

## 2025-07-10 LAB
ANION GAP SERPL CALC-SCNC: 2 MMOL/L — LOW (ref 5–17)
BUN SERPL-MCNC: 29 MG/DL — HIGH (ref 7–18)
CALCIUM SERPL-MCNC: 8.8 MG/DL — SIGNIFICANT CHANGE UP (ref 8.4–10.5)
CHLORIDE SERPL-SCNC: 106 MMOL/L — SIGNIFICANT CHANGE UP (ref 96–108)
CO2 SERPL-SCNC: 29 MMOL/L — SIGNIFICANT CHANGE UP (ref 22–31)
CREAT SERPL-MCNC: 1.68 MG/DL — HIGH (ref 0.5–1.3)
EGFR: 44 ML/MIN/1.73M2 — LOW
EGFR: 44 ML/MIN/1.73M2 — LOW
GLUCOSE BLDC GLUCOMTR-MCNC: 106 MG/DL — HIGH (ref 70–99)
GLUCOSE BLDC GLUCOMTR-MCNC: 116 MG/DL — HIGH (ref 70–99)
GLUCOSE BLDC GLUCOMTR-MCNC: 164 MG/DL — HIGH (ref 70–99)
GLUCOSE BLDC GLUCOMTR-MCNC: 275 MG/DL — HIGH (ref 70–99)
GLUCOSE SERPL-MCNC: 92 MG/DL — SIGNIFICANT CHANGE UP (ref 70–99)
HCT VFR BLD CALC: 23.3 % — LOW (ref 39–50)
HGB BLD-MCNC: 7.7 G/DL — LOW (ref 13–17)
MCHC RBC-ENTMCNC: 33 G/DL — SIGNIFICANT CHANGE UP (ref 32–36)
MCHC RBC-ENTMCNC: 36.2 PG — HIGH (ref 27–34)
MCV RBC AUTO: 109.4 FL — HIGH (ref 80–100)
NRBC BLD AUTO-RTO: 0 /100 WBCS — SIGNIFICANT CHANGE UP (ref 0–0)
PLATELET # BLD AUTO: 221 K/UL — SIGNIFICANT CHANGE UP (ref 150–400)
POTASSIUM SERPL-MCNC: 4.1 MMOL/L — SIGNIFICANT CHANGE UP (ref 3.5–5.3)
POTASSIUM SERPL-SCNC: 4.1 MMOL/L — SIGNIFICANT CHANGE UP (ref 3.5–5.3)
RBC # BLD: 2.13 M/UL — LOW (ref 4.2–5.8)
RBC # FLD: 18.5 % — HIGH (ref 10.3–14.5)
SODIUM SERPL-SCNC: 137 MMOL/L — SIGNIFICANT CHANGE UP (ref 135–145)
WBC # BLD: 5.98 K/UL — SIGNIFICANT CHANGE UP (ref 3.8–10.5)
WBC # FLD AUTO: 5.98 K/UL — SIGNIFICANT CHANGE UP (ref 3.8–10.5)

## 2025-07-10 RX ORDER — SPIRONOLACTONE 25 MG
25 TABLET ORAL ONCE
Refills: 0 | Status: COMPLETED | OUTPATIENT
Start: 2025-07-10 | End: 2025-07-10

## 2025-07-10 RX ORDER — SPIRONOLACTONE 25 MG
50 TABLET ORAL DAILY
Refills: 0 | Status: DISCONTINUED | OUTPATIENT
Start: 2025-07-11 | End: 2025-07-14

## 2025-07-10 RX ADMIN — Medication 650 MILLIGRAM(S): at 07:19

## 2025-07-10 RX ADMIN — Medication 325 MILLIGRAM(S): at 06:08

## 2025-07-10 RX ADMIN — Medication 1000 UNIT(S): at 11:13

## 2025-07-10 RX ADMIN — FOLIC ACID 1 MILLIGRAM(S): 1 TABLET ORAL at 11:14

## 2025-07-10 RX ADMIN — PYRIDOSTIGMINE BROMIDE 60 MILLIGRAM(S): 5 INJECTION, SOLUTION INTRAVENOUS; PARENTERAL at 13:04

## 2025-07-10 RX ADMIN — Medication 25 MILLIGRAM(S): at 10:27

## 2025-07-10 RX ADMIN — ATORVASTATIN CALCIUM 80 MILLIGRAM(S): 80 TABLET, FILM COATED ORAL at 21:36

## 2025-07-10 RX ADMIN — Medication 81 MILLIGRAM(S): at 11:13

## 2025-07-10 RX ADMIN — AMPICILLIN SODIUM AND SULBACTAM SODIUM 200 GRAM(S): 1; .5 INJECTION, POWDER, FOR SOLUTION INTRAMUSCULAR; INTRAVENOUS at 11:12

## 2025-07-10 RX ADMIN — LOSARTAN POTASSIUM 100 MILLIGRAM(S): 100 TABLET, FILM COATED ORAL at 21:36

## 2025-07-10 RX ADMIN — LABETALOL HYDROCHLORIDE 400 MILLIGRAM(S): 200 TABLET, FILM COATED ORAL at 13:04

## 2025-07-10 RX ADMIN — HEPARIN SODIUM 5000 UNIT(S): 1000 INJECTION INTRAVENOUS; SUBCUTANEOUS at 13:04

## 2025-07-10 RX ADMIN — HEPARIN SODIUM 5000 UNIT(S): 1000 INJECTION INTRAVENOUS; SUBCUTANEOUS at 21:37

## 2025-07-10 RX ADMIN — Medication 650 MILLIGRAM(S): at 21:37

## 2025-07-10 RX ADMIN — TAMSULOSIN HYDROCHLORIDE 0.4 MILLIGRAM(S): 0.4 CAPSULE ORAL at 21:36

## 2025-07-10 RX ADMIN — PYRIDOSTIGMINE BROMIDE 60 MILLIGRAM(S): 5 INJECTION, SOLUTION INTRAVENOUS; PARENTERAL at 10:27

## 2025-07-10 RX ADMIN — Medication 90 MILLIGRAM(S): at 11:13

## 2025-07-10 RX ADMIN — AZATHIOPRINE 100 MILLIGRAM(S): 50 TABLET ORAL at 11:13

## 2025-07-10 RX ADMIN — SERTRALINE 50 MILLIGRAM(S): 100 TABLET, FILM COATED ORAL at 11:13

## 2025-07-10 RX ADMIN — Medication 325 MILLIGRAM(S): at 17:07

## 2025-07-10 RX ADMIN — Medication 100 MILLIGRAM(S): at 21:36

## 2025-07-10 RX ADMIN — Medication 25 MILLIGRAM(S): at 06:08

## 2025-07-10 RX ADMIN — HEPARIN SODIUM 5000 UNIT(S): 1000 INJECTION INTRAVENOUS; SUBCUTANEOUS at 06:07

## 2025-07-10 RX ADMIN — CYANOCOBALAMIN 1000 MICROGRAM(S): 1000 INJECTION INTRAMUSCULAR; SUBCUTANEOUS at 11:13

## 2025-07-10 RX ADMIN — INSULIN LISPRO 1: 100 INJECTION, SOLUTION INTRAVENOUS; SUBCUTANEOUS at 17:06

## 2025-07-10 RX ADMIN — PREDNISONE 20 MILLIGRAM(S): 20 TABLET ORAL at 06:07

## 2025-07-10 RX ADMIN — INSULIN LISPRO 3: 100 INJECTION, SOLUTION INTRAVENOUS; SUBCUTANEOUS at 12:01

## 2025-07-10 RX ADMIN — AMPICILLIN SODIUM AND SULBACTAM SODIUM 200 GRAM(S): 1; .5 INJECTION, POWDER, FOR SOLUTION INTRAMUSCULAR; INTRAVENOUS at 00:10

## 2025-07-10 RX ADMIN — PYRIDOSTIGMINE BROMIDE 60 MILLIGRAM(S): 5 INJECTION, SOLUTION INTRAVENOUS; PARENTERAL at 21:36

## 2025-07-10 RX ADMIN — FINASTERIDE 5 MILLIGRAM(S): 1 TABLET, FILM COATED ORAL at 11:13

## 2025-07-10 RX ADMIN — PYRIDOSTIGMINE BROMIDE 60 MILLIGRAM(S): 5 INJECTION, SOLUTION INTRAVENOUS; PARENTERAL at 17:07

## 2025-07-10 RX ADMIN — LABETALOL HYDROCHLORIDE 400 MILLIGRAM(S): 200 TABLET, FILM COATED ORAL at 06:07

## 2025-07-10 RX ADMIN — LABETALOL HYDROCHLORIDE 400 MILLIGRAM(S): 200 TABLET, FILM COATED ORAL at 21:37

## 2025-07-10 RX ADMIN — AMPICILLIN SODIUM AND SULBACTAM SODIUM 200 GRAM(S): 1; .5 INJECTION, POWDER, FOR SOLUTION INTRAMUSCULAR; INTRAVENOUS at 17:06

## 2025-07-10 RX ADMIN — Medication 100 MILLIGRAM(S): at 06:07

## 2025-07-10 RX ADMIN — AMPICILLIN SODIUM AND SULBACTAM SODIUM 200 GRAM(S): 1; .5 INJECTION, POWDER, FOR SOLUTION INTRAMUSCULAR; INTRAVENOUS at 06:08

## 2025-07-10 RX ADMIN — Medication 650 MILLIGRAM(S): at 06:13

## 2025-07-10 RX ADMIN — PYRIDOSTIGMINE BROMIDE 60 MILLIGRAM(S): 5 INJECTION, SOLUTION INTRAVENOUS; PARENTERAL at 00:10

## 2025-07-10 RX ADMIN — Medication 100 MILLIGRAM(S): at 13:04

## 2025-07-10 RX ADMIN — Medication 650 MILLIGRAM(S): at 22:37

## 2025-07-10 RX ADMIN — PYRIDOSTIGMINE BROMIDE 60 MILLIGRAM(S): 5 INJECTION, SOLUTION INTRAVENOUS; PARENTERAL at 06:07

## 2025-07-10 NOTE — PROGRESS NOTE ADULT - SUBJECTIVE AND OBJECTIVE BOX
Patient is a 70y old  Male who presents with a chief complaint of MICHEL (2025 15:40)    PATIENT IS SEEN AND EXAMINED IN MEDICAL FLOOR.  NGT [    ]    ARCADIO [   ]      GT [   ]    ALLERGIES:  No Known Allergies      Daily     Daily Weight in k.2 (10 Jul 2025 04:41)    VITALS:    Vital Signs Last 24 Hrs  T(C): 37.4 (10 Jul 2025 08:00), Max: 37.4 (10 Jul 2025 08:00)  T(F): 99.3 (10 Jul 2025 08:00), Max: 99.3 (10 Jul 2025 08:00)  HR: 67 (10 Jul 2025 08:00) (65 - 73)  BP: 186/66 (10 Jul 2025 08:00) (95/76 - 211/77)  BP(mean): 96 (2025 11:19) (96 - 96)  RR: 18 (10 Jul 2025 08:00) (18 - 18)  SpO2: 95% (10 Jul 2025 08:00) (95% - 100%)    Parameters below as of 10 Jul 2025 08:00  Patient On (Oxygen Delivery Method): room air        LABS:    CBC Full  -  ( 10 Jul 2025 05:59 )  WBC Count : 5.98 K/uL  RBC Count : 2.13 M/uL  Hemoglobin : 7.7 g/dL  Hematocrit : 23.3 %  Platelet Count - Automated : 221 K/uL  Mean Cell Volume : 109.4 fl  Mean Cell Hemoglobin : 36.2 pg  Mean Cell Hemoglobin Concentration : 33.0 g/dL  Auto Neutrophil # : x  Auto Lymphocyte # : x  Auto Monocyte # : x  Auto Eosinophil # : x  Auto Basophil # : x  Auto Neutrophil % : x  Auto Lymphocyte % : x  Auto Monocyte % : x  Auto Eosinophil % : x  Auto Basophil % : x      07-10    137  |  106  |  29[H]  ----------------------------<  92  4.1   |  29  |  1.68[H]    Ca    8.8      10 Jul 2025 05:59      CAPILLARY BLOOD GLUCOSE      POCT Blood Glucose.: 106 mg/dL (10 Jul 2025 07:20)  POCT Blood Glucose.: 94 mg/dL (2025 21:20)  POCT Blood Glucose.: 175 mg/dL (2025 17:02)  POCT Blood Glucose.: 255 mg/dL (2025 13:09)  POCT Blood Glucose.: 292 mg/dL (2025 11:53)          Creatinine Trend: 1.68<--, 1.66<--, 1.81<--, 1.68<--, 1.74<--, 1.74<--  I&O's Summary    2025 07:01  -  10 Jul 2025 07:00  --------------------------------------------------------  IN: 0 mL / OUT: 750 mL / NET: -750 mL            Blood Blood-Peripheral   @ 18:00   No growth at 24 hours  --  --      Blood Blood-Peripheral   @ 17:45   No growth at 24 hours  --  --          MEDICATIONS:    MEDICATIONS  (STANDING):  ampicillin/sulbactam  IVPB      ampicillin/sulbactam  IVPB 3 Gram(s) IV Intermittent every 6 hours  aspirin  chewable 81 milliGRAM(s) Oral daily  atorvastatin 80 milliGRAM(s) Oral at bedtime  azaTHIOprine 100 milliGRAM(s) Oral daily  cholecalciferol 1000 Unit(s) Oral daily  cyanocobalamin 1000 MICROGram(s) Oral daily  dextrose 5%. 1000 milliLiter(s) (50 mL/Hr) IV Continuous <Continuous>  dextrose 50% Injectable 25 Gram(s) IV Push once  ferrous    sulfate 325 milliGRAM(s) Oral two times a day  finasteride 5 milliGRAM(s) Oral daily  FIRST- Mouthwash  BLM 30 milliLiter(s) Swish and Spit three times a day  folic acid 1 milliGRAM(s) Oral daily  heparin   Injectable 5000 Unit(s) SubCutaneous every 8 hours  hydrALAZINE 100 milliGRAM(s) Oral every 8 hours  insulin lispro (ADMELOG) corrective regimen sliding scale   SubCutaneous three times a day before meals  insulin lispro (ADMELOG) corrective regimen sliding scale   SubCutaneous at bedtime  labetalol 400 milliGRAM(s) Oral every 8 hours  losartan 100 milliGRAM(s) Oral at bedtime  NIFEdipine XL 90 milliGRAM(s) Oral <User Schedule>  predniSONE   Tablet 20 milliGRAM(s) Oral daily  pyridostigmine 60 milliGRAM(s) Oral <User Schedule>  sertraline 50 milliGRAM(s) Oral daily  spironolactone 25 milliGRAM(s) Oral once  tamsulosin 0.4 milliGRAM(s) Oral at bedtime      MEDICATIONS  (PRN):  acetaminophen     Tablet .. 650 milliGRAM(s) Oral every 6 hours PRN Temp greater or equal to 38C (100.4F), Mild Pain (1 - 3)  dextrose Oral Gel 15 Gram(s) Oral once PRN Blood Glucose LESS THAN 70 milliGRAM(s)/deciliter  ondansetron Injectable 4 milliGRAM(s) IV Push every 8 hours PRN Nausea and/or Vomiting      REVIEW OF SYSTEMS:                           ALL ROS DONE [ X   ]    CONSTITUTIONAL:  LETHARGIC [   ], FEVER [   ], UNRESPONSIVE [   ]  CVS:  CP  [   ], SOB, [   ], PALPITATIONS [   ], DIZZYNESS [   ]  RS: COUGH [   ], SPUTUM [   ]  GI: ABDOMINAL PAIN [   ], NAUSEA [   ], VOMITINGS [   ], DIARRHEA [   ], CONSTIPATION [   ]  :  DYSURIA [   ], NOCTURIA [   ], INCREASED FREQUENCY [   ], DRIBLING [   ],  SKELETAL: PAINFUL JOINTS [   ], SWOLLEN JOINTS [   ], NECK ACHE [   ], LOW BACK ACHE [   ],  SKIN : ULCERS [   ], RASH [   ], ITCHING [   ]  CNS: HEAD ACHE [   ], DOUBLE VISION [   ], BLURRED VISION [   ], AMS / CONFUSION [   ], SEIZURES [   ], WEAKNESS [   ],TINGLING / NUMBNESS [   ]        PHYSICAL EXAMINATION:    GENERAL APPEARANCE: NO DISTRESS  HEENT:  NO PALLOR, NO  JVD,  NO   NODES, NECK SUPPLE  CVS: S1 +, S2 +,   RS: AEEB,  OCCASIONAL  RALES +,   NO RONCHI  ABD: SOFT, NT, NO, BS +  EXT: NO PE  SKIN: WARM,   SKELETAL:  ROM ACCEPTABLE  CNS:  AAO X 3        RADIOLOGY :  RADIOLOGY AND READINGS REVIEWED      < from: US Renal (25 @ 09:48) >    IMPRESSION:    1. No hydronephrosis.  2. Increased renal cortical echogenicity compatible with renal   parenchymal disease.  3. There is limited visualization of the urinary bladder; however, on   limited views, the wall appears diffusely thickened. Suggest correlation   with urinalysis to exclude infection.  4. Partially imaged hypoechoic structure posterior to the left kidney may   correspond to the lymphadenopathy questioned on the prior CT. Further   evaluation is recommended as clinically.    < end of copied text >      < from: CT Abdomen and Pelvis No Cont (25 @ 23:36) >    IMPRESSION:  No retroperitoneal hematoma.    Soft tissue densities within the retroperitoneum next to the aorta   concerning for extensive lymphadenopathy versus tortuous vessels or a   combination of both. The evaluation is limited due to the lack of IV   contrast. Recommend contrast enhanced cross-sectional imaging for better   assessment. Further workup for lymphoproliferative disease may be helpful.    Circumferential bladder wall thickening as can be seen with decompression   or component of cystitis.        Prelim: No obvious retroperitoneal hematoma. Gallbladder wall thickening.   Bladder wall thickening. Retroperitoneal/pelvic lymphadenopathy.   Persistent right > left pleural effusion. Small pericardial effusion.   Official report to follow.    < end of copied text >  < from: Xray Chest 1 View- PORTABLE-Urgent (Xray Chest 1 View- PORTABLE-Urgent .) (25 @ 16:53) >    IMPRESSION: Sternotomy, left loop recorder, cervical spine hardware   noted. Increasing atelectatic consolidation of left lower lobe with   effusion.    < end of copied text >        ASSESSMENT :     Acute renal failure    HTN (hypertension)    DM (diabetes mellitus)    Myasthenia gravis    Hypercholesterolemia    CVA (cerebrovascular accident)    Peripheral neuropathy    BPH (benign prostatic hyperplasia)    Major depression    Lipoma of chest wall    MG with thymoma (myasthena gravis)    H/O cataract extraction        PLAN:  HPI:  Patient is a 70M, from [facility] ambulates with a walker, with PMHx of Myasthenia gravis s/p thymectomy, HTN, 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 Patient reports he has been feeling nauseous for a few days but no vomiting. He reports chronic increased urinary frequency and sensation of incomplete voiding. He denies all other symptoms - fever, chills, cough, chest pain, SoB, palpitations abdominal pain, diarrhea, dysuria, arm or leg numbness or tingling. Reports regular brown stool - denies black or bloody stool.  (2025 21:32)        # DC PLAN: BACK TO [facility] AFTER ABDOMINAL U/S TO EVALUATE LIKELY RETROPERITONEAL LYMPHADENOPATHY   - WILL REFER HIM TO ONCOLOGY & GI CONSULT (FOR EGD& COLONOSCOPY) AS OUT PATIENT FOR FURTHER FOLLOWUP  - MACROCYTIC ANEMIA, ANEMIA OF CKD - MAY NEED PRBC # 1 UNIT PRIOR TO DISCHARGE   - LIKELY MYELOSUPPRESSION / ANEMIA DUE TO AZATHIOPRINE  - WILL SUPPLEMENT B12, FA, FESO4         # CASE D/W PATIENT AND PARTNER MILLI DONOVAN AT BEDSIDE, ALL QUESTIONS ANSWERED. DISCUSSED RECOMMENDATIONS AS MADE BY MULTIDISCIPLINARY TEAM. DISCUSSED THAT PROGNOSIS IS GUARDED. THEY VERBALIZED UNDERSTANDING. IN DISCUSSION REGARDING GOC - PATIENT WISHES TO BE FULL CODE.        # HYPERTENSIVE URGENCY    # HTN - CONTROLLED   - TELEMETRY  - ECHO - LV EF - 70 - 75%, G1DD  - NOTED TROPONINS  - S/P IV HYDRALAZINE  - HYDRALAZINE  - PROCARDIA  - LABETALOL  - LOSARTAN  - SPIRONOLACTONE  - NOTED SERUM RENIN, ALDOSTERONE LEVELS  - F/U PLASMA METANEPHRINES  - CARDIOLOGY CONSULT  - NEPHROLOGY CONSULT  - ENDOCRINOLOGY CONSULT    # H/O CARDIAC ARRHYTHMIA - LOOP RECORDER IN PLACE  # H/O STERNOTOMY DUE TO THYMECTOMY      # MICHEL ON CKD STAGE 3 B  # BPH, OBSTRUCTIVE UROPATHY  - IV FLUIDS  - PVR - 0 ML  - MONITOR CR  - AVOID NEPHROTOXIC AGENTS  - NEPHROLOGY CONSULT    # ACUTE GINGIVOSTOMATITIS  - PLACE ON UNASYN, F/U BCX  - MAGIC MOUTHWASH  - ID CONSULT    # HYPOGLYCEMIA - RESOLVED  - MONITORING FINGERSTICKS    # ANEMIA  - MACROCYTIC ANEMIA, ANEMIA OF CKD   - LIKELY MYELOSUPPRESSION / ANEMIA DUE TO AZATHIOPRINE    - TREND HGB  - ANEMIA PANEL  - DENIES MELENA, HEMATOCHEZIA, HEMATEMESIS, HEMATURIA  - NOTED CT A/P  - GASTROENTEROLOGY CONSULT    # ? LYMPHADENOPATHY ON CT A/P   - F/U U/S ABDOMEN AS OUTPATIENT  - DEFER CT W/ CONTRAST GIVEN RENAL DYSFUNCTION  - PER FAMILY PATIENT MAY HAVE HARDWARE THAT IS NOT COMPATIBLE WITH MRI  - HEME/ONC CONSULT    # DENIES DYSURIA, UA NEGATIVE FOR LEUKOCYTE ESTERASE AND NITRITES      # MYASTHENIA GRAVIS S/P THYMECTOMY/STERNOTOMY - ON AZATHIOPRINE, PREDNISONE, PYRIDOSTIGMINE  - D/W NEUROLOGY - RECOMMENDED F/U AS OUTPATIENT FOR FURTHER PREDNISONE TAPER/MANAGEMENT  - NEUROLOGY CONSULT    # DM, DIABETIC NEPHROPATHY, DIABETIC RETINOPATHY, DIABETIC PERIPHERAL NEUROPATHY  - SSI + FS    # CVA W/ MILD RIGHT HP    # IMPAIRED GAIT DUE TO GENERALIZED MUSCLE WEAKNESS, CVA, MYASTHENIA GRAVIS, CERVICAL SPINAL STENOSIS - H/O CERVICAL SPINE INSTRUMENTATION - HARDWARE IN PLACE     # PAD  # GI AND DVT PPX   Patient is a 70y old  Male who presents with a chief complaint of MICHEL (2025 15:40)    PATIENT IS SEEN AND EXAMINED IN MEDICAL FLOOR.    ALLERGIES:  No Known Allergies      Daily     Daily Weight in k.2 (10 Jul 2025 04:41)    VITALS:    Vital Signs Last 24 Hrs  T(C): 37.4 (10 Jul 2025 08:00), Max: 37.4 (10 Jul 2025 08:00)  T(F): 99.3 (10 Jul 2025 08:00), Max: 99.3 (10 Jul 2025 08:00)  HR: 67 (10 Jul 2025 08:00) (65 - 73)  BP: 186/66 (10 Jul 2025 08:00) (95/76 - 211/77)  BP(mean): 96 (2025 11:19) (96 - 96)  RR: 18 (10 Jul 2025 08:00) (18 - 18)  SpO2: 95% (10 Jul 2025 08:00) (95% - 100%)    Parameters below as of 10 Jul 2025 08:00  Patient On (Oxygen Delivery Method): room air        LABS:    CBC Full  -  ( 10 Jul 2025 05:59 )  WBC Count : 5.98 K/uL  RBC Count : 2.13 M/uL  Hemoglobin : 7.7 g/dL  Hematocrit : 23.3 %  Platelet Count - Automated : 221 K/uL  Mean Cell Volume : 109.4 fl  Mean Cell Hemoglobin : 36.2 pg  Mean Cell Hemoglobin Concentration : 33.0 g/dL  Auto Neutrophil # : x  Auto Lymphocyte # : x  Auto Monocyte # : x  Auto Eosinophil # : x  Auto Basophil # : x  Auto Neutrophil % : x  Auto Lymphocyte % : x  Auto Monocyte % : x  Auto Eosinophil % : x  Auto Basophil % : x      07-10    137  |  106  |  29[H]  ----------------------------<  92  4.1   |  29  |  1.68[H]    Ca    8.8      10 Jul 2025 05:59      CAPILLARY BLOOD GLUCOSE      POCT Blood Glucose.: 106 mg/dL (10 Jul 2025 07:20)  POCT Blood Glucose.: 94 mg/dL (2025 21:20)  POCT Blood Glucose.: 175 mg/dL (2025 17:02)  POCT Blood Glucose.: 255 mg/dL (2025 13:09)  POCT Blood Glucose.: 292 mg/dL (2025 11:53)          Creatinine Trend: 1.68<--, 1.66<--, 1.81<--, 1.68<--, 1.74<--, 1.74<--  I&O's Summary    2025 07:01  -  10 Jul 2025 07:00  --------------------------------------------------------  IN: 0 mL / OUT: 750 mL / NET: -750 mL            Blood Blood-Peripheral   @ 18:00   No growth at 24 hours  --  --      Blood Blood-Peripheral   @ 17:45   No growth at 24 hours  --  --          MEDICATIONS:    MEDICATIONS  (STANDING):  ampicillin/sulbactam  IVPB      ampicillin/sulbactam  IVPB 3 Gram(s) IV Intermittent every 6 hours  aspirin  chewable 81 milliGRAM(s) Oral daily  atorvastatin 80 milliGRAM(s) Oral at bedtime  azaTHIOprine 100 milliGRAM(s) Oral daily  cholecalciferol 1000 Unit(s) Oral daily  cyanocobalamin 1000 MICROGram(s) Oral daily  dextrose 5%. 1000 milliLiter(s) (50 mL/Hr) IV Continuous <Continuous>  dextrose 50% Injectable 25 Gram(s) IV Push once  ferrous    sulfate 325 milliGRAM(s) Oral two times a day  finasteride 5 milliGRAM(s) Oral daily  FIRST- Mouthwash  BLM 30 milliLiter(s) Swish and Spit three times a day  folic acid 1 milliGRAM(s) Oral daily  heparin   Injectable 5000 Unit(s) SubCutaneous every 8 hours  hydrALAZINE 100 milliGRAM(s) Oral every 8 hours  insulin lispro (ADMELOG) corrective regimen sliding scale   SubCutaneous three times a day before meals  insulin lispro (ADMELOG) corrective regimen sliding scale   SubCutaneous at bedtime  labetalol 400 milliGRAM(s) Oral every 8 hours  losartan 100 milliGRAM(s) Oral at bedtime  NIFEdipine XL 90 milliGRAM(s) Oral <User Schedule>  predniSONE   Tablet 20 milliGRAM(s) Oral daily  pyridostigmine 60 milliGRAM(s) Oral <User Schedule>  sertraline 50 milliGRAM(s) Oral daily  spironolactone 25 milliGRAM(s) Oral once  tamsulosin 0.4 milliGRAM(s) Oral at bedtime      MEDICATIONS  (PRN):  acetaminophen     Tablet .. 650 milliGRAM(s) Oral every 6 hours PRN Temp greater or equal to 38C (100.4F), Mild Pain (1 - 3)  dextrose Oral Gel 15 Gram(s) Oral once PRN Blood Glucose LESS THAN 70 milliGRAM(s)/deciliter  ondansetron Injectable 4 milliGRAM(s) IV Push every 8 hours PRN Nausea and/or Vomiting      REVIEW OF SYSTEMS:                           ALL ROS DONE [ X   ]    CONSTITUTIONAL:  LETHARGIC [   ], FEVER [   ], UNRESPONSIVE [   ]  CVS:  CP  [   ], SOB, [   ], PALPITATIONS [   ], DIZZYNESS [   ]  RS: COUGH [   ], SPUTUM [   ]  GI: ABDOMINAL PAIN [   ], NAUSEA [   ], VOMITINGS [   ], DIARRHEA [   ], CONSTIPATION [   ]  :  DYSURIA [   ], NOCTURIA [   ], INCREASED FREQUENCY [   ], DRIBLING [   ],  SKELETAL: PAINFUL JOINTS [   ], SWOLLEN JOINTS [   ], NECK ACHE [   ], LOW BACK ACHE [   ],  SKIN : ULCERS [   ], RASH [   ], ITCHING [   ]  CNS: HEAD ACHE [   ], DOUBLE VISION [   ], BLURRED VISION [   ], AMS / CONFUSION [   ], SEIZURES [   ], WEAKNESS [   ],TINGLING / NUMBNESS [   ]        PHYSICAL EXAMINATION:    GENERAL APPEARANCE: NO DISTRESS  HEENT:  NO PALLOR, NO  JVD,  NO   NODES, NECK SUPPLE  CVS: S1 +, S2 +,   RS: AEEB,  OCCASIONAL  RALES +,   NO RONCHI  ABD: SOFT, NT, NO, BS +  EXT: NO PE  SKIN: WARM,   SKELETAL:  ROM ACCEPTABLE  CNS:  AAO X 3        RADIOLOGY :  RADIOLOGY AND READINGS REVIEWED      < from: US Renal (25 @ 09:48) >    IMPRESSION:    1. No hydronephrosis.  2. Increased renal cortical echogenicity compatible with renal   parenchymal disease.  3. There is limited visualization of the urinary bladder; however, on   limited views, the wall appears diffusely thickened. Suggest correlation   with urinalysis to exclude infection.  4. Partially imaged hypoechoic structure posterior to the left kidney may   correspond to the lymphadenopathy questioned on the prior CT. Further   evaluation is recommended as clinically.    < end of copied text >      < from: CT Abdomen and Pelvis No Cont (25 @ 23:36) >    IMPRESSION:  No retroperitoneal hematoma.    Soft tissue densities within the retroperitoneum next to the aorta   concerning for extensive lymphadenopathy versus tortuous vessels or a   combination of both. The evaluation is limited due to the lack of IV   contrast. Recommend contrast enhanced cross-sectional imaging for better   assessment. Further workup for lymphoproliferative disease may be helpful.    Circumferential bladder wall thickening as can be seen with decompression   or component of cystitis.        Prelim: No obvious retroperitoneal hematoma. Gallbladder wall thickening.   Bladder wall thickening. Retroperitoneal/pelvic lymphadenopathy.   Persistent right > left pleural effusion. Small pericardial effusion.   Official report to follow.    < end of copied text >  < from: Xray Chest 1 View- PORTABLE-Urgent (Xray Chest 1 View- PORTABLE-Urgent .) (25 @ 16:53) >    IMPRESSION: Sternotomy, left loop recorder, cervical spine hardware   noted. Increasing atelectatic consolidation of left lower lobe with   effusion.    < end of copied text >        ASSESSMENT :     Acute renal failure    HTN (hypertension)    DM (diabetes mellitus)    Myasthenia gravis    Hypercholesterolemia    CVA (cerebrovascular accident)    Peripheral neuropathy    BPH (benign prostatic hyperplasia)    Major depression    Lipoma of chest wall    MG with thymoma (myasthena gravis)    H/O cataract extraction        PLAN:  HPI:  Patient is a 70M, from [facility] ambulates with a walker, with PMHx of Myasthenia gravis s/p thymectomy, HTN, 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 Patient reports he has been feeling nauseous for a few days but no vomiting. He reports chronic increased urinary frequency and sensation of incomplete voiding. He denies all other symptoms - fever, chills, cough, chest pain, SoB, palpitations abdominal pain, diarrhea, dysuria, arm or leg numbness or tingling. Reports regular brown stool - denies black or bloody stool.  (2025 21:32)        # DC PLAN: BACK TO [facility] AFTER ABDOMINAL U/S TO EVALUATE LIKELY RETROPERITONEAL LYMPHADENOPATHY   - WILL REFER HIM TO ONCOLOGY & GI CONSULT (FOR EGD& COLONOSCOPY) AS OUT PATIENT FOR FURTHER FOLLOWUP  - MACROCYTIC ANEMIA, ANEMIA OF CKD - MAY NEED PRBC # 1 UNIT PRIOR TO DISCHARGE   - LIKELY MYELOSUPPRESSION / ANEMIA DUE TO AZATHIOPRINE  - WILL SUPPLEMENT B12, FA, FESO4         # CASE D/W PATIENT AND PARTNER MILLI DONOVAN AT BEDSIDE, ALL QUESTIONS ANSWERED. DISCUSSED RECOMMENDATIONS AS MADE BY MULTIDISCIPLINARY TEAM. DISCUSSED THAT PROGNOSIS IS GUARDED. THEY VERBALIZED UNDERSTANDING. IN DISCUSSION REGARDING GOC - PATIENT WISHES TO BE FULL CODE.        # HYPERTENSIVE URGENCY    # HTN - CONTROLLED   - TELEMETRY  - ECHO - LV EF - 70 - 75%, G1DD  - NOTED TROPONINS  - S/P IV HYDRALAZINE  - HYDRALAZINE  - PROCARDIA  - LABETALOL  - LOSARTAN  - SPIRONOLACTONE  - NOTED SERUM RENIN, ALDOSTERONE LEVELS  - F/U PLASMA METANEPHRINES  - CARDIOLOGY CONSULT  - NEPHROLOGY CONSULT  - ENDOCRINOLOGY CONSULT    # H/O CARDIAC ARRHYTHMIA - LOOP RECORDER IN PLACE  # H/O STERNOTOMY DUE TO THYMECTOMY      # MICHEL ON CKD STAGE 3 B  # BPH, OBSTRUCTIVE UROPATHY  - IV FLUIDS  - PVR - 0 ML  - MONITOR CR  - AVOID NEPHROTOXIC AGENTS  - NEPHROLOGY CONSULT    # ACUTE GINGIVOSTOMATITIS  - PLACE ON UNASYN, F/U BCX  - MAGIC MOUTHWASH  - ID CONSULT    # HYPOGLYCEMIA - RESOLVED  - MONITORING FINGERSTICKS    # ANEMIA  - MACROCYTIC ANEMIA, ANEMIA OF CKD   - LIKELY MYELOSUPPRESSION / ANEMIA DUE TO AZATHIOPRINE    - TREND HGB  - ANEMIA PANEL  - DENIES MELENA, HEMATOCHEZIA, HEMATEMESIS, HEMATURIA  - NOTED CT A/P  - GASTROENTEROLOGY CONSULT    # ? LYMPHADENOPATHY ON CT A/P   - F/U U/S ABDOMEN AS OUTPATIENT  - DEFER CT W/ CONTRAST GIVEN RENAL DYSFUNCTION  - PER FAMILY PATIENT MAY HAVE HARDWARE THAT IS NOT COMPATIBLE WITH MRI  - HEME/ONC CONSULT    # DENIES DYSURIA, UA NEGATIVE FOR LEUKOCYTE ESTERASE AND NITRITES      # MYASTHENIA GRAVIS S/P THYMECTOMY/STERNOTOMY - ON AZATHIOPRINE, PREDNISONE, PYRIDOSTIGMINE  - D/W NEUROLOGY - RECOMMENDED F/U AS OUTPATIENT FOR FURTHER PREDNISONE TAPER/MANAGEMENT  - NEUROLOGY CONSULT    # DM, DIABETIC NEPHROPATHY, DIABETIC RETINOPATHY, DIABETIC PERIPHERAL NEUROPATHY  - SSI + FS    # CVA W/ MILD RIGHT HP    # IMPAIRED GAIT DUE TO GENERALIZED MUSCLE WEAKNESS, CVA, MYASTHENIA GRAVIS, CERVICAL SPINAL STENOSIS - H/O CERVICAL SPINE INSTRUMENTATION - HARDWARE IN PLACE     # PAD  # GI AND DVT PPX   Patient is a 70y old  Male who presents with a chief complaint of MICHEL (2025 15:40)    PATIENT IS SEEN AND EXAMINED IN MEDICAL FLOOR.    ALLERGIES:  No Known Allergies      Daily     Daily Weight in k.2 (10 Jul 2025 04:41)    VITALS:    Vital Signs Last 24 Hrs  T(C): 37.4 (10 Jul 2025 08:00), Max: 37.4 (10 Jul 2025 08:00)  T(F): 99.3 (10 Jul 2025 08:00), Max: 99.3 (10 Jul 2025 08:00)  HR: 67 (10 Jul 2025 08:00) (65 - 73)  BP: 186/66 (10 Jul 2025 08:00) (95/76 - 211/77)  BP(mean): 96 (2025 11:19) (96 - 96)  RR: 18 (10 Jul 2025 08:00) (18 - 18)  SpO2: 95% (10 Jul 2025 08:00) (95% - 100%)    Parameters below as of 10 Jul 2025 08:00  Patient On (Oxygen Delivery Method): room air        LABS:    CBC Full  -  ( 10 Jul 2025 05:59 )  WBC Count : 5.98 K/uL  RBC Count : 2.13 M/uL  Hemoglobin : 7.7 g/dL  Hematocrit : 23.3 %  Platelet Count - Automated : 221 K/uL  Mean Cell Volume : 109.4 fl  Mean Cell Hemoglobin : 36.2 pg  Mean Cell Hemoglobin Concentration : 33.0 g/dL  Auto Neutrophil # : x  Auto Lymphocyte # : x  Auto Monocyte # : x  Auto Eosinophil # : x  Auto Basophil # : x  Auto Neutrophil % : x  Auto Lymphocyte % : x  Auto Monocyte % : x  Auto Eosinophil % : x  Auto Basophil % : x      07-10    137  |  106  |  29[H]  ----------------------------<  92  4.1   |  29  |  1.68[H]    Ca    8.8      10 Jul 2025 05:59      CAPILLARY BLOOD GLUCOSE      POCT Blood Glucose.: 106 mg/dL (10 Jul 2025 07:20)  POCT Blood Glucose.: 94 mg/dL (2025 21:20)  POCT Blood Glucose.: 175 mg/dL (2025 17:02)  POCT Blood Glucose.: 255 mg/dL (2025 13:09)  POCT Blood Glucose.: 292 mg/dL (2025 11:53)          Creatinine Trend: 1.68<--, 1.66<--, 1.81<--, 1.68<--, 1.74<--, 1.74<--  I&O's Summary    2025 07:01  -  10 Jul 2025 07:00  --------------------------------------------------------  IN: 0 mL / OUT: 750 mL / NET: -750 mL            Blood Blood-Peripheral   @ 18:00   No growth at 24 hours  --  --      Blood Blood-Peripheral   @ 17:45   No growth at 24 hours  --  --          MEDICATIONS:    MEDICATIONS  (STANDING):  ampicillin/sulbactam  IVPB      ampicillin/sulbactam  IVPB 3 Gram(s) IV Intermittent every 6 hours  aspirin  chewable 81 milliGRAM(s) Oral daily  atorvastatin 80 milliGRAM(s) Oral at bedtime  azaTHIOprine 100 milliGRAM(s) Oral daily  cholecalciferol 1000 Unit(s) Oral daily  cyanocobalamin 1000 MICROGram(s) Oral daily  dextrose 5%. 1000 milliLiter(s) (50 mL/Hr) IV Continuous <Continuous>  dextrose 50% Injectable 25 Gram(s) IV Push once  ferrous    sulfate 325 milliGRAM(s) Oral two times a day  finasteride 5 milliGRAM(s) Oral daily  FIRST- Mouthwash  BLM 30 milliLiter(s) Swish and Spit three times a day  folic acid 1 milliGRAM(s) Oral daily  heparin   Injectable 5000 Unit(s) SubCutaneous every 8 hours  hydrALAZINE 100 milliGRAM(s) Oral every 8 hours  insulin lispro (ADMELOG) corrective regimen sliding scale   SubCutaneous three times a day before meals  insulin lispro (ADMELOG) corrective regimen sliding scale   SubCutaneous at bedtime  labetalol 400 milliGRAM(s) Oral every 8 hours  losartan 100 milliGRAM(s) Oral at bedtime  NIFEdipine XL 90 milliGRAM(s) Oral <User Schedule>  predniSONE   Tablet 20 milliGRAM(s) Oral daily  pyridostigmine 60 milliGRAM(s) Oral <User Schedule>  sertraline 50 milliGRAM(s) Oral daily  spironolactone 25 milliGRAM(s) Oral once  tamsulosin 0.4 milliGRAM(s) Oral at bedtime      MEDICATIONS  (PRN):  acetaminophen     Tablet .. 650 milliGRAM(s) Oral every 6 hours PRN Temp greater or equal to 38C (100.4F), Mild Pain (1 - 3)  dextrose Oral Gel 15 Gram(s) Oral once PRN Blood Glucose LESS THAN 70 milliGRAM(s)/deciliter  ondansetron Injectable 4 milliGRAM(s) IV Push every 8 hours PRN Nausea and/or Vomiting      REVIEW OF SYSTEMS:                           ALL ROS DONE [ X   ]    CONSTITUTIONAL:  LETHARGIC [   ], FEVER [   ], UNRESPONSIVE [   ]  CVS:  CP  [   ], SOB, [   ], PALPITATIONS [   ], DIZZYNESS [   ]  RS: COUGH [   ], SPUTUM [   ]  GI: ABDOMINAL PAIN [   ], NAUSEA [   ], VOMITINGS [   ], DIARRHEA [   ], CONSTIPATION [   ]  :  DYSURIA [   ], NOCTURIA [   ], INCREASED FREQUENCY [   ], DRIBLING [   ],  SKELETAL: PAINFUL JOINTS [   ], SWOLLEN JOINTS [   ], NECK ACHE [   ], LOW BACK ACHE [   ],  SKIN : ULCERS [   ], RASH [   ], ITCHING [   ]  CNS: HEAD ACHE [   ], DOUBLE VISION [   ], BLURRED VISION [   ], AMS / CONFUSION [   ], SEIZURES [   ], WEAKNESS [   ],TINGLING / NUMBNESS [   ]        PHYSICAL EXAMINATION:    GENERAL APPEARANCE: NO DISTRESS  HEENT:  NO PALLOR, NO  JVD,  NO   NODES, NECK SUPPLE  CVS: S1 +, S2 +,   RS: AEEB,  OCCASIONAL  RALES +,   NO RONCHI  ABD: SOFT, NT, NO, BS +  EXT: NO PE  SKIN: WARM,   SKELETAL:  ROM ACCEPTABLE  CNS:  AAO X 3        RADIOLOGY :  RADIOLOGY AND READINGS REVIEWED      < from: US Renal (25 @ 09:48) >    IMPRESSION:    1. No hydronephrosis.  2. Increased renal cortical echogenicity compatible with renal   parenchymal disease.  3. There is limited visualization of the urinary bladder; however, on   limited views, the wall appears diffusely thickened. Suggest correlation   with urinalysis to exclude infection.  4. Partially imaged hypoechoic structure posterior to the left kidney may   correspond to the lymphadenopathy questioned on the prior CT. Further   evaluation is recommended as clinically.    < end of copied text >      < from: CT Abdomen and Pelvis No Cont (25 @ 23:36) >    IMPRESSION:  No retroperitoneal hematoma.    Soft tissue densities within the retroperitoneum next to the aorta   concerning for extensive lymphadenopathy versus tortuous vessels or a   combination of both. The evaluation is limited due to the lack of IV   contrast. Recommend contrast enhanced cross-sectional imaging for better   assessment. Further workup for lymphoproliferative disease may be helpful.    Circumferential bladder wall thickening as can be seen with decompression   or component of cystitis.        Prelim: No obvious retroperitoneal hematoma. Gallbladder wall thickening.   Bladder wall thickening. Retroperitoneal/pelvic lymphadenopathy.   Persistent right > left pleural effusion. Small pericardial effusion.   Official report to follow.    < end of copied text >  < from: Xray Chest 1 View- PORTABLE-Urgent (Xray Chest 1 View- PORTABLE-Urgent .) (25 @ 16:53) >    IMPRESSION: Sternotomy, left loop recorder, cervical spine hardware   noted. Increasing atelectatic consolidation of left lower lobe with   effusion.    < end of copied text >        ASSESSMENT :     Acute renal failure    HTN (hypertension)    DM (diabetes mellitus)    Myasthenia gravis    Hypercholesterolemia    CVA (cerebrovascular accident)    Peripheral neuropathy    BPH (benign prostatic hyperplasia)    Major depression    Lipoma of chest wall    MG with thymoma (myasthena gravis)    H/O cataract extraction        PLAN:  HPI:  Patient is a 70M, from [facility] ambulates with a walker, with PMHx of Myasthenia gravis s/p thymectomy, HTN, 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 Patient reports he has been feeling nauseous for a few days but no vomiting. He reports chronic increased urinary frequency and sensation of incomplete voiding. He denies all other symptoms - fever, chills, cough, chest pain, SoB, palpitations abdominal pain, diarrhea, dysuria, arm or leg numbness or tingling. Reports regular brown stool - denies black or bloody stool.  (2025 21:32)        # DC PLAN: BACK TO [facility] AFTER ABDOMINAL U/S TO EVALUATE LIKELY RETROPERITONEAL LYMPHADENOPATHY   - WILL REFER HIM TO ONCOLOGY & GI CONSULT (FOR EGD& COLONOSCOPY) AS OUT PATIENT FOR FURTHER FOLLOWUP  - MACROCYTIC ANEMIA, ANEMIA OF CKD - MAY NEED PRBC # 1 UNIT PRIOR TO DISCHARGE   - LIKELY MYELOSUPPRESSION / ANEMIA DUE TO AZATHIOPRINE  - WILL SUPPLEMENT B12, FA, FESO4     # [7/10] CASE D/W PATIENT'S PARTNER MILLI DONOVAN VIA PHONE. CASE DISCUSSED AT LENGTH. ALL QUESTIONS ANSWERED.     # CASE D/W PATIENT AND PARTNER MILLI DONOVAN AT BEDSIDE, ALL QUESTIONS ANSWERED. DISCUSSED RECOMMENDATIONS AS MADE BY MULTIDISCIPLINARY TEAM. DISCUSSED THAT PROGNOSIS IS GUARDED. THEY VERBALIZED UNDERSTANDING. IN DISCUSSION REGARDING GOC - PATIENT WISHES TO BE FULL CODE.        # HYPERTENSIVE URGENCY    # HTN - CONTROLLED   - TELEMETRY  - ECHO - LV EF - 70 - 75%, G1DD  - NOTED TROPONINS  - S/P IV HYDRALAZINE  - HYDRALAZINE  - PROCARDIA  - LABETALOL  - LOSARTAN  - SPIRONOLACTONE  - NOTED SERUM RENIN, ALDOSTERONE LEVELS  - F/U PLASMA METANEPHRINES  - CARDIOLOGY CONSULT  - NEPHROLOGY CONSULT  - ENDOCRINOLOGY CONSULT    # H/O CARDIAC ARRHYTHMIA - LOOP RECORDER IN PLACE  # H/O STERNOTOMY DUE TO THYMECTOMY      # MICHEL ON CKD STAGE 3 B  # BPH, OBSTRUCTIVE UROPATHY  - IV FLUIDS  - PVR - 0 ML  - MONITOR CR  - AVOID NEPHROTOXIC AGENTS  - NEPHROLOGY CONSULT    # ACUTE GINGIVOSTOMATITIS  - PLACE ON UNASYN, F/U BCX  - MAGIC MOUTHWASH  - ID CONSULT    # HYPOGLYCEMIA - RESOLVED  - MONITORING FINGERSTICKS    # ANEMIA  - MACROCYTIC ANEMIA, ANEMIA OF CKD   - LIKELY MYELOSUPPRESSION / ANEMIA DUE TO AZATHIOPRINE    - TREND HGB  - ANEMIA PANEL  - DENIES MELENA, HEMATOCHEZIA, HEMATEMESIS, HEMATURIA  - NOTED CT A/P  - GASTROENTEROLOGY CONSULT    # ? LYMPHADENOPATHY ON CT A/P   - F/U U/S ABDOMEN AS OUTPATIENT  - DEFER CT W/ CONTRAST GIVEN RENAL DYSFUNCTION  - PER FAMILY PATIENT MAY HAVE HARDWARE THAT IS NOT COMPATIBLE WITH MRI  - HEME/ONC CONSULT    # DENIES DYSURIA, UA NEGATIVE FOR LEUKOCYTE ESTERASE AND NITRITES      # MYASTHENIA GRAVIS S/P THYMECTOMY/STERNOTOMY - ON AZATHIOPRINE, PREDNISONE, PYRIDOSTIGMINE  - D/W NEUROLOGY - RECOMMENDED F/U AS OUTPATIENT FOR FURTHER PREDNISONE TAPER/MANAGEMENT  - NEUROLOGY CONSULT    # DM, DIABETIC NEPHROPATHY, DIABETIC RETINOPATHY, DIABETIC PERIPHERAL NEUROPATHY  - SSI + FS    # CVA W/ MILD RIGHT HP    # IMPAIRED GAIT DUE TO GENERALIZED MUSCLE WEAKNESS, CVA, MYASTHENIA GRAVIS, CERVICAL SPINAL STENOSIS - H/O CERVICAL SPINE INSTRUMENTATION - HARDWARE IN PLACE     # PAD  # GI AND DVT PPX

## 2025-07-10 NOTE — PROGRESS NOTE ADULT - ASSESSMENT
Patient is a 71yo Male from [facility] ambulates with a walker, with PMHx of Myasthenia gravis s/p thymectomy, HTN, 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 Pt a/w hypertensive urgency and MICHEL.   Nephrology consulted for Elevated serum creatinine.    1. MICHEL- as per H& P; last SCr 1.3. MICHEL likely hemodynamically mediated due to uncontrolled HTN. Renal function improving  for which Losartan was resumed. Renal function overall remains stable now.  Feurea 13.52%;   UA with 300 protein, no blood. FeNa 10%;  Renal US with no hydro.  TTE with grade 1 diastolic dysfunction, EF 70-75%; ?hypertensive vs infiltrative cardiomyopathy. SIFE neg & K/l wnl. Pt b/l pleural effusions on TTE. Check CXR;  Strict I/Os. Avoid nephrotoxins/ NSAIDs/ RCA. Monitor BMP.    2. Proteinuria- UA with 300 protein, no blood.  UPCr on 6/29,  0.48 g/day. No acute interventions at this time.    3. Hypertensive urgency- BP labile. Advised to stop using  energy drinks due to increased caffeine which can increase BP  c/w Labetalol 400mg PO q 8hrs. c/w Nifedipine ER 90mg at noon. Will increase Aldactone to 50mg PO daily for resistant HTN; . c/w Losartan 100 mg PO qhs due to elevated BP at night; +nocturnal HTN; recc outpt sleep study to r/o BRET.   ?Consider decreasing frequency of pyridostigmine?- defer to Neuro   Recc Renal US with dopplers as an outpt to r/o HOANG (unavailable at [facility]).    c/w low salt diet. Monitor BP  4. Hypokalemia- Kuldip/ Renin; 3.19. Not consistent with hypoaldosteronism. Renin 6.479.   Hypokalemia due to shifting from excessive insulin.   Aldactone as above.  Check VBG. Monitor serum potassium  5. Iron deficiency anemia- low hgb. Check FOBT. tsat 16% with ferriitn 236- Finished Venofer 200mg IV qd x 3 doses. s/p Epo 10k SC x1 on 6/30 & 7/6. Monitor [facility] NEPHROLOGY  Bethel Smith M.D.  Guillermo Jimenez D.O.  Kathleen Lan M.D.  MD Kimi Rodríguez, MSN, ANP-C    Telephone: [phone]  Facsimile: [phone]    [address] [place] [address], [address]  [place] [address]

## 2025-07-10 NOTE — PROGRESS NOTE ADULT - PROBLEM SELECTOR PLAN 1
Hx of HTN on hydralazine 100mg tid, losartan 100mg qd, nifedipine 60mg qd, metoprolol 100mg bid   - Hyperdynamic LVSF on ECHO with EF estimated at 70 to 75%   - telemetry monitoring   -serum aldosterone level normal. serum renin test unable as per [facility] lab  - increased Nifedipine to 90 mg qd  - changed metoprolol to labetalol 400 mg q 8hr and added Hydralazine 100mg q8h.   - resumed losartan 100 mg qd  - Aldactone 50mg qday  - cardio Dr. Mercado following  - Endo Dr. Farr following  - Nephro Dr. Lan following  - Neuro following  - Still with labile b/p-asymptomatic  - ?autonomic dysfunction contributing to extreme fluctuations in BP

## 2025-07-10 NOTE — PROGRESS NOTE ADULT - SUBJECTIVE AND OBJECTIVE BOX
Some elev BP's overnight.    S: Denies any complaints. No symptoms that correlated with elev BP reading overnight. No LH/dizziness, CP, SOB, HA's.      Allergies    No Known Allergies    Intolerances    Vital Signs Last 24 Hrs  T(C): 36.9 (10 Jul 2025 11:03), Max: 37.4 (10 Jul 2025 08:00)  T(F): 98.5 (10 Jul 2025 11:03), Max: 99.3 (10 Jul 2025 08:00)  HR: 68 (10 Jul 2025 12:51) (65 - 73)  BP: 186/70 (10 Jul 2025 12:51) (95/76 - 211/77)  BP(mean): --  RR: 18 (10 Jul 2025 11:03) (18 - 18)  SpO2: 95% (10 Jul 2025 11:03) (95% - 97%)    Parameters below as of 10 Jul 2025 11:03  Patient On (Oxygen Delivery Method): room air        MedsMEDICATIONS  (STANDING):  ampicillin/sulbactam  IVPB      ampicillin/sulbactam  IVPB 3 Gram(s) IV Intermittent every 6 hours  aspirin  chewable 81 milliGRAM(s) Oral daily  atorvastatin 80 milliGRAM(s) Oral at bedtime  azaTHIOprine 100 milliGRAM(s) Oral daily  cholecalciferol 1000 Unit(s) Oral daily  cyanocobalamin 1000 MICROGram(s) Oral daily  dextrose 5%. 1000 milliLiter(s) (50 mL/Hr) IV Continuous <Continuous>  dextrose 50% Injectable 25 Gram(s) IV Push once  ferrous    sulfate 325 milliGRAM(s) Oral two times a day  finasteride 5 milliGRAM(s) Oral daily  FIRST- Mouthwash  BLM 30 milliLiter(s) Swish and Spit three times a day  folic acid 1 milliGRAM(s) Oral daily  heparin   Injectable 5000 Unit(s) SubCutaneous every 8 hours  hydrALAZINE 100 milliGRAM(s) Oral every 8 hours  insulin lispro (ADMELOG) corrective regimen sliding scale   SubCutaneous at bedtime  insulin lispro (ADMELOG) corrective regimen sliding scale   SubCutaneous three times a day before meals  labetalol 400 milliGRAM(s) Oral every 8 hours  losartan 100 milliGRAM(s) Oral at bedtime  NIFEdipine XL 90 milliGRAM(s) Oral <User Schedule>  predniSONE   Tablet 20 milliGRAM(s) Oral daily  pyridostigmine 60 milliGRAM(s) Oral <User Schedule>  sertraline 50 milliGRAM(s) Oral daily  tamsulosin 0.4 milliGRAM(s) Oral at bedtime    MEDICATIONS  (PRN):  acetaminophen     Tablet .. 650 milliGRAM(s) Oral every 6 hours PRN Temp greater or equal to 38C (100.4F), Mild Pain (1 - 3)  dextrose Oral Gel 15 Gram(s) Oral once PRN Blood Glucose LESS THAN 70 milliGRAM(s)/deciliter  ondansetron Injectable 4 milliGRAM(s) IV Push every 8 hours PRN Nausea and/or Vomiting      PHYSICAL EXAM:  GENERAL: NAD, well-groomed, well-developed  NEURO: AAOx3, NILSA b/l, 5/5 b/l UE and 5/5 b/l LE motor strength  LUNG: Lungs clear to auscultation bilaterally, no wheezing, rhonchi, or rales.  HEART: S1, S2, no S3 or S4. Regular rate and rhythm. No murmurs, gallops, or rubs. No JVD  ABDOMEN: Bowel sounds present, abd Soft, Nontender, Nondistended  EXTREMITIES:  2+ Peripheral Pulses b/l, No clubbing, cyanosis, or edema  SKIN: No rashes or lesions    Consultant(s) Notes Reviewed:  [x ] YES  [ ] NO  Care Discussed with Consultants/Other Providers [ x] YES  [ ] NO    LABS:                        7.7    5.98  )-----------( 221      ( 10 Jul 2025 05:59 )             23.3     07-10    137  |  106  |  29[H]  ----------------------------<  92  4.1   |  29  |  1.68[H]    Ca    8.8      10 Jul 2025 05:59          RADIOLOGY & ADDITIONAL TESTS:    EKG:

## 2025-07-10 NOTE — PROGRESS NOTE ADULT - ASSESSMENT
69 y/o M, from [facility] ambulates with a walker, with PMHx of Myasthenia gravis s/p thymectomy, HTN, 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 Hgb 9.9. BUN/Cr 30/1.81.   Admitted for MICHEL on CKD and uncontrolled HTN, Nephrology and Cardiology consulted.   TTE: Left ventricular wall thickness is severely increased. Left ventricular systolic function is hyperdynamic with an ejection fraction visually estimated at 70 to 75 %.  Differential includes hypertensive, hypertrophic, and infiltrative cardiomyopathy, among others. Cardiac MRI for further evaluation was recommended, can be done outpt.    During hospitalization pt was found to have elevated creatinine, renal US negative for hydronephrosis, d/c'd ARB.  MICHEL on CKD now improving from 1.99 > 1.7.   QMA Dr. Moralez consulted for macrocytic anemia and retroperitoneal lymphadenopathy.   Endocrine Dr. Farr consulted possible hormonal causes of uncontrolled HTN.     CXR - found to have Sternotomy, cervical spine hardware, and left loop recorder.   Pt is not a candidate for MRI A/P to further evaluate retroperitoneal lymphadenopathy at this time  Will further evaluate retroperitoneal lymphadenopathy with US A/P.   CXR - no evidence of pleural effusions noted on CT a/p imaging. Remains comfortable on room air.   Currently undergoing 24 urine studies to check for metanephrine and catecholamines to r/o pheochromocytoma.     7/7-Pt. with labile b/p with periods of asymptomatic HTN with SBP as high as 220 dropping down to 110's/40's, needs further monitoring with b/p meds adjustment.  7/8-b/p somewhat improved but still labile, Aldactone added to regimen by nephro, close monitoring continues.

## 2025-07-10 NOTE — PROGRESS NOTE ADULT - SUBJECTIVE AND OBJECTIVE BOX
[facility] NEPHROLOGY- PROGRESS NOTE    69 yo Male from [facility] ambulates with a walker, with PMHx of Myasthenia gravis s/p thymectomy, HTN, 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 Pt a/w hypertensive urgency and MICHEL. Nephrology consulted for Elevated serum creatinine.    Hospital Medications: Medications reviewed.    REVIEW OF SYSTEMS:  Gen: no fever or chills  Cards: no chest pain  Resp: no dyspnea  GI: no nausea or vomiting or diarrhea  : no dysuria or hematuria  Vascular: no LE edema    VITALS:  T(F): 99.3 (07-10-25 @ 08:00), Max: 99.3 (07-10-25 @ 08:00)  HR: 67 (07-10-25 @ 08:00)  BP: 186/66 (07-10-25 @ 08:00)  RR: 18 (07-10-25 @ 08:00)  SpO2: 95% (07-10-25 @ 08:00)  Wt(kg): --    07-09 @ 07:01  -  07-10 @ 07:00  --------------------------------------------------------  IN: 0 mL / OUT: 750 mL / NET: -750 mL        PHYSICAL EXAM:  Gen: NAD, calm  HEENT: +facial/ periorbital edema    Neck: no JVD  Cards: RRR, +S1/S2,+BOUBACAR  Resp: CTA B/L  GI: soft, NT/ND, NABS  : no CVA tenderness  Extremities: no edema B/L     LABS:  07-10    137  |  106  |  29[H]  ----------------------------<  92  4.1   |  29  |  1.68[H]    Ca    8.8      10 Jul 2025 05:59      Creatinine Trend: 1.68 <--, 1.66 <--, 1.81 <--, 1.68 <--, 1.74 <--                        7.7    5.98  )-----------( 221      ( 10 Jul 2025 05:59 )             23.3     Urine Studies:  Urinalysis Basic - ( 10 Jul 2025 05:59 )    Color:  / Appearance:  / SG:  / pH:   Gluc: 92 mg/dL / Ketone:   / Bili:  / Urobili:    Blood:  / Protein:  / Nitrite:    Leuk Esterase:  / RBC:  / WBC    Sq Epi:  / Non Sq Epi:  / Bacteria:

## 2025-07-10 NOTE — PROGRESS NOTE ADULT - PROBLEM SELECTOR PLAN 2
BUN/Cr 30/1.81 (Baseline Scr 1.3)  s/p 1L NS in ED   Post-void bladder scan - 0mL  Renal US - negative for hydronephrosis   - s/p IVF  - creatinine improving 1.99 > 1.66 > 1.7>1.81>1.41 -> 1.68 today  - trend BMP  - Nephrology dr. Lan following

## 2025-07-10 NOTE — PROGRESS NOTE ADULT - SUBJECTIVE AND OBJECTIVE BOX
SUBJECTIVE / INTERVAL HPI: Patient was seen and examined this morning.     Overnight events: No acute overnight events. Pt with BP elevated and labile.     CAPILLARY BLOOD GLUCOSE   135 mg/dL (07-09 @ 07:38)  292 mg/dL (07-09 @ 11:53)  255 mg/dL (07-09 @ 13:09)  175 mg/dL (07-09 @ 17:02)  94 mg/dL (07-09 @ 21:20)  106 mg/dL (07-10 @ 07:20)      REVIEW OF SYSTEMS  Constitutional:  Negative fever, chills or loss of appetite.  Eyes:  Negative blurry vision or double vision.  Cardiovascular:  Negative for chest pain or palpitations.  Respiratory:  Negative for cough, wheezing, or shortness of breath.    Gastrointestinal:  Negative for nausea, vomiting, diarrhea, constipation, or abdominal pain.  Genitourinary:  Negative frequency, urgency or dysuria.  Neurologic:  No headache, confusion, dizziness, lightheadedness.    PHYSICAL EXAM  Vital Signs Last 24 Hrs  T(C): 37.4 (10 Jul 2025 08:00), Max: 37.4 (10 Jul 2025 08:00)  T(F): 99.3 (10 Jul 2025 08:00), Max: 99.3 (10 Jul 2025 08:00)  HR: 67 (10 Jul 2025 08:00) (65 - 73)  BP: 186/66 (10 Jul 2025 08:00) (95/76 - 211/77)  BP(mean): 96 (09 Jul 2025 11:19) (96 - 96)  RR: 18 (10 Jul 2025 08:00) (18 - 18)  SpO2: 95% (10 Jul 2025 08:00) (95% - 100%)    Parameters below as of 10 Jul 2025 08:00  Patient On (Oxygen Delivery Method): room air    Gen: NAD, calm  HEENT: +facial/ periorbital edema    Neck: no JVD  Cards: RRR, +S1/S2,+BOUBACAR  Resp: CTA B/L  GI: soft, NT/ND, NABS  : no CVA tenderness  Extremities: no edema B/L    LABS  CBC - WBC/HGB/HTC/PLT: 5.98/7.7/23.3/221 (07-10-25)  BMP - Na/K/Cl/Bicarb/BUN/Cr/Gluc/AG/eGFR: 137/4.1/106/29/29/1.68/92/2/44 (07-10-25)  Ca - 8.8 (07-10-25)  Phos - -- (07-10-25)  Mg - -- (07-10-25)      Thyroid Stimulating Hormone, Serum: 4.28 (06-27-25)        07-09-25 @ 07:01  -  07-10-25 @ 07:00  --------------------------------------------------------  IN: 0 mL / OUT: 750 mL / NET: -750 mL        MEDICATIONS  MEDICATIONS  (STANDING):  ampicillin/sulbactam  IVPB      ampicillin/sulbactam  IVPB 3 Gram(s) IV Intermittent every 6 hours  aspirin  chewable 81 milliGRAM(s) Oral daily  atorvastatin 80 milliGRAM(s) Oral at bedtime  azaTHIOprine 100 milliGRAM(s) Oral daily  cholecalciferol 1000 Unit(s) Oral daily  cyanocobalamin 1000 MICROGram(s) Oral daily  dextrose 5%. 1000 milliLiter(s) (50 mL/Hr) IV Continuous <Continuous>  dextrose 50% Injectable 25 Gram(s) IV Push once  ferrous    sulfate 325 milliGRAM(s) Oral two times a day  finasteride 5 milliGRAM(s) Oral daily  FIRST- Mouthwash  BLM 30 milliLiter(s) Swish and Spit three times a day  folic acid 1 milliGRAM(s) Oral daily  heparin   Injectable 5000 Unit(s) SubCutaneous every 8 hours  hydrALAZINE 100 milliGRAM(s) Oral every 8 hours  insulin lispro (ADMELOG) corrective regimen sliding scale   SubCutaneous three times a day before meals  insulin lispro (ADMELOG) corrective regimen sliding scale   SubCutaneous at bedtime  labetalol 400 milliGRAM(s) Oral every 8 hours  losartan 100 milliGRAM(s) Oral at bedtime  NIFEdipine XL 90 milliGRAM(s) Oral <User Schedule>  predniSONE   Tablet 20 milliGRAM(s) Oral daily  pyridostigmine 60 milliGRAM(s) Oral <User Schedule>  sertraline 50 milliGRAM(s) Oral daily  tamsulosin 0.4 milliGRAM(s) Oral at bedtime    MEDICATIONS  (PRN):  acetaminophen     Tablet .. 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 Hgb 9.9. BUN/Cr 30/1.81. Admitted for MICHEL on CKD and uncontrolled HTN, Nephrology and Cardiology following.    Endocrinology consulted for further evaluation of uncontrolled HTN. Due to hx of MG with thymectomy, pt on chronic Prednisone 20 mg qd.     A1C: 6.3 %    # Uncontrolled HTN  - pt noted with persistently labile and elevated HTN despite use of multiple anti-HTN agents  - pt on chronic Prednisone 20 mg qd for MG tx > ? exogenous Cushing Syndrome leading to persistently elevated, labile BP, may consider SLOW prednisone taper (f/u primary team and Neuro)  - Renin mildly elev to 6.47, Aldosterone 13.7- Hyperaldosteronism ruled out  , consider renovascular HTN  - per Nephro, Recc Renal US with dopplers as an outpt to r/o HOANG.    - TSH 4.38 wnl   - pt denies any HA, palpitations. -Plasma mets and 24 hr urine for mets/cats WNL > Pheochromocytoma ruled out  - continue to monitor BP at this time    #Diabetes Mellitus  - c/w BILLIE ACHS  - On discharge, please start Jardiance qd as pt will benefit from renoprotective effects and may aid in BP control  - Patient's fingerstick glucose goal is 100-180 mg/dL   SUBJECTIVE / INTERVAL HPI: Patient was seen and examined this morning.     Overnight events: No acute overnight events. Pt with BP elevated and labile.     CAPILLARY BLOOD GLUCOSE   135 mg/dL (07-09 @ 07:38)  292 mg/dL (07-09 @ 11:53)  255 mg/dL (07-09 @ 13:09)  175 mg/dL (07-09 @ 17:02)  94 mg/dL (07-09 @ 21:20)  106 mg/dL (07-10 @ 07:20)      REVIEW OF SYSTEMS  Constitutional:  Negative fever, chills or loss of appetite.  Eyes:  Negative blurry vision or double vision.  Cardiovascular:  Negative for chest pain or palpitations.  Respiratory:  Negative for cough, wheezing, or shortness of breath.    Gastrointestinal:  Negative for nausea, vomiting, diarrhea, constipation, or abdominal pain.  Genitourinary:  Negative frequency, urgency or dysuria.  Neurologic:  No headache, confusion, dizziness, lightheadedness.    PHYSICAL EXAM  Vital Signs Last 24 Hrs  T(C): 37.4 (10 Jul 2025 08:00), Max: 37.4 (10 Jul 2025 08:00)  T(F): 99.3 (10 Jul 2025 08:00), Max: 99.3 (10 Jul 2025 08:00)  HR: 67 (10 Jul 2025 08:00) (65 - 73)  BP: 186/66 (10 Jul 2025 08:00) (95/76 - 211/77)  BP(mean): 96 (09 Jul 2025 11:19) (96 - 96)  RR: 18 (10 Jul 2025 08:00) (18 - 18)  SpO2: 95% (10 Jul 2025 08:00) (95% - 100%)    Parameters below as of 10 Jul 2025 08:00  Patient On (Oxygen Delivery Method): room air    Gen: NAD, calm  HEENT: +facial/ periorbital edema    Neck: no JVD  Cards: RRR, +S1/S2,+BOUBACAR  Resp: CTA B/L  GI: soft, NT/ND, NABS  : no CVA tenderness  Extremities: no edema B/L    LABS  CBC - WBC/HGB/HTC/PLT: 5.98/7.7/23.3/221 (07-10-25)  BMP - Na/K/Cl/Bicarb/BUN/Cr/Gluc/AG/eGFR: 137/4.1/106/29/29/1.68/92/2/44 (07-10-25)  Ca - 8.8 (07-10-25)  Phos - -- (07-10-25)  Mg - -- (07-10-25)      Thyroid Stimulating Hormone, Serum: 4.28 (06-27-25)        07-09-25 @ 07:01  -  07-10-25 @ 07:00  --------------------------------------------------------  IN: 0 mL / OUT: 750 mL / NET: -750 mL        MEDICATIONS  MEDICATIONS  (STANDING):  ampicillin/sulbactam  IVPB      ampicillin/sulbactam  IVPB 3 Gram(s) IV Intermittent every 6 hours  aspirin  chewable 81 milliGRAM(s) Oral daily  atorvastatin 80 milliGRAM(s) Oral at bedtime  azaTHIOprine 100 milliGRAM(s) Oral daily  cholecalciferol 1000 Unit(s) Oral daily  cyanocobalamin 1000 MICROGram(s) Oral daily  dextrose 5%. 1000 milliLiter(s) (50 mL/Hr) IV Continuous <Continuous>  dextrose 50% Injectable 25 Gram(s) IV Push once  ferrous    sulfate 325 milliGRAM(s) Oral two times a day  finasteride 5 milliGRAM(s) Oral daily  FIRST- Mouthwash  BLM 30 milliLiter(s) Swish and Spit three times a day  folic acid 1 milliGRAM(s) Oral daily  heparin   Injectable 5000 Unit(s) SubCutaneous every 8 hours  hydrALAZINE 100 milliGRAM(s) Oral every 8 hours  insulin lispro (ADMELOG) corrective regimen sliding scale   SubCutaneous three times a day before meals  insulin lispro (ADMELOG) corrective regimen sliding scale   SubCutaneous at bedtime  labetalol 400 milliGRAM(s) Oral every 8 hours  losartan 100 milliGRAM(s) Oral at bedtime  NIFEdipine XL 90 milliGRAM(s) Oral <User Schedule>  predniSONE   Tablet 20 milliGRAM(s) Oral daily  pyridostigmine 60 milliGRAM(s) Oral <User Schedule>  sertraline 50 milliGRAM(s) Oral daily  tamsulosin 0.4 milliGRAM(s) Oral at bedtime    MEDICATIONS  (PRN):  acetaminophen     Tablet .. 650 milliGRAM(s) Oral every 6 hours PRN Temp greater or equal to 38C (100.4F), Mild Pain (1 - 3)  dextrose Oral Gel 15 Gram(s) Oral once PRN Blood Glucose LESS THAN 70 milliGRAM(s)/deciliter  ondansetron Injectable 4 milliGRAM(s) IV Push every 8 hours PRN Nausea and/or Vomiting    ASSESSMENT / RECOMMENDATIONS    ASSESSMENT / RECOMMENDATIONS: 69 y/o M, from 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 Hgb 9.9. BUN/Cr 30/1.81. Admitted for MICHEL on CKD and uncontrolled HTN, Nephrology and Cardiology following.    Endocrinology consulted for further evaluation of uncontrolled HTN. Due to hx of MG with thymectomy, pt on chronic Prednisone 20 mg qd.     A1C: 6.3 %    # Uncontrolled HTN  - pt noted with persistently labile and elevated HTN despite use of multiple anti-HTN agents  - pt on chronic Prednisone 20 mg qd for MG tx > ? exogenous Cushing Syndrome leading to persistently elevated, labile BP, may consider SLOW prednisone taper (f/u primary team and Neuro)  - Renin mildly elev to 6.47, Aldosterone 13.7- Hyperaldosteronism ruled out  , consider renovascular HTN  - per Nephro, Recc Renal US with dopplers as an outpt to r/o HOANG.    - TSH 4.38 wnl   - pt denies any HA, palpitations. -Plasma mets and 24 hr urine for mets/cats WNL > Pheochromocytoma ruled out  - continue to monitor BP at this time    #Diabetes Mellitus  - c/w BILLIE ACHS  - On discharge, please start Jardiance qd as pt will benefit from renoprotective effects and may aid in BP control.  - Patient's fingerstick glucose goal is 100-180 mg/dL

## 2025-07-10 NOTE — PROGRESS NOTE ADULT - PROBLEM SELECTOR PLAN 6
Hx of IDT2DM on Awixeox65/30 10U qd and Novolin 100 ISS   - has been drinking rockstar energy drinks causing high sugars   - A1C 6.3  - sliding scale only, on d/c rec by Endo to restart jardiance  - Monitor BS ACHS

## 2025-07-10 NOTE — PROGRESS NOTE ADULT - SUBJECTIVE AND OBJECTIVE BOX
C A R D I O L O G Y  **********************************    DATE OF SERVICE: 07-10-25    Patient denies chest pain or shortness of breath.   Review of symptoms otherwise negative.    acetaminophen     Tablet .. 650 milliGRAM(s) Oral every 6 hours PRN  ampicillin/sulbactam  IVPB      ampicillin/sulbactam  IVPB 3 Gram(s) IV Intermittent every 6 hours  aspirin  chewable 81 milliGRAM(s) Oral daily  atorvastatin 80 milliGRAM(s) Oral at bedtime  azaTHIOprine 100 milliGRAM(s) Oral daily  cholecalciferol 1000 Unit(s) Oral daily  cyanocobalamin 1000 MICROGram(s) Oral daily  dextrose 5%. 1000 milliLiter(s) IV Continuous <Continuous>  dextrose 50% Injectable 25 Gram(s) IV Push once  dextrose Oral Gel 15 Gram(s) Oral once PRN  ferrous    sulfate 325 milliGRAM(s) Oral two times a day  finasteride 5 milliGRAM(s) Oral daily  FIRST- Mouthwash  BLM 30 milliLiter(s) Swish and Spit three times a day  folic acid 1 milliGRAM(s) Oral daily  heparin   Injectable 5000 Unit(s) SubCutaneous every 8 hours  hydrALAZINE 100 milliGRAM(s) Oral every 8 hours  insulin lispro (ADMELOG) corrective regimen sliding scale   SubCutaneous three times a day before meals  insulin lispro (ADMELOG) corrective regimen sliding scale   SubCutaneous at bedtime  labetalol 400 milliGRAM(s) Oral every 8 hours  losartan 100 milliGRAM(s) Oral at bedtime  NIFEdipine XL 90 milliGRAM(s) Oral <User Schedule>  ondansetron Injectable 4 milliGRAM(s) IV Push every 8 hours PRN  predniSONE   Tablet 20 milliGRAM(s) Oral daily  pyridostigmine 60 milliGRAM(s) Oral <User Schedule>  sertraline 50 milliGRAM(s) Oral daily  tamsulosin 0.4 milliGRAM(s) Oral at bedtime                            7.7    5.98  )-----------( 221      ( 10 Jul 2025 05:59 )             23.3       Hemoglobin: 7.7 g/dL (07-10 @ 05:59)  Hemoglobin: 8.1 g/dL (07-09 @ 05:56)  Hemoglobin: 8.1 g/dL (07-08 @ 07:39)  Hemoglobin: 7.8 g/dL (07-06 @ 05:44)      07-10    137  |  106  |  29[H]  ----------------------------<  92  4.1   |  29  |  1.68[H]    Ca    8.8      10 Jul 2025 05:59      Creatinine Trend: 1.68<--, 1.66<--, 1.81<--, 1.68<--, 1.74<--, 1.74<--    COAGS:           T(C): 37.4 (07-10-25 @ 08:00), Max: 37.4 (07-10-25 @ 08:00)  HR: 67 (07-10-25 @ 08:00) (65 - 73)  BP: 186/66 (07-10-25 @ 08:00) (95/76 - 211/77)  RR: 18 (07-10-25 @ 08:00) (18 - 18)  SpO2: 95% (07-10-25 @ 08:00) (95% - 97%)  Wt(kg): --    I&O's Summary    09 Jul 2025 07:01  -  10 Jul 2025 07:00  --------------------------------------------------------  IN: 0 mL / OUT: 750 mL / NET: -750 mL        HEENT:  (-)icterus (-)pallor  CV: N S1 S2 1/6 BOUBACAR (+)2 Pulses B/l  Resp:  Clear to ausculatation B/L, normal effort  GI: (+) BS Soft, NT, ND  Lymph:  (-)Edema, (-)obvious lymphadenopathy  Skin: Warm to touch, Normal turgor  Psych: Appropriate mood and affect      TELEMETRY: 	  sinus        ASSESSMENT/PLAN: 	70y  Male PMHx of Myasthenia gravis s/p thymectomy, HTN, 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 hyperaldosterone state  aldosterone appears wnl, endo recs noted  -  TSH wnl   - cont aldactone 25 mg PO QD  - w/u for secondary causes in progress 24 hour urine metanephrines  - would need Renal artery dopplers likely as an oupt since may not be done in house     # Abnormal EKG  - echo noted, suspect it is due to hypertensive heart disease given his profoundly elevated blood pressures however can arrange for outpt cardiac MRI     # MG  - neuro eval  noted    Ta Mercado MD, [facility]  BEEPER [phone]     C A R D I O L O G Y  **********************************    DATE OF SERVICE: 07-10-25    Patient denies chest pain or shortness of breath.   Review of symptoms otherwise negative.    acetaminophen     Tablet .. 650 milliGRAM(s) Oral every 6 hours PRN  ampicillin/sulbactam  IVPB      ampicillin/sulbactam  IVPB 3 Gram(s) IV Intermittent every 6 hours  aspirin  chewable 81 milliGRAM(s) Oral daily  atorvastatin 80 milliGRAM(s) Oral at bedtime  azaTHIOprine 100 milliGRAM(s) Oral daily  cholecalciferol 1000 Unit(s) Oral daily  cyanocobalamin 1000 MICROGram(s) Oral daily  dextrose 5%. 1000 milliLiter(s) IV Continuous <Continuous>  dextrose 50% Injectable 25 Gram(s) IV Push once  dextrose Oral Gel 15 Gram(s) Oral once PRN  ferrous    sulfate 325 milliGRAM(s) Oral two times a day  finasteride 5 milliGRAM(s) Oral daily  FIRST- Mouthwash  BLM 30 milliLiter(s) Swish and Spit three times a day  folic acid 1 milliGRAM(s) Oral daily  heparin   Injectable 5000 Unit(s) SubCutaneous every 8 hours  hydrALAZINE 100 milliGRAM(s) Oral every 8 hours  insulin lispro (ADMELOG) corrective regimen sliding scale   SubCutaneous three times a day before meals  insulin lispro (ADMELOG) corrective regimen sliding scale   SubCutaneous at bedtime  labetalol 400 milliGRAM(s) Oral every 8 hours  losartan 100 milliGRAM(s) Oral at bedtime  NIFEdipine XL 90 milliGRAM(s) Oral <User Schedule>  ondansetron Injectable 4 milliGRAM(s) IV Push every 8 hours PRN  predniSONE   Tablet 20 milliGRAM(s) Oral daily  pyridostigmine 60 milliGRAM(s) Oral <User Schedule>  sertraline 50 milliGRAM(s) Oral daily  tamsulosin 0.4 milliGRAM(s) Oral at bedtime                            7.7    5.98  )-----------( 221      ( 10 Jul 2025 05:59 )             23.3       Hemoglobin: 7.7 g/dL (07-10 @ 05:59)  Hemoglobin: 8.1 g/dL (07-09 @ 05:56)  Hemoglobin: 8.1 g/dL (07-08 @ 07:39)  Hemoglobin: 7.8 g/dL (07-06 @ 05:44)      07-10    137  |  106  |  29[H]  ----------------------------<  92  4.1   |  29  |  1.68[H]    Ca    8.8      10 Jul 2025 05:59      Creatinine Trend: 1.68<--, 1.66<--, 1.81<--, 1.68<--, 1.74<--, 1.74<--    COAGS:           T(C): 37.4 (07-10-25 @ 08:00), Max: 37.4 (07-10-25 @ 08:00)  HR: 67 (07-10-25 @ 08:00) (65 - 73)  BP: 186/66 (07-10-25 @ 08:00) (95/76 - 211/77)  RR: 18 (07-10-25 @ 08:00) (18 - 18)  SpO2: 95% (07-10-25 @ 08:00) (95% - 97%)  Wt(kg): --    I&O's Summary    09 Jul 2025 07:01  -  10 Jul 2025 07:00  --------------------------------------------------------  IN: 0 mL / OUT: 750 mL / NET: -750 mL        HEENT:  (-)icterus (-)pallor  CV: N S1 S2 1/6 BOUBACAR (+)2 Pulses B/l  Resp:  Clear to ausculatation B/L, normal effort  GI: (+) BS Soft, NT, ND  Lymph:  (-)Edema, (-)obvious lymphadenopathy  Skin: Warm to touch, Normal turgor  Psych: Appropriate mood and affect      TELEMETRY: 	  sinus        ASSESSMENT/PLAN: 	70y  Male PMHx of Myasthenia gravis s/p thymectomy, HTN, 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 hyperaldosterone state  aldosterone appears wnl, endo recs noted  -  TSH wnl   - cont aldactone 25 mg PO QD  - w/u for secondary causes in progress 24 hour urine metanephrines  - would need Renal artery dopplers likely as an oupt since may not be done in house     # Abnormal EKG  - echo noted, suspect it is due to hypertensive heart disease given his profoundly elevated blood pressures however can arrange for outpt cardiac MRI     # MG  - neuro eval  noted    Ta Mercado MD, [facility]  BEEPER [phone]

## 2025-07-11 LAB
-  COAGULASE NEGATIVE STAPHYLOCOCCUS: SIGNIFICANT CHANGE UP
GLUCOSE BLDC GLUCOMTR-MCNC: 114 MG/DL — HIGH (ref 70–99)
GLUCOSE BLDC GLUCOMTR-MCNC: 141 MG/DL — HIGH (ref 70–99)
GLUCOSE BLDC GLUCOMTR-MCNC: 170 MG/DL — HIGH (ref 70–99)
GLUCOSE BLDC GLUCOMTR-MCNC: 250 MG/DL — HIGH (ref 70–99)
GRAM STN FLD: ABNORMAL
METANEPH 24H UR-MRATE: 261 UG/24 HR — SIGNIFICANT CHANGE UP (ref 58–276)
METANEPHS 24H UR-MCNC: 201 UG/L — SIGNIFICANT CHANGE UP
METHOD TYPE: SIGNIFICANT CHANGE UP
NORMETANEPHRINE 24H UR-MRATE: 537 UG/24 HR — SIGNIFICANT CHANGE UP (ref 156–729)
NORMETANEPHRINE.: 413 UG/L — SIGNIFICANT CHANGE UP

## 2025-07-11 RX ADMIN — Medication 650 MILLIGRAM(S): at 06:18

## 2025-07-11 RX ADMIN — LABETALOL HYDROCHLORIDE 400 MILLIGRAM(S): 200 TABLET, FILM COATED ORAL at 13:53

## 2025-07-11 RX ADMIN — HEPARIN SODIUM 5000 UNIT(S): 1000 INJECTION INTRAVENOUS; SUBCUTANEOUS at 21:43

## 2025-07-11 RX ADMIN — PYRIDOSTIGMINE BROMIDE 60 MILLIGRAM(S): 5 INJECTION, SOLUTION INTRAVENOUS; PARENTERAL at 17:19

## 2025-07-11 RX ADMIN — HEPARIN SODIUM 5000 UNIT(S): 1000 INJECTION INTRAVENOUS; SUBCUTANEOUS at 13:53

## 2025-07-11 RX ADMIN — HEPARIN SODIUM 5000 UNIT(S): 1000 INJECTION INTRAVENOUS; SUBCUTANEOUS at 05:49

## 2025-07-11 RX ADMIN — PREDNISONE 20 MILLIGRAM(S): 20 TABLET ORAL at 05:48

## 2025-07-11 RX ADMIN — Medication 325 MILLIGRAM(S): at 17:20

## 2025-07-11 RX ADMIN — FOLIC ACID 1 MILLIGRAM(S): 1 TABLET ORAL at 11:06

## 2025-07-11 RX ADMIN — Medication 90 MILLIGRAM(S): at 11:07

## 2025-07-11 RX ADMIN — AMPICILLIN SODIUM AND SULBACTAM SODIUM 200 GRAM(S): 1; .5 INJECTION, POWDER, FOR SOLUTION INTRAMUSCULAR; INTRAVENOUS at 05:48

## 2025-07-11 RX ADMIN — Medication 325 MILLIGRAM(S): at 05:48

## 2025-07-11 RX ADMIN — TAMSULOSIN HYDROCHLORIDE 0.4 MILLIGRAM(S): 0.4 CAPSULE ORAL at 21:44

## 2025-07-11 RX ADMIN — Medication 100 MILLIGRAM(S): at 21:44

## 2025-07-11 RX ADMIN — PYRIDOSTIGMINE BROMIDE 60 MILLIGRAM(S): 5 INJECTION, SOLUTION INTRAVENOUS; PARENTERAL at 00:19

## 2025-07-11 RX ADMIN — AMPICILLIN SODIUM AND SULBACTAM SODIUM 200 GRAM(S): 1; .5 INJECTION, POWDER, FOR SOLUTION INTRAMUSCULAR; INTRAVENOUS at 11:45

## 2025-07-11 RX ADMIN — Medication 100 MILLIGRAM(S): at 05:48

## 2025-07-11 RX ADMIN — Medication 81 MILLIGRAM(S): at 11:06

## 2025-07-11 RX ADMIN — CYANOCOBALAMIN 1000 MICROGRAM(S): 1000 INJECTION INTRAMUSCULAR; SUBCUTANEOUS at 11:06

## 2025-07-11 RX ADMIN — AZATHIOPRINE 100 MILLIGRAM(S): 50 TABLET ORAL at 11:07

## 2025-07-11 RX ADMIN — AMPICILLIN SODIUM AND SULBACTAM SODIUM 200 GRAM(S): 1; .5 INJECTION, POWDER, FOR SOLUTION INTRAMUSCULAR; INTRAVENOUS at 17:19

## 2025-07-11 RX ADMIN — Medication 4 MILLIGRAM(S): at 06:51

## 2025-07-11 RX ADMIN — LOSARTAN POTASSIUM 100 MILLIGRAM(S): 100 TABLET, FILM COATED ORAL at 21:44

## 2025-07-11 RX ADMIN — Medication 650 MILLIGRAM(S): at 05:57

## 2025-07-11 RX ADMIN — Medication 100 MILLIGRAM(S): at 13:53

## 2025-07-11 RX ADMIN — INSULIN LISPRO 2: 100 INJECTION, SOLUTION INTRAVENOUS; SUBCUTANEOUS at 11:48

## 2025-07-11 RX ADMIN — AMPICILLIN SODIUM AND SULBACTAM SODIUM 200 GRAM(S): 1; .5 INJECTION, POWDER, FOR SOLUTION INTRAMUSCULAR; INTRAVENOUS at 00:20

## 2025-07-11 RX ADMIN — LABETALOL HYDROCHLORIDE 400 MILLIGRAM(S): 200 TABLET, FILM COATED ORAL at 05:48

## 2025-07-11 RX ADMIN — ATORVASTATIN CALCIUM 80 MILLIGRAM(S): 80 TABLET, FILM COATED ORAL at 21:43

## 2025-07-11 RX ADMIN — PYRIDOSTIGMINE BROMIDE 60 MILLIGRAM(S): 5 INJECTION, SOLUTION INTRAVENOUS; PARENTERAL at 05:49

## 2025-07-11 RX ADMIN — FINASTERIDE 5 MILLIGRAM(S): 1 TABLET, FILM COATED ORAL at 11:06

## 2025-07-11 RX ADMIN — Medication 1000 UNIT(S): at 11:06

## 2025-07-11 RX ADMIN — PYRIDOSTIGMINE BROMIDE 60 MILLIGRAM(S): 5 INJECTION, SOLUTION INTRAVENOUS; PARENTERAL at 11:06

## 2025-07-11 RX ADMIN — INSULIN LISPRO 1: 100 INJECTION, SOLUTION INTRAVENOUS; SUBCUTANEOUS at 17:19

## 2025-07-11 RX ADMIN — SERTRALINE 50 MILLIGRAM(S): 100 TABLET, FILM COATED ORAL at 11:07

## 2025-07-11 RX ADMIN — PYRIDOSTIGMINE BROMIDE 60 MILLIGRAM(S): 5 INJECTION, SOLUTION INTRAVENOUS; PARENTERAL at 13:53

## 2025-07-11 RX ADMIN — Medication 50 MILLIGRAM(S): at 05:49

## 2025-07-11 NOTE — PROGRESS NOTE ADULT - PROBLEM SELECTOR PLAN 1
-   History of HTN on hydralazine 100mg tid, losartan 100mg qd, nifedipine 60mg qd, metoprolol 100mg bid   -   Hyperdynamic LVEF on ECHO with EF estimated at 70 to 75%   -   Continue with telemetry monitoring   -   Serum aldosterone level normal. serum renin test unable as per [facility]  -   Increased Nifedipine to 90 mg qd  -   Changed metoprolol to labetalol 400 mg q 8hr   -   Hydralazine 100mg q8h  -   Losartan 100 mg qd  -   Aldactone 50mg daily  -   Cardiology  Dr. Mercado following  -   Endo Dr. Farr following  -   Nephro Dr. Lan following  -   Neuro following  -   Still with labile b/p-asymptomatic

## 2025-07-11 NOTE — PROGRESS NOTE ADULT - PROBLEM SELECTOR PROBLEM 4
MG (myasthenia gravis)
Retroperitoneal lymphadenopathy
Retroperitoneal lymphadenopathy
MG (myasthenia gravis)
Retroperitoneal lymphadenopathy
Retroperitoneal lymphadenopathy
MG (myasthenia gravis)
Retroperitoneal lymphadenopathy

## 2025-07-11 NOTE — PROGRESS NOTE ADULT - PROBLEM SELECTOR PROBLEM 6
Insulin dependent type 2 diabetes mellitus
History of CVA (cerebrovascular accident)
Insulin dependent type 2 diabetes mellitus
History of CVA (cerebrovascular accident)
History of CVA (cerebrovascular accident)
Insulin dependent type 2 diabetes mellitus
Insulin dependent type 2 diabetes mellitus

## 2025-07-11 NOTE — PROGRESS NOTE ADULT - SUBJECTIVE AND OBJECTIVE BOX
NP Note :    INTERVAL HPI / OVERNIGHT EVENTS: no new complaints    MEDICATIONS  (STANDING):  ampicillin/sulbactam  IVPB      ampicillin/sulbactam  IVPB 3 Gram(s) IV Intermittent every 6 hours  aspirin  chewable 81 milliGRAM(s) Oral daily  atorvastatin 80 milliGRAM(s) Oral at bedtime  azaTHIOprine 100 milliGRAM(s) Oral daily  cholecalciferol 1000 Unit(s) Oral daily  cyanocobalamin 1000 MICROGram(s) Oral daily  dextrose 5%. 1000 milliLiter(s) (50 mL/Hr) IV Continuous <Continuous>  dextrose 50% Injectable 25 Gram(s) IV Push once  ferrous    sulfate 325 milliGRAM(s) Oral two times a day  finasteride 5 milliGRAM(s) Oral daily  FIRST- Mouthwash  BLM 30 milliLiter(s) Swish and Spit three times a day  folic acid 1 milliGRAM(s) Oral daily  heparin   Injectable 5000 Unit(s) SubCutaneous every 8 hours  hydrALAZINE 100 milliGRAM(s) Oral every 8 hours  insulin lispro (ADMELOG) corrective regimen sliding scale   SubCutaneous at bedtime  insulin lispro (ADMELOG) corrective regimen sliding scale   SubCutaneous three times a day before meals  labetalol 400 milliGRAM(s) Oral every 8 hours  losartan 100 milliGRAM(s) Oral at bedtime  NIFEdipine XL 90 milliGRAM(s) Oral <User Schedule>  predniSONE   Tablet 20 milliGRAM(s) Oral daily  pyridostigmine 60 milliGRAM(s) Oral <User Schedule>  sertraline 50 milliGRAM(s) Oral daily  spironolactone 50 milliGRAM(s) Oral daily  tamsulosin 0.4 milliGRAM(s) Oral at bedtime    MEDICATIONS  (PRN):  acetaminophen     Tablet .. 650 milliGRAM(s) Oral every 6 hours PRN Temp greater or equal to 38C (100.4F), Mild Pain (1 - 3)  dextrose Oral Gel 15 Gram(s) Oral once PRN Blood Glucose LESS THAN 70 milliGRAM(s)/deciliter  ondansetron Injectable 4 milliGRAM(s) IV Push every 8 hours PRN Nausea and/or Vomiting      __________________________________________________  REVIEW OF SYSTEMS:    CONSTITUTIONAL: No fever, chills. Pain at times roof of mouth when eating.   EYES: no acute visual disturbances  NECK: No pain or stiffness  RESPIRATORY: No cough; No shortness of breath  CARDIOVASCULAR: No chest pain, no palpitations  GASTROINTESTINAL: No pain. No nausea or vomiting; No diarrhea   NEUROLOGICAL: No headache or numbness, no tremors  MUSCULOSKELETAL: No joint pain, no muscle pain  GENITOURINARY: no dysuria, no frequency, no hesitancy  PSYCHIATRY: no depression , no anxiety  ALL OTHER  ROS negative        Vital Signs Last 24 Hrs  T(C): 36.4 (11 Jul 2025 11:14), Max: 36.7 (10 Jul 2025 19:53)  T(F): 97.5 (11 Jul 2025 11:14), Max: 98.1 (10 Jul 2025 19:53)  HR: 75 (11 Jul 2025 11:14) (61 - 75)  BP: 160/69 (11 Jul 2025 11:14) (138/80 - 194/74)  BP(mean): 86 (11 Jul 2025 11:14) (86 - 99)  RR: 18 (11 Jul 2025 11:14) (17 - 18)  SpO2: 98% (11 Jul 2025 11:14) (95% - 98%)    Parameters below as of 11 Jul 2025 11:14  Patient On (Oxygen Delivery Method): room air        ________________________________________________  PHYSICAL EXAM:  GENERAL: No acute distress  HEENT: Normocephalic;  conjunctivae and sclerae clear; moist mucous membranes;   NECK : supple  CHEST/LUNG: Clear to auscultation bilaterally with good air entry   HEART: S1 S2  regular; no murmurs, gallops or rubs  ABDOMEN: Soft, Nontender, Nondistended; Bowel sounds present  EXTREMITIES: no cyanosis;; no calf tenderness  SKIN: warm and dry; no rash  NERVOUS SYSTEM:  Awake and alert; Oriented  to place, person and time ; no new deficits    _________________________________________________  LABS:                        7.7    5.98  )-----------( 221      ( 10 Jul 2025 05:59 )             23.3     07-10    137  |  106  |  29[H]  ----------------------------<  92  4.1   |  29  |  1.68[H]    Ca    8.8      10 Jul 2025 05:59        Urinalysis Basic - ( 10 Jul 2025 05:59 )    Color: x / Appearance: x / SG: x / pH: x  Gluc: 92 mg/dL / Ketone: x  / Bili: x / Urobili: x   Blood: x / Protein: x / Nitrite: x   Leuk Esterase: x / RBC: x / WBC x   Sq Epi: x / Non Sq Epi: x / Bacteria: x      CAPILLARY BLOOD GLUCOSE  POCT Blood Glucose.: 114 mg/dL (11 Jul 2025 07:16)  POCT Blood Glucose.: 116 mg/dL (10 Jul 2025 21:19)  POCT Blood Glucose.: 164 mg/dL (10 Jul 2025 16:59)  POCT Blood Glucose.: 275 mg/dL (10 Jul 2025 11:38)        RADIOLOGY & ADDITIONAL TESTS:    Imaging  Reviewed:  YES/NO    Consultant(s) Notes Reviewed:   YES/ No      Plan of care was discussed with patient and /or primary care giver; all questions and concerns were addressed

## 2025-07-11 NOTE — PROGRESS NOTE ADULT - PROBLEM SELECTOR PLAN 4
-  CT A/P - Soft tissue densities within the retroperitoneum next to the aorta   -  Concerning for extensive lymphadenopathy versus tortuous vessels or a   combination of both.   -  Not a candidate for MRI - has loop recorder, cervical hardware and sternotomy  -  US A/P   -  QMA Dr. Moralez following

## 2025-07-11 NOTE — PROGRESS NOTE ADULT - PROBLEM SELECTOR PROBLEM 1
Uncontrolled hypertension
Hypertensive urgency
Hypertensive urgency
Uncontrolled hypertension
Hypertensive urgency
Uncontrolled hypertension

## 2025-07-11 NOTE — PROGRESS NOTE ADULT - PROBLEM SELECTOR PROBLEM 5
MG (myasthenia gravis)
Insulin dependent type 2 diabetes mellitus
MG (myasthenia gravis)
Insulin dependent type 2 diabetes mellitus
Insulin dependent type 2 diabetes mellitus
MG (myasthenia gravis)

## 2025-07-11 NOTE — PROGRESS NOTE ADULT - ASSESSMENT
Patient is a 69yo Male from [facility] ambulates with a walker, with PMHx of Myasthenia gravis s/p thymectomy, HTN, 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 Pt a/w hypertensive urgency and MICHEL.   Nephrology consulted for Elevated serum creatinine.    1. MICHEL- as per H& P; last SCr 1.3. MICHEL likely hemodynamically mediated due to uncontrolled HTN. Renal function improving  for which Losartan was resumed. Renal function overall remains stable now.  Feurea 13.52%;   UA with 300 protein, no blood. FeNa 10%;  Renal US with no hydro.  TTE with grade 1 diastolic dysfunction, EF 70-75%; ?hypertensive vs infiltrative cardiomyopathy. SIFE neg & K/l wnl. Pt b/l pleural effusions on TTE. Check CXR;  Strict I/Os. Avoid nephrotoxins/ NSAIDs/ RCA. Monitor BMP.    2. Proteinuria- UA with 300 protein, no blood.  UPCr on 6/29,  0.48 g/day. No acute interventions at this time.    3. Hypertensive urgency- BP labile. Advised to stop using  energy drinks due to increased caffeine which can increase BP  c/w Labetalol 400mg PO q 8hrs. c/w Nifedipine ER 90mg at noon. Recc to increase Aldactone to 75mg PO daily for resistant HTN, if potassium is nwl . c/w Losartan 100 mg PO qhs due to elevated BP at night; +nocturnal HTN; recc outpt sleep study to r/o BRET.   ?Consider decreasing frequency of pyridostigmine?- defer to Neuro   Recc Renal US with dopplers as an outpt to r/o HOANG (unavailable at [facility]).    c/w low salt diet. Monitor BP  4. Hypokalemia- Kuldip/ Renin; 3.19. Not consistent with hypoaldosteronism. Renin 6.479.   Hypokalemia due to shifting from excessive insulin.   Aldactone as above.  Check VBG. Monitor serum potassium  5. Iron deficiency anemia- low hgb. Check FOBT. tsat 16% with ferriitn 236- Finished Venofer 200mg IV qd x 3 doses. s/p Epo 10k SC x1 on 6/30 & 7/6. Monitor [facility] NEPHROLOGY  Bethel Smith M.D.  Guillermo Jimenez D.O.  Kathleen Lan M.D.  MD Kimi Rodríguez, MSN, ANP-C    Telephone: [phone]  Facsimile: [phone]    [facility] [place] [address], [address]  [place] [address]

## 2025-07-11 NOTE — PROGRESS NOTE ADULT - SUBJECTIVE AND OBJECTIVE BOX
Patient is a 70y old  Male who presents with a chief complaint of MICHEL (10 Jul 2025 15:14)    PATIENT IS SEEN AND EXAMINED IN MEDICAL FLOOR.  NGT [    ]    ERVIN [   ]      GT [   ]    ALLERGIES:  No Known Allergies      Daily     Daily Weight in k (2025 04:44)    VITALS:    Vital Signs Last 24 Hrs  T(C): 36.4 (2025 07:57), Max: 36.9 (10 Jul 2025 11:03)  T(F): 97.5 (2025 07:57), Max: 98.5 (10 Jul 2025 11:03)  HR: 61 (2025 07:57) (61 - 75)  BP: 194/74 (2025 07:57) (138/80 - 194/74)  BP(mean): 98 (2025 04:44) (98 - 99)  RR: 18 (2025 07:57) (17 - 18)  SpO2: 97% (2025 07:57) (95% - 97%)    Parameters below as of 2025 07:57  Patient On (Oxygen Delivery Method): room air        LABS:    CBC Full  -  ( 10 Jul 2025 05:59 )  WBC Count : 5.98 K/uL  RBC Count : 2.13 M/uL  Hemoglobin : 7.7 g/dL  Hematocrit : 23.3 %  Platelet Count - Automated : 221 K/uL  Mean Cell Volume : 109.4 fl  Mean Cell Hemoglobin : 36.2 pg  Mean Cell Hemoglobin Concentration : 33.0 g/dL  Auto Neutrophil # : x  Auto Lymphocyte # : x  Auto Monocyte # : x  Auto Eosinophil # : x  Auto Basophil # : x  Auto Neutrophil % : x  Auto Lymphocyte % : x  Auto Monocyte % : x  Auto Eosinophil % : x  Auto Basophil % : x      07-10    137  |  106  |  29[H]  ----------------------------<  92  4.1   |  29  |  1.68[H]    Ca    8.8      10 Jul 2025 05:59      CAPILLARY BLOOD GLUCOSE      POCT Blood Glucose.: 114 mg/dL (2025 07:16)  POCT Blood Glucose.: 116 mg/dL (10 Jul 2025 21:19)  POCT Blood Glucose.: 164 mg/dL (10 Jul 2025 16:59)  POCT Blood Glucose.: 275 mg/dL (10 Jul 2025 11:38)          Creatinine Trend: 1.68<--, 1.66<--, 1.81<--, 1.68<--, 1.74<--, 1.74<--  I&O's Summary    10 Jul 2025 07:01  -  2025 07:00  --------------------------------------------------------  IN: 774 mL / OUT: 1700 mL / NET: -926 mL            Blood Blood-Peripheral   @ 18:00   Growth in anaerobic bottle: Gram Positive Cocci in Clusters  Direct identification is available within approximately 3-5  hours either by Blood Panel Multiplexed PCR or Direct  MALDI-TOF. Details: [email or web address]  --  Blood Culture PCR      Blood Blood-Peripheral   @ 17:45   No growth at 48 Hours  --  --          MEDICATIONS:    MEDICATIONS  (STANDING):  ampicillin/sulbactam  IVPB      ampicillin/sulbactam  IVPB 3 Gram(s) IV Intermittent every 6 hours  aspirin  chewable 81 milliGRAM(s) Oral daily  atorvastatin 80 milliGRAM(s) Oral at bedtime  azaTHIOprine 100 milliGRAM(s) Oral daily  cholecalciferol 1000 Unit(s) Oral daily  cyanocobalamin 1000 MICROGram(s) Oral daily  dextrose 5%. 1000 milliLiter(s) (50 mL/Hr) IV Continuous <Continuous>  dextrose 50% Injectable 25 Gram(s) IV Push once  ferrous    sulfate 325 milliGRAM(s) Oral two times a day  finasteride 5 milliGRAM(s) Oral daily  FIRST- Mouthwash  BLM 30 milliLiter(s) Swish and Spit three times a day  folic acid 1 milliGRAM(s) Oral daily  heparin   Injectable 5000 Unit(s) SubCutaneous every 8 hours  hydrALAZINE 100 milliGRAM(s) Oral every 8 hours  insulin lispro (ADMELOG) corrective regimen sliding scale   SubCutaneous at bedtime  insulin lispro (ADMELOG) corrective regimen sliding scale   SubCutaneous three times a day before meals  labetalol 400 milliGRAM(s) Oral every 8 hours  losartan 100 milliGRAM(s) Oral at bedtime  NIFEdipine XL 90 milliGRAM(s) Oral <User Schedule>  predniSONE   Tablet 20 milliGRAM(s) Oral daily  pyridostigmine 60 milliGRAM(s) Oral <User Schedule>  sertraline 50 milliGRAM(s) Oral daily  spironolactone 50 milliGRAM(s) Oral daily  tamsulosin 0.4 milliGRAM(s) Oral at bedtime      MEDICATIONS  (PRN):  acetaminophen     Tablet .. 650 milliGRAM(s) Oral every 6 hours PRN Temp greater or equal to 38C (100.4F), Mild Pain (1 - 3)  dextrose Oral Gel 15 Gram(s) Oral once PRN Blood Glucose LESS THAN 70 milliGRAM(s)/deciliter  ondansetron Injectable 4 milliGRAM(s) IV Push every 8 hours PRN Nausea and/or Vomiting      REVIEW OF SYSTEMS:                           ALL ROS DONE [ X   ]    CONSTITUTIONAL:  LETHARGIC [   ], FEVER [   ], UNRESPONSIVE [   ]  CVS:  CP  [   ], SOB, [   ], PALPITATIONS [   ], DIZZYNESS [   ]  RS: COUGH [   ], SPUTUM [   ]  GI: ABDOMINAL PAIN [   ], NAUSEA [   ], VOMITINGS [   ], DIARRHEA [   ], CONSTIPATION [   ]  :  DYSURIA [   ], NOCTURIA [   ], INCREASED FREQUENCY [   ], DRIBLING [   ],  SKELETAL: PAINFUL JOINTS [   ], SWOLLEN JOINTS [   ], NECK ACHE [   ], LOW BACK ACHE [   ],  SKIN : ULCERS [   ], RASH [   ], ITCHING [   ]  CNS: HEAD ACHE [   ], DOUBLE VISION [   ], BLURRED VISION [   ], AMS / CONFUSION [   ], SEIZURES [   ], WEAKNESS [   ],TINGLING / NUMBNESS [   ]      PHYSICAL EXAMINATION:    GENERAL APPEARANCE: NO DISTRESS  HEENT:  NO PALLOR, NO  JVD,  NO   NODES, NECK SUPPLE  CVS: S1 +, S2 +,   RS: AEEB,  OCCASIONAL  RALES +,   NO RONCHI  ABD: SOFT, NT, NO, BS +  EXT: NO PE  SKIN: WARM,   SKELETAL:  ROM ACCEPTABLE  CNS:  AAO X 3        RADIOLOGY :  RADIOLOGY AND READINGS REVIEWED      < from: US Renal (25 @ 09:48) >    IMPRESSION:    1. No hydronephrosis.  2. Increased renal cortical echogenicity compatible with renal   parenchymal disease.  3. There is limited visualization of the urinary bladder; however, on   limited views, the wall appears diffusely thickened. Suggest correlation   with urinalysis to exclude infection.  4. Partially imaged hypoechoic structure posterior to the left kidney may   correspond to the lymphadenopathy questioned on the prior CT. Further   evaluation is recommended as clinically.    < end of copied text >      < from: CT Abdomen and Pelvis No Cont (25 @ 23:36) >    IMPRESSION:  No retroperitoneal hematoma.    Soft tissue densities within the retroperitoneum next to the aorta   concerning for extensive lymphadenopathy versus tortuous vessels or a   combination of both. The evaluation is limited due to the lack of IV   contrast. Recommend contrast enhanced cross-sectional imaging for better   assessment. Further workup for lymphoproliferative disease may be helpful.    Circumferential bladder wall thickening as can be seen with decompression   or component of cystitis.        Prelim: No obvious retroperitoneal hematoma. Gallbladder wall thickening.   Bladder wall thickening. Retroperitoneal/pelvic lymphadenopathy.   Persistent right > left pleural effusion. Small pericardial effusion.   Official report to follow.    < end of copied text >  < from: Xray Chest 1 View- PORTABLE-Urgent (Xray Chest 1 View- PORTABLE-Urgent .) (25 @ 16:53) >    IMPRESSION: Sternotomy, left loop recorder, cervical spine hardware   noted. Increasing atelectatic consolidation of left lower lobe with   effusion.    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 Patient reports he has been feeling nauseous for a few days but no vomiting. He reports chronic increased urinary frequency and sensation of incomplete voiding. He denies all other symptoms - fever, chills, cough, chest pain, SoB, palpitations abdominal pain, diarrhea, dysuria, arm or leg numbness or tingling. Reports regular brown stool - denies black or bloody stool.  (2025 21:32)        # DC PLAN: BACK TO [facility] AFTER ABDOMINAL U/S TO EVALUATE LIKELY RETROPERITONEAL LYMPHADENOPATHY   - WILL REFER HIM TO ONCOLOGY & GI CONSULT (FOR EGD& COLONOSCOPY) AS OUT PATIENT FOR FURTHER FOLLOWUP  - MACROCYTIC ANEMIA, ANEMIA OF CKD - MAY NEED PRBC # 1 UNIT PRIOR TO DISCHARGE   - LIKELY MYELOSUPPRESSION / ANEMIA DUE TO AZATHIOPRINE  - WILL SUPPLEMENT B12, FA, FESO4     # [7/10] CASE D/W PATIENT'S PARTNER MILLI DONOVAN VIA PHONE. CASE DISCUSSED AT LENGTH. ALL QUESTIONS ANSWERED.     # CASE D/W PATIENT AND PARTNER MILLI DONOVAN AT BEDSIDE, ALL QUESTIONS ANSWERED. DISCUSSED RECOMMENDATIONS AS MADE BY MULTIDISCIPLINARY TEAM. DISCUSSED THAT PROGNOSIS IS GUARDED. THEY VERBALIZED UNDERSTANDING. IN DISCUSSION REGARDING GOC - PATIENT WISHES TO BE FULL CODE.        # HYPERTENSIVE URGENCY    # HTN - CONTROLLED   - TELEMETRY  - ECHO - LV EF - 70 - 75%, G1DD  - NOTED TROPONINS  - S/P IV HYDRALAZINE  - HYDRALAZINE  - PROCARDIA  - LABETALOL  - LOSARTAN  - SPIRONOLACTONE  - NOTED SERUM RENIN, ALDOSTERONE LEVELS  - F/U PLASMA METANEPHRINES  - CARDIOLOGY CONSULT  - NEPHROLOGY CONSULT  - ENDOCRINOLOGY CONSULT    # H/O CARDIAC ARRHYTHMIA - LOOP RECORDER IN PLACE  # H/O STERNOTOMY DUE TO THYMECTOMY      # MICHEL ON CKD STAGE 3 B  # BPH, OBSTRUCTIVE UROPATHY  - IV FLUIDS  - PVR - 0 ML  - MONITOR CR  - AVOID NEPHROTOXIC AGENTS  - NEPHROLOGY CONSULT    # ACUTE GINGIVOSTOMATITIS  - PLACE ON UNASYN, F/U BCX  - MAGIC MOUTHWASH  - ID CONSULT    # HYPOGLYCEMIA - RESOLVED  - MONITORING FINGERSTICKS    # ANEMIA  - MACROCYTIC ANEMIA, ANEMIA OF CKD   - LIKELY MYELOSUPPRESSION / ANEMIA DUE TO AZATHIOPRINE    - TREND HGB  - ANEMIA PANEL  - DENIES MELENA, HEMATOCHEZIA, HEMATEMESIS, HEMATURIA  - NOTED CT A/P  - GASTROENTEROLOGY CONSULT    # ? LYMPHADENOPATHY ON CT A/P   - F/U U/S ABDOMEN AS OUTPATIENT  - DEFER CT W/ CONTRAST GIVEN RENAL DYSFUNCTION  - PER FAMILY PATIENT MAY HAVE HARDWARE THAT IS NOT COMPATIBLE WITH MRI  - HEME/ONC CONSULT    # DENIES DYSURIA, UA NEGATIVE FOR LEUKOCYTE ESTERASE AND NITRITES      # MYASTHENIA GRAVIS S/P THYMECTOMY/STERNOTOMY - ON AZATHIOPRINE, PREDNISONE, PYRIDOSTIGMINE  - D/W NEUROLOGY - RECOMMENDED F/U AS OUTPATIENT FOR FURTHER PREDNISONE TAPER/MANAGEMENT  - NEUROLOGY CONSULT    # DM, DIABETIC NEPHROPATHY, DIABETIC RETINOPATHY, DIABETIC PERIPHERAL NEUROPATHY  - SSI + FS    # CVA W/ MILD RIGHT HP    # IMPAIRED GAIT DUE TO GENERALIZED MUSCLE WEAKNESS, CVA, MYASTHENIA GRAVIS, CERVICAL SPINAL STENOSIS - H/O CERVICAL SPINE INSTRUMENTATION - HARDWARE IN PLACE     # PAD  # GI AND DVT PPX     Patient is a 70y old  Male who presents with a chief complaint of MICHEL (10 Jul 2025 15:14)    PATIENT IS SEEN AND EXAMINED IN MEDICAL FLOOR.      ALLERGIES:  No Known Allergies      Daily     Daily Weight in k (2025 04:44)    VITALS:    Vital Signs Last 24 Hrs  T(C): 36.4 (2025 07:57), Max: 36.9 (10 Jul 2025 11:03)  T(F): 97.5 (2025 07:57), Max: 98.5 (10 Jul 2025 11:03)  HR: 61 (2025 07:57) (61 - 75)  BP: 194/74 (2025 07:57) (138/80 - 194/74)  BP(mean): 98 (2025 04:44) (98 - 99)  RR: 18 (2025 07:57) (17 - 18)  SpO2: 97% (2025 07:57) (95% - 97%)    Parameters below as of 2025 07:57  Patient On (Oxygen Delivery Method): room air        LABS:    CBC Full  -  ( 10 Jul 2025 05:59 )  WBC Count : 5.98 K/uL  RBC Count : 2.13 M/uL  Hemoglobin : 7.7 g/dL  Hematocrit : 23.3 %  Platelet Count - Automated : 221 K/uL  Mean Cell Volume : 109.4 fl  Mean Cell Hemoglobin : 36.2 pg  Mean Cell Hemoglobin Concentration : 33.0 g/dL  Auto Neutrophil # : x  Auto Lymphocyte # : x  Auto Monocyte # : x  Auto Eosinophil # : x  Auto Basophil # : x  Auto Neutrophil % : x  Auto Lymphocyte % : x  Auto Monocyte % : x  Auto Eosinophil % : x  Auto Basophil % : x      07-10    137  |  106  |  29[H]  ----------------------------<  92  4.1   |  29  |  1.68[H]    Ca    8.8      10 Jul 2025 05:59      CAPILLARY BLOOD GLUCOSE      POCT Blood Glucose.: 114 mg/dL (2025 07:16)  POCT Blood Glucose.: 116 mg/dL (10 Jul 2025 21:19)  POCT Blood Glucose.: 164 mg/dL (10 Jul 2025 16:59)  POCT Blood Glucose.: 275 mg/dL (10 Jul 2025 11:38)          Creatinine Trend: 1.68<--, 1.66<--, 1.81<--, 1.68<--, 1.74<--, 1.74<--  I&O's Summary    10 Jul 2025 07:01  -  2025 07:00  --------------------------------------------------------  IN: 774 mL / OUT: 1700 mL / NET: -926 mL            Blood Blood-Peripheral   @ 18:00   Growth in anaerobic bottle: Gram Positive Cocci in Clusters  Direct identification is available within approximately 3-5  hours either by Blood Panel Multiplexed PCR or Direct  MALDI-TOF. Details: [email or web address]  --  Blood Culture PCR      Blood Blood-Peripheral   @ 17:45   No growth at 48 Hours  --  --          MEDICATIONS:    MEDICATIONS  (STANDING):  ampicillin/sulbactam  IVPB      ampicillin/sulbactam  IVPB 3 Gram(s) IV Intermittent every 6 hours  aspirin  chewable 81 milliGRAM(s) Oral daily  atorvastatin 80 milliGRAM(s) Oral at bedtime  azaTHIOprine 100 milliGRAM(s) Oral daily  cholecalciferol 1000 Unit(s) Oral daily  cyanocobalamin 1000 MICROGram(s) Oral daily  dextrose 5%. 1000 milliLiter(s) (50 mL/Hr) IV Continuous <Continuous>  dextrose 50% Injectable 25 Gram(s) IV Push once  ferrous    sulfate 325 milliGRAM(s) Oral two times a day  finasteride 5 milliGRAM(s) Oral daily  FIRST- Mouthwash  BLM 30 milliLiter(s) Swish and Spit three times a day  folic acid 1 milliGRAM(s) Oral daily  heparin   Injectable 5000 Unit(s) SubCutaneous every 8 hours  hydrALAZINE 100 milliGRAM(s) Oral every 8 hours  insulin lispro (ADMELOG) corrective regimen sliding scale   SubCutaneous at bedtime  insulin lispro (ADMELOG) corrective regimen sliding scale   SubCutaneous three times a day before meals  labetalol 400 milliGRAM(s) Oral every 8 hours  losartan 100 milliGRAM(s) Oral at bedtime  NIFEdipine XL 90 milliGRAM(s) Oral <User Schedule>  predniSONE   Tablet 20 milliGRAM(s) Oral daily  pyridostigmine 60 milliGRAM(s) Oral <User Schedule>  sertraline 50 milliGRAM(s) Oral daily  spironolactone 50 milliGRAM(s) Oral daily  tamsulosin 0.4 milliGRAM(s) Oral at bedtime      MEDICATIONS  (PRN):  acetaminophen     Tablet .. 650 milliGRAM(s) Oral every 6 hours PRN Temp greater or equal to 38C (100.4F), Mild Pain (1 - 3)  dextrose Oral Gel 15 Gram(s) Oral once PRN Blood Glucose LESS THAN 70 milliGRAM(s)/deciliter  ondansetron Injectable 4 milliGRAM(s) IV Push every 8 hours PRN Nausea and/or Vomiting      REVIEW OF SYSTEMS:                           ALL ROS DONE [ X   ]    CONSTITUTIONAL:  LETHARGIC [   ], FEVER [   ], UNRESPONSIVE [   ]  CVS:  CP  [   ], SOB, [   ], PALPITATIONS [   ], DIZZYNESS [   ]  RS: COUGH [   ], SPUTUM [   ]  GI: ABDOMINAL PAIN [   ], NAUSEA [   ], VOMITINGS [   ], DIARRHEA [   ], CONSTIPATION [   ]  :  DYSURIA [   ], NOCTURIA [   ], INCREASED FREQUENCY [   ], DRIBLING [   ],  SKELETAL: PAINFUL JOINTS [   ], SWOLLEN JOINTS [   ], NECK ACHE [   ], LOW BACK ACHE [   ],  SKIN : ULCERS [   ], RASH [   ], ITCHING [   ]  CNS: HEAD ACHE [   ], DOUBLE VISION [   ], BLURRED VISION [   ], AMS / CONFUSION [   ], SEIZURES [   ], WEAKNESS [   ],TINGLING / NUMBNESS [   ]      PHYSICAL EXAMINATION:    GENERAL APPEARANCE: NO DISTRESS  HEENT:  NO PALLOR, NO  JVD,  NO   NODES, NECK SUPPLE  CVS: S1 +, S2 +,   RS: AEEB,  OCCASIONAL  RALES +,   NO RONCHI  ABD: SOFT, NT, NO, BS +  EXT: NO PE  SKIN: WARM,   SKELETAL:  ROM ACCEPTABLE  CNS:  AAO X 3        RADIOLOGY :  RADIOLOGY AND READINGS REVIEWED      < from: US Renal (25 @ 09:48) >    IMPRESSION:    1. No hydronephrosis.  2. Increased renal cortical echogenicity compatible with renal   parenchymal disease.  3. There is limited visualization of the urinary bladder; however, on   limited views, the wall appears diffusely thickened. Suggest correlation   with urinalysis to exclude infection.  4. Partially imaged hypoechoic structure posterior to the left kidney may   correspond to the lymphadenopathy questioned on the prior CT. Further   evaluation is recommended as clinically.    < end of copied text >      < from: CT Abdomen and Pelvis No Cont (25 @ 23:36) >    IMPRESSION:  No retroperitoneal hematoma.    Soft tissue densities within the retroperitoneum next to the aorta   concerning for extensive lymphadenopathy versus tortuous vessels or a   combination of both. The evaluation is limited due to the lack of IV   contrast. Recommend contrast enhanced cross-sectional imaging for better   assessment. Further workup for lymphoproliferative disease may be helpful.    Circumferential bladder wall thickening as can be seen with decompression   or component of cystitis.        Prelim: No obvious retroperitoneal hematoma. Gallbladder wall thickening.   Bladder wall thickening. Retroperitoneal/pelvic lymphadenopathy.   Persistent right > left pleural effusion. Small pericardial effusion.   Official report to follow.    < end of copied text >  < from: Xray Chest 1 View- PORTABLE-Urgent (Xray Chest 1 View- PORTABLE-Urgent .) (25 @ 16:53) >    IMPRESSION: Sternotomy, left loop recorder, cervical spine hardware   noted. Increasing atelectatic consolidation of left lower lobe with   effusion.    < end of copied text >        ASSESSMENT :     Acute renal failure    HTN (hypertension)    DM (diabetes mellitus)    Myasthenia gravis    Hypercholesterolemia    CVA (cerebrovascular accident)    Peripheral neuropathy    BPH (benign prostatic hyperplasia)    Major depression    Lipoma of chest wall    MG with thymoma (myasthena gravis)    H/O cataract extraction        PLAN:  HPI:  Patient is a 70M, from 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 Patient reports he has been feeling nauseous for a few days but no vomiting. He reports chronic increased urinary frequency and sensation of incomplete voiding. He denies all other symptoms - fever, chills, cough, chest pain, SoB, palpitations abdominal pain, diarrhea, dysuria, arm or leg numbness or tingling. Reports regular brown stool - denies black or bloody stool.  (2025 21:32)        # DC PLAN: BACK TO [facility] AFTER ABDOMINAL U/S TO EVALUATE LIKELY RETROPERITONEAL LYMPHADENOPATHY   - WILL REFER HIM TO ONCOLOGY & GI CONSULT (FOR EGD& COLONOSCOPY) AS OUT PATIENT FOR FURTHER FOLLOWUP  - MACROCYTIC ANEMIA, ANEMIA OF CKD   - LIKELY MYELOSUPPRESSION / ANEMIA DUE TO AZATHIOPRINE  - WILL SUPPLEMENT B12, FA, FESO4     # [7/10] CASE D/W PATIENT'S PARTNER MILLI DONOVAN VIA PHONE. CASE DISCUSSED AT LENGTH. ALL QUESTIONS ANSWERED.     # CASE D/W PATIENT AND PARTNER MILLI DONOVAN AT BEDSIDE, ALL QUESTIONS ANSWERED. DISCUSSED RECOMMENDATIONS AS MADE BY MULTIDISCIPLINARY TEAM. DISCUSSED THAT PROGNOSIS IS GUARDED. THEY VERBALIZED UNDERSTANDING. IN DISCUSSION REGARDING GOC - PATIENT WISHES TO BE FULL CODE.        # HYPERTENSIVE URGENCY    # HTN - CONTROLLED   - TELEMETRY  - ECHO - LV EF - 70 - 75%, G1DD  - NOTED TROPONINS  - S/P IV HYDRALAZINE  - HYDRALAZINE  - PROCARDIA  - LABETALOL  - LOSARTAN  - SPIRONOLACTONE  - NOTED SERUM RENIN, ALDOSTERONE LEVELS  - F/U PLASMA METANEPHRINES  - CARDIOLOGY CONSULT  - NEPHROLOGY CONSULT  - ENDOCRINOLOGY CONSULT    # H/O CARDIAC ARRHYTHMIA - LOOP RECORDER IN PLACE  # H/O STERNOTOMY DUE TO THYMECTOMY      # MICHEL ON CKD STAGE 3 B  # BPH, OBSTRUCTIVE UROPATHY  - IV FLUIDS  - PVR - 0 ML  - MONITOR CR  - AVOID NEPHROTOXIC AGENTS  - NEPHROLOGY CONSULT    # ACUTE GINGIVOSTOMATITIS  # ? GPC IN BLOOD - CONTAMINANT ? - F/U FINAL CULTURES  - PLACE ON UNASYN, F/U BCX  - MAGIC MOUTHWASH  - ID CONSULT    # HYPOGLYCEMIA - RESOLVED  - MONITORING FINGERSTICKS    # ANEMIA  - MACROCYTIC ANEMIA, ANEMIA OF CKD   - LIKELY MYELOSUPPRESSION / ANEMIA DUE TO AZATHIOPRINE    - TREND HGB  - ANEMIA PANEL  - DENIES MELENA, HEMATOCHEZIA, HEMATEMESIS, HEMATURIA  - NOTED CT A/P  - GASTROENTEROLOGY CONSULT    # ? LYMPHADENOPATHY ON CT A/P   - F/U U/S ABDOMEN AS OUTPATIENT  - DEFER CT W/ CONTRAST GIVEN RENAL DYSFUNCTION  - PER FAMILY PATIENT MAY HAVE HARDWARE THAT IS NOT COMPATIBLE WITH MRI  - HEME/ONC CONSULT    # DENIES DYSURIA, UA NEGATIVE FOR LEUKOCYTE ESTERASE AND NITRITES      # MYASTHENIA GRAVIS S/P THYMECTOMY/STERNOTOMY - ON AZATHIOPRINE, PREDNISONE, PYRIDOSTIGMINE  - D/W NEUROLOGY - RECOMMENDED F/U AS OUTPATIENT FOR FURTHER PREDNISONE TAPER/MANAGEMENT  - NEUROLOGY CONSULT    # DM, DIABETIC NEPHROPATHY, DIABETIC RETINOPATHY, DIABETIC PERIPHERAL NEUROPATHY  - SSI + FS    # CVA W/ MILD RIGHT HP    # IMPAIRED GAIT DUE TO GENERALIZED MUSCLE WEAKNESS, CVA, MYASTHENIA GRAVIS, CERVICAL SPINAL STENOSIS - H/O CERVICAL SPINE INSTRUMENTATION - HARDWARE IN PLACE     # PAD  # GI AND DVT PPX

## 2025-07-11 NOTE — PROGRESS NOTE ADULT - PROBLEM SELECTOR PROBLEM 9
BPH (benign prostatic hyperplasia)
Prophylactic measure
BPH (benign prostatic hyperplasia)
BPH (benign prostatic hyperplasia)
Prophylactic measure
Prophylactic measure
BPH (benign prostatic hyperplasia)

## 2025-07-11 NOTE — PROGRESS NOTE ADULT - ASSESSMENT
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 Patient reports he has been feeling nauseous for a few days but no vomiting. He reports chronic increased urinary frequency and sensation of incomplete voiding. He denies all other symptoms - fever, chills, cough, chest pain, SoB, palpitations abdominal pain, diarrhea, dysuria, arm or leg numbness or tingling. Reports regular brown stool - denies black or bloody stool.  (25 Jun 2025 21:32)

## 2025-07-11 NOTE — PROGRESS NOTE ADULT - NUTRITIONAL ASSESSMENT
Diet, Regular:   Consistent Carbohydrate {No Snacks}  DASH/TLC {Sodium & Cholesterol Restricted}  Soft and Bite Sized (SOFTBTSZ)  No Concentrated Potassium  Supplement Feeding Modality:  Oral  Glucerna Shake Cans or Servings Per Day:  1       Frequency:  Two Times a day (07-10-25 @ 11:58) [Active]

## 2025-07-11 NOTE — PROVIDER CONTACT NOTE (CRITICAL VALUE NOTIFICATION) - TEST AND RESULT REPORTED:
glucose 49
Blood culture drawn on the  8th of July 2025, positive growth in Anaerobic bottlegram positive cocci in clusters.
glucose 47
K+= 2.9, Glucose 40

## 2025-07-11 NOTE — PROGRESS NOTE ADULT - PROBLEM SELECTOR PLAN 7
Hx of CVA w/ residual right sided weakness on aspirin 81mg qd, atorvastatin 80mg qd   - c/w home meds -  Continue with ASA 81 mg  -  Continue with Atorvastatin

## 2025-07-11 NOTE — PROGRESS NOTE ADULT - SUBJECTIVE AND OBJECTIVE BOX
C A R D I O L O G Y  **********************************    DATE OF SERVICE: 07-11-25    Patient denies chest pain or shortness of breath.   Review of symptoms otherwise negative.    acetaminophen     Tablet .. 650 milliGRAM(s) Oral every 6 hours PRN  ampicillin/sulbactam  IVPB      ampicillin/sulbactam  IVPB 3 Gram(s) IV Intermittent every 6 hours  aspirin  chewable 81 milliGRAM(s) Oral daily  atorvastatin 80 milliGRAM(s) Oral at bedtime  azaTHIOprine 100 milliGRAM(s) Oral daily  cholecalciferol 1000 Unit(s) Oral daily  cyanocobalamin 1000 MICROGram(s) Oral daily  dextrose 5%. 1000 milliLiter(s) IV Continuous <Continuous>  dextrose 50% Injectable 25 Gram(s) IV Push once  dextrose Oral Gel 15 Gram(s) Oral once PRN  ferrous    sulfate 325 milliGRAM(s) Oral two times a day  finasteride 5 milliGRAM(s) Oral daily  FIRST- Mouthwash  BLM 30 milliLiter(s) Swish and Spit three times a day  folic acid 1 milliGRAM(s) Oral daily  heparin   Injectable 5000 Unit(s) SubCutaneous every 8 hours  hydrALAZINE 100 milliGRAM(s) Oral every 8 hours  insulin lispro (ADMELOG) corrective regimen sliding scale   SubCutaneous at bedtime  insulin lispro (ADMELOG) corrective regimen sliding scale   SubCutaneous three times a day before meals  labetalol 400 milliGRAM(s) Oral every 8 hours  losartan 100 milliGRAM(s) Oral at bedtime  NIFEdipine XL 90 milliGRAM(s) Oral <User Schedule>  ondansetron Injectable 4 milliGRAM(s) IV Push every 8 hours PRN  predniSONE   Tablet 20 milliGRAM(s) Oral daily  pyridostigmine 60 milliGRAM(s) Oral <User Schedule>  sertraline 50 milliGRAM(s) Oral daily  spironolactone 50 milliGRAM(s) Oral daily  tamsulosin 0.4 milliGRAM(s) Oral at bedtime                            7.7    5.98  )-----------( 221      ( 10 Jul 2025 05:59 )             23.3       Hemoglobin: 7.7 g/dL (07-10 @ 05:59)  Hemoglobin: 8.1 g/dL (07-09 @ 05:56)  Hemoglobin: 8.1 g/dL (07-08 @ 07:39)      07-10    137  |  106  |  29[H]  ----------------------------<  92  4.1   |  29  |  1.68[H]    Ca    8.8      10 Jul 2025 05:59      Creatinine Trend: 1.68<--, 1.66<--, 1.81<--, 1.68<--, 1.74<--, 1.74<--    COAGS:           T(C): 36.4 (07-11-25 @ 11:14), Max: 36.7 (07-10-25 @ 19:53)  HR: 75 (07-11-25 @ 11:14) (61 - 75)  BP: 160/69 (07-11-25 @ 11:14) (138/80 - 194/74)  RR: 18 (07-11-25 @ 11:14) (17 - 18)  SpO2: 98% (07-11-25 @ 11:14) (95% - 98%)  Wt(kg): --    I&O's Summary    10 Jul 2025 07:01  -  11 Jul 2025 07:00  --------------------------------------------------------  IN: 774 mL / OUT: 1700 mL / NET: -926 mL          HEENT:  (-)icterus (-)pallor  CV: N S1 S2 1/6 BOUBACAR (+)2 Pulses B/l  Resp:  Clear to ausculatation B/L, normal effort  GI: (+) BS Soft, NT, ND  Lymph:  (-)Edema, (-)obvious lymphadenopathy  Skin: Warm to touch, Normal turgor  Psych: Appropriate mood and affect      TELEMETRY: 	  sinus        ASSESSMENT/PLAN: 	70y  Male PMHx of Myasthenia gravis s/p thymectomy, HTN, 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 hyperaldosterone state  aldosterone appears wnl, endo recs noted  -  TSH wnl   - cont aldactone 50 mg PO QD  - r/o for pheo  - would need Renal artery dopplers likely as an oupt since may not be done in house     # Abnormal EKG  - echo noted, suspect it is due to hypertensive heart disease given his profoundly elevated blood pressures however can arrange for outpt cardiac MRI     # MG  - neuro eval  noted    Ta Mercado MD, [facility]  BEEPER [phone]

## 2025-07-11 NOTE — PROGRESS NOTE ADULT - PROBLEM SELECTOR PLAN 5
Hx of myasthenia gravis s/p thymectomy on prednisone 20mg qd, azathioprine 50mg qd, pyridostigmine 60mg @1AM, 6AM, 10AM, 1PM, 5PM, 9PM   Not in crisis   - c/w home meds
-   History of myasthenia gravis s/p thymectomy on prednisone 20mg qd, azathioprine 50mg qd, pyridostigmine 60mg @1AM, 6AM, 10AM, 1PM, 5PM, 9PM   -  Not in exacerbation  -  Continue with Prednisone  -  Continue with Azathioprine  -  Continue with Pyridostigmine
Hx of myasthenia gravis s/p thymectomy on prednisone 20mg qd, azathioprine 50mg qd, pyridostigmine 60mg @1AM, 6AM, 10AM, 1PM, 5PM, 9PM   Not in crisis   - c/w home meds
Hx of IDT2DM on Yzjuapd38/30 10U qd and Novolin 100 ISS     - c/w lantus 6U qhs + BILLIE AC QHS  - controlled
Hx of myasthenia gravis s/p thymectomy on prednisone 20mg qd, azathioprine 50mg qd, pyridostigmine 60mg @1AM, 6AM, 10AM, 1PM, 5PM, 9PM   Not in crisis   - c/w home meds
Hx of IDT2DM on Exkggur80/30 10U qd and Novolin 100 ISS   - A1C 6.3  - c/w lantus 6U qhs + BILLIE AC QHS  - Monitor BS ACHS  - 6/30 BMP glucose 47, then improved 108--> 200s with eating.
Hx of IDT2DM on Kevrznu83/30 10U qd and Novolin 100 ISS     - c/w lantus 6U qhs + BILLIE AC QHS  - controlled
Hx of myasthenia gravis s/p thymectomy on prednisone 20mg qd, azathioprine 50mg qd, pyridostigmine 60mg @1AM, 6AM, 10AM, 1PM, 5PM, 9PM   Not in crisis   - c/w home meds
Hx of myasthenia gravis s/p thymectomy on prednisone 20mg qd, azathioprine 50mg qd, pyridostigmine 60mg @1AM, 6AM, 10AM, 1PM, 5PM, 9PM   Not in crisis   - c/w home meds

## 2025-07-11 NOTE — PROGRESS NOTE ADULT - PROBLEM SELECTOR PROBLEM 7
History of CVA (cerebrovascular accident)
Major depression
History of CVA (cerebrovascular accident)
Major depression
Major depression
History of CVA (cerebrovascular accident)

## 2025-07-11 NOTE — PROGRESS NOTE ADULT - PROBLEM SELECTOR PLAN 8
Hx of depression on sertraline 50mg qd   - c/w home meds -  Continue with Sertraline  -  Supportive care

## 2025-07-11 NOTE — PROGRESS NOTE ADULT - SUBJECTIVE AND OBJECTIVE BOX
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 Pt a/w hypertensive urgency and MICHEL. Nephrology consulted for Elevated serum creatinine.    Hospital Medications: Medications reviewed.    REVIEW OF SYSTEMS:  Gen: no fever or chills  Cards: no chest pain  Resp: no dyspnea  GI: no nausea or vomiting or diarrhea  : no dysuria or hematuria  Vascular: no LE edema    VITALS:  T(F): 97.6 (07-11-25 @ 16:02), Max: 98.1 (07-10-25 @ 19:53)  HR: 77 (07-11-25 @ 16:02)  BP: 156/79 (07-11-25 @ 16:02)  RR: 19 (07-11-25 @ 16:02)  SpO2: 99% (07-11-25 @ 16:02)  Wt(kg): --    07-10 @ 07:01  -  07-11 @ 07:00  --------------------------------------------------------  IN: 774 mL / OUT: 1700 mL / NET: -926 mL    07-11 @ 07:01  -  07-11 @ 17:10  --------------------------------------------------------  IN: 574 mL / OUT: 200 mL / NET: 374 mL        PHYSICAL EXAM:  Gen: NAD, calm  HEENT: +facial/ periorbital edema    Neck: no JVD  Cards: RRR, +S1/S2,+BOUBACAR  Resp: CTA B/L  GI: soft, NT/ND, NABS  : no CVA tenderness  Extremities: no edema B/L     LABS:  07-10    137  |  106  |  29[H]  ----------------------------<  92  4.1   |  29  |  1.68[H]    Ca    8.8      10 Jul 2025 05:59      Creatinine Trend: 1.68 <--, 1.66 <--, 1.81 <--, 1.68 <--                        7.7    5.98  )-----------( 221      ( 10 Jul 2025 05:59 )             23.3     Urine Studies:  Urinalysis Basic - ( 10 Jul 2025 05:59 )    Color:  / Appearance:  / SG:  / pH:   Gluc: 92 mg/dL / Ketone:   / Bili:  / Urobili:    Blood:  / Protein:  / Nitrite:    Leuk Esterase:  / RBC:  / WBC    Sq Epi:  / Non Sq Epi:  / Bacteria:

## 2025-07-11 NOTE — PROGRESS NOTE ADULT - PROBLEM SELECTOR PLAN 9
Hx of BPH on finasteride 5mg qd and tamsulosin 0.4mg qd   - c/w home meds -  Continue with Tamsulosin

## 2025-07-11 NOTE — PROGRESS NOTE ADULT - PROBLEM SELECTOR PLAN 2
-  BUN/Cr 30/1.81 (Baseline Scr 1.3)  -  Last creatinine 1.68 today  -  s/p 1L NS in ED   -  Post-void bladder scan - 0mL  -  Renal US - negative for hydronephrosis   -  s/p IVF  -  Creatinine improving 1.99 > 1.66 > 1.7>1.81>1.41 -> 1.68 today  -  Monitor BMP daily  -  Nephrology Dr Lan following

## 2025-07-11 NOTE — PROGRESS NOTE ADULT - PROBLEM SELECTOR PLAN 6
-   History of IDT2DM on Mldujxr89/30 10U qd and Novolin 100 ISS   -   Patient reports he has been drinking rockstar energy drinks causing high sugars   -   HgbA1C 6.3  - sliding scale only, on d/c rec by Kristy to restart jardiance  - Monitor BS ACHS -   History of IDT2DM on Nzefajv63/30 10U qd and Novolin 100 ISS   -   Patient reports he has been drinking rockstar energy drinks causing high sugars   -   HgbA1C 6.3  -   Finger sticks AC and Hs  -   Sliding scale as ordered

## 2025-07-11 NOTE — PROGRESS NOTE ADULT - PROBLEM SELECTOR PROBLEM 8
BPH (benign prostatic hyperplasia)
Major depression
BPH (benign prostatic hyperplasia)
Major depression
Major depression
BPH (benign prostatic hyperplasia)
Major depression

## 2025-07-11 NOTE — PROGRESS NOTE ADULT - PROBLEM SELECTOR PLAN 10
DVT prophylaxis - heparin SQ    ####Discharge planning  from [facility], will discharge back to facility pending stable b/p    f/u QMA recs  CM following -   DVT prophylaxis - heparin SQ    ####Discharge planning  from [facility], will discharge back to facility pending stable b/p  Blood cultures from 7/8 positive with 2/4 bottles  Infectious disease consulted

## 2025-07-11 NOTE — PROGRESS NOTE ADULT - PROBLEM SELECTOR PLAN 3
-   Patient  sent in from NH for Hgb 7.9 in a routine lab   -   Total iron 37, TIBC 233, % iron 16  -   No active signs of bleeding   -   Macrocytic anemia   -   B12 and folate serum wnl   -   Continue with home iron and folic acid supplement  -   Completed Venofer 200mg IV qd x 3 doses  -   Started Epogen per nephrology  -   QMA following  -   has moderate to severe Macrocytic anemia suggestive of myelodysplastic anemia per hem/onc  -   Recommend bone marrow aspiration and biopsy as an outpatient.

## 2025-07-12 LAB
ANION GAP SERPL CALC-SCNC: 4 MMOL/L — LOW (ref 5–17)
BUN SERPL-MCNC: 22 MG/DL — HIGH (ref 7–18)
CALCIUM SERPL-MCNC: 8.7 MG/DL — SIGNIFICANT CHANGE UP (ref 8.4–10.5)
CHLORIDE SERPL-SCNC: 106 MMOL/L — SIGNIFICANT CHANGE UP (ref 96–108)
CO2 SERPL-SCNC: 29 MMOL/L — SIGNIFICANT CHANGE UP (ref 22–31)
CREAT SERPL-MCNC: 1.87 MG/DL — HIGH (ref 0.5–1.3)
CULTURE RESULTS: ABNORMAL
EGFR: 39 ML/MIN/1.73M2 — LOW
EGFR: 39 ML/MIN/1.73M2 — LOW
GLUCOSE BLDC GLUCOMTR-MCNC: 118 MG/DL — HIGH (ref 70–99)
GLUCOSE BLDC GLUCOMTR-MCNC: 131 MG/DL — HIGH (ref 70–99)
GLUCOSE BLDC GLUCOMTR-MCNC: 168 MG/DL — HIGH (ref 70–99)
GLUCOSE BLDC GLUCOMTR-MCNC: 278 MG/DL — HIGH (ref 70–99)
GLUCOSE SERPL-MCNC: 103 MG/DL — HIGH (ref 70–99)
HCT VFR BLD CALC: 25 % — LOW (ref 39–50)
HGB BLD-MCNC: 8 G/DL — LOW (ref 13–17)
MCHC RBC-ENTMCNC: 32 G/DL — SIGNIFICANT CHANGE UP (ref 32–36)
MCHC RBC-ENTMCNC: 35.7 PG — HIGH (ref 27–34)
MCV RBC AUTO: 111.6 FL — HIGH (ref 80–100)
NRBC BLD AUTO-RTO: 0 /100 WBCS — SIGNIFICANT CHANGE UP (ref 0–0)
ORGANISM # SPEC MICROSCOPIC CNT: ABNORMAL
ORGANISM # SPEC MICROSCOPIC CNT: ABNORMAL
PLATELET # BLD AUTO: 203 K/UL — SIGNIFICANT CHANGE UP (ref 150–400)
POTASSIUM SERPL-MCNC: 3.9 MMOL/L — SIGNIFICANT CHANGE UP (ref 3.5–5.3)
POTASSIUM SERPL-SCNC: 3.9 MMOL/L — SIGNIFICANT CHANGE UP (ref 3.5–5.3)
RBC # BLD: 2.24 M/UL — LOW (ref 4.2–5.8)
RBC # FLD: 18.6 % — HIGH (ref 10.3–14.5)
SODIUM SERPL-SCNC: 139 MMOL/L — SIGNIFICANT CHANGE UP (ref 135–145)
SPECIMEN SOURCE: SIGNIFICANT CHANGE UP
WBC # BLD: 5.15 K/UL — SIGNIFICANT CHANGE UP (ref 3.8–10.5)
WBC # FLD AUTO: 5.15 K/UL — SIGNIFICANT CHANGE UP (ref 3.8–10.5)

## 2025-07-12 RX ORDER — VANCOMYCIN HCL IN 5 % DEXTROSE 1.5G/250ML
1000 PLASTIC BAG, INJECTION (ML) INTRAVENOUS ONCE
Refills: 0 | Status: COMPLETED | OUTPATIENT
Start: 2025-07-12 | End: 2025-07-13

## 2025-07-12 RX ADMIN — AZATHIOPRINE 100 MILLIGRAM(S): 50 TABLET ORAL at 12:06

## 2025-07-12 RX ADMIN — HEPARIN SODIUM 5000 UNIT(S): 1000 INJECTION INTRAVENOUS; SUBCUTANEOUS at 05:59

## 2025-07-12 RX ADMIN — Medication 325 MILLIGRAM(S): at 17:13

## 2025-07-12 RX ADMIN — PYRIDOSTIGMINE BROMIDE 60 MILLIGRAM(S): 5 INJECTION, SOLUTION INTRAVENOUS; PARENTERAL at 21:04

## 2025-07-12 RX ADMIN — INSULIN LISPRO 1: 100 INJECTION, SOLUTION INTRAVENOUS; SUBCUTANEOUS at 16:45

## 2025-07-12 RX ADMIN — LOSARTAN POTASSIUM 100 MILLIGRAM(S): 100 TABLET, FILM COATED ORAL at 21:08

## 2025-07-12 RX ADMIN — Medication 100 MILLIGRAM(S): at 05:59

## 2025-07-12 RX ADMIN — PYRIDOSTIGMINE BROMIDE 60 MILLIGRAM(S): 5 INJECTION, SOLUTION INTRAVENOUS; PARENTERAL at 17:13

## 2025-07-12 RX ADMIN — Medication 100 MILLIGRAM(S): at 21:08

## 2025-07-12 RX ADMIN — PYRIDOSTIGMINE BROMIDE 60 MILLIGRAM(S): 5 INJECTION, SOLUTION INTRAVENOUS; PARENTERAL at 09:55

## 2025-07-12 RX ADMIN — Medication 1000 UNIT(S): at 12:06

## 2025-07-12 RX ADMIN — AMPICILLIN SODIUM AND SULBACTAM SODIUM 200 GRAM(S): 1; .5 INJECTION, POWDER, FOR SOLUTION INTRAMUSCULAR; INTRAVENOUS at 06:00

## 2025-07-12 RX ADMIN — ATORVASTATIN CALCIUM 80 MILLIGRAM(S): 80 TABLET, FILM COATED ORAL at 21:03

## 2025-07-12 RX ADMIN — LABETALOL HYDROCHLORIDE 400 MILLIGRAM(S): 200 TABLET, FILM COATED ORAL at 01:03

## 2025-07-12 RX ADMIN — HEPARIN SODIUM 5000 UNIT(S): 1000 INJECTION INTRAVENOUS; SUBCUTANEOUS at 13:18

## 2025-07-12 RX ADMIN — LABETALOL HYDROCHLORIDE 400 MILLIGRAM(S): 200 TABLET, FILM COATED ORAL at 05:58

## 2025-07-12 RX ADMIN — PYRIDOSTIGMINE BROMIDE 60 MILLIGRAM(S): 5 INJECTION, SOLUTION INTRAVENOUS; PARENTERAL at 01:04

## 2025-07-12 RX ADMIN — FOLIC ACID 1 MILLIGRAM(S): 1 TABLET ORAL at 12:07

## 2025-07-12 RX ADMIN — SERTRALINE 50 MILLIGRAM(S): 100 TABLET, FILM COATED ORAL at 12:06

## 2025-07-12 RX ADMIN — CYANOCOBALAMIN 1000 MICROGRAM(S): 1000 INJECTION INTRAMUSCULAR; SUBCUTANEOUS at 12:06

## 2025-07-12 RX ADMIN — PYRIDOSTIGMINE BROMIDE 60 MILLIGRAM(S): 5 INJECTION, SOLUTION INTRAVENOUS; PARENTERAL at 12:08

## 2025-07-12 RX ADMIN — PYRIDOSTIGMINE BROMIDE 60 MILLIGRAM(S): 5 INJECTION, SOLUTION INTRAVENOUS; PARENTERAL at 05:58

## 2025-07-12 RX ADMIN — FINASTERIDE 5 MILLIGRAM(S): 1 TABLET, FILM COATED ORAL at 12:06

## 2025-07-12 RX ADMIN — Medication 90 MILLIGRAM(S): at 12:07

## 2025-07-12 RX ADMIN — AMPICILLIN SODIUM AND SULBACTAM SODIUM 200 GRAM(S): 1; .5 INJECTION, POWDER, FOR SOLUTION INTRAMUSCULAR; INTRAVENOUS at 17:13

## 2025-07-12 RX ADMIN — LABETALOL HYDROCHLORIDE 400 MILLIGRAM(S): 200 TABLET, FILM COATED ORAL at 13:18

## 2025-07-12 RX ADMIN — HEPARIN SODIUM 5000 UNIT(S): 1000 INJECTION INTRAVENOUS; SUBCUTANEOUS at 21:03

## 2025-07-12 RX ADMIN — INSULIN LISPRO 3: 100 INJECTION, SOLUTION INTRAVENOUS; SUBCUTANEOUS at 12:20

## 2025-07-12 RX ADMIN — LABETALOL HYDROCHLORIDE 400 MILLIGRAM(S): 200 TABLET, FILM COATED ORAL at 21:08

## 2025-07-12 RX ADMIN — PREDNISONE 20 MILLIGRAM(S): 20 TABLET ORAL at 05:59

## 2025-07-12 RX ADMIN — AMPICILLIN SODIUM AND SULBACTAM SODIUM 200 GRAM(S): 1; .5 INJECTION, POWDER, FOR SOLUTION INTRAMUSCULAR; INTRAVENOUS at 12:05

## 2025-07-12 RX ADMIN — Medication 50 MILLIGRAM(S): at 05:58

## 2025-07-12 RX ADMIN — AMPICILLIN SODIUM AND SULBACTAM SODIUM 200 GRAM(S): 1; .5 INJECTION, POWDER, FOR SOLUTION INTRAMUSCULAR; INTRAVENOUS at 00:56

## 2025-07-12 RX ADMIN — Medication 81 MILLIGRAM(S): at 12:07

## 2025-07-12 RX ADMIN — Medication 100 MILLIGRAM(S): at 13:18

## 2025-07-12 RX ADMIN — Medication 325 MILLIGRAM(S): at 05:59

## 2025-07-12 RX ADMIN — TAMSULOSIN HYDROCHLORIDE 0.4 MILLIGRAM(S): 0.4 CAPSULE ORAL at 21:03

## 2025-07-12 NOTE — PROGRESS NOTE ADULT - ASSESSMENT
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 Pt a/w hypertensive urgency and MICHEL.   Nephrology consulted for Elevated serum creatinine.    1. MICHEL- as per H& P; last SCr 1.3. MICHEL likely hemodynamically mediated due to uncontrolled HTN. Renal function improving  for which Losartan was resumed. Renal function overall remains stable now.  Feurea 13.52%;   UA with 300 protein, no blood. FeNa 10%;  Renal US with no hydro.  TTE with grade 1 diastolic dysfunction, EF 70-75%; ?hypertensive vs infiltrative cardiomyopathy. SIFE neg & K/l wnl. Pt b/l pleural effusions on TTE. Check CXR;  Strict I/Os. Avoid nephrotoxins/ NSAIDs/ RCA. Monitor BMP.    2. Proteinuria- UA with 300 protein, no blood.  UPCr on 6/29,  0.48 g/day. No acute interventions at this time.    3. Hypertensive urgency- BP now improving. Advised to stop using  energy drinks due to increased caffeine which can increase BP  c/w Labetalol 400mg PO q 8hrs. c/w Nifedipine ER 90mg at noon. Recc to increase Aldactone to 75mg PO daily for resistant HTN, if potassium is nwl . c/w Losartan 100 mg PO qhs due to elevated BP at night; +nocturnal HTN; recc outpt sleep study to r/o BRET.   ?Consider decreasing frequency of pyridostigmine?- defer to Neuro  Recc Renal US with dopplers as an outpt to r/o HOANG (unavailable at [facility]).    c/w low salt diet. Monitor BP  4. Hypokalemia- Kuldip/ Renin; 3.19. Not consistent with hypoaldosteronism. Renin 6.479.   Hypokalemia due to shifting from excessive insulin.   Aldactone as above.  Check VBG. Monitor serum potassium  5. Iron deficiency anemia- low hgb. Check FOBT. tsat 16% with ferriitn 236- Finished Venofer 200mg IV qd x 3 doses. s/p Epo 10k SC x1 on 6/30 & 7/6. Monitor hgb    [facility] NEPHROLOGY  Bethel Smith M.D.  Guillermo Jimenez D.O.  Kathleen Lan M.D.  MD Kimi Rodríguez, MSN, ANP-C    Telephone: [phone]  Facsimile: [phone]    [address] [place] [address], [address]  [place] [address]

## 2025-07-12 NOTE — PROGRESS NOTE ADULT - SUBJECTIVE AND OBJECTIVE BOX
Patient is a 70y old  Male who presents with a chief complaint of MICHEL (12 Jul 2025 07:47)    PATIENT IS SEEN AND EXAMINED IN MEDICAL FLOOR.  NGT [    ]    ERVIN [   ]      GT [   ]    ALLERGIES:  No Known Allergies      Daily     Daily     VITALS:    Vital Signs Last 24 Hrs  T(C): 37 (12 Jul 2025 08:00), Max: 37 (12 Jul 2025 08:00)  T(F): 98.6 (12 Jul 2025 08:00), Max: 98.6 (12 Jul 2025 08:00)  HR: 66 (12 Jul 2025 08:00) (66 - 77)  BP: 123/58 (12 Jul 2025 08:00) (123/58 - 180/70)  BP(mean): 90 (12 Jul 2025 04:48) (90 - 104)  RR: 18 (12 Jul 2025 08:00) (18 - 19)  SpO2: 99% (12 Jul 2025 08:00) (96% - 99%)    Parameters below as of 12 Jul 2025 08:00  Patient On (Oxygen Delivery Method): room air        LABS:    CBC Full  -  ( 12 Jul 2025 05:38 )  WBC Count : 5.15 K/uL  RBC Count : 2.24 M/uL  Hemoglobin : 8.0 g/dL  Hematocrit : 25.0 %  Platelet Count - Automated : 203 K/uL  Mean Cell Volume : 111.6 fl  Mean Cell Hemoglobin : 35.7 pg  Mean Cell Hemoglobin Concentration : 32.0 g/dL  Auto Neutrophil # : x  Auto Lymphocyte # : x  Auto Monocyte # : x  Auto Eosinophil # : x  Auto Basophil # : x  Auto Neutrophil % : x  Auto Lymphocyte % : x  Auto Monocyte % : x  Auto Eosinophil % : x  Auto Basophil % : x      07-12    139  |  106  |  22[H]  ----------------------------<  103[H]  3.9   |  29  |  1.87[H]    Ca    8.7      12 Jul 2025 05:38      CAPILLARY BLOOD GLUCOSE      POCT Blood Glucose.: 131 mg/dL (12 Jul 2025 07:46)  POCT Blood Glucose.: 141 mg/dL (11 Jul 2025 20:58)  POCT Blood Glucose.: 170 mg/dL (11 Jul 2025 16:51)  POCT Blood Glucose.: 250 mg/dL (11 Jul 2025 11:36)          Creatinine Trend: 1.87<--, 1.68<--, 1.66<--, 1.81<--, 1.68<--, 1.74<--  I&O's Summary    11 Jul 2025 07:01  -  12 Jul 2025 07:00  --------------------------------------------------------  IN: 624 mL / OUT: 500 mL / NET: 124 mL            Blood Blood-Peripheral  07-08 @ 18:00   Growth in anaerobic bottle: Gram Positive Cocci in Clusters  Direct identification is available within approximately 3-5  hours either by Blood Panel Multiplexed PCR or Direct  MALDI-TOF. Details: [email or web address]  --  Blood Culture PCR      Blood Blood-Peripheral  07-08 @ 17:45   No growth at 72 Hours  --  --          MEDICATIONS:    MEDICATIONS  (STANDING):  ampicillin/sulbactam  IVPB      ampicillin/sulbactam  IVPB 3 Gram(s) IV Intermittent every 6 hours  aspirin  chewable 81 milliGRAM(s) Oral daily  atorvastatin 80 milliGRAM(s) Oral at bedtime  azaTHIOprine 100 milliGRAM(s) Oral daily  cholecalciferol 1000 Unit(s) Oral daily  cyanocobalamin 1000 MICROGram(s) Oral daily  dextrose 5%. 1000 milliLiter(s) (50 mL/Hr) IV Continuous <Continuous>  dextrose 50% Injectable 25 Gram(s) IV Push once  ferrous    sulfate 325 milliGRAM(s) Oral two times a day  finasteride 5 milliGRAM(s) Oral daily  FIRST- Mouthwash  BLM 30 milliLiter(s) Swish and Spit three times a day  folic acid 1 milliGRAM(s) Oral daily  heparin   Injectable 5000 Unit(s) SubCutaneous every 8 hours  hydrALAZINE 100 milliGRAM(s) Oral every 8 hours  insulin lispro (ADMELOG) corrective regimen sliding scale   SubCutaneous three times a day before meals  insulin lispro (ADMELOG) corrective regimen sliding scale   SubCutaneous at bedtime  labetalol 400 milliGRAM(s) Oral every 8 hours  losartan 100 milliGRAM(s) Oral at bedtime  NIFEdipine XL 90 milliGRAM(s) Oral <User Schedule>  predniSONE   Tablet 20 milliGRAM(s) Oral daily  pyridostigmine 60 milliGRAM(s) Oral <User Schedule>  sertraline 50 milliGRAM(s) Oral daily  spironolactone 50 milliGRAM(s) Oral daily  tamsulosin 0.4 milliGRAM(s) Oral at bedtime      MEDICATIONS  (PRN):  acetaminophen     Tablet .. 650 milliGRAM(s) Oral every 6 hours PRN Temp greater or equal to 38C (100.4F), Mild Pain (1 - 3)  dextrose Oral Gel 15 Gram(s) Oral once PRN Blood Glucose LESS THAN 70 milliGRAM(s)/deciliter  ondansetron Injectable 4 milliGRAM(s) IV Push every 8 hours PRN Nausea and/or Vomiting      REVIEW OF SYSTEMS:                           ALL ROS DONE [ X   ]    CONSTITUTIONAL:  LETHARGIC [   ], FEVER [   ], UNRESPONSIVE [   ]  CVS:  CP  [   ], SOB, [   ], PALPITATIONS [   ], DIZZYNESS [   ]  RS: COUGH [   ], SPUTUM [   ]  GI: ABDOMINAL PAIN [   ], NAUSEA [   ], VOMITINGS [   ], DIARRHEA [   ], CONSTIPATION [   ]  :  DYSURIA [   ], NOCTURIA [   ], INCREASED FREQUENCY [   ], DRIBLING [   ],  SKELETAL: PAINFUL JOINTS [   ], SWOLLEN JOINTS [   ], NECK ACHE [   ], LOW BACK ACHE [   ],  SKIN : ULCERS [   ], RASH [   ], ITCHING [   ]  CNS: HEAD ACHE [   ], DOUBLE VISION [   ], BLURRED VISION [   ], AMS / CONFUSION [   ], SEIZURES [   ], WEAKNESS [   ],TINGLING / NUMBNESS [   ]        PHYSICAL EXAMINATION:    GENERAL APPEARANCE: NO DISTRESS  HEENT:  NO PALLOR, NO  JVD,  NO   NODES, NECK SUPPLE  CVS: S1 +, S2 +,   RS: AEEB,  OCCASIONAL  RALES +,   NO RONCHI  ABD: SOFT, NT, NO, BS +  EXT: NO PE  SKIN: WARM,   SKELETAL:  ROM ACCEPTABLE  CNS:  AAO X 3        RADIOLOGY :  RADIOLOGY AND READINGS REVIEWED      < from: US Renal (06.30.25 @ 09:48) >    IMPRESSION:    1. No hydronephrosis.  2. Increased renal cortical echogenicity compatible with renal   parenchymal disease.  3. There is limited visualization of the urinary bladder; however, on   limited views, the wall appears diffusely thickened. Suggest correlation   with urinalysis to exclude infection.  4. Partially imaged hypoechoic structure posterior to the left kidney may   correspond to the lymphadenopathy questioned on the prior CT. Further   evaluation is recommended as clinically.    < end of copied text >      < from: CT Abdomen and Pelvis No Cont (06.29.25 @ 23:36) >    IMPRESSION:  No retroperitoneal hematoma.    Soft tissue densities within the retroperitoneum next to the aorta   concerning for extensive lymphadenopathy versus tortuous vessels or a   combination of both. The evaluation is limited due to the lack of IV   contrast. Recommend contrast enhanced cross-sectional imaging for better   assessment. Further workup for lymphoproliferative disease may be helpful.    Circumferential bladder wall thickening as can be seen with decompression   or component of cystitis.        Prelim: No obvious retroperitoneal hematoma. Gallbladder wall thickening.   Bladder wall thickening. Retroperitoneal/pelvic lymphadenopathy.   Persistent right > left pleural effusion. Small pericardial effusion.   Official report to follow.    < end of copied text >  < from: Xray Chest 1 View- PORTABLE-Urgent (Xray Chest 1 View- PORTABLE-Urgent .) (07.03.25 @ 16:53) >    IMPRESSION: Sternotomy, left loop recorder, cervical spine hardware   noted. Increasing atelectatic consolidation of left lower lobe with   effusion.    < end of copied text >        ASSESSMENT :     Acute renal failure    HTN (hypertension)    DM (diabetes mellitus)    Myasthenia gravis    Hypercholesterolemia    CVA (cerebrovascular accident)    Peripheral neuropathy    BPH (benign prostatic hyperplasia)    Major depression    Lipoma of chest wall    MG with thymoma (myasthena gravis)    H/O cataract extraction        PLAN:  HPI:  Patient is a 70M, from [facility] ambulates with a walker, with PMHx of Myasthenia gravis s/p thymectomy, HTN, 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 Patient reports he has been feeling nauseous for a few days but no vomiting. He reports chronic increased urinary frequency and sensation of incomplete voiding. He denies all other symptoms - fever, chills, cough, chest pain, SoB, palpitations abdominal pain, diarrhea, dysuria, arm or leg numbness or tingling. Reports regular brown stool - denies black or bloody stool.  (25 Jun 2025 21:32)        # DC PLAN: BACK TO [facility] AFTER ABDOMINAL U/S TO EVALUATE LIKELY RETROPERITONEAL LYMPHADENOPATHY   - WILL REFER HIM TO ONCOLOGY & GI CONSULT (FOR EGD& COLONOSCOPY) AS OUT PATIENT FOR FURTHER FOLLOWUP  - MACROCYTIC ANEMIA, ANEMIA OF CKD   - LIKELY MYELOSUPPRESSION / ANEMIA DUE TO AZATHIOPRINE  - WILL SUPPLEMENT B12, FA, FESO4     # [7/10] CASE D/W PATIENT'S PARTNER MILLI DONOVAN VIA PHONE. CASE DISCUSSED AT LENGTH. ALL QUESTIONS ANSWERED.     # CASE D/W PATIENT AND PARTNER MILLI DONOVAN AT BEDSIDE, ALL QUESTIONS ANSWERED. DISCUSSED RECOMMENDATIONS AS MADE BY MULTIDISCIPLINARY TEAM. DISCUSSED THAT PROGNOSIS IS GUARDED. THEY VERBALIZED UNDERSTANDING. IN DISCUSSION REGARDING GOC - PATIENT WISHES TO BE FULL CODE.        # HYPERTENSIVE URGENCY    # HTN - CONTROLLED   - TELEMETRY  - ECHO - LV EF - 70 - 75%, G1DD  - NOTED TROPONINS  - S/P IV HYDRALAZINE  - HYDRALAZINE  - PROCARDIA  - LABETALOL  - LOSARTAN  - SPIRONOLACTONE  - NOTED SERUM RENIN, ALDOSTERONE LEVELS  - F/U PLASMA METANEPHRINES  - CARDIOLOGY CONSULT  - NEPHROLOGY CONSULT  - ENDOCRINOLOGY CONSULT    # H/O CARDIAC ARRHYTHMIA - LOOP RECORDER IN PLACE  # H/O STERNOTOMY DUE TO THYMECTOMY      # MICHEL ON CKD STAGE 3 B  # BPH, OBSTRUCTIVE UROPATHY  - IV FLUIDS  - PVR - 0 ML  - MONITOR CR  - AVOID NEPHROTOXIC AGENTS  - NEPHROLOGY CONSULT    # ACUTE GINGIVOSTOMATITIS  # ? GPC IN BLOOD - CONTAMINANT ? - F/U FINAL CULTURES  - PLACE ON UNASYN, F/U BCX  - MAGIC MOUTHWASH  - ID CONSULT    # HYPOGLYCEMIA - RESOLVED  - MONITORING FINGERSTICKS    # ANEMIA  - MACROCYTIC ANEMIA, ANEMIA OF CKD   - LIKELY MYELOSUPPRESSION / ANEMIA DUE TO AZATHIOPRINE    - TREND HGB  - ANEMIA PANEL  - DENIES MELENA, HEMATOCHEZIA, HEMATEMESIS, HEMATURIA  - NOTED CT A/P  - GASTROENTEROLOGY CONSULT    # ? LYMPHADENOPATHY ON CT A/P   - F/U U/S ABDOMEN AS OUTPATIENT  - DEFER CT W/ CONTRAST GIVEN RENAL DYSFUNCTION  - PER FAMILY PATIENT MAY HAVE HARDWARE THAT IS NOT COMPATIBLE WITH MRI  - HEME/ONC CONSULT    # DENIES DYSURIA, UA NEGATIVE FOR LEUKOCYTE ESTERASE AND NITRITES      # MYASTHENIA GRAVIS S/P THYMECTOMY/STERNOTOMY - ON AZATHIOPRINE, PREDNISONE, PYRIDOSTIGMINE  - D/W NEUROLOGY - RECOMMENDED F/U AS OUTPATIENT FOR FURTHER PREDNISONE TAPER/MANAGEMENT  - NEUROLOGY CONSULT    # DM, DIABETIC NEPHROPATHY, DIABETIC RETINOPATHY, DIABETIC PERIPHERAL NEUROPATHY  - SSI + FS    # CVA W/ MILD RIGHT HP    # IMPAIRED GAIT DUE TO GENERALIZED MUSCLE WEAKNESS, CVA, MYASTHENIA GRAVIS, CERVICAL SPINAL STENOSIS - H/O CERVICAL SPINE INSTRUMENTATION - HARDWARE IN PLACE     # PAD  # GI AND DVT PPX       Patient is a 70y old  Male who presents with a chief complaint of MICHEL (12 Jul 2025 07:47)    PATIENT IS SEEN AND EXAMINED IN MEDICAL FLOOR.      ALLERGIES:  No Known Allergies      VITALS:    Vital Signs Last 24 Hrs  T(C): 37 (12 Jul 2025 08:00), Max: 37 (12 Jul 2025 08:00)  T(F): 98.6 (12 Jul 2025 08:00), Max: 98.6 (12 Jul 2025 08:00)  HR: 66 (12 Jul 2025 08:00) (66 - 77)  BP: 123/58 (12 Jul 2025 08:00) (123/58 - 180/70)  BP(mean): 90 (12 Jul 2025 04:48) (90 - 104)  RR: 18 (12 Jul 2025 08:00) (18 - 19)  SpO2: 99% (12 Jul 2025 08:00) (96% - 99%)    Parameters below as of 12 Jul 2025 08:00  Patient On (Oxygen Delivery Method): room air        LABS:    CBC Full  -  ( 12 Jul 2025 05:38 )  WBC Count : 5.15 K/uL  RBC Count : 2.24 M/uL  Hemoglobin : 8.0 g/dL  Hematocrit : 25.0 %  Platelet Count - Automated : 203 K/uL  Mean Cell Volume : 111.6 fl  Mean Cell Hemoglobin : 35.7 pg  Mean Cell Hemoglobin Concentration : 32.0 g/dL  Auto Neutrophil # : x  Auto Lymphocyte # : x  Auto Monocyte # : x  Auto Eosinophil # : x  Auto Basophil # : x  Auto Neutrophil % : x  Auto Lymphocyte % : x  Auto Monocyte % : x  Auto Eosinophil % : x  Auto Basophil % : x      07-12    139  |  106  |  22[H]  ----------------------------<  103[H]  3.9   |  29  |  1.87[H]    Ca    8.7      12 Jul 2025 05:38      CAPILLARY BLOOD GLUCOSE      POCT Blood Glucose.: 131 mg/dL (12 Jul 2025 07:46)  POCT Blood Glucose.: 141 mg/dL (11 Jul 2025 20:58)  POCT Blood Glucose.: 170 mg/dL (11 Jul 2025 16:51)  POCT Blood Glucose.: 250 mg/dL (11 Jul 2025 11:36)          Creatinine Trend: 1.87<--, 1.68<--, 1.66<--, 1.81<--, 1.68<--, 1.74<--  I&O's Summary    11 Jul 2025 07:01  -  12 Jul 2025 07:00  --------------------------------------------------------  IN: 624 mL / OUT: 500 mL / NET: 124 mL            Blood Blood-Peripheral  07-08 @ 18:00   Growth in anaerobic bottle: Gram Positive Cocci in Clusters  Direct identification is available within approximately 3-5  hours either by Blood Panel Multiplexed PCR or Direct  MALDI-TOF. Details: [email or web address]  --  Blood Culture PCR      Blood Blood-Peripheral  07-08 @ 17:45   No growth at 72 Hours  --  --          MEDICATIONS:    MEDICATIONS  (STANDING):  ampicillin/sulbactam  IVPB      ampicillin/sulbactam  IVPB 3 Gram(s) IV Intermittent every 6 hours  aspirin  chewable 81 milliGRAM(s) Oral daily  atorvastatin 80 milliGRAM(s) Oral at bedtime  azaTHIOprine 100 milliGRAM(s) Oral daily  cholecalciferol 1000 Unit(s) Oral daily  cyanocobalamin 1000 MICROGram(s) Oral daily  dextrose 5%. 1000 milliLiter(s) (50 mL/Hr) IV Continuous <Continuous>  dextrose 50% Injectable 25 Gram(s) IV Push once  ferrous    sulfate 325 milliGRAM(s) Oral two times a day  finasteride 5 milliGRAM(s) Oral daily  FIRST- Mouthwash  BLM 30 milliLiter(s) Swish and Spit three times a day  folic acid 1 milliGRAM(s) Oral daily  heparin   Injectable 5000 Unit(s) SubCutaneous every 8 hours  hydrALAZINE 100 milliGRAM(s) Oral every 8 hours  insulin lispro (ADMELOG) corrective regimen sliding scale   SubCutaneous three times a day before meals  insulin lispro (ADMELOG) corrective regimen sliding scale   SubCutaneous at bedtime  labetalol 400 milliGRAM(s) Oral every 8 hours  losartan 100 milliGRAM(s) Oral at bedtime  NIFEdipine XL 90 milliGRAM(s) Oral <User Schedule>  predniSONE   Tablet 20 milliGRAM(s) Oral daily  pyridostigmine 60 milliGRAM(s) Oral <User Schedule>  sertraline 50 milliGRAM(s) Oral daily  spironolactone 50 milliGRAM(s) Oral daily  tamsulosin 0.4 milliGRAM(s) Oral at bedtime      MEDICATIONS  (PRN):  acetaminophen     Tablet .. 650 milliGRAM(s) Oral every 6 hours PRN Temp greater or equal to 38C (100.4F), Mild Pain (1 - 3)  dextrose Oral Gel 15 Gram(s) Oral once PRN Blood Glucose LESS THAN 70 milliGRAM(s)/deciliter  ondansetron Injectable 4 milliGRAM(s) IV Push every 8 hours PRN Nausea and/or Vomiting      REVIEW OF SYSTEMS:                           ALL ROS DONE [ X   ]    CONSTITUTIONAL:  LETHARGIC [   ], FEVER [   ], UNRESPONSIVE [   ]  CVS:  CP  [   ], SOB, [   ], PALPITATIONS [   ], DIZZYNESS [   ]  RS: COUGH [   ], SPUTUM [   ]  GI: ABDOMINAL PAIN [   ], NAUSEA [   ], VOMITINGS [   ], DIARRHEA [   ], CONSTIPATION [   ]  :  DYSURIA [   ], NOCTURIA [   ], INCREASED FREQUENCY [   ], DRIBLING [   ],  SKELETAL: PAINFUL JOINTS [   ], SWOLLEN JOINTS [   ], NECK ACHE [   ], LOW BACK ACHE [   ],  SKIN : ULCERS [   ], RASH [   ], ITCHING [   ]  CNS: HEAD ACHE [   ], DOUBLE VISION [   ], BLURRED VISION [   ], AMS / CONFUSION [   ], SEIZURES [   ], WEAKNESS [   ],TINGLING / NUMBNESS [   ]        PHYSICAL EXAMINATION:    GENERAL APPEARANCE: NO DISTRESS  HEENT:  NO PALLOR, NO  JVD,  NO   NODES, NECK SUPPLE  CVS: S1 +, S2 +,   RS: AEEB,  OCCASIONAL  RALES +,   NO RONCHI  ABD: SOFT, NT, NO, BS +  EXT: NO PE  SKIN: WARM,   SKELETAL:  ROM ACCEPTABLE  CNS:  AAO X 3        RADIOLOGY :  RADIOLOGY AND READINGS REVIEWED      < from: US Renal (06.30.25 @ 09:48) >    IMPRESSION:    1. No hydronephrosis.  2. Increased renal cortical echogenicity compatible with renal   parenchymal disease.  3. There is limited visualization of the urinary bladder; however, on   limited views, the wall appears diffusely thickened. Suggest correlation   with urinalysis to exclude infection.  4. Partially imaged hypoechoic structure posterior to the left kidney may   correspond to the lymphadenopathy questioned on the prior CT. Further   evaluation is recommended as clinically.    < end of copied text >      < from: CT Abdomen and Pelvis No Cont (06.29.25 @ 23:36) >    IMPRESSION:  No retroperitoneal hematoma.    Soft tissue densities within the retroperitoneum next to the aorta   concerning for extensive lymphadenopathy versus tortuous vessels or a   combination of both. The evaluation is limited due to the lack of IV   contrast. Recommend contrast enhanced cross-sectional imaging for better   assessment. Further workup for lymphoproliferative disease may be helpful.    Circumferential bladder wall thickening as can be seen with decompression   or component of cystitis.        Prelim: No obvious retroperitoneal hematoma. Gallbladder wall thickening.   Bladder wall thickening. Retroperitoneal/pelvic lymphadenopathy.   Persistent right > left pleural effusion. Small pericardial effusion.   Official report to follow.    < end of copied text >  < from: Xray Chest 1 View- PORTABLE-Urgent (Xray Chest 1 View- PORTABLE-Urgent .) (07.03.25 @ 16:53) >    IMPRESSION: Sternotomy, left loop recorder, cervical spine hardware   noted. Increasing atelectatic consolidation of left lower lobe with   effusion.    < end of copied text >        ASSESSMENT :     Acute renal failure    HTN (hypertension)    DM (diabetes mellitus)    Myasthenia gravis    Hypercholesterolemia    CVA (cerebrovascular accident)    Peripheral neuropathy    BPH (benign prostatic hyperplasia)    Major depression    Lipoma of chest wall    MG with thymoma (myasthena gravis)    H/O cataract extraction        PLAN:  HPI:  Patient is a 70M, from [facility] ambulates with a walker, with PMHx of Myasthenia gravis s/p thymectomy, HTN, 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 Patient reports he has been feeling nauseous for a few days but no vomiting. He reports chronic increased urinary frequency and sensation of incomplete voiding. He denies all other symptoms - fever, chills, cough, chest pain, SoB, palpitations abdominal pain, diarrhea, dysuria, arm or leg numbness or tingling. Reports regular brown stool - denies black or bloody stool.  (25 Jun 2025 21:32)        # DC PLAN: BACK TO [facility] AFTER ABDOMINAL U/S TO EVALUATE LIKELY RETROPERITONEAL LYMPHADENOPATHY   - WILL REFER HIM TO ONCOLOGY & GI CONSULT (FOR EGD& COLONOSCOPY) AS OUT PATIENT FOR FURTHER FOLLOWUP  - MACROCYTIC ANEMIA, ANEMIA OF CKD   - LIKELY MYELOSUPPRESSION / ANEMIA DUE TO AZATHIOPRINE  - WILL SUPPLEMENT B12, FA, FESO4     # [7/10] CASE D/W PATIENT'S PARTNER MILLI DONOVAN VIA PHONE. CASE DISCUSSED AT LENGTH. ALL QUESTIONS ANSWERED.     # CASE D/W PATIENT AND PARTNER MILLI DONOVAN AT BEDSIDE, ALL QUESTIONS ANSWERED. DISCUSSED RECOMMENDATIONS AS MADE BY MULTIDISCIPLINARY TEAM. DISCUSSED THAT PROGNOSIS IS GUARDED. THEY VERBALIZED UNDERSTANDING. IN DISCUSSION REGARDING GOC - PATIENT WISHES TO BE FULL CODE.        # HYPERTENSIVE URGENCY    # HTN - CONTROLLED   - TELEMETRY  - ECHO - LV EF - 70 - 75%, G1DD  - NOTED TROPONINS  - S/P IV HYDRALAZINE  - HYDRALAZINE  - PROCARDIA  - LABETALOL  - LOSARTAN  - SPIRONOLACTONE  - NOTED SERUM RENIN, ALDOSTERONE LEVELS  - F/U PLASMA METANEPHRINES  - CARDIOLOGY CONSULT  - NEPHROLOGY CONSULT  - ENDOCRINOLOGY CONSULT    # H/O CARDIAC ARRHYTHMIA - LOOP RECORDER IN PLACE  # H/O STERNOTOMY DUE TO THYMECTOMY      # MICHEL ON CKD STAGE 3 B  # BPH, OBSTRUCTIVE UROPATHY  - IV FLUIDS  - PVR - 0 ML  - MONITOR CR  - AVOID NEPHROTOXIC AGENTS  - NEPHROLOGY CONSULT    # ACUTE GINGIVOSTOMATITIS  # ? GPC IN BLOOD - CONTAMINANT ? - F/U FINAL CULTURES  - PLACE ON UNASYN, F/U BCX  - MAGIC MOUTHWASH  - ID CONSULT    # HYPOGLYCEMIA - RESOLVED  - MONITORING FINGERSTICKS    # ANEMIA  - MACROCYTIC ANEMIA, ANEMIA OF CKD   - LIKELY MYELOSUPPRESSION / ANEMIA DUE TO AZATHIOPRINE    - TREND HGB  - ANEMIA PANEL  - DENIES MELENA, HEMATOCHEZIA, HEMATEMESIS, HEMATURIA  - NOTED CT A/P  - GASTROENTEROLOGY CONSULT    # ? LYMPHADENOPATHY ON CT A/P   - F/U U/S ABDOMEN AS OUTPATIENT  - DEFER CT W/ CONTRAST GIVEN RENAL DYSFUNCTION  - PER FAMILY PATIENT MAY HAVE HARDWARE THAT IS NOT COMPATIBLE WITH MRI  - HEME/ONC CONSULT    # DENIES DYSURIA, UA NEGATIVE FOR LEUKOCYTE ESTERASE AND NITRITES      # MYASTHENIA GRAVIS S/P THYMECTOMY/STERNOTOMY - ON AZATHIOPRINE, PREDNISONE, PYRIDOSTIGMINE  - D/W NEUROLOGY - RECOMMENDED F/U AS OUTPATIENT FOR FURTHER PREDNISONE TAPER/MANAGEMENT  - NEUROLOGY CONSULT    # DM, DIABETIC NEPHROPATHY, DIABETIC RETINOPATHY, DIABETIC PERIPHERAL NEUROPATHY  - SSI + FS    # CVA W/ MILD RIGHT HP    # IMPAIRED GAIT DUE TO GENERALIZED MUSCLE WEAKNESS, CVA, MYASTHENIA GRAVIS, CERVICAL SPINAL STENOSIS - H/O CERVICAL SPINE INSTRUMENTATION - HARDWARE IN PLACE     # PAD  # GI AND DVT PPX

## 2025-07-12 NOTE — CHART NOTE - NSCHARTNOTEFT_GEN_A_CORE
Patient is a 70y old  Male who presents with a chief complaint of MICHEL (12 Jul 2025 11:26)  Today follow up with ID Dr Herzog for abnormal 7/8/2025 blood cultures - contact over teams and voicemail left     Vital Signs Last 24 Hrs  T(C): 36.8 (12 Jul 2025 12:02), Max: 37 (12 Jul 2025 08:00)  T(F): 98.3 (12 Jul 2025 12:02), Max: 98.6 (12 Jul 2025 08:00)  HR: 75 (12 Jul 2025 12:02) (66 - 77)  BP: 133/65 (12 Jul 2025 12:02) (123/58 - 180/70)  BP(mean): 90 (12 Jul 2025 04:48) (90 - 104)  RR: 18 (12 Jul 2025 12:02) (18 - 19)  SpO2: 99% (12 Jul 2025 12:02) (96% - 99%)    Parameters below as of 12 Jul 2025 12:02  Patient On (Oxygen Delivery Method): room air      PAST MEDICAL & SURGICAL HISTORY:  HTN (hypertension)  DM (diabetes mellitus)  Myasthenia gravis  Hypercholesterolemia  CVA (cerebrovascular accident)  Peripheral neuropathy  BPH (benign prostatic hyperplasia)  Major depression  Lipoma of chest wall  MG with thymoma (myasthena gravis)  H/O cataract extraction, right eye    LABS:                        8.0    5.15  )-----------( 203      ( 12 Jul 2025 05:38 )             25.0     07-12    139  |  106  |  22[H]  ----------------------------<  103[H]  3.9   |  29  |  1.87[H]    Ca    8.7      12 Jul 2025 05:38      Care Collaborated Discussed with Consultants/Other Providers [x] YES  [ ] NO Patient is a 70y old  Male who presents with a chief complaint of MICHEL (12 Jul 2025 11:26)  Today follow up with ID Dr Herzog for abnormal 7/8/2025 blood cultures - contact over teams and voicemail left   At 19:00 follow up with ID Dr Lazar     Vital Signs Last 24 Hrs  T(C): 36.8 (12 Jul 2025 12:02), Max: 37 (12 Jul 2025 08:00)  T(F): 98.3 (12 Jul 2025 12:02), Max: 98.6 (12 Jul 2025 08:00)  HR: 75 (12 Jul 2025 12:02) (66 - 77)  BP: 133/65 (12 Jul 2025 12:02) (123/58 - 180/70)  BP(mean): 90 (12 Jul 2025 04:48) (90 - 104)  RR: 18 (12 Jul 2025 12:02) (18 - 19)  SpO2: 99% (12 Jul 2025 12:02) (96% - 99%)    Parameters below as of 12 Jul 2025 12:02  Patient On (Oxygen Delivery Method): room air      PAST MEDICAL & SURGICAL HISTORY:  HTN (hypertension)  DM (diabetes mellitus)  Myasthenia gravis  Hypercholesterolemia  CVA (cerebrovascular accident)  Peripheral neuropathy  BPH (benign prostatic hyperplasia)  Major depression  Lipoma of chest wall  MG with thymoma (myasthena gravis)  H/O cataract extraction, right eye    LABS:                        8.0    5.15  )-----------( 203      ( 12 Jul 2025 05:38 )             25.0     07-12    139  |  106  |  22[H]  ----------------------------<  103[H]  3.9   |  29  |  1.87[H]    Ca    8.7      12 Jul 2025 05:38    ASSESSMENT AND PLAN   Patient is a 70y old  Male who presents with a chief complaint of MICHEL (12 Jul 2025 11:26)  Today follow up with ID Dr Herzog for abnormal 7/8/2025 blood cultures - contact over teams and voicemail left   At 19:00 follow up with ID Dr Lazar   will send/repeat blood cultures  ESR, CRP, procal  Vancomycin 1gm once as MICHEL and D/C Unasyn  f/u ID         Care Collaborated Discussed with Consultants/Other Providers [x] YES  [ ] NO

## 2025-07-12 NOTE — CONSULT NOTE ADULT - CONSULT REQUESTED DATE/TIME
07-Jul-2025 16:51
26-Jun-2025 10:50
02-Jul-2025 13:46
30-Jun-2025
02-Jul-2025 21:22
02-Jul-2025 21:02
12-Jul-2025 17:00
27-Jun-2025 16:24

## 2025-07-12 NOTE — CONSULT NOTE ADULT - CONSULT REASON
Visit Information    Have you changed or started any medications since your last visit including any over-the-counter medicines, vitamins, or herbal medicines? no   Have you stopped taking any of your medications? Is so, why? -  no  Are you having any side effects from any of your medications? - no    Have you seen any other physician or provider since your last visit?  no   Have you had any other diagnostic tests since your last visit?  no   Have you been seen in the emergency room and/or had an admission in a hospital since we last saw you?  no   Have you had your routine dental cleaning in the past 6 months?  no     Do you have an active MyChart account? If no, what is the barrier?   Yes    Patient Care Team:  RILEY Rangel CNP as PCP - General (Family Nurse Practitioner)  RILEY Rangel CNP as PCP - Empaneled Provider    Medical History Review  Past Medical, Family, and Social History reviewed and  contribute to the patient presenting condition    Health Maintenance   Topic Date Due    DTaP/Tdap/Td vaccine (1 - Tdap) Never done    Shingles vaccine (2 of 3) 12/15/2016    Diabetic foot exam  01/14/2022    Flu vaccine (1) 08/01/2023    Diabetic retinal exam  07/29/2023    COVID-19 Vaccine (1) 08/11/2025 (Originally 1948)    Colorectal Cancer Screen  10/17/2023    Diabetic Alb to Cr ratio (uACR) test  02/01/2024    Depression Screen  03/02/2024    Annual Wellness Visit (AWV)  03/02/2024    A1C test (Diabetic or Prediabetic)  06/06/2024    Lipids  06/07/2024    GFR test (Diabetes, CKD 3-4, OR last GFR 15-59)  06/07/2024    Pneumococcal 65+ years Vaccine  Completed    AAA screen  Completed    Hepatitis C screen  Completed    Hepatitis A vaccine  Aged Out    Hib vaccine  Aged Out    Meningococcal (ACWY) vaccine  Aged Out
Retroperitoneal mass / Anemia
Optimisation of MG meds
Retroperitoneal mass suggestive of lymphadenopathy
Elevated serum creatinine.
Uncontrolled BP
Anemia
Patient with positive blood Cx.
Uncontrolled HTN
therapy not helpful and has recurrent venous ulcers  Starts keflex for infection currently  Ok for neosporin and keep area covered  Call INB or worsening  Continue with ID appt also    Referral given for podiatry    Patient given educational materials - see patient instructions. Discussed use,benefit, and side effects of prescribed medications. All patient questions answered. Pt voiced understanding. Reviewed health maintenance. Instructed to continue currentmedications, diet and exercise.     Electronically signed by RILEY Juares CNP, CNP on 8/1/2023 at 12:38 PM

## 2025-07-12 NOTE — CONSULT NOTE ADULT - SUBJECTIVE AND OBJECTIVE BOX
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 Patient reports he has been feeling nauseous for a few days but no vomiting. He reports chronic increased urinary frequency and sensation of incomplete voiding. He denies all other symptoms - fever, chills, cough, chest pain, SoB, palpitations abdominal pain, diarrhea, dysuria, arm or leg numbness or tingling. Reports regular brown stool - denies black or bloody stool.  (25 Jun 2025 21:32)      PAST MEDICAL & SURGICAL HISTORY:  HTN (hypertension)      DM (diabetes mellitus)      Myasthenia gravis      Hypercholesterolemia      CVA (cerebrovascular accident)      Peripheral neuropathy      BPH (benign prostatic hyperplasia)      Major depression      Lipoma of chest wall      MG with thymoma (myasthena gravis)      H/O cataract extraction  , right eye          MEDICATIONS  (STANDING):  ampicillin/sulbactam  IVPB      ampicillin/sulbactam  IVPB 3 Gram(s) IV Intermittent every 6 hours  aspirin  chewable 81 milliGRAM(s) Oral daily  atorvastatin 80 milliGRAM(s) Oral at bedtime  azaTHIOprine 100 milliGRAM(s) Oral daily  cholecalciferol 1000 Unit(s) Oral daily  cyanocobalamin 1000 MICROGram(s) Oral daily  dextrose 5%. 1000 milliLiter(s) (50 mL/Hr) IV Continuous <Continuous>  dextrose 50% Injectable 25 Gram(s) IV Push once  ferrous    sulfate 325 milliGRAM(s) Oral two times a day  finasteride 5 milliGRAM(s) Oral daily  FIRST- Mouthwash  BLM 30 milliLiter(s) Swish and Spit three times a day  folic acid 1 milliGRAM(s) Oral daily  heparin   Injectable 5000 Unit(s) SubCutaneous every 8 hours  hydrALAZINE 100 milliGRAM(s) Oral every 8 hours  insulin lispro (ADMELOG) corrective regimen sliding scale   SubCutaneous three times a day before meals  insulin lispro (ADMELOG) corrective regimen sliding scale   SubCutaneous at bedtime  labetalol 400 milliGRAM(s) Oral every 8 hours  losartan 100 milliGRAM(s) Oral at bedtime  NIFEdipine XL 90 milliGRAM(s) Oral <User Schedule>  predniSONE   Tablet 20 milliGRAM(s) Oral daily  pyridostigmine 60 milliGRAM(s) Oral <User Schedule>  sertraline 50 milliGRAM(s) Oral daily  spironolactone 50 milliGRAM(s) Oral daily  tamsulosin 0.4 milliGRAM(s) Oral at bedtime    MEDICATIONS  (PRN):  acetaminophen     Tablet .. 650 milliGRAM(s) Oral every 6 hours PRN Temp greater or equal to 38C (100.4F), Mild Pain (1 - 3)  dextrose Oral Gel 15 Gram(s) Oral once PRN Blood Glucose LESS THAN 70 milliGRAM(s)/deciliter  ondansetron Injectable 4 milliGRAM(s) IV Push every 8 hours PRN Nausea and/or Vomiting      FAMILY HISTORY:  Family history of hypertension (Mother)        SOCIAL HISTORY:    [ ] Non-smoker  [ ] Smoker  [ ] Alcohol    Allergies    No Known Allergies    Intolerances    	    REVIEW OF SYSTEMS:  CONSTITUTIONAL: No fever, weight loss, or fatigue  EYES: No eye pain, visual disturbances, or discharge  ENT:  No difficulty hearing, tinnitus, vertigo; No sinus or throat pain  NECK: No pain or stiffness  RESPIRATORY: No cough, wheezing, chills or hemoptysis; No Shortness of Breath  CARDIOVASCULAR: No chest pain, palpitations, passing out, dizziness, or leg swelling  GASTROINTESTINAL: No abdominal or epigastric pain. No nausea, vomiting, or hematemesis; No diarrhea or constipation. No melena or hematochezia.  GENITOURINARY: No dysuria, frequency, hematuria, or incontinence  NEUROLOGICAL: No headaches, memory loss, loss of strength, numbness, or tremors  SKIN: No itching, burning, rashes, or lesions   LYMPH Nodes: No enlarged glands  ENDOCRINE: No heat or cold intolerance; No hair loss  MUSCULOSKELETAL: No joint pain or swelling; No muscle, back, or extremity pain  PSYCHIATRIC: No depression, anxiety, mood swings, or difficulty sleeping  HEME/LYMPH: No easy bruising, or bleeding gums  ALLERGY AND IMMUNOLOGIC: No hives or eczema	    [ ] All others negative	  [ ] Unable to obtain    PHYSICAL EXAM:  T(C): 37 (07-12-25 @ 18:58), Max: 37 (07-12-25 @ 08:00)  HR: 68 (07-12-25 @ 18:58) (66 - 77)  BP: 154/58 (07-12-25 @ 18:58) (123/58 - 180/70)  RR: 17 (07-12-25 @ 18:58) (17 - 18)  SpO2: 96% (07-12-25 @ 18:58) (96% - 99%)  Wt(kg): --  I&O's Summary    11 Jul 2025 07:01  -  12 Jul 2025 07:00  --------------------------------------------------------  IN: 624 mL / OUT: 500 mL / NET: 124 mL        Appearance: Normal	  HEENT:   Normal oral mucosa, PERRL, EOMI	  Lymphatic: No lymphadenopathy  Cardiovascular: Normal S1 S2, No JVD, No murmurs, No edema  Respiratory: Lungs clear to auscultation	  Psychiatry: A & O x 3, Mood & affect appropriate  Gastrointestinal:  Soft, Non-tender, + BS	  Skin: No rashes, No ecchymoses, No cyanosis	  Neurologic: Non-focal  Extremities: Normal range of motion, No clubbing, cyanosis or edema  Vascular: Peripheral pulses palpable 2+ bilaterally    TELEMETRY: 	    ECG:  	  RADIOLOGY:  OTHER: 	  	  LABS:	 	    CARDIAC MARKERS:                              8.0    5.15  )-----------( 203      ( 12 Jul 2025 05:38 )             25.0     07-12    139  |  106  |  22[H]  ----------------------------<  103[H]  3.9   |  29  |  1.87[H]    Ca    8.7      12 Jul 2025 05:38      proBNP:   Lipid Profile:   HgA1c:   TSH:     PREVIOUS DIAGNOSTIC TESTING:    [ ] Echocardiogram:  [ ]  Catheterization:  [ ] Stress Test:

## 2025-07-12 NOTE — CONSULT NOTE ADULT - ASSESSMENT
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 Patient reports he has been feeling nauseous for a few days but no vomiting. He reports chronic increased urinary frequency and sensation of incomplete voiding. He denies all other symptoms - fever, chills, cough, chest pain, SoB, palpitations abdominal pain, diarrhea, dysuria, arm or leg numbness or tingling. Reports regular brown stool - denies black or bloody stool.  (25 Jun 2025 21:32)     1.  Staph epidermidis in 1 blood culture probably is just a contaminant. Patient is afebrile and has no acute complaints at all  2.  Since he has a foreign body such as cervical spine instrumentation , need to make sure staph epi is a contaminant and not a pathogen.  3.  Please repeat blood Cx X 2 . Recommend baseline ESR, CRP , Procalcitonin.  4.  Patient  on IV Unasyn. Recommend one dose of IV I G Vancomycin .

## 2025-07-12 NOTE — CONSULT NOTE ADULT - CONSULT REQUESTED BY NAME
Dr. Goldstein
Dr Lopez,P
Dr. Goldstein
Dr. Shai Goldstein
Primary Team
Dr. Goldstein
Shai Goldstein
Dr. Goldstein

## 2025-07-12 NOTE — PROGRESS NOTE ADULT - SUBJECTIVE AND OBJECTIVE BOX
C A R D I O L O G Y  **********************************    DATE OF SERVICE: 07-12-25      no events overnight        acetaminophen     Tablet .. 650 milliGRAM(s) Oral every 6 hours PRN  ampicillin/sulbactam  IVPB      ampicillin/sulbactam  IVPB 3 Gram(s) IV Intermittent every 6 hours  aspirin  chewable 81 milliGRAM(s) Oral daily  atorvastatin 80 milliGRAM(s) Oral at bedtime  azaTHIOprine 100 milliGRAM(s) Oral daily  cholecalciferol 1000 Unit(s) Oral daily  cyanocobalamin 1000 MICROGram(s) Oral daily  dextrose 5%. 1000 milliLiter(s) IV Continuous <Continuous>  dextrose 50% Injectable 25 Gram(s) IV Push once  dextrose Oral Gel 15 Gram(s) Oral once PRN  ferrous    sulfate 325 milliGRAM(s) Oral two times a day  finasteride 5 milliGRAM(s) Oral daily  FIRST- Mouthwash  BLM 30 milliLiter(s) Swish and Spit three times a day  folic acid 1 milliGRAM(s) Oral daily  heparin   Injectable 5000 Unit(s) SubCutaneous every 8 hours  hydrALAZINE 100 milliGRAM(s) Oral every 8 hours  insulin lispro (ADMELOG) corrective regimen sliding scale   SubCutaneous three times a day before meals  insulin lispro (ADMELOG) corrective regimen sliding scale   SubCutaneous at bedtime  labetalol 400 milliGRAM(s) Oral every 8 hours  losartan 100 milliGRAM(s) Oral at bedtime  NIFEdipine XL 90 milliGRAM(s) Oral <User Schedule>  ondansetron Injectable 4 milliGRAM(s) IV Push every 8 hours PRN  predniSONE   Tablet 20 milliGRAM(s) Oral daily  pyridostigmine 60 milliGRAM(s) Oral <User Schedule>  sertraline 50 milliGRAM(s) Oral daily  spironolactone 50 milliGRAM(s) Oral daily  tamsulosin 0.4 milliGRAM(s) Oral at bedtime                            8.0    5.15  )-----------( 203 ( 12 Jul 2025 05:38 )             25.0       Hemoglobin: 8.0 g/dL (07-12 @ 05:38)  Hemoglobin: 7.7 g/dL (07-10 @ 05:59)  Hemoglobin: 8.1 g/dL (07-09 @ 05:56)  Hemoglobin: 8.1 g/dL (07-08 @ 07:39)      07-12    139  |  106  |  22[H]  ----------------------------<  103[H]  3.9   |  29  |  1.87[H]    Ca    8.7      12 Jul 2025 05:38      Creatinine Trend: 1.87<--, 1.68<--, 1.66<--, 1.81<--, 1.68<--, 1.74<--    COAGS:           T(C): 36.7 (07-12-25 @ 04:48), Max: 36.8 (07-11-25 @ 23:49)  HR: 77 (07-12-25 @ 04:48) (61 - 77)  BP: 180/70 (07-12-25 @ 04:48) (156/79 - 194/74)  RR: 18 (07-12-25 @ 04:48) (18 - 19)  SpO2: 98% (07-12-25 @ 04:48) (96% - 99%)  Wt(kg): --    I&O's Summary    11 Jul 2025 07:01  -  12 Jul 2025 07:00  --------------------------------------------------------  IN: 624 mL / OUT: 500 mL / NET: 124 mL      HEENT:  (-)icterus (-)pallor  CV: N S1 S2 1/6 BOUBACAR (+)2 Pulses B/l  Resp:  Clear to ausculatation B/L, normal effort  GI: (+) BS Soft, NT, ND  Lymph:  (-)Edema, (-)obvious lymphadenopathy  Skin: Warm to touch, Normal turgor  Psych: Appropriate mood and affect      TELEMETRY: 	  sinus        ASSESSMENT/PLAN: 	70y  Male PMHx of Myasthenia gravis s/p thymectomy, HTN, 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 hyperaldosterone state  aldosterone appears wnl, endo recs noted  -  TSH wnl   - cont aldactone 50 mg PO QD  - r/o for pheo  - would need Renal artery dopplers likely as an oupt since may not be done in house     # Abnormal EKG  - echo noted, suspect it is due to hypertensive heart disease given his profoundly elevated blood pressures however can arrange for outpt cardiac MRI     # MG  - neuro follow up  C A R D I O L O G Y  **********************************    DATE OF SERVICE: 07-12-25      no events overnight        acetaminophen     Tablet .. 650 milliGRAM(s) Oral every 6 hours PRN  ampicillin/sulbactam  IVPB      ampicillin/sulbactam  IVPB 3 Gram(s) IV Intermittent every 6 hours  aspirin  chewable 81 milliGRAM(s) Oral daily  atorvastatin 80 milliGRAM(s) Oral at bedtime  azaTHIOprine 100 milliGRAM(s) Oral daily  cholecalciferol 1000 Unit(s) Oral daily  cyanocobalamin 1000 MICROGram(s) Oral daily  dextrose 5%. 1000 milliLiter(s) IV Continuous <Continuous>  dextrose 50% Injectable 25 Gram(s) IV Push once  dextrose Oral Gel 15 Gram(s) Oral once PRN  ferrous    sulfate 325 milliGRAM(s) Oral two times a day  finasteride 5 milliGRAM(s) Oral daily  FIRST- Mouthwash  BLM 30 milliLiter(s) Swish and Spit three times a day  folic acid 1 milliGRAM(s) Oral daily  heparin   Injectable 5000 Unit(s) SubCutaneous every 8 hours  hydrALAZINE 100 milliGRAM(s) Oral every 8 hours  insulin lispro (ADMELOG) corrective regimen sliding scale   SubCutaneous three times a day before meals  insulin lispro (ADMELOG) corrective regimen sliding scale   SubCutaneous at bedtime  labetalol 400 milliGRAM(s) Oral every 8 hours  losartan 100 milliGRAM(s) Oral at bedtime  NIFEdipine XL 90 milliGRAM(s) Oral <User Schedule>  ondansetron Injectable 4 milliGRAM(s) IV Push every 8 hours PRN  predniSONE   Tablet 20 milliGRAM(s) Oral daily  pyridostigmine 60 milliGRAM(s) Oral <User Schedule>  sertraline 50 milliGRAM(s) Oral daily  spironolactone 50 milliGRAM(s) Oral daily  tamsulosin 0.4 milliGRAM(s) Oral at bedtime                          8.0    5.15  )-----------( 203 ( 12 Jul 2025 05:38 )             25.0       Hemoglobin: 8.0 g/dL (07-12 @ 05:38)  Hemoglobin: 7.7 g/dL (07-10 @ 05:59)  Hemoglobin: 8.1 g/dL (07-09 @ 05:56)  Hemoglobin: 8.1 g/dL (07-08 @ 07:39)      07-12    139  |  106  |  22[H]  ----------------------------<  103[H]  3.9   |  29  |  1.87[H]    Ca    8.7      12 Jul 2025 05:38      Creatinine Trend: 1.87<--, 1.68<--, 1.66<--, 1.81<--, 1.68<--, 1.74<--    COAGS:       T(C): 36.7 (07-12-25 @ 04:48), Max: 36.8 (07-11-25 @ 23:49)  HR: 77 (07-12-25 @ 04:48) (61 - 77)  BP: 180/70 (07-12-25 @ 04:48) (156/79 - 194/74)  RR: 18 (07-12-25 @ 04:48) (18 - 19)  SpO2: 98% (07-12-25 @ 04:48) (96% - 99%)  Wt(kg): --    I&O's Summary    11 Jul 2025 07:01  -  12 Jul 2025 07:00  --------------------------------------------------------  IN: 624 mL / OUT: 500 mL / NET: 124 mL      HEENT:  (-)icterus (-)pallor  CV: N S1 S2 1/6 BOUBACAR (+)2 Pulses B/l  Resp:  Clear to ausculatation B/L, normal effort  GI: (+) BS Soft, NT, ND  Lymph:  (-)Edema, (-)obvious lymphadenopathy  Skin: Warm to touch, Normal turgor  Psych: Appropriate mood and affect      TELEMETRY: 	  sinus        ASSESSMENT/PLAN: 	70y  Male PMHx of Myasthenia gravis s/p thymectomy, HTN, 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  - only drug class missing is loop diuretic.  -  TSH wnl   - r/o for pheo  - would need Renal artery dopplers likely as an oupt since may not be done in house     # Abnormal EKG  - echo noted, suspect it is due to hypertensive heart disease given his profoundly elevated blood pressures however can arrange for outpt cardiac MRI     # MG  - neuro follow up

## 2025-07-12 NOTE — PROGRESS NOTE ADULT - SUBJECTIVE AND OBJECTIVE BOX
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 Pt a/w hypertensive urgency and MICHEL. Nephrology consulted for Elevated serum creatinine.    Hospital Medications: Medications reviewed.    REVIEW OF SYSTEMS:  Gen: no fever or chills  Cards: no chest pain  Resp: no dyspnea  GI: no nausea or vomiting or diarrhea  : no dysuria or hematuria  Vascular: no LE edema      VITALS:  T(F): 98.1 (07-12-25 @ 15:40), Max: 98.6 (07-12-25 @ 08:00)  HR: 69 (07-12-25 @ 15:40)  BP: 137/60 (07-12-25 @ 15:40)  RR: 17 (07-12-25 @ 15:40)  SpO2: 96% (07-12-25 @ 15:40)  Wt(kg): --    07-11 @ 07:01  -  07-12 @ 07:00  --------------------------------------------------------  IN: 624 mL / OUT: 500 mL / NET: 124 mL      PHYSICAL EXAM:  Gen: NAD, calm  HEENT: +facial/ periorbital edema    Cards: RRR, +S1/S2,+BOUBACAR  Resp: CTA B/L  GI: soft, NT/ND, NABS  : no CVA tenderness  Extremities: no edema B/L       LABS:  07-12    139  |  106  |  22[H]  ----------------------------<  103[H]  3.9   |  29  |  1.87[H]    Ca    8.7      12 Jul 2025 05:38      Creatinine Trend: 1.87 <--, 1.68 <--, 1.66 <--, 1.81 <--, 1.68 <--                        8.0    5.15  )-----------( 203      ( 12 Jul 2025 05:38 )             25.0     Urine Studies:  Urinalysis Basic - ( 12 Jul 2025 05:38 )    Color:  / Appearance:  / SG:  / pH:   Gluc: 103 mg/dL / Ketone:   / Bili:  / Urobili:    Blood:  / Protein:  / Nitrite:    Leuk Esterase:  / RBC:  / WBC    Sq Epi:  / Non Sq Epi:  / Bacteria:

## 2025-07-13 LAB
ANION GAP SERPL CALC-SCNC: 3 MMOL/L — LOW (ref 5–17)
BUN SERPL-MCNC: 23 MG/DL — HIGH (ref 7–18)
CALCIUM SERPL-MCNC: 8.9 MG/DL — SIGNIFICANT CHANGE UP (ref 8.4–10.5)
CHLORIDE SERPL-SCNC: 106 MMOL/L — SIGNIFICANT CHANGE UP (ref 96–108)
CO2 SERPL-SCNC: 30 MMOL/L — SIGNIFICANT CHANGE UP (ref 22–31)
CREAT SERPL-MCNC: 1.91 MG/DL — HIGH (ref 0.5–1.3)
CRP SERPL-MCNC: <2.9 MG/L — SIGNIFICANT CHANGE UP (ref 0–5)
CULTURE RESULTS: SIGNIFICANT CHANGE UP
EGFR: 38 ML/MIN/1.73M2 — LOW
EGFR: 38 ML/MIN/1.73M2 — LOW
ERYTHROCYTE [SEDIMENTATION RATE] IN BLOOD: 14 MM/HR — SIGNIFICANT CHANGE UP (ref 0–15)
GLUCOSE BLDC GLUCOMTR-MCNC: 105 MG/DL — HIGH (ref 70–99)
GLUCOSE BLDC GLUCOMTR-MCNC: 140 MG/DL — HIGH (ref 70–99)
GLUCOSE BLDC GLUCOMTR-MCNC: 158 MG/DL — HIGH (ref 70–99)
GLUCOSE BLDC GLUCOMTR-MCNC: 241 MG/DL — HIGH (ref 70–99)
GLUCOSE SERPL-MCNC: 105 MG/DL — HIGH (ref 70–99)
HCT VFR BLD CALC: 26 % — LOW (ref 39–50)
HGB BLD-MCNC: 8.6 G/DL — LOW (ref 13–17)
MCHC RBC-ENTMCNC: 33.1 G/DL — SIGNIFICANT CHANGE UP (ref 32–36)
MCHC RBC-ENTMCNC: 35.7 PG — HIGH (ref 27–34)
MCV RBC AUTO: 107.9 FL — HIGH (ref 80–100)
NRBC BLD AUTO-RTO: 0 /100 WBCS — SIGNIFICANT CHANGE UP (ref 0–0)
PLATELET # BLD AUTO: 216 K/UL — SIGNIFICANT CHANGE UP (ref 150–400)
POTASSIUM SERPL-MCNC: 3.8 MMOL/L — SIGNIFICANT CHANGE UP (ref 3.5–5.3)
POTASSIUM SERPL-SCNC: 3.8 MMOL/L — SIGNIFICANT CHANGE UP (ref 3.5–5.3)
PROCALCITONIN SERPL-MCNC: 0.09 NG/ML — SIGNIFICANT CHANGE UP (ref 0.02–0.1)
RBC # BLD: 2.41 M/UL — LOW (ref 4.2–5.8)
RBC # FLD: 18.5 % — HIGH (ref 10.3–14.5)
SODIUM SERPL-SCNC: 139 MMOL/L — SIGNIFICANT CHANGE UP (ref 135–145)
SPECIMEN SOURCE: SIGNIFICANT CHANGE UP
WBC # BLD: 5.46 K/UL — SIGNIFICANT CHANGE UP (ref 3.8–10.5)
WBC # FLD AUTO: 5.46 K/UL — SIGNIFICANT CHANGE UP (ref 3.8–10.5)

## 2025-07-13 RX ORDER — EPOETIN ALFA 10000 [IU]/ML
10000 SOLUTION INTRAVENOUS; SUBCUTANEOUS ONCE
Refills: 0 | Status: COMPLETED | OUTPATIENT
Start: 2025-07-13 | End: 2025-07-13

## 2025-07-13 RX ORDER — AMPICILLIN SODIUM AND SULBACTAM SODIUM 1; .5 G/1; G/1
3 INJECTION, POWDER, FOR SOLUTION INTRAMUSCULAR; INTRAVENOUS EVERY 6 HOURS
Refills: 0 | Status: DISCONTINUED | OUTPATIENT
Start: 2025-07-13 | End: 2025-07-14

## 2025-07-13 RX ADMIN — Medication 81 MILLIGRAM(S): at 11:33

## 2025-07-13 RX ADMIN — HEPARIN SODIUM 5000 UNIT(S): 1000 INJECTION INTRAVENOUS; SUBCUTANEOUS at 05:27

## 2025-07-13 RX ADMIN — Medication 90 MILLIGRAM(S): at 11:35

## 2025-07-13 RX ADMIN — Medication 325 MILLIGRAM(S): at 05:27

## 2025-07-13 RX ADMIN — Medication 325 MILLIGRAM(S): at 17:25

## 2025-07-13 RX ADMIN — PYRIDOSTIGMINE BROMIDE 60 MILLIGRAM(S): 5 INJECTION, SOLUTION INTRAVENOUS; PARENTERAL at 05:27

## 2025-07-13 RX ADMIN — PYRIDOSTIGMINE BROMIDE 60 MILLIGRAM(S): 5 INJECTION, SOLUTION INTRAVENOUS; PARENTERAL at 22:35

## 2025-07-13 RX ADMIN — ATORVASTATIN CALCIUM 80 MILLIGRAM(S): 80 TABLET, FILM COATED ORAL at 22:42

## 2025-07-13 RX ADMIN — FOLIC ACID 1 MILLIGRAM(S): 1 TABLET ORAL at 11:33

## 2025-07-13 RX ADMIN — Medication 1000 UNIT(S): at 11:34

## 2025-07-13 RX ADMIN — AMPICILLIN SODIUM AND SULBACTAM SODIUM 200 GRAM(S): 1; .5 INJECTION, POWDER, FOR SOLUTION INTRAMUSCULAR; INTRAVENOUS at 17:26

## 2025-07-13 RX ADMIN — Medication 100 MILLIGRAM(S): at 14:20

## 2025-07-13 RX ADMIN — HEPARIN SODIUM 5000 UNIT(S): 1000 INJECTION INTRAVENOUS; SUBCUTANEOUS at 22:36

## 2025-07-13 RX ADMIN — PYRIDOSTIGMINE BROMIDE 60 MILLIGRAM(S): 5 INJECTION, SOLUTION INTRAVENOUS; PARENTERAL at 01:47

## 2025-07-13 RX ADMIN — CYANOCOBALAMIN 1000 MICROGRAM(S): 1000 INJECTION INTRAMUSCULAR; SUBCUTANEOUS at 11:34

## 2025-07-13 RX ADMIN — TAMSULOSIN HYDROCHLORIDE 0.4 MILLIGRAM(S): 0.4 CAPSULE ORAL at 22:36

## 2025-07-13 RX ADMIN — LABETALOL HYDROCHLORIDE 400 MILLIGRAM(S): 200 TABLET, FILM COATED ORAL at 14:20

## 2025-07-13 RX ADMIN — HEPARIN SODIUM 5000 UNIT(S): 1000 INJECTION INTRAVENOUS; SUBCUTANEOUS at 14:20

## 2025-07-13 RX ADMIN — LABETALOL HYDROCHLORIDE 400 MILLIGRAM(S): 200 TABLET, FILM COATED ORAL at 05:27

## 2025-07-13 RX ADMIN — Medication 250 MILLIGRAM(S): at 01:47

## 2025-07-13 RX ADMIN — INSULIN LISPRO 2: 100 INJECTION, SOLUTION INTRAVENOUS; SUBCUTANEOUS at 11:47

## 2025-07-13 RX ADMIN — Medication 100 MILLIGRAM(S): at 22:35

## 2025-07-13 RX ADMIN — SERTRALINE 50 MILLIGRAM(S): 100 TABLET, FILM COATED ORAL at 11:34

## 2025-07-13 RX ADMIN — Medication 100 MILLIGRAM(S): at 05:26

## 2025-07-13 RX ADMIN — LABETALOL HYDROCHLORIDE 400 MILLIGRAM(S): 200 TABLET, FILM COATED ORAL at 22:35

## 2025-07-13 RX ADMIN — FINASTERIDE 5 MILLIGRAM(S): 1 TABLET, FILM COATED ORAL at 11:34

## 2025-07-13 RX ADMIN — LOSARTAN POTASSIUM 100 MILLIGRAM(S): 100 TABLET, FILM COATED ORAL at 22:36

## 2025-07-13 RX ADMIN — PYRIDOSTIGMINE BROMIDE 60 MILLIGRAM(S): 5 INJECTION, SOLUTION INTRAVENOUS; PARENTERAL at 10:04

## 2025-07-13 RX ADMIN — INSULIN LISPRO 1: 100 INJECTION, SOLUTION INTRAVENOUS; SUBCUTANEOUS at 16:54

## 2025-07-13 RX ADMIN — PREDNISONE 20 MILLIGRAM(S): 20 TABLET ORAL at 05:27

## 2025-07-13 RX ADMIN — Medication 50 MILLIGRAM(S): at 05:27

## 2025-07-13 RX ADMIN — PYRIDOSTIGMINE BROMIDE 60 MILLIGRAM(S): 5 INJECTION, SOLUTION INTRAVENOUS; PARENTERAL at 12:54

## 2025-07-13 RX ADMIN — AZATHIOPRINE 100 MILLIGRAM(S): 50 TABLET ORAL at 11:35

## 2025-07-13 RX ADMIN — PYRIDOSTIGMINE BROMIDE 60 MILLIGRAM(S): 5 INJECTION, SOLUTION INTRAVENOUS; PARENTERAL at 17:26

## 2025-07-13 RX ADMIN — EPOETIN ALFA 10000 UNIT(S): 10000 SOLUTION INTRAVENOUS; SUBCUTANEOUS at 11:35

## 2025-07-13 NOTE — PROGRESS NOTE ADULT - SUBJECTIVE AND OBJECTIVE BOX
Date of Service 07-13-25 @ 22:16    CHIEF COMPLAINT:Patient is a 70y old  Male who presents with a chief complaint of MICHEL (13 Jul 2025 12:06)    	  REVIEW OF SYSTEMS:  CONSTITUTIONAL: No fever, weight loss, or fatigue  EYES: No eye pain, visual disturbances, or discharge  ENT:  No difficulty hearing, tinnitus, vertigo; No sinus or throat pain  NECK: No pain or stiffness  RESPIRATORY: No cough, wheezing, chills or hemoptysis; No Shortness of Breath  CARDIOVASCULAR: No chest pain, palpitations, passing out, dizziness, or leg swelling  GASTROINTESTINAL: No abdominal or epigastric pain. No nausea, vomiting, or hematemesis; No diarrhea or constipation. No melena or hematochezia.  GENITOURINARY: No dysuria, frequency, hematuria, or incontinence  NEUROLOGICAL: No headaches, memory loss, loss of strength, numbness, or tremors  SKIN: No itching, burning, rashes, or lesions   LYMPH Nodes: No enlarged glands  ENDOCRINE: No heat or cold intolerance; No hair loss  MUSCULOSKELETAL: No joint pain or swelling; No muscle, back, or extremity pain  PSYCHIATRIC: No depression, anxiety, mood swings, or difficulty sleeping  HEME/LYMPH: No easy bruising, or bleeding gums  ALLERGY AND IMMUNOLOGIC: No hives or eczema	    [ ] All others negative	  [ ] Unable to obtain    PHYSICAL EXAM:  T(C): 36.9 (07-13-25 @ 19:16), Max: 37.2 (07-13-25 @ 15:32)  HR: 66 (07-13-25 @ 19:16) (62 - 70)  BP: 178/65 (07-13-25 @ 19:16) (130/58 - 178/65)  RR: 17 (07-13-25 @ 19:16) (17 - 18)  SpO2: 97% (07-13-25 @ 19:16) (96% - 97%)  Wt(kg): --  I&O's Summary    12 Jul 2025 07:01  -  13 Jul 2025 07:00  --------------------------------------------------------  IN: 0 mL / OUT: 100 mL / NET: -100 mL        Appearance: Normal	  HEENT:   Normal oral mucosa, PERRL, EOMI	  Lymphatic: No lymphadenopathy  Cardiovascular: Normal S1 S2, No JVD, No murmurs, No edema  Respiratory: Lungs clear to auscultation	  Psychiatry: A & O x 3, Mood & affect appropriate  Gastrointestinal:  Soft, Non-tender, + BS	  Skin: No rashes, No ecchymoses, No cyanosis	  Neurologic: Non-focal  Extremities: Normal range of motion, No clubbing, cyanosis or edema  Vascular: Peripheral pulses palpable 2+ bilaterally    MEDICATIONS  (STANDING):  ampicillin/sulbactam  IVPB 3 Gram(s) IV Intermittent every 6 hours  aspirin  chewable 81 milliGRAM(s) Oral daily  atorvastatin 80 milliGRAM(s) Oral at bedtime  azaTHIOprine 100 milliGRAM(s) Oral daily  cholecalciferol 1000 Unit(s) Oral daily  cyanocobalamin 1000 MICROGram(s) Oral daily  dextrose 5%. 1000 milliLiter(s) (50 mL/Hr) IV Continuous <Continuous>  dextrose 50% Injectable 25 Gram(s) IV Push once  ferrous    sulfate 325 milliGRAM(s) Oral two times a day  finasteride 5 milliGRAM(s) Oral daily  FIRST- Mouthwash  BLM 30 milliLiter(s) Swish and Spit three times a day  folic acid 1 milliGRAM(s) Oral daily  heparin   Injectable 5000 Unit(s) SubCutaneous every 8 hours  hydrALAZINE 100 milliGRAM(s) Oral every 8 hours  insulin lispro (ADMELOG) corrective regimen sliding scale   SubCutaneous at bedtime  insulin lispro (ADMELOG) corrective regimen sliding scale   SubCutaneous three times a day before meals  labetalol 400 milliGRAM(s) Oral every 8 hours  losartan 100 milliGRAM(s) Oral at bedtime  NIFEdipine XL 90 milliGRAM(s) Oral <User Schedule>  predniSONE   Tablet 20 milliGRAM(s) Oral daily  pyridostigmine 60 milliGRAM(s) Oral <User Schedule>  sertraline 50 milliGRAM(s) Oral daily  spironolactone 50 milliGRAM(s) Oral daily  tamsulosin 0.4 milliGRAM(s) Oral at bedtime      TELEMETRY: 	    ECG:  	  RADIOLOGY:  OTHER: 	  	  LABS:	 	    CARDIAC MARKERS:                                8.6    5.46  )-----------( 216      ( 13 Jul 2025 06:17 )             26.0     07-13    139  |  106  |  23[H]  ----------------------------<  105[H]  3.8   |  30  |  1.91[H]    Ca    8.9      13 Jul 2025 06:17      proBNP:   Lipid Profile:   HgA1c:   TSH: Thyroid Stimulating Hormone, Serum: 4.28 uU/mL (06-27 @ 05:47)      	         Date of Service 07-13-25 @ 22:16    CHIEF COMPLAINT: Patient is a 70y old  Male who presents with a chief complaint of MICHEL (13 Jul 2025 12:06)    	  REVIEW OF SYSTEMS:  CONSTITUTIONAL: No fever, weight loss, or fatigue  EYES: No eye pain, visual disturbances, or discharge  ENT:  No difficulty hearing, tinnitus, vertigo; No sinus or throat pain  NECK: No pain or stiffness  RESPIRATORY: No cough, wheezing, chills or hemoptysis; No Shortness of Breath  CARDIOVASCULAR: No chest pain, palpitations, passing out, dizziness, or leg swelling  GASTROINTESTINAL: No abdominal or epigastric pain. No nausea, vomiting, or hematemesis; No diarrhea or constipation. No melena or hematochezia.  GENITOURINARY: No dysuria, frequency, hematuria, or incontinence  NEUROLOGICAL: No headaches, memory loss, loss of strength, numbness, or tremors  SKIN: No itching, burning, rashes, or lesions   LYMPH Nodes: No enlarged glands  ENDOCRINE: No heat or cold intolerance; No hair loss  MUSCULOSKELETAL: No joint pain or swelling; No muscle, back, or extremity pain  PSYCHIATRIC: No depression, anxiety, mood swings, or difficulty sleeping  HEME/LYMPH: No easy bruising, or bleeding gums  ALLERGY AND IMMUNOLOGIC: No hives or eczema	    [ ] All others negative	  [ ] Unable to obtain    PHYSICAL EXAM:  T(C): 36.9 (07-13-25 @ 19:16), Max: 37.2 (07-13-25 @ 15:32)  HR: 66 (07-13-25 @ 19:16) (62 - 70)  BP: 178/65 (07-13-25 @ 19:16) (130/58 - 178/65)  RR: 17 (07-13-25 @ 19:16) (17 - 18)  SpO2: 97% (07-13-25 @ 19:16) (96% - 97%)  Wt(kg): --  I&O's Summary    12 Jul 2025 07:01  -  13 Jul 2025 07:00  --------------------------------------------------------  IN: 0 mL / OUT: 100 mL / NET: -100 mL        Appearance: Normal	  HEENT:   Normal oral mucosa, PERRL, EOMI	  Lymphatic: No lymphadenopathy  Cardiovascular: Normal S1 S2, No JVD, No murmurs, No edema  Respiratory: Lungs clear to auscultation	  Psychiatry: A & O x 3, Mood & affect appropriate  Gastrointestinal:  Soft, Non-tender, + BS	  Skin: No rashes, No ecchymoses, No cyanosis	  Neurologic: Non-focal  Extremities: Normal range of motion, No clubbing, cyanosis or edema  Vascular: Peripheral pulses palpable 2+ bilaterally    MEDICATIONS  (STANDING):  ampicillin/sulbactam  IVPB 3 Gram(s) IV Intermittent every 6 hours  aspirin  chewable 81 milliGRAM(s) Oral daily  atorvastatin 80 milliGRAM(s) Oral at bedtime  azaTHIOprine 100 milliGRAM(s) Oral daily  cholecalciferol 1000 Unit(s) Oral daily  cyanocobalamin 1000 MICROGram(s) Oral daily  dextrose 5%. 1000 milliLiter(s) (50 mL/Hr) IV Continuous <Continuous>  dextrose 50% Injectable 25 Gram(s) IV Push once  ferrous    sulfate 325 milliGRAM(s) Oral two times a day  finasteride 5 milliGRAM(s) Oral daily  FIRST- Mouthwash  BLM 30 milliLiter(s) Swish and Spit three times a day  folic acid 1 milliGRAM(s) Oral daily  heparin   Injectable 5000 Unit(s) SubCutaneous every 8 hours  hydrALAZINE 100 milliGRAM(s) Oral every 8 hours  insulin lispro (ADMELOG) corrective regimen sliding scale   SubCutaneous at bedtime  insulin lispro (ADMELOG) corrective regimen sliding scale   SubCutaneous three times a day before meals  labetalol 400 milliGRAM(s) Oral every 8 hours  losartan 100 milliGRAM(s) Oral at bedtime  NIFEdipine XL 90 milliGRAM(s) Oral <User Schedule>  predniSONE   Tablet 20 milliGRAM(s) Oral daily  pyridostigmine 60 milliGRAM(s) Oral <User Schedule>  sertraline 50 milliGRAM(s) Oral daily  spironolactone 50 milliGRAM(s) Oral daily  tamsulosin 0.4 milliGRAM(s) Oral at bedtime      TELEMETRY: 	    ECG:  	  RADIOLOGY:  OTHER: 	  	  LABS:	 	    CARDIAC MARKERS:                                8.6    5.46  )-----------( 216      ( 13 Jul 2025 06:17 )             26.0     07-13    139  |  106  |  23[H]  ----------------------------<  105[H]  3.8   |  30  |  1.91[H]    Ca    8.9      13 Jul 2025 06:17      proBNP:   Lipid Profile:   HgA1c:   TSH: Thyroid Stimulating Hormone, Serum: 4.28 uU/mL (06-27 @ 05:47)

## 2025-07-13 NOTE — PROGRESS NOTE ADULT - SUBJECTIVE AND OBJECTIVE BOX
Interval Events:  pt in and    Allergies    No Known Allergies    Intolerances      Endocrine/Metabolic Medications:  atorvastatin 80 milliGRAM(s) Oral at bedtime  dextrose 50% Injectable 25 Gram(s) IV Push once  dextrose Oral Gel 15 Gram(s) Oral once PRN  finasteride 5 milliGRAM(s) Oral daily  insulin lispro (ADMELOG) corrective regimen sliding scale   SubCutaneous three times a day before meals  insulin lispro (ADMELOG) corrective regimen sliding scale   SubCutaneous at bedtime  predniSONE   Tablet 20 milliGRAM(s) Oral daily      Vital Signs Last 24 Hrs  T(C): 36.3 (13 Jul 2025 10:57), Max: 37 (12 Jul 2025 18:58)  T(F): 97.3 (13 Jul 2025 10:57), Max: 98.6 (12 Jul 2025 18:58)  HR: 62 (13 Jul 2025 10:57) (62 - 70)  BP: 135/48 (13 Jul 2025 10:57) (130/58 - 169/70)  BP(mean): --  RR: 17 (13 Jul 2025 10:57) (17 - 18)  SpO2: 97% (13 Jul 2025 10:57) (96% - 97%)    Parameters below as of 13 Jul 2025 10:57  Patient On (Oxygen Delivery Method): room air          PHYSICAL EXAM  All physical exam findings normal, except those marked:  General:	Alert, active, cooperative, NAD, well hydrated  .		[] Abnormal:  Neck		Normal: supple, no cervical adenopathy, no palpable thyroid  .		[] Abnormal:  Cardiovascular	Normal: regular rate, normal S1, S2, no murmurs  .		[] Abnormal:  Respiratory	Normal: no chest wall deformity, normal respiratory pattern, CTA B/L  .		[] Abnormal:  Abdominal	Normal: soft, ND, NT, bowel sounds present, no masses, no organomegaly  .		[] Abnormal:  		Normal normal genitalia, testes descended, circumcised/uncircumcised  .		Arvind stage:			Breast arvind:  .		Menstrual history:  .		[] Abnormal:  Extremities	Normal: FROM x4  .		[] Abnormal:  Skin		Normal: intact and not indurated, no rash, no acanthosis nigricans  .		[] Abnormal:  Neurologic	Normal: grossly intact  .		[] Abnormal:    LABS                        8.6    5.46  )-----------( 216      ( 13 Jul 2025 06:17 )             26.0                               139    |  106    |  23                  Calcium: 8.9   / iCa: x      (07-13 @ 06:17)    ----------------------------<  105       Magnesium: x                                3.8     |  30     |  1.91             Phosphorous: x          CAPILLARY BLOOD GLUCOSE      POCT Blood Glucose.: 241 mg/dL (13 Jul 2025 11:36)  POCT Blood Glucose.: 140 mg/dL (13 Jul 2025 08:14)  POCT Blood Glucose.: 118 mg/dL (12 Jul 2025 21:12)  POCT Blood Glucose.: 168 mg/dL (12 Jul 2025 16:37)  POCT Blood Glucose.: 278 mg/dL (12 Jul 2025 12:10)        Assesment/plan    69 y/o M, from [facility] ambulates with a walker, with PMHx of Myasthenia gravis s/p thymectomy, HTN, 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 Hgb 9.9. BUN/Cr 30/1.81. Admitted for MICHEL on CKD and uncontrolled HTN, Nephrology and Cardiology following.    Endocrinology consulted for further evaluation of uncontrolled HTN. Due to hx of MG with thymectomy, pt on chronic Prednisone 20 mg qd.     A1C: 6.3 %    # Uncontrolled HTN  - pt noted with persistently labile and elevated HTN despite use of multiple anti-HTN agents- now better controlled   - pt on chronic Prednisone 20 mg qd for MG tx > ? exogenous Cushing Syndrome leading to persistently elevated, labile BP, may consider SLOW prednisone taper (f/u primary team and Neuro)  - Renin mildly elev to 6.47, Aldosterone 13.7- Hyperaldosteronism ruled out  , consider renovascular HTN  - per Nephro, Recc Renal US with dopplers as an outpt to r/o HOANG.    - TSH 4.38 wnl   - pt denies any HA, palpitations. -Plasma mets and 24 hr urine for mets/cats WNL > Pheochromocytoma ruled out  - continue to monitor BP at this time    #Diabetes Mellitus  - c/w BILLIE ACHS  - On discharge, please start Jardiance qd as pt will benefit from renoprotective effects and may aid in BP control.  - Patient's fingerstick glucose goal is 100-180 mg/dL

## 2025-07-13 NOTE — PROGRESS NOTE ADULT - SUBJECTIVE AND OBJECTIVE BOX
C A R D I O L O G Y  **********************************    DATE OF SERVICE: 07-13-25        no events overnight       acetaminophen     Tablet .. 650 milliGRAM(s) Oral every 6 hours PRN  aspirin  chewable 81 milliGRAM(s) Oral daily  atorvastatin 80 milliGRAM(s) Oral at bedtime  azaTHIOprine 100 milliGRAM(s) Oral daily  cholecalciferol 1000 Unit(s) Oral daily  cyanocobalamin 1000 MICROGram(s) Oral daily  dextrose 5%. 1000 milliLiter(s) IV Continuous <Continuous>  dextrose 50% Injectable 25 Gram(s) IV Push once  dextrose Oral Gel 15 Gram(s) Oral once PRN  ferrous    sulfate 325 milliGRAM(s) Oral two times a day  finasteride 5 milliGRAM(s) Oral daily  FIRST- Mouthwash  BLM 30 milliLiter(s) Swish and Spit three times a day  folic acid 1 milliGRAM(s) Oral daily  heparin   Injectable 5000 Unit(s) SubCutaneous every 8 hours  hydrALAZINE 100 milliGRAM(s) Oral every 8 hours  insulin lispro (ADMELOG) corrective regimen sliding scale   SubCutaneous three times a day before meals  insulin lispro (ADMELOG) corrective regimen sliding scale   SubCutaneous at bedtime  labetalol 400 milliGRAM(s) Oral every 8 hours  losartan 100 milliGRAM(s) Oral at bedtime  NIFEdipine XL 90 milliGRAM(s) Oral <User Schedule>  ondansetron Injectable 4 milliGRAM(s) IV Push every 8 hours PRN  predniSONE   Tablet 20 milliGRAM(s) Oral daily  pyridostigmine 60 milliGRAM(s) Oral <User Schedule>  sertraline 50 milliGRAM(s) Oral daily  spironolactone 50 milliGRAM(s) Oral daily  tamsulosin 0.4 milliGRAM(s) Oral at bedtime                            8.0    5.15  )-----------( 203 ( 12 Jul 2025 05:38 )             25.0       Hemoglobin: 8.0 g/dL (07-12 @ 05:38)  Hemoglobin: 7.7 g/dL (07-10 @ 05:59)  Hemoglobin: 8.1 g/dL (07-09 @ 05:56)      07-12    139  |  106  |  22[H]  ----------------------------<  103[H]  3.9   |  29  |  1.87[H]    Ca    8.7      12 Jul 2025 05:38      Creatinine Trend: 1.87<--, 1.68<--, 1.66<--, 1.81<--, 1.68<--, 1.74<--    COAGS:           T(C): 36.3 (07-13-25 @ 07:41), Max: 37 (07-12-25 @ 18:58)  HR: 66 (07-13-25 @ 07:41) (66 - 75)  BP: 130/58 (07-13-25 @ 07:41) (130/58 - 169/70)  RR: 17 (07-13-25 @ 07:41) (17 - 18)  SpO2: 97% (07-13-25 @ 07:41) (96% - 99%)  Wt(kg): --    I&O's Summary    12 Jul 2025 07:01  -  13 Jul 2025 07:00  --------------------------------------------------------  IN: 0 mL / OUT: 100 mL / NET: -100 mL      HEENT:  (-)icterus (-)pallor  CV: N S1 S2 1/6 BOUBACAR (+)2 Pulses B/l  Resp:  Clear to ausculatation B/L, normal effort  GI: (+) BS Soft, NT, ND  Lymph:  (-)Edema, (-)obvious lymphadenopathy  Skin: Warm to touch, Normal turgor  Psych: Appropriate mood and affect      TELEMETRY: 	  sinus        ASSESSMENT/PLAN: 	70y  Male PMHx of Myasthenia gravis s/p thymectomy, HTN, 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  - only drug class missing is loop diuretic.  -  TSH wnl   - r/o for pheo  - would need Renal artery dopplers likely as an oupt since may not be done in house     # Abnormal EKG  - echo noted, suspect it is due to hypertensive heart disease given his profoundly elevated blood pressures however can arrange for outpt cardiac MRI     # MG  - neuro follow up  C A R D I O L O G Y  **********************************    DATE OF SERVICE: 07-13-25        no events overnight       acetaminophen     Tablet .. 650 milliGRAM(s) Oral every 6 hours PRN  aspirin  chewable 81 milliGRAM(s) Oral daily  atorvastatin 80 milliGRAM(s) Oral at bedtime  azaTHIOprine 100 milliGRAM(s) Oral daily  cholecalciferol 1000 Unit(s) Oral daily  cyanocobalamin 1000 MICROGram(s) Oral daily  dextrose 5%. 1000 milliLiter(s) IV Continuous <Continuous>  dextrose 50% Injectable 25 Gram(s) IV Push once  dextrose Oral Gel 15 Gram(s) Oral once PRN  ferrous    sulfate 325 milliGRAM(s) Oral two times a day  finasteride 5 milliGRAM(s) Oral daily  FIRST- Mouthwash  BLM 30 milliLiter(s) Swish and Spit three times a day  folic acid 1 milliGRAM(s) Oral daily  heparin   Injectable 5000 Unit(s) SubCutaneous every 8 hours  hydrALAZINE 100 milliGRAM(s) Oral every 8 hours  insulin lispro (ADMELOG) corrective regimen sliding scale   SubCutaneous three times a day before meals  insulin lispro (ADMELOG) corrective regimen sliding scale   SubCutaneous at bedtime  labetalol 400 milliGRAM(s) Oral every 8 hours  losartan 100 milliGRAM(s) Oral at bedtime  NIFEdipine XL 90 milliGRAM(s) Oral <User Schedule>  ondansetron Injectable 4 milliGRAM(s) IV Push every 8 hours PRN  predniSONE   Tablet 20 milliGRAM(s) Oral daily  pyridostigmine 60 milliGRAM(s) Oral <User Schedule>  sertraline 50 milliGRAM(s) Oral daily  spironolactone 50 milliGRAM(s) Oral daily  tamsulosin 0.4 milliGRAM(s) Oral at bedtime                          8.0    5.15  )-----------( 203 ( 12 Jul 2025 05:38 )             25.0       Hemoglobin: 8.0 g/dL (07-12 @ 05:38)  Hemoglobin: 7.7 g/dL (07-10 @ 05:59)  Hemoglobin: 8.1 g/dL (07-09 @ 05:56)      07-12    139  |  106  |  22[H]  ----------------------------<  103[H]  3.9   |  29  |  1.87[H]    Ca    8.7      12 Jul 2025 05:38      Creatinine Trend: 1.87<--, 1.68<--, 1.66<--, 1.81<--, 1.68<--, 1.74<--    COAGS:         T(C): 36.3 (07-13-25 @ 07:41), Max: 37 (07-12-25 @ 18:58)  HR: 66 (07-13-25 @ 07:41) (66 - 75)  BP: 130/58 (07-13-25 @ 07:41) (130/58 - 169/70)  RR: 17 (07-13-25 @ 07:41) (17 - 18)  SpO2: 97% (07-13-25 @ 07:41) (96% - 99%)  Wt(kg): --    I&O's Summary    12 Jul 2025 07:01  -  13 Jul 2025 07:00  --------------------------------------------------------  IN: 0 mL / OUT: 100 mL / NET: -100 mL      HEENT:  (-)icterus (-)pallor  CV: N S1 S2 1/6 BOUBACAR (+)2 Pulses B/l  Resp:  Clear to ausculatation B/L, normal effort  GI: (+) BS Soft, NT, ND  Lymph:  (-)Edema, (-)obvious lymphadenopathy  Skin: Warm to touch, Normal turgor  Psych: Appropriate mood and affect      TELEMETRY: 	  sinus        ASSESSMENT/PLAN: 	70y  Male PMHx of Myasthenia gravis s/p thymectomy, HTN, 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  - only drug class missing is loop diuretic.  -  TSH wnl   - r/o for pheo  - would need Renal artery dopplers likely as an oupt since may not be done in house     # Abnormal EKG  - echo noted, suspect it is due to hypertensive heart disease given his profoundly elevated blood pressures however can arrange for outpt cardiac MRI     # MG  - neuro follow up  Patient is critically ill, requiring critical care services.

## 2025-07-13 NOTE — PROGRESS NOTE ADULT - ASSESSMENT
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 Patient reports he has been feeling nauseous for a few days but no vomiting. He reports chronic increased urinary frequency and sensation of incomplete voiding. He denies all other symptoms - fever, chills, cough, chest pain, SoB, palpitations abdominal pain, diarrhea, dysuria, arm or leg numbness or tingling. Reports regular brown stool - denies black or bloody stool.  (25 Jun 2025 21:32)        1.  Staph epidermidis in 1 blood culture probably is just a contaminant.  2.  The acute phase reactants are not impressive.  ESR-14, CRP < 2.9.  3.  The repeat blood cultures have been negative so far.  4.  No need of any other antibiotic at this point unless patient spikes fevers.  Patient was on IV Unasyn and then received a dose of vancomycin.

## 2025-07-13 NOTE — PROGRESS NOTE ADULT - SUBJECTIVE AND OBJECTIVE BOX
Patient is a 70y old  Male who presents with a chief complaint of MICHEL (2025 08:33)    PATIENT IS SEEN AND EXAMINED IN MEDICAL FLOOR.  NGT [    ]    ARCADIO [   ]      GT [   ]    ALLERGIES:  No Known Allergies      Daily     Daily Weight in k (2025 04:38)    VITALS:    Vital Signs Last 24 Hrs  T(C): 36.3 (2025 07:41), Max: 37 (2025 18:58)  T(F): 97.3 (2025 07:41), Max: 98.6 (2025 18:58)  HR: 66 (2025 07:41) (66 - 75)  BP: 130/58 (2025 07:41) (130/58 - 169/70)  BP(mean): --  RR: 17 (2025 07:41) (17 - 18)  SpO2: 97% (2025 07:41) (96% - 99%)    Parameters below as of 2025 07:41  Patient On (Oxygen Delivery Method): room air        LABS:    CBC Full  -  ( 2025 05:38 )  WBC Count : 5.15 K/uL  RBC Count : 2.24 M/uL  Hemoglobin : 8.0 g/dL  Hematocrit : 25.0 %  Platelet Count - Automated : 203 K/uL  Mean Cell Volume : 111.6 fl  Mean Cell Hemoglobin : 35.7 pg  Mean Cell Hemoglobin Concentration : 32.0 g/dL  Auto Neutrophil # : x  Auto Lymphocyte # : x  Auto Monocyte # : x  Auto Eosinophil # : x  Auto Basophil # : x  Auto Neutrophil % : x  Auto Lymphocyte % : x  Auto Monocyte % : x  Auto Eosinophil % : x  Auto Basophil % : x      07-12    139  |  106  |  22[H]  ----------------------------<  103[H]  3.9   |  29  |  1.87[H]    Ca    8.7      2025 05:38      CAPILLARY BLOOD GLUCOSE      POCT Blood Glucose.: 140 mg/dL (2025 08:14)  POCT Blood Glucose.: 118 mg/dL (2025 21:12)  POCT Blood Glucose.: 168 mg/dL (2025 16:37)  POCT Blood Glucose.: 278 mg/dL (2025 12:10)          Creatinine Trend: 1.87<--, 1.68<--, 1.66<--, 1.81<--, 1.68<--, 1.74<--  I&O's Summary    2025 07:01  -  2025 07:00  --------------------------------------------------------  IN: 0 mL / OUT: 100 mL / NET: -100 mL            Blood Blood-Peripheral   @ 12:23   No growth at 24 hours  --  --      Blood Blood-Peripheral   @ 12:16   No growth at 24 hours  --  --      Blood Blood-Peripheral   @ 18:00   Growth in anaerobic bottle: Staphylococcus haemolyticus  Isolation of Coagulase negative Staphylococcus from single blood culture  sets may represent  contamination. Contact the Microbiology Department at [phone] if  susceptibility testing is needed.  clinically indicated.  Direct identification is available within approximately 3-5  hours either by Blood Panel Multiplexed PCR or Direct  MALDI-TOF. Details: [email or web address]  --  Blood Culture PCR      Blood Blood-Peripheral   @ 17:45   No growth at 4 days  --  --          MEDICATIONS:    MEDICATIONS  (STANDING):  aspirin  chewable 81 milliGRAM(s) Oral daily  atorvastatin 80 milliGRAM(s) Oral at bedtime  azaTHIOprine 100 milliGRAM(s) Oral daily  cholecalciferol 1000 Unit(s) Oral daily  cyanocobalamin 1000 MICROGram(s) Oral daily  dextrose 5%. 1000 milliLiter(s) (50 mL/Hr) IV Continuous <Continuous>  dextrose 50% Injectable 25 Gram(s) IV Push once  ferrous    sulfate 325 milliGRAM(s) Oral two times a day  finasteride 5 milliGRAM(s) Oral daily  FIRST- Mouthwash  BLM 30 milliLiter(s) Swish and Spit three times a day  folic acid 1 milliGRAM(s) Oral daily  heparin   Injectable 5000 Unit(s) SubCutaneous every 8 hours  hydrALAZINE 100 milliGRAM(s) Oral every 8 hours  insulin lispro (ADMELOG) corrective regimen sliding scale   SubCutaneous three times a day before meals  insulin lispro (ADMELOG) corrective regimen sliding scale   SubCutaneous at bedtime  labetalol 400 milliGRAM(s) Oral every 8 hours  losartan 100 milliGRAM(s) Oral at bedtime  NIFEdipine XL 90 milliGRAM(s) Oral <User Schedule>  predniSONE   Tablet 20 milliGRAM(s) Oral daily  pyridostigmine 60 milliGRAM(s) Oral <User Schedule>  sertraline 50 milliGRAM(s) Oral daily  spironolactone 50 milliGRAM(s) Oral daily  tamsulosin 0.4 milliGRAM(s) Oral at bedtime      MEDICATIONS  (PRN):  acetaminophen     Tablet .. 650 milliGRAM(s) Oral every 6 hours PRN Temp greater or equal to 38C (100.4F), Mild Pain (1 - 3)  dextrose Oral Gel 15 Gram(s) Oral once PRN Blood Glucose LESS THAN 70 milliGRAM(s)/deciliter  ondansetron Injectable 4 milliGRAM(s) IV Push every 8 hours PRN Nausea and/or Vomiting      REVIEW OF SYSTEMS:                           ALL ROS DONE [ X   ]    CONSTITUTIONAL:  LETHARGIC [   ], FEVER [   ], UNRESPONSIVE [   ]  CVS:  CP  [   ], SOB, [   ], PALPITATIONS [   ], DIZZYNESS [   ]  RS: COUGH [   ], SPUTUM [   ]  GI: ABDOMINAL PAIN [   ], NAUSEA [   ], VOMITINGS [   ], DIARRHEA [   ], CONSTIPATION [   ]  :  DYSURIA [   ], NOCTURIA [   ], INCREASED FREQUENCY [   ], DRIBLING [   ],  SKELETAL: PAINFUL JOINTS [   ], SWOLLEN JOINTS [   ], NECK ACHE [   ], LOW BACK ACHE [   ],  SKIN : ULCERS [   ], RASH [   ], ITCHING [   ]  CNS: HEAD ACHE [   ], DOUBLE VISION [   ], BLURRED VISION [   ], AMS / CONFUSION [   ], SEIZURES [   ], WEAKNESS [   ],TINGLING / NUMBNESS [   ]        PHYSICAL EXAMINATION:    GENERAL APPEARANCE: NO DISTRESS  HEENT:  NO PALLOR, NO  JVD,  NO   NODES, NECK SUPPLE  CVS: S1 +, S2 +,   RS: AEEB,  OCCASIONAL  RALES +,   NO RONCHI  ABD: SOFT, NT, NO, BS +  EXT: NO PE  SKIN: WARM,   SKELETAL:  ROM ACCEPTABLE  CNS:  AAO X 3        RADIOLOGY :  RADIOLOGY AND READINGS REVIEWED      < from: US Renal (25 @ 09:48) >    IMPRESSION:    1. No hydronephrosis.  2. Increased renal cortical echogenicity compatible with renal   parenchymal disease.  3. There is limited visualization of the urinary bladder; however, on   limited views, the wall appears diffusely thickened. Suggest correlation   with urinalysis to exclude infection.  4. Partially imaged hypoechoic structure posterior to the left kidney may   correspond to the lymphadenopathy questioned on the prior CT. Further   evaluation is recommended as clinically.    < end of copied text >      < from: CT Abdomen and Pelvis No Cont (25 @ 23:36) >    IMPRESSION:  No retroperitoneal hematoma.    Soft tissue densities within the retroperitoneum next to the aorta   concerning for extensive lymphadenopathy versus tortuous vessels or a   combination of both. The evaluation is limited due to the lack of IV   contrast. Recommend contrast enhanced cross-sectional imaging for better   assessment. Further workup for lymphoproliferative disease may be helpful.    Circumferential bladder wall thickening as can be seen with decompression   or component of cystitis.        Prelim: No obvious retroperitoneal hematoma. Gallbladder wall thickening.   Bladder wall thickening. Retroperitoneal/pelvic lymphadenopathy.   Persistent right > left pleural effusion. Small pericardial effusion.   Official report to follow.    < end of copied text >  < from: Xray Chest 1 View- PORTABLE-Urgent (Xray Chest 1 View- PORTABLE-Urgent .) (25 @ 16:53) >    IMPRESSION: Sternotomy, left loop recorder, cervical spine hardware   noted. Increasing atelectatic consolidation of left lower lobe with   effusion.    < end of copied text >        ASSESSMENT :     Acute renal failure    HTN (hypertension)    DM (diabetes mellitus)    Myasthenia gravis    Hypercholesterolemia    CVA (cerebrovascular accident)    Peripheral neuropathy    BPH (benign prostatic hyperplasia)    Major depression    Lipoma of chest wall    MG with thymoma (myasthena gravis)    H/O cataract extraction        PLAN:  HPI:  Patient is a 70M, from [facility] ambulates with a walker, with PMHx of Myasthenia gravis s/p thymectomy, HTN, 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 Patient reports he has been feeling nauseous for a few days but no vomiting. He reports chronic increased urinary frequency and sensation of incomplete voiding. He denies all other symptoms - fever, chills, cough, chest pain, SoB, palpitations abdominal pain, diarrhea, dysuria, arm or leg numbness or tingling. Reports regular brown stool - denies black or bloody stool.  (2025 21:32)        # DC PLAN: BACK TO [facility] AFTER ABDOMINAL U/S TO EVALUATE LIKELY RETROPERITONEAL LYMPHADENOPATHY   - WILL REFER HIM TO ONCOLOGY & GI CONSULT (FOR EGD& COLONOSCOPY) AS OUT PATIENT FOR FURTHER FOLLOWUP  - MACROCYTIC ANEMIA, ANEMIA OF CKD   - LIKELY MYELOSUPPRESSION / ANEMIA DUE TO AZATHIOPRINE  - WILL SUPPLEMENT B12, FA, FESO4     # [7/10] CASE D/W PATIENT'S PARTNER MILLI DONOVAN VIA PHONE. CASE DISCUSSED AT LENGTH. ALL QUESTIONS ANSWERED.     # CASE D/W PATIENT AND PARTNER MILLI DONOVAN AT BEDSIDE, ALL QUESTIONS ANSWERED. DISCUSSED RECOMMENDATIONS AS MADE BY MULTIDISCIPLINARY TEAM. DISCUSSED THAT PROGNOSIS IS GUARDED. THEY VERBALIZED UNDERSTANDING. IN DISCUSSION REGARDING GOC - PATIENT WISHES TO BE FULL CODE.        # HYPERTENSIVE URGENCY    # HTN - CONTROLLED   - TELEMETRY  - ECHO - LV EF - 70 - 75%, G1DD  - NOTED TROPONINS  - S/P IV HYDRALAZINE  - HYDRALAZINE  - PROCARDIA  - LABETALOL  - LOSARTAN  - SPIRONOLACTONE  - NOTED SERUM RENIN, ALDOSTERONE LEVELS  - F/U PLASMA METANEPHRINES  - CARDIOLOGY CONSULT  - NEPHROLOGY CONSULT  - ENDOCRINOLOGY CONSULT    # H/O CARDIAC ARRHYTHMIA - LOOP RECORDER IN PLACE  # H/O STERNOTOMY DUE TO THYMECTOMY      # MICHEL ON CKD STAGE 3 B  # BPH, OBSTRUCTIVE UROPATHY  - IV FLUIDS  - PVR - 0 ML  - MONITOR CR  - AVOID NEPHROTOXIC AGENTS  - NEPHROLOGY CONSULT    # ACUTE GINGIVOSTOMATITIS  # ? GPC IN BLOOD - CONTAMINANT ? - F/U FINAL CULTURES  - PLACE ON UNASYN, F/U BCX  - MAGIC MOUTHWASH  - ID CONSULT    # HYPOGLYCEMIA - RESOLVED  - MONITORING FINGERSTICKS    # ANEMIA  - MACROCYTIC ANEMIA, ANEMIA OF CKD   - LIKELY MYELOSUPPRESSION / ANEMIA DUE TO AZATHIOPRINE    - TREND HGB  - ANEMIA PANEL  - DENIES MELENA, HEMATOCHEZIA, HEMATEMESIS, HEMATURIA  - NOTED CT A/P  - GASTROENTEROLOGY CONSULT    # ? LYMPHADENOPATHY ON CT A/P   - F/U U/S ABDOMEN AS OUTPATIENT  - DEFER CT W/ CONTRAST GIVEN RENAL DYSFUNCTION  - PER FAMILY PATIENT MAY HAVE HARDWARE THAT IS NOT COMPATIBLE WITH MRI  - HEME/ONC CONSULT    # DENIES DYSURIA, UA NEGATIVE FOR LEUKOCYTE ESTERASE AND NITRITES      # MYASTHENIA GRAVIS S/P THYMECTOMY/STERNOTOMY - ON AZATHIOPRINE, PREDNISONE, PYRIDOSTIGMINE  - D/W NEUROLOGY - RECOMMENDED F/U AS OUTPATIENT FOR FURTHER PREDNISONE TAPER/MANAGEMENT  - NEUROLOGY CONSULT    # DM, DIABETIC NEPHROPATHY, DIABETIC RETINOPATHY, DIABETIC PERIPHERAL NEUROPATHY  - SSI + FS    # CVA W/ MILD RIGHT HP    # IMPAIRED GAIT DUE TO GENERALIZED MUSCLE WEAKNESS, CVA, MYASTHENIA GRAVIS, CERVICAL SPINAL STENOSIS - H/O CERVICAL SPINE INSTRUMENTATION - HARDWARE IN PLACE     # PAD  # GI AND DVT PPX     Patient is a 70y old  Male who presents with a chief complaint of MICHEL (2025 08:33)    PATIENT IS SEEN AND EXAMINED IN MEDICAL FLOOR.      ALLERGIES:  No Known Allergies      Daily     Daily Weight in k (2025 04:38)    VITALS:    Vital Signs Last 24 Hrs  T(C): 36.3 (2025 07:41), Max: 37 (2025 18:58)  T(F): 97.3 (2025 07:41), Max: 98.6 (2025 18:58)  HR: 66 (2025 07:41) (66 - 75)  BP: 130/58 (2025 07:41) (130/58 - 169/70)  BP(mean): --  RR: 17 (2025 07:41) (17 - 18)  SpO2: 97% (2025 07:41) (96% - 99%)    Parameters below as of 2025 07:41  Patient On (Oxygen Delivery Method): room air        LABS:    CBC Full  -  ( 2025 05:38 )  WBC Count : 5.15 K/uL  RBC Count : 2.24 M/uL  Hemoglobin : 8.0 g/dL  Hematocrit : 25.0 %  Platelet Count - Automated : 203 K/uL  Mean Cell Volume : 111.6 fl  Mean Cell Hemoglobin : 35.7 pg  Mean Cell Hemoglobin Concentration : 32.0 g/dL  Auto Neutrophil # : x  Auto Lymphocyte # : x  Auto Monocyte # : x  Auto Eosinophil # : x  Auto Basophil # : x  Auto Neutrophil % : x  Auto Lymphocyte % : x  Auto Monocyte % : x  Auto Eosinophil % : x  Auto Basophil % : x      07-12    139  |  106  |  22[H]  ----------------------------<  103[H]  3.9   |  29  |  1.87[H]    Ca    8.7      2025 05:38      CAPILLARY BLOOD GLUCOSE      POCT Blood Glucose.: 140 mg/dL (2025 08:14)  POCT Blood Glucose.: 118 mg/dL (2025 21:12)  POCT Blood Glucose.: 168 mg/dL (2025 16:37)  POCT Blood Glucose.: 278 mg/dL (2025 12:10)          Creatinine Trend: 1.87<--, 1.68<--, 1.66<--, 1.81<--, 1.68<--, 1.74<--  I&O's Summary    2025 07:01  -  2025 07:00  --------------------------------------------------------  IN: 0 mL / OUT: 100 mL / NET: -100 mL            Blood Blood-Peripheral   @ 12:23   No growth at 24 hours  --  --      Blood Blood-Peripheral   @ 12:16   No growth at 24 hours  --  --      Blood Blood-Peripheral   @ 18:00   Growth in anaerobic bottle: Staphylococcus haemolyticus  Isolation of Coagulase negative Staphylococcus from single blood culture  sets may represent  contamination. Contact the Microbiology Department at [phone] if  susceptibility testing is needed.  clinically indicated.  Direct identification is available within approximately 3-5  hours either by Blood Panel Multiplexed PCR or Direct  MALDI-TOF. Details: [email or web address]  --  Blood Culture PCR      Blood Blood-Peripheral   @ 17:45   No growth at 4 days  --  --          MEDICATIONS:    MEDICATIONS  (STANDING):  aspirin  chewable 81 milliGRAM(s) Oral daily  atorvastatin 80 milliGRAM(s) Oral at bedtime  azaTHIOprine 100 milliGRAM(s) Oral daily  cholecalciferol 1000 Unit(s) Oral daily  cyanocobalamin 1000 MICROGram(s) Oral daily  dextrose 5%. 1000 milliLiter(s) (50 mL/Hr) IV Continuous <Continuous>  dextrose 50% Injectable 25 Gram(s) IV Push once  ferrous    sulfate 325 milliGRAM(s) Oral two times a day  finasteride 5 milliGRAM(s) Oral daily  FIRST- Mouthwash  BLM 30 milliLiter(s) Swish and Spit three times a day  folic acid 1 milliGRAM(s) Oral daily  heparin   Injectable 5000 Unit(s) SubCutaneous every 8 hours  hydrALAZINE 100 milliGRAM(s) Oral every 8 hours  insulin lispro (ADMELOG) corrective regimen sliding scale   SubCutaneous three times a day before meals  insulin lispro (ADMELOG) corrective regimen sliding scale   SubCutaneous at bedtime  labetalol 400 milliGRAM(s) Oral every 8 hours  losartan 100 milliGRAM(s) Oral at bedtime  NIFEdipine XL 90 milliGRAM(s) Oral <User Schedule>  predniSONE   Tablet 20 milliGRAM(s) Oral daily  pyridostigmine 60 milliGRAM(s) Oral <User Schedule>  sertraline 50 milliGRAM(s) Oral daily  spironolactone 50 milliGRAM(s) Oral daily  tamsulosin 0.4 milliGRAM(s) Oral at bedtime      MEDICATIONS  (PRN):  acetaminophen     Tablet .. 650 milliGRAM(s) Oral every 6 hours PRN Temp greater or equal to 38C (100.4F), Mild Pain (1 - 3)  dextrose Oral Gel 15 Gram(s) Oral once PRN Blood Glucose LESS THAN 70 milliGRAM(s)/deciliter  ondansetron Injectable 4 milliGRAM(s) IV Push every 8 hours PRN Nausea and/or Vomiting      REVIEW OF SYSTEMS:                           ALL ROS DONE [ X   ]    CONSTITUTIONAL:  LETHARGIC [   ], FEVER [   ], UNRESPONSIVE [   ]  CVS:  CP  [   ], SOB, [   ], PALPITATIONS [   ], DIZZYNESS [   ]  RS: COUGH [   ], SPUTUM [   ]  GI: ABDOMINAL PAIN [   ], NAUSEA [   ], VOMITINGS [   ], DIARRHEA [   ], CONSTIPATION [   ]  :  DYSURIA [   ], NOCTURIA [   ], INCREASED FREQUENCY [   ], DRIBLING [   ],  SKELETAL: PAINFUL JOINTS [   ], SWOLLEN JOINTS [   ], NECK ACHE [   ], LOW BACK ACHE [   ],  SKIN : ULCERS [   ], RASH [   ], ITCHING [   ]  CNS: HEAD ACHE [   ], DOUBLE VISION [   ], BLURRED VISION [   ], AMS / CONFUSION [   ], SEIZURES [   ], WEAKNESS [   ],TINGLING / NUMBNESS [   ]        PHYSICAL EXAMINATION:    GENERAL APPEARANCE: NO DISTRESS  HEENT:  NO PALLOR, NO  JVD,  NO   NODES, NECK SUPPLE  CVS: S1 +, S2 +,   RS: AEEB,  OCCASIONAL  RALES +,   NO RONCHI  ABD: SOFT, NT, NO, BS +  EXT: NO PE  SKIN: WARM,   SKELETAL:  ROM ACCEPTABLE  CNS:  AAO X 3        RADIOLOGY :  RADIOLOGY AND READINGS REVIEWED      < from: US Renal (25 @ 09:48) >    IMPRESSION:    1. No hydronephrosis.  2. Increased renal cortical echogenicity compatible with renal   parenchymal disease.  3. There is limited visualization of the urinary bladder; however, on   limited views, the wall appears diffusely thickened. Suggest correlation   with urinalysis to exclude infection.  4. Partially imaged hypoechoic structure posterior to the left kidney may   correspond to the lymphadenopathy questioned on the prior CT. Further   evaluation is recommended as clinically.    < end of copied text >      < from: CT Abdomen and Pelvis No Cont (25 @ 23:36) >    IMPRESSION:  No retroperitoneal hematoma.    Soft tissue densities within the retroperitoneum next to the aorta   concerning for extensive lymphadenopathy versus tortuous vessels or a   combination of both. The evaluation is limited due to the lack of IV   contrast. Recommend contrast enhanced cross-sectional imaging for better   assessment. Further workup for lymphoproliferative disease may be helpful.    Circumferential bladder wall thickening as can be seen with decompression   or component of cystitis.        Prelim: No obvious retroperitoneal hematoma. Gallbladder wall thickening.   Bladder wall thickening. Retroperitoneal/pelvic lymphadenopathy.   Persistent right > left pleural effusion. Small pericardial effusion.   Official report to follow.    < end of copied text >  < from: Xray Chest 1 View- PORTABLE-Urgent (Xray Chest 1 View- PORTABLE-Urgent .) (25 @ 16:53) >    IMPRESSION: Sternotomy, left loop recorder, cervical spine hardware   noted. Increasing atelectatic consolidation of left lower lobe with   effusion.    < end of copied text >        ASSESSMENT :     Acute renal failure    HTN (hypertension)    DM (diabetes mellitus)    Myasthenia gravis    Hypercholesterolemia    CVA (cerebrovascular accident)    Peripheral neuropathy    BPH (benign prostatic hyperplasia)    Major depression    Lipoma of chest wall    MG with thymoma (myasthena gravis)    H/O cataract extraction        PLAN:  HPI:  Patient is a 70M, from [facility] ambulates with a walker, with PMHx of Myasthenia gravis s/p thymectomy, HTN, 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 Patient reports he has been feeling nauseous for a few days but no vomiting. He reports chronic increased urinary frequency and sensation of incomplete voiding. He denies all other symptoms - fever, chills, cough, chest pain, SoB, palpitations abdominal pain, diarrhea, dysuria, arm or leg numbness or tingling. Reports regular brown stool - denies black or bloody stool.  (2025 21:32)        # DC PLAN: BACK TO [facility] AFTER ABDOMINAL U/S TO EVALUATE LIKELY RETROPERITONEAL LYMPHADENOPATHY   - WILL REFER HIM TO ONCOLOGY & GI CONSULT (FOR EGD& COLONOSCOPY) AS OUT PATIENT FOR FURTHER FOLLOWUP  - MACROCYTIC ANEMIA, ANEMIA OF CKD   - LIKELY MYELOSUPPRESSION / ANEMIA DUE TO AZATHIOPRINE  - WILL SUPPLEMENT B12, FA, FESO4     # [] CASE D/W PATIENT'S PARTNER MILLI DONOVAN VIA PHONE @ [phone]. CASE DISCUSSED AT LENGTH. ALL QUESTIONS ANSWERED. REVIEWED RECOMMENDATIONS AS MADE BY MULTIDISCIPLINARY TEAM. SHE VERBALIZED UNDERSTANDING. DISCUSSED THAT PROGNOSIS IS GUARDED, SHE VERBALIZED UNDERSTANDING    # CASE D/W PATIENT AND PARTNER MILLI DONOVAN AT BEDSIDE, ALL QUESTIONS ANSWERED. DISCUSSED RECOMMENDATIONS AS MADE BY MULTIDISCIPLINARY TEAM. DISCUSSED THAT PROGNOSIS IS GUARDED. THEY VERBALIZED UNDERSTANDING. IN DISCUSSION REGARDING GOC - PATIENT WISHES TO BE FULL CODE.        # HYPERTENSIVE URGENCY    # HTN - CONTROLLED   - TELEMETRY  - ECHO - LV EF - 70 - 75%, G1DD  - NOTED TROPONINS  - S/P IV HYDRALAZINE  - HYDRALAZINE  - PROCARDIA  - LABETALOL  - LOSARTAN  - SPIRONOLACTONE  - NOTED SERUM RENIN, ALDOSTERONE LEVELS  - F/U PLASMA METANEPHRINES  - CARDIOLOGY CONSULT  - NEPHROLOGY CONSULT  - ENDOCRINOLOGY CONSULT    # H/O CARDIAC ARRHYTHMIA - LOOP RECORDER IN PLACE  # H/O STERNOTOMY DUE TO THYMECTOMY      # MICHEL ON CKD STAGE 3 B  # BPH, OBSTRUCTIVE UROPATHY  - IV FLUIDS  - PVR - 0 ML  - MONITOR CR  - AVOID NEPHROTOXIC AGENTS  - NEPHROLOGY CONSULT    # ACUTE GINGIVOSTOMATITIS  # STAPH. HEMOLYTICS IN BLOOD - ? LIKELY CONTAMINANT    - PLACE ON UNASYN, F/U BCX [S. HEMOLYTICUS], RPT CX - NGTD  - MAGIC MOUTHWASH  - ID CONSULT    # HYPOGLYCEMIA - RESOLVED  - MONITORING FINGERSTICKS    # ANEMIA  - MACROCYTIC ANEMIA, ANEMIA OF CKD   - LIKELY MYELOSUPPRESSION / ANEMIA DUE TO AZATHIOPRINE    - TREND HGB  - ANEMIA PANEL  - DENIES MELENA, HEMATOCHEZIA, HEMATEMESIS, HEMATURIA  - NOTED CT A/P  - GASTROENTEROLOGY CONSULT    # ? LYMPHADENOPATHY ON CT A/P   - F/U U/S ABDOMEN AS OUTPATIENT  - DEFER CT W/ CONTRAST GIVEN RENAL DYSFUNCTION  - PER FAMILY PATIENT MAY HAVE HARDWARE THAT IS NOT COMPATIBLE WITH MRI  - HEME/ONC CONSULT    # DENIES DYSURIA, UA NEGATIVE FOR LEUKOCYTE ESTERASE AND NITRITES      # MYASTHENIA GRAVIS S/P THYMECTOMY/STERNOTOMY - ON AZATHIOPRINE, PREDNISONE, PYRIDOSTIGMINE  - D/W NEUROLOGY - RECOMMENDED F/U AS OUTPATIENT FOR FURTHER PREDNISONE TAPER/MANAGEMENT  - NEUROLOGY CONSULT    # DM, DIABETIC NEPHROPATHY, DIABETIC RETINOPATHY, DIABETIC PERIPHERAL NEUROPATHY  - SSI + FS    # CVA W/ MILD RIGHT HP    # IMPAIRED GAIT DUE TO GENERALIZED MUSCLE WEAKNESS, CVA, MYASTHENIA GRAVIS, CERVICAL SPINAL STENOSIS - H/O CERVICAL SPINE INSTRUMENTATION - HARDWARE IN PLACE     # PAD  # GI AND DVT PPX

## 2025-07-13 NOTE — PROGRESS NOTE ADULT - SUBJECTIVE AND OBJECTIVE BOX
[facility] NEPHROLOGY- PROGRESS NOTE    69 yo Male from [facility] ambulates with a walker, with PMHx of Myasthenia gravis s/p thymectomy, HTN, 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 Pt a/w hypertensive urgency and MICHEL. Nephrology consulted for Elevated serum creatinine.    Hospital Medications: Medications reviewed.    REVIEW OF SYSTEMS:  Gen: no fever or chills  Cards: no chest pain  Resp: no dyspnea  GI: no nausea or vomiting or diarrhea  : no dysuria or hematuria  Vascular: no LE edema      VITALS:  T(F): 97.3 (07-13-25 @ 07:41), Max: 98.6 (07-12-25 @ 18:58)  HR: 66 (07-13-25 @ 07:41)  BP: 130/58 (07-13-25 @ 07:41)  RR: 17 (07-13-25 @ 07:41)  SpO2: 97% (07-13-25 @ 07:41)  Wt(kg): --    07-12 @ 07:01  -  07-13 @ 07:00  --------------------------------------------------------  IN: 0 mL / OUT: 100 mL / NET: -100 mL      PHYSICAL EXAM:  Gen: NAD, calm  HEENT: +facial/ periorbital edema    Cards: RRR, +S1/S2,+BOUBACAR  Resp: CTA B/L  GI: soft, NT/ND, NABS  : no CVA tenderness  Extremities: no edema B/L       LABS:  07-12    139  |  106  |  22[H]  ----------------------------<  103[H]  3.9   |  29  |  1.87[H]    Ca    8.7      12 Jul 2025 05:38      Creatinine Trend: 1.87 <--, 1.68 <--, 1.66 <--, 1.81 <--                        8.0    5.15  )-----------( 203      ( 12 Jul 2025 05:38 )             25.0     Urine Studies:  Urinalysis Basic - ( 12 Jul 2025 05:38 )    Color:  / Appearance:  / SG:  / pH:   Gluc: 103 mg/dL / Ketone:   / Bili:  / Urobili:    Blood:  / Protein:  / Nitrite:    Leuk Esterase:  / RBC:  / WBC    Sq Epi:  / Non Sq Epi:  / Bacteria:

## 2025-07-13 NOTE — PROGRESS NOTE ADULT - ASSESSMENT
Patient is a 71yo Male from [facility] ambulates with a walker, with PMHx of Myasthenia gravis s/p thymectomy, HTN, 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 Pt a/w hypertensive urgency and MICHEL.   Nephrology consulted for Elevated serum creatinine.    1. MICHEL- as per H& P; last SCr 1.3. MICHEL likely hemodynamically mediated due to uncontrolled HTN. Renal function improving  for which Losartan was resumed. Renal function overall remains stable now.  Feurea 13.52%;   UA with 300 protein, no blood. FeNa 10%;  Renal US with no hydro.  TTE with grade 1 diastolic dysfunction, EF 70-75%; ?hypertensive vs infiltrative cardiomyopathy. SIFE neg & K/l wnl. Pt b/l pleural effusions on TTE. Check CXR;  Strict I/Os. Avoid nephrotoxins/ NSAIDs/ RCA. Monitor BMP.    2. Proteinuria- UA with 300 protein, no blood.  UPCr on 6/29,  0.48 g/day. No acute interventions at this time.    3. Hypertensive urgency- BP now improving. Advised to stop using  energy drinks due to increased caffeine which can increase BP  c/w Labetalol 400mg PO q 8hrs. c/w Nifedipine ER 90mg at noon. Continue with aldactone 50 mg PO daily for resistant HTN. c/w Losartan 100 mg PO qhs due to elevated BP at night; +nocturnal HTN; recc outpt sleep study to r/o BRET.   ?Consider decreasing frequency of pyridostigmine?- defer to Neuro  Recc Renal US with dopplers as an outpt to r/o HOANG (unavailable at [facility]).    c/w low salt diet. Monitor BP  4. Hypokalemia- Kuldip/ Renin; 3.19. Not consistent with hypoaldosteronism. Renin 6.479.   Hypokalemia due to shifting from excessive insulin.   Aldactone as above.  Check VBG. Monitor serum potassium  5. Iron deficiency anemia- low hgb. Check FOBT. tsat 16% with ferriitn 236- Finished Venofer 200mg IV qd x 3 doses. s/p Epo 10k SC x1 on 6/30 & 7/6. Will give another dose today. Monitor hgb    [facility] NEPHROLOGY  Bethel Smith M.D.  Guillermo Jimenez D.O.  Kathleen Lan M.D.  MD Kimi Rodríguez, MSN, ANP-C    Telephone: [phone]  Facsimile: [phone]    [facility] [place] [address], [address]  [place] [address]

## 2025-07-13 NOTE — PROGRESS NOTE ADULT - NS ATTEND AMEND GEN_ALL_CORE FT
BP coming under better control, 130s-160s.  consider adding an loop diuretic like Bumex for blood pressure management in this patient with CKD & diabetes. BP coming under better control, 130s-160s.  consider adding an loop diuretic like Bumex for blood pressure management in this patient with CKD & diabetes.  can also consider using a different beta blocker for BP control:  nebivolol (40mg daily)  or carvedilol (25mg BID), instead of labetalol 400-800mg TID.

## 2025-07-14 ENCOUNTER — TRANSCRIPTION ENCOUNTER (OUTPATIENT)
Age: 70
End: 2025-07-14

## 2025-07-14 VITALS — OXYGEN SATURATION: 100 % | TEMPERATURE: 98 F | HEART RATE: 96 BPM | RESPIRATION RATE: 18 BRPM

## 2025-07-14 LAB
ANION GAP SERPL CALC-SCNC: 4 MMOL/L — LOW (ref 5–17)
BUN SERPL-MCNC: 24 MG/DL — HIGH (ref 7–18)
CALCIUM SERPL-MCNC: 8.6 MG/DL — SIGNIFICANT CHANGE UP (ref 8.4–10.5)
CHLORIDE SERPL-SCNC: 105 MMOL/L — SIGNIFICANT CHANGE UP (ref 96–108)
CO2 SERPL-SCNC: 29 MMOL/L — SIGNIFICANT CHANGE UP (ref 22–31)
CREAT SERPL-MCNC: 1.87 MG/DL — HIGH (ref 0.5–1.3)
EGFR: 39 ML/MIN/1.73M2 — LOW
EGFR: 39 ML/MIN/1.73M2 — LOW
GLUCOSE BLDC GLUCOMTR-MCNC: 108 MG/DL — HIGH (ref 70–99)
GLUCOSE BLDC GLUCOMTR-MCNC: 184 MG/DL — HIGH (ref 70–99)
GLUCOSE SERPL-MCNC: 88 MG/DL — SIGNIFICANT CHANGE UP (ref 70–99)
HCT VFR BLD CALC: 24.2 % — LOW (ref 39–50)
HGB BLD-MCNC: 7.8 G/DL — LOW (ref 13–17)
MCHC RBC-ENTMCNC: 32.2 G/DL — SIGNIFICANT CHANGE UP (ref 32–36)
MCHC RBC-ENTMCNC: 35.8 PG — HIGH (ref 27–34)
MCV RBC AUTO: 111 FL — HIGH (ref 80–100)
METANEPHRINE, PL: 34.3 PG/ML — SIGNIFICANT CHANGE UP (ref 0–88)
NORMETANEPHRINE, PL: 79.7 PG/ML — SIGNIFICANT CHANGE UP (ref 0–285.2)
NRBC BLD AUTO-RTO: 0 /100 WBCS — SIGNIFICANT CHANGE UP (ref 0–0)
PLATELET # BLD AUTO: 171 K/UL — SIGNIFICANT CHANGE UP (ref 150–400)
POTASSIUM SERPL-MCNC: 4 MMOL/L — SIGNIFICANT CHANGE UP (ref 3.5–5.3)
POTASSIUM SERPL-SCNC: 4 MMOL/L — SIGNIFICANT CHANGE UP (ref 3.5–5.3)
RBC # BLD: 2.18 M/UL — LOW (ref 4.2–5.8)
RBC # FLD: 18.2 % — HIGH (ref 10.3–14.5)
SODIUM SERPL-SCNC: 138 MMOL/L — SIGNIFICANT CHANGE UP (ref 135–145)
WBC # BLD: 4.8 K/UL — SIGNIFICANT CHANGE UP (ref 3.8–10.5)
WBC # FLD AUTO: 4.8 K/UL — SIGNIFICANT CHANGE UP (ref 3.8–10.5)

## 2025-07-14 PROCEDURE — 93306 TTE W/DOPPLER COMPLETE: CPT

## 2025-07-14 PROCEDURE — 88184 FLOWCYTOMETRY/ TC 1 MARKER: CPT

## 2025-07-14 PROCEDURE — 84484 ASSAY OF TROPONIN QUANT: CPT

## 2025-07-14 PROCEDURE — 99285 EMERGENCY DEPT VISIT HI MDM: CPT | Mod: 25

## 2025-07-14 PROCEDURE — 82803 BLOOD GASES ANY COMBINATION: CPT

## 2025-07-14 PROCEDURE — 82570 ASSAY OF URINE CREATININE: CPT

## 2025-07-14 PROCEDURE — 84155 ASSAY OF PROTEIN SERUM: CPT

## 2025-07-14 PROCEDURE — 83605 ASSAY OF LACTIC ACID: CPT

## 2025-07-14 PROCEDURE — 85610 PROTHROMBIN TIME: CPT

## 2025-07-14 PROCEDURE — 86850 RBC ANTIBODY SCREEN: CPT

## 2025-07-14 PROCEDURE — 83036 HEMOGLOBIN GLYCOSYLATED A1C: CPT

## 2025-07-14 PROCEDURE — 82746 ASSAY OF FOLIC ACID SERUM: CPT

## 2025-07-14 PROCEDURE — 83615 LACTATE (LD) (LDH) ENZYME: CPT

## 2025-07-14 PROCEDURE — 83735 ASSAY OF MAGNESIUM: CPT

## 2025-07-14 PROCEDURE — 85652 RBC SED RATE AUTOMATED: CPT

## 2025-07-14 PROCEDURE — 83540 ASSAY OF IRON: CPT

## 2025-07-14 PROCEDURE — 86901 BLOOD TYPING SEROLOGIC RH(D): CPT

## 2025-07-14 PROCEDURE — 84443 ASSAY THYROID STIM HORMONE: CPT

## 2025-07-14 PROCEDURE — 84156 ASSAY OF PROTEIN URINE: CPT

## 2025-07-14 PROCEDURE — 76775 US EXAM ABDO BACK WALL LIM: CPT

## 2025-07-14 PROCEDURE — 84466 ASSAY OF TRANSFERRIN: CPT

## 2025-07-14 PROCEDURE — 85027 COMPLETE CBC AUTOMATED: CPT

## 2025-07-14 PROCEDURE — 84540 ASSAY OF URINE/UREA-N: CPT

## 2025-07-14 PROCEDURE — 74176 CT ABD & PELVIS W/O CONTRAST: CPT

## 2025-07-14 PROCEDURE — 83550 IRON BINDING TEST: CPT

## 2025-07-14 PROCEDURE — 86140 C-REACTIVE PROTEIN: CPT

## 2025-07-14 PROCEDURE — 82384 ASSAY THREE CATECHOLAMINES: CPT

## 2025-07-14 PROCEDURE — 81001 URINALYSIS AUTO W/SCOPE: CPT

## 2025-07-14 PROCEDURE — 80048 BASIC METABOLIC PNL TOTAL CA: CPT

## 2025-07-14 PROCEDURE — 86334 IMMUNOFIX E-PHORESIS SERUM: CPT

## 2025-07-14 PROCEDURE — 82728 ASSAY OF FERRITIN: CPT

## 2025-07-14 PROCEDURE — 84100 ASSAY OF PHOSPHORUS: CPT

## 2025-07-14 PROCEDURE — 71045 X-RAY EXAM CHEST 1 VIEW: CPT

## 2025-07-14 PROCEDURE — 85730 THROMBOPLASTIN TIME PARTIAL: CPT

## 2025-07-14 PROCEDURE — 87077 CULTURE AEROBIC IDENTIFY: CPT

## 2025-07-14 PROCEDURE — 36415 COLL VENOUS BLD VENIPUNCTURE: CPT

## 2025-07-14 PROCEDURE — 84550 ASSAY OF BLOOD/URIC ACID: CPT

## 2025-07-14 PROCEDURE — 84145 PROCALCITONIN (PCT): CPT

## 2025-07-14 PROCEDURE — 82607 VITAMIN B-12: CPT

## 2025-07-14 PROCEDURE — 84300 ASSAY OF URINE SODIUM: CPT

## 2025-07-14 PROCEDURE — 87150 DNA/RNA AMPLIFIED PROBE: CPT

## 2025-07-14 PROCEDURE — 84165 PROTEIN E-PHORESIS SERUM: CPT

## 2025-07-14 PROCEDURE — 93005 ELECTROCARDIOGRAM TRACING: CPT

## 2025-07-14 PROCEDURE — 84133 ASSAY OF URINE POTASSIUM: CPT

## 2025-07-14 PROCEDURE — 82784 ASSAY IGA/IGD/IGG/IGM EACH: CPT

## 2025-07-14 PROCEDURE — 86900 BLOOD TYPING SEROLOGIC ABO: CPT

## 2025-07-14 PROCEDURE — 82962 GLUCOSE BLOOD TEST: CPT

## 2025-07-14 PROCEDURE — 87205 SMEAR GRAM STAIN: CPT

## 2025-07-14 PROCEDURE — 85025 COMPLETE CBC W/AUTO DIFF WBC: CPT

## 2025-07-14 PROCEDURE — 83521 IG LIGHT CHAINS FREE EACH: CPT

## 2025-07-14 PROCEDURE — 83835 ASSAY OF METANEPHRINES: CPT

## 2025-07-14 PROCEDURE — 87040 BLOOD CULTURE FOR BACTERIA: CPT

## 2025-07-14 PROCEDURE — 82088 ASSAY OF ALDOSTERONE: CPT

## 2025-07-14 PROCEDURE — 84244 ASSAY OF RENIN: CPT

## 2025-07-14 PROCEDURE — 88185 FLOWCYTOMETRY/TC ADD-ON: CPT

## 2025-07-14 PROCEDURE — 80053 COMPREHEN METABOLIC PANEL: CPT

## 2025-07-14 RX ORDER — SPIRONOLACTONE 25 MG
2 TABLET ORAL
Qty: 0 | Refills: 0 | DISCHARGE
Start: 2025-07-14

## 2025-07-14 RX ORDER — AMOXICILLIN AND CLAVULANATE POTASSIUM 500; 125 MG/1; MG/1
1 TABLET, FILM COATED ORAL
Qty: 4 | Refills: 0
Start: 2025-07-14 | End: 2025-07-15

## 2025-07-14 RX ADMIN — CYANOCOBALAMIN 1000 MICROGRAM(S): 1000 INJECTION INTRAMUSCULAR; SUBCUTANEOUS at 11:53

## 2025-07-14 RX ADMIN — PYRIDOSTIGMINE BROMIDE 60 MILLIGRAM(S): 5 INJECTION, SOLUTION INTRAVENOUS; PARENTERAL at 00:22

## 2025-07-14 RX ADMIN — HEPARIN SODIUM 5000 UNIT(S): 1000 INJECTION INTRAVENOUS; SUBCUTANEOUS at 06:00

## 2025-07-14 RX ADMIN — Medication 50 MILLIGRAM(S): at 06:03

## 2025-07-14 RX ADMIN — LABETALOL HYDROCHLORIDE 400 MILLIGRAM(S): 200 TABLET, FILM COATED ORAL at 06:01

## 2025-07-14 RX ADMIN — INSULIN LISPRO 1: 100 INJECTION, SOLUTION INTRAVENOUS; SUBCUTANEOUS at 11:52

## 2025-07-14 RX ADMIN — SERTRALINE 50 MILLIGRAM(S): 100 TABLET, FILM COATED ORAL at 11:54

## 2025-07-14 RX ADMIN — AMPICILLIN SODIUM AND SULBACTAM SODIUM 200 GRAM(S): 1; .5 INJECTION, POWDER, FOR SOLUTION INTRAMUSCULAR; INTRAVENOUS at 06:00

## 2025-07-14 RX ADMIN — PYRIDOSTIGMINE BROMIDE 60 MILLIGRAM(S): 5 INJECTION, SOLUTION INTRAVENOUS; PARENTERAL at 09:25

## 2025-07-14 RX ADMIN — AMPICILLIN SODIUM AND SULBACTAM SODIUM 200 GRAM(S): 1; .5 INJECTION, POWDER, FOR SOLUTION INTRAMUSCULAR; INTRAVENOUS at 11:57

## 2025-07-14 RX ADMIN — Medication 1000 UNIT(S): at 11:53

## 2025-07-14 RX ADMIN — AZATHIOPRINE 100 MILLIGRAM(S): 50 TABLET ORAL at 11:55

## 2025-07-14 RX ADMIN — FINASTERIDE 5 MILLIGRAM(S): 1 TABLET, FILM COATED ORAL at 11:53

## 2025-07-14 RX ADMIN — PREDNISONE 20 MILLIGRAM(S): 20 TABLET ORAL at 06:01

## 2025-07-14 RX ADMIN — Medication 325 MILLIGRAM(S): at 06:01

## 2025-07-14 RX ADMIN — Medication 100 MILLIGRAM(S): at 06:01

## 2025-07-14 RX ADMIN — PYRIDOSTIGMINE BROMIDE 60 MILLIGRAM(S): 5 INJECTION, SOLUTION INTRAVENOUS; PARENTERAL at 06:01

## 2025-07-14 RX ADMIN — FOLIC ACID 1 MILLIGRAM(S): 1 TABLET ORAL at 11:53

## 2025-07-14 RX ADMIN — Medication 90 MILLIGRAM(S): at 11:54

## 2025-07-14 RX ADMIN — Medication 81 MILLIGRAM(S): at 11:53

## 2025-07-14 RX ADMIN — AMPICILLIN SODIUM AND SULBACTAM SODIUM 200 GRAM(S): 1; .5 INJECTION, POWDER, FOR SOLUTION INTRAMUSCULAR; INTRAVENOUS at 00:22

## 2025-07-14 NOTE — DISCHARGE NOTE NURSING/CASE MANAGEMENT/SOCIAL WORK - FINANCIAL ASSISTANCE
[facility] provides services at a reduced cost to those who are determined to be eligible through [facility]’s financial assistance program. Information regarding [facility]’s financial assistance program can be found by going to [email or web address] or by calling [phone].

## 2025-07-14 NOTE — PROGRESS NOTE ADULT - SUBJECTIVE AND OBJECTIVE BOX
C A R D I O L O G Y  **********************************    DATE OF SERVICE: 07-14-25    Patient denies chest pain or shortness of breath.   Review of symptoms otherwise negative.    acetaminophen     Tablet .. 650 milliGRAM(s) Oral every 6 hours PRN  ampicillin/sulbactam  IVPB 3 Gram(s) IV Intermittent every 6 hours  aspirin  chewable 81 milliGRAM(s) Oral daily  atorvastatin 80 milliGRAM(s) Oral at bedtime  azaTHIOprine 100 milliGRAM(s) Oral daily  cholecalciferol 1000 Unit(s) Oral daily  cyanocobalamin 1000 MICROGram(s) Oral daily  dextrose 5%. 1000 milliLiter(s) IV Continuous <Continuous>  dextrose 50% Injectable 25 Gram(s) IV Push once  dextrose Oral Gel 15 Gram(s) Oral once PRN  ferrous    sulfate 325 milliGRAM(s) Oral two times a day  finasteride 5 milliGRAM(s) Oral daily  FIRST- Mouthwash  BLM 30 milliLiter(s) Swish and Spit three times a day  folic acid 1 milliGRAM(s) Oral daily  heparin   Injectable 5000 Unit(s) SubCutaneous every 8 hours  hydrALAZINE 100 milliGRAM(s) Oral every 8 hours  insulin lispro (ADMELOG) corrective regimen sliding scale   SubCutaneous three times a day before meals  insulin lispro (ADMELOG) corrective regimen sliding scale   SubCutaneous at bedtime  labetalol 400 milliGRAM(s) Oral every 8 hours  losartan 100 milliGRAM(s) Oral at bedtime  NIFEdipine XL 90 milliGRAM(s) Oral <User Schedule>  ondansetron Injectable 4 milliGRAM(s) IV Push every 8 hours PRN  predniSONE   Tablet 20 milliGRAM(s) Oral daily  pyridostigmine 60 milliGRAM(s) Oral <User Schedule>  sertraline 50 milliGRAM(s) Oral daily  spironolactone 50 milliGRAM(s) Oral daily  tamsulosin 0.4 milliGRAM(s) Oral at bedtime                            7.8    4.80  )-----------( 171      ( 14 Jul 2025 05:40 )             24.2       Hemoglobin: 7.8 g/dL (07-14 @ 05:40)  Hemoglobin: 8.6 g/dL (07-13 @ 06:17)  Hemoglobin: 8.0 g/dL (07-12 @ 05:38)  Hemoglobin: 7.7 g/dL (07-10 @ 05:59)      07-14    138  |  105  |  24[H]  ----------------------------<  88  4.0   |  29  |  1.87[H]    Ca    8.6      14 Jul 2025 05:40      Creatinine Trend: 1.87<--, 1.91<--, 1.87<--, 1.68<--, 1.66<--, 1.81<--    COAGS:           T(C): 36.6 (07-14-25 @ 11:24), Max: 37.2 (07-13-25 @ 15:32)  HR: 96 (07-14-25 @ 11:24) (64 - 96)  BP: 141/61 (07-14-25 @ 07:49) (141/61 - 178/65)  RR: 18 (07-14-25 @ 11:24) (17 - 18)  SpO2: 100% (07-14-25 @ 11:24) (95% - 100%)  Wt(kg): --    I&O's Summary    13 Jul 2025 07:01  -  14 Jul 2025 07:00  --------------------------------------------------------  IN: 0 mL / OUT: 1100 mL / NET: -1100 mL        HEENT:  (-)icterus (-)pallor  CV: N S1 S2 1/6 BOUBACAR (+)2 Pulses B/l  Resp:  Clear to ausculatation B/L, normal effort  GI: (+) BS Soft, NT, ND  Lymph:  (-)Edema, (-)obvious lymphadenopathy  Skin: Warm to touch, Normal turgor  Psych: Appropriate mood and affect      TELEMETRY: 	  sinus        ASSESSMENT/PLAN: 	70y  Male PMHx of Myasthenia gravis s/p thymectomy, HTN, 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  - only drug class missing is loop diuretic.  -  TSH wnl   - r/o for pheo  - would need Renal artery dopplers likely as an oupt since may not be done in house     # Abnormal EKG  - echo noted, suspect it is due to hypertensive heart disease given his profoundly elevated blood pressures however can arrange for outpt cardiac MRI     # MG  - neuro follow up     Ta Mercado MD, [facility]  BEEPER [phone]

## 2025-07-14 NOTE — PROGRESS NOTE ADULT - ASSESSMENT
Patient is a 70M, from [facility] ambulates with a walker, with PMHx of Myasthenia gravis s/p thymectomy, HTN, 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 Patient reports he has been feeling nauseous for a few days but no vomiting. He reports chronic increased urinary frequency and sensation of incomplete voiding. He denies all other symptoms - fever, chills, cough, chest pain, SoB, palpitations abdominal pain, diarrhea, dysuria, arm or leg numbness or tingling. Reports regular brown stool - denies black or bloody stool.  (25 Jun 2025 21:32)        1.  Staph epidermidis in 1 blood culture probably is just a contaminant. Patient though appears chronically ill states he feels fine and so is stable from infection point of view.  2.  The acute phase reactants are not impressive.  ESR-14, CRP < 2.9.  3.  The repeat blood cultures have been negative so far.  4.  No need of any other antibiotic at this point unless patient spikes fevers.  Patient was on IV Unasyn and then received a dose of vancomycin.

## 2025-07-14 NOTE — PROGRESS NOTE ADULT - SUBJECTIVE AND OBJECTIVE BOX
Date of Service 07-14-25 @ 16:34    CHIEF COMPLAINT:Patient is a 70y old  Male who presents with a chief complaint of MICHEL (14 Jul 2025 11:40)    	  REVIEW OF SYSTEMS:  CONSTITUTIONAL: No fever, weight loss, or fatigue  EYES: No eye pain, visual disturbances, or discharge  ENT:  No difficulty hearing, tinnitus, vertigo; No sinus or throat pain  NECK: No pain or stiffness  RESPIRATORY: No cough, wheezing, chills or hemoptysis; No Shortness of Breath  CARDIOVASCULAR: No chest pain, palpitations, passing out, dizziness, or leg swelling  GASTROINTESTINAL: No abdominal or epigastric pain. No nausea, vomiting, or hematemesis; No diarrhea or constipation. No melena or hematochezia.  GENITOURINARY: No dysuria, frequency, hematuria, or incontinence  NEUROLOGICAL: No headaches, memory loss, loss of strength, numbness, or tremors  SKIN: No itching, burning, rashes, or lesions   LYMPH Nodes: No enlarged glands  ENDOCRINE: No heat or cold intolerance; No hair loss  MUSCULOSKELETAL: No joint pain or swelling; No muscle, back, or extremity pain  PSYCHIATRIC: No depression, anxiety, mood swings, or difficulty sleeping  HEME/LYMPH: No easy bruising, or bleeding gums  ALLERGY AND IMMUNOLOGIC: No hives or eczema	    [ ] All others negative	  [ ] Unable to obtain    PHYSICAL EXAM:  T(C): 36.6 (07-14-25 @ 11:24), Max: 37 (07-14-25 @ 07:49)  HR: 96 (07-14-25 @ 11:24) (64 - 96)  BP: 141/61 (07-14-25 @ 07:49) (141/61 - 178/65)  RR: 18 (07-14-25 @ 11:24) (17 - 18)  SpO2: 100% (07-14-25 @ 11:24) (95% - 100%)  Wt(kg): --  I&O's Summary    13 Jul 2025 07:01  -  14 Jul 2025 07:00  --------------------------------------------------------  IN: 0 mL / OUT: 1100 mL / NET: -1100 mL        Appearance: Normal	  HEENT:   Normal oral mucosa, PERRL, EOMI	  Lymphatic: No lymphadenopathy  Cardiovascular: Normal S1 S2, No JVD, No murmurs, No edema  Respiratory: Lungs clear to auscultation	  Psychiatry: A & O x 3, Mood & affect appropriate  Gastrointestinal:  Soft, Non-tender, + BS	  Skin: No rashes, No ecchymoses, No cyanosis	  Neurologic: Non-focal  Extremities: Normal range of motion, No clubbing, cyanosis or edema  Vascular: Peripheral pulses palpable 2+ bilaterally    MEDICATIONS  (STANDING):  ampicillin/sulbactam  IVPB 3 Gram(s) IV Intermittent every 6 hours  aspirin  chewable 81 milliGRAM(s) Oral daily  atorvastatin 80 milliGRAM(s) Oral at bedtime  azaTHIOprine 100 milliGRAM(s) Oral daily  cholecalciferol 1000 Unit(s) Oral daily  cyanocobalamin 1000 MICROGram(s) Oral daily  dextrose 5%. 1000 milliLiter(s) (50 mL/Hr) IV Continuous <Continuous>  dextrose 50% Injectable 25 Gram(s) IV Push once  ferrous    sulfate 325 milliGRAM(s) Oral two times a day  finasteride 5 milliGRAM(s) Oral daily  FIRST- Mouthwash  BLM 30 milliLiter(s) Swish and Spit three times a day  folic acid 1 milliGRAM(s) Oral daily  heparin   Injectable 5000 Unit(s) SubCutaneous every 8 hours  hydrALAZINE 100 milliGRAM(s) Oral every 8 hours  insulin lispro (ADMELOG) corrective regimen sliding scale   SubCutaneous three times a day before meals  insulin lispro (ADMELOG) corrective regimen sliding scale   SubCutaneous at bedtime  labetalol 400 milliGRAM(s) Oral every 8 hours  losartan 100 milliGRAM(s) Oral at bedtime  NIFEdipine XL 90 milliGRAM(s) Oral <User Schedule>  predniSONE   Tablet 20 milliGRAM(s) Oral daily  pyridostigmine 60 milliGRAM(s) Oral <User Schedule>  sertraline 50 milliGRAM(s) Oral daily  spironolactone 50 milliGRAM(s) Oral daily  tamsulosin 0.4 milliGRAM(s) Oral at bedtime      TELEMETRY: 	    ECG:  	  RADIOLOGY:  OTHER: 	  	  LABS:	 	    CARDIAC MARKERS:                                7.8    4.80  )-----------( 171      ( 14 Jul 2025 05:40 )             24.2     07-14    138  |  105  |  24[H]  ----------------------------<  88  4.0   |  29  |  1.87[H]    Ca    8.6      14 Jul 2025 05:40      proBNP:   Lipid Profile:   HgA1c:   TSH: Thyroid Stimulating Hormone, Serum: 4.28 uU/mL (06-27 @ 05:47)

## 2025-07-14 NOTE — PROGRESS NOTE ADULT - REASON FOR ADMISSION
MICHEL

## 2025-07-14 NOTE — PROGRESS NOTE ADULT - PROVIDER SPECIALTY LIST ADULT
Cardiology
Endocrinology
Internal Medicine
Nephrology
Cardiology
Endocrinology
Heme/Onc
Infectious Disease
Internal Medicine
Nephrology
Cardiology
Endocrinology
Infectious Disease
Internal Medicine
Nephrology
Nephrology
Heme/Onc
Internal Medicine
Nephrology
Internal Medicine

## 2025-07-14 NOTE — DISCHARGE NOTE NURSING/CASE MANAGEMENT/SOCIAL WORK - PATIENT PORTAL LINK FT
You can access the FollowMyHealth Patient Portal offered by [facility] by registering at the following website: [email or web address]. By joining [employer]’s FollowMyHealth portal, you will also be able to view your health information using other applications (apps) compatible with our system.

## 2025-08-07 ENCOUNTER — EMERGENCY (EMERGENCY)
Facility: HOSPITAL | Age: 70
LOS: 1 days | End: 2025-08-07
Attending: STUDENT IN AN ORGANIZED HEALTH CARE EDUCATION/TRAINING PROGRAM
Payer: MEDICAID

## 2025-08-07 VITALS
DIASTOLIC BLOOD PRESSURE: 60 MMHG | TEMPERATURE: 98 F | OXYGEN SATURATION: 96 % | HEART RATE: 54 BPM | WEIGHT: 134.26 LBS | RESPIRATION RATE: 18 BRPM | SYSTOLIC BLOOD PRESSURE: 104 MMHG

## 2025-08-07 VITALS
HEART RATE: 68 BPM | SYSTOLIC BLOOD PRESSURE: 217 MMHG | DIASTOLIC BLOOD PRESSURE: 73 MMHG | RESPIRATION RATE: 18 BRPM | TEMPERATURE: 98 F | OXYGEN SATURATION: 97 %

## 2025-08-07 DIAGNOSIS — Z98.49 CATARACT EXTRACTION STATUS, UNSPECIFIED EYE: Chronic | ICD-10-CM

## 2025-08-07 DIAGNOSIS — G70.00 MYASTHENIA GRAVIS WITHOUT (ACUTE) EXACERBATION: Chronic | ICD-10-CM

## 2025-08-07 DIAGNOSIS — D17.39 BENIGN LIPOMATOUS NEOPLASM OF SKIN AND SUBCUTANEOUS TISSUE OF OTHER SITES: Chronic | ICD-10-CM

## 2025-08-07 LAB
ACANTHOCYTES BLD QL SMEAR: SLIGHT — SIGNIFICANT CHANGE UP
ALBUMIN SERPL ELPH-MCNC: 3 G/DL — LOW (ref 3.5–5)
ALP SERPL-CCNC: 86 U/L — SIGNIFICANT CHANGE UP (ref 40–120)
ALT FLD-CCNC: 19 U/L DA — SIGNIFICANT CHANGE UP (ref 10–60)
ANION GAP SERPL CALC-SCNC: 2 MMOL/L — LOW (ref 5–17)
APPEARANCE UR: CLEAR — SIGNIFICANT CHANGE UP
AST SERPL-CCNC: 40 U/L — SIGNIFICANT CHANGE UP (ref 10–40)
BACTERIA # UR AUTO: ABNORMAL /HPF
BASOPHILS # BLD AUTO: 0.02 K/UL — SIGNIFICANT CHANGE UP (ref 0–0.2)
BASOPHILS NFR BLD AUTO: 0.2 % — SIGNIFICANT CHANGE UP (ref 0–2)
BILIRUB SERPL-MCNC: 0.5 MG/DL — SIGNIFICANT CHANGE UP (ref 0.2–1.2)
BILIRUB UR-MCNC: NEGATIVE — SIGNIFICANT CHANGE UP
BUN SERPL-MCNC: 25 MG/DL — HIGH (ref 7–18)
BURR CELLS BLD QL SMEAR: PRESENT — SIGNIFICANT CHANGE UP
CALCIUM SERPL-MCNC: 8.8 MG/DL — SIGNIFICANT CHANGE UP (ref 8.4–10.5)
CHLORIDE SERPL-SCNC: 102 MMOL/L — SIGNIFICANT CHANGE UP (ref 96–108)
CO2 SERPL-SCNC: 29 MMOL/L — SIGNIFICANT CHANGE UP (ref 22–31)
COLOR SPEC: SIGNIFICANT CHANGE UP
CREAT SERPL-MCNC: 1.95 MG/DL — HIGH (ref 0.5–1.3)
DACRYOCYTES BLD QL SMEAR: SLIGHT — SIGNIFICANT CHANGE UP
DIFF PNL FLD: NEGATIVE — SIGNIFICANT CHANGE UP
EGFR: 37 ML/MIN/1.73M2 — LOW
EGFR: 37 ML/MIN/1.73M2 — LOW
EOSINOPHIL # BLD AUTO: 0 K/UL — SIGNIFICANT CHANGE UP (ref 0–0.5)
EOSINOPHIL NFR BLD AUTO: 0 % — SIGNIFICANT CHANGE UP (ref 0–6)
EPI CELLS # UR: SIGNIFICANT CHANGE UP
GLUCOSE SERPL-MCNC: 80 MG/DL — SIGNIFICANT CHANGE UP (ref 70–99)
GLUCOSE UR QL: NEGATIVE MG/DL — SIGNIFICANT CHANGE UP
HCT VFR BLD CALC: 28.6 % — LOW (ref 39–50)
HGB BLD-MCNC: 9.5 G/DL — LOW (ref 13–17)
IMM GRANULOCYTES NFR BLD AUTO: 0.5 % — SIGNIFICANT CHANGE UP (ref 0–0.9)
KETONES UR QL: NEGATIVE MG/DL — SIGNIFICANT CHANGE UP
LEUKOCYTE ESTERASE UR-ACNC: NEGATIVE — SIGNIFICANT CHANGE UP
LYMPHOCYTES # BLD AUTO: 0.39 K/UL — LOW (ref 1–3.3)
LYMPHOCYTES # BLD AUTO: 3.6 % — LOW (ref 13–44)
MACROCYTES BLD QL: SLIGHT — SIGNIFICANT CHANGE UP
MAGNESIUM SERPL-MCNC: 2 MG/DL — SIGNIFICANT CHANGE UP (ref 1.6–2.6)
MANUAL SMEAR VERIFICATION: SIGNIFICANT CHANGE UP
MCHC RBC-ENTMCNC: 33.2 G/DL — SIGNIFICANT CHANGE UP (ref 32–36)
MCHC RBC-ENTMCNC: 35.8 PG — HIGH (ref 27–34)
MCV RBC AUTO: 107.9 FL — HIGH (ref 80–100)
MONOCYTES # BLD AUTO: 0.27 K/UL — SIGNIFICANT CHANGE UP (ref 0–0.9)
MONOCYTES NFR BLD AUTO: 2.5 % — SIGNIFICANT CHANGE UP (ref 2–14)
NEUTROPHILS # BLD AUTO: 9.98 K/UL — HIGH (ref 1.8–7.4)
NEUTROPHILS NFR BLD AUTO: 93.2 % — HIGH (ref 43–77)
NITRITE UR-MCNC: NEGATIVE — SIGNIFICANT CHANGE UP
NRBC BLD AUTO-RTO: 0 /100 WBCS — SIGNIFICANT CHANGE UP (ref 0–0)
OVALOCYTES BLD QL SMEAR: SLIGHT — SIGNIFICANT CHANGE UP
PH UR: 5.5 — SIGNIFICANT CHANGE UP (ref 5–8)
PHOSPHATE SERPL-MCNC: 2.9 MG/DL — SIGNIFICANT CHANGE UP (ref 2.5–4.5)
PLATELET # BLD AUTO: 258 K/UL — SIGNIFICANT CHANGE UP (ref 150–400)
POTASSIUM SERPL-MCNC: 4.7 MMOL/L — SIGNIFICANT CHANGE UP (ref 3.5–5.3)
POTASSIUM SERPL-SCNC: 4.7 MMOL/L — SIGNIFICANT CHANGE UP (ref 3.5–5.3)
PROT SERPL-MCNC: 6.7 G/DL — SIGNIFICANT CHANGE UP (ref 6–8.3)
PROT UR-MCNC: 300 MG/DL
RBC # BLD: 2.65 M/UL — LOW (ref 4.2–5.8)
RBC # FLD: 15.9 % — HIGH (ref 10.3–14.5)
RBC BLD AUTO: ABNORMAL
RBC CASTS # UR COMP ASSIST: 0 /HPF — SIGNIFICANT CHANGE UP (ref 0–4)
SCHISTOCYTES BLD QL AUTO: SLIGHT — SIGNIFICANT CHANGE UP
SODIUM SERPL-SCNC: 133 MMOL/L — LOW (ref 135–145)
SP GR SPEC: 1.01 — SIGNIFICANT CHANGE UP (ref 1–1.03)
UROBILINOGEN FLD QL: 0.2 MG/DL — SIGNIFICANT CHANGE UP (ref 0.2–1)
WBC # BLD: 10.71 K/UL — HIGH (ref 3.8–10.5)
WBC # FLD AUTO: 10.71 K/UL — HIGH (ref 3.8–10.5)
WBC UR QL: 0 /HPF — SIGNIFICANT CHANGE UP (ref 0–5)

## 2025-08-07 PROCEDURE — 93010 ELECTROCARDIOGRAM REPORT: CPT

## 2025-08-07 PROCEDURE — 85025 COMPLETE CBC W/AUTO DIFF WBC: CPT

## 2025-08-07 PROCEDURE — 71045 X-RAY EXAM CHEST 1 VIEW: CPT | Mod: 26

## 2025-08-07 PROCEDURE — 36415 COLL VENOUS BLD VENIPUNCTURE: CPT

## 2025-08-07 PROCEDURE — 83735 ASSAY OF MAGNESIUM: CPT

## 2025-08-07 PROCEDURE — 99284 EMERGENCY DEPT VISIT MOD MDM: CPT | Mod: 25

## 2025-08-07 PROCEDURE — 84100 ASSAY OF PHOSPHORUS: CPT

## 2025-08-07 PROCEDURE — 71045 X-RAY EXAM CHEST 1 VIEW: CPT

## 2025-08-07 PROCEDURE — 99284 EMERGENCY DEPT VISIT MOD MDM: CPT

## 2025-08-07 PROCEDURE — 81001 URINALYSIS AUTO W/SCOPE: CPT

## 2025-08-07 PROCEDURE — 80053 COMPREHEN METABOLIC PANEL: CPT

## 2025-08-07 PROCEDURE — 82962 GLUCOSE BLOOD TEST: CPT

## 2025-08-07 RX ORDER — PREDNISONE 20 MG/1
1 TABLET ORAL
Refills: 0 | DISCHARGE

## 2025-08-07 RX ORDER — LOSARTAN POTASSIUM 100 MG/1
100 TABLET, FILM COATED ORAL ONCE
Refills: 0 | Status: COMPLETED | OUTPATIENT
Start: 2025-08-07 | End: 2025-08-07

## 2025-08-07 RX ORDER — LOSARTAN POTASSIUM 100 MG/1
100 TABLET, FILM COATED ORAL DAILY
Refills: 0 | Status: DISCONTINUED | OUTPATIENT
Start: 2025-08-07 | End: 2025-08-07

## 2025-08-07 RX ORDER — ASPIRIN 325 MG
1 TABLET ORAL
Refills: 0 | DISCHARGE

## 2025-08-07 RX ORDER — LANOLIN/MINERAL OIL/PETROLATUM
1 OINTMENT (GRAM) OPHTHALMIC (EYE)
Refills: 0 | DISCHARGE

## 2025-08-07 RX ORDER — LABETALOL HYDROCHLORIDE 200 MG/1
2 TABLET, FILM COATED ORAL
Refills: 0 | DISCHARGE

## 2025-08-07 RX ADMIN — Medication 100 MILLIGRAM(S): at 23:20

## 2025-08-07 RX ADMIN — LOSARTAN POTASSIUM 100 MILLIGRAM(S): 100 TABLET, FILM COATED ORAL at 23:20

## 2025-08-08 PROBLEM — N40.0 BENIGN PROSTATIC HYPERPLASIA WITHOUT LOWER URINARY TRACT SYMPTOMS: Chronic | Status: ACTIVE | Noted: 2025-06-26

## 2025-08-08 PROBLEM — F32.9 MAJOR DEPRESSIVE DISORDER, SINGLE EPISODE, UNSPECIFIED: Chronic | Status: ACTIVE | Noted: 2025-06-26

## 2025-08-08 LAB — PLAT MORPH BLD: NORMAL — SIGNIFICANT CHANGE UP
